# Patient Record
Sex: FEMALE | Race: BLACK OR AFRICAN AMERICAN | NOT HISPANIC OR LATINO | Employment: OTHER | URBAN - METROPOLITAN AREA
[De-identification: names, ages, dates, MRNs, and addresses within clinical notes are randomized per-mention and may not be internally consistent; named-entity substitution may affect disease eponyms.]

---

## 2017-02-10 ENCOUNTER — OFFICE VISIT (OUTPATIENT)
Dept: INTERNAL MEDICINE | Facility: CLINIC | Age: 67
End: 2017-02-10
Payer: MEDICARE

## 2017-02-10 VITALS
HEIGHT: 63 IN | WEIGHT: 220 LBS | OXYGEN SATURATION: 99 % | SYSTOLIC BLOOD PRESSURE: 110 MMHG | TEMPERATURE: 98 F | DIASTOLIC BLOOD PRESSURE: 70 MMHG | BODY MASS INDEX: 38.98 KG/M2

## 2017-02-10 DIAGNOSIS — E04.1 THYROID NODULE: ICD-10-CM

## 2017-02-10 DIAGNOSIS — I77.9 BILATERAL CAROTID ARTERY DISEASE: ICD-10-CM

## 2017-02-10 DIAGNOSIS — E78.2 MIXED HYPERLIPIDEMIA: ICD-10-CM

## 2017-02-10 DIAGNOSIS — E66.01 SEVERE OBESITY (BMI 35.0-39.9) WITH COMORBIDITY: ICD-10-CM

## 2017-02-10 DIAGNOSIS — M43.06 SPONDYLOLYSIS OF LUMBAR REGION: ICD-10-CM

## 2017-02-10 DIAGNOSIS — I10 ESSENTIAL HYPERTENSION: Primary | ICD-10-CM

## 2017-02-10 DIAGNOSIS — E55.9 VITAMIN D DEFICIENCY: ICD-10-CM

## 2017-02-10 DIAGNOSIS — E21.0 HYPERPARATHYROIDISM, PRIMARY: ICD-10-CM

## 2017-02-10 PROCEDURE — 99499 UNLISTED E&M SERVICE: CPT | Mod: S$GLB,,, | Performed by: FAMILY MEDICINE

## 2017-02-10 PROCEDURE — 99214 OFFICE O/P EST MOD 30 MIN: CPT | Mod: S$GLB,,, | Performed by: FAMILY MEDICINE

## 2017-02-10 PROCEDURE — 99999 PR PBB SHADOW E&M-EST. PATIENT-LVL III: CPT | Mod: PBBFAC,,, | Performed by: FAMILY MEDICINE

## 2017-02-10 PROCEDURE — 3074F SYST BP LT 130 MM HG: CPT | Mod: S$GLB,,, | Performed by: FAMILY MEDICINE

## 2017-02-10 PROCEDURE — 3078F DIAST BP <80 MM HG: CPT | Mod: S$GLB,,, | Performed by: FAMILY MEDICINE

## 2017-02-10 RX ORDER — LISINOPRIL AND HYDROCHLOROTHIAZIDE 20; 25 MG/1; MG/1
1 TABLET ORAL DAILY
Qty: 90 TABLET | Refills: 3 | Status: SHIPPED | OUTPATIENT
Start: 2017-02-10 | End: 2017-09-27 | Stop reason: SDUPTHER

## 2017-02-10 NOTE — PROGRESS NOTES
"Subjective:       Patient ID: Beth Souza is a 66 y.o. female.    Chief Complaint: Follow-up    HPI Comments: 66-year-old Afro-American female patient with Patient Active Problem List:     Hypertension     Vitamin D deficiency     Spondylolisthesis of lumbar region     Obesity, Class II, BMI 35.0-39.9, with comorbidity (see actual BMI)     Hyperlipidemia     Hyperparathyroidism, primary     Bilateral carotid artery disease     Thyroid nodule     Peripheral polyneuropathy     Cataract  Here for follow-up on chronic medical conditions, patient reports that she's been taking her medicines regularly and requesting refill on lisinopril with hydrochlorothiazide.  Patient denies of any discomfort with swallowing secondary to thyroid nodule, has been doing well after sebaceous cyst removal on the right side of the neck.   Patient continues to have low back pain off and on lately.   Has been taking gabapentin only as needed.   Patient was informed about hyperparathyroidism but does not follow up with nephrology.   Finished vitamin D by prescription in the past and would like to know whether she can get a refill.  Denies of any dizziness or chest pain or shortness of breath        Review of Systems   Constitutional: Negative for fatigue.   Eyes: Negative for visual disturbance.   Respiratory: Negative for shortness of breath.    Cardiovascular: Negative for chest pain and leg swelling.   Gastrointestinal: Negative for abdominal pain, nausea and vomiting.   Musculoskeletal: Positive for back pain. Negative for myalgias.   Skin: Negative for rash.   Neurological: Positive for numbness. Negative for dizziness, weakness, light-headedness and headaches.   Psychiatric/Behavioral: Negative for sleep disturbance.         Visit Vitals    /70    Temp 98.4 °F (36.9 °C) (Tympanic)    Ht 5' 3" (1.6 m)    Wt 99.8 kg (220 lb 0.3 oz)    SpO2 99%    BMI 38.97 kg/m2     Objective:      Physical Exam   Constitutional: She is " oriented to person, place, and time. She appears well-developed and well-nourished.   HENT:   Head: Normocephalic and atraumatic.   Mouth/Throat: Oropharynx is clear and moist.   Neck: No thyromegaly present.   Cardiovascular: Normal rate, regular rhythm and normal heart sounds.    No murmur heard.  Pulmonary/Chest: Effort normal and breath sounds normal. She has no wheezes.   Abdominal: Soft. Bowel sounds are normal. There is no tenderness.   Musculoskeletal: She exhibits no edema or tenderness.   Neurological: She is alert and oriented to person, place, and time.   Skin: Skin is warm and dry. No rash noted.   Positive for skin tags   Psychiatric: She has a normal mood and affect.         Assessment:       1. Essential hypertension    2. Vitamin D deficiency    3. Mixed hyperlipidemia    4. Hyperparathyroidism, primary    5. Bilateral carotid artery disease    6. Thyroid nodule    7. Severe obesity (BMI 35.0-39.9) with comorbidity    8. Spondylolysis of lumbar region        Plan:   Essential hypertension  -     Basic metabolic panel; Future; Expected date: 2/10/17  -     Lipid panel; Future; Expected date: 2/10/17  -     lisinopril-hydrochlorothiazide (PRINZIDE,ZESTORETIC) 20-25 mg Tab; Take 1 tablet by mouth once daily.  Dispense: 90 tablet; Refill: 3  -     EKG 12-lead; Future  Blood pressure stable today currently on lisinopril hydrodissected 20/25 mg, refill given today.  Encouraged to restrict salt intake and eat low-fat and low-cholesterol diet and exercise 30 minutes daily      Vitamin D deficiency  -     Vitamin D; Future; Expected date: 2/10/17  Will recheck vitamin D levels and replete accordingly    Mixed hyperlipidemia  -     Lipid panel; Future; Expected date: 2/10/17  Currently diet-controlled, will check fasting labs     Hyperparathyroidism, primary  -     PTH, intact; Future; Expected date: 2/10/17   Will recheck parathyroid levels    Bilateral carotid artery disease  -     US Carotid Bilateral;  Future; Expected date: 2/10/17   noted previous carotid ultrasound showing minimal atherosclerosis, will recheck labs on carotid ultrasound    Thyroid nodule  -     TSH; Future; Expected date: 2/10/17   patient clinically asymptomatic    Severe obesity (BMI 35.0-39.9) with comorbidity-encouraged to work on lifestyle modifications with diet and exercise to lose weight with BMI 38.9     Spondylolysis of lumbar region- advised to take gabapentin only as needed  Graded exercise regimen recommended

## 2017-02-10 NOTE — MR AVS SNAPSHOT
[facility]  [address] [facility] [place] [address]  Phone: [phone]  Fax: [phone]                  Beth Souza   2/10/2017 1:40 PM   Office Visit    Description:  Female : 1950   Provider:  Edna Oliva MD   Department:  [facility] - Internal Medicine           Reason for Visit     Follow-up           Diagnoses this Visit        Comments    Essential hypertension    -  Primary     Vitamin D deficiency         Mixed hyperlipidemia         Hyperparathyroidism, primary         Bilateral carotid artery disease         Thyroid nodule         Severe obesity (BMI 35.0-39.9) with comorbidity         Spondylolysis of lumbar region                To Do List           Future Appointments        Provider Department Dept Phone    2017 1:30 PM LABORATORY, [facility] [phone]    2017 2:00 PM [facility] [phone]    2017 2[phone]      Goals (5 Years of Data)     None      Follow-Up and Disposition     Return in about 6 months (around 8/10/2017), or if symptoms worsen or fail to improve.       These Medications        Disp Refills Start End    lisinopril-hydrochlorothiazide (PRINZIDE,ZESTORETIC) 20-25 mg Tab 90 tablet 3 2/10/2017     Take 1 tablet by mouth once daily. - Oral    Pharmacy: [facility] [address] [place] [address] [address] [facility] Ph #: [phone]         [facility] On Call     [facility] Care [facility] -  Assistance  Registered nurses in the [facility] provide clinical advisement, health education, appointment booking, and other advisory services.  Call for this free service at [phone].             Medications           Message regarding Medications     Verify the changes and/or additions to your medication regime listed below are the same as discussed with your clinician today.  If any of these changes or additions are  "incorrect, please notify your healthcare provider.        CHANGE how you are taking these medications     Start Taking Instead of    lisinopril-hydrochlorothiazide (PRINZIDE,ZESTORETIC) 20-25 mg Tab lisinopril-hydrochlorothiazide (PRINZIDE,ZESTORETIC) 20-25 mg Tab    Dosage:  Take 1 tablet by mouth once daily. Dosage:  take 1 tablet by mouth once daily    Reason for Change:  Reorder            Verify that the below list of medications is an accurate representation of the medications you are currently taking.  If none reported, the list may be blank. If incorrect, please contact your healthcare provider. Carry this list with you in case of emergency.           Current Medications     fluticasone (FLONASE) 50 mcg/actuation nasal spray 1 spray by Each Nare route once daily.    lisinopril-hydrochlorothiazide (PRINZIDE,ZESTORETIC) 20-25 mg Tab Take 1 tablet by mouth once daily.    MV,FE,MIN/LUTEIN (CENTRUM SILVER ULTRA WOMEN'S ORAL) Take 1 tablet by mouth once daily.           Clinical Reference Information           Your Vitals Were     BP Temp Height Weight SpO2 BMI    110/70 98.4 °F (36.9 °C) (Tympanic) 5' 3" (1.6 m) 99.8 kg (220 lb 0.3 oz) 99% 38.97 kg/m2      Blood Pressure          Most Recent Value    BP  110/70      Allergies as of 2/10/2017     Aspirin    Sulfa (Sulfonamide Antibiotics)      Immunizations Administered on Date of Encounter - 2/10/2017     None      Orders Placed During Today's Visit     Future Labs/Procedures Expected by Expires    Basic metabolic panel  2/10/2017 4/11/2018    Lipid panel  2/10/2017 4/11/2018    PTH, intact  2/10/2017 4/11/2018    TSH  2/10/2017 4/11/2018    US Carotid Bilateral  2/10/2017 2/10/2018    Vitamin D  2/10/2017 4/11/2018    EKG 12-lead  As directed 2/10/2018      [facility] Sign-Up     Activating your [facility] account is as easy as 1-2-3!     1) Visit [email or web address], select Sign Up Now, enter this activation code and your date of birth, then select Next.  Activation " code not generated  Current Patient Portal Status: Account disabled      2) Create a username and password to use when you visit [facility] in the future and select a security question in case you lose your password and select Next.    3) Enter your e-mail address and click Sign Up!    Additional Information  If you have questions, please e-mail [email or web address] or call [phone] to talk to our [email or web address] staff. Remember, [facility] is NOT to be used for urgent needs. For medical emergencies, dial 911.         Language Assistance Services     ATTENTION: Language assistance services are available, free of charge. Please call [phone].      ATENCIÓN: Si habla heike, tiene a moreira disposición servicios gratuitos de asistencia lingüística. Llame al [phone].     CHÚ Ý: N?u b?n nói Ti?ng Vi?t, có các d?ch v? h? tr? ngôn ng? mi?n phí dành cho b?n. G?i s? [phone].         [facility] complies with applicable Federal civil rights laws and does not discriminate on the basis of race, color, national origin, age, disability, or sex.

## 2017-02-11 ENCOUNTER — LAB VISIT (OUTPATIENT)
Dept: LAB | Facility: HOSPITAL | Age: 67
End: 2017-02-11
Attending: FAMILY MEDICINE
Payer: MEDICARE

## 2017-02-11 DIAGNOSIS — E21.0 HYPERPARATHYROIDISM, PRIMARY: ICD-10-CM

## 2017-02-11 DIAGNOSIS — E04.1 THYROID NODULE: ICD-10-CM

## 2017-02-11 DIAGNOSIS — E55.9 VITAMIN D DEFICIENCY: ICD-10-CM

## 2017-02-11 DIAGNOSIS — I10 ESSENTIAL HYPERTENSION: ICD-10-CM

## 2017-02-11 DIAGNOSIS — E78.2 MIXED HYPERLIPIDEMIA: ICD-10-CM

## 2017-02-11 LAB
25(OH)D3+25(OH)D2 SERPL-MCNC: 18 NG/ML
ANION GAP SERPL CALC-SCNC: 10 MMOL/L
BUN SERPL-MCNC: 17 MG/DL
CALCIUM SERPL-MCNC: 9.2 MG/DL
CHLORIDE SERPL-SCNC: 104 MMOL/L
CHOLEST/HDLC SERPL: 3.9 {RATIO}
CO2 SERPL-SCNC: 28 MMOL/L
CREAT SERPL-MCNC: 0.8 MG/DL
EST. GFR  (AFRICAN AMERICAN): >60 ML/MIN/1.73 M^2
EST. GFR  (NON AFRICAN AMERICAN): >60 ML/MIN/1.73 M^2
GLUCOSE SERPL-MCNC: 86 MG/DL
HDL/CHOLESTEROL RATIO: 25.8 %
HDLC SERPL-MCNC: 225 MG/DL
HDLC SERPL-MCNC: 58 MG/DL
LDLC SERPL CALC-MCNC: 148.8 MG/DL
NONHDLC SERPL-MCNC: 167 MG/DL
POTASSIUM SERPL-SCNC: 3.6 MMOL/L
PTH-INTACT SERPL-MCNC: 83 PG/ML
SODIUM SERPL-SCNC: 142 MMOL/L
TRIGL SERPL-MCNC: 91 MG/DL
TSH SERPL DL<=0.005 MIU/L-ACNC: 1.11 UIU/ML

## 2017-02-11 PROCEDURE — 82306 VITAMIN D 25 HYDROXY: CPT

## 2017-02-11 PROCEDURE — 83970 ASSAY OF PARATHORMONE: CPT

## 2017-02-11 PROCEDURE — 80048 BASIC METABOLIC PNL TOTAL CA: CPT

## 2017-02-11 PROCEDURE — 80061 LIPID PANEL: CPT

## 2017-02-11 PROCEDURE — 84443 ASSAY THYROID STIM HORMONE: CPT

## 2017-02-11 PROCEDURE — 36415 COLL VENOUS BLD VENIPUNCTURE: CPT | Mod: PO

## 2017-02-13 ENCOUNTER — TELEPHONE (OUTPATIENT)
Dept: INTERNAL MEDICINE | Facility: CLINIC | Age: 67
End: 2017-02-13

## 2017-02-13 ENCOUNTER — CLINICAL SUPPORT (OUTPATIENT)
Dept: CARDIOLOGY | Facility: CLINIC | Age: 67
End: 2017-02-13
Payer: MEDICARE

## 2017-02-13 ENCOUNTER — HOSPITAL ENCOUNTER (OUTPATIENT)
Dept: RADIOLOGY | Facility: HOSPITAL | Age: 67
Discharge: HOME | End: 2017-02-13
Attending: FAMILY MEDICINE
Payer: MEDICARE

## 2017-02-13 DIAGNOSIS — E55.9 VITAMIN D DEFICIENCY: ICD-10-CM

## 2017-02-13 DIAGNOSIS — I10 ESSENTIAL HYPERTENSION: ICD-10-CM

## 2017-02-13 DIAGNOSIS — I77.9 BILATERAL CAROTID ARTERY DISEASE: ICD-10-CM

## 2017-02-13 DIAGNOSIS — E21.3 HYPERPARATHYROIDISM: ICD-10-CM

## 2017-02-13 DIAGNOSIS — E55.9 VITAMIN D DEFICIENCY: Primary | ICD-10-CM

## 2017-02-13 PROCEDURE — 93880 EXTRACRANIAL BILAT STUDY: CPT | Mod: 26,,, | Performed by: RADIOLOGY

## 2017-02-13 PROCEDURE — 93000 ELECTROCARDIOGRAM COMPLETE: CPT | Mod: S$GLB,,, | Performed by: NUCLEAR MEDICINE

## 2017-02-13 RX ORDER — ERGOCALCIFEROL 1.25 MG/1
50000 CAPSULE ORAL
Qty: 10 CAPSULE | Refills: 0 | Status: SHIPPED | OUTPATIENT
Start: 2017-02-13 | End: 2017-05-03 | Stop reason: SDUPTHER

## 2017-02-13 RX ORDER — ERGOCALCIFEROL 1.25 MG/1
50000 CAPSULE ORAL
Qty: 10 CAPSULE | Refills: 0 | Status: SHIPPED | OUTPATIENT
Start: 2017-02-13 | End: 2017-02-13 | Stop reason: SDUPTHER

## 2017-02-13 NOTE — TELEPHONE ENCOUNTER
Low vitamin D levels noted, prescription will be sent to pharmacy to be taken once a week.  Elevated parathyroid levels noted, please discuss further with nephrology, referral has been placed.  Otherwise stable labs

## 2017-03-08 ENCOUNTER — OFFICE VISIT (OUTPATIENT)
Dept: NEPHROLOGY | Facility: CLINIC | Age: 67
End: 2017-03-08
Payer: MEDICARE

## 2017-03-08 VITALS
HEART RATE: 76 BPM | WEIGHT: 220.69 LBS | HEIGHT: 63 IN | BODY MASS INDEX: 39.1 KG/M2 | DIASTOLIC BLOOD PRESSURE: 70 MMHG | SYSTOLIC BLOOD PRESSURE: 104 MMHG

## 2017-03-08 DIAGNOSIS — E21.0 HYPERPARATHYROIDISM, PRIMARY: Primary | ICD-10-CM

## 2017-03-08 PROCEDURE — 99205 OFFICE O/P NEW HI 60 MIN: CPT | Mod: S$GLB,,, | Performed by: INTERNAL MEDICINE

## 2017-03-08 PROCEDURE — 1160F RVW MEDS BY RX/DR IN RCRD: CPT | Mod: S$GLB,,, | Performed by: INTERNAL MEDICINE

## 2017-03-08 PROCEDURE — 3074F SYST BP LT 130 MM HG: CPT | Mod: S$GLB,,, | Performed by: INTERNAL MEDICINE

## 2017-03-08 PROCEDURE — 99499 UNLISTED E&M SERVICE: CPT | Mod: S$GLB,,, | Performed by: INTERNAL MEDICINE

## 2017-03-08 PROCEDURE — 3078F DIAST BP <80 MM HG: CPT | Mod: S$GLB,,, | Performed by: INTERNAL MEDICINE

## 2017-03-08 PROCEDURE — 99999 PR PBB SHADOW E&M-EST. PATIENT-LVL III: CPT | Mod: PBBFAC,,, | Performed by: INTERNAL MEDICINE

## 2017-03-08 NOTE — MR AVS SNAPSHOT
[facility]  [address] [facility] [place] [address]  Phone: [phone]  Fax: [phone]                  Beth Souza   3/8/2017 1:40 PM   Office Visit    Description:  Female : 1950   Provider:  Malachi Ramírez MD   Department:  [facility] - Nephrology           Reason for Visit     Consult     Hyperparathyroidism           Diagnoses this Visit        Comments    Hyperparathyroidism, primary    -  Primary            To Do List           Future Appointments        Provider Department Dept Phone    3/13/2017 3:20 PM Priyanka Ny MD [facility] [phone]    2017 9:35 AM LABORATORY, [facility] [phone]    2017 8:00 AM Malachi Ramírez MD [facility] [phone]      Goals (5 Years of Data)     None      Follow-Up and Disposition     Return in about 3 months (around 2017).      [facility]     [facility] Nurse Care [facility] - 24/7 Assistance  Registered nurses in the [facility] provide clinical advisement, health education, appointment booking, and other advisory services.  Call for this free service at [phone].             Medications           Message regarding Medications     Verify the changes and/or additions to your medication regime listed below are the same as discussed with your clinician today.  If any of these changes or additions are incorrect, please notify your healthcare provider.        STOP taking these medications     MV,FE,MIN/LUTEIN (CENTRUM SILVER ULTRA WOMEN'S ORAL) Take 1 tablet by mouth once daily.           Verify that the below list of medications is an accurate representation of the medications you are currently taking.  If none reported, the list may be blank. If incorrect, please contact your healthcare provider. Carry this list with you in case of emergency.           Current Medications     ergocalciferol (ERGOCALCIFEROL) 50,000 unit Cap Take 1 capsule (50,000 Units total) by  "mouth every 7 days.    fluticasone (FLONASE) 50 mcg/actuation nasal spray 1 spray by Each Nare route once daily.    lisinopril-hydrochlorothiazide (PRINZIDE,ZESTORETIC) 20-25 mg Tab Take 1 tablet by mouth once daily.           Clinical Reference Information           Your Vitals Were     BP Pulse Height Weight BMI    104/70 76 5' 3" (1.6 m) 100.1 kg (220 lb 10.9 oz) 39.09 kg/m2      Blood Pressure          Most Recent Value    BP  104/70      Allergies as of 3/8/2017     Aspirin    Sulfa (Sulfonamide Antibiotics)      Immunizations Administered on Date of Encounter - 3/8/2017     None      Orders Placed During Today's Visit     Future Labs/Procedures Expected by Expires    PTH, intact  3/8/2017 5/7/2018    Recurring Lab Work Interval Expires    Renal function panel  Every Weekday until 3/8/2018 3/8/2018      [facility] Sign-Up     Activating your [facility] account is as easy as 1-2-3!     1) Visit [email or web address], select Sign Up Now, enter this activation code and your date of birth, then select Next.  Activation code not generated  Current Patient Portal Status: Account disabled      2) Create a username and password to use when you visit [facility] in the future and select a security question in case you lose your password and select Next.    3) Enter your e-mail address and click Sign Up!    Additional Information  If you have questions, please e-mail [email or web address] or call [phone] to talk to our [facility] staff. Remember, [facility] is NOT to be used for urgent needs. For medical emergencies, dial 911.         Instructions    Call us if you develop severe constipation, severe abdominal cramping , memory loss.        Language Assistance Services     ATTENTION: Language assistance services are available, free of charge. Please call [phone].      ATENCIÓN: Si maddisonla heike, tiene a moreira disposición servicios gratuitos de asistencia lingüística. Llame al [phone].     CHÚ Ý: N?u b?n nói Ti?ng " Vi?t, có các d?ch v? h? tr? ngôn ng? mi?n phí dành cho b?n. G?i s? [phone].         [facility] - Nephrology complies with applicable Federal civil rights laws and does not discriminate on the basis of race, color, national origin, age, disability, or sex.

## 2017-03-08 NOTE — PROGRESS NOTES
NEPHROLOGY CONSULTATION    PHYSICIAN REQUESTING THE CONSULT: Dr. Edna Oliva    REASON FOR CONSULTATION: Hyperparathyroidism    66 y.o. female with history of vitamin D deficiency, HTN, hyperlipidemia, neuropathy, bilateral carotid artery disease  presents to the renal clinic for evaluation of hyperparathyroidism. A consultation has been requested by the patient's PMD, Dr. Edna Oliva. Patient today presents to the clinic complaining of headaches, back pain, left foot paresthesia. She denies any chest pain, SOB, hemoptysis, abdominal or flank pain, diarrhea, nausea/vomiting, hematuria, dysuria, LE swelling, rashes, nasal congestion. Patient reports intermittent NSAID use in the form of Aleve (1-2 tabs per day).   Patient has a longstanding history of HTN that was diagnosed many years ago.  BP is currently well controlled at 104/70. In addition, patient reports history of vitamin D deficiency (currently on vitamin D supplements, last 25-OH vitamin D level was 18), Le neuropathy (left leg) and carotid artery disease. She denies any history of nephrolithiasis.  Patient denies any history of calcium/PTH abnormalities in her family. Laboratory review revealed that the patient's PTH is mildly elevated at 85 (2/11/17). Last calcium from 2/11/17 was 9.2.         Past Medical History:   Diagnosis Date    Cataract     Hyperlipidemia     Hypertension     Lumbar spondylosis     Vitamin D deficiency        Past Surgical History:   Procedure Laterality Date    PARTIAL HYSTERECTOMY         Review of patient's allergies indicates:   Allergen Reactions    Aspirin      Bruising    Sulfa (sulfonamide antibiotics)        Current Outpatient Prescriptions   Medication Sig Dispense Refill    ergocalciferol (ERGOCALCIFEROL) 50,000 unit Cap Take 1 capsule (50,000 Units total) by mouth every 7 days. 10 capsule 0    fluticasone (FLONASE) 50 mcg/actuation nasal spray 1 spray by Each Nare route once daily. 16 g 0     "lisinopril-hydrochlorothiazide (PRINZIDE,ZESTORETIC) 20-25 mg Tab Take 1 tablet by mouth once daily. 90 tablet 3     No current facility-administered medications for this visit.        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 No    Breast-Feeding No     Social History Narrative       Review of Systems:  1. GENERAL: patient denies any fever, weight changes, generalized weakness, dizziness.  2. HEENT: patient reports intermittent headaches, no visual disturbances, swallowing problems, sinus pain, nasal congestion.  3. CARDIOVASCULAR: patient denies chest pain, palpitations.  4. PULMONARY: patient denies SOB, coughing, hemoptysis, wheezing.  5. GASTROINTESTINAL: patient denies abdominal pain/cramping, nausea, vomiting, diarrhea, no constipation  6. GENITOURINARY: patient denies dysuria, hematuria, hesitancy, frequency.  7. EXTREMITIES: patient denies LE edema, LE cramping.  8. DERMATOLOGY: patient denies rashes, ulcers.  9. NEURO: patient denies tremors, extremity weakness, she reports extremity numbness/tingling.  10. MUSCULOSKELETAL: patient reports intermittent back pain  11. HEMATOLOGY: patient denies rectal or gum bleeding.  12: PSYCH: patient denies anxiety, depression.      PHYSICAL EXAM:    /70  Pulse 76  Ht 5' 3" (1.6 m)  Wt 100.1 kg (220 lb 10.9 oz)  BMI 39.09 kg/m2    GENERAL: Pleasant " overweight lady presents to clinic with non-labored breathing.  HEENT: PER, no nasal discharge, no icterus, no oral exudates, moist mucosal membranes.  NECK: no thyroid mass, no lymphadenopathy.  HEART: RRR S1/S2, no rubs, good peripheral pulses.  LUNGS: CTA bilaterally, no wheezing, breathing is nonlabored.  ABDOMEN: soft, nontender, not distended, bowel sounds are present, no abdominal hernia.  EXTREM: no LE edema.  SKIN: no rashes, skin is warm and dry.  NEURO: A & O x 3, no obvious focal signs.    LABORATORY RESULTS:    Lab Results   Component Value Date    CREATININE 0.8 02/11/2017    BUN 17 02/11/2017     02/11/2017    K 3.6 02/11/2017     02/11/2017    CO2 28 02/11/2017      Lab Results   Component Value Date    PTH 83.0 (H) 02/11/2017    CALCIUM 9.2 02/11/2017     No results found for: ALBUMIN  Lab Results   Component Value Date    WBC 6.23 06/03/2015    HGB 12.1 06/03/2015    HCT 37.3 06/03/2015    MCV 89 06/03/2015     (H) 06/03/2015     25-OH vitamin D: 18 (2/11/17)    ASSESSMENT AND PLAN:  66 y.o. female with history of vitamin D deficiency, HTN, hyperlipidemia, neuropathy, bilateral carotid artery disease  presents to the renal clinic for evaluation of hyperparathyroidism    1. Hyperparathyroidism: Patient likely suffers from mild primary hyperparathyroidism. No interventions needed at present.. She will return in 3 months for follow up.    2. Electrolytes: Calcium level at goal at present (9.2). Will check albumin level.     3. Acid base status: No acute issues.    4. Volume: Euvolemic.     5. Hypertension: Good BP control.     6. Medications: Reviewed. Agree with current medical regimen. OK to continue vitamin D for now.       Thank you very much for this consult. Please see my note in Epic for recommendations.    Total time spent was about 45 min. 50 % or more was spend on counseling about treatment options disease etiology. Level 5 consult.

## 2017-03-08 NOTE — LETTER
March 8, 2017      Edna Oliva MD  [address] [facility] [address] [place]  [place] [address]           [facility]  [address] [place] [address]  Phone: [phone]  Fax: [phone]          Patient: Beth Souza   MR Number: [id number]   YOB: 1950   Date of Visit: 3/8/2017       Dear Dr. Edna Oliva:    Thank you for referring Beth Souza to me for evaluation. Attached you will find relevant portions of my assessment and plan of care.    If you have questions, please do not hesitate to call me. I look forward to following Beth Souza along with you.    Sincerely,    Malachi Ramírez MD    Enclosure  CC:  No Recipients    If you would like to receive this communication electronically, please contact [email or web address] or [phone] to request more information on [employer] Link access.    For providers and/or their staff who would like to refer a patient to [facility], please contact us through our one-stop-shop provider referral line, [facility], at [phone].    If you feel you have received this communication in error or would no longer like to receive these types of communications, please e-mail [email or web address]

## 2017-03-10 ENCOUNTER — TELEPHONE (OUTPATIENT)
Dept: PAIN MEDICINE | Facility: CLINIC | Age: 67
End: 2017-03-10

## 2017-03-10 NOTE — TELEPHONE ENCOUNTER
----- Message from Melody Neff sent at 3/9/2017  3:33 PM CST -----  Contact: pt   Pt was told by dr love that there was nothing else she could do for her and told her to schedule with Dr Abbasi,,, system would not allow me to schedule,,, please call pt back to set up appt at [phone]

## 2017-03-16 ENCOUNTER — TELEPHONE (OUTPATIENT)
Dept: PAIN MEDICINE | Facility: CLINIC | Age: 67
End: 2017-03-16

## 2017-03-16 NOTE — TELEPHONE ENCOUNTER
----- Message from Amita Sanches sent at 3/16/2017  3:26 PM CDT -----  Contact: Pt  Pt will be late for her appt. Pt went to [facility] instead of [facility]. Pt is on her way. Pt can be reached at [phone].

## 2017-03-20 ENCOUNTER — OFFICE VISIT (OUTPATIENT)
Dept: PAIN MEDICINE | Facility: CLINIC | Age: 67
End: 2017-03-20
Payer: MEDICARE

## 2017-03-20 VITALS
DIASTOLIC BLOOD PRESSURE: 81 MMHG | WEIGHT: 220 LBS | HEART RATE: 64 BPM | SYSTOLIC BLOOD PRESSURE: 121 MMHG | HEIGHT: 63 IN | BODY MASS INDEX: 38.98 KG/M2

## 2017-03-20 DIAGNOSIS — M70.70 BURSITIS OF HIP, UNSPECIFIED LATERALITY: ICD-10-CM

## 2017-03-20 DIAGNOSIS — M46.1 SACROILIITIS: ICD-10-CM

## 2017-03-20 DIAGNOSIS — M48.061 STENOSIS, SPINAL, LUMBAR: Primary | ICD-10-CM

## 2017-03-20 PROCEDURE — 3074F SYST BP LT 130 MM HG: CPT | Mod: S$GLB,,, | Performed by: ANESTHESIOLOGY

## 2017-03-20 PROCEDURE — 99999 PR PBB SHADOW E&M-EST. PATIENT-LVL III: CPT | Mod: PBBFAC,,, | Performed by: ANESTHESIOLOGY

## 2017-03-20 PROCEDURE — 99213 OFFICE O/P EST LOW 20 MIN: CPT | Mod: S$GLB,,, | Performed by: ANESTHESIOLOGY

## 2017-03-20 PROCEDURE — 3079F DIAST BP 80-89 MM HG: CPT | Mod: S$GLB,,, | Performed by: ANESTHESIOLOGY

## 2017-03-20 PROCEDURE — 99499 UNLISTED E&M SERVICE: CPT | Mod: S$GLB,,, | Performed by: ANESTHESIOLOGY

## 2017-03-20 PROCEDURE — 1160F RVW MEDS BY RX/DR IN RCRD: CPT | Mod: S$GLB,,, | Performed by: ANESTHESIOLOGY

## 2017-03-20 RX ORDER — GABAPENTIN 300 MG/1
300 CAPSULE ORAL NIGHTLY
Qty: 30 CAPSULE | Refills: 0 | Status: SHIPPED | OUTPATIENT
Start: 2017-03-20 | End: 2017-04-24

## 2017-03-20 RX ORDER — MELOXICAM 15 MG/1
15 TABLET ORAL DAILY
Qty: 30 TABLET | Refills: 0 | Status: SHIPPED | OUTPATIENT
Start: 2017-03-20 | End: 2017-04-19

## 2017-03-20 NOTE — PROGRESS NOTES
Chief Pain Complaint:  Bilateral hip and thigh pain    History of Present Illness:  This patient is a 66 y.o. female who presents today complaining of the above noted pain/s. The patient describes the pain as follows.    - duration of pain: > 3 years   - timing: constant   - character: variable, unable to describe  - radiating, dermatomal: extends into bilateral lower extremities  - antecedent trauma, prior spinal surgery: no prior trauma, no prior spinal surgery   - pertinent negatives: No fever, No chills, No weight loss, No bladder dysfunction, No bowel dysfunction, No saddle anesthesia  - pertinent positives: left leg weakness    - medications, other therapies tried (physical therapy, injections):     >> apap, NSAIDs    >> Has previously undergone Physical Therapy    >> Has previously undergone spinal injection/s, LESI's per reporting        Imaging / Labs / Studies (reviewed on 3/20/2017):      Results for orders placed during the hospital encounter of 08/03/16   MRI Lumbar Spine Without Contrast     Findings: No priors.  Multiplanar sequences were obtained of the lumbar spine.  Vertebral alignment demonstrates grade 1 spondylolisthesis of L4 on L5.  The conus ends at the level of L1.  There is desiccation of lumbar intervertebral discs with loss of disc height at L5-S1.  There discogenic changes in the endplates at this level as well as marginal osteophyte formation at L2-3 through L5-S1.  There are no compression fractures or acute osseous abnormalities identified.  Schmorl's node is seen inferior endplate of L5.  Axial images were acquired through the disc spaces T11-12 and T12-L1 and L2-3 through L5-S1.  At T11-12 there is mild circumferential bulging of the disc and facet arthropathy with mild bilateral foraminal narrowing and no significant canal stenosis.  At T12-L1 there is mild bulging of the disc and facet hypertrophy without significant canal or foraminal narrowing.  At L2-3 there is facet and  ligamentum flavum hypertrophy with minimal bulging of the disc.  There is no significant canal or foraminal stenosis.  At L3-4 prominent facet and ligamentum flavum hypertrophy and diffuse posterior bulging of the disc history moderate bilateral foraminal stenosis.  Facet and ligamentum flavum hypertrophy creates a mass effect upon the posterior thecal sac.   At L4-5 significant facet and ligamentum flavum hypertrophy due to unroofing of the disc secondary to spondylolisthesis.  There is diffuse bulging of the disc and severe canal and foraminal stenosis.  AP diameter of the spinal canal is about 8 mm and of the thecal sac is about 5.6 mm at this level.  L5-S1, there is prominent facet arthropathy with diffuse bulging of the disc which is creating moderately severe bilateral foraminal stenosis.  No significant canal stenosis.              Results for orders placed during the hospital encounter of 05/13/16   X-Ray Lumbar Spine Complete 5 View    Narrative Five view lumbar spine.  Findings: There is multilevel degenerative vertebral endplate spurring, disc space narrowing, and facet arthropathy.  Vacuum disc phenomenon at L5-S1.  Grade one anterolisthesis at L4-L5.  Pedicles appear intact.  No compression fracture.              Review of Systems:  CONSTITUTIONAL: patient denies any fever, chills, or weight loss  SKIN: patient denies any rash or itching  RESPIRATORY: patient reports dyspnea with exertion  GASTROINTESTINAL: patient denies having any diarrhea, constipation, or bowel incontinence  GENITOURINARY: patient denies having any abnormal bladder function    MUSCULOSKELETAL:  - patient complains of the above noted pain/s (see chief pain complaint)    NEUROLOGICAL:   - pain as above  - strength in Lower extremities is decreased, on the LEFT  - sensation in Lower extremities is abnormal, on the LEFT  - patient denies any loss of bowel or bladder control      PSYCHIATRIC: patient denies any change in  "mood      Physical Exam:  /81  Pulse 64  Ht 5' 3" (1.6 m)  Wt 99.8 kg (220 lb)  BMI 38.97 kg/m2 (reviewed on 3/20/2017)  General: alert and oriented, in no apparent distress  Gait: normal gait  Skin: No rashes, No discoloration, No obvious lesions  HEENT: EOMI  Respiratory: respirations nonlabored    Musculoskeletal:  - Any pain on flexion, extension, rotation:    >> pain on extension and rotation  - Straight Leg Raise:     >> LEFT :: negative    >> RIGHT :: negative    - Any tenderness to palpation across paraspinal muscles, joints, bursae:     >> across lumbar paraspinals < SI joints and GTB b/l    Neuro:  - Extremity Strength:     >> LEFT :: 5/5    >> RIGHT :: 5/5  - Extremity Reflexes:    >> LEFT  :: 2+    >> RIGHT :: 2+  - Sensory (sensation to light touch):    >> LEFT :: intact    >> RIGHT :: intact     Psych:  Mood and affect is appropriate      Assessment:  Sacroiliitis  Greater Trochanteric Bursitis  Spondylolisthesis  Lumbar Spondylosis  Lumbar DDD  Lumbar radiculopathy  Lumbar spinal stenosis    Plan:  Patient care today for follow-up visit.  She was last seen in August 2016.  She has pain as above, she had LESI x 2 years prior that never helped, she does not want to move forward with any spinal injection at this time.  I previously discussed trying mobic and gabapentin, along with physical therapy.  I discuss all treatment options with her today in clinic including medications, physical therapy, spinal injections, surgical intervention.  It is unclear what if anything patient wants to do to treat her pain.  Ultimately she decides that she wants to try some type of medication.  I will again prescribed Mobic and gabapentin and have her back in 4 weeks to evaluate efficacy.  Imaging / studies reviewed, detailed above.  I discussed in detail the risks, benefits, and alternatives to any and all potential treatment options.  All questions and concerns were fully addressed today in clinic.      >>Pain " Disability Questionnaire:  8-22-16 :: 33    >>Pain Disability Index:  3/20/2017 :: 35    >>ORT:  3/20/2017 :: 0

## 2017-03-20 NOTE — MR AVS SNAPSHOT
[facility]  [address] [place] [address]  Phone: [phone]  Fax: [phone]                  Beth Souza   3/20/2017 11:20 AM   Office Visit    Description:  Female : 1950   Provider:  Nuno Abbasi MD   Department:  [facility]           Reason for Visit     Back Pain           Diagnoses this Visit        Comments    Stenosis, spinal, lumbar    -  Primary     Sacroiliitis         Bursitis of hip, unspecified laterality                To Do List           Future Appointments        Provider Department Dept Phone    2017 10:40 AM Nuno Abbasi MD [facility] [phone]    2017 9:35 AM [facility] [phone]    2017 8:00 AM Malachi Ramírez MD [facility] Nephrology [phone]      Goals (5 Years of Data)     None      Follow-Up and Disposition     Return in about 4 weeks (around 2017).      [facility]     [facility] Nurse Care Line -  Assistance  Registered nurses in the [facility] provide clinical advisement, health education, appointment booking, and other advisory services.  Call for this free service at [phone].             Medications           Message regarding Medications     Verify the changes and/or additions to your medication regime listed below are the same as discussed with your clinician today.  If any of these changes or additions are incorrect, please notify your healthcare provider.             Verify that the below list of medications is an accurate representation of the medications you are currently taking.  If none reported, the list may be blank. If incorrect, please contact your healthcare provider. Carry this list with you in case of emergency.           Current Medications     ergocalciferol (ERGOCALCIFEROL) 50,000 unit Cap Take 1 capsule (50,000 Units total) by mouth every 7 days.    fluticasone  "(FLONASE) 50 mcg/actuation nasal spray 1 spray by Each Nare route once daily.    lisinopril-hydrochlorothiazide (PRINZIDE,ZESTORETIC) 20-25 mg Tab Take 1 tablet by mouth once daily.           Clinical Reference Information           Your Vitals Were     BP Pulse Height Weight BMI    121/81 64 5' 3" (1.6 m) 99.8 kg (220 lb) 38.97 kg/m2      Blood Pressure          Most Recent Value    BP  121/81      Allergies as of 3/20/2017     Aspirin    Sulfa (Sulfonamide Antibiotics)      Immunizations Administered on Date of Encounter - 3/20/2017     None      [facility] Sign-Up     Activating your [facility] account is as easy as 1-2-3!     1) Visit [email or web address], select Sign Up Now, enter this activation code and your date of birth, then select Next.  Activation code not generated  Current Patient Portal Status: Account disabled      2) Create a username and password to use when you visit [facility] in the future and select a security question in case you lose your password and select Next.    3) Enter your e-mail address and click Sign Up!    Additional Information  If you have questions, please e-mail [email or web address] or call [phone] to talk to our [facility] staff. Remember, [facility] is NOT to be used for urgent needs. For medical emergencies, dial 911.         Language Assistance Services     ATTENTION: Language assistance services are available, free of charge. Please call [phone].      ATENCIÓN: Si habla español, tiene a moreira disposición servicios gratuitos de asistencia lingüística. Llame al [phone].     CHÚ Ý: N?u b?n nói Ti?ng Vi?t, có các d?ch v? h? tr? ngôn ng? mi?n phí dành cho b?n. G?i s? [phone].         [facility] complies with applicable Federal civil rights laws and does not discriminate on the basis of race, color, national origin, age, disability, or sex.        "

## 2017-04-24 ENCOUNTER — OFFICE VISIT (OUTPATIENT)
Dept: PAIN MEDICINE | Facility: CLINIC | Age: 67
End: 2017-04-24
Payer: MEDICARE

## 2017-04-24 VITALS
WEIGHT: 222 LBS | RESPIRATION RATE: 18 BRPM | SYSTOLIC BLOOD PRESSURE: 135 MMHG | HEIGHT: 63 IN | DIASTOLIC BLOOD PRESSURE: 78 MMHG | BODY MASS INDEX: 39.34 KG/M2 | HEART RATE: 57 BPM

## 2017-04-24 DIAGNOSIS — M46.1 SACROILIITIS: ICD-10-CM

## 2017-04-24 DIAGNOSIS — M54.16 BILATERAL LUMBAR RADICULOPATHY: ICD-10-CM

## 2017-04-24 DIAGNOSIS — M48.061 STENOSIS, SPINAL, LUMBAR: Primary | ICD-10-CM

## 2017-04-24 DIAGNOSIS — M47.817 SPONDYLOSIS OF LUMBOSACRAL REGION WITHOUT MYELOPATHY OR RADICULOPATHY: ICD-10-CM

## 2017-04-24 DIAGNOSIS — M51.36 DDD (DEGENERATIVE DISC DISEASE), LUMBAR: ICD-10-CM

## 2017-04-24 PROCEDURE — 99999 PR PBB SHADOW E&M-EST. PATIENT-LVL III: CPT | Mod: PBBFAC,,, | Performed by: PHYSICIAN ASSISTANT

## 2017-04-24 PROCEDURE — 3075F SYST BP GE 130 - 139MM HG: CPT | Mod: S$GLB,,, | Performed by: PHYSICIAN ASSISTANT

## 2017-04-24 PROCEDURE — 99499 UNLISTED E&M SERVICE: CPT | Mod: S$GLB,,, | Performed by: PHYSICIAN ASSISTANT

## 2017-04-24 PROCEDURE — 1160F RVW MEDS BY RX/DR IN RCRD: CPT | Mod: S$GLB,,, | Performed by: PHYSICIAN ASSISTANT

## 2017-04-24 PROCEDURE — 99214 OFFICE O/P EST MOD 30 MIN: CPT | Mod: S$GLB,,, | Performed by: PHYSICIAN ASSISTANT

## 2017-04-24 PROCEDURE — 3078F DIAST BP <80 MM HG: CPT | Mod: S$GLB,,, | Performed by: PHYSICIAN ASSISTANT

## 2017-04-24 RX ORDER — TOPIRAMATE 25 MG/1
TABLET ORAL
Qty: 60 TABLET | Refills: 2 | Status: SHIPPED | OUTPATIENT
Start: 2017-04-24 | End: 2017-06-19 | Stop reason: SDUPTHER

## 2017-04-24 NOTE — PROGRESS NOTES
Chief Pain Complaint:  Bilateral hip and thigh pain    History of Present Illness:  This patient is a 66 y.o. female who presents today complaining of the above noted pain/s. The patient describes the pain as follows.    - duration of pain: > 3 years   - timing: constant   - character: variable, unable to describe  - radiating, dermatomal: extends into bilateral lower extremities  - antecedent trauma, prior spinal surgery: no prior trauma, no prior spinal surgery   - pertinent negatives: No fever, No chills, No weight loss, No bladder dysfunction, No bowel dysfunction, No saddle anesthesia  - pertinent positives: left leg weakness    - medications, other therapies tried (physical therapy, injections):     >> apap, NSAIDs    >> Has previously undergone Physical Therapy    >> Has previously undergone spinal injection/s: LESI's per reporting with minimal relief        Imaging / Labs / Studies (reviewed on 4/24/2017):    8/03/16 MRI Lumbar Spine Without Contrast     Findings: No priors.  Multiplanar sequences were obtained of the lumbar spine.  Vertebral alignment demonstrates grade 1 spondylolisthesis of L4 on L5.  The conus ends at the level of L1.  There is desiccation of lumbar intervertebral discs with loss of disc height at L5-S1.  There discogenic changes in the endplates at this level as well as marginal osteophyte formation at L2-3 through L5-S1.  There are no compression fractures or acute osseous abnormalities identified.  Schmorl's node is seen inferior endplate of L5.  Axial images were acquired through the disc spaces T11-12 and T12-L1 and L2-3 through L5-S1.  At T11-12 there is mild circumferential bulging of the disc and facet arthropathy with mild bilateral foraminal narrowing and no significant canal stenosis.  At T12-L1 there is mild bulging of the disc and facet hypertrophy without significant canal or foraminal narrowing.  At L2-3 there is facet and ligamentum flavum hypertrophy with minimal bulging  of the disc.  There is no significant canal or foraminal stenosis.  At L3-4 prominent facet and ligamentum flavum hypertrophy and diffuse posterior bulging of the disc history moderate bilateral foraminal stenosis.  Facet and ligamentum flavum hypertrophy creates a mass effect upon the posterior thecal sac.   At L4-5 significant facet and ligamentum flavum hypertrophy due to unroofing of the disc secondary to spondylolisthesis.  There is diffuse bulging of the disc and severe canal and foraminal stenosis.  AP diameter of the spinal canal is about 8 mm and of the thecal sac is about 5.6 mm at this level.  L5-S1, there is prominent facet arthropathy with diffuse bulging of the disc which is creating moderately severe bilateral foraminal stenosis.  No significant canal stenosis.       5/13/16 X-Ray Lumbar Spine Complete 5 View    Narrative Five view lumbar spine.  Findings: There is multilevel degenerative vertebral endplate spurring, disc space narrowing, and facet arthropathy.  Vacuum disc phenomenon at L5-S1.  Grade one anterolisthesis at L4-L5.  Pedicles appear intact.  No compression fracture.       Review of Systems:  CONSTITUTIONAL: patient denies any fever, chills, or weight loss  SKIN: patient denies any rash or itching  RESPIRATORY: patient reports dyspnea with exertion  GASTROINTESTINAL: patient denies having any diarrhea, constipation, or bowel incontinence  GENITOURINARY: patient denies having any abnormal bladder function    MUSCULOSKELETAL:  - patient complains of the above noted pain/s (see chief pain complaint)    NEUROLOGICAL:   - pain as above  - strength in Lower extremities is decreased, on the LEFT  - sensation in Lower extremities is abnormal, on the LEFT  - patient denies any loss of bowel or bladder control      PSYCHIATRIC: patient denies any change in mood      Physical Exam:    Vitals:  /78 (BP Location: Right arm, Patient Position: Sitting, BP Method: Automatic)  Pulse (!) 57  Resp  "18  Ht 5' 3" (1.6 m)  Wt 100.7 kg (222 lb)  BMI 39.33 kg/m2   (reviewed on 4/24/2017)    General: alert and oriented, in no apparent distress  Gait: normal gait  Skin: No rashes, No discoloration, No obvious lesions  HEENT: EOMI  Respiratory: respirations nonlabored    Musculoskeletal:  - Any pain on flexion, extension, rotation:    >> pain on extension and rotation  - Straight Leg Raise:     >> LEFT :: negative    >> RIGHT :: negative  - Any tenderness to palpation across paraspinal muscles, joints, bursae:     >> across lumbar paraspinals     >> over bilateral SI joints + GT bursae    Neuro:  - Extremity Strength:     >> LEFT :: 5/5    >> RIGHT :: 5/5  - Extremity Reflexes:    >> LEFT  :: 2+    >> RIGHT :: 2+  - Sensory (sensation to light touch):    >> LEFT :: intact    >> RIGHT :: intact     Psych:  Mood and affect is appropriate      Assessment:  Sacroiliitis  Greater Trochanteric Bursitis  Spondylolisthesis  Lumbar Spondylosis  Lumbar DDD  Lumbar radiculopathy  Lumbar spinal stenosis    Plan:  Patient presents today for follow-up visit.  She had LESI x 2 years prior that never helped, so she does not want to move forward with any spinal injection at this time.    - Will start Topamax 25mg BID (with titration instructions, take 1 tablet QHS x 1 week then increase to BID increasing as tolerated). D/c gabapentin - as she tried it for 2 weeks and does not feel it to be very helpful.   - She is scared to take Mobic, so she never started this after reading side effects online.  - Start PT with passive modalities, including ice and heat, and active modalities, including a regimen for stretching and strengthening. Order sent to [facility].   - We also discussed compound cream, but she uses something over the counter that she feels works better.  RTC in 8 weeks after PT if needed. I discussed the risks, benefits, and alternatives to potential treatment options. All questions and concerns were fully addressed today " in clinic. Dr. Abbasi was consulted regarding the patient plan and agrees.              >>Pain Disability Questionnaire:  8-22-16 :: 33    >>Pain Disability Index:  3/20/2017 :: 35  4/24/2017 :: 55    >>ORT:  3/20/2017 :: 0

## 2017-04-24 NOTE — MR AVS SNAPSHOT
[facility]  [address] [place] [address]  Phone: [phone]  Fax: [phone]                  Beth Souza   2017 3:40 PM   Office Visit    Description:  Female : 1950   Provider:  Yolanda Sagastume PA-C   Department:  [facility]           Reason for Visit     Back Pain                To Do List           Future Appointments        Provider Department Dept Phone    2017 3:00 PM Yolanda Sagastume PA-C [facility] [phone]    2017 9:35 AM LAB[facility]AT[facility], [facility] [phone]    2017 8:00 AM Malachi Ramírez MD [facility] Nephrology [phone]      Goals (5 Years of Data)     None       These Medications        Disp Refills Start End    topiramate (TOPAMAX) 25 MG tablet 60 tablet 2 2017     Take 1 tablet (25 mg total) by mouth at night x 1 week then increase to 2 (two) times daily. Increase as tolerated.    Pharmacy: [facility] [address] [place] [address] [address] [place] [address] #: [phone]         [facility]     [facility] Care [facility] -  Assistance  Unless otherwise directed by your provider, please contact [facility], our nurse care line that is available for  assistance.     Registered nurses in the [facility] provide: appointment scheduling, clinical advisement, health education, and other advisory services.  Call: [phone] (toll free)               Medications           Message regarding Medications     Verify the changes and/or additions to your medication regime listed below are the same as discussed with your clinician today.  If any of these changes or additions are incorrect, please notify your healthcare provider.        START taking these NEW medications        Refills    topiramate (TOPAMAX) 25 MG tablet 2    Sig: Take 1 tablet (25 mg total) by mouth at night x 1 week then increase to 2 (two) times  "daily. Increase as tolerated.    Class: Normal      STOP taking these medications     gabapentin (NEURONTIN) 300 MG capsule Take 1 capsule (300 mg total) by mouth every evening. Take 45 min to 1 hour before bed as may cause drowsiness           Verify that the below list of medications is an accurate representation of the medications you are currently taking.  If none reported, the list may be blank. If incorrect, please contact your healthcare provider. Carry this list with you in case of emergency.           Current Medications     ergocalciferol (ERGOCALCIFEROL) 50,000 unit Cap Take 1 capsule (50,000 Units total) by mouth every 7 days.    fluticasone (FLONASE) 50 mcg/actuation nasal spray 1 spray by Each Nare route once daily.    lisinopril-hydrochlorothiazide (PRINZIDE,ZESTORETIC) 20-25 mg Tab Take 1 tablet by mouth once daily.    topiramate (TOPAMAX) 25 MG tablet Take 1 tablet (25 mg total) by mouth at night x 1 week then increase to 2 (two) times daily. Increase as tolerated.           Clinical Reference Information           Your Vitals Were     BP Pulse Resp Height Weight BMI    135/78 (BP Location: Right arm, Patient Position: Sitting, BP Method: Automatic) 57 18 5' 3" (1.6 m) 100.7 kg (222 lb) 39.33 kg/m2      Blood Pressure          Most Recent Value    BP  135/78      Allergies as of 4/24/2017     Aspirin    Sulfa (Sulfonamide Antibiotics)      Immunizations Administered on Date of Encounter - 4/24/2017     None      [facility] Sign-Up     Activating your [facility] account is as easy as 1-2-3!     1) Visit [email or web address], select Sign Up Now, enter this activation code and your date of birth, then select Next.  Activation code not generated  Current Patient Portal Status: Account disabled      2) Create a username and password to use when you visit [facility] in the future and select a security question in case you lose your password and select Next.    3) Enter your e-mail address and click Sign " Up!    Additional Information  If you have questions, please e-mail [email or web address] or call [phone] to talk to our [facility] staff. Remember, [facility] is NOT to be used for urgent needs. For medical emergencies, dial 911.         Language Assistance Services     ATTENTION: Language assistance services are available, free of charge. Please call [phone].      ATENCIÓN: Si habla español, tiene a moreira disposición servicios gratuitos de asistencia lingüística. Llame al [phone].     [facility] Ý: N?u b?n nói Ti?ng Vi?t, có các d?ch v? h? tr? ngôn ng? mi?n phí dành cho b?n. G?i s? [phone].         [facility] complies with applicable Federal civil rights laws and does not discriminate on the basis of race, color, national origin, age, disability, or sex.

## 2017-05-01 ENCOUNTER — LAB VISIT (OUTPATIENT)
Dept: LAB | Facility: HOSPITAL | Age: 67
End: 2017-05-01
Attending: FAMILY MEDICINE
Payer: MEDICARE

## 2017-05-01 ENCOUNTER — OFFICE VISIT (OUTPATIENT)
Dept: INTERNAL MEDICINE | Facility: CLINIC | Age: 67
End: 2017-05-01
Payer: MEDICARE

## 2017-05-01 VITALS
HEART RATE: 55 BPM | WEIGHT: 222.69 LBS | TEMPERATURE: 98 F | HEIGHT: 63 IN | OXYGEN SATURATION: 99 % | BODY MASS INDEX: 39.46 KG/M2 | SYSTOLIC BLOOD PRESSURE: 134 MMHG | DIASTOLIC BLOOD PRESSURE: 80 MMHG

## 2017-05-01 DIAGNOSIS — E04.1 THYROID NODULE: ICD-10-CM

## 2017-05-01 DIAGNOSIS — G62.9 PERIPHERAL POLYNEUROPATHY: ICD-10-CM

## 2017-05-01 DIAGNOSIS — Z23 NEED FOR PROPHYLACTIC VACCINATION WITH STREPTOCOCCUS PNEUMONIAE (PNEUMOCOCCUS) AND INFLUENZA VACCINES: ICD-10-CM

## 2017-05-01 DIAGNOSIS — Z12.39 BREAST SCREENING: ICD-10-CM

## 2017-05-01 DIAGNOSIS — E55.9 VITAMIN D DEFICIENCY: ICD-10-CM

## 2017-05-01 DIAGNOSIS — E21.0 HYPERPARATHYROIDISM, PRIMARY: ICD-10-CM

## 2017-05-01 DIAGNOSIS — I10 ESSENTIAL HYPERTENSION: ICD-10-CM

## 2017-05-01 DIAGNOSIS — R53.82 CHRONIC FATIGUE: ICD-10-CM

## 2017-05-01 DIAGNOSIS — E78.2 MIXED HYPERLIPIDEMIA: ICD-10-CM

## 2017-05-01 DIAGNOSIS — M43.16 SPONDYLOLISTHESIS OF LUMBAR REGION: Primary | ICD-10-CM

## 2017-05-01 PROCEDURE — 36415 COLL VENOUS BLD VENIPUNCTURE: CPT | Mod: PO

## 2017-05-01 PROCEDURE — 99214 OFFICE O/P EST MOD 30 MIN: CPT | Mod: 25,S$GLB,, | Performed by: FAMILY MEDICINE

## 2017-05-01 PROCEDURE — 1160F RVW MEDS BY RX/DR IN RCRD: CPT | Mod: S$GLB,,, | Performed by: FAMILY MEDICINE

## 2017-05-01 PROCEDURE — 84443 ASSAY THYROID STIM HORMONE: CPT

## 2017-05-01 PROCEDURE — 82607 VITAMIN B-12: CPT

## 2017-05-01 PROCEDURE — 83970 ASSAY OF PARATHORMONE: CPT

## 2017-05-01 PROCEDURE — 82306 VITAMIN D 25 HYDROXY: CPT

## 2017-05-01 PROCEDURE — 90670 PCV13 VACCINE IM: CPT | Mod: S$GLB,,, | Performed by: FAMILY MEDICINE

## 2017-05-01 PROCEDURE — 99999 PR PBB SHADOW E&M-EST. PATIENT-LVL III: CPT | Mod: PBBFAC,,, | Performed by: FAMILY MEDICINE

## 2017-05-01 PROCEDURE — 85025 COMPLETE CBC W/AUTO DIFF WBC: CPT

## 2017-05-01 PROCEDURE — 99499 UNLISTED E&M SERVICE: CPT | Mod: S$GLB,,, | Performed by: FAMILY MEDICINE

## 2017-05-01 PROCEDURE — G0009 ADMIN PNEUMOCOCCAL VACCINE: HCPCS | Mod: S$GLB,,, | Performed by: FAMILY MEDICINE

## 2017-05-01 PROCEDURE — 3079F DIAST BP 80-89 MM HG: CPT | Mod: S$GLB,,, | Performed by: FAMILY MEDICINE

## 2017-05-01 PROCEDURE — 80053 COMPREHEN METABOLIC PANEL: CPT

## 2017-05-01 PROCEDURE — 3075F SYST BP GE 130 - 139MM HG: CPT | Mod: S$GLB,,, | Performed by: FAMILY MEDICINE

## 2017-05-01 NOTE — MR AVS SNAPSHOT
[facility]  [address] [facility] [place] [address]  Phone: [phone]  Fax: [phone]                  Beth Souza   2017 3:40 PM   Office Visit    Description:  Female : 1950   Provider:  Edna Oliva MD   Department:  [facility]           Reason for Visit     Fatigue           Diagnoses this Visit        Comments    Spondylolisthesis of lumbar region    -  Primary     Peripheral polyneuropathy         Thyroid nodule         Hyperparathyroidism, primary         Vitamin D deficiency         Essential hypertension         Obesity, Class II, BMI 35.0-39.9, with comorbidity (see actual BMI)         Mixed hyperlipidemia         Chronic fatigue         Breast screening         Need for prophylactic vaccination with Streptococcus pneumoniae (Pneumococcus) and Influenza vaccines                To Do List           Future Appointments        Provider Department Dept Phone    2017 6:45 PM LAB, SAME DAY [facility] [phone]    2017 1:45 PM [facility]1-[facility] [phone]    2017 3:00 PM Yolanda Sagastume PA-C [facility] [phone]    2017 9:35 AM LABORATORY, [facility] [phone]    2017 8:00 AM Malachi Ramríez MD [facility] [phone]      Goals (5 Years of Data)     None      Follow-Up and Disposition     Return in about 6 months (around 2017), or if symptoms worsen or fail to improve.      [facility]     [facility] Nurse Care Line - 24/ Assistance  Unless otherwise directed by your provider, please contact [facility], our nurse care line that is available for 24/ assistance.     Registered nurses in the [facility] provide: appointment scheduling, clinical advisement, health education, and other advisory services.  Call: [phone] (toll free)               Medications           Message  "regarding Medications     Verify the changes and/or additions to your medication regime listed below are the same as discussed with your clinician today.  If any of these changes or additions are incorrect, please notify your healthcare provider.             Verify that the below list of medications is an accurate representation of the medications you are currently taking.  If none reported, the list may be blank. If incorrect, please contact your healthcare provider. Carry this list with you in case of emergency.           Current Medications     ergocalciferol (ERGOCALCIFEROL) 50,000 unit Cap Take 1 capsule (50,000 Units total) by mouth every 7 days.    fluticasone (FLONASE) 50 mcg/actuation nasal spray 1 spray by Each Nare route once daily.    lisinopril-hydrochlorothiazide (PRINZIDE,ZESTORETIC) 20-25 mg Tab Take 1 tablet by mouth once daily.    topiramate (TOPAMAX) 25 MG tablet Take 1 tablet (25 mg total) by mouth at night x 1 week then increase to 2 (two) times daily. Increase as tolerated.           Clinical Reference Information           Your Vitals Were     BP Pulse Temp Height Weight SpO2    134/80 55 98 °F (36.7 °C) (Tympanic) 5' 3" (1.6 m) 101 kg (222 lb 10.6 oz) 99%    BMI                39.44 kg/m2          Blood Pressure          Most Recent Value    BP  134/80      Allergies as of 5/1/2017     Aspirin    Sulfa (Sulfonamide Antibiotics)      Immunizations Administered on Date of Encounter - 5/1/2017     Name Date Dose VIS Date Route    Pneumococcal Conjugate - 13 Valent  Incomplete 0.5 mL 11/5/2015 Intramuscular      Orders Placed During Today's Visit      Normal Orders This Visit    Pneumococcal Conjugate Vaccine (13 Valent) (IM)     Future Labs/Procedures Expected by Expires    CBC auto differential  5/1/2017 6/30/2018    Comprehensive metabolic panel  5/1/2017 6/30/2018    Mammo Digital Screening Bilat with CAD  5/1/2017 7/1/2018    PTH, intact  5/1/2017 6/30/2018    TSH  5/1/2017 6/30/2018    " Vitamin B12  5/1/2017 6/30/2018    Vitamin D  5/1/2017 6/30/2018      [facility] Sign-Up     Activating your [facility] account is as easy as 1-2-3!     1) Visit [email or web address], select Sign Up Now, enter this activation code and your date of birth, then select Next.  Activation code not generated  Current Patient Portal Status: Account disabled      2) Create a username and password to use when you visit [facility] in the future and select a security question in case you lose your password and select Next.    3) Enter your e-mail address and click Sign Up!    Additional Information  If you have questions, please e-mail [email or web address] or call [phone] to talk to our [email or web address] staff. Remember, [facility] is NOT to be used for urgent needs. For medical emergencies, dial 911.         Language Assistance Services     ATTENTION: Language assistance services are available, free of charge. Please call [phone].      ATENCIÓN: Si habla español, tiene a moreira disposición servicios gratuitos de asistencia lingüística. Llame al [phone].     EMETERIO Ý: N?u b?n nói Ti?ng Vi?t, có các d?ch v? h? tr? ngôn ng? mi?n phí dành cho b?n. G?i s? [phone].         [facility] complies with applicable Federal civil rights laws and does not discriminate on the basis of race, color, national origin, age, disability, or sex.

## 2017-05-02 LAB
25(OH)D3+25(OH)D2 SERPL-MCNC: 20 NG/ML
ALBUMIN SERPL BCP-MCNC: 3.6 G/DL
ALP SERPL-CCNC: 80 U/L
ALT SERPL W/O P-5'-P-CCNC: 15 U/L
ANION GAP SERPL CALC-SCNC: 11 MMOL/L
AST SERPL-CCNC: 9 U/L
BASOPHILS # BLD AUTO: 0.01 K/UL
BASOPHILS NFR BLD: 0.2 %
BILIRUB SERPL-MCNC: 0.2 MG/DL
BUN SERPL-MCNC: 25 MG/DL
CALCIUM SERPL-MCNC: 9.3 MG/DL
CHLORIDE SERPL-SCNC: 105 MMOL/L
CO2 SERPL-SCNC: 29 MMOL/L
CREAT SERPL-MCNC: 1 MG/DL
DIFFERENTIAL METHOD: ABNORMAL
EOSINOPHIL # BLD AUTO: 0.2 K/UL
EOSINOPHIL NFR BLD: 2.4 %
ERYTHROCYTE [DISTWIDTH] IN BLOOD BY AUTOMATED COUNT: 15.1 %
EST. GFR  (AFRICAN AMERICAN): >60 ML/MIN/1.73 M^2
EST. GFR  (NON AFRICAN AMERICAN): 59.3 ML/MIN/1.73 M^2
GLUCOSE SERPL-MCNC: 101 MG/DL
HCT VFR BLD AUTO: 42 %
HGB BLD-MCNC: 13.2 G/DL
LYMPHOCYTES # BLD AUTO: 2.1 K/UL
LYMPHOCYTES NFR BLD: 34.7 %
MCH RBC QN AUTO: 28.9 PG
MCHC RBC AUTO-ENTMCNC: 31.4 %
MCV RBC AUTO: 92 FL
MONOCYTES # BLD AUTO: 0.7 K/UL
MONOCYTES NFR BLD: 11.5 %
NEUTROPHILS # BLD AUTO: 3.2 K/UL
NEUTROPHILS NFR BLD: 51 %
PLATELET # BLD AUTO: 300 K/UL
PMV BLD AUTO: 11.3 FL
POTASSIUM SERPL-SCNC: 4 MMOL/L
PROT SERPL-MCNC: 7 G/DL
PTH-INTACT SERPL-MCNC: 114 PG/ML
RBC # BLD AUTO: 4.57 M/UL
SODIUM SERPL-SCNC: 145 MMOL/L
TSH SERPL DL<=0.005 MIU/L-ACNC: 0.92 UIU/ML
VIT B12 SERPL-MCNC: 581 PG/ML
WBC # BLD AUTO: 6.17 K/UL

## 2017-05-03 ENCOUNTER — TELEPHONE (OUTPATIENT)
Dept: INTERNAL MEDICINE | Facility: CLINIC | Age: 67
End: 2017-05-03

## 2017-05-03 DIAGNOSIS — E21.3 HYPERPARATHYROIDISM: Primary | ICD-10-CM

## 2017-05-03 DIAGNOSIS — E55.9 VITAMIN D DEFICIENCY: ICD-10-CM

## 2017-05-03 RX ORDER — ERGOCALCIFEROL 1.25 MG/1
50000 CAPSULE ORAL
Qty: 10 CAPSULE | Refills: 0 | Status: SHIPPED | OUTPATIENT
Start: 2017-05-03 | End: 2017-08-08 | Stop reason: SDUPTHER

## 2017-05-03 NOTE — TELEPHONE ENCOUNTER
Low vitamin D levels, refill given on prescription vitamin D to be taken weekly.  Elevated parathyroid levels but normal calcium levels, will refer to nephrology for further evaluation  Otherwise stable labs

## 2017-06-19 ENCOUNTER — OFFICE VISIT (OUTPATIENT)
Dept: PAIN MEDICINE | Facility: CLINIC | Age: 67
End: 2017-06-19
Payer: MEDICARE

## 2017-06-19 ENCOUNTER — OFFICE VISIT (OUTPATIENT)
Dept: INTERNAL MEDICINE | Facility: CLINIC | Age: 67
End: 2017-06-19
Payer: MEDICARE

## 2017-06-19 VITALS
OXYGEN SATURATION: 97 % | HEART RATE: 64 BPM | BODY MASS INDEX: 39.64 KG/M2 | DIASTOLIC BLOOD PRESSURE: 80 MMHG | WEIGHT: 223 LBS | HEIGHT: 63 IN | TEMPERATURE: 97 F | WEIGHT: 223.75 LBS | BODY MASS INDEX: 39.51 KG/M2 | RESPIRATION RATE: 16 BRPM | SYSTOLIC BLOOD PRESSURE: 108 MMHG | SYSTOLIC BLOOD PRESSURE: 108 MMHG | DIASTOLIC BLOOD PRESSURE: 80 MMHG | HEIGHT: 63 IN | HEART RATE: 64 BPM

## 2017-06-19 DIAGNOSIS — M46.1 SACROILIITIS: ICD-10-CM

## 2017-06-19 DIAGNOSIS — M47.817 SPONDYLOSIS OF LUMBOSACRAL REGION WITHOUT MYELOPATHY OR RADICULOPATHY: ICD-10-CM

## 2017-06-19 DIAGNOSIS — M54.16 BILATERAL LUMBAR RADICULOPATHY: ICD-10-CM

## 2017-06-19 DIAGNOSIS — M51.36 DDD (DEGENERATIVE DISC DISEASE), LUMBAR: ICD-10-CM

## 2017-06-19 DIAGNOSIS — R53.82 CHRONIC FATIGUE: Primary | ICD-10-CM

## 2017-06-19 DIAGNOSIS — M48.061 STENOSIS, SPINAL, LUMBAR: Primary | ICD-10-CM

## 2017-06-19 PROCEDURE — 99999 PR PBB SHADOW E&M-EST. PATIENT-LVL III: CPT | Mod: PBBFAC,,, | Performed by: PHYSICIAN ASSISTANT

## 2017-06-19 PROCEDURE — 1126F AMNT PAIN NOTED NONE PRSNT: CPT | Mod: S$GLB,,, | Performed by: PHYSICIAN ASSISTANT

## 2017-06-19 PROCEDURE — 1159F MED LIST DOCD IN RCRD: CPT | Mod: S$GLB,,, | Performed by: PHYSICIAN ASSISTANT

## 2017-06-19 PROCEDURE — 99213 OFFICE O/P EST LOW 20 MIN: CPT | Mod: S$GLB,,, | Performed by: PHYSICIAN ASSISTANT

## 2017-06-19 PROCEDURE — 99999 PR PBB SHADOW E&M-EST. PATIENT-LVL IV: CPT | Mod: PBBFAC,,, | Performed by: PHYSICIAN ASSISTANT

## 2017-06-19 PROCEDURE — 99499 UNLISTED E&M SERVICE: CPT | Mod: S$GLB,,, | Performed by: PHYSICIAN ASSISTANT

## 2017-06-19 PROCEDURE — 99214 OFFICE O/P EST MOD 30 MIN: CPT | Mod: S$GLB,,, | Performed by: PHYSICIAN ASSISTANT

## 2017-06-19 RX ORDER — TOPIRAMATE 25 MG/1
TABLET ORAL
Qty: 60 TABLET | Refills: 2 | Status: SHIPPED | OUTPATIENT
Start: 2017-06-19 | End: 2017-10-24

## 2017-06-19 NOTE — PATIENT INSTRUCTIONS
4 Steps for Eating Healthier  Changing the way you eat can improve your health. It can lower your cholesterol and blood pressure, and help you stay at a healthy weight. Your diet doesnt have to be bland and boring to be healthy. Just watch your calories and follow these steps:    1. Eat fewer unhealthy fats  · Choose more fish and lean meats instead of fatty cuts of meat.  · Skip butter and lard, and use less margarine.  · Pass on foods that have palm, coconut, or hydrogenated oils.  · Eat fewer high-fat dairy foods like cheese, ice cream, and whole milk.  · Get a heart-healthy cookbook and try some low-fat recipes.  2. Go light on salt  · Keep the saltshaker off the table.  · Limit high-salt ingredients, such as soy sauce, bouillon, and garlic salt.  · Instead of adding salt when cooking, season your food with herbs and flavorings. Try lemon, garlic, and onion.  · Limit convenience foods, such as boxed or canned foods and restaurant food.  · Read food labels and choose lower-sodium options.  3. Limit sugar  · Pause before you add sugars to pancakes, cereal, coffee, or tea. This includes white and brown table sugar, syrup, honey, and molasses. Cut your usual amount by half.  · Use non-sugar sweeteners. Stevia, aspartame, and sucralose can satisfy a sweet tooth without adding calories.  · Swap out sugar-filled soda and other drinks. Buy sugar-free or low-calorie beverages. Remember water is always the best choice.  · Read labels and choose foods with less added sugar. Keep in mind that dairy foods and foods with fruit will have some natural sugar.  · Cut the sugar in recipes by 1/3 to 1/2. Boost the flavor with extracts like almond, vanilla, or orange. Or add spices such as cinnamon or nutmeg.  4. Eat more fiber  · Eat fresh fruits and vegetables every day.  · Boost your diet with whole grains. Go for oats, whole-grain rice, and bran.  · Add beans and lentils to your meals.  · Drink more water to match your fiber  increase. This is to help prevent constipation.  Date Last Reviewed: 5/11/2015  © 1131-0542 [employer]. [address] [place] [address], [place] [address]. All rights reserved. This information is not intended as a substitute for professional medical care. Always follow your healthcare professional's instructions.        Managing Fatigue     Family members can help with meals and chores around the house.   Fatigue is common. It can be caused by worry, lack of sleep, or poor appetite. Fatigue can also be a sign of anemia, a shortage of red blood cells. You might need medical treatment for anemia. The tips below can help you feel better.  Conserving energy  · Keep track of the times of day when you are most tired and plan around them. For instance, if you are more tired in the afternoon, try to get tasks done in the morning.  · Decide which tasks are most important. Do those first.  · Pass tasks along to others when you need to. Ask for help.  · Accept help when its offered. Tell people what they can do to help. For instance, you may need someone to fix a meal, fold clothes, or put gas in your car.  · Plan rest times. You may want to take a nap each day. Just sitting quietly for a few minutes can make you feel more rested.  What you can do to feel better  · Relax before you try to sleep. Take a bath or read for a while.  · Form a sleep pattern. Go to bed at the same time each night and get up at the same time each morning.  · Eat well. Choose foods from all of the food groups each day.  · Exercise. Take a brisk walk to help increase your energy.  · Avoid caffeine and alcohol. Drink plenty of water or fruit juices instead.  Treating anemia  If you begin to feel more tired than normal, tell your doctor. Fatigue could be a sign of anemia. This problem is fairly common in cancer patients, especially during chemotherapy and radiation treatments. If your red blood cell count is too low, you may get a blood  "transfusion. In some cases, you may need medicine to increase the number of red blood cells your body makes.  When to call your healthcare provider  Call your healthcare provider if you have:  · Shortness of breath or chest pain  · A dizzy feeling when you get up from lying or sitting down  · Paler skin than normal  · Extreme tiredness that is not helped by sleep   Date Last Reviewed: 1/3/2016  © 0390-3929 [employer]. [address] [place] [address], [place] [address]. All rights reserved. This information is not intended as a substitute for professional medical care. Always follow your healthcare professional's instructions.        Eating Healthy on the Run     Many grocery stores stock pre-made salads for eating on the run.     Need to eat on the run? This often means grabbing "junk" or fast food full of fat, salt, sugar, and cholesterol. But being in a rush doesn't mean that you can't eat healthy.  "Fast" food made healthy  Try these ways to get good nutrition, fast.  · Go to a grocery or convenience market instead of a fast-food restaurant. Look for choices like sandwiches, yogurt, fresh fruit, and juices.  · Buy precut, prepackaged fresh or frozen fruits and vegetables. You can open the package for a snack, salad, smoothie, or stir-jensen.  · Microwave a frozen dinner that has less than 15 grams (g) of fat and less than 800 milligrams (mg) of sodium. Complete the meal with a wholegrain roll, vegetables, and fresh fruit.  · If you must have fast food, consider your options. Go for veggie burgers, broiled and skinless chicken breast sandwiches, or dinner salads with low-fat dressing. Avoid large (super-sized) portions.  · Blot the extra oil from food with a napkin before you eat it.  · Instead of french fries, choose a baked potato with salsa.  Tip  Fast-food restaurants often have printed nutrition information available. Ask for this information and look up your favorite items before you order.   Date Last " Reviewed: 6/2/2015  © 6125-0943 [employer]. [address] [place] [address]. All rights reserved. This information is not intended as a substitute for professional medical care. Always follow your healthcare professional's instructions.        Healthy Tips for Eating Out  You don't have to give up eating out to cut down on fat, cholesterol, and salt. You just need to think about what you order. Many menus highlight low-fat and low-sodium dishes. But if you can't find what you want, ask. Explain what you need to the  or . Or ask to see printed nutrition information.    Ask for what you want  · Ask that foods be prepared with little or no fat and with no added salt.  · Ask that sauces be left off or served on the side. Choose sauces made with tomato instead of with cream or cheese.  · Ask for steamed rice or a baked or boiled potato, without butter or sour cream.  · Ask that vegetables be steamed and served with no butter or sauce. Ask for lemon juice or vinegar to sprinkle on them for flavor.  Keep these tips in mind  · Choose minestrone or vegetable soups. Ask about sodium content.  · Order salad dressing on the side. Dip your fork in the dressing, then in the salad.  · Look for fish, chicken, turkey, or meat that is broiled, roasted, poached, or steamed.  · Order 1 or 2 low-fat appetizers or soup and a salad instead of a main dish. Or eat only half of the main dish and take the rest home.  · If you want a dessert, try fresh fruit, nonfat yogurt, or sorbet. Or share a dessert.  Foods to avoid  · Donuts, muffins, and pastries  · Coconut, vegetables with butter, cream, or cheese sauce  · Cream, whole milk, and powdered creamers  · Rodriguez, liver, luncheon meats, ground meat, and canned fish in oil  · Sweets and foods made with butter, coconut or palm oil, or hydrogenated fats   Date Last Reviewed: 5/11/2015  © 1714-8599 [employer]. [address] [place] [address]  [address]. All rights reserved. This information is not intended as a substitute for professional medical care. Always follow your healthcare professional's instructions.        More Tips for Healthy Eating Out     Balance out your calories. If you're going out for dinner at a nice restaurant, make healthier choices during the day.   Be creative when eating out. Many places dont make you stick strictly to the menu. Instead of ordering a large entree, choose an appetizer or two and a bowl of soup. A mix of side orders can also make a good meal. Read the descriptions of other entrees and specials. If another entree comes with baby carrots, ask for a side order of them even if they dont come with your entree. Ask how food is prepared or if it can be made differently.  Tips for making the most of your meal out  · Ask to have high-fat, high-calorie extras, such as French fries and potato chips, left off your plate so you wont be tempted.   · Ask what substitutions are available. Instead of French fries, you may be able to have a side of salad with low-calorie dressing.  · Order low-fat milk instead of cream for your coffee.  · Ask for vegetables and main courses to be served without sauces, butter, margarine, or oil.  · Be aware of portion size. You dont have to clean your plate. Take half your meal home in a doggie bag to eat the next day.  · Look for heart-healthy or low-fat entrees that include whole grains, vegetables, or fruits.  · Choose foods that are grilled, broiled, or steamed.  · Avoid dishes that are described on the menu as fried, breaded, smothered, rich, or creamy.  Date Last Reviewed: 6/8/2015  © 3999-6096 [employer]. [address] [place] [address], [place] [address]. All rights reserved. This information is not intended as a substitute for professional medical care. Always follow your healthcare professional's instructions.        What is Insomnia?  Do you have trouble falling asleep? Do you wake  up often during the night? Or, maybe you wake up too early in the morning. You may be suffering from insomnia. Talk to your health care provider if it lasts longer than a few weeks and you feel tired most of the time.    What causes insomnia?  Some common causes of insomnia are:  · Medical problems such as pain, depression, medication side effects, or trouble breathing  · Circadian rhythm disorder, a shift in the bodys normal 24-hour activity cycle  · Lifestyle factors such as a changing sleep schedule, lack of exercise, or too much caffeine  · Sleep settings such as a poor mattress, noise, or a room thats too hot or too cold  · Stress such as problems at work, money worries, or family events  Talk to your health care provider  Describe your sleeping problems to your health care provider. Try to keep a daily sleep diary for a couple weeks. Write down the time you go to bed, the time you wake up, and anything that seems to affect your sleep. Your health care provider can work with you to develop a treatment plan. You may need to learn good sleeping habits and make some lifestyle changes. If you have any medical problems, these may need to be treated first.  Date Last Reviewed: 7/18/2015  © 9006-3956 [employer]. [address] [place] [address], [place] [address]. All rights reserved. This information is not intended as a substitute for professional medical care. Always follow your healthcare professional's instructions.

## 2017-06-19 NOTE — PROGRESS NOTES
Chief Pain Complaint:  Bilateral hip and thigh pain    History of Present Illness:  This patient is a 67 y.o. female who presents today complaining of the above noted pain/s. The patient describes the pain as follows.    - duration of pain: > 3 years   - timing: constant   - character: variable, unable to describe  - radiating, dermatomal: extends into bilateral lower extremities  - antecedent trauma, prior spinal surgery: no prior trauma, no prior spinal surgery   - pertinent negatives: No fever, No chills, No weight loss, No bladder dysfunction, No bowel dysfunction, No saddle anesthesia  - pertinent positives: left leg weakness    - medications, other therapies tried (physical therapy, injections):     >> apap, NSAIDs    >> Has previously undergone Physical Therapy    >> Has previously undergone spinal injection/s: LESI's per reporting with minimal relief        Imaging / Labs / Studies (reviewed on 6/19/2017):    8/03/16 MRI Lumbar Spine Without Contrast     Findings: No priors.  Multiplanar sequences were obtained of the lumbar spine.  Vertebral alignment demonstrates grade 1 spondylolisthesis of L4 on L5.  The conus ends at the level of L1.  There is desiccation of lumbar intervertebral discs with loss of disc height at L5-S1.  There discogenic changes in the endplates at this level as well as marginal osteophyte formation at L2-3 through L5-S1.  There are no compression fractures or acute osseous abnormalities identified.  Schmorl's node is seen inferior endplate of L5.  Axial images were acquired through the disc spaces T11-12 and T12-L1 and L2-3 through L5-S1.  At T11-12 there is mild circumferential bulging of the disc and facet arthropathy with mild bilateral foraminal narrowing and no significant canal stenosis.  At T12-L1 there is mild bulging of the disc and facet hypertrophy without significant canal or foraminal narrowing.  At L2-3 there is facet and ligamentum flavum hypertrophy with minimal bulging  of the disc.  There is no significant canal or foraminal stenosis.  At L3-4 prominent facet and ligamentum flavum hypertrophy and diffuse posterior bulging of the disc history moderate bilateral foraminal stenosis.  Facet and ligamentum flavum hypertrophy creates a mass effect upon the posterior thecal sac.   At L4-5 significant facet and ligamentum flavum hypertrophy due to unroofing of the disc secondary to spondylolisthesis.  There is diffuse bulging of the disc and severe canal and foraminal stenosis.  AP diameter of the spinal canal is about 8 mm and of the thecal sac is about 5.6 mm at this level.  L5-S1, there is prominent facet arthropathy with diffuse bulging of the disc which is creating moderately severe bilateral foraminal stenosis.  No significant canal stenosis.       5/13/16 X-Ray Lumbar Spine Complete 5 View    Narrative Five view lumbar spine.  Findings: There is multilevel degenerative vertebral endplate spurring, disc space narrowing, and facet arthropathy.  Vacuum disc phenomenon at L5-S1.  Grade one anterolisthesis at L4-L5.  Pedicles appear intact.  No compression fracture.       Review of Systems:  CONSTITUTIONAL: patient denies any fever, chills, or weight loss  SKIN: patient denies any rash or itching  RESPIRATORY: patient reports dyspnea with exertion  GASTROINTESTINAL: patient denies having any diarrhea, constipation, or bowel incontinence  GENITOURINARY: patient denies having any abnormal bladder function    MUSCULOSKELETAL:  - patient complains of the above noted pain/s (see chief pain complaint)    NEUROLOGICAL:   - pain as above  - strength in Lower extremities is decreased, on the LEFT  - sensation in Lower extremities is abnormal, on the LEFT  - patient denies any loss of bowel or bladder control      PSYCHIATRIC: patient denies any change in mood      Physical Exam:    Vitals:  /80 (BP Location: Right arm, Patient Position: Sitting, BP Method: Automatic)   Pulse 64   Resp 16  "  Ht 5' 3" (1.6 m)   Wt 101.2 kg (223 lb)   BMI 39.50 kg/m²    (reviewed on 6/19/2017)    General: alert and oriented, in no apparent distress  Gait: normal gait  Skin: No rashes, No discoloration, No obvious lesions  HEENT: EOMI  Respiratory: respirations nonlabored    Musculoskeletal:  - Any pain on flexion, extension, rotation:    >> pain on extension and rotation  - Straight Leg Raise:     >> LEFT :: negative    >> RIGHT :: negative  - Any tenderness to palpation across paraspinal muscles, joints, bursae:     >> across lumbar paraspinals     >> over bilateral SI joints + GT bursae, improved    Neuro:  - Extremity Strength:     >> LEFT :: 5/5    >> RIGHT :: 5/5  - Extremity Reflexes:    >> LEFT  :: 2+    >> RIGHT :: 2+  - Sensory (sensation to light touch):    >> LEFT :: intact    >> RIGHT :: intact     Psych:  Mood and affect is appropriate      Assessment:  Sacroiliitis  Greater Trochanteric Bursitis  Spondylolisthesis  Lumbar Spondylosis  Lumbar DDD  Lumbar radiculopathy  Lumbar spinal stenosis    Plan:  Patient presents today for follow-up visit.  She had LESI x 2 years prior that never helped, so she does not want to move forward with any spinal injection at this time.    - Will re-start Topamax 25mg BID (with titration instructions, take 1 tablet QHS x 1 week then increase to BID increasing as tolerated). She did not start this last time because she was getting it confused with gabapentin.  - Continue PT at [facility]. She has been going once per week, which she finds very helpful. She cannot afford copay more than once per week. PDI score decreased from 55 to 14 after starting PT.   RTC PRN. I discussed the risks, benefits, and alternatives to potential treatment options. All questions and concerns were fully addressed today in clinic. Dr. Abbasi was consulted regarding the patient plan and agrees.              >>Pain Disability Questionnaire:  8-22-16 :: 33    >>Pain Disability Index:  3/20/2017 :: " 35  4/24/2017 :: 55  6/19/2017 :: 14    >>ORT:  3/20/2017 :: 0

## 2017-06-19 NOTE — PROGRESS NOTES
"  Subjective:      Patient ID: Beth Souza is a 67 y.o. female.    Chief Complaint: Fatigue    Fatigue   This is a new problem. The current episode started more than 1 month ago. The problem occurs intermittently. The problem has been waxing and waning. Associated symptoms include fatigue, joint swelling (occasionally in feet off and on), myalgias and urinary symptoms. Pertinent negatives include no abdominal pain, anorexia, arthralgias, change in bowel habit, chest pain, chills, congestion, coughing, diaphoresis, fever, headaches, nausea, neck pain, numbness, rash, sore throat, swollen glands, vertigo, visual change, vomiting or weakness. Associated symptoms comments: Chronic sciatica. Nothing aggravates the symptoms. Treatments tried: vitamin D. The treatment provided no relief.   Physical therapy once a week. NO other exercise.   Diet: BLT with egg and cheese with white bread for breakfast.   Fruit and chips for snack.   Lunch: chinese food, sandwich, fried fish or chicken.     Denies any depression or anxiety.     Sleep: wakes up a couple times throughout the night. Goes back to sleep without any problems. No problem falling asleep.     Review of Systems   Constitutional: Positive for fatigue. Negative for chills, diaphoresis and fever.   HENT: Negative for congestion and sore throat.    Respiratory: Negative for cough.    Cardiovascular: Negative for chest pain.   Gastrointestinal: Negative for abdominal pain, anorexia, change in bowel habit, nausea and vomiting.   Musculoskeletal: Positive for joint swelling (occasionally in feet off and on) and myalgias. Negative for arthralgias and neck pain.   Skin: Negative for rash.   Neurological: Negative for vertigo, weakness, numbness and headaches.     Objective:   /80   Pulse 64   Temp 97 °F (36.1 °C) (Tympanic)   Ht 5' 3" (1.6 m)   Wt 101.5 kg (223 lb 12.3 oz)   SpO2 97%   BMI 39.64 kg/m²     Physical Exam   Constitutional: She is oriented to " person, place, and time. She appears well-developed and well-nourished.   HENT:   Head: Normocephalic and atraumatic.   Right Ear: External ear normal.   Left Ear: External ear normal.   Nose: Nose normal.   Mouth/Throat: Oropharynx is clear and moist.   Eyes: Conjunctivae and EOM are normal. Pupils are equal, round, and reactive to light.   Neck: Normal range of motion. Neck supple.   Cardiovascular: Normal rate, regular rhythm and normal heart sounds.  Exam reveals no gallop and no friction rub.    No murmur heard.  Pulmonary/Chest: Effort normal and breath sounds normal. No respiratory distress. She has no wheezes. She has no rales. She exhibits no tenderness.   Abdominal: Soft. She exhibits no distension. There is no tenderness.   Musculoskeletal: Normal range of motion.   Lymphadenopathy:     She has no cervical adenopathy.   Neurological: She is alert and oriented to person, place, and time.   Skin: Skin is warm and dry.   Psychiatric: She has a normal mood and affect. Her behavior is normal. Judgment and thought content normal.   Vitals reviewed.      Assessment:     1. Chronic fatigue      Plan:   Chronic fatigue    -recent labs reviewed, no obvious causes for her fatigue. Focus on good sleep and eating healthy.  -focus on reducing carbohydrates in diet. Increase protein. Handouts on healthy meal options was printed for her today.   -bed is for sleep only. Some healthy sleep  Habits were discussed with her. Do not turn on TV prior to bed or when wake up in the middle of the night. Try to get between 7-9 hours of sleep a night.     Return if symptoms worsen or fail to improve.

## 2017-06-23 ENCOUNTER — LAB VISIT (OUTPATIENT)
Dept: LAB | Facility: HOSPITAL | Age: 67
End: 2017-06-23
Attending: INTERNAL MEDICINE
Payer: MEDICARE

## 2017-06-23 DIAGNOSIS — E21.0 HYPERPARATHYROIDISM, PRIMARY: ICD-10-CM

## 2017-06-23 LAB
ALBUMIN SERPL BCP-MCNC: 3.5 G/DL
ANION GAP SERPL CALC-SCNC: 10 MMOL/L
BUN SERPL-MCNC: 14 MG/DL
CALCIUM SERPL-MCNC: 9.1 MG/DL
CHLORIDE SERPL-SCNC: 104 MMOL/L
CO2 SERPL-SCNC: 26 MMOL/L
CREAT SERPL-MCNC: 0.9 MG/DL
EST. GFR  (AFRICAN AMERICAN): >60 ML/MIN/1.73 M^2
EST. GFR  (NON AFRICAN AMERICAN): >60 ML/MIN/1.73 M^2
GLUCOSE SERPL-MCNC: 146 MG/DL
PHOSPHATE SERPL-MCNC: 2.8 MG/DL
POTASSIUM SERPL-SCNC: 3.7 MMOL/L
PTH-INTACT SERPL-MCNC: 75 PG/ML
SODIUM SERPL-SCNC: 140 MMOL/L

## 2017-06-23 PROCEDURE — 80069 RENAL FUNCTION PANEL: CPT

## 2017-06-23 PROCEDURE — 83970 ASSAY OF PARATHORMONE: CPT

## 2017-06-23 PROCEDURE — 36415 COLL VENOUS BLD VENIPUNCTURE: CPT | Mod: PO

## 2017-06-26 ENCOUNTER — OFFICE VISIT (OUTPATIENT)
Dept: NEPHROLOGY | Facility: CLINIC | Age: 67
End: 2017-06-26
Payer: MEDICARE

## 2017-06-26 VITALS
BODY MASS INDEX: 38.66 KG/M2 | HEIGHT: 64 IN | HEART RATE: 64 BPM | WEIGHT: 226.44 LBS | DIASTOLIC BLOOD PRESSURE: 78 MMHG | SYSTOLIC BLOOD PRESSURE: 114 MMHG

## 2017-06-26 DIAGNOSIS — E21.0 HYPERPARATHYROIDISM, PRIMARY: Primary | ICD-10-CM

## 2017-06-26 PROCEDURE — 99999 PR PBB SHADOW E&M-EST. PATIENT-LVL III: CPT | Mod: PBBFAC,,, | Performed by: INTERNAL MEDICINE

## 2017-06-26 PROCEDURE — 1159F MED LIST DOCD IN RCRD: CPT | Mod: S$GLB,,, | Performed by: INTERNAL MEDICINE

## 2017-06-26 PROCEDURE — 99213 OFFICE O/P EST LOW 20 MIN: CPT | Mod: S$GLB,,, | Performed by: INTERNAL MEDICINE

## 2017-06-26 PROCEDURE — 1125F AMNT PAIN NOTED PAIN PRSNT: CPT | Mod: S$GLB,,, | Performed by: INTERNAL MEDICINE

## 2017-06-26 PROCEDURE — 99499 UNLISTED E&M SERVICE: CPT | Mod: S$GLB,,, | Performed by: INTERNAL MEDICINE

## 2017-06-26 NOTE — PROGRESS NOTES
PROGRESS NOTE FOR ESTABLISHED PATIENT    PHYSICIAN REQUESTING THE CONSULT: Dr. Edna Oliva    REASON FOR VISIT: Hyperparathyroidism    67 y.o. female with history of vitamin D deficiency, HTN, hyperlipidemia, neuropathy, bilateral carotid artery disease  presents to the renal clinic for evaluation of hyperparathyroidism.     Patient has no complaints at present. She is currently taking po Vitamin D supplements.             Past Medical History:   Diagnosis Date    Cataract     Hyperlipidemia     Hypertension     Lumbar spondylosis     Vitamin D deficiency        Past Surgical History:   Procedure Laterality Date    PARTIAL HYSTERECTOMY         Review of patient's allergies indicates:   Allergen Reactions    Aspirin      Bruising    Sulfa (sulfonamide antibiotics)        Current Outpatient Prescriptions   Medication Sig Dispense Refill    ergocalciferol (ERGOCALCIFEROL) 50,000 unit Cap Take 1 capsule (50,000 Units total) by mouth every 7 days. 10 capsule 0    fluticasone (FLONASE) 50 mcg/actuation nasal spray 1 spray by Each Nare route once daily. 16 g 0    lisinopril-hydrochlorothiazide (PRINZIDE,ZESTORETIC) 20-25 mg Tab Take 1 tablet by mouth once daily. 90 tablet 3    topiramate (TOPAMAX) 25 MG tablet Take 1 tablet (25 mg total) by mouth at night x 1 week then increase to 2 (two) times daily. Increase as tolerated. 60 tablet 2     No current facility-administered medications for this visit.        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 No    Breast-Feeding No     Social History Narrative    No narrative on file       Review of Systems:  1. GENERAL: patient denies any fever, weight changes, generalized weakness, dizziness.  2. HEENT: patient denies headaches, no visual disturbances, swallowing problems, sinus pain, nasal congestion.  3. CARDIOVASCULAR: patient denies chest pain, palpitations.  4. PULMONARY: patient denies SOB, coughing, hemoptysis, wheezing.  5. GASTROINTESTINAL: patient denies abdominal pain/cramping, nausea, vomiting, diarrhea, no constipation  6. GENITOURINARY: patient denies dysuria, hematuria, hesitancy, frequency.  7. EXTREMITIES: patient denies LE edema, LE cramping.  8. DERMATOLOGY: patient denies rashes, ulcers.  9. NEURO: patient denies tremors, extremity weakness, she reports intermittent extremity numbness/tingling.  10. MUSCULOSKELETAL: patient reports intermittent back pain  11. HEMATOLOGY: patient denies rectal or gum bleeding.  12: PSYCH: patient denies anxiety, depression.      PHYSICAL EXAM:  /78   Pulse 64   Ht 5' 4" (1.626 m)   Wt 102.7 kg (226 lb 6.6 oz)   BMI 38.86 kg/m²     GENERAL: Pleasant overweight lady presents to clinic with non-labored breathing.  HEENT: PER, no nasal discharge, no icterus, no oral exudates, moist mucosal membranes.  NECK: no thyroid mass, no lymphadenopathy.  HEART: RRR S1/S2, no rubs, good peripheral pulses.  LUNGS: CTA bilaterally, no wheezing, breathing is nonlabored.  ABDOMEN: soft, nontender, not distended, bowel sounds are present, no abdominal hernia.  EXTREM: no LE edema.  SKIN: no rashes, skin is warm and dry.  NEURO: A & O x 3, no obvious focal signs.    LABORATORY RESULTS:    Lab Results   Component Value Date    CREATININE 0.9 06/23/2017    BUN 14 06/23/2017     06/23/2017    K 3.7 06/23/2017     " 06/23/2017    CO2 26 06/23/2017      Lab Results   Component Value Date    PTH 75.0 06/23/2017    CALCIUM 9.1 06/23/2017    PHOS 2.8 06/23/2017     Lab Results   Component Value Date    ALBUMIN 3.5 06/23/2017     Lab Results   Component Value Date    WBC 6.17 05/01/2017    HGB 13.2 05/01/2017    HCT 42.0 05/01/2017    MCV 92 05/01/2017     05/01/2017     25-OH vitamin D: 20 (5/1/17)    ASSESSMENT AND PLAN:  67 y.o. female with history of vitamin D deficiency, HTN, hyperlipidemia, neuropathy, bilateral carotid artery disease  presents to the renal clinic for evaluation of hyperparathyroidism    1. Hyperparathyroidism: Patient likely suffers from mild primary hyperparathyroidism. No interventions needed at present.. She will return in 4-5 months for follow up.     2. Electrolytes: Calcium level at goal at present (9.3).     3. Acid base status: No acute issues.    4. Volume: Euvolemic.     5. Hypertension: Good BP control.     6. Medications: Reviewed. Agree with current medical regimen. OK to continue vitamin D for now.

## 2017-07-06 ENCOUNTER — TELEPHONE (OUTPATIENT)
Dept: INTERNAL MEDICINE | Facility: CLINIC | Age: 67
End: 2017-07-06

## 2017-07-06 NOTE — TELEPHONE ENCOUNTER
----- Message from Libby Vasquez sent at 7/6/2017  1:49 PM CDT -----  Contact: pt  Please call pt @ [phone], pt have some questions, pt will discuss with nurse.

## 2017-07-06 NOTE — TELEPHONE ENCOUNTER
----- Message from Alexandra Haddad sent at 7/6/2017  2:11 PM CDT -----  Contact: pt   States she is rtn nurses call and can be reached at [phone]//thanks/dbw

## 2017-07-07 ENCOUNTER — TELEPHONE (OUTPATIENT)
Dept: INTERNAL MEDICINE | Facility: CLINIC | Age: 67
End: 2017-07-07

## 2017-07-07 NOTE — TELEPHONE ENCOUNTER
Spoke to pt. Pt is complaining of hair and finger nail issues. Pt is scheduled to see hilda bird on Monday

## 2017-07-07 NOTE — TELEPHONE ENCOUNTER
----- Message from Melody Neff sent at 7/7/2017 12:48 PM CDT -----  Contact: pt   Pt says she needs to ask a few questions,,, Please call pt back at [phone]

## 2017-07-10 ENCOUNTER — OFFICE VISIT (OUTPATIENT)
Dept: INTERNAL MEDICINE | Facility: CLINIC | Age: 67
End: 2017-07-10
Payer: MEDICARE

## 2017-07-10 VITALS
HEART RATE: 58 BPM | WEIGHT: 221.13 LBS | HEIGHT: 64 IN | TEMPERATURE: 97 F | SYSTOLIC BLOOD PRESSURE: 120 MMHG | OXYGEN SATURATION: 96 % | DIASTOLIC BLOOD PRESSURE: 60 MMHG | BODY MASS INDEX: 37.75 KG/M2

## 2017-07-10 DIAGNOSIS — L60.3 ONYCHORRHEXIS: Primary | ICD-10-CM

## 2017-07-10 PROCEDURE — 1126F AMNT PAIN NOTED NONE PRSNT: CPT | Mod: S$GLB,,, | Performed by: PHYSICIAN ASSISTANT

## 2017-07-10 PROCEDURE — 1159F MED LIST DOCD IN RCRD: CPT | Mod: S$GLB,,, | Performed by: PHYSICIAN ASSISTANT

## 2017-07-10 PROCEDURE — 99999 PR PBB SHADOW E&M-EST. PATIENT-LVL III: CPT | Mod: PBBFAC,,, | Performed by: PHYSICIAN ASSISTANT

## 2017-07-10 PROCEDURE — 99213 OFFICE O/P EST LOW 20 MIN: CPT | Mod: S$GLB,,, | Performed by: PHYSICIAN ASSISTANT

## 2017-07-10 NOTE — PROGRESS NOTES
Subjective:      Patient ID: Beth Souza is a 67 y.o. female.    Chief Complaint: Other (hair and nail issues)    HPI  Patient here today because she feels like her nails and hair are extra dry. Denies any excessive hair loss. Pt states that her nails crack at the nail bed. Reports generalized body aches, sciatica, and fatigue. Symptoms have been going on for about 6 months. Denies dry mouth. Admits to dry eyes.   Denies fever, night sweats, or acute weight changes. Denies depression and anxiety.   Also reports occasional shortness of breath with exertion, not too severe, just needs to take breaks. Also occasionally feels like her heart is beating a little faster than normal, usually related to her taking BC powder.     Review of Systems   Constitutional: Negative for activity change, appetite change, chills, diaphoresis, fatigue, fever and unexpected weight change.   HENT: Negative.  Negative for congestion, hearing loss, postnasal drip, rhinorrhea, sore throat, trouble swallowing and voice change.    Eyes: Negative.  Negative for visual disturbance.   Respiratory: Positive for shortness of breath. Negative for cough, choking and chest tightness.    Cardiovascular: Positive for palpitations. Negative for chest pain and leg swelling.   Gastrointestinal: Negative for abdominal distention, abdominal pain, blood in stool, constipation, diarrhea, nausea and vomiting.   Endocrine: Negative for cold intolerance, heat intolerance, polydipsia and polyuria.   Genitourinary: Negative.  Negative for difficulty urinating and frequency.   Musculoskeletal: Positive for arthralgias and myalgias. Negative for back pain, gait problem and joint swelling.   Skin: Negative for color change, pallor, rash and wound.   Neurological: Negative for dizziness, tremors, weakness, light-headedness, numbness and headaches.   Hematological: Negative for adenopathy.   Psychiatric/Behavioral: Negative for behavioral problems, confusion,  "self-injury, sleep disturbance and suicidal ideas. The patient is not nervous/anxious.      Objective:   /60   Pulse (!) 58   Temp 96.8 °F (36 °C) (Tympanic)   Ht 5' 4" (1.626 m)   Wt 100.3 kg (221 lb 1.9 oz)   SpO2 96%   BMI 37.96 kg/m²     Physical Exam   Constitutional: She is oriented to person, place, and time. She appears well-developed and well-nourished.   HENT:   Head: Normocephalic and atraumatic.   Right Ear: External ear normal.   Left Ear: External ear normal.   Nose: Nose normal.   Mouth/Throat: Oropharynx is clear and moist.   Eyes: Conjunctivae and EOM are normal. Pupils are equal, round, and reactive to light.   Neck: Normal range of motion. Neck supple.   Cardiovascular: Normal rate, regular rhythm and normal heart sounds.  Exam reveals no gallop and no friction rub.    No murmur heard.  Pulmonary/Chest: Effort normal and breath sounds normal. No respiratory distress. She has no wheezes. She has no rales. She exhibits no tenderness.   Abdominal: Soft. She exhibits no distension. There is no tenderness.   Musculoskeletal: Normal range of motion.   Lymphadenopathy:     She has no cervical adenopathy.   Neurological: She is alert and oriented to person, place, and time.   Skin: Skin is warm and dry. No cyanosis. Nails show no clubbing (nails painted. No obvious nail changes).   Psychiatric: She has a normal mood and affect. Her behavior is normal. Judgment and thought content normal.   Vitals reviewed.      Assessment:     1. Onychorrhexis      Plan:   Onychorrhexis    -start biotin. Reviewed prior labs, no acute changes   -advised pt to get her mammogram done.   -if shortness of breath and heart racing episodes worsen, will refer to cardiology  -discontinue BC powder. Tylenol for pain.     Return if symptoms worsen or fail to improve.      "

## 2017-07-14 ENCOUNTER — TELEPHONE (OUTPATIENT)
Dept: INTERNAL MEDICINE | Facility: CLINIC | Age: 67
End: 2017-07-14

## 2017-07-14 NOTE — TELEPHONE ENCOUNTER
Pt requesting handicap tag renewal.  Notified pt that Dr. Oliva will not be in clinic until 7/24/17.  Pt stated she cannot wait that long.  Notified pt that I will check with Dyan Carney to see if she can sign for handicap tags.  Pt verbalized understanding.

## 2017-07-14 NOTE — TELEPHONE ENCOUNTER
----- Message from Maria Esther Woodson sent at 7/14/2017  9:45 AM CDT -----  Contact: pt   Call pt regarding getting her handicap plate renewed.   [phone] (home)

## 2017-07-14 NOTE — TELEPHONE ENCOUNTER
Notified pt that handicap tag form has been signed by Dyan Carney and is ready for  at the first floor registration desk.  Pt verbalized understanding.

## 2017-07-24 ENCOUNTER — HOSPITAL ENCOUNTER (OUTPATIENT)
Dept: RADIOLOGY | Facility: HOSPITAL | Age: 67
Discharge: HOME | End: 2017-07-24
Attending: FAMILY MEDICINE
Payer: MEDICARE

## 2017-07-24 VITALS — HEIGHT: 64 IN | BODY MASS INDEX: 37.73 KG/M2 | WEIGHT: 221 LBS

## 2017-07-24 DIAGNOSIS — Z12.39 BREAST SCREENING: ICD-10-CM

## 2017-07-24 PROCEDURE — 77067 SCR MAMMO BI INCL CAD: CPT | Mod: TC

## 2017-07-24 PROCEDURE — 77067 SCR MAMMO BI INCL CAD: CPT | Mod: 26,,, | Performed by: RADIOLOGY

## 2017-08-08 DIAGNOSIS — E55.9 VITAMIN D DEFICIENCY: ICD-10-CM

## 2017-08-08 RX ORDER — ERGOCALCIFEROL 1.25 MG/1
50000 CAPSULE ORAL
Qty: 10 CAPSULE | Refills: 0 | Status: SHIPPED | OUTPATIENT
Start: 2017-08-08 | End: 2017-10-31 | Stop reason: SDUPTHER

## 2017-09-01 ENCOUNTER — OFFICE VISIT (OUTPATIENT)
Dept: INTERNAL MEDICINE | Facility: CLINIC | Age: 67
End: 2017-09-01
Payer: MEDICARE

## 2017-09-01 ENCOUNTER — HOSPITAL ENCOUNTER (OUTPATIENT)
Dept: RADIOLOGY | Facility: HOSPITAL | Age: 67
Discharge: HOME | End: 2017-09-01
Attending: FAMILY MEDICINE
Payer: MEDICARE

## 2017-09-01 VITALS
HEART RATE: 54 BPM | DIASTOLIC BLOOD PRESSURE: 88 MMHG | SYSTOLIC BLOOD PRESSURE: 130 MMHG | WEIGHT: 221.31 LBS | HEIGHT: 64 IN | TEMPERATURE: 97 F | BODY MASS INDEX: 37.78 KG/M2 | OXYGEN SATURATION: 99 %

## 2017-09-01 DIAGNOSIS — R51.9 SINUS HEADACHE: ICD-10-CM

## 2017-09-01 DIAGNOSIS — E55.9 VITAMIN D DEFICIENCY: ICD-10-CM

## 2017-09-01 DIAGNOSIS — R51.9 SINUS HEADACHE: Primary | ICD-10-CM

## 2017-09-01 DIAGNOSIS — I10 ESSENTIAL HYPERTENSION: ICD-10-CM

## 2017-09-01 PROCEDURE — 70220 X-RAY EXAM OF SINUSES: CPT | Mod: 26,,, | Performed by: RADIOLOGY

## 2017-09-01 PROCEDURE — 99999 PR PBB SHADOW E&M-EST. PATIENT-LVL III: CPT | Mod: PBBFAC,,, | Performed by: FAMILY MEDICINE

## 2017-09-01 PROCEDURE — 99214 OFFICE O/P EST MOD 30 MIN: CPT | Mod: S$GLB,,, | Performed by: FAMILY MEDICINE

## 2017-09-01 PROCEDURE — 3008F BODY MASS INDEX DOCD: CPT | Mod: S$GLB,,, | Performed by: FAMILY MEDICINE

## 2017-09-01 PROCEDURE — 3079F DIAST BP 80-89 MM HG: CPT | Mod: S$GLB,,, | Performed by: FAMILY MEDICINE

## 2017-09-01 PROCEDURE — 1126F AMNT PAIN NOTED NONE PRSNT: CPT | Mod: S$GLB,,, | Performed by: FAMILY MEDICINE

## 2017-09-01 PROCEDURE — 3075F SYST BP GE 130 - 139MM HG: CPT | Mod: S$GLB,,, | Performed by: FAMILY MEDICINE

## 2017-09-01 PROCEDURE — 1159F MED LIST DOCD IN RCRD: CPT | Mod: S$GLB,,, | Performed by: FAMILY MEDICINE

## 2017-09-01 PROCEDURE — 70220 X-RAY EXAM OF SINUSES: CPT | Mod: TC,PO

## 2017-09-01 PROCEDURE — 99499 UNLISTED E&M SERVICE: CPT | Mod: S$GLB,,, | Performed by: FAMILY MEDICINE

## 2017-09-01 RX ORDER — FLUTICASONE PROPIONATE 50 MCG
1 SPRAY, SUSPENSION (ML) NASAL DAILY
Qty: 16 G | Refills: 0 | Status: SHIPPED | OUTPATIENT
Start: 2017-09-01 | End: 2018-02-01

## 2017-09-01 RX ORDER — MONTELUKAST SODIUM 10 MG/1
10 TABLET ORAL NIGHTLY
Qty: 30 TABLET | Refills: 0 | Status: SHIPPED | OUTPATIENT
Start: 2017-09-01 | End: 2017-10-01

## 2017-09-01 NOTE — PROGRESS NOTES
"Subjective:       Patient ID: Beth Souza is a 67 y.o. female.    Chief Complaint: Headache    67-year-old Afro-American female patient with Patient Active Problem List:     Hypertension     Vitamin D deficiency     Spondylolisthesis of lumbar region     Obesity, Class II, BMI 35.0-39.9, with comorbidity (see actual BMI)     Hyperlipidemia     Hyperparathyroidism, primary     Thyroid nodule     Peripheral polyneuropathy     Cataract  Here with complaint of headache and sinus pressure, occasionally feeling dizzy but denies of any syncopal episodes, nausea vomiting or fever, blurry vision.   Has been taking her medications for blood pressure and vitamin D by prescription, currently not taking Topamax.  Patient denies of any nasal congestion, but reports sinus pressure.           Review of Systems   Constitutional: Negative for chills, fatigue and fever.   HENT: Positive for congestion, sinus pain and sinus pressure. Negative for postnasal drip, rhinorrhea, sneezing and sore throat.    Eyes: Negative for visual disturbance.   Respiratory: Negative for cough and shortness of breath.    Cardiovascular: Negative for chest pain and leg swelling.   Gastrointestinal: Negative for abdominal pain, nausea and vomiting.   Musculoskeletal: Negative for myalgias.   Skin: Negative for rash.   Neurological: Positive for dizziness and headaches. Negative for syncope and light-headedness.   Psychiatric/Behavioral: Negative for sleep disturbance.         /88   Pulse (!) 54   Temp 97.4 °F (36.3 °C) (Tympanic)   Ht 5' 4" (1.626 m)   Wt 100.4 kg (221 lb 5.5 oz)   SpO2 99%   BMI 37.99 kg/m²   Objective:      Physical Exam   Constitutional: She is oriented to person, place, and time. She appears well-developed and well-nourished.   HENT:   Head: Normocephalic and atraumatic.   Right Ear: External ear normal.   Left Ear: External ear normal.   Mouth/Throat: Oropharynx is clear and moist.   Positive for sinus pressure in " the maxillary and frontal sinuses.  No postnasal drip noted on exam today.   Neck: Neck supple.   Cardiovascular: Normal rate, regular rhythm and normal heart sounds.    No murmur heard.  Pulmonary/Chest: Effort normal and breath sounds normal. She has no wheezes.   Abdominal: Soft. Bowel sounds are normal. There is no tenderness.   Musculoskeletal: She exhibits no edema.   Lymphadenopathy:     She has no cervical adenopathy.   Neurological: She is alert and oriented to person, place, and time.   Skin: Skin is warm and dry. No rash noted.   Psychiatric: She has a normal mood and affect.         Assessment:       1. Sinus headache    2. Essential hypertension    3. Vitamin D deficiency    4. Obesity, Class II, BMI 35.0-39.9, with comorbidity (see actual BMI)        Plan:   Sinus headache  -     X-Ray Sinuses Min 3 Views; Future; Expected date: 09/01/2017  -     montelukast (SINGULAIR) 10 mg tablet; Take 1 tablet (10 mg total) by mouth every evening.  Dispense: 30 tablet; Refill: 0  -     fluticasone (FLONASE) 50 mcg/actuation nasal spray; 1 spray by Each Nare route once daily.  Dispense: 16 g; Refill: 0  Singulair and Flonase prescribed today for symptomatic relief.  Advised to take over-the-counter Claritin for next 7-10 days and drink adequate fluids.  Avoid decongestants  If symptoms continue to persist may need further evaluation like CT scan  Currently not taking Topamax    Essential hypertension-blood pressure stable today currently on lisinopril hydrochlorothiazide 20/25 mg daily    Vitamin D deficiency-taking vitamin D weekly    Obesity, Class II, BMI 35.0-39.9, with comorbidity (see actual BMI)-lifestyle modifications recommended with diet and exercise to lose weight with BMI 37

## 2017-09-27 ENCOUNTER — TELEPHONE (OUTPATIENT)
Dept: INTERNAL MEDICINE | Facility: CLINIC | Age: 67
End: 2017-09-27

## 2017-09-27 ENCOUNTER — OFFICE VISIT (OUTPATIENT)
Dept: INTERNAL MEDICINE | Facility: CLINIC | Age: 67
End: 2017-09-27
Payer: MEDICARE

## 2017-09-27 VITALS
OXYGEN SATURATION: 98 % | DIASTOLIC BLOOD PRESSURE: 80 MMHG | BODY MASS INDEX: 33.98 KG/M2 | HEIGHT: 64 IN | TEMPERATURE: 99 F | SYSTOLIC BLOOD PRESSURE: 118 MMHG | WEIGHT: 199.06 LBS | HEART RATE: 60 BPM

## 2017-09-27 DIAGNOSIS — E04.1 THYROID NODULE: ICD-10-CM

## 2017-09-27 DIAGNOSIS — E21.0 HYPERPARATHYROIDISM, PRIMARY: ICD-10-CM

## 2017-09-27 DIAGNOSIS — E78.2 MIXED HYPERLIPIDEMIA: ICD-10-CM

## 2017-09-27 DIAGNOSIS — I10 ESSENTIAL HYPERTENSION: ICD-10-CM

## 2017-09-27 DIAGNOSIS — R41.3 MEMORY DEFICITS: ICD-10-CM

## 2017-09-27 DIAGNOSIS — Z00.00 ROUTINE GENERAL MEDICAL EXAMINATION AT A HEALTH CARE FACILITY: Primary | ICD-10-CM

## 2017-09-27 DIAGNOSIS — R82.90 ABNORMAL URINE ODOR: Primary | ICD-10-CM

## 2017-09-27 DIAGNOSIS — E55.9 VITAMIN D DEFICIENCY: ICD-10-CM

## 2017-09-27 DIAGNOSIS — E66.9 OBESITY (BMI 30.0-34.9): ICD-10-CM

## 2017-09-27 DIAGNOSIS — M43.16 SPONDYLOLISTHESIS OF LUMBAR REGION: ICD-10-CM

## 2017-09-27 PROCEDURE — 99999 PR PBB SHADOW E&M-EST. PATIENT-LVL III: CPT | Mod: PBBFAC,,, | Performed by: FAMILY MEDICINE

## 2017-09-27 PROCEDURE — 99499 UNLISTED E&M SERVICE: CPT | Mod: S$GLB,,, | Performed by: FAMILY MEDICINE

## 2017-09-27 PROCEDURE — 99397 PER PM REEVAL EST PAT 65+ YR: CPT | Mod: S$GLB,,, | Performed by: FAMILY MEDICINE

## 2017-09-27 RX ORDER — LISINOPRIL AND HYDROCHLOROTHIAZIDE 20; 25 MG/1; MG/1
1 TABLET ORAL DAILY
Qty: 90 TABLET | Refills: 3 | Status: SHIPPED | OUTPATIENT
Start: 2017-09-27 | End: 2018-09-19 | Stop reason: SDUPTHER

## 2017-09-27 NOTE — PROGRESS NOTES
"Subjective:       Patient ID: Beth Souza is a 67 y.o. female.    Chief Complaint: Hyperlipidemia    67-year-old Afro-American female patient with Patient Active Problem List:     Hypertension     Vitamin D deficiency     Spondylolisthesis of lumbar region     Hyperlipidemia     Hyperparathyroidism, primary     Thyroid nodule     Peripheral polyneuropathy     Cataract  Here for follow-up on chronic medical conditions and for routine annual physicals.  Patient reports that she has been doing better with sinus headache and stopped taking Singulair and Topamax.   Continues to take vitamin D by prescription  Patient concerned about memory deficits, requesting refill on blood pressure medication as she does not recall where she has placed it  Denies of any chest pain or shortness of breath, abdominal discomfort.   Patient is scheduled to see nephrology next month.   Has been trying to stay physically active.       Review of Systems   Constitutional: Negative for fatigue.   Eyes: Negative for visual disturbance.   Respiratory: Negative for shortness of breath.    Cardiovascular: Negative for chest pain and leg swelling.   Gastrointestinal: Negative for abdominal pain, nausea and vomiting.   Musculoskeletal: Positive for back pain and myalgias.   Skin: Negative for rash.   Neurological: Negative for weakness, light-headedness, numbness and headaches.   Psychiatric/Behavioral: Positive for decreased concentration. Negative for sleep disturbance.         /80   Pulse 60   Temp 98.7 °F (37.1 °C) (Tympanic)   Ht 5' 4" (1.626 m)   Wt 90.3 kg (199 lb 1.2 oz)   SpO2 98%   BMI 34.17 kg/m²   Objective:      Physical Exam   Constitutional: She is oriented to person, place, and time. She appears well-developed and well-nourished.   HENT:   Head: Normocephalic and atraumatic.   Mouth/Throat: Oropharynx is clear and moist.   Cardiovascular: Normal rate, regular rhythm and normal heart sounds.    No murmur " heard.  Pulmonary/Chest: Effort normal and breath sounds normal. She has no wheezes.   Abdominal: Soft. Bowel sounds are normal. There is no tenderness.   Musculoskeletal: She exhibits no edema or tenderness.   Neurological: She is alert and oriented to person, place, and time.   Skin: Skin is warm and dry. No rash noted.   Psychiatric: She has a normal mood and affect.         Assessment:       1. Routine general medical examination at a health care facility    2. Essential hypertension    3. Mixed hyperlipidemia    4. Spondylolisthesis of lumbar region    5. Vitamin D deficiency    6. Hyperparathyroidism, primary    7. Thyroid nodule    8. Obesity (BMI 30.0-34.9)    9. Memory deficits        Plan:   Routine general medical examination at a health care facility  -     CBC auto differential; Future; Expected date: 09/27/2017  -     Comprehensive metabolic panel; Future; Expected date: 09/27/2017  -     Lipid panel; Future; Expected date: 09/27/2017  -     TSH; Future; Expected date: 09/27/2017  Vital signs stable today.  Clinical exam stable.   Encouraged to maintain lifestyle modifications with low-fat and low-cholesterol diet and exercise 30 minutes daily  Up-to-date with screenings    Essential hypertension  -     Comprehensive metabolic panel; Future; Expected date: 09/27/2017  -     Lipid panel; Future; Expected date: 09/27/2017  -     TSH; Future; Expected date: 09/27/2017  -     lisinopril-hydrochlorothiazide (PRINZIDE,ZESTORETIC) 20-25 mg Tab; Take 1 tablet by mouth once daily.  Dispense: 90 tablet; Refill: 3  Blood pressure stable today, refill given on lisinopril hydrochlorothiazide 20/25 mg daily.   Restrict salt intake    Mixed hyperlipidemia  -     Lipid panel; Future; Expected date: 09/27/2017  Currently diet-controlled    Spondylolisthesis of lumbar region  Vitamin D deficiency  Hyperparathyroidism, primary  Thyroid nodule  -     TSH; Future; Expected date: 09/27/2017    Stable and asymptomatic  secondary to back pain as well as thyroid nodule.  Taking vitamin D by prescription.  Followed by nephrology for vitamin D deficiency and hyperparathyroidism      Obesity (BMI 30.0-34.9)-lifestyle modifications recommended to lose weight with diet and exercise with BMI 34    Memory deficits  -     Vitamin B12; Future; Expected date: 09/27/2017  Encouraged to make notes to avoid being forgetful.

## 2017-09-27 NOTE — TELEPHONE ENCOUNTER
Pt states she forgot to tell you during her appt. That she has a smelly discharge and a vaginal Odor . Pt is requesting test to be ordered to get done tomorrow during her blood work  Please advise

## 2017-09-28 ENCOUNTER — LAB VISIT (OUTPATIENT)
Dept: LAB | Facility: HOSPITAL | Age: 67
End: 2017-09-28
Attending: FAMILY MEDICINE
Payer: MEDICARE

## 2017-09-28 DIAGNOSIS — Z00.00 ROUTINE GENERAL MEDICAL EXAMINATION AT A HEALTH CARE FACILITY: ICD-10-CM

## 2017-09-28 DIAGNOSIS — E04.1 THYROID NODULE: ICD-10-CM

## 2017-09-28 DIAGNOSIS — I10 ESSENTIAL HYPERTENSION: ICD-10-CM

## 2017-09-28 DIAGNOSIS — R41.3 MEMORY DEFICITS: ICD-10-CM

## 2017-09-28 DIAGNOSIS — E78.2 MIXED HYPERLIPIDEMIA: ICD-10-CM

## 2017-09-28 LAB
ALBUMIN SERPL BCP-MCNC: 3.2 G/DL
ALP SERPL-CCNC: 87 U/L
ALT SERPL W/O P-5'-P-CCNC: 11 U/L
ANION GAP SERPL CALC-SCNC: 12 MMOL/L
AST SERPL-CCNC: 9 U/L
BASOPHILS # BLD AUTO: 0.02 K/UL
BASOPHILS NFR BLD: 0.4 %
BILIRUB SERPL-MCNC: 0.5 MG/DL
BUN SERPL-MCNC: 16 MG/DL
CALCIUM SERPL-MCNC: 9.3 MG/DL
CHLORIDE SERPL-SCNC: 102 MMOL/L
CHOLEST SERPL-MCNC: 193 MG/DL
CHOLEST/HDLC SERPL: 3.6 {RATIO}
CO2 SERPL-SCNC: 30 MMOL/L
CREAT SERPL-MCNC: 0.8 MG/DL
DIFFERENTIAL METHOD: ABNORMAL
EOSINOPHIL # BLD AUTO: 0.1 K/UL
EOSINOPHIL NFR BLD: 2.5 %
ERYTHROCYTE [DISTWIDTH] IN BLOOD BY AUTOMATED COUNT: 14.6 %
EST. GFR  (AFRICAN AMERICAN): >60 ML/MIN/1.73 M^2
EST. GFR  (NON AFRICAN AMERICAN): >60 ML/MIN/1.73 M^2
GLUCOSE SERPL-MCNC: 92 MG/DL
HCT VFR BLD AUTO: 41.9 %
HDLC SERPL-MCNC: 54 MG/DL
HDLC SERPL: 28 %
HGB BLD-MCNC: 13.6 G/DL
LDLC SERPL CALC-MCNC: 124 MG/DL
LYMPHOCYTES # BLD AUTO: 1.9 K/UL
LYMPHOCYTES NFR BLD: 38 %
MCH RBC QN AUTO: 29.2 PG
MCHC RBC AUTO-ENTMCNC: 32.5 G/DL
MCV RBC AUTO: 90 FL
MONOCYTES # BLD AUTO: 0.6 K/UL
MONOCYTES NFR BLD: 12.5 %
NEUTROPHILS # BLD AUTO: 2.3 K/UL
NEUTROPHILS NFR BLD: 46.4 %
NONHDLC SERPL-MCNC: 139 MG/DL
PLATELET # BLD AUTO: 285 K/UL
PMV BLD AUTO: 10.3 FL
POTASSIUM SERPL-SCNC: 3.7 MMOL/L
PROT SERPL-MCNC: 6.7 G/DL
RBC # BLD AUTO: 4.66 M/UL
SODIUM SERPL-SCNC: 144 MMOL/L
TRIGL SERPL-MCNC: 75 MG/DL
TSH SERPL DL<=0.005 MIU/L-ACNC: 2.35 UIU/ML
VIT B12 SERPL-MCNC: 714 PG/ML
WBC # BLD AUTO: 4.87 K/UL

## 2017-09-28 PROCEDURE — 84443 ASSAY THYROID STIM HORMONE: CPT

## 2017-09-28 PROCEDURE — 85025 COMPLETE CBC W/AUTO DIFF WBC: CPT

## 2017-09-28 PROCEDURE — 36415 COLL VENOUS BLD VENIPUNCTURE: CPT | Mod: PO

## 2017-09-28 PROCEDURE — 80053 COMPREHEN METABOLIC PANEL: CPT

## 2017-09-28 PROCEDURE — 80061 LIPID PANEL: CPT

## 2017-09-28 PROCEDURE — 82607 VITAMIN B-12: CPT

## 2017-10-05 ENCOUNTER — TELEPHONE (OUTPATIENT)
Dept: INTERNAL MEDICINE | Facility: CLINIC | Age: 67
End: 2017-10-05

## 2017-10-05 NOTE — TELEPHONE ENCOUNTER
----- Message from Rosalind Almendarez sent at 10/5/2017  8:41 AM CDT -----  Would like to speak to nurse about test results. Please call back at [phone]. thanks

## 2017-10-05 NOTE — TELEPHONE ENCOUNTER
----- Message from Abhay Jarrell sent at 10/5/2017  1:51 PM CDT -----  Pt is requesting a call from nurse to discuss a female issue she has.(personal )      Please call pt back at [phone]

## 2017-10-05 NOTE — TELEPHONE ENCOUNTER
Spoke to pt. Informed pt that her UA came back normal. Pt is complaining of a vaginal odor and a discharge. Pt states she is sexually active. I told pt I can ask dr tam to put in a STD work up. Pt states she is going to schedule an appt with her gyneo.

## 2017-10-16 ENCOUNTER — OFFICE VISIT (OUTPATIENT)
Dept: OBSTETRICS AND GYNECOLOGY | Facility: CLINIC | Age: 67
End: 2017-10-16
Payer: MEDICARE

## 2017-10-16 VITALS
BODY MASS INDEX: 37.9 KG/M2 | HEIGHT: 64 IN | DIASTOLIC BLOOD PRESSURE: 84 MMHG | SYSTOLIC BLOOD PRESSURE: 131 MMHG | WEIGHT: 222 LBS

## 2017-10-16 DIAGNOSIS — N89.8 VAGINAL ODOR: Primary | ICD-10-CM

## 2017-10-16 PROCEDURE — 99204 OFFICE O/P NEW MOD 45 MIN: CPT | Mod: S$GLB,,, | Performed by: NURSE PRACTITIONER

## 2017-10-16 PROCEDURE — 99999 PR PBB SHADOW E&M-EST. PATIENT-LVL III: CPT | Mod: PBBFAC,,, | Performed by: NURSE PRACTITIONER

## 2017-10-16 RX ORDER — METRONIDAZOLE 7.5 MG/G
1 GEL VAGINAL DAILY
Qty: 70 G | Refills: 0 | Status: SHIPPED | OUTPATIENT
Start: 2017-10-16 | End: 2017-10-24

## 2017-10-24 ENCOUNTER — OFFICE VISIT (OUTPATIENT)
Dept: CARDIOLOGY | Facility: CLINIC | Age: 67
End: 2017-10-24
Payer: MEDICARE

## 2017-10-24 VITALS
HEIGHT: 64 IN | HEART RATE: 56 BPM | DIASTOLIC BLOOD PRESSURE: 82 MMHG | BODY MASS INDEX: 38.24 KG/M2 | SYSTOLIC BLOOD PRESSURE: 112 MMHG | WEIGHT: 224 LBS

## 2017-10-24 DIAGNOSIS — I10 ESSENTIAL HYPERTENSION: ICD-10-CM

## 2017-10-24 DIAGNOSIS — R00.2 PALPITATION: Primary | ICD-10-CM

## 2017-10-24 DIAGNOSIS — E78.2 MIXED HYPERLIPIDEMIA: ICD-10-CM

## 2017-10-24 PROCEDURE — 99999 PR PBB SHADOW E&M-EST. PATIENT-LVL III: CPT | Mod: PBBFAC,,, | Performed by: INTERNAL MEDICINE

## 2017-10-24 PROCEDURE — 99499 UNLISTED E&M SERVICE: CPT | Mod: S$GLB,,, | Performed by: INTERNAL MEDICINE

## 2017-10-24 PROCEDURE — 99214 OFFICE O/P EST MOD 30 MIN: CPT | Mod: S$GLB,,, | Performed by: INTERNAL MEDICINE

## 2017-10-24 NOTE — PROGRESS NOTES
Subjective:   Patient ID:  Beth Souza is a 67 y.o. female who presents for follow up of Hypertension and Shortness of Breath        Pt, 63 yo, came in for one yr f/u.  PMH HTN, HLD and Vit D.   No chest pain, fainting, PND, orthopnea, syncope and claudication.   Palpitation and fluttering feeling, almost daily. Occasional dizziness.  Does not exercise due to back pain.    No smoking/drinking.  EKG on 10-: NSR. nonspecifc ST T change.               Past Medical History:   Diagnosis Date    Cataract     Hyperlipidemia     Hypertension     Lumbar spondylosis     Vitamin D deficiency        Past Surgical History:   Procedure Laterality Date    PARTIAL HYSTERECTOMY         Social History   Substance Use Topics    Smoking status: Former Smoker     Quit date: 1/1/2004    Smokeless tobacco: Never Used    Alcohol use Yes      Comment: occasionallty       Family History   Problem Relation Age of Onset    Hypertension Mother     Diabetes Daughter     Melanoma Neg Hx     Psoriasis Neg Hx     Lupus Neg Hx     Eczema Neg Hx          Review of Systems   Constitution: Negative for decreased appetite, diaphoresis, fever, weakness, malaise/fatigue and night sweats.   HENT: Negative for nosebleeds.    Eyes: Negative for blurred vision and double vision.   Cardiovascular: Positive for palpitations. Negative for chest pain, claudication, dyspnea on exertion, irregular heartbeat, leg swelling, near-syncope, orthopnea, paroxysmal nocturnal dyspnea and syncope.   Respiratory: Negative for cough, shortness of breath, sleep disturbances due to breathing, snoring, sputum production and wheezing.    Endocrine: Negative for cold intolerance and polyuria.   Hematologic/Lymphatic: Does not bruise/bleed easily.   Skin: Negative for rash.   Musculoskeletal: Negative for back pain, falls, joint pain, joint swelling and neck pain.   Gastrointestinal: Negative for abdominal pain, heartburn, nausea and vomiting.    Genitourinary: Negative for dysuria, frequency and hematuria.   Neurological: Negative for difficulty with concentration, dizziness, focal weakness, headaches, light-headedness, numbness and seizures.   Psychiatric/Behavioral: Negative for depression, memory loss and substance abuse. The patient does not have insomnia.    Allergic/Immunologic: Negative for HIV exposure and hives.       Objective:   Physical Exam   Constitutional: She is oriented to person, place, and time. She appears well-nourished.   HENT:   Head: Normocephalic.   Eyes: Pupils are equal, round, and reactive to light.   Neck: Normal carotid pulses and no JVD present. Carotid bruit is not present. No thyromegaly present.   Cardiovascular: Normal rate, regular rhythm, normal heart sounds and normal pulses.   No extrasystoles are present. PMI is not displaced.  Exam reveals no gallop and no S3.    No murmur heard.  Pulmonary/Chest: Breath sounds normal. No stridor. No respiratory distress.   Abdominal: Soft. Bowel sounds are normal. There is no tenderness. There is no rebound.   Musculoskeletal: Normal range of motion.   Neurological: She is alert and oriented to person, place, and time.   Skin: Skin is intact. No rash noted.   Psychiatric: Her behavior is normal.       Lab Results   Component Value Date    CHOL 193 09/28/2017    CHOL 225 (H) 02/11/2017    CHOL 229 (H) 08/05/2016     Lab Results   Component Value Date    HDL 54 09/28/2017    HDL 58 02/11/2017    HDL 57 08/05/2016     Lab Results   Component Value Date    LDLCALC 124.0 09/28/2017    LDLCALC 148.8 02/11/2017    LDLCALC 150.4 08/05/2016     Lab Results   Component Value Date    TRIG 75 09/28/2017    TRIG 91 02/11/2017    TRIG 108 08/05/2016     Lab Results   Component Value Date    CHOLHDL 28.0 09/28/2017    CHOLHDL 25.8 02/11/2017    CHOLHDL 24.9 08/05/2016       Chemistry        Component Value Date/Time     09/28/2017 0805    K 3.7 09/28/2017 0805     09/28/2017 0805     CO2 30 (H) 09/28/2017 0805    BUN 16 09/28/2017 0805    CREATININE 0.8 09/28/2017 0805    GLU 92 09/28/2017 0805        Component Value Date/Time    CALCIUM 9.3 09/28/2017 0805    ALKPHOS 87 09/28/2017 0805    AST 9 (L) 09/28/2017 0805    ALT 11 09/28/2017 0805    BILITOT 0.5 09/28/2017 0805    ESTGFRAFRICA >60.0 09/28/2017 0805    EGFRNONAA >60.0 09/28/2017 0805          Lab Results   Component Value Date    TSH 2.348 09/28/2017     No results found for: INR, PROTIME  Lab Results   Component Value Date    WBC 4.87 09/28/2017    HGB 13.6 09/28/2017    HCT 41.9 09/28/2017    MCV 90 09/28/2017     09/28/2017     BMP  Sodium   Date Value Ref Range Status   09/28/2017 144 136 - 145 mmol/L Final     Potassium   Date Value Ref Range Status   09/28/2017 3.7 3.5 - 5.1 mmol/L Final     Chloride   Date Value Ref Range Status   09/28/2017 102 95 - 110 mmol/L Final     CO2   Date Value Ref Range Status   09/28/2017 30 (H) 23 - 29 mmol/L Final     BUN, Bld   Date Value Ref Range Status   09/28/2017 16 8 - 23 mg/dL Final     Creatinine   Date Value Ref Range Status   09/28/2017 0.8 0.5 - 1.4 mg/dL Final     Calcium   Date Value Ref Range Status   09/28/2017 9.3 8.7 - 10.5 mg/dL Final     Anion Gap   Date Value Ref Range Status   09/28/2017 12 8 - 16 mmol/L Final     eGFR if    Date Value Ref Range Status   09/28/2017 >60.0 >60 mL/min/1.73 m^2 Final     eGFR if non    Date Value Ref Range Status   09/28/2017 >60.0 >60 mL/min/1.73 m^2 Final     Comment:     Calculation used to obtain the estimated glomerular filtration  rate (eGFR) is the CKD-EPI equation. Since race is unknown   in our information system, the eGFR values for   -American and Non--American patients are given   for each creatinine result.       CrCl cannot be calculated (Patient's most recent lab result is older than the maximum 7 days allowed.).     Assessment:      1. Palpitation    2. Essential hypertension     3. Mixed hyperlipidemia        Plan:   Palpitation  -     Holter monitor - 48 hour  -     2D echo with color flow doppler; Future    Essential hypertension  -     2D echo with color flow doppler; Future    Mixed hyperlipidemia      Continue current meds.  Recommend heart-healthy diet, weight control and regular exercise.  Leonor. Risk modification.   RTC in 1 yr    I have reviewed all pertinent labs and cardiac studies. Plans and recommendations have been formulated under my direct supervision. All questions answered and patient voiced understanding. Patient to continue current medications.

## 2017-10-27 ENCOUNTER — CLINICAL SUPPORT (OUTPATIENT)
Dept: CARDIOLOGY | Facility: CLINIC | Age: 67
End: 2017-10-27
Attending: INTERNAL MEDICINE
Payer: MEDICARE

## 2017-10-27 DIAGNOSIS — R00.2 PALPITATION: ICD-10-CM

## 2017-10-27 DIAGNOSIS — I10 ESSENTIAL HYPERTENSION: ICD-10-CM

## 2017-10-27 PROCEDURE — 93306 TTE W/DOPPLER COMPLETE: CPT | Mod: S$GLB,,, | Performed by: INTERNAL MEDICINE

## 2017-10-30 LAB
ESTIMATED PA SYSTOLIC PRESSURE: 36.48
RETIRED EF AND QEF - SEE NOTES: 60 (ref 55–65)

## 2017-10-31 DIAGNOSIS — E55.9 VITAMIN D DEFICIENCY: ICD-10-CM

## 2017-10-31 RX ORDER — ERGOCALCIFEROL 1.25 MG/1
CAPSULE ORAL
Qty: 10 CAPSULE | Refills: 0 | Status: SHIPPED | OUTPATIENT
Start: 2017-10-31 | End: 2017-11-06 | Stop reason: SDUPTHER

## 2017-11-01 ENCOUNTER — LAB VISIT (OUTPATIENT)
Dept: LAB | Facility: HOSPITAL | Age: 67
End: 2017-11-01
Attending: INTERNAL MEDICINE
Payer: MEDICARE

## 2017-11-01 ENCOUNTER — OFFICE VISIT (OUTPATIENT)
Dept: NEPHROLOGY | Facility: CLINIC | Age: 67
End: 2017-11-01
Payer: MEDICARE

## 2017-11-01 VITALS
HEART RATE: 72 BPM | WEIGHT: 229.94 LBS | DIASTOLIC BLOOD PRESSURE: 88 MMHG | BODY MASS INDEX: 39.26 KG/M2 | HEIGHT: 64 IN | SYSTOLIC BLOOD PRESSURE: 124 MMHG

## 2017-11-01 DIAGNOSIS — E21.0 HYPERPARATHYROIDISM, PRIMARY: Primary | ICD-10-CM

## 2017-11-01 DIAGNOSIS — E21.0 HYPERPARATHYROIDISM, PRIMARY: ICD-10-CM

## 2017-11-01 LAB
25(OH)D3+25(OH)D2 SERPL-MCNC: 21 NG/ML
ALBUMIN SERPL BCP-MCNC: 3.2 G/DL
ANION GAP SERPL CALC-SCNC: 8 MMOL/L
BUN SERPL-MCNC: 19 MG/DL
CALCIUM SERPL-MCNC: 8.9 MG/DL
CHLORIDE SERPL-SCNC: 105 MMOL/L
CO2 SERPL-SCNC: 31 MMOL/L
CREAT SERPL-MCNC: 0.8 MG/DL
EST. GFR  (AFRICAN AMERICAN): >60 ML/MIN/1.73 M^2
EST. GFR  (NON AFRICAN AMERICAN): >60 ML/MIN/1.73 M^2
GLUCOSE SERPL-MCNC: 116 MG/DL
PHOSPHATE SERPL-MCNC: 2.5 MG/DL
POTASSIUM SERPL-SCNC: 3.8 MMOL/L
PTH-INTACT SERPL-MCNC: 119 PG/ML
SODIUM SERPL-SCNC: 144 MMOL/L

## 2017-11-01 PROCEDURE — 82306 VITAMIN D 25 HYDROXY: CPT

## 2017-11-01 PROCEDURE — 99499 UNLISTED E&M SERVICE: CPT | Mod: S$GLB,,, | Performed by: INTERNAL MEDICINE

## 2017-11-01 PROCEDURE — 83970 ASSAY OF PARATHORMONE: CPT

## 2017-11-01 PROCEDURE — 80069 RENAL FUNCTION PANEL: CPT

## 2017-11-01 PROCEDURE — 99213 OFFICE O/P EST LOW 20 MIN: CPT | Mod: S$GLB,,, | Performed by: INTERNAL MEDICINE

## 2017-11-01 PROCEDURE — 99999 PR PBB SHADOW E&M-EST. PATIENT-LVL III: CPT | Mod: PBBFAC,,, | Performed by: INTERNAL MEDICINE

## 2017-11-01 PROCEDURE — 36415 COLL VENOUS BLD VENIPUNCTURE: CPT | Mod: PO

## 2017-11-01 NOTE — PROGRESS NOTES
PROGRESS NOTE FOR ESTABLISHED PATIENT    PHYSICIAN REQUESTING THE CONSULT: Dr. Edna Oliva    REASON FOR VISIT: Hyperparathyroidism    67 y.o. female with history of vitamin D deficiency, HTN, hyperlipidemia, neuropathy, bilateral carotid artery disease  presents to the renal clinic for evaluation of hyperparathyroidism.     Patient has no complaints at present.              Past Medical History:   Diagnosis Date    Cataract     Hyperlipidemia     Hypertension     Lumbar spondylosis     Vitamin D deficiency        Past Surgical History:   Procedure Laterality Date    PARTIAL HYSTERECTOMY         Review of patient's allergies indicates:   Allergen Reactions    Aspirin      Bruising    Sulfa (sulfonamide antibiotics)        Current Outpatient Prescriptions   Medication Sig Dispense Refill    fluticasone (FLONASE) 50 mcg/actuation nasal spray 1 spray by Each Nare route once daily. 16 g 0    lisinopril-hydrochlorothiazide (PRINZIDE,ZESTORETIC) 20-25 mg Tab Take 1 tablet by mouth once daily. 90 tablet 3    VITAMIN D2 50,000 unit capsule TAKE ONE CAPSULE BY MOUTH ONCE A WEEK 10 capsule 0     No current facility-administered medications for this visit.        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 No    Breast-Feeding No     Social History Narrative    No narrative on file       Review  "of Systems:  1. GENERAL: patient denies any fever, weight changes, generalized weakness, dizziness.  2. HEENT: patient denies headaches, no visual disturbances, swallowing problems, sinus pain, nasal congestion.  3. CARDIOVASCULAR: patient denies chest pain, palpitations.  4. PULMONARY: patient denies SOB, coughing, hemoptysis, wheezing.  5. GASTROINTESTINAL: patient denies abdominal pain/cramping, nausea, vomiting, diarrhea, no constipation  6. GENITOURINARY: patient denies dysuria, hematuria, hesitancy, frequency.  7. EXTREMITIES: patient denies LE edema, LE cramping.  8. DERMATOLOGY: patient denies rashes, ulcers.  9. NEURO: patient denies tremors, extremity weakness, she reports intermittent extremity numbness/tingling.  10. MUSCULOSKELETAL: patient reports intermittent back pain  11. HEMATOLOGY: patient denies rectal or gum bleeding.  12: PSYCH: patient denies anxiety, depression.      PHYSICAL EXAM:  /88   Pulse 72   Ht 5' 4" (1.626 m)   Wt 104.3 kg (229 lb 15 oz)   BMI 39.47 kg/m²     GENERAL: Pleasant overweight lady presents to clinic with non-labored breathing.  HEENT: PER, no nasal discharge, no icterus, no oral exudates, moist mucosal membranes.  NECK: no thyroid mass, no lymphadenopathy.  HEART: RRR S1/S2, no rubs, good peripheral pulses.  LUNGS: CTA bilaterally, no wheezing, breathing is nonlabored.  ABDOMEN: soft, nontender, not distended, bowel sounds are present, no abdominal hernia.  EXTREM: no LE edema.  SKIN: no rashes, skin is warm and dry.  NEURO: A & O x 3, no obvious focal signs.    LABORATORY RESULTS:    Lab Results   Component Value Date    CREATININE 0.8 09/28/2017    BUN 16 09/28/2017     09/28/2017    K 3.7 09/28/2017     09/28/2017    CO2 30 (H) 09/28/2017      Lab Results   Component Value Date    PTH 75.0 06/23/2017    CALCIUM 9.3 09/28/2017    PHOS 2.8 06/23/2017     Lab Results   Component Value Date    ALBUMIN 3.2 (L) 09/28/2017     Lab Results   Component " Value Date    WBC 4.87 09/28/2017    HGB 13.6 09/28/2017    HCT 41.9 09/28/2017    MCV 90 09/28/2017     09/28/2017     25-OH vitamin D: 20 (5/1/17)    ASSESSMENT AND PLAN:  67 y.o. female with history of vitamin D deficiency, HTN, hyperlipidemia, neuropathy, bilateral carotid artery disease  presents to the renal clinic for evaluation of hyperparathyroidism    1. Hyperparathyroidism: Patient likely suffers from mild primary hyperparathyroidism. No interventions needed at present.. She will return in 3 months for follow up. Will follow up on today's labs.     2. Electrolytes: Calcium level at goal at present (9.3). Will follow up on today's labs.     3. Acid base status: No acute issues.    4. Volume: Euvolemic.     5. Hypertension: Good BP control.     6. Medications: Reviewed. Agree with current medical regimen. Continue vitamin D for now.

## 2017-11-06 DIAGNOSIS — E55.9 VITAMIN D DEFICIENCY: ICD-10-CM

## 2017-11-06 RX ORDER — ERGOCALCIFEROL 1.25 MG/1
CAPSULE ORAL
Qty: 10 CAPSULE | Refills: 0 | Status: SHIPPED | OUTPATIENT
Start: 2017-11-06 | End: 2018-02-01 | Stop reason: SDUPTHER

## 2017-11-21 ENCOUNTER — OFFICE VISIT (OUTPATIENT)
Dept: INTERNAL MEDICINE | Facility: CLINIC | Age: 67
End: 2017-11-21
Payer: MEDICARE

## 2017-11-21 VITALS
OXYGEN SATURATION: 98 % | TEMPERATURE: 98 F | BODY MASS INDEX: 38.47 KG/M2 | HEIGHT: 64 IN | HEART RATE: 63 BPM | DIASTOLIC BLOOD PRESSURE: 83 MMHG | SYSTOLIC BLOOD PRESSURE: 123 MMHG | WEIGHT: 225.31 LBS

## 2017-11-21 DIAGNOSIS — I10 ESSENTIAL HYPERTENSION: ICD-10-CM

## 2017-11-21 DIAGNOSIS — J06.9 URTI (ACUTE UPPER RESPIRATORY INFECTION): Primary | ICD-10-CM

## 2017-11-21 PROCEDURE — 99213 OFFICE O/P EST LOW 20 MIN: CPT | Mod: S$GLB,,, | Performed by: FAMILY MEDICINE

## 2017-11-21 PROCEDURE — 99499 UNLISTED E&M SERVICE: CPT | Mod: S$GLB,,, | Performed by: FAMILY MEDICINE

## 2017-11-21 PROCEDURE — 99999 PR PBB SHADOW E&M-EST. PATIENT-LVL III: CPT | Mod: PBBFAC,,, | Performed by: FAMILY MEDICINE

## 2017-11-21 RX ORDER — CETIRIZINE HYDROCHLORIDE 10 MG/1
10 TABLET ORAL DAILY
Qty: 30 TABLET | Refills: 0 | Status: SHIPPED | OUTPATIENT
Start: 2017-11-21 | End: 2018-02-01

## 2017-11-21 NOTE — PROGRESS NOTES
"Subjective:       Patient ID: Beth Souza is a 67 y.o. female.    Chief Complaint: Sore Throat (Nasal pressure, possible cold)    67-year-old -American female patient with Patient Active Problem List:     Hypertension     Vitamin D deficiency     Spondylolisthesis of lumbar region     Hyperlipidemia     Hyperparathyroidism, primary     Thyroid nodule     Peripheral polyneuropathy     Cataract     Palpitation  Here with complaint of right-sided sore throat, and headache since yesterday, would like to get checked out.  Denies of any runny nose or fever, earache, nausea vomiting or cough.   Patient has tried taking Veronique-Warren over-the-counter  Patient is going out of town for Thanksgiving and would like to get checked      Review of Systems   Constitutional: Negative for chills, fatigue and fever.   HENT: Positive for postnasal drip and sore throat. Negative for congestion, ear pain, rhinorrhea and sinus pressure.    Eyes: Negative for visual disturbance.   Respiratory: Negative for cough and shortness of breath.    Cardiovascular: Negative for chest pain and leg swelling.   Gastrointestinal: Negative for abdominal pain, nausea and vomiting.   Musculoskeletal: Negative for myalgias.   Skin: Negative for rash.   Neurological: Positive for headaches. Negative for light-headedness.   Psychiatric/Behavioral: Negative for sleep disturbance.         /83 (BP Location: Left arm, Patient Position: Sitting)   Pulse 63   Temp 97.9 °F (36.6 °C) (Tympanic)   Ht 5' 4" (1.626 m)   Wt 102.2 kg (225 lb 5 oz)   SpO2 98%   BMI 38.67 kg/m²   Objective:      Physical Exam   Constitutional: She is oriented to person, place, and time. She appears well-developed and well-nourished.   HENT:   Head: Normocephalic and atraumatic.   Right Ear: External ear normal.   Left Ear: External ear normal.   Mouth/Throat: Oropharynx is clear and moist. No oropharyngeal exudate.   Positive for faint postnasal drip noted in the " posterior pharyngeal mucosa but no significant erythema or exudates noted   Neck: Neck supple.   Cardiovascular: Normal rate, regular rhythm and normal heart sounds.    No murmur heard.  Pulmonary/Chest: Effort normal and breath sounds normal. She has no wheezes.   Abdominal: Soft. Bowel sounds are normal. There is no tenderness.   Musculoskeletal: She exhibits no edema.   Lymphadenopathy:     She has no cervical adenopathy.   Neurological: She is alert and oriented to person, place, and time.   Skin: Skin is warm and dry. No rash noted.   Psychiatric: She has a normal mood and affect.         Assessment:       1. URTI (acute upper respiratory infection)    2. Essential hypertension        Plan:   URTI (acute upper respiratory infection)  -     cetirizine (ZYRTEC) 10 MG tablet; Take 1 tablet (10 mg total) by mouth once daily.  Dispense: 30 tablet; Refill: 0  Patient was advised to start doing salt water gargles and take Zyrtec over-the-counter and avoid decongestants with history of hypertension.   Drink adequate fluids  No antibiotics needed at this time    Essential hypertension-blood pressure stable today currently on lisinopril hydrochlorothiazide 20/25 mg daily

## 2017-12-31 ENCOUNTER — OFFICE VISIT (OUTPATIENT)
Dept: URGENT CARE | Facility: CLINIC | Age: 67
End: 2017-12-31
Payer: MEDICARE

## 2017-12-31 VITALS
BODY MASS INDEX: 36.34 KG/M2 | SYSTOLIC BLOOD PRESSURE: 141 MMHG | DIASTOLIC BLOOD PRESSURE: 93 MMHG | HEART RATE: 65 BPM | HEIGHT: 65 IN | OXYGEN SATURATION: 98 % | TEMPERATURE: 100 F | WEIGHT: 218.13 LBS

## 2017-12-31 DIAGNOSIS — R68.89 FLU-LIKE SYMPTOMS: Primary | ICD-10-CM

## 2017-12-31 LAB
CTP QC/QA: YES
FLUAV AG NPH QL: NEGATIVE
FLUBV AG NPH QL: NEGATIVE

## 2017-12-31 PROCEDURE — 99999 PR PBB SHADOW E&M-EST. PATIENT-LVL III: CPT | Mod: PBBFAC,,, | Performed by: FAMILY MEDICINE

## 2017-12-31 PROCEDURE — 87804 INFLUENZA ASSAY W/OPTIC: CPT | Mod: QW,S$GLB,, | Performed by: FAMILY MEDICINE

## 2017-12-31 PROCEDURE — 99214 OFFICE O/P EST MOD 30 MIN: CPT | Mod: S$GLB,,, | Performed by: FAMILY MEDICINE

## 2017-12-31 RX ORDER — OSELTAMIVIR PHOSPHATE 75 MG/1
75 CAPSULE ORAL 2 TIMES DAILY
Qty: 10 CAPSULE | Refills: 0 | Status: SHIPPED | OUTPATIENT
Start: 2017-12-31 | End: 2018-01-05

## 2017-12-31 NOTE — PROGRESS NOTES
"Subjective:       Patient ID: Beth Souza is a 67 y.o. female.    Chief Complaint: Cough ("cough/congestion and headache")    BP (!) 141/93 (BP Location: Right arm, Patient Position: Sitting, BP Method: Large (Automatic))   Pulse 65   Temp 100 °F (37.8 °C) (Tympanic)   Ht 5' 5" (1.651 m)   Wt 98.9 kg (218 lb 2.3 oz)   SpO2 98%   BMI 36.30 kg/m²     HPI  Congestion fever headaches cough for 3-4 days. No body aches. Taking over the counter cold remedies, helped a little  Had flu vaccine this season    Review of Systems   Constitutional: Positive for fever.   HENT: Positive for congestion.    Respiratory: Positive for cough.    Neurological: Positive for headaches.       Objective:      Physical Exam   Constitutional: She is oriented to person, place, and time. She appears well-developed and well-nourished. No distress.   HENT:   Head: Normocephalic and atraumatic.   Mouth/Throat: Oropharynx is clear and moist. No oropharyngeal exudate.   Eyes: EOM are normal. Pupils are equal, round, and reactive to light. Right eye exhibits no discharge. Left eye exhibits no discharge.   Eyes teary   Neck: Normal range of motion. Neck supple.   Cardiovascular: Regular rhythm and normal heart sounds.    No murmur heard.  Pulmonary/Chest: Effort normal and breath sounds normal. She has no wheezes. She has no rales.   Musculoskeletal: Normal range of motion.   Lymphadenopathy:     She has cervical adenopathy.   Neurological: She is alert and oriented to person, place, and time. No cranial nerve deficit.   Skin: Skin is warm and dry. She is not diaphoretic.   Nursing note and vitals reviewed.      Assessment:       1. Flu-like symptoms        Plan:     Beth was seen today for cough.    Diagnoses and all orders for this visit:    Flu-like symptoms  -     POCT Influenza A/B  -     oseltamivir (TAMIFLU) 75 MG capsule; Take 1 capsule (75 mg total) by mouth 2 (two) times daily.          Discussed with pt all information and " results pertaining to this visit. Discussed dx and plan of tx.  All questions and concerns were addressed at this time. Pt expresses understanding of information and instructions.  Care and follow up instruction given to patient.

## 2017-12-31 NOTE — PATIENT INSTRUCTIONS
Understanding the Cold Virus  Colds are the most common illness that people get. Most adults get 2 or 3 colds per year, and most children get 5 to 7 colds per year. Colds may be caused by over 200 types of viruses. The most common of these are rhinoviruses (rhino refers to the nose).  What causes a cold virus?  All colds start with infection by a virus. You can be infected by more than one cold virus at a time. Infection with cold viruses happens when:  · You breathe in a virus from the air. This can happen when someone with a cold sneezes or coughs near you.  · You touch your eyes, nose, or mouth when your hand has a cold virus on it. This can happen if you touch an object that has the cold virus on it.  What are the symptoms of a cold virus?  Almost all colds involve a stuffy nose. Other common symptoms include:  · Runny nose  · Sneezing  · Sore throat  · Headache  · Cough  How is a cold treated?  Colds usually last 5 to 10 days. Treatment focuses on relieving symptoms. Treatments may include:  · Decongestant medicines. Several types of decongestants are available without prescription. These may help reduce stuffy or runny nose symptoms.  · Prescription or over-the-counter nasal sprays. These may help reduce nasal symptoms, including stuffiness.  · Prescription or over-the-counter pain medicines. These can help with headaches and sore throat.  · Self-care. This includes extra rest, using humidifiers, and drinking more fluids. These help you feel better while you are getting over a cold.  Antibiotics are not helpful for a cold. They do not make a cold shorter or relieve symptoms. Taking antibiotics when you dont need them can make them work less well when you need them for another illness.  Follow all directions for using medicines, especially when giving them to children. Contact your healthcare provider if you have any questions about using cold medicines safely.  Can a cold be prevented?  You can help  reduce the spread of cold viruses. This can help both you and others avoid getting colds. Follow these tips:  · Wash your hands well anytime you may have come into contact with cold viruses. Wash your hands for at least 20 seconds. When you cant wash with soap and water, use an alcohol-based hand .  · Dont touch your nose, eyes, or mouth, especially after touching something that may have a cold virus on it.  · Cover your mouth and nose when you cough or sneeze. Throw away tissues after using them.  · Disinfect things you touch often, such as phones and keyboards.    · Stay home when you have a cold.  What are the possible complications of a cold virus?  Colds usually go away by themselves. But its not unusual to get another type of infection while you have a cold. These can include:  · Sinus infection  · Lung infection, such as bronchitis or pneumonia  · Ear infection  If you have asthma or chronic bronchitis, a cold can make your condition worse.     When should I call my healthcare provider?  Call your healthcare provider right away if you have any of these:  · Fever of 100.4°F (38°C) or higher, or as directed  · Cough, chest pain, or shortness of breath that gets worse  · Symptoms dont get better or get worse after about 10 days  · Headache, sleepiness, or confusion that gets worse   Date Last Reviewed: 3/28/2016  © 3308-0063 [employer]. [address] [place] [address], [place] [address]. All rights reserved. This information is not intended as a substitute for professional medical care. Always follow your healthcare professional's instructions.

## 2018-01-02 ENCOUNTER — OFFICE VISIT (OUTPATIENT)
Dept: INTERNAL MEDICINE | Facility: CLINIC | Age: 68
End: 2018-01-02
Payer: MEDICARE

## 2018-01-02 VITALS
BODY MASS INDEX: 36.58 KG/M2 | HEIGHT: 65 IN | TEMPERATURE: 100 F | HEART RATE: 75 BPM | WEIGHT: 219.56 LBS | OXYGEN SATURATION: 99 % | SYSTOLIC BLOOD PRESSURE: 130 MMHG | DIASTOLIC BLOOD PRESSURE: 84 MMHG

## 2018-01-02 DIAGNOSIS — E66.01 SEVERE OBESITY (BMI 35.0-39.9): ICD-10-CM

## 2018-01-02 DIAGNOSIS — J06.9 URTI (ACUTE UPPER RESPIRATORY INFECTION): ICD-10-CM

## 2018-01-02 DIAGNOSIS — I10 ESSENTIAL HYPERTENSION: ICD-10-CM

## 2018-01-02 DIAGNOSIS — R68.89 FLU-LIKE SYMPTOMS: Primary | ICD-10-CM

## 2018-01-02 PROCEDURE — 96372 THER/PROPH/DIAG INJ SC/IM: CPT | Mod: S$GLB,,, | Performed by: FAMILY MEDICINE

## 2018-01-02 PROCEDURE — 99999 PR PBB SHADOW E&M-EST. PATIENT-LVL III: CPT | Mod: PBBFAC,,, | Performed by: FAMILY MEDICINE

## 2018-01-02 PROCEDURE — 99213 OFFICE O/P EST LOW 20 MIN: CPT | Mod: 25,S$GLB,, | Performed by: FAMILY MEDICINE

## 2018-01-02 RX ORDER — METHYLPREDNISOLONE ACETATE 80 MG/ML
80 INJECTION, SUSPENSION INTRA-ARTICULAR; INTRALESIONAL; INTRAMUSCULAR; SOFT TISSUE
Status: COMPLETED | OUTPATIENT
Start: 2018-01-02 | End: 2018-01-02

## 2018-01-02 RX ADMIN — METHYLPREDNISOLONE ACETATE 80 MG: 80 INJECTION, SUSPENSION INTRA-ARTICULAR; INTRALESIONAL; INTRAMUSCULAR; SOFT TISSUE at 02:01

## 2018-01-02 NOTE — PROGRESS NOTES
"Subjective:       Patient ID: Beth Souza is a 67 y.o. female.    Chief Complaint: Cough (chest congestion); Nasal Congestion; and Headache (Pressure)    67-year-old -American female patient with Patient Active Problem List:     Hypertension     Vitamin D deficiency     Spondylolisthesis of lumbar region     Hyperlipidemia     Hyperparathyroidism, primary     Thyroid nodule     Peripheral polyneuropathy     Cataract     Palpitation     Severe obesity (BMI 35.0-39.9)  Here with complaint of cough, nasal congestion and body aches, and has been having low-grade fever which patient has been taking ibuprofen every 6 hours.  Patient has been having these symptoms ongoing for the past 10 days, has been to urgent care and had been checked for flu, came back negative, patient has been prescribed Tamiflu but did not start the medication yet, as she was negative for flu.   Continues to have ongoing symptoms and has been having body aches getting worse  Has been taking her blood pressure medication regularly  Denies of any wheezing.       Review of Systems   Constitutional: Positive for chills and fever. Negative for fatigue.   HENT: Positive for congestion.    Eyes: Negative for visual disturbance.   Respiratory: Positive for cough. Negative for shortness of breath.    Cardiovascular: Negative for chest pain and leg swelling.   Gastrointestinal: Negative for abdominal pain, nausea and vomiting.   Musculoskeletal: Positive for myalgias.   Skin: Negative for rash.   Neurological: Negative for light-headedness and headaches.   Psychiatric/Behavioral: Negative for sleep disturbance.         /84 (BP Location: Left arm, Patient Position: Sitting)   Pulse 75   Temp (!) 100.4 °F (38 °C) (Tympanic)   Ht 5' 5" (1.651 m)   Wt 99.6 kg (219 lb 9.3 oz)   SpO2 99%   BMI 36.54 kg/m²   Objective:      Physical Exam   Constitutional: She is oriented to person, place, and time. She appears well-developed and " well-nourished.   HENT:   Head: Normocephalic and atraumatic.   Right Ear: External ear normal.   Left Ear: External ear normal.   Mouth/Throat: Oropharynx is clear and moist. No oropharyngeal exudate.   Neck: Neck supple.   Cardiovascular: Normal rate, regular rhythm and normal heart sounds.    No murmur heard.  Pulmonary/Chest: Effort normal and breath sounds normal. She has no wheezes.   Abdominal: Soft. Bowel sounds are normal. There is no tenderness.   Musculoskeletal: She exhibits no edema.   Lymphadenopathy:     She has no cervical adenopathy.   Neurological: She is alert and oriented to person, place, and time.   Skin: Skin is warm and dry. No rash noted.   Psychiatric: She has a normal mood and affect.         Assessment:       1. Flu-like symptoms    2. URTI (acute upper respiratory infection)    3. Severe obesity (BMI 35.0-39.9)    4. Essential hypertension        Plan:   Flu-like symptoms  -     methylPREDNISolone acetate injection 80 mg; Inject 1 mL (80 mg total) into the muscle one time.  URTI (acute upper respiratory infection)  -     methylPREDNISolone acetate injection 80 mg; Inject 1 mL (80 mg total) into the muscle one time.  Reviewed flu swab which was negative  Patient secondary to ongoing symptoms advised to start taking Tamiflu as prescribed  Continue using Zyrtec and Flonase and drink adequate fluids  Tylenol/ibuprofen as needed for fever/bodyaches every 6 hours    Severe obesity (BMI 35.0-39.9)-lifestyle modifications recommended    Essential hypertension-blood pressure stable today currently on lisinopril hydrochlorothiazide 20/25 mg daily

## 2018-01-04 ENCOUNTER — TELEPHONE (OUTPATIENT)
Dept: INTERNAL MEDICINE | Facility: CLINIC | Age: 68
End: 2018-01-04

## 2018-01-04 NOTE — TELEPHONE ENCOUNTER
Returned call to pt with response from Dr. Oliva, advised pt just keep appt as scheduled for further evaluation. DREW

## 2018-01-04 NOTE — TELEPHONE ENCOUNTER
Returned pt call, states that she's still not feeling well even while taking the medication(s) prescribed by Dr. Oliva. Advised that she could go to Urgent Care or ER for further evaluation but pt agreed not to go, scheduled appt with Dr. Oliva for 1/8/2018 at 2:40pm. Pt also states that she's real weak in her legs and wanted to know if there's something she do for it, will send a message to Dr. Oliva and call pt back with a response. DREW

## 2018-01-08 ENCOUNTER — OFFICE VISIT (OUTPATIENT)
Dept: INTERNAL MEDICINE | Facility: CLINIC | Age: 68
End: 2018-01-08
Payer: MEDICARE

## 2018-01-08 ENCOUNTER — HOSPITAL ENCOUNTER (OUTPATIENT)
Dept: RADIOLOGY | Facility: HOSPITAL | Age: 68
Discharge: HOME | End: 2018-01-08
Attending: NURSE PRACTITIONER
Payer: MEDICARE

## 2018-01-08 VITALS
OXYGEN SATURATION: 95 % | SYSTOLIC BLOOD PRESSURE: 102 MMHG | DIASTOLIC BLOOD PRESSURE: 86 MMHG | HEIGHT: 65 IN | BODY MASS INDEX: 36.34 KG/M2 | HEART RATE: 56 BPM | WEIGHT: 218.13 LBS | TEMPERATURE: 98 F

## 2018-01-08 DIAGNOSIS — J20.9 ACUTE BRONCHITIS, UNSPECIFIED ORGANISM: ICD-10-CM

## 2018-01-08 DIAGNOSIS — J20.9 ACUTE BRONCHITIS, UNSPECIFIED ORGANISM: Primary | ICD-10-CM

## 2018-01-08 DIAGNOSIS — R35.0 URINARY FREQUENCY: ICD-10-CM

## 2018-01-08 PROCEDURE — 99214 OFFICE O/P EST MOD 30 MIN: CPT | Mod: S$GLB,,, | Performed by: NURSE PRACTITIONER

## 2018-01-08 PROCEDURE — 71046 X-RAY EXAM CHEST 2 VIEWS: CPT | Mod: 26,,, | Performed by: RADIOLOGY

## 2018-01-08 PROCEDURE — 99999 PR PBB SHADOW E&M-EST. PATIENT-LVL IV: CPT | Mod: PBBFAC,,, | Performed by: NURSE PRACTITIONER

## 2018-01-08 PROCEDURE — 71046 X-RAY EXAM CHEST 2 VIEWS: CPT | Mod: TC,FY,PO

## 2018-01-08 RX ORDER — ALBUTEROL SULFATE 90 UG/1
1-2 AEROSOL, METERED RESPIRATORY (INHALATION) EVERY 6 HOURS PRN
Qty: 1 INHALER | Refills: 0 | Status: SHIPPED | OUTPATIENT
Start: 2018-01-08 | End: 2018-02-01

## 2018-01-08 RX ORDER — GUAIFENESIN 600 MG/1
600 TABLET, EXTENDED RELEASE ORAL 2 TIMES DAILY
Qty: 20 TABLET | Refills: 0 | Status: SHIPPED | OUTPATIENT
Start: 2018-01-08 | End: 2018-01-18

## 2018-01-08 RX ORDER — PROMETHAZINE HYDROCHLORIDE AND DEXTROMETHORPHAN HYDROBROMIDE 6.25; 15 MG/5ML; MG/5ML
5 SYRUP ORAL NIGHTLY PRN
Qty: 118 ML | Refills: 0 | Status: SHIPPED | OUTPATIENT
Start: 2018-01-08 | End: 2018-01-18

## 2018-01-08 NOTE — PATIENT INSTRUCTIONS
Discharge Instructions: Using an Inhaler  Your healthcare provider prescribed medicine that you will breathe in (inhale) by using an inhaler or metered-dose inhaler (MDI). An inhaler is a pressurized sprayer that gives you a measured amount of medicine. A spacer (holding chamber) may also be used. Spacers make it easier to inhale medicine correctly. Using the inhaler the right way is important so that the medicine gets to your lungs to help you breathe better. MDIs are a common type of inhaler, and these instructions refer to this type of inhaler. Other type of inhalers are also available. These include dry powder inhalers (DPIs). Check with your healthcare provider if you aren't sure which type of inhaler you are to use when you get home.  Home care  MDI without spacer  · Remove the cap and shake the inhaler.  · Take a deep breath and breathe out (exhale) all the way.  · Place the inhaler in your mouth. Close your lips around it.  · As you breathe in deeply, press down on the inhaler to release the medicine. Hold your breath for a count of 10, or as long as you can comfortably. Then slowly breathe out.          MDI with spacer  · Remove the caps from the inhaler and spacer and shake the inhaler.  · Take a deep breath and breathe out (exhale) all the way. Put the spacer between your teeth and close your lips tightly around the spacer.  · Spray 1 puff into the spacer by pressing down on the inhaler. Then slowly breathe in as deeply as you can. If you breathe in too quickly, you may hear a whistling sound in the spacer.  · Take the spacer out of your mouth. Hold your breath for a count of 10, or as long as you can comfortably. Then slowly breathe out.    Follow-up with your provider  As soon as you can, make all of your follow-up appointments as directed by our staff.  When to call 911  Call 911 right away if you have:  · Shortness of breath that does not get better after taking your quick-relief medicine  · Trouble  walking and talking because of shortness of breath  · Blue lips or fingernails  · Severe wheezing  · Peak flow readings less than 50% of your personal best   Date Last Reviewed: 10/1/2016  © 9552-3212 [employer]. [address] [place] [address], [place] [address]. All rights reserved. This information is not intended as a substitute for professional medical care. Always follow your healthcare professional's instructions.        Discharge Instructions: Using an Inhaler  Your healthcare provider prescribed medicine that you will breathe in (inhale) by using an inhaler or metered-dose inhaler (MDI). An inhaler is a pressurized sprayer that gives you a measured amount of medicine. A spacer (holding chamber) may also be used. Spacers make it easier to inhale medicine correctly. Using the inhaler the right way is important so that the medicine gets to your lungs to help you breathe better. MDIs are a common type of inhaler, and these instructions refer to this type of inhaler. Other type of inhalers are also available. These include dry powder inhalers (DPIs). Check with your healthcare provider if you aren't sure which type of inhaler you are to use when you get home.  Home care  MDI without spacer  · Remove the cap and shake the inhaler.  · Take a deep breath and breathe out (exhale) all the way.  · Place the inhaler in your mouth. Close your lips around it.  · As you breathe in deeply, press down on the inhaler to release the medicine. Hold your breath for a count of 10, or as long as you can comfortably. Then slowly breathe out.          MDI with spacer  · Remove the caps from the inhaler and spacer and shake the inhaler.  · Take a deep breath and breathe out (exhale) all the way. Put the spacer between your teeth and close your lips tightly around the spacer.  · Spray 1 puff into the spacer by pressing down on the inhaler. Then slowly breathe in as deeply as you can. If you breathe in too quickly, you may hear  a whistling sound in the spacer.  · Take the spacer out of your mouth. Hold your breath for a count of 10, or as long as you can comfortably. Then slowly breathe out.    Follow-up with your provider  As soon as you can, make all of your follow-up appointments as directed by our staff.  When to call 911  Call 911 right away if you have:  · Shortness of breath that does not get better after taking your quick-relief medicine  · Trouble walking and talking because of shortness of breath  · Blue lips or fingernails  · Severe wheezing  · Peak flow readings less than 50% of your personal best   Date Last Reviewed: 10/1/2016  © 1612-8172 [employer]. [address] [place] [address], [place] [address]. All rights reserved. This information is not intended as a substitute for professional medical care. Always follow your healthcare professional's instructions.

## 2018-01-08 NOTE — PROGRESS NOTES
"CC:COUGH  HPI:This is a new problem.   Beth Souza is a 67 y.o. female with a history of cough.  The current episode started in the past 7 days.   The problem has been gradually worsening.   Associated symptoms included cough, chest pain, wheezing.   Pertinent negatives include fever, facial pain, swollen glands   Treatments tried: Tamiflu has been used and this has provided no relief.     Review of patient's allergies indicates:   Allergen Reactions    Aspirin      Bruising       Outpatient Medications Prior to Visit   Medication Sig Dispense Refill    lisinopril-hydrochlorothiazide (PRINZIDE,ZESTORETIC) 20-25 mg Tab Take 1 tablet by mouth once daily. 90 tablet 3    VITAMIN D2 50,000 unit capsule TAKE ONE CAPSULE BY MOUTH ONCE A WEEK 10 capsule 0    cetirizine (ZYRTEC) 10 MG tablet Take 1 tablet (10 mg total) by mouth once daily. 30 tablet 0    fluticasone (FLONASE) 50 mcg/actuation nasal spray 1 spray by Each Nare route once daily. 16 g 0     No facility-administered medications prior to visit.         Physical Exam   /86 (BP Location: Right arm, Patient Position: Sitting, BP Method: Medium (Automatic))   Pulse (!) 56   Temp 98.1 °F (36.7 °C) (Tympanic)   Ht 5' 5" (1.651 m)   Wt 98.9 kg (218 lb 2.3 oz)   SpO2 95%   BMI 36.30 kg/m²   Constitutional: The patient appears well-developed and well-nourished.   Head: Normocephalic and atraumatic.   Right Ear: Tympanic membrane and ear canal normal. No drainage, swelling or tenderness. Tympanic membrane is not injected, not erythematous and not bulging.   Left Ear: Ear canal normal. No drainage, swelling or tenderness. Tympanic membrane is not injected, not erythematous and not bulging.   Nose:  No mucosal edema or rhinitis is noted.      Mouth/Throat: Uvula is midline.  Posterior oropharynx is not erythematous. No oropharyngeal exudate is noted.    Cardiovascular: Normal rate, regular rhythm, S1 normal, S2 normal and normal heart sounds.  Exam " reveals no gallop, no S3, no S4 and no friction rub.    No murmur heard.  Pulmonary/Chest: Effort normal and breath sounds are coarse. No stridor. Not tachypneic. No respiratory distress. The patient has wheezes. The patient has no rhonchi. The patient has no rales.   Skin: The patient is not diaphoretic.     Encounter Diagnoses   Name Primary?    Acute bronchitis, unspecified organism Yes    Urinary frequency        PLAN:    Beth was seen today for fatigue.    Diagnoses and all orders for this visit:    Acute bronchitis, unspecified organism  -     X-Ray Chest PA And Lateral; Future  -     albuterol 90 mcg/actuation inhaler; Inhale 1-2 puffs into the lungs every 6 (six) hours as needed for Wheezing (Cough).  -     guaiFENesin (MUCINEX) 600 mg 12 hr tablet; Take 1 tablet (600 mg total) by mouth 2 (two) times daily.  -     promethazine-dextromethorphan (PROMETHAZINE-DM) 6.25-15 mg/5 mL Syrp; Take 5 mLs by mouth nightly as needed (cough).    Urinary frequency  -     URINALYSIS; Future        Medications Ordered This Encounter      albuterol 90 mcg/actuation inhaler          Sig: Inhale 1-2 puffs into the lungs every 6 (six) hours as needed for Wheezing (Cough).          Dispense:  1 Inhaler          Refill:  0      guaiFENesin (MUCINEX) 600 mg 12 hr tablet          Sig: Take 1 tablet (600 mg total) by mouth 2 (two) times daily.          Dispense:  20 tablet          Refill:  0      promethazine-dextromethorphan (PROMETHAZINE-DM) 6.25-15 mg/5 mL Syrp          Sig: Take 5 mLs by mouth nightly as needed (cough).          Dispense:  118 mL          Refill:  0  Orders Placed This Encounter   Procedures    X-Ray Chest PA And Lateral     Standing Status:   Future     Number of Occurrences:   1     Standing Expiration Date:   1/8/2019     Order Specific Question:   May the Radiologist modify the order per protocol to meet the clinical needs of the patient?     Answer:   Yes    URINALYSIS     Standing Status:   Future      Number of Occurrences:   1     Standing Expiration Date:   3/9/2019     RTC if symptoms are worsening or changing significantly or if not improved by the end of therapy.  Patient reports having some urinary frequency.  Will get urinalysis.

## 2018-01-22 ENCOUNTER — OFFICE VISIT (OUTPATIENT)
Dept: INTERNAL MEDICINE | Facility: CLINIC | Age: 68
End: 2018-01-22
Payer: MEDICARE

## 2018-01-22 VITALS
DIASTOLIC BLOOD PRESSURE: 84 MMHG | BODY MASS INDEX: 36.18 KG/M2 | OXYGEN SATURATION: 99 % | HEIGHT: 65 IN | TEMPERATURE: 98 F | WEIGHT: 217.13 LBS | HEART RATE: 61 BPM | SYSTOLIC BLOOD PRESSURE: 123 MMHG

## 2018-01-22 DIAGNOSIS — M43.16 SPONDYLOLISTHESIS OF LUMBAR REGION: ICD-10-CM

## 2018-01-22 DIAGNOSIS — G47.00 INSOMNIA, UNSPECIFIED TYPE: Primary | ICD-10-CM

## 2018-01-22 DIAGNOSIS — E04.1 THYROID NODULE: ICD-10-CM

## 2018-01-22 DIAGNOSIS — E55.9 VITAMIN D DEFICIENCY: ICD-10-CM

## 2018-01-22 DIAGNOSIS — E21.0 HYPERPARATHYROIDISM, PRIMARY: ICD-10-CM

## 2018-01-22 DIAGNOSIS — E78.2 MIXED HYPERLIPIDEMIA: ICD-10-CM

## 2018-01-22 DIAGNOSIS — E66.01 SEVERE OBESITY (BMI 35.0-39.9): ICD-10-CM

## 2018-01-22 DIAGNOSIS — I10 ESSENTIAL HYPERTENSION: ICD-10-CM

## 2018-01-22 PROCEDURE — 99999 PR PBB SHADOW E&M-EST. PATIENT-LVL III: CPT | Mod: PBBFAC,,, | Performed by: FAMILY MEDICINE

## 2018-01-22 PROCEDURE — 99214 OFFICE O/P EST MOD 30 MIN: CPT | Mod: S$GLB,,, | Performed by: FAMILY MEDICINE

## 2018-01-22 RX ORDER — GABAPENTIN 100 MG/1
100 CAPSULE ORAL NIGHTLY
Qty: 30 CAPSULE | Refills: 0 | Status: SHIPPED | OUTPATIENT
Start: 2018-01-22 | End: 2018-02-01

## 2018-01-22 NOTE — PROGRESS NOTES
"Subjective:       Patient ID: Beth Souza is a 67 y.o. female.    Chief Complaint: Insomnia    67-year-old Afro-American female patient with Patient Active Problem List:     Hypertension     Vitamin D deficiency     Spondylolisthesis of lumbar region     Hyperlipidemia     Hyperparathyroidism, primary     Thyroid nodule     Peripheral polyneuropathy     Cataract     Palpitation     Severe obesity (BMI 35.0-39.9)  Concerned about sleepless nights, has not tried any over-the-counter sleeping aids, reports getting sleep for 45 minutes to an hour, wakes up every hour and watches TV.  Patient denies of any significant palpitations  Reports that she's been having occasional tingling and numbness sensation to her lower legs, back pain has been stable.           Review of Systems   Constitutional: Negative for fatigue.   Eyes: Negative for visual disturbance.   Respiratory: Negative for shortness of breath.    Cardiovascular: Negative for chest pain and leg swelling.   Gastrointestinal: Negative for abdominal pain, nausea and vomiting.   Musculoskeletal: Positive for back pain and myalgias.   Skin: Negative for rash.   Neurological: Positive for numbness. Negative for weakness, light-headedness and headaches.   Psychiatric/Behavioral: Positive for sleep disturbance. The patient is not nervous/anxious.          /84 (BP Location: Left arm, Patient Position: Sitting)   Pulse 61   Temp 97.6 °F (36.4 °C) (Tympanic)   Ht 5' 5" (1.651 m)   Wt 98.5 kg (217 lb 2.5 oz)   SpO2 99%   BMI 36.14 kg/m²   Objective:      Physical Exam   Constitutional: She is oriented to person, place, and time. She appears well-developed and well-nourished.   HENT:   Head: Normocephalic and atraumatic.   Mouth/Throat: Oropharynx is clear and moist.   Cardiovascular: Normal rate, regular rhythm and normal heart sounds.    No murmur heard.  Pulmonary/Chest: Effort normal and breath sounds normal. She has no wheezes.   Abdominal: Soft. " Bowel sounds are normal. There is no tenderness.   Musculoskeletal: She exhibits no edema or tenderness.   No significant paraspinal lumbar muscle tenderness noted on exam today   Neurological: She is alert and oriented to person, place, and time.   Skin: Skin is warm and dry. No rash noted.   Psychiatric: She has a normal mood and affect.         Assessment:       1. Insomnia, unspecified type    2. Essential hypertension    3. Mixed hyperlipidemia    4. Hyperparathyroidism, primary    5. Thyroid nodule    6. Vitamin D deficiency    7. Severe obesity (BMI 35.0-39.9)    8. Spondylolisthesis of lumbar region        Plan:   Insomnia, unspecified type- patient was advised to maintain good sleep regimen and avoid watching TV or electronic items.   Avoid caffeine and carbonated beverages  Try over-the-counter melatonin    Essential hypertension-blood pressure stable today currently on lisinopril hydrochlorothiazide 20/25 mg daily    Mixed hyperlipidemia-currently diet-controlled    Hyperparathyroidism, primary-followed by nephrology    Thyroid nodule-stable and asymptomatic    Vitamin D deficiency-taking vitamin D by prescription    Severe obesity (BMI 35.0-39.9)-lifestyle modifications recommended with diet and excised to lose weight with BMI 36    Spondylolisthesis of lumbar region  -     gabapentin (NEURONTIN) 100 MG capsule; Take 1 capsule (100 mg total) by mouth every evening.  Dispense: 30 capsule; Refill: 0  Will do a trial of gabapentin 100 mg daily.  If helpful will consider further refills

## 2018-01-29 ENCOUNTER — TELEPHONE (OUTPATIENT)
Dept: INTERNAL MEDICINE | Facility: CLINIC | Age: 68
End: 2018-01-29

## 2018-01-29 NOTE — TELEPHONE ENCOUNTER
----- Message from Chacha Frey sent at 1/29/2018 10:54 AM CST -----  Contact: self   Patient would like to consult with nurse regarding a knot on her right side with soreness. Please call back at [phone].      Thanks,  Chacha Frey

## 2018-01-30 ENCOUNTER — OFFICE VISIT (OUTPATIENT)
Dept: INTERNAL MEDICINE | Facility: CLINIC | Age: 68
End: 2018-01-30
Payer: MEDICARE

## 2018-01-30 ENCOUNTER — LAB VISIT (OUTPATIENT)
Dept: LAB | Facility: HOSPITAL | Age: 68
End: 2018-01-30
Attending: INTERNAL MEDICINE
Payer: MEDICARE

## 2018-01-30 VITALS
SYSTOLIC BLOOD PRESSURE: 125 MMHG | WEIGHT: 220.25 LBS | HEART RATE: 71 BPM | TEMPERATURE: 97 F | DIASTOLIC BLOOD PRESSURE: 82 MMHG | HEIGHT: 65 IN | BODY MASS INDEX: 36.69 KG/M2 | OXYGEN SATURATION: 99 %

## 2018-01-30 DIAGNOSIS — M47.26 OSTEOARTHRITIS OF SPINE WITH RADICULOPATHY, LUMBAR REGION: ICD-10-CM

## 2018-01-30 DIAGNOSIS — E21.0 HYPERPARATHYROIDISM, PRIMARY: ICD-10-CM

## 2018-01-30 DIAGNOSIS — I10 ESSENTIAL HYPERTENSION: ICD-10-CM

## 2018-01-30 DIAGNOSIS — L98.9 SKIN LESION OF BACK: Primary | ICD-10-CM

## 2018-01-30 DIAGNOSIS — E66.01 SEVERE OBESITY (BMI 35.0-39.9): ICD-10-CM

## 2018-01-30 LAB
25(OH)D3+25(OH)D2 SERPL-MCNC: 22 NG/ML
ALBUMIN SERPL BCP-MCNC: 3.4 G/DL
ANION GAP SERPL CALC-SCNC: 11 MMOL/L
BUN SERPL-MCNC: 17 MG/DL
CALCIUM SERPL-MCNC: 9.1 MG/DL
CHLORIDE SERPL-SCNC: 102 MMOL/L
CO2 SERPL-SCNC: 30 MMOL/L
CREAT SERPL-MCNC: 0.9 MG/DL
EST. GFR  (AFRICAN AMERICAN): >60 ML/MIN/1.73 M^2
EST. GFR  (NON AFRICAN AMERICAN): >60 ML/MIN/1.73 M^2
GLUCOSE SERPL-MCNC: 129 MG/DL
PHOSPHATE SERPL-MCNC: 2.2 MG/DL
POTASSIUM SERPL-SCNC: 3.4 MMOL/L
PTH-INTACT SERPL-MCNC: 93 PG/ML
SODIUM SERPL-SCNC: 143 MMOL/L

## 2018-01-30 PROCEDURE — 82306 VITAMIN D 25 HYDROXY: CPT

## 2018-01-30 PROCEDURE — 1159F MED LIST DOCD IN RCRD: CPT | Mod: S$GLB,,, | Performed by: FAMILY MEDICINE

## 2018-01-30 PROCEDURE — 83970 ASSAY OF PARATHORMONE: CPT

## 2018-01-30 PROCEDURE — 1125F AMNT PAIN NOTED PAIN PRSNT: CPT | Mod: S$GLB,,, | Performed by: FAMILY MEDICINE

## 2018-01-30 PROCEDURE — 80069 RENAL FUNCTION PANEL: CPT

## 2018-01-30 PROCEDURE — 3008F BODY MASS INDEX DOCD: CPT | Mod: S$GLB,,, | Performed by: FAMILY MEDICINE

## 2018-01-30 PROCEDURE — 99999 PR PBB SHADOW E&M-EST. PATIENT-LVL IV: CPT | Mod: PBBFAC,,, | Performed by: FAMILY MEDICINE

## 2018-01-30 PROCEDURE — 99214 OFFICE O/P EST MOD 30 MIN: CPT | Mod: S$GLB,,, | Performed by: FAMILY MEDICINE

## 2018-01-30 PROCEDURE — 36415 COLL VENOUS BLD VENIPUNCTURE: CPT | Mod: PO

## 2018-01-30 RX ORDER — SULFAMETHOXAZOLE AND TRIMETHOPRIM 800; 160 MG/1; MG/1
1 TABLET ORAL 2 TIMES DAILY
Qty: 14 TABLET | Refills: 0 | Status: SHIPPED | OUTPATIENT
Start: 2018-01-30 | End: 2018-02-01

## 2018-01-30 NOTE — PROGRESS NOTES
"Subjective:       Patient ID: Beth Souza is a 67 y.o. female.    Chief Complaint: Cyst in right side    67-year-old Afro-American female patient with Patient Active Problem List:     Hypertension     Vitamin D deficiency     Spondylolisthesis of lumbar region     Hyperlipidemia     Hyperparathyroidism, primary     Thyroid nodule     Peripheral polyneuropathy     Cataract     Palpitation     Severe obesity (BMI 35.0-39.9)  Here with complaint of noticing lumps to the right side of the back, in the flank area, patient has been noticing it for the past few months but has been gradually getting bigger in size and has soreness.   Patient secondary to chronic low back pain reports ongoing back pain, has been doing well and taking her medications regularly  Denies of changing to a new soaps or lotions.        Review of Systems   Constitutional: Negative for fatigue.   Eyes: Negative for visual disturbance.   Respiratory: Negative for shortness of breath.    Cardiovascular: Negative for chest pain and leg swelling.   Gastrointestinal: Negative for abdominal pain, nausea and vomiting.   Musculoskeletal: Positive for back pain and myalgias.   Skin: Negative for rash.        Lumps noted to the right flank   Neurological: Negative for weakness, light-headedness, numbness and headaches.   Psychiatric/Behavioral: Negative for sleep disturbance.         /82 (BP Location: Left arm, Patient Position: Sitting)   Pulse 71   Temp 97.3 °F (36.3 °C) (Tympanic)   Ht 5' 5" (1.651 m)   Wt 99.9 kg (220 lb 3.8 oz)   SpO2 99%   BMI 36.65 kg/m²   Objective:      Physical Exam   Constitutional: She is oriented to person, place, and time. She appears well-developed and well-nourished.   HENT:   Head: Normocephalic and atraumatic.   Mouth/Throat: Oropharynx is clear and moist.   Cardiovascular: Normal rate, regular rhythm and normal heart sounds.    No murmur heard.  Pulmonary/Chest: Effort normal and breath sounds normal. She " has no wheezes.   Abdominal: Soft. Bowel sounds are normal. There is no tenderness.   Musculoskeletal: She exhibits tenderness. She exhibits no edema.    Positive for tiny less than 1 cm, skin lesions noted to the right side of the paraspinal lumbar muscles in the flank area, consistent with lipoma, but has minimal tenderness on palpation.  No significant erythema noted on exam today or warmth noted  Noted paraspinal lumbar muscle tenderness in the midline   Neurological: She is alert and oriented to person, place, and time.   Skin: Skin is warm and dry. No rash noted.   Psychiatric: She has a normal mood and affect.         Assessment:       1. Skin lesion of back    2. Essential hypertension    3. Severe obesity (BMI 35.0-39.9)    4. Osteoarthritis of spine with radiculopathy, lumbar region        Plan:   Skin lesion of back  -     sulfamethoxazole-trimethoprim 800-160mg (BACTRIM DS) 800-160 mg Tab; Take 1 tablet by mouth 2 (two) times daily.  Dispense: 14 tablet; Refill: 0  Likely lipoma but secondary to increase in size and tenderness, will treat with Bactrim to rule out any infectious cause.   If symptoms continue to persist and increase in size, will refer to general surgery for further evaluation    Essential hypertension-blood pressure stable today currently on lisinopril hydrochlorothiazide 20/25 mg daily    Severe obesity (BMI 35.0-39.9)-lifestyle modifications recommended with diet and exercise to lose weight with BMI 36    Osteoarthritis of spine with radiculopathy, lumbar region  -     Ambulatory referral to Physical Medicine Rehab  [facility], but is no longer covered by her insurance.  Has been taking gabapentin and warm compresses for symptomatic relief, but no significant response noted

## 2018-02-01 ENCOUNTER — OFFICE VISIT (OUTPATIENT)
Dept: NEPHROLOGY | Facility: CLINIC | Age: 68
End: 2018-02-01
Payer: MEDICARE

## 2018-02-01 ENCOUNTER — TELEPHONE (OUTPATIENT)
Dept: INTERNAL MEDICINE | Facility: CLINIC | Age: 68
End: 2018-02-01

## 2018-02-01 VITALS
DIASTOLIC BLOOD PRESSURE: 82 MMHG | SYSTOLIC BLOOD PRESSURE: 118 MMHG | HEART RATE: 72 BPM | BODY MASS INDEX: 36.69 KG/M2 | HEIGHT: 65 IN | WEIGHT: 220.25 LBS

## 2018-02-01 DIAGNOSIS — E83.39 HYPOPHOSPHATEMIA: ICD-10-CM

## 2018-02-01 DIAGNOSIS — E21.0 HYPERPARATHYROIDISM, PRIMARY: Primary | ICD-10-CM

## 2018-02-01 DIAGNOSIS — E55.9 VITAMIN D DEFICIENCY: ICD-10-CM

## 2018-02-01 DIAGNOSIS — M54.10 RADICULITIS OF LEG: Primary | ICD-10-CM

## 2018-02-01 DIAGNOSIS — M43.16 SPONDYLOLISTHESIS OF LUMBAR REGION: ICD-10-CM

## 2018-02-01 PROCEDURE — 1159F MED LIST DOCD IN RCRD: CPT | Mod: S$GLB,,, | Performed by: INTERNAL MEDICINE

## 2018-02-01 PROCEDURE — 1125F AMNT PAIN NOTED PAIN PRSNT: CPT | Mod: S$GLB,,, | Performed by: INTERNAL MEDICINE

## 2018-02-01 PROCEDURE — 3008F BODY MASS INDEX DOCD: CPT | Mod: S$GLB,,, | Performed by: INTERNAL MEDICINE

## 2018-02-01 PROCEDURE — 99999 PR PBB SHADOW E&M-EST. PATIENT-LVL III: CPT | Mod: PBBFAC,,, | Performed by: INTERNAL MEDICINE

## 2018-02-01 PROCEDURE — 99214 OFFICE O/P EST MOD 30 MIN: CPT | Mod: S$GLB,,, | Performed by: INTERNAL MEDICINE

## 2018-02-01 RX ORDER — GABAPENTIN 100 MG/1
100-200 CAPSULE ORAL NIGHTLY
Qty: 60 CAPSULE | Refills: 1 | Status: SHIPPED | OUTPATIENT
Start: 2018-02-01 | End: 2018-02-26

## 2018-02-01 RX ORDER — ERGOCALCIFEROL 1.25 MG/1
50000 CAPSULE ORAL
Qty: 10 CAPSULE | Refills: 0 | Status: SHIPPED | OUTPATIENT
Start: 2018-02-01 | End: 2018-09-19

## 2018-02-01 RX ORDER — METHYLPREDNISOLONE 4 MG/1
TABLET ORAL
Qty: 1 PACKAGE | Refills: 0 | Status: SHIPPED | OUTPATIENT
Start: 2018-02-01 | End: 2018-02-22

## 2018-02-01 NOTE — PROGRESS NOTES
Pt wanting to try conservative measures prior to scheduling with pain management for injections. She continues to have low back discomfort with similar radicular symptoms into her leg and foot.  She prefers [facility], but due to insurance constraints she is agreeable to try PT at [facility]

## 2018-02-01 NOTE — PROGRESS NOTES
PROGRESS NOTE FOR ESTABLISHED PATIENT    PHYSICIAN REQUESTING THE CONSULT: Dr. Edna Oliva    REASON FOR VISIT: Hyperparathyroidism    67 y.o. female with history of vitamin D deficiency, HTN, hyperlipidemia, neuropathy, bilateral carotid artery disease  presents to the renal clinic for evaluation of hyperparathyroidism.     Patient reports intermittent thigh pain.             Past Medical History:   Diagnosis Date    Cataract     Hyperlipidemia     Hypertension     Lumbar spondylosis     Vitamin D deficiency        Past Surgical History:   Procedure Laterality Date    PARTIAL HYSTERECTOMY         Review of patient's allergies indicates:   Allergen Reactions    Aspirin      Bruising    Sulfa (sulfonamide antibiotics)        Current Outpatient Prescriptions   Medication Sig Dispense Refill    lisinopril-hydrochlorothiazide (PRINZIDE,ZESTORETIC) 20-25 mg Tab Take 1 tablet by mouth once daily. 90 tablet 3    VITAMIN D2 50,000 unit capsule TAKE ONE CAPSULE BY MOUTH ONCE A WEEK 10 capsule 0     No current facility-administered medications for this visit.        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 No    Breast-Feeding No     Social History Narrative    No narrative on file       Review of Systems:  1. GENERAL: patient denies any fever, weight changes, generalized weakness,  "dizziness.  2. HEENT: patient denies headaches, no visual disturbances, swallowing problems, sinus pain, nasal congestion.  3. CARDIOVASCULAR: patient denies chest pain, palpitations.  4. PULMONARY: patient denies SOB, coughing, hemoptysis, wheezing.  5. GASTROINTESTINAL: patient denies abdominal pain/cramping, nausea, vomiting, diarrhea, no constipation  6. GENITOURINARY: patient denies dysuria, hematuria, hesitancy, frequency.  7. EXTREMITIES: patient denies LE edema, LE cramping.  8. DERMATOLOGY: patient denies rashes, ulcers.  9. NEURO: patient denies tremors, extremity weakness, she reports intermittent extremity numbness/tingling.  10. MUSCULOSKELETAL: patient reports intermittent thigh pain  11. HEMATOLOGY: patient denies rectal or gum bleeding.  12: PSYCH: patient denies anxiety, depression.      PHYSICAL EXAM:  /82   Pulse 72   Ht 5' 5" (1.651 m)   Wt 99.9 kg (220 lb 3.8 oz)   BMI 36.65 kg/m²     GENERAL: Pleasant overweight lady presents to clinic with non-labored breathing.  HEENT: PER, no nasal discharge, no icterus, no oral exudates, moist mucosal membranes.  NECK: no thyroid mass, no lymphadenopathy.  HEART: RRR S1/S2, no rubs, good peripheral pulses.  LUNGS: CTA bilaterally, no wheezing, breathing is nonlabored.  ABDOMEN: soft, nontender, not distended, bowel sounds are present, no abdominal hernia.  EXTREM: no LE edema.  SKIN: no rashes, skin is warm and dry.  NEURO: A & O x 3, no obvious focal signs.    LABORATORY RESULTS:    Lab Results   Component Value Date    CREATININE 0.9 01/30/2018    BUN 17 01/30/2018     01/30/2018    K 3.4 (L) 01/30/2018     01/30/2018    CO2 30 (H) 01/30/2018      Lab Results   Component Value Date    PTH 93.0 (H) 01/30/2018    CALCIUM 9.1 01/30/2018    PHOS 2.2 (L) 01/30/2018     Lab Results   Component Value Date    ALBUMIN 3.4 (L) 01/30/2018     Lab Results   Component Value Date    WBC 4.87 09/28/2017    HGB 13.6 09/28/2017    HCT 41.9 09/28/2017 "    MCV 90 09/28/2017     09/28/2017     25-OH vitamin D: 22 (1/30/18)    ASSESSMENT AND PLAN:  67 y.o. female with history of vitamin D deficiency, HTN, hyperlipidemia, neuropathy, bilateral carotid artery disease  presents to the renal clinic for evaluation of hyperparathyroidism    1. Hyperparathyroidism: Patient likely suffers from mild primary hyperparathyroidism. Continue Vitamin D. She will return in 4 months for follow up. Will follow up on today's labs.     2. Electrolytes: Calcium level at goal at present (9.1).                            Mild hypophosphatemia: high phos diet was recommended.     3. Acid base status: No acute issues.    4. Volume: Euvolemic.     5. Hypertension: Good BP control.     6. Medications: Reviewed. Agree with current medical regimen. Continue vitamin D for now.

## 2018-02-22 ENCOUNTER — TELEPHONE (OUTPATIENT)
Dept: INTERNAL MEDICINE | Facility: CLINIC | Age: 68
End: 2018-02-22

## 2018-02-22 NOTE — TELEPHONE ENCOUNTER
----- Message from Yamilet Wise sent at 2/22/2018  1:16 PM CST -----  Contact: Pt  Pt called and stated she was returning a call to the nurse. She can be reached at [phone].    Thanks,  TF

## 2018-02-22 NOTE — TELEPHONE ENCOUNTER
----- Message from Edna Oliva MD sent at 2/22/2018 12:10 PM CST -----  Contact: Patient  If its painful, advise her to come in     Dr Oliva  ----- Message -----  From: Ama Oro LPN  Sent: 2/22/2018  11:25 AM  To: Edna Oliva MD        ----- Message -----  From: Tamra Modi  Sent: 2/22/2018  11:04 AM  To: [facility] Staff    Patient called to speak with the nurse; she stated she still has the knot in her side. She can be contacted at [phone].    Thanks,  Tamra

## 2018-02-26 ENCOUNTER — OFFICE VISIT (OUTPATIENT)
Dept: INTERNAL MEDICINE | Facility: CLINIC | Age: 68
End: 2018-02-26
Payer: MEDICARE

## 2018-02-26 VITALS
DIASTOLIC BLOOD PRESSURE: 84 MMHG | HEIGHT: 65 IN | SYSTOLIC BLOOD PRESSURE: 130 MMHG | TEMPERATURE: 97 F | BODY MASS INDEX: 36.22 KG/M2 | WEIGHT: 217.38 LBS | OXYGEN SATURATION: 98 % | HEART RATE: 54 BPM

## 2018-02-26 DIAGNOSIS — E21.0 HYPERPARATHYROIDISM, PRIMARY: ICD-10-CM

## 2018-02-26 DIAGNOSIS — M43.16 SPONDYLOLISTHESIS OF LUMBAR REGION: ICD-10-CM

## 2018-02-26 DIAGNOSIS — E87.6 HYPOKALEMIA: ICD-10-CM

## 2018-02-26 DIAGNOSIS — E66.01 SEVERE OBESITY (BMI 35.0-39.9): ICD-10-CM

## 2018-02-26 DIAGNOSIS — D17.1 LIPOMA OF BACK: Primary | ICD-10-CM

## 2018-02-26 DIAGNOSIS — E55.9 VITAMIN D DEFICIENCY: ICD-10-CM

## 2018-02-26 DIAGNOSIS — M54.10 RADICULITIS OF LEG: ICD-10-CM

## 2018-02-26 DIAGNOSIS — I10 ESSENTIAL HYPERTENSION: ICD-10-CM

## 2018-02-26 PROBLEM — R00.2 PALPITATION: Status: RESOLVED | Noted: 2017-10-24 | Resolved: 2018-02-26

## 2018-02-26 PROCEDURE — 99999 PR PBB SHADOW E&M-EST. PATIENT-LVL IV: CPT | Mod: PBBFAC,,, | Performed by: FAMILY MEDICINE

## 2018-02-26 PROCEDURE — 99214 OFFICE O/P EST MOD 30 MIN: CPT | Mod: S$GLB,,, | Performed by: FAMILY MEDICINE

## 2018-02-26 PROCEDURE — 1126F AMNT PAIN NOTED NONE PRSNT: CPT | Mod: S$GLB,,, | Performed by: FAMILY MEDICINE

## 2018-02-26 PROCEDURE — 1159F MED LIST DOCD IN RCRD: CPT | Mod: S$GLB,,, | Performed by: FAMILY MEDICINE

## 2018-02-26 PROCEDURE — 3008F BODY MASS INDEX DOCD: CPT | Mod: S$GLB,,, | Performed by: FAMILY MEDICINE

## 2018-02-26 NOTE — PROGRESS NOTES
"Subjective:       Patient ID: Beth Souza is a 67 y.o. female.    Chief Complaint: Cyst    67-year-old Afro-American female patient with Patient Active Problem List:     Hypertension     Vitamin D deficiency     Spondylolisthesis of lumbar region     Hyperparathyroidism, primary     Thyroid nodule     Peripheral polyneuropathy     Cataract     Severe obesity (BMI 35.0-39.9)  Here for follow-up on skin lesion to the right side of the flank, reports that she's finish antibiotics but no response noted, continues to have minimal discomfort with certain position changes, reports pain as 4-5/10.  Reports that she's been having this lesion gradually getting bigger for the past 1 year . Secondary to ongoing chronic low back pain radiating to the legs patient was prescribed gabapentin, but has not been taking it regularly.  Reports low back pain as 5/10.   Has been taking her blood pressure medications regularly         Review of Systems   Constitutional: Negative for fatigue.   Eyes: Negative for visual disturbance.   Respiratory: Negative for shortness of breath.    Cardiovascular: Negative for chest pain and leg swelling.   Gastrointestinal: Negative for abdominal pain, nausea and vomiting.   Musculoskeletal: Positive for back pain and myalgias.   Skin: Negative for rash.   Neurological: Positive for numbness. Negative for light-headedness and headaches.   Psychiatric/Behavioral: Negative for sleep disturbance.         /84 (BP Location: Right arm, Patient Position: Sitting)   Pulse (!) 54   Temp 96.8 °F (36 °C) (Tympanic)   Ht 5' 5" (1.651 m)   Wt 98.6 kg (217 lb 6 oz)   SpO2 98%   BMI 36.17 kg/m²   Objective:      Physical Exam   Constitutional: She is oriented to person, place, and time. She appears well-developed and well-nourished.   HENT:   Head: Normocephalic and atraumatic.   Mouth/Throat: Oropharynx is clear and moist.   Cardiovascular: Normal rate, regular rhythm and normal heart sounds.    No " murmur heard.  Pulmonary/Chest: Effort normal and breath sounds normal. She has no wheezes.   Abdominal: Soft. Bowel sounds are normal. There is no tenderness.   Musculoskeletal: She exhibits tenderness. She exhibits no edema.   Positive for paraspinal lumbar muscle tenderness on the right side   Neurological: She is alert and oriented to person, place, and time.   Skin: Skin is warm and dry. No rash noted. No erythema.   Positive for circular, mobile skin lesion noted to the right flank, consistent with lipoma   Psychiatric: She has a normal mood and affect.       No visits with results within 2 Week(s) from this visit.   Latest known visit with results is:   Lab Visit on 01/30/2018   Component Date Value Ref Range Status    Vit D, 25-Hydroxy 01/30/2018 22* 30 - 96 ng/mL Final    Comment: Vitamin D deficiency.........<10 ng/mL                              Vitamin D insufficiency......10-29 ng/mL       Vitamin D sufficiency........> or equal to 30 ng/mL  Vitamin D toxicity............>100 ng/mL      Glucose 01/30/2018 129* 70 - 110 mg/dL Final    Sodium 01/30/2018 143  136 - 145 mmol/L Final    Potassium 01/30/2018 3.4* 3.5 - 5.1 mmol/L Final    Chloride 01/30/2018 102  95 - 110 mmol/L Final    CO2 01/30/2018 30* 23 - 29 mmol/L Final    BUN, Bld 01/30/2018 17  8 - 23 mg/dL Final    Calcium 01/30/2018 9.1  8.7 - 10.5 mg/dL Final    Creatinine 01/30/2018 0.9  0.5 - 1.4 mg/dL Final    Albumin 01/30/2018 3.4* 3.5 - 5.2 g/dL Final    Phosphorus 01/30/2018 2.2* 2.7 - 4.5 mg/dL Final    eGFR if African American 01/30/2018 >60.0  >60 mL/min/1.73 m^2 Final    eGFR if non African American 01/30/2018 >60.0  >60 mL/min/1.73 m^2 Final    Comment: Calculation used to obtain the estimated glomerular filtration  rate (eGFR) is the CKD-EPI equation.       Anion Gap 01/30/2018 11  8 - 16 mmol/L Final    PTH, Intact 01/30/2018 93.0* 9.0 - 77.0 pg/mL Final       Assessment:       1. Lipoma of back    2. Essential  hypertension    3. Vitamin D deficiency    4. Severe obesity (BMI 35.0-39.9)    5. Hyperparathyroidism, primary    6. Spondylolisthesis of lumbar region    7. Radiculitis of leg    8. Hypokalemia        Plan:   Lipoma of back  -     Ambulatory referral to General Surgery  No response noted with antibiotics.   Likely lipoma, patient will be refer to general surgery    Essential hypertension-blood pressure stable today currently on lisinopril hydrochlorothiazide 20/25 mg daily  Reviewed recent labs showing low potassium and low protein, patient was given information about potassium-rich diet advised to eat protein rich diet    Vitamin D deficiency-taking vitamin D by prescription    Severe obesity (BMI 35.0-39.9)-lifestyle modifications recommended to lose weight with BMI 36    Hyperparathyroidism, primary-to be followed by nephrology    Spondylolisthesis of lumbar region- advised to take gabapentin as prescribed by physical medicine and rehabilitation physician for radiculopathy

## 2018-02-26 NOTE — PATIENT INSTRUCTIONS
Potassium-Rich Foods  The normal adult diet usually contains 2,000 mg to 4,000 mg of potassium per day. More potassium is needed when you lose too much potassium from your body. This can happen if you have diarrhea or vomiting. It can also happen if you take a medicine to make you urinate more (diuretic). To increase the amount of potassium in your diet, include these high-potassium foods.     [The (*) indicates foods highest in potassium.]  Vegetables  Artichokes. Cooked 1/2 cup, 200 mg to 300 mg*  Asparagus. Cooked 1/2 cup, 200 mg to 300 mg  Beans. White, red, mclaughlin cooked 1/2 cup, 300 mg to 500 mg*  Beets. Cooked 1/2 cup, 200 mg to 300 mg  Broccoli. Cooked or raw 1 cup, 200 mg to 500 mg*  [place] sprouts. Cooked 1/2 cup, 200 mg to 300 mg  Cabbage. Raw 1 cup, 100 mg to 200 mg  Carrots. Raw or cooked 1/2 cup, 100 mg to 200 mg  Celery. Raw 1 cup, 200 mg to 300 mg  Lima beans. Fresh or frozen 1/2 cup, 300 mg to 500 mg*   Mushrooms. Raw or cooked 1/2 cup, 100 mg to 300 mg  Peas. Cooked 1/2 cup, 150 mg to 250 mg   Potatoes. Baked 1 medium, 500 mg to 900 mg*   Spinach. Cooked 1 cup, 800 mg to 900 mg*   Spinach. Raw 2 cups, 300 mg to 400 mg *  Squash, winter. Fresh, frozen, or cooked 1/2 cup, 200 mg to 400 mg   Tomato. Fresh 1 medium, 200 mg to 300 mg   Tomato juice. Canned 1/2 cup, 200 mg to 300 mg   Fruits  Apple juice. Unsweetened 1 cup, 200 mg to 300 mg   Apricots. Canned 1/2 cup, 200 mg to 300 mg   Apricots. Dried 4 pieces, 100 mg to 200 mg   Avocado. Raw 1/2 cup, 300 mg to 400 mg*  Banana. Fresh 1 small, 300 mg to 400 mg*   Cantaloupe. Fresh 1 cup diced, 300 mg to 400 mg*   Grape juice. Unsweetened 1 cup, 200 mg to 300 mg   Honeydew melon. Fresh 1 cup diced, 300 mg to 400 mg*   Orange. Fresh 1 medium, 200 mg to 300 mg    Orange juice. Unsweetened, fresh or frozen 1/2 cup, 200 mg to 300 mg  Pineapple juice. Unsweetened 1 cup, 300 mg to 400 mg   Prune juice. Unsweetened 1/2 cup, 300 mg to 400 mg*   Prunes. Dried 5  pieces, 300 mg to 400 mg*   Strawberries. Fresh or frozen 1 cup, 200 mg to 300 mg  Meat  Red meat. Cooked 3 ounces, 100 mg to 300 mg   Seafood  Cod, flounder, halibut. Cooked 3 ounces, 100 mg to 300 mg*  [place]. Cooked, 3 ounces 300 mg to 400 mg*   Scallops. Cooked 3 ounces, 200 mg to 300 mg*  Shrimp. Cooked 3/4 cup, 100 mg to 200 mg   Tuna. Fresh or canned 3/4 cup, 200 mg to 500 mg   Date Last Reviewed: 10/1/2016  © 8811-7256 [employer]. [address] [place] [address], [place] [address]. All rights reserved. This information is not intended as a substitute for professional medical care. Always follow your healthcare professional's instructions.        Potassium-Rich Foods  The normal adult diet usually contains 2,000 mg to 4,000 mg of potassium per day. More potassium is needed when you lose too much potassium from your body. This can happen if you have diarrhea or vomiting. It can also happen if you take a medicine to make you urinate more (diuretic). To increase the amount of potassium in your diet, include these high-potassium foods.     [The (*) indicates foods highest in potassium.]  Vegetables  Artichokes. Cooked 1/2 cup, 200 mg to 300 mg*  Asparagus. Cooked 1/2 cup, 200 mg to 300 mg  Beans. White, red, mclaughlin cooked 1/2 cup, 300 mg to 500 mg*  Beets. Cooked 1/2 cup, 200 mg to 300 mg  Broccoli. Cooked or raw 1 cup, 200 mg to 500 mg*  [place] sprouts. Cooked 1/2 cup, 200 mg to 300 mg  Cabbage. Raw 1 cup, 100 mg to 200 mg  Carrots. Raw or cooked 1/2 cup, 100 mg to 200 mg  Celery. Raw 1 cup, 200 mg to 300 mg  Lima beans. Fresh or frozen 1/2 cup, 300 mg to 500 mg*   Mushrooms. Raw or cooked 1/2 cup, 100 mg to 300 mg  Peas. Cooked 1/2 cup, 150 mg to 250 mg   Potatoes. Baked 1 medium, 500 mg to 900 mg*   Spinach. Cooked 1 cup, 800 mg to 900 mg*   Spinach. Raw 2 cups, 300 mg to 400 mg *  Squash, winter. Fresh, frozen, or cooked 1/2 cup, 200 mg to 400 mg   Tomato. Fresh 1 medium, 200 mg to 300 mg   Tomato  juice. Canned 1/2 cup, 200 mg to 300 mg   Fruits  Apple juice. Unsweetened 1 cup, 200 mg to 300 mg   Apricots. Canned 1/2 cup, 200 mg to 300 mg   Apricots. Dried 4 pieces, 100 mg to 200 mg   Avocado. Raw 1/2 cup, 300 mg to 400 mg*  Banana. Fresh 1 small, 300 mg to 400 mg*   Cantaloupe. Fresh 1 cup diced, 300 mg to 400 mg*   Grape juice. Unsweetened 1 cup, 200 mg to 300 mg   Honeydew melon. Fresh 1 cup diced, 300 mg to 400 mg*   Orange. Fresh 1 medium, 200 mg to 300 mg    Orange juice. Unsweetened, fresh or frozen 1/2 cup, 200 mg to 300 mg  Pineapple juice. Unsweetened 1 cup, 300 mg to 400 mg   Prune juice. Unsweetened 1/2 cup, 300 mg to 400 mg*   Prunes. Dried 5 pieces, 300 mg to 400 mg*   Strawberries. Fresh or frozen 1 cup, 200 mg to 300 mg  Meat  Red meat. Cooked 3 ounces, 100 mg to 300 mg   Seafood  Cod, flounder, halibut. Cooked 3 ounces, 100 mg to 300 mg*  [place]. Cooked, 3 ounces 300 mg to 400 mg*   Scallops. Cooked 3 ounces, 200 mg to 300 mg*  Shrimp. Cooked 3/4 cup, 100 mg to 200 mg   Tuna. Fresh or canned 3/4 cup, 200 mg to 500 mg   Date Last Reviewed: 10/1/2016  © 2910-6351 [employer]. [address] [place] [address]. All rights reserved. This information is not intended as a substitute for professional medical care. Always follow your healthcare professional's instructions.

## 2018-03-22 ENCOUNTER — TELEPHONE (OUTPATIENT)
Dept: SURGERY | Facility: CLINIC | Age: 68
End: 2018-03-22

## 2018-03-22 NOTE — TELEPHONE ENCOUNTER
----- Message from Abhay Jarrell sent at 3/22/2018  3:33 PM CDT -----  Pt is requesting a call from nurse to discuss she was sent the wrong apt reminder in a text message.            Please call pt back at [phone]

## 2018-03-26 ENCOUNTER — OFFICE VISIT (OUTPATIENT)
Dept: INTERNAL MEDICINE | Facility: CLINIC | Age: 68
End: 2018-03-26
Payer: MEDICARE

## 2018-03-26 VITALS
TEMPERATURE: 99 F | HEIGHT: 65 IN | BODY MASS INDEX: 36.65 KG/M2 | OXYGEN SATURATION: 99 % | SYSTOLIC BLOOD PRESSURE: 122 MMHG | DIASTOLIC BLOOD PRESSURE: 80 MMHG | HEART RATE: 85 BPM | WEIGHT: 220 LBS

## 2018-03-26 DIAGNOSIS — E66.01 SEVERE OBESITY (BMI 35.0-39.9): ICD-10-CM

## 2018-03-26 DIAGNOSIS — I10 ESSENTIAL HYPERTENSION: ICD-10-CM

## 2018-03-26 DIAGNOSIS — J30.2 CHRONIC SEASONAL ALLERGIC RHINITIS, UNSPECIFIED TRIGGER: ICD-10-CM

## 2018-03-26 DIAGNOSIS — J02.9 PHARYNGITIS, UNSPECIFIED ETIOLOGY: Primary | ICD-10-CM

## 2018-03-26 DIAGNOSIS — E55.9 VITAMIN D DEFICIENCY: ICD-10-CM

## 2018-03-26 PROCEDURE — 99213 OFFICE O/P EST LOW 20 MIN: CPT | Mod: 25,S$GLB,, | Performed by: FAMILY MEDICINE

## 2018-03-26 PROCEDURE — 3079F DIAST BP 80-89 MM HG: CPT | Mod: CPTII,S$GLB,, | Performed by: FAMILY MEDICINE

## 2018-03-26 PROCEDURE — 99999 PR PBB SHADOW E&M-EST. PATIENT-LVL III: CPT | Mod: PBBFAC,,, | Performed by: FAMILY MEDICINE

## 2018-03-26 PROCEDURE — 99499 UNLISTED E&M SERVICE: CPT | Mod: S$GLB,,, | Performed by: FAMILY MEDICINE

## 2018-03-26 PROCEDURE — 3074F SYST BP LT 130 MM HG: CPT | Mod: CPTII,S$GLB,, | Performed by: FAMILY MEDICINE

## 2018-03-26 PROCEDURE — 96372 THER/PROPH/DIAG INJ SC/IM: CPT | Mod: S$GLB,,, | Performed by: FAMILY MEDICINE

## 2018-03-26 RX ORDER — METHYLPREDNISOLONE ACETATE 80 MG/ML
80 INJECTION, SUSPENSION INTRA-ARTICULAR; INTRALESIONAL; INTRAMUSCULAR; SOFT TISSUE
Status: COMPLETED | OUTPATIENT
Start: 2018-03-26 | End: 2018-03-26

## 2018-03-26 RX ADMIN — METHYLPREDNISOLONE ACETATE 80 MG: 80 INJECTION, SUSPENSION INTRA-ARTICULAR; INTRALESIONAL; INTRAMUSCULAR; SOFT TISSUE at 03:03

## 2018-03-26 NOTE — PROGRESS NOTES
"Subjective:       Patient ID: Beth Souza is a 67 y.o. female.    Chief Complaint: Follow-up (possible cold/sinus problem)    67-year-old Afro-American female patient with Patient Active Problem List:     Hypertension     Vitamin D deficiency     Spondylolisthesis of lumbar region     Hyperparathyroidism, primary     Thyroid nodule     Peripheral polyneuropathy     Cataract     Severe obesity (BMI 35.0-39.9)  Here with complaint of sore throat, postnasal drip, sinus pressure and nasal congestion starting yesterday.  Patient took Claritin this morning, has Flonase but did not start using it.  Denies of any fever chills, nausea vomiting cough, has been having minimal headache.   Taking her blood pressure medication vitamin D weekly      Review of Systems   Constitutional: Negative for fatigue and fever.   HENT: Positive for congestion, postnasal drip, rhinorrhea, sinus pressure and sore throat.    Eyes: Negative for visual disturbance.   Respiratory: Negative for cough and shortness of breath.    Cardiovascular: Negative for chest pain and leg swelling.   Gastrointestinal: Negative for abdominal pain, nausea and vomiting.   Musculoskeletal: Negative for myalgias.   Skin: Negative for rash.   Neurological: Negative for light-headedness and headaches.   Psychiatric/Behavioral: Negative for sleep disturbance.         /80 (BP Location: Right arm, Patient Position: Sitting)   Pulse 85   Temp 98.8 °F (37.1 °C) (Tympanic)   Ht 5' 5" (1.651 m)   Wt 99.8 kg (220 lb 0.3 oz)   SpO2 99%   BMI 36.61 kg/m²   Objective:      Physical Exam   Constitutional: She is oriented to person, place, and time. She appears well-developed and well-nourished.   HENT:   Head: Normocephalic and atraumatic.   Right Ear: External ear normal.   Left Ear: External ear normal.   Mouth/Throat: Oropharynx is clear and moist. No oropharyngeal exudate.   Faint postnasal drip noted today  Positive for sinus pressure in  bilateral " maxillary areas and rhinorrhea noted   Neck: Neck supple.   Cardiovascular: Normal rate, regular rhythm and normal heart sounds.    No murmur heard.  Pulmonary/Chest: Effort normal and breath sounds normal. She has no wheezes.   Abdominal: Soft. Bowel sounds are normal. There is no tenderness.   Musculoskeletal: She exhibits no edema.   Lymphadenopathy:     She has no cervical adenopathy.   Neurological: She is alert and oriented to person, place, and time.   Skin: Skin is warm and dry. No rash noted.   Psychiatric: She has a normal mood and affect.         Assessment:       1. Pharyngitis, unspecified etiology    2. Chronic seasonal allergic rhinitis, unspecified trigger    3. Essential hypertension    4. Vitamin D deficiency    5. Severe obesity (BMI 35.0-39.9)        Plan:   Pharyngitis, unspecified etiology  -     methylPREDNISolone acetate injection 80 mg; Inject 1 mL (80 mg total) into the muscle one time.  Chronic seasonal allergic rhinitis, unspecified trigger  -     methylPREDNISolone acetate injection 80 mg; Inject 1 mL (80 mg total) into the muscle one time.  Depo-Medrol 80 mg IM ×1 given today in the clinic  Patient was advised to start using Flonase and continue Claritin or Zyrtec  Salt water gargles recommended  Drink adequate fluids  No further need for antibiotics required at this time    Essential hypertension-blood pressure stable today currently on lisinopril hydrochlorothiazide 20/25 mg daily    Vitamin D deficiency-currently taking prescription vitamin D weekly    Severe obesity (BMI 35.0-39.9)-lifestyle modifications recommended to lose weight with BMI 36

## 2018-04-12 ENCOUNTER — OFFICE VISIT (OUTPATIENT)
Dept: SURGERY | Facility: CLINIC | Age: 68
End: 2018-04-12
Payer: MEDICARE

## 2018-04-12 VITALS
HEART RATE: 81 BPM | BODY MASS INDEX: 36.61 KG/M2 | DIASTOLIC BLOOD PRESSURE: 80 MMHG | SYSTOLIC BLOOD PRESSURE: 128 MMHG | TEMPERATURE: 99 F | WEIGHT: 220 LBS

## 2018-04-12 DIAGNOSIS — L91.0 KELOID SCAR: ICD-10-CM

## 2018-04-12 DIAGNOSIS — R22.9 SUBCUTANEOUS MASS: Primary | ICD-10-CM

## 2018-04-12 PROCEDURE — 99999 PR PBB SHADOW E&M-EST. PATIENT-LVL II: CPT | Mod: PBBFAC,,, | Performed by: SURGERY

## 2018-04-12 PROCEDURE — 99211 OFF/OP EST MAY X REQ PHY/QHP: CPT | Mod: S$GLB,,, | Performed by: SURGERY

## 2018-04-12 NOTE — PROGRESS NOTES
Subjective:       Patient ID: Beth Souza is a 67 y.o. female.    Itching right neck scar and mass right lower back    Chief Complaint: Post-op Evaluation    The patient states that she has itching on the old abscess site on the right neck.  This was where an infected sebaceous cyst was drained in 2016.  She states that the scar is raised and causes pruritus.    She also has a nodule on the medial aspect of her right lower back that was sore 2 weeks ago it is improved      Review of Systems   Skin:        Itching at skin scar, back nodule       Objective:      Physical Exam   Constitutional: No distress.   Overweight   Skin:   On the right neck there is a 1.5 cm hypertrophic scar.    On the medial aspect of the right back there is a 2 x 1 cm firm mobile nodule   Vitals reviewed.      Assessment:     pruritus of the neck scar.    Back mass likely lipoma or sebaceous cyst    Plan:       filed hydrocortisone ointment at the neck scar.  If pruritus does not improve excision and closure with nylon.  Removal of the back mass/lipoma.  Closure with nylon prevent or minimize tissue reaction.

## 2018-04-12 NOTE — PATIENT INSTRUCTIONS
The area on your right neck may be itching because of the weighted heal and created hypertrophic or keloid type scar.  We could also remove the scar and close the area with 1 suture that we have to remove the try to minimize the body's reaction to the procedure.    The lump on your back is either a lipoma or sebaceous cyst.  Both of these are benign.    We can remove both of these lesions at the same time.    You can try using 1% or 2% hydrocortisone ointment or cream on the neck scar 3 times a day to see if this helps with the itching of that scar

## 2018-05-07 ENCOUNTER — TELEPHONE (OUTPATIENT)
Dept: OBSTETRICS AND GYNECOLOGY | Facility: CLINIC | Age: 68
End: 2018-05-07

## 2018-05-07 NOTE — TELEPHONE ENCOUNTER
Spoke with pt and informed pt that due to an emergency Ms Sky had to leave clinic. Pt verbalized understanding. Pt states she will call back to reschedule her appt.  I verbalized understanding.

## 2018-05-10 ENCOUNTER — TELEPHONE (OUTPATIENT)
Dept: INTERNAL MEDICINE | Facility: CLINIC | Age: 68
End: 2018-05-10

## 2018-05-10 NOTE — TELEPHONE ENCOUNTER
----- Message from Libby Vasquez sent at 5/10/2018 11:33 AM CDT -----  Contact: pt  Please call pt @ [phone] regarding appt, pt have a questions.

## 2018-05-10 NOTE — TELEPHONE ENCOUNTER
Returned pt call,stated that she thought her appt on 5/14/2018 was for an annual exam when she already had one in September 2017 but realized that it's only a follow-up appt and just wanted to confirm. DREW

## 2018-05-14 ENCOUNTER — LAB VISIT (OUTPATIENT)
Dept: LAB | Facility: HOSPITAL | Age: 68
End: 2018-05-14
Attending: INTERNAL MEDICINE
Payer: MEDICARE

## 2018-05-14 ENCOUNTER — OFFICE VISIT (OUTPATIENT)
Dept: INTERNAL MEDICINE | Facility: CLINIC | Age: 68
End: 2018-05-14
Payer: MEDICARE

## 2018-05-14 VITALS
HEART RATE: 56 BPM | BODY MASS INDEX: 36.91 KG/M2 | OXYGEN SATURATION: 98 % | TEMPERATURE: 98 F | DIASTOLIC BLOOD PRESSURE: 84 MMHG | WEIGHT: 221.56 LBS | SYSTOLIC BLOOD PRESSURE: 132 MMHG | HEIGHT: 65 IN

## 2018-05-14 DIAGNOSIS — E21.0 HYPERPARATHYROIDISM, PRIMARY: ICD-10-CM

## 2018-05-14 DIAGNOSIS — E04.1 THYROID NODULE: ICD-10-CM

## 2018-05-14 DIAGNOSIS — Z23 NEED FOR PROPHYLACTIC VACCINATION WITH STREPTOCOCCUS PNEUMONIAE (PNEUMOCOCCUS) AND INFLUENZA VACCINES: ICD-10-CM

## 2018-05-14 DIAGNOSIS — E66.01 SEVERE OBESITY (BMI 35.0-39.9): ICD-10-CM

## 2018-05-14 DIAGNOSIS — E55.9 VITAMIN D DEFICIENCY: ICD-10-CM

## 2018-05-14 DIAGNOSIS — E83.39 HYPOPHOSPHATEMIA: ICD-10-CM

## 2018-05-14 DIAGNOSIS — R53.82 CHRONIC FATIGUE: Primary | ICD-10-CM

## 2018-05-14 DIAGNOSIS — I10 ESSENTIAL HYPERTENSION: ICD-10-CM

## 2018-05-14 DIAGNOSIS — M43.16 SPONDYLOLISTHESIS OF LUMBAR REGION: ICD-10-CM

## 2018-05-14 PROCEDURE — 99999 PR PBB SHADOW E&M-EST. PATIENT-LVL III: CPT | Mod: PBBFAC,,, | Performed by: FAMILY MEDICINE

## 2018-05-14 PROCEDURE — 90732 PPSV23 VACC 2 YRS+ SUBQ/IM: CPT | Mod: S$GLB,,, | Performed by: FAMILY MEDICINE

## 2018-05-14 PROCEDURE — 83970 ASSAY OF PARATHORMONE: CPT

## 2018-05-14 PROCEDURE — 99214 OFFICE O/P EST MOD 30 MIN: CPT | Mod: 25,S$GLB,, | Performed by: FAMILY MEDICINE

## 2018-05-14 PROCEDURE — G0009 ADMIN PNEUMOCOCCAL VACCINE: HCPCS | Mod: S$GLB,,, | Performed by: FAMILY MEDICINE

## 2018-05-14 PROCEDURE — 99499 UNLISTED E&M SERVICE: CPT | Mod: S$GLB,,, | Performed by: FAMILY MEDICINE

## 2018-05-14 PROCEDURE — 3079F DIAST BP 80-89 MM HG: CPT | Mod: CPTII,S$GLB,, | Performed by: FAMILY MEDICINE

## 2018-05-14 PROCEDURE — 80069 RENAL FUNCTION PANEL: CPT

## 2018-05-14 PROCEDURE — 36415 COLL VENOUS BLD VENIPUNCTURE: CPT | Mod: PO

## 2018-05-14 PROCEDURE — 3075F SYST BP GE 130 - 139MM HG: CPT | Mod: CPTII,S$GLB,, | Performed by: FAMILY MEDICINE

## 2018-05-14 PROCEDURE — 82306 VITAMIN D 25 HYDROXY: CPT

## 2018-05-15 ENCOUNTER — LAB VISIT (OUTPATIENT)
Dept: LAB | Facility: HOSPITAL | Age: 68
End: 2018-05-15
Attending: FAMILY MEDICINE
Payer: MEDICARE

## 2018-05-15 DIAGNOSIS — E78.2 MIXED HYPERLIPIDEMIA: Primary | ICD-10-CM

## 2018-05-15 DIAGNOSIS — I10 ESSENTIAL HYPERTENSION: ICD-10-CM

## 2018-05-15 DIAGNOSIS — E55.9 VITAMIN D DEFICIENCY: ICD-10-CM

## 2018-05-15 DIAGNOSIS — E21.0 HYPERPARATHYROIDISM, PRIMARY: ICD-10-CM

## 2018-05-15 DIAGNOSIS — R53.82 CHRONIC FATIGUE: ICD-10-CM

## 2018-05-15 DIAGNOSIS — E04.1 THYROID NODULE: ICD-10-CM

## 2018-05-15 LAB
25(OH)D3+25(OH)D2 SERPL-MCNC: 24 NG/ML
25(OH)D3+25(OH)D2 SERPL-MCNC: 26 NG/ML
ALBUMIN SERPL BCP-MCNC: 3.6 G/DL
ALBUMIN SERPL BCP-MCNC: 3.6 G/DL
ALP SERPL-CCNC: 82 U/L
ALT SERPL W/O P-5'-P-CCNC: 17 U/L
ANION GAP SERPL CALC-SCNC: 14 MMOL/L
ANION GAP SERPL CALC-SCNC: 9 MMOL/L
AST SERPL-CCNC: 12 U/L
BASOPHILS # BLD AUTO: 0.02 K/UL
BASOPHILS NFR BLD: 0.4 %
BILIRUB SERPL-MCNC: 0.7 MG/DL
BUN SERPL-MCNC: 15 MG/DL
BUN SERPL-MCNC: 23 MG/DL
CALCIUM SERPL-MCNC: 9.2 MG/DL
CALCIUM SERPL-MCNC: 9.4 MG/DL
CHLORIDE SERPL-SCNC: 102 MMOL/L
CHLORIDE SERPL-SCNC: 102 MMOL/L
CHOLEST SERPL-MCNC: 216 MG/DL
CHOLEST/HDLC SERPL: 3.3 {RATIO}
CO2 SERPL-SCNC: 25 MMOL/L
CO2 SERPL-SCNC: 29 MMOL/L
CREAT SERPL-MCNC: 0.8 MG/DL
CREAT SERPL-MCNC: 0.8 MG/DL
DIFFERENTIAL METHOD: ABNORMAL
EOSINOPHIL # BLD AUTO: 0.1 K/UL
EOSINOPHIL NFR BLD: 1.8 %
ERYTHROCYTE [DISTWIDTH] IN BLOOD BY AUTOMATED COUNT: 14.7 %
EST. GFR  (AFRICAN AMERICAN): >60 ML/MIN/1.73 M^2
EST. GFR  (AFRICAN AMERICAN): >60 ML/MIN/1.73 M^2
EST. GFR  (NON AFRICAN AMERICAN): >60 ML/MIN/1.73 M^2
EST. GFR  (NON AFRICAN AMERICAN): >60 ML/MIN/1.73 M^2
GLUCOSE SERPL-MCNC: 91 MG/DL
GLUCOSE SERPL-MCNC: 98 MG/DL
HCT VFR BLD AUTO: 41.2 %
HDLC SERPL-MCNC: 65 MG/DL
HDLC SERPL: 30.1 %
HGB BLD-MCNC: 13.2 G/DL
IMM GRANULOCYTES # BLD AUTO: 0.01 K/UL
IMM GRANULOCYTES NFR BLD AUTO: 0.2 %
LDLC SERPL CALC-MCNC: 132.4 MG/DL
LYMPHOCYTES # BLD AUTO: 1.5 K/UL
LYMPHOCYTES NFR BLD: 29.9 %
MCH RBC QN AUTO: 29.8 PG
MCHC RBC AUTO-ENTMCNC: 32 G/DL
MCV RBC AUTO: 93 FL
MONOCYTES # BLD AUTO: 0.6 K/UL
MONOCYTES NFR BLD: 11.2 %
NEUTROPHILS # BLD AUTO: 2.8 K/UL
NEUTROPHILS NFR BLD: 56.5 %
NONHDLC SERPL-MCNC: 151 MG/DL
NRBC BLD-RTO: 0 /100 WBC
PHOSPHATE SERPL-MCNC: 3 MG/DL
PLATELET # BLD AUTO: 314 K/UL
PMV BLD AUTO: 10.5 FL
POTASSIUM SERPL-SCNC: 4.3 MMOL/L
POTASSIUM SERPL-SCNC: 4.5 MMOL/L
PROT SERPL-MCNC: 7 G/DL
PTH-INTACT SERPL-MCNC: 101 PG/ML
PTH-INTACT SERPL-MCNC: 66 PG/ML
RBC # BLD AUTO: 4.43 M/UL
SODIUM SERPL-SCNC: 140 MMOL/L
SODIUM SERPL-SCNC: 141 MMOL/L
TRIGL SERPL-MCNC: 93 MG/DL
TSH SERPL DL<=0.005 MIU/L-ACNC: 1.33 UIU/ML
WBC # BLD AUTO: 5.01 K/UL

## 2018-05-15 PROCEDURE — 36415 COLL VENOUS BLD VENIPUNCTURE: CPT | Mod: PO

## 2018-05-15 PROCEDURE — 80061 LIPID PANEL: CPT

## 2018-05-15 PROCEDURE — 83970 ASSAY OF PARATHORMONE: CPT

## 2018-05-15 PROCEDURE — 84443 ASSAY THYROID STIM HORMONE: CPT

## 2018-05-15 PROCEDURE — 85025 COMPLETE CBC W/AUTO DIFF WBC: CPT

## 2018-05-15 PROCEDURE — 80053 COMPREHEN METABOLIC PANEL: CPT

## 2018-05-15 PROCEDURE — 82306 VITAMIN D 25 HYDROXY: CPT

## 2018-05-15 RX ORDER — SIMVASTATIN 20 MG/1
20 TABLET, FILM COATED ORAL NIGHTLY
Qty: 90 TABLET | Refills: 3 | Status: SHIPPED | OUTPATIENT
Start: 2018-05-15 | End: 2018-05-24

## 2018-05-22 ENCOUNTER — OFFICE VISIT (OUTPATIENT)
Dept: NEPHROLOGY | Facility: CLINIC | Age: 68
End: 2018-05-22
Payer: MEDICARE

## 2018-05-22 VITALS
HEART RATE: 56 BPM | BODY MASS INDEX: 40.57 KG/M2 | SYSTOLIC BLOOD PRESSURE: 122 MMHG | DIASTOLIC BLOOD PRESSURE: 90 MMHG | WEIGHT: 220.44 LBS | HEIGHT: 62 IN

## 2018-05-22 DIAGNOSIS — E21.0 HYPERPARATHYROIDISM, PRIMARY: Primary | ICD-10-CM

## 2018-05-22 PROCEDURE — 99214 OFFICE O/P EST MOD 30 MIN: CPT | Mod: S$GLB,,, | Performed by: INTERNAL MEDICINE

## 2018-05-22 PROCEDURE — 3080F DIAST BP >= 90 MM HG: CPT | Mod: CPTII,S$GLB,, | Performed by: INTERNAL MEDICINE

## 2018-05-22 PROCEDURE — 3074F SYST BP LT 130 MM HG: CPT | Mod: CPTII,S$GLB,, | Performed by: INTERNAL MEDICINE

## 2018-05-22 PROCEDURE — 99999 PR PBB SHADOW E&M-EST. PATIENT-LVL III: CPT | Mod: PBBFAC,,, | Performed by: INTERNAL MEDICINE

## 2018-05-22 NOTE — PROGRESS NOTES
PROGRESS NOTE FOR ESTABLISHED PATIENT    PHYSICIAN REQUESTING THE CONSULT: Dr. Edna Oliva    REASON FOR VISIT: Hyperparathyroidism    67 y.o. female with history of vitamin D deficiency, HTN, hyperlipidemia, neuropathy, bilateral carotid artery disease  presents to the renal clinic for evaluation of hyperparathyroidism.     Patient presents without complaints today.             Past Medical History:   Diagnosis Date    Cataract     Hyperlipidemia     Hypertension     Lumbar spondylosis     Vitamin D deficiency        Past Surgical History:   Procedure Laterality Date    PARTIAL HYSTERECTOMY         Review of patient's allergies indicates:   Allergen Reactions    Aspirin      Bruising    Sulfa (sulfonamide antibiotics)        Current Outpatient Prescriptions   Medication Sig Dispense Refill    ergocalciferol (VITAMIN D2) 50,000 unit Cap Take 1 capsule (50,000 Units total) by mouth every 7 days. 10 capsule 0    lisinopril-hydrochlorothiazide (PRINZIDE,ZESTORETIC) 20-25 mg Tab Take 1 tablet by mouth once daily. 90 tablet 3    simvastatin (ZOCOR) 20 MG tablet Take 1 tablet (20 mg total) by mouth every evening. 90 tablet 3     No current facility-administered medications for this visit.        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 No    Breast-Feeding No     Social History Narrative  "   No narrative on file       Review of Systems:  1. GENERAL: patient denies any fever, weight changes, generalized weakness, dizziness.  2. HEENT: patient denies headaches, no visual disturbances, swallowing problems, sinus pain, nasal congestion.  3. CARDIOVASCULAR: patient denies chest pain, palpitations.  4. PULMONARY: patient denies SOB, coughing, hemoptysis, wheezing.  5. GASTROINTESTINAL: patient denies abdominal pain/cramping, nausea, vomiting, diarrhea, no constipation  6. GENITOURINARY: patient denies dysuria, hematuria, hesitancy, frequency.  7. EXTREMITIES: patient denies LE edema, LE cramping.  8. DERMATOLOGY: patient denies rashes, ulcers.  9. NEURO: patient denies tremors, extremity weakness, she reports intermittent extremity numbness/tingling.  10. MUSCULOSKELETAL: patient denies any joint pain  11. HEMATOLOGY: patient denies rectal or gum bleeding.  12: PSYCH: patient denies anxiety, depression.      PHYSICAL EXAM:  BP (!) 122/90   Pulse (!) 56   Ht 5' 2" (1.575 m)   Wt 100 kg (220 lb 7.4 oz)   BMI 40.32 kg/m²     GENERAL: Pleasant overweight lady presents to clinic with non-labored breathing.  HEENT: PER, no nasal discharge, no icterus, no oral exudates, moist mucosal membranes.  NECK: no thyroid mass, no lymphadenopathy.  HEART: RRR S1/S2, no rubs, good peripheral pulses.  LUNGS: CTA bilaterally, no wheezing, breathing is nonlabored.  ABDOMEN: soft, nontender, not distended, bowel sounds are present, no abdominal hernia.  EXTREM: no LE edema.  SKIN: no rashes, skin is warm and dry.  NEURO: A & O x 3, no obvious focal signs.    LABORATORY RESULTS:    Lab Results   Component Value Date    CREATININE 0.8 05/15/2018    BUN 15 05/15/2018     05/15/2018    K 4.3 05/15/2018     05/15/2018    CO2 29 05/15/2018      Lab Results   Component Value Date    PTH 66.0 05/15/2018    CALCIUM 9.4 05/15/2018    PHOS 3.0 05/14/2018     Lab Results   Component Value Date    ALBUMIN 3.6 05/15/2018 "     Lab Results   Component Value Date    WBC 5.01 05/15/2018    HGB 13.2 05/15/2018    HCT 41.2 05/15/2018    MCV 93 05/15/2018     05/15/2018     25-OH vitamin D: 26 (5/15/18)    ASSESSMENT AND PLAN:  67 y.o. female with history of vitamin D deficiency, HTN, hyperlipidemia, neuropathy, bilateral carotid artery disease  presents to the renal clinic for evaluation of hyperparathyroidism    1. Hyperparathyroidism: Patient likely suffers from mild primary hyperparathyroidism. Last PTH was 101. Continue Vitamin D. She will return in 3 months for follow up.     2. Electrolytes: Calcium level at goal at present (9.4).                            Mild hypophosphatemia: has resolved.     3. Acid base status: No acute issues.    4. Volume: Euvolemic.     5. Hypertension: Good BP control.     6. Medications: Reviewed. Agree with current medical regimen. Continue vitamin D for now.

## 2018-05-24 ENCOUNTER — OFFICE VISIT (OUTPATIENT)
Dept: OBSTETRICS AND GYNECOLOGY | Facility: CLINIC | Age: 68
End: 2018-05-24
Payer: MEDICARE

## 2018-05-24 VITALS
HEIGHT: 62 IN | WEIGHT: 224.88 LBS | SYSTOLIC BLOOD PRESSURE: 128 MMHG | BODY MASS INDEX: 41.38 KG/M2 | DIASTOLIC BLOOD PRESSURE: 86 MMHG

## 2018-05-24 DIAGNOSIS — N76.0 BV (BACTERIAL VAGINOSIS): ICD-10-CM

## 2018-05-24 DIAGNOSIS — B96.89 BV (BACTERIAL VAGINOSIS): ICD-10-CM

## 2018-05-24 DIAGNOSIS — B37.31 CANDIDAL VAGINITIS: Primary | ICD-10-CM

## 2018-05-24 PROCEDURE — 3079F DIAST BP 80-89 MM HG: CPT | Mod: CPTII,S$GLB,, | Performed by: NURSE PRACTITIONER

## 2018-05-24 PROCEDURE — 87480 CANDIDA DNA DIR PROBE: CPT

## 2018-05-24 PROCEDURE — 99213 OFFICE O/P EST LOW 20 MIN: CPT | Mod: S$GLB,,, | Performed by: NURSE PRACTITIONER

## 2018-05-24 PROCEDURE — 99999 PR PBB SHADOW E&M-EST. PATIENT-LVL II: CPT | Mod: PBBFAC,,, | Performed by: NURSE PRACTITIONER

## 2018-05-24 PROCEDURE — 87510 GARDNER VAG DNA DIR PROBE: CPT

## 2018-05-24 PROCEDURE — 3074F SYST BP LT 130 MM HG: CPT | Mod: CPTII,S$GLB,, | Performed by: NURSE PRACTITIONER

## 2018-05-24 RX ORDER — METRONIDAZOLE 500 MG/1
500 TABLET ORAL EVERY 12 HOURS
Qty: 14 TABLET | Refills: 0 | Status: SHIPPED | OUTPATIENT
Start: 2018-05-24 | End: 2018-05-31

## 2018-05-24 NOTE — PROGRESS NOTES
"CC: Vaginal discharge    Beth Souza is a 67 y.o. female  presents for vaginal discharge and odor for 3 weeks. No OTC medication used.     Past Medical History:   Diagnosis Date    Cataract     Hyperlipidemia     Hypertension     Lumbar spondylosis     Vitamin D deficiency      Past Surgical History:   Procedure Laterality Date    PARTIAL HYSTERECTOMY       Family History   Problem Relation Age of Onset    Hypertension Mother     Diabetes Daughter     Melanoma Neg Hx     Psoriasis Neg Hx     Lupus Neg Hx     Eczema Neg Hx      Social History   Substance Use Topics    Smoking status: Former Smoker     Quit date: 2004    Smokeless tobacco: Never Used    Alcohol use Yes      Comment: occasionallty     OB History      Para Term  AB Living    3 3 3     3    SAB TAB Ectopic Multiple Live Births            3          /86   Ht 5' 2" (1.575 m)   Wt 102 kg (224 lb 13.9 oz)   BMI 41.13 kg/m²     ROS:  GENERAL: Denies weight gain or weight loss. Feeling well overall.   SKIN: Denies rash or lesions.   HEAD: Denies head injury or headache.   NODES: Denies enlarged lymph nodes.   CHEST: Denies chest pain or shortness of breath.   CARDIOVASCULAR: Denies palpitations or left sided chest pain.   ABDOMEN: No abdominal pain, constipation, diarrhea, nausea, vomiting or rectal bleeding.   URINARY: No frequency, dysuria, hematuria, or burning on urination.  REPRODUCTIVE: See HPI.   BREASTS: The patient performs breast self-examination and denies pain, lumps, or nipple discharge.   HEMATOLOGIC: No easy bruisability or excessive bleeding.   MUSCULOSKELETAL: Denies joint pain or swelling.   NEUROLOGIC: Denies syncope or weakness.   PSYCHIATRIC: Denies depression, anxiety or mood swings.    PE:   APPEARANCE: Well nourished, well developed, in no acute distress.  AFFECT: WNL, alert and oriented x 3.  PELVIC: Normal external female genitalia without lesions. Vagina atrophic without " lesions, scant , white discharge.    1. Candidal vaginitis  Vaginosis Screen by DNA Probe    PLAN:  Wet prep; Clue cells  Flagyl rx       Patient was counseled today on A.C.S. Pap guidelines and recommendations for yearly pelvic exams, mammograms and monthly self breast exams; to see her PCP for other health maintenance.

## 2018-05-25 ENCOUNTER — TELEPHONE (OUTPATIENT)
Dept: OBSTETRICS AND GYNECOLOGY | Facility: CLINIC | Age: 68
End: 2018-05-25

## 2018-05-25 LAB
CANDIDA RRNA VAG QL PROBE: NEGATIVE
G VAGINALIS RRNA GENITAL QL PROBE: POSITIVE
T VAGINALIS RRNA GENITAL QL PROBE: NEGATIVE

## 2018-05-25 NOTE — TELEPHONE ENCOUNTER
----- Message from Mallorie Thakur NP sent at 5/25/2018  8:20 AM CDT -----  Please call patient and inform her that cx indicated BV, please complete the Flagyl.

## 2018-06-14 ENCOUNTER — TELEPHONE (OUTPATIENT)
Dept: INTERNAL MEDICINE | Facility: CLINIC | Age: 68
End: 2018-06-14

## 2018-06-14 DIAGNOSIS — Z11.1 SCREENING-PULMONARY TB: Primary | ICD-10-CM

## 2018-06-14 NOTE — TELEPHONE ENCOUNTER
PPD skin test has been ordered, please advise patient to get it tomorrow so that he can be read on Monday  Patient is up-to-date with pneumonia vaccinations, during her last visit she had received pneumonia injection and does not need any.

## 2018-06-14 NOTE — TELEPHONE ENCOUNTER
Pt called stated that she would like to get a TB test done, advised that I will send a message to Dr. Oliva and call back with a response. DREW

## 2018-06-14 NOTE — TELEPHONE ENCOUNTER
Returned call to pt, advised that orders have been place to have TB skin test done. Advised that  prefers her to come in tomorrow for it but pt states she is unable to and scheduled for Monday. DREW

## 2018-06-18 ENCOUNTER — TELEPHONE (OUTPATIENT)
Dept: INTERNAL MEDICINE | Facility: CLINIC | Age: 68
End: 2018-06-18

## 2018-06-18 ENCOUNTER — CLINICAL SUPPORT (OUTPATIENT)
Dept: INTERNAL MEDICINE | Facility: CLINIC | Age: 68
End: 2018-06-18
Payer: MEDICARE

## 2018-06-18 DIAGNOSIS — Z11.1 SCREENING-PULMONARY TB: Primary | ICD-10-CM

## 2018-06-18 PROCEDURE — 86580 TB INTRADERMAL TEST: CPT | Mod: S$GLB,,, | Performed by: FAMILY MEDICINE

## 2018-06-19 ENCOUNTER — TELEPHONE (OUTPATIENT)
Dept: INTERNAL MEDICINE | Facility: CLINIC | Age: 68
End: 2018-06-19

## 2018-06-19 NOTE — TELEPHONE ENCOUNTER
----- Message from Lee Torres sent at 6/19/2018  1:19 PM CDT -----  Contact: pt  Please give pt a call at ..[phone] (home) [phone] (work)regarding questions about her appt tomorrow.

## 2018-06-19 NOTE — TELEPHONE ENCOUNTER
Returned call to pt, just stated that she would be a little tomorrow to have her TB test read as she has an appointment at 2pm. Advised that she could afterwards. DREW

## 2018-06-21 ENCOUNTER — CLINICAL SUPPORT (OUTPATIENT)
Dept: INTERNAL MEDICINE | Facility: CLINIC | Age: 68
End: 2018-06-21
Payer: MEDICARE

## 2018-06-21 PROCEDURE — 99999 PR PBB SHADOW E&M-EST. PATIENT-LVL I: CPT | Mod: PBBFAC,,,

## 2018-06-25 NOTE — PROGRESS NOTES
Patient here for PPD placement as per current standing MD order. After reviewing allergies and meds, PPD placed in left arm. Patient was advised to remain in clinic for 15 minutes post administration and to report any signs and symptoms of allergic reaction. Patient scheduled for return visit for PPD read and informed of time parameters for reading of test. Patient verbalized understanding.

## 2018-07-16 ENCOUNTER — OFFICE VISIT (OUTPATIENT)
Dept: INTERNAL MEDICINE | Facility: CLINIC | Age: 68
End: 2018-07-16
Payer: MEDICARE

## 2018-07-16 ENCOUNTER — HOSPITAL ENCOUNTER (OUTPATIENT)
Dept: RADIOLOGY | Facility: HOSPITAL | Age: 68
Discharge: HOME | End: 2018-07-16
Attending: FAMILY MEDICINE
Payer: MEDICARE

## 2018-07-16 VITALS
WEIGHT: 218.69 LBS | HEART RATE: 66 BPM | DIASTOLIC BLOOD PRESSURE: 82 MMHG | OXYGEN SATURATION: 98 % | BODY MASS INDEX: 40.25 KG/M2 | SYSTOLIC BLOOD PRESSURE: 130 MMHG | TEMPERATURE: 98 F | HEIGHT: 62 IN

## 2018-07-16 DIAGNOSIS — I10 ESSENTIAL HYPERTENSION: ICD-10-CM

## 2018-07-16 DIAGNOSIS — E55.9 VITAMIN D DEFICIENCY: ICD-10-CM

## 2018-07-16 DIAGNOSIS — Z01.89 NEED FOR ASSESSMENT FOR SLEEP APNEA: ICD-10-CM

## 2018-07-16 DIAGNOSIS — E21.0 HYPERPARATHYROIDISM, PRIMARY: ICD-10-CM

## 2018-07-16 DIAGNOSIS — M47.26 OSTEOARTHRITIS OF SPINE WITH RADICULOPATHY, LUMBAR REGION: Primary | ICD-10-CM

## 2018-07-16 DIAGNOSIS — M79.642 LEFT HAND PAIN: ICD-10-CM

## 2018-07-16 DIAGNOSIS — E66.01 MORBID OBESITY WITH BMI OF 40.0-44.9, ADULT: ICD-10-CM

## 2018-07-16 PROCEDURE — 73130 X-RAY EXAM OF HAND: CPT | Mod: TC,FY,PO,LT

## 2018-07-16 PROCEDURE — 99999 PR PBB SHADOW E&M-EST. PATIENT-LVL IV: CPT | Mod: PBBFAC,,, | Performed by: FAMILY MEDICINE

## 2018-07-16 PROCEDURE — 3079F DIAST BP 80-89 MM HG: CPT | Mod: CPTII,S$GLB,, | Performed by: FAMILY MEDICINE

## 2018-07-16 PROCEDURE — 99214 OFFICE O/P EST MOD 30 MIN: CPT | Mod: S$GLB,,, | Performed by: FAMILY MEDICINE

## 2018-07-16 PROCEDURE — 3075F SYST BP GE 130 - 139MM HG: CPT | Mod: CPTII,S$GLB,, | Performed by: FAMILY MEDICINE

## 2018-07-16 PROCEDURE — 73130 X-RAY EXAM OF HAND: CPT | Mod: 26,LT,, | Performed by: RADIOLOGY

## 2018-07-16 RX ORDER — GABAPENTIN 100 MG/1
100 CAPSULE ORAL NIGHTLY
Qty: 30 CAPSULE | Refills: 1 | Status: SHIPPED | OUTPATIENT
Start: 2018-07-16 | End: 2018-09-15

## 2018-07-16 NOTE — PATIENT INSTRUCTIONS
After finishing prescription vitamin d by treatment , start taking over the counter vitamin D3 2000 units daily for maintenance

## 2018-07-16 NOTE — PROGRESS NOTES
Subjective:       Patient ID: Beth Souza is a 68 y.o. female.    Chief Complaint: Leg Pain (both legs)    68-year-old  female patient with Patient Active Problem List:     Hypertension     Vitamin D deficiency     Spondylolisthesis of lumbar region     Hyperparathyroidism, primary     Thyroid nodule     Peripheral polyneuropathy     Cataract     Osteoarthritis of spine with radiculopathy, lumbar region     Morbid obesity with BMI of 40.0-44.9, adult  Here with complaint of bilateral leg pains below the knees, off and on lately for the past 2 weeks which has got worse, has been having off and on leg pains radiating from the lower back, patient requesting physical therapy which has been helpful in the past and does not want to consider going to physiatry as patient not interested in steroid injections  Patient has been taking blood pressure medication and finishing up vitamin-D by prescription  Denies any significant neck pain but started to have left hand pain and was unable to make a fist few days ago, reports that this pain started a week ago and denies any injury or trauma  Patient reports pain as 7/10 intermittently and has tried taking over-the-counter Tylenol and ibuprofen with no significant relief  Denies any trouble with bowel /bladder accidents   Patient does report feeling sleepy after, getting few hours of sleep but has difficulty staying asleep and occasionally snores.           Review of Systems   Constitutional: Negative for fatigue.   Eyes: Negative for visual disturbance.   Respiratory: Negative for shortness of breath.    Cardiovascular: Negative for chest pain and leg swelling.   Gastrointestinal: Negative for abdominal pain, nausea and vomiting.   Musculoskeletal: Positive for arthralgias, back pain and myalgias.   Skin: Negative for color change and rash.   Neurological: Positive for numbness. Negative for weakness, light-headedness and headaches.  "  Psychiatric/Behavioral: Positive for sleep disturbance.         /82 (BP Location: Left arm, Patient Position: Sitting)   Pulse 66   Temp 98.2 °F (36.8 °C) (Tympanic)   Ht 5' 2" (1.575 m)   Wt 99.2 kg (218 lb 11.1 oz)   SpO2 98%   BMI 40.00 kg/m²   Objective:      Physical Exam   Constitutional: She is oriented to person, place, and time. She appears well-developed and well-nourished.   HENT:   Head: Normocephalic and atraumatic.   Mouth/Throat: Oropharynx is clear and moist.   Cardiovascular: Normal rate, regular rhythm and normal heart sounds.    No murmur heard.  Pulmonary/Chest: Effort normal and breath sounds normal. She has no wheezes.   Abdominal: Soft. Bowel sounds are normal. There is no tenderness.   Musculoskeletal: She exhibits tenderness. She exhibits no edema.   Positive for paraspinal lumbar muscle tenderness bilaterally, but no significant calf tenderness noted on exam today  Normal bilateral knee exam  Positive for tenderness to the left hand with hand  1+ today on the left side  No paraspinal cervical muscle tenderness noted on exam today   Neurological: She is alert and oriented to person, place, and time.   Skin: Skin is warm and dry. No rash noted. No erythema.   Psychiatric: She has a normal mood and affect.         Assessment:       1. Osteoarthritis of spine with radiculopathy, lumbar region    2. Left hand pain    3. Morbid obesity with BMI of 40.0-44.9, adult    4. Hyperparathyroidism, primary    5. Vitamin D deficiency    6. Essential hypertension    7. Need for assessment for sleep apnea        Plan:   Osteoarthritis of spine with radiculopathy, lumbar region  -     gabapentin (NEURONTIN) 100 MG capsule; Take 1 capsule (100 mg total) by mouth every evening.  Dispense: 30 capsule; Refill: 1  -     Ambulatory Referral to Physical/Occupational Therapy  Gabapentin will be prescribed today for symptomatic relief and will refer to physical therapy for further " evaluation      Left hand pain  -     X-Ray Hand Complete Left; Future; Expected date: 07/16/2018  Will get x-ray of the left hand to look into further etiology  Patient was advised to take over-the-counter extra-strength Tylenol as needed for pain, likely secondary to arthritis    Morbid obesity with BMI of 40.0-44.9, adult-Lifestyle modifications recommended to lose weight with BMI 40 with diet and exercise    Hyperparathyroidism, primary-noted normal parathyroid levels  On  recent labs    Vitamin D deficiency- patient was advised to finish prescription vitamin-D weekly and start taking over-the-counter vitamin D3 2000 units daily for maintenance    Essential hypertension-blood pressure is stable today currently on lisinopril hydrochlorothiazide 20/25 mg daily    Need for assessment for sleep apnea  -     Ambulatory referral to Pulmonology    Patient reports that she has been having trouble staying asleep and does not feel refreshed and does have history of snoring will refer to pulmonology to rule out sleep apnea

## 2018-07-19 ENCOUNTER — TELEPHONE (OUTPATIENT)
Dept: INTERNAL MEDICINE | Facility: CLINIC | Age: 68
End: 2018-07-19

## 2018-07-19 NOTE — TELEPHONE ENCOUNTER
Returned call to pt regarding X-ray results, advised that Dr. Oliva mailed a letter out to her regarding those results on 7/17 and should be receiving that soon. DREW

## 2018-07-19 NOTE — TELEPHONE ENCOUNTER
----- Message from Kizzy Souza sent at 7/19/2018  1:38 PM CDT -----  Contact: pt  She's calling to get xray results, also wanted to schedule mammogram, please add orders and advise [phone] (home) [phone] (work)

## 2018-07-23 ENCOUNTER — TELEPHONE (OUTPATIENT)
Dept: INTERNAL MEDICINE | Facility: CLINIC | Age: 68
End: 2018-07-23

## 2018-07-23 NOTE — TELEPHONE ENCOUNTER
Returned call to pt regarding a medication that was prescribed and stated that medication is working great but wanted to know when can she stop taking it. Informed that Dr. Oliva wants to continue as prescribed until she discontinues it. DREW

## 2018-07-23 NOTE — TELEPHONE ENCOUNTER
----- Message from Nedra Polanco sent at 7/23/2018  4:32 PM CDT -----  Contact: pt  Pt stated she will like a call back, she can be reached at [phone] Thanks

## 2018-07-27 ENCOUNTER — TELEPHONE (OUTPATIENT)
Dept: INTERNAL MEDICINE | Facility: CLINIC | Age: 68
End: 2018-07-27

## 2018-07-27 NOTE — TELEPHONE ENCOUNTER
Spoke with pt, she was wanting to know if she can drink alcohol with Gabapentin. Advised her that Dr. Osei stated that alcohol is not recommended with any medications. Also advised pt that it can cause dizziness, lack of concentration, and alcohol intolerance. Pt verbalized understanding.

## 2018-07-27 NOTE — TELEPHONE ENCOUNTER
----- Message from Franchesca Horn sent at 7/27/2018  3:22 PM CDT -----  Contact: pt   Pt is requesting a call back from the nurse in regards to pt having questions about her med.  [phone]

## 2018-07-27 NOTE — PROGRESS NOTES
"Beth Souza is a 67 y.o. female  presents with complaint of vaginal odor with slight white discharge for last 3 weeks.     Past Medical History:   Diagnosis Date    Cataract     Hyperlipidemia     Hypertension     Lumbar spondylosis     Vitamin D deficiency      Past Surgical History:   Procedure Laterality Date    PARTIAL HYSTERECTOMY       Social History   Substance Use Topics    Smoking status: Former Smoker     Quit date: 2004    Smokeless tobacco: Never Used    Alcohol use Yes      Comment: occasionallty     Family History   Problem Relation Age of Onset    Hypertension Mother     Diabetes Daughter     Melanoma Neg Hx     Psoriasis Neg Hx     Lupus Neg Hx     Eczema Neg Hx      OB History    Para Term  AB Living   3 3 3     3   SAB TAB Ectopic Multiple Live Births           3      # Outcome Date GA Lbr Zachary/2nd Weight Sex Delivery Anes PTL Lv   3 Term      Vag-Spont   HUGO   2 Term      Vag-Spont   HUGO   1 Term      Vag-Spont   HUGO          /84   Ht 5' 4" (1.626 m)   Wt 100.7 kg (222 lb 0.1 oz)   BMI 38.11 kg/m²     ROS:  GENERAL: No fever, chills, fatigability or weight loss.  VULVAR: No pain, no lesions and no itching.  VAGINAL: No relaxation, no itching, no discharge, no abnormal bleeding and no lesions.  ABDOMEN: No abdominal pain. Denies nausea. Denies vomiting. No diarrhea. No constipation  BREAST: Denies pain. No lumps. No discharge.  URINARY: No incontinence, no nocturia, no frequency and no dysuria.  CARDIOVASCULAR: No chest pain. No shortness of breath. No leg cramps.  NEUROLOGICAL: No headaches. No vision changes.    PHYSICAL EXAM:  VULVA: normal appearing vulva with no masses, tenderness or lesions, VAGINA: vaginal discharge - mucoid, WET MOUNT done - results: negative for pathogens, normal epithelial cells    ASSESSMENT and PLAN:  1. Vaginal odor  POCT Wet Prep    metronidazole (METROGEL) 0.75 % vaginal gel     Discussed issues that can cause " odor - will try metrogel if not covered by her insurance will change to oral flagyl     Patient was counseled today on vaginitis prevention including :  a. avoiding feminine products such as deoderant soaps, body wash, bubble bath, douches, scented toilet paper, deoderant tampons or pads, feminine wipes, chronic pad use, etc.  b. avoiding other vulvovaginal irritants such as long hot baths, humidity, tight, synthetic clothing, chlorine and sitting around in wet bathing suits  c. wearing cotton underwear, avoiding thong underwear and no underwear to bed  d. taking showers instead of baths and use a hair dryer on cool setting afterwards to dry  e. wearing cotton to exercise and shower immediately after exercise and change clothes  f. using polyurethane condoms without spermicide if sexually active and symptoms are triggered by intercourse       [id number]

## 2018-07-30 ENCOUNTER — OFFICE VISIT (OUTPATIENT)
Dept: PULMONOLOGY | Facility: CLINIC | Age: 68
End: 2018-07-30
Payer: MEDICARE

## 2018-07-30 ENCOUNTER — TELEPHONE (OUTPATIENT)
Dept: PULMONOLOGY | Facility: CLINIC | Age: 68
End: 2018-07-30

## 2018-07-30 VITALS
DIASTOLIC BLOOD PRESSURE: 78 MMHG | RESPIRATION RATE: 18 BRPM | BODY MASS INDEX: 40.33 KG/M2 | SYSTOLIC BLOOD PRESSURE: 116 MMHG | WEIGHT: 219.13 LBS | HEIGHT: 62 IN | OXYGEN SATURATION: 95 % | HEART RATE: 64 BPM

## 2018-07-30 DIAGNOSIS — G47.33 OSA (OBSTRUCTIVE SLEEP APNEA): Primary | ICD-10-CM

## 2018-07-30 DIAGNOSIS — G47.00 INSOMNIA, UNSPECIFIED TYPE: ICD-10-CM

## 2018-07-30 PROCEDURE — 3078F DIAST BP <80 MM HG: CPT | Mod: CPTII,S$GLB,, | Performed by: NURSE PRACTITIONER

## 2018-07-30 PROCEDURE — 3074F SYST BP LT 130 MM HG: CPT | Mod: CPTII,S$GLB,, | Performed by: NURSE PRACTITIONER

## 2018-07-30 PROCEDURE — 99999 PR PBB SHADOW E&M-EST. PATIENT-LVL III: CPT | Mod: PBBFAC,,, | Performed by: NURSE PRACTITIONER

## 2018-07-30 PROCEDURE — 99204 OFFICE O/P NEW MOD 45 MIN: CPT | Mod: S$GLB,,, | Performed by: NURSE PRACTITIONER

## 2018-07-30 NOTE — LETTER
July 30, 2018      Edna Oliva MD  [address] [facility] [address]  [place] [address]           [facility]  [address] [place] [address]  Phone: [phone]  Fax: [phone]          Patient: Beth Souza   MR Number: [id number]   YOB: 1950   Date of Visit: 7/30/2018       Dear Dr. Edna Oliva:    Thank you for referring Beth Souza to me for evaluation. Attached you will find relevant portions of my assessment and plan of care.    If you have questions, please do not hesitate to call me. I look forward to following Beth Souza along with you.    Sincerely,    Elizabeth Lejeune, DANIELLE    Enclosure  CC:  No Recipients    If you would like to receive this communication electronically, please contact [email or web address] or [phone] to request more information on [facility] Link access.    For providers and/or their staff who would like to refer a patient to [facility], please contact us through our one-stop-shop provider referral line, [facility] , at [phone].    If you feel you have received this communication in error or would no longer like to receive these types of communications, please e-mail [email or web address]

## 2018-07-30 NOTE — PROGRESS NOTES
Subjective:      Patient ID: Beth Souza is a 68 y.o. female.    Chief Complaint: sleep evaluation    HPI    Patient presents to the office today for evaluation of sleep apnea.  Patient with snoring. Patient not having problems falling asleep, but wakes up frequently throughout the night.  Patient does not wake up feeling refreshed in the morning.  Patient with daytime hypersomnolence. She feels very sleepy but states she does not fall asleep when she lies down during the day.  [place] Sleepiness Scale score 0.  Patient has had symptoms for a few years. Comorbidities include HTN.   Also has insomnia secondary watching TV throughout the night.  She states she was ordering from [employer] â[employer] in the middle of the night.  She has recently started turning the TV  Off when she is ready to sleep.   Bedtime: 8PM -asleep from 10pm to midnight  Wake time: 8AM    STOP - BANG Questionnaire:     1. Snoring : Do you snore loudly ?    Yes    2. Tired : Do you often feel tired, fatigued, or sleepy during daytime?   Yes    3. Observed: Has anyone observed you stop breathing during your sleep?   No    4. Blood pressure : Do you have or are you being treated for high blood pressure?   Yes    5. BMI :BMI more than 35 kg/m2?   Yes    6. Age : Age over 50 yr old?   Yes    7. Neck circumference: Neck circumference greater than 40 cm?   No    8. Gender: Gender male?   No    High risk of LEN: Yes 5 - 8  Intermediate risk of LEN: Yes 3 - 4  Low risk of LEN: Yes 0 - 2      References:   STOP Questionnaire   A Tool to Screen Patients for Obstructive Sleep Apnea: [facility]., ALICJA Jones.B.B.S., Grover Carlos M.D.,Emma Conner, Ph.D., ALICJA Watkins.B.B.S.,_ John Hernandez.,_ Rajesh Eng M.D., [facility].R.C.P.C.; Anesthesiology 2008; 108:812-21 Copyright © 2008, the American Society of Anesthesiologists, Inc. [employer]    Patient Active Problem List   Diagnosis     "Hypertension    Vitamin D deficiency    Spondylolisthesis of lumbar region    Hyperparathyroidism, primary    Thyroid nodule    Peripheral polyneuropathy    Cataract    Osteoarthritis of spine with radiculopathy, lumbar region    Morbid obesity with BMI of 40.0-44.9, adult         /78   Pulse 64   Resp 18   Ht 5' 2" (1.575 m)   Wt 99.4 kg (219 lb 2.2 oz)   SpO2 95%   BMI 40.08 kg/m²   Body mass index is 40.08 kg/m².    Review of Systems      Objective:      Physical Exam  Personal Diagnostic Review    No results found for this or any previous visit.    Results for orders placed during the hospital encounter of 01/08/18   X-Ray Chest PA And Lateral    Narrative Chest two views.  Comparison 03/14/2016.    Findings: There is borderline cardiomegaly and aortic uncoiling.  Lungs are clear bilaterally.  Multilevel thoracic spondylosis.    Impression  No acute disease.      Electronically signed by: WARD ZULETA MD  Date:     01/08/18  Time:    14:58          Assessment:     1. LEN (obstructive sleep apnea)    2. Insomnia, unspecified type       Outpatient Encounter Prescriptions as of 7/30/2018   Medication Sig Dispense Refill    ergocalciferol (VITAMIN D2) 50,000 unit Cap Take 1 capsule (50,000 Units total) by mouth every 7 days. 10 capsule 0    gabapentin (NEURONTIN) 100 MG capsule Take 1 capsule (100 mg total) by mouth every evening. 30 capsule 1    lisinopril-hydrochlorothiazide (PRINZIDE,ZESTORETIC) 20-25 mg Tab Take 1 tablet by mouth once daily. 90 tablet 3     No facility-administered encounter medications on file as of 7/30/2018.      Orders Placed This Encounter   Procedures    Home Sleep Studies     Standing Status:   Future     Standing Expiration Date:   7/30/2019     Scheduling Instructions:      3 night protocol     Plan:   Sleep study to evaluate for sleep apnea.   Discussed sleep habits.   Spend no more than 8 hrs in the bed.   No TV in bedroom.       Thank you Oliva for this " consultation.

## 2018-07-30 NOTE — PATIENT INSTRUCTIONS
You are scheduled for a home sleep test. You should be notified soon about your test. If you have any questions please contact our sleep lab at [phone].   You can also go to [email or web address] for more information about your home sleep test.   The sleep lab will also make a follow up appointment with your provider to review the results of the sleep test.

## 2018-08-07 ENCOUNTER — LAB VISIT (OUTPATIENT)
Dept: LAB | Facility: HOSPITAL | Age: 68
End: 2018-08-07
Attending: INTERNAL MEDICINE
Payer: MEDICARE

## 2018-08-07 DIAGNOSIS — E21.0 HYPERPARATHYROIDISM, PRIMARY: ICD-10-CM

## 2018-08-07 LAB
ALBUMIN SERPL BCP-MCNC: 3.4 G/DL
ANION GAP SERPL CALC-SCNC: 11 MMOL/L
BUN SERPL-MCNC: 15 MG/DL
CALCIUM SERPL-MCNC: 9.5 MG/DL
CHLORIDE SERPL-SCNC: 102 MMOL/L
CO2 SERPL-SCNC: 30 MMOL/L
CREAT SERPL-MCNC: 0.8 MG/DL
EST. GFR  (AFRICAN AMERICAN): >60 ML/MIN/1.73 M^2
EST. GFR  (NON AFRICAN AMERICAN): >60 ML/MIN/1.73 M^2
GLUCOSE SERPL-MCNC: 112 MG/DL
PHOSPHATE SERPL-MCNC: 2.6 MG/DL
POTASSIUM SERPL-SCNC: 3.8 MMOL/L
PTH-INTACT SERPL-MCNC: 83 PG/ML
SODIUM SERPL-SCNC: 143 MMOL/L

## 2018-08-07 PROCEDURE — 80069 RENAL FUNCTION PANEL: CPT

## 2018-08-07 PROCEDURE — 83970 ASSAY OF PARATHORMONE: CPT

## 2018-08-07 PROCEDURE — 36415 COLL VENOUS BLD VENIPUNCTURE: CPT | Mod: PO

## 2018-08-09 ENCOUNTER — TELEPHONE (OUTPATIENT)
Dept: INTERNAL MEDICINE | Facility: CLINIC | Age: 68
End: 2018-08-09

## 2018-08-09 NOTE — TELEPHONE ENCOUNTER
Called pt with response from Dr. Oliva regarding gabapentin, no answer left message to call back office. DREW

## 2018-08-09 NOTE — TELEPHONE ENCOUNTER
Returned call to pt, advise that Dr. Oliva states that symptoms have completely resolved then she may discontinue medication but if she still has some symptoms then can take medication on an as needed bases. DREW

## 2018-08-09 NOTE — TELEPHONE ENCOUNTER
Returned call to pt, stated that she completed medication. Advised that I will send a message to Dr. Oliva informing her it the matter and call her back to let her know if she should continue or discontinue the medicine. DREW

## 2018-08-13 ENCOUNTER — TELEPHONE (OUTPATIENT)
Dept: INTERNAL MEDICINE | Facility: CLINIC | Age: 68
End: 2018-08-13

## 2018-08-13 NOTE — TELEPHONE ENCOUNTER
----- Message from Kizzy Souza sent at 8/13/2018  1:58 PM CDT -----  Contact: pt  She's calling stating that she was told to take Vitamin D3 but cannot afford to pay over the counter, wants to know if it can prescribed, please advise [phone] (home) [phone] (work)

## 2018-08-13 NOTE — TELEPHONE ENCOUNTER
Returned call to pt, advised that she can get Vitamin D3 2000 units otc from [employer] for about $6 and cheaper than the one she found at [facility]. DREW

## 2018-08-14 ENCOUNTER — CLINICAL SUPPORT (OUTPATIENT)
Dept: REHABILITATION | Facility: HOSPITAL | Age: 68
End: 2018-08-14
Payer: MEDICARE

## 2018-08-14 DIAGNOSIS — M47.26 OSTEOARTHRITIS OF SPINE WITH RADICULOPATHY, LUMBAR REGION: Primary | ICD-10-CM

## 2018-08-14 PROCEDURE — 97161 PT EVAL LOW COMPLEX 20 MIN: CPT

## 2018-08-14 PROCEDURE — 97014 ELECTRIC STIMULATION THERAPY: CPT

## 2018-08-14 PROCEDURE — G8978 MOBILITY CURRENT STATUS: HCPCS | Mod: CJ

## 2018-08-14 PROCEDURE — G8979 MOBILITY GOAL STATUS: HCPCS | Mod: CI

## 2018-08-14 PROCEDURE — 97110 THERAPEUTIC EXERCISES: CPT

## 2018-08-14 NOTE — PROGRESS NOTES
PHYSICAL THERAPY INITIAL OUTPATIENT EVALUATION    Referring Provider:  Dr. Edna Oliva    Diagnosis:       ICD-10-CM ICD-9-CM    1. Osteoarthritis of spine with radiculopathy, lumbar region M47.26 721.3        Orders:  Evaluate and Treat    Date of Initial Evaluation:  18    Orders :  18    Coding Cycle Visit # 1 of 12    SUBJECTIVE:  Patient reports pain in low back x 3 years. Insiduous onset per pt.  Pt. Reports pain going down back of legs and her legs are weak and she feels tired.  Pt. Reports that pain radiates to L LE greater than R LE with more numbness on L LE and radiates down into B feet.  Pt. Reports limited with walking long distances and standing for long periods. Pt. Reports having injections in low back but doesn't feel that they helped.  Pt. Reports little pain in low back except with stooping pain increases significantly.      Past Medical History:    Past Medical History:   Diagnosis Date    Cataract     Hyperlipidemia     Hypertension     Lumbar spondylosis     Vitamin D deficiency        Problem List:    Patient Active Problem List   Diagnosis    Hypertension    Vitamin D deficiency    Spondylolisthesis of lumbar region    Hyperparathyroidism, primary    Thyroid nodule    Peripheral polyneuropathy    Cataract    Osteoarthritis of spine with radiculopathy, lumbar region    Morbid obesity with BMI of 40.0-44.9, adult       Current Medications:    Current Outpatient Medications:     ergocalciferol (VITAMIN D2) 50,000 unit Cap, Take 1 capsule (50,000 Units total) by mouth every 7 days., Disp: 10 capsule, Rfl: 0    gabapentin (NEURONTIN) 100 MG capsule, Take 1 capsule (100 mg total) by mouth every evening., Disp: 30 capsule, Rfl: 1    lisinopril-hydrochlorothiazide (PRINZIDE,ZESTORETIC) 20-25 mg Tab, Take 1 tablet by mouth once daily., Disp: 90 tablet, Rfl: 3    OBJECTIVE:  Pain: 9.5-20/10,  described as constant    Sensation:  B LE intact      Lumbar ROM: Forward  Bending   WNL       Backwardbending  WNL      Sidebend Right  WNL      Sidebend Left   WNL     Rotation Right   WNL     Rotation Left   WNL    Hip Extension B 5 degrees from neutral         R L  Strength:  Gluteus Medius   3/5 3/5     Glut Max   1+/5 1+/5      Psoas    4+/5 4+/5     TFL    4/5 4+/5     Quadriceps   5/5 5/5     HS     5/5 5/5     Rectus Abdominis  NT     Anterior Tibialis  5/5 5/5     Gastrocnemius  4+/5 4+/5     Transverse Abdominis NT     Everters   5/5 5/5     Inverters   1+/5 1+/5    Function: MODIFIED OSWESTRY LOW BACK PAIN DISABILITY QUESTIONNAIRE    The following scores are patient-reported and range from 0-5, with 0 being least impaired and 5 being most impaired.        Eval  Section 1- Pain intensity    5/5   Section 2- Person care  N/A/5   Section 3 Lifting- Optional  4/5     Section 4  Walking  3/5  Section 5 Sitting   0/5   Section 6 Standing  3/5  Section 7 Sleeping  N/A/5   Section 8 Social Life   N/A/5  Section 9 Traveling  N/A/5   Section 10 Employ/home  4/5    Patient reports 38% disability on the Modified Oswestry Low Back Pain Disability Questionnaire.    Functional Limitations and Goal:   This patient's primary Physical Therapy goal is to improve his current level of function(Walking and moving around ) with minimal limitations. The patient's current level of impairment is 20-40% Impaired(CJ) based on their score of 38% impaired on the Oswestry Low Back Questionnaire. The Patient is expected to achieve a score of 1-20%(CI) within 10  Treatments.    Other: Repeated Motions flexion caused increase in pain. Repeated motion extension had no change.  Repeated Motions no change with Side glides.   Slump Test Bilat.- Neg.; HS 90/90 R:L 12:18 degrees; Neg. Sciatic nerve bias except L sural nerve + bias.      ASSESSMENT:  The patient is a 68 y.o. year old female who presents to physical therapy with complaints of OA of lumbar spine with radiculopathy.  Impairments include decreased B  hip extension ROM, mild to significant weakness in B lower Extremities, increase L HS tightness, and L sural nerve bias.  Impairments that limit patient include walking and standing long periods and lifting, and bending.  Pt. Shows good rehab potential.  Pt. Will benefit from skilled PT to increase lumbar and LE strength and flexibility to decrease pain.  Pt. Will begin with Mariya Extension Principle secondary Flexion syndrome.    Co-morbidities which may impact the plan of care and potentially impede the patient's progress in therapy include:  Lumbar spondylosis.      The patient's clinical presentation is stabl.  Based on patient's stable clinical presentation,  multiple co-morbidities, and examination of 2 body systems, patient presents with low complexity.    Short Term Goals:  (3-4 weeks)  1.  Patient will decrease lumbar pain to less than 6/10.  2.  Patient will increase B hip ext to WNL.  3.  Patient will increase superficial core strength to 3+/5.      Long Term Goals:  (6-8 weeks)  1.  Patient will increase deep abdominal strength to 4/5 to perform lifting activities without increase in pain.  2.  Patient will B LE strength to 5/5 to tolerate standing and walking activities.  3.  Patient will be able to bend and squat with less than 3/10 low back pain.    4.  Patient will increase to 5% impairment per Low Back Disability Questionnaire.    TREATMENT PROVIDED:    Initial evaluation completed.    Manual Therapy:  N/A    Therapeutic Exercise:  B quad stretch x 4 min., B hip ext x 4 min., bridging x 2min., prone press up x 2 min., B HS stretch x 4 min., PPT x 2 min.     Modalities: e-stim with moist heat to lumbar region x 15 min.    Self-Care: HEP, condition, activity      PLAN:  Patient will benefit from physical therapy (2) x/week for (6-8) weeks including manual therapy, therapeutic exercise, functional activities, modalities, and patient education.    Thank you for this referral.    These services are  reasonable and necessary for the conditions set forth above while under my care.

## 2018-08-16 ENCOUNTER — CLINICAL SUPPORT (OUTPATIENT)
Dept: REHABILITATION | Facility: HOSPITAL | Age: 68
End: 2018-08-16
Payer: MEDICARE

## 2018-08-16 DIAGNOSIS — M47.26 OSTEOARTHRITIS OF SPINE WITH RADICULOPATHY, LUMBAR REGION: Primary | ICD-10-CM

## 2018-08-16 PROCEDURE — 97110 THERAPEUTIC EXERCISES: CPT

## 2018-08-16 NOTE — PROGRESS NOTES
PHYSICAL THERAPY Daily Note    Referring Provider:  Dr. Edna Oliva    Diagnosis:       ICD-10-CM ICD-9-CM    1. Osteoarthritis of spine with radiculopathy, lumbar region M47.26 721.3        Orders:  Evaluate and Treat    Date of Initial Evaluation:  18    Orders :  18    Coding Cycle Visit # 2 of 12    SUBJECTIVE:    Pt. Reports feeling better since evaluation.  Pt. Reports being able to sleep better.     Past Medical History:    Past Medical History:   Diagnosis Date    Cataract     Hyperlipidemia     Hypertension     Lumbar spondylosis     Vitamin D deficiency        Problem List:    Patient Active Problem List   Diagnosis    Hypertension    Vitamin D deficiency    Spondylolisthesis of lumbar region    Hyperparathyroidism, primary    Thyroid nodule    Peripheral polyneuropathy    Cataract    Osteoarthritis of spine with radiculopathy, lumbar region    Morbid obesity with BMI of 40.0-44.9, adult       Current Medications:    Current Outpatient Medications:     ergocalciferol (VITAMIN D2) 50,000 unit Cap, Take 1 capsule (50,000 Units total) by mouth every 7 days., Disp: 10 capsule, Rfl: 0    gabapentin (NEURONTIN) 100 MG capsule, Take 1 capsule (100 mg total) by mouth every evening., Disp: 30 capsule, Rfl: 1    lisinopril-hydrochlorothiazide (PRINZIDE,ZESTORETIC) 20-25 mg Tab, Take 1 tablet by mouth once daily., Disp: 90 tablet, Rfl: 3    OBJECTIVE:  Pain: 9.5-20/10,  described as constant    ASSESSMENT:  Pt. Reports getting some cramping in her legs last night. Pt. Only has mild tenderness L5 today.  Mild  Muscle tension noted  B glut medius with moderate tenderness with STM.  Pt. Had more discomfort with extension exercises today.  Pt. Educated on decreasing amount of extension with prone press ups  And decreasing some tension with quad stretch.  Pt. Expressed more weakness with hip extension exercise.    Added further core and LE strengthening and stretching to POC.         TREATMENT PROVIDED:      Manual Therapy:  P-A  Mobilization to Lumbar 1-5 x 3 min. STM x 6 min. To B glut medius    Therapeutic Exercise:  (one on one x 24 min., supervised x 4 min.) B quad stretch x 4 min., B hip ext x 4 min., bridging x 2min., prone press up x 2 min., B HS stretch x 4 min., PPT x 2 min., hip abd x 2min., shuttle x 2min. B gastroc stretch x 4 min., iso abs with ball x 2min.    Modalities: e-stim with moist heat to lumbar region x 15 min.    Self-Care: HEP, condition, activity      PLAN:  Patient will benefit from physical therapy (2) x/week for (6-8) weeks including manual therapy, therapeutic exercise, functional activities, modalities, and patient education.    Thank you for this referral.    These services are reasonable and necessary for the conditions set forth above while under my care.

## 2018-08-21 ENCOUNTER — CLINICAL SUPPORT (OUTPATIENT)
Dept: REHABILITATION | Facility: HOSPITAL | Age: 68
End: 2018-08-21
Payer: MEDICARE

## 2018-08-21 DIAGNOSIS — M47.26 OSTEOARTHRITIS OF SPINE WITH RADICULOPATHY, LUMBAR REGION: Primary | ICD-10-CM

## 2018-08-21 PROCEDURE — 97140 MANUAL THERAPY 1/> REGIONS: CPT

## 2018-08-21 PROCEDURE — 97110 THERAPEUTIC EXERCISES: CPT

## 2018-08-21 PROCEDURE — 97014 ELECTRIC STIMULATION THERAPY: CPT

## 2018-08-21 NOTE — PROGRESS NOTES
PHYSICAL THERAPY Daily Note    Referring Provider:  Dr. Edna Oliva    Diagnosis:       ICD-10-CM ICD-9-CM    1. Osteoarthritis of spine with radiculopathy, lumbar region M47.26 721.3        Orders:  Evaluate and Treat    Date of Initial Evaluation:  18    Orders :  18    Coding Cycle Visit # 3 of 12    SUBJECTIVE:    Pt. Reports pain with walking long distance. Pt. Reports some discomfort post. Leg and lumbar region after last tx.    Past Medical History:    Past Medical History:   Diagnosis Date    Cataract     Hyperlipidemia     Hypertension     Lumbar spondylosis     Vitamin D deficiency        Problem List:    Patient Active Problem List   Diagnosis    Hypertension    Vitamin D deficiency    Spondylolisthesis of lumbar region    Hyperparathyroidism, primary    Thyroid nodule    Peripheral polyneuropathy    Cataract    Osteoarthritis of spine with radiculopathy, lumbar region    Morbid obesity with BMI of 40.0-44.9, adult       Current Medications:    Current Outpatient Medications:     ergocalciferol (VITAMIN D2) 50,000 unit Cap, Take 1 capsule (50,000 Units total) by mouth every 7 days., Disp: 10 capsule, Rfl: 0    gabapentin (NEURONTIN) 100 MG capsule, Take 1 capsule (100 mg total) by mouth every evening., Disp: 30 capsule, Rfl: 1    lisinopril-hydrochlorothiazide (PRINZIDE,ZESTORETIC) 20-25 mg Tab, Take 1 tablet by mouth once daily., Disp: 90 tablet, Rfl: 3    OBJECTIVE:  Current Pain:  0/10, some discomfort lower back and upper glut region     ASSESSMENT:    Pt. Educated on DOMS.  Pt. Had numbness in L foot with lying prone.  Pt. Neg. For L femoral nerve bias.  Pt. Had tenderness in B glut medius but greater on L glut medius. Pt. Had cramp in L lower thoracic region with bridging.  Pt. Seems to be progressing with mild decrease in pain level.  Cont. PoC.        TREATMENT PROVIDED:      Manual Therapy:  P-A  Mobilization to Lumbar 4-5 spinous processes x 4 min. STM x 8 min.  To B glut medius    Therapeutic Exercise:  (one on one x 12 min., supervised x 18 min.) B quad stretch x 4 min., B hip ext x 4 min., bridging x 2min., prone press up x 2 min., B HS stretch x 4 min., PPT x 2 min., hip abd x 2min., shuttle x 2min. B gastroc stretch x 4 min., iso abs with ball x 2min.    Modalities: e-stim with moist heat to lumbar region x 15 min.    Self-Care: HEP, condition, activity      PLAN:  Patient will benefit from physical therapy (2) x/week for (6-8) weeks including manual therapy, therapeutic exercise, functional activities, modalities, and patient education.    Thank you for this referral.    These services are reasonable and necessary for the conditions set forth above while under my care.

## 2018-08-28 ENCOUNTER — TELEPHONE (OUTPATIENT)
Dept: PULMONOLOGY | Facility: CLINIC | Age: 68
End: 2018-08-28

## 2018-08-28 ENCOUNTER — PROCEDURE VISIT (OUTPATIENT)
Dept: SLEEP MEDICINE | Facility: CLINIC | Age: 68
End: 2018-08-28
Payer: MEDICARE

## 2018-08-28 DIAGNOSIS — G47.33 OSA (OBSTRUCTIVE SLEEP APNEA): ICD-10-CM

## 2018-08-28 PROCEDURE — 95806 SLEEP STUDY UNATT&RESP EFFT: CPT | Mod: S$GLB,,, | Performed by: INTERNAL MEDICINE

## 2018-08-28 PROCEDURE — 99499 UNLISTED E&M SERVICE: CPT | Mod: S$GLB,,, | Performed by: INTERNAL MEDICINE

## 2018-08-28 NOTE — Clinical Note
Assessment and RecommendationsThree night protocol was used. Data on the 1st night was used for the reportDuration 6 hr 49 min. Excluded signals 64 min Respiratory. Lowest oxygen saturation 87%. Significant heartrate variability present. Snoring recorded above 50 decibels 91% of the time.Apnea-hypopnea index: AHI: 11.7 events per hour. Total events 80.Mild obstructive sleep apnea-hypopnea syndrome.Treatment recommendations:CPAP would be the guideline recommendation for first-line treatment for obstructive sleep apnea.Either order in lab CPAP titration or AutoPAP device.Follow-up evaluation and treatment in the sleep disorder clinic.Other modalities for treatment of obstructive sleep apnea may be explored in patients with intolerant to CPAPincluding evaluation by ear nose and throat, mandibular advancement device, Hypoglossal nerve stimulator (INSPIRE) ,nonsupine sleep positioning device. Significant weight loss is recommended to normal ranges.Close follow-up to ensure resolution of s

## 2018-08-28 NOTE — PROGRESS NOTES
Assessment and Recommendations  Three night protocol was used. Data on the 1st night was used for the report  Duration 6 hr 49 min. Excluded signals 64 min Respiratory. Lowest oxygen saturation 87%. Significant heart  rate variability present. Snoring recorded above 50 decibels 91% of the time.  Apnea-hypopnea index: AHI: 11.7 events per hour. Total events 80.  Mild obstructive sleep apnea-hypopnea syndrome.  Treatment recommendations:  CPAP would be the guideline recommendation for first-line treatment for obstructive sleep apnea.  Either order in lab CPAP titration or AutoPAP device.  Follow-up evaluation and treatment in the sleep disorder clinic.  Other modalities for treatment of obstructive sleep apnea may be explored in patients with intolerant to CPAP  including evaluation by ear nose and throat, mandibular advancement device, Hypoglossal nerve stimulator (  INSPIRE) ,nonsupine sleep positioning device. Significant weight loss is recommended to normal ranges.  Close follow-up to ensure resolution of symptoms

## 2018-08-28 NOTE — PROCEDURES
Home Sleep Studies  Date/Time: 8/28/2018 1:40 PM  Performed by: Jean Bosch MD  Authorized by: Elizabeth Lejeune, NP       Assessment and Recommendations  Three night protocol was used. Data on the 1st night was used for the report  Duration 6 hr 49 min. Excluded signals 64 min Respiratory. Lowest oxygen saturation 87%. Significant heart  rate variability present. Snoring recorded above 50 decibels 91% of the time.  Apnea-hypopnea index: AHI: 11.7 events per hour. Total events 80.  Mild obstructive sleep apnea-hypopnea syndrome.  Treatment recommendations:  CPAP would be the guideline recommendation for first-line treatment for obstructive sleep apnea.  Either order in lab CPAP titration or AutoPAP device.  Follow-up evaluation and treatment in the sleep disorder clinic.  Other modalities for treatment of obstructive sleep apnea may be explored in patients with intolerant to CPAP  including evaluation by ear nose and throat, mandibular advancement device, Hypoglossal nerve stimulator (  INSPIRE) ,nonsupine sleep positioning device. Significant weight loss is recommended to normal ranges.  Close follow-up to ensure resolution of symptoms

## 2018-08-29 ENCOUNTER — OFFICE VISIT (OUTPATIENT)
Dept: SLEEP MEDICINE | Facility: CLINIC | Age: 68
End: 2018-08-29
Payer: MEDICARE

## 2018-08-29 VITALS
WEIGHT: 217.81 LBS | RESPIRATION RATE: 19 BRPM | BODY MASS INDEX: 40.08 KG/M2 | SYSTOLIC BLOOD PRESSURE: 130 MMHG | HEIGHT: 62 IN | HEART RATE: 60 BPM | DIASTOLIC BLOOD PRESSURE: 82 MMHG | OXYGEN SATURATION: 98 %

## 2018-08-29 DIAGNOSIS — G47.33 OSA (OBSTRUCTIVE SLEEP APNEA): Primary | ICD-10-CM

## 2018-08-29 DIAGNOSIS — G47.00 INSOMNIA, UNSPECIFIED TYPE: ICD-10-CM

## 2018-08-29 PROCEDURE — 3075F SYST BP GE 130 - 139MM HG: CPT | Mod: CPTII,S$GLB,, | Performed by: NURSE PRACTITIONER

## 2018-08-29 PROCEDURE — 3079F DIAST BP 80-89 MM HG: CPT | Mod: CPTII,S$GLB,, | Performed by: NURSE PRACTITIONER

## 2018-08-29 PROCEDURE — 99214 OFFICE O/P EST MOD 30 MIN: CPT | Mod: S$GLB,,, | Performed by: NURSE PRACTITIONER

## 2018-08-29 PROCEDURE — 99999 PR PBB SHADOW E&M-EST. PATIENT-LVL III: CPT | Mod: PBBFAC,,, | Performed by: NURSE PRACTITIONER

## 2018-08-29 NOTE — PROGRESS NOTES
"Subjective:      Patient ID: Beth Souza is a 68 y.o. female.    Chief Complaint: Sleep Apnea    Patient presents to the office today for evaluation of sleep apnea.  Patient with snoring. Patient not having problems falling asleep, but wakes up frequently throughout the night.  Patient does not wake up feeling refreshed in the morning.  Patient with daytime hypersomnolence. She feels very sleepy but states she does not fall asleep when she lies down during the day.  Patient has had symptoms for a few years. Comorbidities include HTN.   Also has insomnia secondary watching TV throughout the night.  She states she was ordering from [employer] in the middle of the night.  She has recently started turning the TV  Off when she is ready to sleep.   Bedtime: 8PM -asleep from 10pm to midnight  Wake time: 8AM    She was advised on sleep stimuli and sleep scheduling. No changes since last visit.     She was approved for HST.     Patient Active Problem List:     Hypertension     Vitamin D deficiency     Spondylolisthesis of lumbar region     Hyperparathyroidism, primary     Thyroid nodule     Peripheral polyneuropathy     Cataract     Osteoarthritis of spine with radiculopathy, lumbar region     Morbid obesity with BMI of 40.0-44.9, adult     LEN (obstructive sleep apnea)            /82   Pulse 60   Resp 19   Ht 5' 2" (1.575 m)   Wt 98.8 kg (217 lb 13 oz)   SpO2 98%   BMI 39.84 kg/m²   Body mass index is 39.84 kg/m².    Review of Systems   Constitutional: Negative.    HENT: Negative.    Respiratory: Negative.    Cardiovascular: Negative.    Musculoskeletal: Negative.    Gastrointestinal: Negative.    Neurological: Negative.    Psychiatric/Behavioral: Positive for sleep disturbance.     Objective:      Physical Exam   Constitutional: She is oriented to person, place, and time. She appears well-developed and well-nourished.   HENT:   Head: Normocephalic and atraumatic.   Mouth/Throat: Oropharynx is clear and moist. "   Neck: Normal range of motion. Neck supple.   Cardiovascular: Normal rate and regular rhythm. Exam reveals no gallop.   No murmur heard.  Pulmonary/Chest: Effort normal and breath sounds normal.   Abdominal: Soft. She exhibits no mass. There is no tenderness.   Musculoskeletal: Normal range of motion. She exhibits no edema.   Neurological: She is alert and oriented to person, place, and time.   Skin: Skin is warm and dry.   Psychiatric: She has a normal mood and affect.     Personal Diagnostic Review  Home sleep test with AHI 11        Assessment:       1. LEN (obstructive sleep apnea)    2. Insomnia, unspecified type        Outpatient Encounter Medications as of 8/29/2018   Medication Sig Dispense Refill    ergocalciferol (VITAMIN D2) 50,000 unit Cap Take 1 capsule (50,000 Units total) by mouth every 7 days. 10 capsule 0    gabapentin (NEURONTIN) 100 MG capsule Take 1 capsule (100 mg total) by mouth every evening. 30 capsule 1    lisinopril-hydrochlorothiazide (PRINZIDE,ZESTORETIC) 20-25 mg Tab Take 1 tablet by mouth once daily. 90 tablet 3     No facility-administered encounter medications on file as of 8/29/2018.      Orders Placed This Encounter   Procedures    CPAP FOR HOME USE     Order Specific Question:   Type:     Answer:   Auto CPAP     Order Specific Question:   Auto CPAP pressure setting range (cmH20):     Answer:   4-16     Order Specific Question:   Length of need (1-99 months):     Answer:   99     Order Specific Question:   Humidification:     Answer:   Heated     Order Specific Question:   Type of mask:     Answer:   Nasal     Order Specific Question:   Headgear?     Answer:   Yes     Order Specific Question:   Tubing?     Answer:   Yes     Order Specific Question:   Humidifier chamber?     Answer:   Yes     Order Specific Question:   Chin strap?     Answer:   Yes     Order Specific Question:   Filters?     Answer:   Yes     Plan:      Discussed only 8hrs in bedroom and stimulus control.  AutoPAP 4-16 cm H2O and follow up in 8 weeks with download of data card and review of symptoms.

## 2018-08-29 NOTE — PROGRESS NOTES
Subjective:      Patient ID: Beth Souza is a 68 y.o. female.    Chief Complaint: No chief complaint on file.    HPI    Patient presents to the office today for evaluation of sleep apnea.  Patient with snoring and witnessed apneas. Patient not having problems falling asleep, but wakes up frequently throughout the night.  Patient does not wake up feeling refreshed in the morning.  Patient with daytime hypersomnolence.  H[place] Sleepiness Scale score ***.  Patient has had symptoms for ***. Comorbidities include ***  Bedtime: ***PM  Wake time: ***AM    STOP - BANG Questionnaire:     1. Snoring : Do you snore loudly ?    {YES:37317}    2. Tired : Do you often feel tired, fatigued, or sleepy during daytime?   {YES:41266}    3. Observed: Has anyone observed you stop breathing during your sleep?   {YES:04742}    4. Blood pressure : Do you have or are you being treated for high blood pressure?   {YES:94512}    5. BMI :BMI more than 35 kg/m2?   {YES:47145}    6. Age : Age over 50 yr old?   {YES:07137}    7. Neck circumference: Neck circumference greater than 40 cm?   {YES:81783}    8. Gender: Gender male?   {YES:54000}    High risk of LEN: Yes 5 - 8  Intermediate risk of LEN: Yes 3 - 4  Low risk of LEN: Yes 0 - 2      References:   STOP Questionnaire   A Tool to Screen Patients for Obstructive Sleep Apnea: JUJU Deal.C.P.C., ALICJA Jones.B.B.S., Grover Carlos M.D.,Emma Conner, Ph.D., ALICJA Watkins.B.B.S.,_ John Hernandez.,_ Rajesh Eng M.D., Anjum Young F.R.C.P.C.; Anesthesiology 2008; 108:812-21 Copyright © 2008, the American Society of Anesthesiologists, Inc. [employer]    Patient Active Problem List   Diagnosis    Hypertension    Vitamin D deficiency    Spondylolisthesis of lumbar region    Hyperparathyroidism, primary    Thyroid nodule    Peripheral polyneuropathy    Cataract    Osteoarthritis of spine with radiculopathy, lumbar region     Morbid obesity with BMI of 40.0-44.9, adult    LEN (obstructive sleep apnea)         There were no vitals taken for this visit.  There is no height or weight on file to calculate BMI.    Review of Systems      Objective:      Physical Exam  Personal Diagnostic Review    No results found for this or any previous visit.    Results for orders placed during the hospital encounter of 01/08/18   X-Ray Chest PA And Lateral    Narrative Chest two views.  Comparison 03/14/2016.    Findings: There is borderline cardiomegaly and aortic uncoiling.  Lungs are clear bilaterally.  Multilevel thoracic spondylosis.    Impression  No acute disease.      Electronically signed by: WARD ZULETA MD  Date:     01/08/18  Time:    14:58          Assessment:     No diagnosis found.   Outpatient Encounter Medications as of 8/29/2018   Medication Sig Dispense Refill    ergocalciferol (VITAMIN D2) 50,000 unit Cap Take 1 capsule (50,000 Units total) by mouth every 7 days. 10 capsule 0    gabapentin (NEURONTIN) 100 MG capsule Take 1 capsule (100 mg total) by mouth every evening. 30 capsule 1    lisinopril-hydrochlorothiazide (PRINZIDE,ZESTORETIC) 20-25 mg Tab Take 1 tablet by mouth once daily. 90 tablet 3     No facility-administered encounter medications on file as of 8/29/2018.      No orders of the defined types were placed in this encounter.    Plan:     ***

## 2018-08-29 NOTE — PATIENT INSTRUCTIONS
"Stimulus control -- Stimulus control therapy is based on the idea that some people with insomnia have learned to associate the bedroom with staying awake rather than sleeping.  ?You should spend no more than 20 minutes lying in bed trying to fall asleep.  ?If you cannot fall asleep within 20 minutes, get up, go to another room and read or find another relaxing activity until you feel sleepy again. Activities such as eating, balancing your checkbook, doing housework, watching TV, or studying for a test, which "reward" you for staying awake, should be avoided.  ?When you start to feel sleepy, you can return to bed. If you cannot fall asleep in another 20 minutes, repeat the process.  ?Set an alarm clock and get up at the same time every day, including weekends.  ?Do not take a nap during the day.  You may not sleep much on the first night. However, sleep is more likely on succeeding nights because sleepiness is increased and naps are not allowed.      "

## 2018-09-04 ENCOUNTER — CLINICAL SUPPORT (OUTPATIENT)
Dept: REHABILITATION | Facility: HOSPITAL | Age: 68
End: 2018-09-04
Payer: MEDICARE

## 2018-09-04 DIAGNOSIS — M47.26 OSTEOARTHRITIS OF SPINE WITH RADICULOPATHY, LUMBAR REGION: Primary | ICD-10-CM

## 2018-09-04 PROCEDURE — 97140 MANUAL THERAPY 1/> REGIONS: CPT

## 2018-09-04 PROCEDURE — 97110 THERAPEUTIC EXERCISES: CPT

## 2018-09-04 PROCEDURE — 97014 ELECTRIC STIMULATION THERAPY: CPT

## 2018-09-04 NOTE — PROGRESS NOTES
PHYSICAL THERAPY Daily Note    Referring Provider:  Dr. Edna Oliva    Diagnosis:       ICD-10-CM ICD-9-CM    1. Osteoarthritis of spine with radiculopathy, lumbar region M47.26 721.3        Orders:  Evaluate and Treat    Date of Initial Evaluation:  18    Orders :  18    Coding Cycle Visit # 4 of 12    SUBJECTIVE:   Pt. Reports no increase in pain and no positions or activities are bothering her.  Pt. Reports numbness/ tingling pins in L lateral leg.      Past Medical History:    Past Medical History:   Diagnosis Date    Cataract     Hyperlipidemia     Hypertension     Lumbar spondylosis     Vitamin D deficiency        Problem List:    Patient Active Problem List   Diagnosis    Hypertension    Vitamin D deficiency    Spondylolisthesis of lumbar region    Hyperparathyroidism, primary    Thyroid nodule    Peripheral polyneuropathy    Cataract    Osteoarthritis of spine with radiculopathy, lumbar region    Morbid obesity with BMI of 40.0-44.9, adult    LEN (obstructive sleep apnea)       Current Medications:    Current Outpatient Medications:     ergocalciferol (VITAMIN D2) 50,000 unit Cap, Take 1 capsule (50,000 Units total) by mouth every 7 days., Disp: 10 capsule, Rfl: 0    gabapentin (NEURONTIN) 100 MG capsule, Take 1 capsule (100 mg total) by mouth every evening., Disp: 30 capsule, Rfl: 1    lisinopril-hydrochlorothiazide (PRINZIDE,ZESTORETIC) 20-25 mg Tab, Take 1 tablet by mouth once daily., Disp: 90 tablet, Rfl: 3    OBJECTIVE:  Current Pain:  0/10, some discomfort lower back and upper glut region     ASSESSMENT:    Pt. Tender to L 3-5 spinous processes and sacrum.  Pt. Noted to have cont. B hip ext to neutral.  Pt. Also noted to have B IT band tightness.  Pt. Educated on IT band stretches and continuing piriformis stretch.  Pt. Had cramping in hip flexors with quad stretch.  Pt. Admits to not being faithful with HEP.  Pt. Has tenderness B glut medius and discomfort with B QL  stretch.  No leg length discrepancy noted.  Cont. POC.        TREATMENT PROVIDED:      Manual Therapy:  PA mob L 3-5 x 3 min., STM B IT band x 6 min., B QL stretch x 4 min.    Therapeutic Exercise:  (one on one x 12 min., supervised x 23 min.) B quad stretch x 4 min., B hip ext x 4 min., bridging x 2min., B HS stretch x 4 min., PPT x 2 min., hip abd x 2min., shuttle x 3min. B gastroc stretch x 4 min., iso abs with ball x 2min., B IT band stretch x 4 min. , B piriformis stretch x 4 min.    Modalities: e-stim with moist heat to lumbar region x 15 min.    Self-Care: HEP, condition, activity      PLAN:  Patient will benefit from physical therapy (2) x/week for (6-8) weeks including manual therapy, therapeutic exercise, functional activities, modalities, and patient education.    Thank you for this referral.    These services are reasonable and necessary for the conditions set forth above while under my care.

## 2018-09-07 DIAGNOSIS — E21.3 HYPERPARATHYROIDISM: Primary | ICD-10-CM

## 2018-09-08 ENCOUNTER — LAB VISIT (OUTPATIENT)
Dept: LAB | Facility: HOSPITAL | Age: 68
End: 2018-09-08
Attending: INTERNAL MEDICINE
Payer: MEDICARE

## 2018-09-08 DIAGNOSIS — E21.3 HYPERPARATHYROIDISM: ICD-10-CM

## 2018-09-08 LAB
ALBUMIN SERPL BCP-MCNC: 3.5 G/DL
ANION GAP SERPL CALC-SCNC: 9 MMOL/L
BASOPHILS # BLD AUTO: 0.04 K/UL
BASOPHILS NFR BLD: 0.8 %
BUN SERPL-MCNC: 12 MG/DL
CALCIUM SERPL-MCNC: 9.8 MG/DL
CHLORIDE SERPL-SCNC: 102 MMOL/L
CO2 SERPL-SCNC: 31 MMOL/L
CREAT SERPL-MCNC: 0.8 MG/DL
DIFFERENTIAL METHOD: ABNORMAL
EOSINOPHIL # BLD AUTO: 0.1 K/UL
EOSINOPHIL NFR BLD: 2.4 %
ERYTHROCYTE [DISTWIDTH] IN BLOOD BY AUTOMATED COUNT: 14.8 %
EST. GFR  (AFRICAN AMERICAN): >60 ML/MIN/1.73 M^2
EST. GFR  (NON AFRICAN AMERICAN): >60 ML/MIN/1.73 M^2
GLUCOSE SERPL-MCNC: 84 MG/DL
HCT VFR BLD AUTO: 43.2 %
HGB BLD-MCNC: 13.6 G/DL
IMM GRANULOCYTES # BLD AUTO: 0.02 K/UL
IMM GRANULOCYTES NFR BLD AUTO: 0.4 %
LYMPHOCYTES # BLD AUTO: 1.7 K/UL
LYMPHOCYTES NFR BLD: 33.6 %
MCH RBC QN AUTO: 29.4 PG
MCHC RBC AUTO-ENTMCNC: 31.5 G/DL
MCV RBC AUTO: 94 FL
MONOCYTES # BLD AUTO: 0.5 K/UL
MONOCYTES NFR BLD: 10.7 %
NEUTROPHILS # BLD AUTO: 2.6 K/UL
NEUTROPHILS NFR BLD: 52.1 %
NRBC BLD-RTO: 0 /100 WBC
PHOSPHATE SERPL-MCNC: 3.8 MG/DL
PLATELET # BLD AUTO: 316 K/UL
PMV BLD AUTO: 10 FL
POTASSIUM SERPL-SCNC: 4.3 MMOL/L
RBC # BLD AUTO: 4.62 M/UL
SODIUM SERPL-SCNC: 142 MMOL/L
WBC # BLD AUTO: 4.94 K/UL

## 2018-09-08 PROCEDURE — 80069 RENAL FUNCTION PANEL: CPT

## 2018-09-08 PROCEDURE — 85025 COMPLETE CBC W/AUTO DIFF WBC: CPT

## 2018-09-08 PROCEDURE — 36415 COLL VENOUS BLD VENIPUNCTURE: CPT | Mod: PO

## 2018-09-10 ENCOUNTER — TELEPHONE (OUTPATIENT)
Dept: INTERNAL MEDICINE | Facility: CLINIC | Age: 68
End: 2018-09-10

## 2018-09-10 NOTE — TELEPHONE ENCOUNTER
----- Message from Jr Bhardwaj sent at 9/10/2018  1:28 PM CDT -----  Contact: pt  She's calling in regards to being worked into the schedule one day this week, pls advise, [phone] (home)

## 2018-09-13 ENCOUNTER — TELEPHONE (OUTPATIENT)
Dept: PULMONOLOGY | Facility: CLINIC | Age: 68
End: 2018-09-13

## 2018-09-13 NOTE — TELEPHONE ENCOUNTER
----- Message from Marixa Howell sent at 9/13/2018 10:42 AM CDT -----  Contact: self/[phone]  Would like to consult with nurse regarding a sleep mask, please call back at [phone]. Thanks/ar

## 2018-09-15 ENCOUNTER — OFFICE VISIT (OUTPATIENT)
Dept: NEPHROLOGY | Facility: CLINIC | Age: 68
End: 2018-09-15
Payer: MEDICARE

## 2018-09-15 VITALS
HEIGHT: 62 IN | SYSTOLIC BLOOD PRESSURE: 126 MMHG | HEART RATE: 64 BPM | BODY MASS INDEX: 40.57 KG/M2 | DIASTOLIC BLOOD PRESSURE: 90 MMHG | WEIGHT: 220.44 LBS

## 2018-09-15 DIAGNOSIS — E83.52 HYPERCALCEMIA: ICD-10-CM

## 2018-09-15 DIAGNOSIS — E21.0 HYPERPARATHYROIDISM, PRIMARY: Primary | ICD-10-CM

## 2018-09-15 PROCEDURE — 3074F SYST BP LT 130 MM HG: CPT | Mod: CPTII,,, | Performed by: INTERNAL MEDICINE

## 2018-09-15 PROCEDURE — 99999 PR PBB SHADOW E&M-EST. PATIENT-LVL III: CPT | Mod: PBBFAC,,, | Performed by: INTERNAL MEDICINE

## 2018-09-15 PROCEDURE — 99213 OFFICE O/P EST LOW 20 MIN: CPT | Mod: S$PBB,,, | Performed by: INTERNAL MEDICINE

## 2018-09-15 PROCEDURE — 1101F PT FALLS ASSESS-DOCD LE1/YR: CPT | Mod: CPTII,,, | Performed by: INTERNAL MEDICINE

## 2018-09-15 PROCEDURE — 99213 OFFICE O/P EST LOW 20 MIN: CPT | Mod: PBBFAC,PO | Performed by: INTERNAL MEDICINE

## 2018-09-15 PROCEDURE — 3080F DIAST BP >= 90 MM HG: CPT | Mod: CPTII,,, | Performed by: INTERNAL MEDICINE

## 2018-09-15 NOTE — PROGRESS NOTES
PROGRESS NOTE FOR ESTABLISHED PATIENT    PHYSICIAN REQUESTING THE CONSULT: Dr. Edna Oliva    REASON FOR VISIT: Hyperparathyroidism    68 y.o. female with history of vitamin D deficiency, HTN, hyperlipidemia, neuropathy, bilateral carotid artery disease  presents to the renal clinic for evaluation of hyperparathyroidism.     Patient presents without complaints today except for minor LE edema.             Past Medical History:   Diagnosis Date    Cataract     Hyperlipidemia     Hypertension     Lumbar spondylosis     Vitamin D deficiency        Past Surgical History:   Procedure Laterality Date    PARTIAL HYSTERECTOMY         Review of patient's allergies indicates:   Allergen Reactions    Aspirin      Bruising    Sulfa (sulfonamide antibiotics)        Current Outpatient Medications   Medication Sig Dispense Refill    ergocalciferol (VITAMIN D2) 50,000 unit Cap Take 1 capsule (50,000 Units total) by mouth every 7 days. 10 capsule 0    gabapentin (NEURONTIN) 100 MG capsule Take 1 capsule (100 mg total) by mouth every evening. 30 capsule 1    lisinopril-hydrochlorothiazide (PRINZIDE,ZESTORETIC) 20-25 mg Tab Take 1 tablet by mouth once daily. 90 tablet 3     No current facility-administered medications for this visit.        Family History   Problem Relation Age of Onset    Hypertension Mother     Diabetes Daughter     Melanoma Neg Hx     Psoriasis Neg Hx     Lupus Neg Hx     Eczema Neg Hx        Social History     Socioeconomic History    Marital status:      Spouse name: Not on file    Number of children: 3    Years of education: Not on file    Highest education level: Not on file   Social Needs    Financial resource strain: Not on file    Food insecurity - worry: Not on file    Food insecurity - inability: Not on file    Transportation needs - medical: Not on file    Transportation needs - non-medical: Not on file   Occupational History    Occupation: Cleaning houses     Employer:  "not employed   Tobacco Use    Smoking status: Former Smoker     Last attempt to quit: 2004     Years since quittin.7    Smokeless tobacco: Never Used   Substance and Sexual Activity    Alcohol use: Yes     Comment: occasionallty    Drug use: No    Sexual activity: Yes     Partners: Male   Other Topics Concern    Are you pregnant or think you may be? No    Breast-feeding No   Social History Narrative    Not on file       Review of Systems:  1. GENERAL: patient denies any fever, weight changes, generalized weakness, dizziness.  2. HEENT: patient denies headaches, no visual disturbances, swallowing problems, sinus pain, nasal congestion.  3. CARDIOVASCULAR: patient denies chest pain, palpitations.  4. PULMONARY: patient denies SOB, coughing, hemoptysis, wheezing.  5. GASTROINTESTINAL: patient denies abdominal pain/cramping, nausea, vomiting, diarrhea, no constipation  6. GENITOURINARY: patient denies dysuria, hematuria, hesitancy, frequency.  7. EXTREMITIES: patient reports mild LE edema, no LE cramping.  8. DERMATOLOGY: patient denies rashes, ulcers.  9. NEURO: patient denies tremors, extremity weakness  10. MUSCULOSKELETAL: patient denies any joint pain  11. HEMATOLOGY: patient denies rectal or gum bleeding.  12: PSYCH: patient denies anxiety, depression.      PHYSICAL EXAM:  BP (!) 126/90   Pulse 64   Ht 5' 2" (1.575 m)   Wt 100 kg (220 lb 7.4 oz)   BMI 40.32 kg/m²     GENERAL: Pleasant overweight lady presents to clinic with non-labored breathing.  HEENT: PER, no nasal discharge, no icterus, no oral exudates, moist mucosal membranes.  NECK: no thyroid mass, no lymphadenopathy.  HEART: RRR S1/S2, no rubs, good peripheral pulses.  LUNGS: CTA bilaterally, no wheezing, breathing is nonlabored.  ABDOMEN: soft, nontender, not distended, bowel sounds are present, no abdominal hernia.  EXTREM: Trace LE edema.  SKIN: no rashes, skin is warm and dry.  NEURO: A & O x 3, no obvious focal " signs.    LABORATORY RESULTS:    Lab Results   Component Value Date    CREATININE 0.8 09/08/2018    BUN 12 09/08/2018     09/08/2018    K 4.3 09/08/2018     09/08/2018    CO2 31 (H) 09/08/2018      Lab Results   Component Value Date    PTH 83.0 (H) 08/07/2018    CALCIUM 9.8 09/08/2018    PHOS 3.8 09/08/2018     Lab Results   Component Value Date    ALBUMIN 3.5 09/08/2018     Lab Results   Component Value Date    WBC 4.94 09/08/2018    HGB 13.6 09/08/2018    HCT 43.2 09/08/2018    MCV 94 09/08/2018     09/08/2018     25-OH vitamin D: 26 (5/15/18)    ASSESSMENT AND PLAN:  68 y.o. female with history of vitamin D deficiency, HTN, hyperlipidemia, neuropathy, bilateral carotid artery disease  presents to the renal clinic for evaluation of hyperparathyroidism    1. Hyperparathyroidism: Patient likely suffers from mild primary hyperparathyroidism. Last PTH was 83 (down from 101). Continue Vitamin D. She will return in 6 months for follow up.     2. Electrolytes: Calcium level at goal at present (9.8).     3. Acid base status: No acute issues.    4. Volume: Euvolemic.     5. Hypertension: Good BP control.     6. Medications: Reviewed. Agree with current medical regimen. Continue vitamin D for now.

## 2018-09-19 ENCOUNTER — TELEPHONE (OUTPATIENT)
Dept: INTERNAL MEDICINE | Facility: CLINIC | Age: 68
End: 2018-09-19

## 2018-09-19 ENCOUNTER — OFFICE VISIT (OUTPATIENT)
Dept: INTERNAL MEDICINE | Facility: CLINIC | Age: 68
End: 2018-09-19
Payer: MEDICARE

## 2018-09-19 VITALS
BODY MASS INDEX: 40.37 KG/M2 | TEMPERATURE: 99 F | SYSTOLIC BLOOD PRESSURE: 118 MMHG | OXYGEN SATURATION: 99 % | WEIGHT: 219.38 LBS | DIASTOLIC BLOOD PRESSURE: 76 MMHG | HEART RATE: 50 BPM | HEIGHT: 62 IN

## 2018-09-19 DIAGNOSIS — E04.1 THYROID NODULE: ICD-10-CM

## 2018-09-19 DIAGNOSIS — I10 ESSENTIAL HYPERTENSION: ICD-10-CM

## 2018-09-19 DIAGNOSIS — Z12.39 SCREENING FOR BREAST CANCER: Primary | ICD-10-CM

## 2018-09-19 DIAGNOSIS — B00.1 FEVER BLISTER: ICD-10-CM

## 2018-09-19 DIAGNOSIS — Z12.31 ENCOUNTER FOR SCREENING MAMMOGRAM FOR MALIGNANT NEOPLASM OF BREAST: ICD-10-CM

## 2018-09-19 DIAGNOSIS — E66.01 MORBID OBESITY WITH BMI OF 40.0-44.9, ADULT: ICD-10-CM

## 2018-09-19 DIAGNOSIS — E21.0 HYPERPARATHYROIDISM, PRIMARY: ICD-10-CM

## 2018-09-19 DIAGNOSIS — G47.33 OSA (OBSTRUCTIVE SLEEP APNEA): ICD-10-CM

## 2018-09-19 DIAGNOSIS — M43.16 SPONDYLOLISTHESIS OF LUMBAR REGION: ICD-10-CM

## 2018-09-19 DIAGNOSIS — Z00.00 ROUTINE GENERAL MEDICAL EXAMINATION AT A HEALTH CARE FACILITY: Primary | ICD-10-CM

## 2018-09-19 DIAGNOSIS — E55.9 VITAMIN D DEFICIENCY: ICD-10-CM

## 2018-09-19 PROCEDURE — 99214 OFFICE O/P EST MOD 30 MIN: CPT | Mod: S$PBB,,, | Performed by: FAMILY MEDICINE

## 2018-09-19 PROCEDURE — 99214 OFFICE O/P EST MOD 30 MIN: CPT | Mod: PBBFAC,PO | Performed by: FAMILY MEDICINE

## 2018-09-19 PROCEDURE — 99999 PR PBB SHADOW E&M-EST. PATIENT-LVL IV: CPT | Mod: PBBFAC,,, | Performed by: FAMILY MEDICINE

## 2018-09-19 PROCEDURE — 3078F DIAST BP <80 MM HG: CPT | Mod: CPTII,,, | Performed by: FAMILY MEDICINE

## 2018-09-19 PROCEDURE — 3074F SYST BP LT 130 MM HG: CPT | Mod: CPTII,,, | Performed by: FAMILY MEDICINE

## 2018-09-19 RX ORDER — LISINOPRIL AND HYDROCHLOROTHIAZIDE 20; 25 MG/1; MG/1
1 TABLET ORAL DAILY
Qty: 90 TABLET | Refills: 3 | Status: SHIPPED | OUTPATIENT
Start: 2018-09-19 | End: 2019-08-19 | Stop reason: SDUPTHER

## 2018-09-19 RX ORDER — ACYCLOVIR 50 MG/G
CREAM TOPICAL 3 TIMES DAILY
Qty: 1 TUBE | Refills: 0 | Status: SHIPPED | OUTPATIENT
Start: 2018-09-19 | End: 2019-01-25

## 2018-09-19 NOTE — PROGRESS NOTES
"Subjective:       Patient ID: Beth Souza is a 68 y.o. female.    Chief Complaint: Follow-up     68-year-old  female patient with Patient Active Problem List:     Hypertension     Vitamin D deficiency     Spondylolisthesis of lumbar region     Hyperparathyroidism, primary     Thyroid nodule     Peripheral polyneuropathy     Cataract     Osteoarthritis of spine with radiculopathy, lumbar region     Morbid obesity with BMI of 40.0-44.9, adult     LEN (obstructive sleep apnea)  Here for routine annual physicals.  Patient reports that she has been taking home blood pressure medication regularly and requesting refill today.  Continues to have significant low back pain radiating to bilateral lower legs and has tried physical therapy in the past but would like to consider aquatic therapy which has been helpful in the past, reports back pain as 8 to 10/10.   Patient denies any recent falls  Currently taking vitamin-D over-the-counter 2000 units daily  Patient has been given CPAP machine recently and will start using from tonight for sleep apnea  Reports that she has been having fever blisters to left side of the mouth and would like to get checked to see if there is any infection  Would like to get scheduled for mammogram      Review of Systems   Constitutional: Negative for fatigue and fever.   Eyes: Negative for visual disturbance.   Respiratory: Negative for shortness of breath.    Cardiovascular: Negative for chest pain and leg swelling.   Gastrointestinal: Negative for abdominal pain, nausea and vomiting.   Musculoskeletal: Positive for back pain, gait problem and myalgias.   Skin: Negative for rash.   Neurological: Positive for numbness. Negative for weakness, light-headedness and headaches.   Psychiatric/Behavioral: Negative for sleep disturbance.         /76 (BP Location: Left arm, Patient Position: Sitting)   Pulse (!) 50   Temp 98.5 °F (36.9 °C) (Oral)   Ht 5' 2" (1.575 m)   Wt " 99.5 kg (219 lb 5.7 oz)   SpO2 99%   BMI 40.12 kg/m²   Objective:      Physical Exam   Constitutional: She is oriented to person, place, and time. She appears well-developed and well-nourished.   HENT:   Head: Normocephalic and atraumatic.   Right Ear: External ear normal.   Left Ear: External ear normal.   Mouth/Throat: Oropharynx is clear and moist.   Neck: Neck supple.   Cardiovascular: Normal rate, regular rhythm and normal heart sounds.   No murmur heard.  Pulmonary/Chest: Effort normal and breath sounds normal. She has no wheezes.   Abdominal: Soft. Bowel sounds are normal. There is no tenderness.   Musculoskeletal: She exhibits tenderness. She exhibits no edema.   Positive for paraspinal lumbar muscle tenderness bilaterally with straight leg raise test negative bilaterally   Lymphadenopathy:     She has no cervical adenopathy.   Neurological: She is alert and oriented to person, place, and time.   Skin: Skin is warm and dry. No rash noted.   Noted fever blisters at the tip of the left side of the mouth   Psychiatric: She has a normal mood and affect.       Lab Visit on 09/08/2018   Component Date Value Ref Range Status    WBC 09/08/2018 4.94  3.90 - 12.70 K/uL Final    RBC 09/08/2018 4.62  4.00 - 5.40 M/uL Final    Hemoglobin 09/08/2018 13.6  12.0 - 16.0 g/dL Final    Hematocrit 09/08/2018 43.2  37.0 - 48.5 % Final    MCV 09/08/2018 94  82 - 98 fL Final    MCH 09/08/2018 29.4  27.0 - 31.0 pg Final    MCHC 09/08/2018 31.5* 32.0 - 36.0 g/dL Final    RDW 09/08/2018 14.8* 11.5 - 14.5 % Final    Platelets 09/08/2018 316  150 - 350 K/uL Final    MPV 09/08/2018 10.0  9.2 - 12.9 fL Final    Immature Granulocytes 09/08/2018 0.4  0.0 - 0.5 % Final    Gran # (ANC) 09/08/2018 2.6  1.8 - 7.7 K/uL Final    Immature Grans (Abs) 09/08/2018 0.02  0.00 - 0.04 K/uL Final    Comment: Mild elevation in immature granulocytes is non specific and   can be seen in a variety of conditions including stress response,    acute inflammation, trauma and pregnancy. Correlation with other   laboratory and clinical findings is essential.      Lymph # 09/08/2018 1.7  1.0 - 4.8 K/uL Final    Mono # 09/08/2018 0.5  0.3 - 1.0 K/uL Final    Eos # 09/08/2018 0.1  0.0 - 0.5 K/uL Final    Baso # 09/08/2018 0.04  0.00 - 0.20 K/uL Final    nRBC 09/08/2018 0  0 /100 WBC Final    Gran% 09/08/2018 52.1  38.0 - 73.0 % Final    Lymph% 09/08/2018 33.6  18.0 - 48.0 % Final    Mono% 09/08/2018 10.7  4.0 - 15.0 % Final    Eosinophil% 09/08/2018 2.4  0.0 - 8.0 % Final    Basophil% 09/08/2018 0.8  0.0 - 1.9 % Final    Differential Method 09/08/2018 Automated   Final    Glucose 09/08/2018 84  70 - 110 mg/dL Final    Sodium 09/08/2018 142  136 - 145 mmol/L Final    Potassium 09/08/2018 4.3  3.5 - 5.1 mmol/L Final    Chloride 09/08/2018 102  95 - 110 mmol/L Final    CO2 09/08/2018 31* 23 - 29 mmol/L Final    BUN, Bld 09/08/2018 12  8 - 23 mg/dL Final    Calcium 09/08/2018 9.8  8.7 - 10.5 mg/dL Final    Creatinine 09/08/2018 0.8  0.5 - 1.4 mg/dL Final    Albumin 09/08/2018 3.5  3.5 - 5.2 g/dL Final    Phosphorus 09/08/2018 3.8  2.7 - 4.5 mg/dL Final    eGFR if African American 09/08/2018 >60.0  >60 mL/min/1.73 m^2 Final    eGFR if non African American 09/08/2018 >60.0  >60 mL/min/1.73 m^2 Final    Comment: Calculation used to obtain the estimated glomerular filtration  rate (eGFR) is the CKD-EPI equation.       Anion Gap 09/08/2018 9  8 - 16 mmol/L Final   Lab Visit on 09/08/2018   Component Date Value Ref Range Status    Protein, Urine Random 09/08/2018 8  0 - 15 mg/dL Final    Comment: The random urine reference ranges provided were established   for 24 hour urine collections.  No reference ranges exist for  random urine specimens.  Correlate clinically.      Creatinine, Random Ur 09/08/2018 121.0  15.0 - 325.0 mg/dL Final    Comment: The random urine reference ranges provided were established   for 24 hour urine collections.  No  reference ranges exist for  random urine specimens.  Correlate clinically.      Prot/Creat Ratio, Ur 09/08/2018 0.07  0.00 - 0.20 Final    Specimen UA 09/08/2018 Urine, Clean Catch   Final    Color, UA 09/08/2018 Yellow  Yellow, Straw, Ave Final    Appearance, UA 09/08/2018 Clear  Clear Final    pH, UA 09/08/2018 8.0  5.0 - 8.0 Final    Specific Gravity, UA 09/08/2018 1.015  1.005 - 1.030 Final    Protein, UA 09/08/2018 Negative  Negative Final    Comment: Recommend a 24 hour urine protein or a urine   protein/creatinine ratio if globulin induced proteinuria is  clinically suspected.      Glucose, UA 09/08/2018 Negative  Negative Final    Ketones, UA 09/08/2018 Negative  Negative Final    Bilirubin (UA) 09/08/2018 Negative  Negative Final    Occult Blood UA 09/08/2018 Negative  Negative Final    Nitrite, UA 09/08/2018 Negative  Negative Final    Leukocytes, UA 09/08/2018 Negative  Negative Final       Assessment:       1. Routine general medical examination at a health care facility    2. Essential hypertension    3. Vitamin D deficiency    4. Spondylolisthesis of lumbar region    5. Hyperparathyroidism, primary    6. Thyroid nodule    7. Morbid obesity with BMI of 40.0-44.9, adult    8. LEN (obstructive sleep apnea)    9. Encounter for screening mammogram for malignant neoplasm of breast    10. Fever blister        Plan:   Routine general medical examination at a health care facility  -     Lipid panel; Future; Expected date: 09/19/2018  -     TSH; Future; Expected date: 09/19/2018  Vital signs stable today.  Clinical exam stable  Reviewed recent labs which looks stable but will check fasting lipid panel  Encouraged to work on lifestyle modifications with diet and exercise  Patient due for mammogram and will be scheduled  Up-to-date on screenings otherwise    Essential hypertension  -     Lipid panel; Future; Expected date: 09/19/2018  -     TSH; Future; Expected date: 09/19/2018  -      lisinopril-hydrochlorothiazide (PRINZIDE,ZESTORETIC) 20-25 mg Tab; Take 1 tablet by mouth once daily.  Dispense: 90 tablet; Refill: 3  Blood pressure is stable today currently on lisinopril hydrochlorothiazide 20/25 mg, refill given today    Vitamin D deficiency-currently taking over-the-counter vitamin D3 2000 units daily    Spondylolisthesis of lumbar region  -     Ambulatory referral to Pain Clinic  -     Ambulatory Referral to Physical/Occupational Therapy  Will start with aquatic therapy at [facility]  and will refer to Pain Management secondary to degenerative lumbar spine with stenosis for further evaluation    Hyperparathyroidism, primary  Thyroid nodule    Morbid obesity with BMI of 40.0-44.9, adult- Lifestyle modifications recommended to lose weight with BMI 40 with diet and exercise    LEN (obstructive sleep apnea)-will start using CPAP today    Encounter for screening mammogram for malignant neoplasm of breast-will schedule mammogram    Fever blister  -     acyclovir 5% (ZOVIRAX) 5 % Crea; Apply topically 3 (three) times daily. Apply to fever blisters  Dispense: 1 Tube; Refill: 0  Zovirax to be prescribed today  Encouraged to avoid stress

## 2018-09-20 ENCOUNTER — OFFICE VISIT (OUTPATIENT)
Dept: OBSTETRICS AND GYNECOLOGY | Facility: CLINIC | Age: 68
End: 2018-09-20
Payer: MEDICARE

## 2018-09-20 VITALS
HEIGHT: 62 IN | WEIGHT: 219.56 LBS | BODY MASS INDEX: 40.4 KG/M2 | SYSTOLIC BLOOD PRESSURE: 126 MMHG | DIASTOLIC BLOOD PRESSURE: 82 MMHG

## 2018-09-20 DIAGNOSIS — Z00.00 PREVENTATIVE HEALTH CARE: ICD-10-CM

## 2018-09-20 DIAGNOSIS — Z01.419 GYNECOLOGIC EXAM NORMAL: Primary | ICD-10-CM

## 2018-09-20 PROCEDURE — 87660 TRICHOMONAS VAGIN DIR PROBE: CPT

## 2018-09-20 PROCEDURE — G0101 CA SCREEN;PELVIC/BREAST EXAM: HCPCS | Mod: S$PBB,,, | Performed by: NURSE PRACTITIONER

## 2018-09-20 PROCEDURE — 99212 OFFICE O/P EST SF 10 MIN: CPT | Mod: PBBFAC | Performed by: NURSE PRACTITIONER

## 2018-09-20 PROCEDURE — 99999 PR PBB SHADOW E&M-EST. PATIENT-LVL II: CPT | Mod: PBBFAC,,, | Performed by: NURSE PRACTITIONER

## 2018-09-20 RX ORDER — MULTIVITAMIN
1 TABLET ORAL DAILY
COMMUNITY
End: 2019-01-25

## 2018-09-20 RX ORDER — LANOLIN ALCOHOL/MO/W.PET/CERES
100 CREAM (GRAM) TOPICAL DAILY
COMMUNITY
End: 2019-01-25

## 2018-09-20 RX ORDER — CHOLECALCIFEROL (VITAMIN D3) 25 MCG
1000 TABLET ORAL DAILY
COMMUNITY
End: 2019-01-25

## 2018-09-20 NOTE — PROGRESS NOTES
"CC: Well woman exam    Beth Souza is a 68 y.o. female  presents for a well woman exam.  No issues, problems, or complaints. Patient is s/p benign hysterectomy. Has mammogram per PCP next week. Patient is sexually active.    Past Medical History:   Diagnosis Date    Cataract     Hyperlipidemia     Hypertension     Lumbar spondylosis     Vitamin D deficiency      Past Surgical History:   Procedure Laterality Date    PARTIAL HYSTERECTOMY       Family History   Problem Relation Age of Onset    Hypertension Mother     Diabetes Daughter     Melanoma Neg Hx     Psoriasis Neg Hx     Lupus Neg Hx     Eczema Neg Hx      Social History     Tobacco Use    Smoking status: Former Smoker     Last attempt to quit: 2004     Years since quittin.7    Smokeless tobacco: Former User   Substance Use Topics    Alcohol use: Yes     Comment: occasionallty    Drug use: No     OB History      Para Term  AB Living    3 3 3     3    SAB TAB Ectopic Multiple Live Births            3          /82 (BP Location: Right arm, Patient Position: Sitting, BP Method: Large (Manual))   Ht 5' 2" (1.575 m)   Wt 99.6 kg (219 lb 9.3 oz)   BMI 40.16 kg/m²     ROS:  GENERAL: Denies weight gain or weight loss. Feeling well overall.   SKIN: Denies rash or lesions.   HEAD: Denies head injury or headache.   NODES: Denies enlarged lymph nodes.   CHEST: Denies chest pain or shortness of breath.   CARDIOVASCULAR: Denies palpitations or left sided chest pain.   ABDOMEN: No abdominal pain, constipation, diarrhea, nausea, vomiting or rectal bleeding.   URINARY: No frequency, dysuria, hematuria, or burning on urination.  REPRODUCTIVE: See HPI.   BREASTS: The patient performs breast self-examination and denies pain, lumps, or nipple discharge.   HEMATOLOGIC: No easy bruisability or excessive bleeding.   MUSCULOSKELETAL: Denies joint pain or swelling.   NEUROLOGIC: Denies syncope or weakness.   PSYCHIATRIC: " Denies depression, anxiety or mood swings.    PE:   APPEARANCE: Well nourished, well developed, in no acute distress.  AFFECT: WNL, alert and oriented x 3.  SKIN: No acne or hirsutism.  NECK: Neck symmetric without masses or thyromegaly.  NODES: No inguinal, cervical, axillary or femoral lymph node enlargement.  CHEST: Good respiratory effort.   ABDOMEN: Soft. No tenderness or masses. No hepatosplenomegaly. No hernias.  BREASTS: Symmetrical, no skin changes or visible lesions. No palpable masses, nipple discharge bilaterally.  PELVIC: Normal external female genitalia without lesions. Normal hair distribution. Adequate perineal body, normal urethral meatus. Vagina atrophic without lesions, scant discharge.  No significant cystocele or rectocele. Bimanual exam shows uterus and cervix to be surgically absent. Adnexa without masses or tenderness.  RECTAL: Rectovaginal exam confirms above with normal sphincter tone, no masses.  EXTREMITIES: No edema.     PLAN:  Affirm cx  Patient was counseled today on A.C.S. Pap guidelines and recommendations for yearly pelvic exams, mammograms and monthly self breast exams; to see her PCP for other health maintenance.

## 2018-09-21 LAB
CANDIDA RRNA VAG QL PROBE: NEGATIVE
G VAGINALIS RRNA GENITAL QL PROBE: NEGATIVE
T VAGINALIS RRNA GENITAL QL PROBE: NEGATIVE

## 2018-09-22 ENCOUNTER — LAB VISIT (OUTPATIENT)
Dept: LAB | Facility: HOSPITAL | Age: 68
End: 2018-09-22
Attending: FAMILY MEDICINE
Payer: MEDICARE

## 2018-09-22 DIAGNOSIS — I10 ESSENTIAL HYPERTENSION: ICD-10-CM

## 2018-09-22 DIAGNOSIS — Z00.00 ROUTINE GENERAL MEDICAL EXAMINATION AT A HEALTH CARE FACILITY: ICD-10-CM

## 2018-09-22 LAB
CHOLEST SERPL-MCNC: 212 MG/DL
CHOLEST/HDLC SERPL: 3.5 {RATIO}
HDLC SERPL-MCNC: 61 MG/DL
HDLC SERPL: 28.8 %
LDLC SERPL CALC-MCNC: 132.6 MG/DL
NONHDLC SERPL-MCNC: 151 MG/DL
TRIGL SERPL-MCNC: 92 MG/DL
TSH SERPL DL<=0.005 MIU/L-ACNC: 1.47 UIU/ML

## 2018-09-22 PROCEDURE — 84443 ASSAY THYROID STIM HORMONE: CPT

## 2018-09-22 PROCEDURE — 36415 COLL VENOUS BLD VENIPUNCTURE: CPT | Mod: PO

## 2018-09-22 PROCEDURE — 80061 LIPID PANEL: CPT

## 2018-09-24 ENCOUNTER — TELEPHONE (OUTPATIENT)
Dept: INTERNAL MEDICINE | Facility: CLINIC | Age: 68
End: 2018-09-24

## 2018-09-24 DIAGNOSIS — E78.2 MIXED HYPERLIPIDEMIA: Primary | ICD-10-CM

## 2018-09-24 RX ORDER — SIMVASTATIN 20 MG/1
20 TABLET, FILM COATED ORAL NIGHTLY
Qty: 90 TABLET | Refills: 3 | Status: SHIPPED | OUTPATIENT
Start: 2018-09-24 | End: 2019-01-25

## 2018-09-24 NOTE — TELEPHONE ENCOUNTER
Spoke with Malissa at [employer] regarding pt's Prior Authorization for acyclovir 5% cream (Zovirax). Stated that PA didn't have enough supporting documentation and that she faxed over a form stating what we would need to send back, advised that we haven't received anything just yet but will keep watch for it. TNJ

## 2018-09-26 ENCOUNTER — TELEPHONE (OUTPATIENT)
Dept: INTERNAL MEDICINE | Facility: CLINIC | Age: 68
End: 2018-09-26

## 2018-09-26 NOTE — TELEPHONE ENCOUNTER
[facility] sent denial on PA for Zovirax 5%, stated if there are any alternatives? Please advise

## 2018-09-29 ENCOUNTER — HOSPITAL ENCOUNTER (OUTPATIENT)
Dept: RADIOLOGY | Facility: HOSPITAL | Age: 68
Discharge: HOME | End: 2018-09-29
Attending: FAMILY MEDICINE
Payer: MEDICARE

## 2018-09-29 VITALS — HEIGHT: 62 IN | BODY MASS INDEX: 40.3 KG/M2 | WEIGHT: 219 LBS

## 2018-09-29 DIAGNOSIS — Z12.39 SCREENING FOR BREAST CANCER: ICD-10-CM

## 2018-09-29 PROCEDURE — 77067 SCR MAMMO BI INCL CAD: CPT | Mod: TC,PO

## 2018-09-29 PROCEDURE — 77067 SCR MAMMO BI INCL CAD: CPT | Mod: 26,,, | Performed by: RADIOLOGY

## 2018-10-04 ENCOUNTER — HOSPITAL ENCOUNTER (OUTPATIENT)
Dept: RADIOLOGY | Facility: HOSPITAL | Age: 68
Discharge: HOME | End: 2018-10-04
Attending: PAIN MEDICINE
Payer: MEDICARE

## 2018-10-04 ENCOUNTER — OFFICE VISIT (OUTPATIENT)
Dept: PAIN MEDICINE | Facility: CLINIC | Age: 68
End: 2018-10-04
Payer: MEDICARE

## 2018-10-04 VITALS
BODY MASS INDEX: 40.29 KG/M2 | HEIGHT: 62 IN | RESPIRATION RATE: 16 BRPM | DIASTOLIC BLOOD PRESSURE: 99 MMHG | HEART RATE: 56 BPM | SYSTOLIC BLOOD PRESSURE: 149 MMHG | WEIGHT: 218.94 LBS

## 2018-10-04 DIAGNOSIS — M43.16 SPONDYLOLISTHESIS OF LUMBAR REGION: ICD-10-CM

## 2018-10-04 DIAGNOSIS — M54.16 LUMBAR RADICULOPATHY: ICD-10-CM

## 2018-10-04 DIAGNOSIS — M53.3 SACROILIAC JOINT PAIN: ICD-10-CM

## 2018-10-04 DIAGNOSIS — M51.36 LUMBAR DEGENERATIVE DISC DISEASE: Primary | ICD-10-CM

## 2018-10-04 DIAGNOSIS — M48.061 SPINAL STENOSIS OF LUMBAR REGION WITHOUT NEUROGENIC CLAUDICATION: ICD-10-CM

## 2018-10-04 PROCEDURE — 99204 OFFICE O/P NEW MOD 45 MIN: CPT | Mod: S$PBB,,, | Performed by: PAIN MEDICINE

## 2018-10-04 PROCEDURE — 1101F PT FALLS ASSESS-DOCD LE1/YR: CPT | Mod: CPTII,,, | Performed by: PAIN MEDICINE

## 2018-10-04 PROCEDURE — 99213 OFFICE O/P EST LOW 20 MIN: CPT | Mod: PBBFAC,25 | Performed by: PAIN MEDICINE

## 2018-10-04 PROCEDURE — 72110 X-RAY EXAM L-2 SPINE 4/>VWS: CPT | Mod: TC

## 2018-10-04 PROCEDURE — 99999 PR PBB SHADOW E&M-EST. PATIENT-LVL III: CPT | Mod: PBBFAC,,, | Performed by: PAIN MEDICINE

## 2018-10-04 PROCEDURE — 72110 X-RAY EXAM L-2 SPINE 4/>VWS: CPT | Mod: 26,,, | Performed by: RADIOLOGY

## 2018-10-04 NOTE — PROGRESS NOTES
Chief Pain Complaint:  Chief Complaint   Patient presents with    Low-back Pain     Pt c/o low back, radiates to both feet      History of Present Illness:   This patient is a 68 y.o. female who presents today complaining of the above noted pain/s. The patient describes the pain as follows. Ms. Souza has history of hypertension, lumbar spondylosis, peripheral neuropathy, obesity, LEN with complaints of low back and bilateral leg pain radiating into the feet.  She has been having these symptoms for approximately 3 years and currently rates her pain as a 9/10.  She describes numbness in the left lateral thigh which is constant she has found no relieving factors while walking and bending caused her symptoms to increase.  Left side is worse than the right side.  She has undergone injections 2 times before with little to no benefit.  There was no inciting factor.  She does not participate in physical therapy recently is due to start next week.  She notices numbness left leg denies weakness and denies bowel or bladder changes aside from urinary urgency.    Previous Therapy:  Medications:  Tramadol, gabapentin, ibuprofen  Injections:  X2  Surgeries:  No lumbar surgeries  Physical Therapy:  Has not participated in physical therapy    Past Surgical History:   Procedure Laterality Date    HYSTERECTOMY      PARTIAL HYSTERECTOMY       Imaging / Labs / Studies (reviewed on 10/4/2018):    Results for orders placed during the hospital encounter of 08/03/16   MRI Lumbar Spine Without Contrast    Narrative Findings: No priors.  Multiplanar sequences were obtained of the lumbar spine.    Vertebral alignment demonstrates grade 1 spondylolisthesis of L4 on L5.  The conus ends at the level of L1.  There is desiccation of lumbar intervertebral discs with loss of disc height at L5-S1.  There discogenic changes in the endplates at this level as well as marginal osteophyte formation at L2-3 through L5-S1.  There are no compression  fractures or acute osseous abnormalities identified.  Schmorl's node is seen inferior endplate of L5.  Axial images were acquired through the disc spaces T11-12 and T12-L1 and L2-3 through L5-S1.    At T11-12 there is mild circumferential bulging of the disc and facet arthropathy with mild bilateral foraminal narrowing and no significant canal stenosis.  At T12-L1 there is mild bulging of the disc and facet hypertrophy without significant canal or foraminal narrowing.  At L2-3 there is facet and ligamentum flavum hypertrophy with minimal bulging of the disc.  There is no significant canal or foraminal stenosis.  At L3-4 prominent facet and ligamentum flavum hypertrophy and diffuse posterior bulging of the disc history moderate bilateral foraminal stenosis.  Facet and ligamentum flavum hypertrophy creates a mass effect upon the posterior thecal sac.   At L4-5 significant facet and ligamentum flavum hypertrophy due to unroofing of the disc secondary to spondylolisthesis.  There is diffuse bulging of the disc and severe canal and foraminal stenosis.  AP diameter of the spinal canal is about 8 mm and of the thecal sac is about 5.6 mm at this level.  L5-S1, there is prominent facet arthropathy with diffuse bulging of the disc which is creating moderately severe bilateral foraminal stenosis.  No significant canal stenosis.    Impression  1.  Grade 1 spondylolisthesis of L4 on L5.  2.  Multilevel degenerative change with most significant findings at the L4-5 level where there is significant canal stenosis.  Areas of acquired canal and foraminal narrowing as detailed above.     Results for orders placed during the hospital encounter of 05/13/16   X-Ray Lumbar Spine Complete 5 View    Narrative Five view lumbar spine.    Findings: There is multilevel degenerative vertebral endplate spurring, disc space narrowing, and facet arthropathy.  Vacuum disc phenomenon at L5-S1.  Grade one anterolisthesis at L4-L5.  Pedicles appear  intact.  No compression fracture.    Impression  As above.     Results for orders placed during the hospital encounter of 02/25/15   X-Ray Lumbar Spine AP And Lateral    Narrative Findings: Compared to previous 01/23/14.  No new osseous abnormalities are identified. 2 views    Impression  Stable appearance to osseous structures.  Grade 1 spondylolisthesis L4 on L5 and degenerative change.     Review of Systems:  CONSTITUTIONAL: patient denies any fever, chills, or weight loss  SKIN: patient denies any rash or itching  RESPIRATORY: patient denies having any shortness of breath  GASTROINTESTINAL: patient denies having any diarrhea, constipation, or bowel incontinence  GENITOURINARY: patient denies having any abnormal bladder function    MUSCULOSKELETAL:  - patient complains of the above noted pain/s (see chief pain complaint)    NEUROLOGICAL:   - pain as above  - strength in Lower extremities is intact, BILATERALLY  - sensation in Lower extremities is abnormal, on the LEFT  - patient denies any loss of bowel or bladder control      PSYCHIATRIC: patient denies any change in mood    Other:  All other systems reviewed and are negative      Physical Exam:  There were no vitals taken for this visit. (reviewed on 10/4/2018)\  General:  Alert and oriented, No acute distress.    HEENT:       EOMI.  Normocephalic, atraumatic.   Cardiovascular:  Regular rate.    Gastrointestinal:  Soft.    Respiratory:  Respirations are non-labored.    Cervical Spine:  No masses or atrophy,  Thoracic Spine:  Palpation normal.    Lumbar Spine:  No masses or atrophy,         Range of motion -normal Flexion, Extension,  Right Lat Bending,  Left Lat Bending        Increased pain with -moving from a flexed to an extended position        Palpation - tender palpation bilateral lower lumbar facet joint line        PSIS tenderness -  possible left negative on right        Tung's/YURI - positive left, negative on the right             SLR - negative  for radicular symptoms  Motor Exam:      Strength:  Rate on 1-5 scale Right Left   L2- hip flexion  5 5   L3- knee extension  5 5   L4- ankle dorsiflexion 5 5   L5- great toe extension 5 5   S1- ankle plantarflexion 5 5     Sensory Exam:  Full and equal sensation to light touch throughout except left lateral thigh sensation is decreased to light touch   Reflexes   Left DTR's   Biceps.   2  Triceps.   2   Brachioradialis 2.     Knee.   2  Ankle.   1  Right DTR's   Biceps.   2  Triceps.   2  Brachioradialis. 2     Knee.   2  Ankle.   1  Neurologic:  Cranial Nerves II-XII are grossly intact.    Pathologic reflexes:            Clonus - Neg  Psychiatric:  Cooperative.    Gait: Normal    Assessment  Lumbar Spondylosis  Lumbar Radiculopathy    1. 68 y.o. year old patient with PMH of   Past Medical History:   Diagnosis Date    Cataract     Hyperlipidemia     Hypertension     Lumbar spondylosis     Vitamin D deficiency       presenting with pain located lumbar spine, bilateral lower extremities  2. Pain Generators / Etiology:  Lumbar spondylolisthesis, lumbar spondylosis, lumbar radiculopathy, degenerative disc disease of the lumbar spine, lumbar spinal stenosis  3. Failed Meds (E- Effective, NE- Not Effective):  Tylenol-E,   4. Physical Therapy - Has referral; start next week  5. Psychological comorbidities -  none  6. Anticoagulants / Antiplatelets:  None     PLAN:  1. Medications:  Recommend over-the-counter ibuprofen to 400 mg twice a day, in combination with Tylenol twice a day not to exceed 3000 mg daily  2. PT - patient has referral, will start next week  3. Psychological - none  4. Labs - obtain  none; reviewed labs from 09/08/2018, creatinine 0.8  5. Imaging - obtain lumbar flexion-extension x-ray today to evaluate L4-5 listhesis  6. Interventions - schedule none; consider bilateral L4 and L5 transforaminal epidural steroid injections, lumbar medial branch blocks in the future  7. Referrals - none; consider  referral to Neurosurgery in the future  8. Records - none  9. Follow up visit - return to clinic in 4 6 weeks for follow-up  10. Patient Questions - answer all patient's questions regarding diagnosis, therapy, treatment    TENA Blunt MD  Interventional Pain  [facility] [place]

## 2018-10-04 NOTE — PATIENT INSTRUCTIONS
- recommend over the counter ibuprofen 200-400mg twice daily with meals  - will get lumbar flexion/extension xray today  - start with physical therapy and perform exercises at home as tolerated  - continue taking tylenol over the counter, do not exceed 3000mg daily  - alternate ibuprofen and tylenol during the day  - return to clinic in 4-6 weeks

## 2018-10-30 ENCOUNTER — OFFICE VISIT (OUTPATIENT)
Dept: SLEEP MEDICINE | Facility: CLINIC | Age: 68
End: 2018-10-30
Payer: MEDICARE

## 2018-10-30 ENCOUNTER — TELEPHONE (OUTPATIENT)
Dept: PAIN MEDICINE | Facility: CLINIC | Age: 68
End: 2018-10-30

## 2018-10-30 VITALS
OXYGEN SATURATION: 99 % | DIASTOLIC BLOOD PRESSURE: 80 MMHG | WEIGHT: 222 LBS | HEART RATE: 60 BPM | RESPIRATION RATE: 17 BRPM | BODY MASS INDEX: 40.85 KG/M2 | SYSTOLIC BLOOD PRESSURE: 130 MMHG | HEIGHT: 62 IN

## 2018-10-30 DIAGNOSIS — G47.00 INSOMNIA, UNSPECIFIED TYPE: ICD-10-CM

## 2018-10-30 DIAGNOSIS — E66.01 MORBID OBESITY WITH BMI OF 40.0-44.9, ADULT: ICD-10-CM

## 2018-10-30 DIAGNOSIS — G47.33 OSA (OBSTRUCTIVE SLEEP APNEA): Primary | ICD-10-CM

## 2018-10-30 PROCEDURE — 1101F PT FALLS ASSESS-DOCD LE1/YR: CPT | Mod: CPTII,,, | Performed by: NURSE PRACTITIONER

## 2018-10-30 PROCEDURE — 90662 IIV NO PRSV INCREASED AG IM: CPT | Mod: PBBFAC,PO

## 2018-10-30 PROCEDURE — 3079F DIAST BP 80-89 MM HG: CPT | Mod: CPTII,,, | Performed by: NURSE PRACTITIONER

## 2018-10-30 PROCEDURE — 99999 PR PBB SHADOW E&M-EST. PATIENT-LVL III: CPT | Mod: PBBFAC,,, | Performed by: NURSE PRACTITIONER

## 2018-10-30 PROCEDURE — 3075F SYST BP GE 130 - 139MM HG: CPT | Mod: CPTII,,, | Performed by: NURSE PRACTITIONER

## 2018-10-30 PROCEDURE — 99214 OFFICE O/P EST MOD 30 MIN: CPT | Mod: S$PBB,,, | Performed by: NURSE PRACTITIONER

## 2018-10-30 PROCEDURE — 99213 OFFICE O/P EST LOW 20 MIN: CPT | Mod: PBBFAC,PO | Performed by: NURSE PRACTITIONER

## 2018-10-30 NOTE — TELEPHONE ENCOUNTER
No answer. Left message informing patient that our providers do not write prescriptions for narcotic pain medicines. Instructed patient to call back with any other questions.     ----- Message from Jazmin Moore MA sent at 10/29/2018  9:40 AM CDT -----  Contact: [phone]       ----- Message -----  From: Mannie Nuno  Sent: 10/29/2018   8:27 AM  To: [facility] Staff    Pt would like to consult with the nurse, pt states that her back is in pain and nothing over the counter is working for her.  Pt would like something called in for pain.  Please call back at [phone].  Thx-

## 2018-10-30 NOTE — PROGRESS NOTES
"Subjective:      Patient ID: Beth Souza is a 68 y.o. female.    Chief Complaint: Sleep Apnea    HPI  Presents to office for review of AutoPAP therapy. Patient states improved symptoms with use of AutoPAP. Sleeping more soundly. Waking up feeling more refreshed. Improved daytime sleepiness. Patient states she is benefiting from use of the AutoPAP. Still still has poor sleep habits. In bed in 7:00 and watching TV late at night watching TV, but she feels much better during the day. Not sleepy.     Patient Active Problem List   Diagnosis    Hypertension    Vitamin D deficiency    Spondylolisthesis of lumbar region    Hyperparathyroidism, primary    Thyroid nodule    Peripheral polyneuropathy    Cataract    Osteoarthritis of spine with radiculopathy, lumbar region    Morbid obesity with BMI of 40.0-44.9, adult    LEN (obstructive sleep apnea)         /80   Pulse 60   Resp 17   Ht 5' 2" (1.575 m)   Wt 100.7 kg (222 lb 0.1 oz)   SpO2 99%   BMI 40.60 kg/m²   Body mass index is 40.6 kg/m².    Review of Systems   Psychiatric/Behavioral: Positive for sleep disturbance.   All other systems reviewed and are negative.    Objective:      Physical Exam   Constitutional: She is oriented to person, place, and time. She appears well-developed and well-nourished.   HENT:   Head: Normocephalic and atraumatic.   Neck: Normal range of motion. Neck supple.   Cardiovascular: Normal rate and regular rhythm.   Pulmonary/Chest: Effort normal and breath sounds normal.   Musculoskeletal: Normal range of motion.   Neurological: She is alert and oriented to person, place, and time.   Skin: Skin is warm and dry.   Psychiatric: She has a normal mood and affect.     Personal Diagnostic Review  Autopap download: Patient wears on average 7 hrs and 35 minutes. Greater than 4 hrs 90 % of the time. 90% percentile pressure 7 with AHI 2.5 events/hr          Assessment:       1. LEN (obstructive sleep apnea)    2. Insomnia, " unspecified type    3. Morbid obesity with BMI of 40.0-44.9, adult        Outpatient Encounter Medications as of 10/30/2018   Medication Sig Dispense Refill    lisinopril-hydrochlorothiazide (PRINZIDE,ZESTORETIC) 20-25 mg Tab Take 1 tablet by mouth once daily. 90 tablet 3    multivitamin (ONE DAILY MULTIVITAMIN) per tablet Take 1 tablet by mouth once daily.      acyclovir 5% (ZOVIRAX) 5 % Crea Apply topically 3 (three) times daily. Apply to fever blisters 1 Tube 0    cyanocobalamin (VITAMIN B-12) 1000 MCG tablet Take 100 mcg by mouth once daily.      simvastatin (ZOCOR) 20 MG tablet Take 1 tablet (20 mg total) by mouth every evening. 90 tablet 3    vitamin D (VITAMIN D3) 1000 units Tab Take 1,000 Units by mouth once daily.       No facility-administered encounter medications on file as of 10/30/2018.      Orders Placed This Encounter   Procedures    Influenza - High Dose (65+) (PF) (IM)     Plan:   Doing well on PAP settings. Patient is compliant. Follow up in 6 months with PAP data download or call earlier if any problems.  Weight loss and exercise to improve overall health.

## 2018-11-13 ENCOUNTER — CLINICAL SUPPORT (OUTPATIENT)
Dept: CARDIOLOGY | Facility: CLINIC | Age: 68
End: 2018-11-13
Payer: MEDICARE

## 2018-11-13 ENCOUNTER — OFFICE VISIT (OUTPATIENT)
Dept: CARDIOLOGY | Facility: CLINIC | Age: 68
End: 2018-11-13
Payer: MEDICARE

## 2018-11-13 VITALS
WEIGHT: 224 LBS | DIASTOLIC BLOOD PRESSURE: 76 MMHG | HEIGHT: 62 IN | SYSTOLIC BLOOD PRESSURE: 130 MMHG | BODY MASS INDEX: 41.22 KG/M2 | HEART RATE: 57 BPM

## 2018-11-13 DIAGNOSIS — I10 HTN (HYPERTENSION): ICD-10-CM

## 2018-11-13 DIAGNOSIS — I10 ESSENTIAL HYPERTENSION: Primary | ICD-10-CM

## 2018-11-13 DIAGNOSIS — I10 ESSENTIAL HYPERTENSION: ICD-10-CM

## 2018-11-13 PROCEDURE — 3075F SYST BP GE 130 - 139MM HG: CPT | Mod: CPTII,S$GLB,, | Performed by: INTERNAL MEDICINE

## 2018-11-13 PROCEDURE — 99999 PR PBB SHADOW E&M-EST. PATIENT-LVL III: CPT | Mod: PBBFAC,,, | Performed by: INTERNAL MEDICINE

## 2018-11-13 PROCEDURE — 1101F PT FALLS ASSESS-DOCD LE1/YR: CPT | Mod: CPTII,S$GLB,, | Performed by: INTERNAL MEDICINE

## 2018-11-13 PROCEDURE — 93000 ELECTROCARDIOGRAM COMPLETE: CPT | Mod: S$GLB,,, | Performed by: INTERNAL MEDICINE

## 2018-11-13 PROCEDURE — 99213 OFFICE O/P EST LOW 20 MIN: CPT | Mod: S$GLB,,, | Performed by: INTERNAL MEDICINE

## 2018-11-13 PROCEDURE — 3078F DIAST BP <80 MM HG: CPT | Mod: CPTII,S$GLB,, | Performed by: INTERNAL MEDICINE

## 2018-11-13 NOTE — PROGRESS NOTES
Subjective:   Patient ID:  Beth Souza is a 68 y.o. female who presents for follow up of Annual Exam; Palpitations; and Hypertension        Pt, 65 yo, came in for one yr f/u.  PMH HTN, HLD and Vit D.   No chest pain, fainting except sinus, PND, orthopnea, syncope and claudication.   Palpitation and fluttering feeling at night. No dizziness, and faint  Does not exercise due to back pain.    No smoking/drinking.  EKG on 10-: NSR. nonspecifc ST T change.               Past Medical History:   Diagnosis Date    Cataract     Hyperlipidemia     Hypertension     Lumbar spondylosis     Vitamin D deficiency        Past Surgical History:   Procedure Laterality Date    HYSTERECTOMY      PARTIAL HYSTERECTOMY         Social History     Tobacco Use    Smoking status: Former Smoker     Last attempt to quit: 2004     Years since quittin.8    Smokeless tobacco: Former User   Substance Use Topics    Alcohol use: Yes     Comment: occasionallty    Drug use: No       Family History   Problem Relation Age of Onset    Hypertension Mother     Diabetes Daughter     Melanoma Neg Hx     Psoriasis Neg Hx     Lupus Neg Hx     Eczema Neg Hx          Review of Systems   Constitution: Negative for decreased appetite, diaphoresis, fever, weakness, malaise/fatigue and night sweats.   HENT: Negative for nosebleeds.    Eyes: Negative for blurred vision and double vision.   Cardiovascular: Positive for palpitations. Negative for chest pain, claudication, dyspnea on exertion, irregular heartbeat, leg swelling, near-syncope, orthopnea, paroxysmal nocturnal dyspnea and syncope.   Respiratory: Negative for cough, shortness of breath, sleep disturbances due to breathing, snoring, sputum production and wheezing.    Endocrine: Negative for cold intolerance and polyuria.   Hematologic/Lymphatic: Does not bruise/bleed easily.   Skin: Negative for rash.   Musculoskeletal: Negative for back pain, falls, joint pain, joint  swelling and neck pain.   Gastrointestinal: Negative for abdominal pain, heartburn, nausea and vomiting.   Genitourinary: Negative for dysuria, frequency and hematuria.   Neurological: Negative for difficulty with concentration, dizziness, focal weakness, headaches, light-headedness, numbness and seizures.   Psychiatric/Behavioral: Negative for depression, memory loss and substance abuse. The patient does not have insomnia.    Allergic/Immunologic: Negative for HIV exposure and hives.       Objective:   Physical Exam   Constitutional: She is oriented to person, place, and time. She appears well-nourished.   HENT:   Head: Normocephalic.   Eyes: Pupils are equal, round, and reactive to light.   Neck: Normal carotid pulses and no JVD present. Carotid bruit is not present. No thyromegaly present.   Cardiovascular: Normal rate, regular rhythm, normal heart sounds and normal pulses.  No extrasystoles are present. PMI is not displaced. Exam reveals no gallop and no S3.   No murmur heard.  Pulmonary/Chest: Breath sounds normal. No stridor. No respiratory distress.   Abdominal: Soft. Bowel sounds are normal. There is no tenderness. There is no rebound.   Musculoskeletal: Normal range of motion.   Neurological: She is alert and oriented to person, place, and time.   Skin: Skin is intact. No rash noted.   Psychiatric: Her behavior is normal.       Lab Results   Component Value Date    CHOL 212 (H) 09/22/2018    CHOL 216 (H) 05/15/2018    CHOL 193 09/28/2017     Lab Results   Component Value Date    HDL 61 09/22/2018    HDL 65 05/15/2018    HDL 54 09/28/2017     Lab Results   Component Value Date    LDLCALC 132.6 09/22/2018    LDLCALC 132.4 05/15/2018    LDLCALC 124.0 09/28/2017     Lab Results   Component Value Date    TRIG 92 09/22/2018    TRIG 93 05/15/2018    TRIG 75 09/28/2017     Lab Results   Component Value Date    CHOLHDL 28.8 09/22/2018    CHOLHDL 30.1 05/15/2018    CHOLHDL 28.0 09/28/2017       Chemistry         Component Value Date/Time     09/08/2018 0826    K 4.3 09/08/2018 0826     09/08/2018 0826    CO2 31 (H) 09/08/2018 0826    BUN 12 09/08/2018 0826    CREATININE 0.8 09/08/2018 0826    GLU 84 09/08/2018 0826        Component Value Date/Time    CALCIUM 9.8 09/08/2018 0826    ALKPHOS 82 05/15/2018 0946    AST 12 05/15/2018 0946    ALT 17 05/15/2018 0946    BILITOT 0.7 05/15/2018 0946    ESTGFRAFRICA >60.0 09/08/2018 0826    EGFRNONAA >60.0 09/08/2018 0826          No results found for: LABA1C, HGBA1C  Lab Results   Component Value Date    TSH 1.465 09/22/2018     No results found for: INR, PROTIME  Lab Results   Component Value Date    WBC 4.94 09/08/2018    HGB 13.6 09/08/2018    HCT 43.2 09/08/2018    MCV 94 09/08/2018     09/08/2018     BMP  Sodium   Date Value Ref Range Status   09/08/2018 142 136 - 145 mmol/L Final     Potassium   Date Value Ref Range Status   09/08/2018 4.3 3.5 - 5.1 mmol/L Final     Chloride   Date Value Ref Range Status   09/08/2018 102 95 - 110 mmol/L Final     CO2   Date Value Ref Range Status   09/08/2018 31 (H) 23 - 29 mmol/L Final     BUN, Bld   Date Value Ref Range Status   09/08/2018 12 8 - 23 mg/dL Final     Creatinine   Date Value Ref Range Status   09/08/2018 0.8 0.5 - 1.4 mg/dL Final     Calcium   Date Value Ref Range Status   09/08/2018 9.8 8.7 - 10.5 mg/dL Final     Anion Gap   Date Value Ref Range Status   09/08/2018 9 8 - 16 mmol/L Final     eGFR if    Date Value Ref Range Status   09/08/2018 >60.0 >60 mL/min/1.73 m^2 Final     eGFR if non    Date Value Ref Range Status   09/08/2018 >60.0 >60 mL/min/1.73 m^2 Final     Comment:     Calculation used to obtain the estimated glomerular filtration  rate (eGFR) is the CKD-EPI equation.        BNP  @LABRCNTIP(BNP,BNPTRIAGEBLO)@  @LABRCNTIP(troponini)@  CrCl cannot be calculated (Patient's most recent lab result is older than the maximum 7 days allowed.).  No results found in the last  24 hours.  No results found in the last 24 hours.  No results found in the last 24 hours.    Assessment:      1. Essential hypertension      BP controlled   occasional palpitation  Echo showed normal EF and mild LVH  Plan:     Continue current meds.  Recommend heart-healthy diet, weight control and regular exercise.  Leonor. Risk modification.   RTC in 1 yr    I have reviewed all pertinent labs and cardiac studies. Plans and recommendations have been formulated under my direct supervision. All questions answered and patient voiced understanding. Patient to continue current medications.

## 2018-11-29 ENCOUNTER — DOCUMENTATION ONLY (OUTPATIENT)
Dept: REHABILITATION | Facility: HOSPITAL | Age: 68
End: 2018-11-29

## 2018-11-29 NOTE — PROGRESS NOTES
Pt. Came to PT x 4 visits.  Pt. Had a decrease in pain.  Pt. Did not return after 9-4-18.  Pt. Now D/C from PT.

## 2019-01-25 ENCOUNTER — OFFICE VISIT (OUTPATIENT)
Dept: INTERNAL MEDICINE | Facility: CLINIC | Age: 69
End: 2019-01-25
Payer: MEDICARE

## 2019-01-25 VITALS
OXYGEN SATURATION: 95 % | TEMPERATURE: 98 F | SYSTOLIC BLOOD PRESSURE: 130 MMHG | WEIGHT: 223.13 LBS | HEIGHT: 62 IN | BODY MASS INDEX: 41.06 KG/M2 | DIASTOLIC BLOOD PRESSURE: 80 MMHG | HEART RATE: 57 BPM

## 2019-01-25 DIAGNOSIS — E04.1 THYROID NODULE: ICD-10-CM

## 2019-01-25 DIAGNOSIS — E66.01 MORBID OBESITY WITH BMI OF 40.0-44.9, ADULT: ICD-10-CM

## 2019-01-25 DIAGNOSIS — E55.9 VITAMIN D DEFICIENCY: ICD-10-CM

## 2019-01-25 DIAGNOSIS — M47.26 OSTEOARTHRITIS OF SPINE WITH RADICULOPATHY, LUMBAR REGION: Primary | ICD-10-CM

## 2019-01-25 DIAGNOSIS — M54.16 LUMBAR BACK PAIN WITH RADICULOPATHY AFFECTING LEFT LOWER EXTREMITY: ICD-10-CM

## 2019-01-25 DIAGNOSIS — I10 ESSENTIAL HYPERTENSION: ICD-10-CM

## 2019-01-25 DIAGNOSIS — E21.0 HYPERPARATHYROIDISM, PRIMARY: ICD-10-CM

## 2019-01-25 DIAGNOSIS — L91.8 CUTANEOUS SKIN TAGS: ICD-10-CM

## 2019-01-25 PROCEDURE — 3079F DIAST BP 80-89 MM HG: CPT | Mod: CPTII,S$GLB,, | Performed by: FAMILY MEDICINE

## 2019-01-25 PROCEDURE — 99214 PR OFFICE/OUTPT VISIT, EST, LEVL IV, 30-39 MIN: ICD-10-PCS | Mod: S$GLB,,, | Performed by: FAMILY MEDICINE

## 2019-01-25 PROCEDURE — 1101F PR PT FALLS ASSESS DOC 0-1 FALLS W/OUT INJ PAST YR: ICD-10-PCS | Mod: CPTII,S$GLB,, | Performed by: FAMILY MEDICINE

## 2019-01-25 PROCEDURE — 99999 PR PBB SHADOW E&M-EST. PATIENT-LVL IV: ICD-10-PCS | Mod: PBBFAC,,, | Performed by: FAMILY MEDICINE

## 2019-01-25 PROCEDURE — 99999 PR PBB SHADOW E&M-EST. PATIENT-LVL IV: CPT | Mod: PBBFAC,,, | Performed by: FAMILY MEDICINE

## 2019-01-25 PROCEDURE — 3079F PR MOST RECENT DIASTOLIC BLOOD PRESSURE 80-89 MM HG: ICD-10-PCS | Mod: CPTII,S$GLB,, | Performed by: FAMILY MEDICINE

## 2019-01-25 PROCEDURE — 1101F PT FALLS ASSESS-DOCD LE1/YR: CPT | Mod: CPTII,S$GLB,, | Performed by: FAMILY MEDICINE

## 2019-01-25 PROCEDURE — 99499 RISK ADDL DX/OHS AUDIT: ICD-10-PCS | Mod: S$GLB,,, | Performed by: FAMILY MEDICINE

## 2019-01-25 PROCEDURE — 3075F PR MOST RECENT SYSTOLIC BLOOD PRESS GE 130-139MM HG: ICD-10-PCS | Mod: CPTII,S$GLB,, | Performed by: FAMILY MEDICINE

## 2019-01-25 PROCEDURE — 99214 OFFICE O/P EST MOD 30 MIN: CPT | Mod: S$GLB,,, | Performed by: FAMILY MEDICINE

## 2019-01-25 PROCEDURE — 3075F SYST BP GE 130 - 139MM HG: CPT | Mod: CPTII,S$GLB,, | Performed by: FAMILY MEDICINE

## 2019-01-25 PROCEDURE — 99499 UNLISTED E&M SERVICE: CPT | Mod: S$GLB,,, | Performed by: FAMILY MEDICINE

## 2019-01-25 RX ORDER — GABAPENTIN 300 MG/1
300 CAPSULE ORAL NIGHTLY PRN
Qty: 30 CAPSULE | Refills: 0 | Status: SHIPPED | OUTPATIENT
Start: 2019-01-25 | End: 2019-04-09 | Stop reason: SDUPTHER

## 2019-01-25 NOTE — PROGRESS NOTES
"Subjective:       Patient ID: Beth Souza is a 68 y.o. female.    Chief Complaint: Left leg pain (w/ numbness starting at left hip down to left foot)    68-year-old  female patient with Patient Active Problem List:     Hypertension     Vitamin D deficiency     Spondylolisthesis of lumbar region     Hyperparathyroidism, primary     Thyroid nodule     Peripheral polyneuropathy     Cataract     Osteoarthritis of spine with radiculopathy, lumbar region     Morbid obesity with BMI of 40.0-44.9, adult     LEN (obstructive sleep apnea)  Here with complaint of low back pain radiating to the left leg, up to 8/10 in spite of doing physical therapy twice a week.  Reports worsening symptoms lately but denies any trouble with bowel or bladder.  Denies any injury or trauma and has been taking over-the-counter Tylenol with no significant response.  Patient reports tingling and numbness sensation radiating to the left leg.   Has been taking her blood pressure medication regularly  Patient also requesting to get skin lesion checked out on the scalp on the left side which has been painful      Review of Systems   Constitutional: Negative for fatigue.   Eyes: Negative for visual disturbance.   Respiratory: Negative for shortness of breath.    Cardiovascular: Negative for chest pain and leg swelling.   Gastrointestinal: Negative for abdominal pain, constipation, nausea and vomiting.   Genitourinary: Negative for frequency and urgency.   Musculoskeletal: Positive for arthralgias, back pain and myalgias.   Skin: Negative for rash.   Neurological: Positive for numbness. Negative for weakness, light-headedness and headaches.   Psychiatric/Behavioral: Negative for sleep disturbance.         /80 (BP Location: Left arm, Patient Position: Sitting)   Pulse (!) 57   Temp 97.5 °F (36.4 °C) (Oral)   Ht 5' 2" (1.575 m)   Wt 101.2 kg (223 lb 1.7 oz)   SpO2 95%   BMI 40.81 kg/m²   Objective:      Physical Exam "   Constitutional: She is oriented to person, place, and time. She appears well-developed and well-nourished.   HENT:   Head: Normocephalic and atraumatic.   Mouth/Throat: Oropharynx is clear and moist.   Cardiovascular: Normal rate, regular rhythm and normal heart sounds.   No murmur heard.  Pulmonary/Chest: Effort normal and breath sounds normal. She has no wheezes.   Abdominal: Soft. Bowel sounds are normal. There is no tenderness.   Musculoskeletal: She exhibits tenderness. She exhibits no edema.   Positive for paraspinal lumbar muscle tenderness on the left side, with straight leg raise test negative on the left side   Neurological: She is alert and oriented to person, place, and time.   Skin: Skin is warm and dry. No rash noted. No erythema.   Noted cutaneous skin tag raised on the left side of the scalp and patient also has multiple skin tags to the neck   Psychiatric: She has a normal mood and affect.         Assessment:       1. Osteoarthritis of spine with radiculopathy, lumbar region    2. Lumbar back pain with radiculopathy affecting left lower extremity    3. Essential hypertension    4. Vitamin D deficiency    5. Morbid obesity with BMI of 40.0-44.9, adult    6. Thyroid nodule    7. Hyperparathyroidism, primary    8. Cutaneous skin tags        Plan:   Osteoarthritis of spine with radiculopathy, lumbar region  -     MRI Lumbar Spine Without Contrast; Future; Expected date: 01/25/2019  -     gabapentin (NEURONTIN) 300 MG capsule; Take 1 capsule (300 mg total) by mouth nightly as needed.  Dispense: 30 capsule; Refill: 0  Lumbar back pain with radiculopathy affecting left lower extremity  -     MRI Lumbar Spine Without Contrast; Future; Expected date: 01/25/2019  -     gabapentin (NEURONTIN) 300 MG capsule; Take 1 capsule (300 mg total) by mouth nightly as needed.  Dispense: 30 capsule; Refill: 0  Secondary to worsening symptoms, will get MRI of the lumbar spine and will refer to Pain Management after  reviewing MRI test results  Patient will be prescribed gabapentin 300 mg daily in the evening as needed for symptomatic relief  Advised to continue physical therapy and take over-the-counter Tylenol as needed for pain    Essential hypertension  -     Basic metabolic panel; Future; Expected date: 01/25/2019  -     Lipid panel; Future; Expected date: 01/25/2019  Blood pressure is stable today currently on lisinopril hydrochlorothiazide 20/25 mg daily    Vitamin D deficiency  -     Vitamin D; Future; Expected date: 01/25/2019  Finished vitamin-D by prescription weekly, will recheck vitamin-D level    Morbid obesity with BMI of 40.0-44.9, adult-strict lifestyle changes recommended with diet and exercise to lose weight with BMI 40    Thyroid nodule  -     TSH; Future; Expected date: 01/25/2019  Hyperparathyroidism, primary  -     PTH, intact; Future; Expected date: 01/25/2019  Will recheck parathyroid and thyroid levels  Patient clinically asymptomatic    Cutaneous skin tags  -     Ambulatory Referral to Dermatology  Will refer to Dermatology for further evaluation

## 2019-01-28 ENCOUNTER — LAB VISIT (OUTPATIENT)
Dept: LAB | Facility: HOSPITAL | Age: 69
End: 2019-01-28
Attending: FAMILY MEDICINE
Payer: MEDICARE

## 2019-01-28 DIAGNOSIS — E04.1 THYROID NODULE: ICD-10-CM

## 2019-01-28 DIAGNOSIS — E55.9 VITAMIN D DEFICIENCY: ICD-10-CM

## 2019-01-28 DIAGNOSIS — E21.0 HYPERPARATHYROIDISM, PRIMARY: ICD-10-CM

## 2019-01-28 DIAGNOSIS — I10 ESSENTIAL HYPERTENSION: ICD-10-CM

## 2019-01-28 LAB
25(OH)D3+25(OH)D2 SERPL-MCNC: 21 NG/ML
ANION GAP SERPL CALC-SCNC: 8 MMOL/L
BUN SERPL-MCNC: 15 MG/DL
CALCIUM SERPL-MCNC: 9 MG/DL
CHLORIDE SERPL-SCNC: 104 MMOL/L
CHOLEST SERPL-MCNC: 209 MG/DL
CHOLEST/HDLC SERPL: 3.1 {RATIO}
CO2 SERPL-SCNC: 31 MMOL/L
CREAT SERPL-MCNC: 0.8 MG/DL
EST. GFR  (AFRICAN AMERICAN): >60 ML/MIN/1.73 M^2
EST. GFR  (NON AFRICAN AMERICAN): >60 ML/MIN/1.73 M^2
GLUCOSE SERPL-MCNC: 101 MG/DL
HDLC SERPL-MCNC: 67 MG/DL
HDLC SERPL: 32.1 %
LDLC SERPL CALC-MCNC: 126.4 MG/DL
NONHDLC SERPL-MCNC: 142 MG/DL
POTASSIUM SERPL-SCNC: 4.1 MMOL/L
PTH-INTACT SERPL-MCNC: 97 PG/ML
SODIUM SERPL-SCNC: 143 MMOL/L
TRIGL SERPL-MCNC: 78 MG/DL
TSH SERPL DL<=0.005 MIU/L-ACNC: 1.44 UIU/ML

## 2019-01-28 PROCEDURE — 83970 ASSAY OF PARATHORMONE: CPT

## 2019-01-28 PROCEDURE — 80061 LIPID PANEL: CPT

## 2019-01-28 PROCEDURE — 36415 COLL VENOUS BLD VENIPUNCTURE: CPT

## 2019-01-28 PROCEDURE — 80048 BASIC METABOLIC PNL TOTAL CA: CPT

## 2019-01-28 PROCEDURE — 84443 ASSAY THYROID STIM HORMONE: CPT

## 2019-01-28 PROCEDURE — 82306 VITAMIN D 25 HYDROXY: CPT

## 2019-01-29 ENCOUNTER — TELEPHONE (OUTPATIENT)
Dept: INTERNAL MEDICINE | Facility: CLINIC | Age: 69
End: 2019-01-29

## 2019-01-29 DIAGNOSIS — E55.9 VITAMIN D DEFICIENCY: Primary | ICD-10-CM

## 2019-01-29 RX ORDER — ERGOCALCIFEROL 1.25 MG/1
50000 CAPSULE ORAL
Qty: 10 CAPSULE | Refills: 0 | Status: SHIPPED | OUTPATIENT
Start: 2019-01-29 | End: 2019-04-17

## 2019-01-29 NOTE — TELEPHONE ENCOUNTER
----- Message from Amber Cheng sent at 1/29/2019 12:33 PM CST -----  Pt at [phone]//states she has a question regarding med/Gabapentin//please call to discuss//thanks/del

## 2019-01-29 NOTE — TELEPHONE ENCOUNTER
----- Message from Shabbir Bond sent at 1/29/2019  2:00 PM CST -----  Contact: self [phone]  Returning call, please call back at [phone]. Md Conner

## 2019-01-29 NOTE — TELEPHONE ENCOUNTER
Returned call to pt regarding labs, conveyed results as noted my Dr. Oliva. Pt verbalized understanding and will contact us if she would like to start low dose of cholesterol medication. Pt also states that she just started gabapentin and it makes her feel woozy, wanted to know from Dr. Oliva if the dosage is too strong or it's because it is a new medication and her body isn't use to it yet? Lastly she wanted to know if she can stop medication on once issue is resolved or if Dr. Oliva has to discontinue for her, advised that I will send message to Dr. Oliva to verify and call back with a response. DREW

## 2019-01-31 ENCOUNTER — TELEPHONE (OUTPATIENT)
Dept: INTERNAL MEDICINE | Facility: CLINIC | Age: 69
End: 2019-01-31

## 2019-01-31 NOTE — TELEPHONE ENCOUNTER
----- Message from Nitish Pickering sent at 1/31/2019  3:15 PM CST -----  Contact: same  Patient called in and stated she would like a call back from office about her C-Pap machine.  Patient call back number is [phone]

## 2019-02-12 ENCOUNTER — TELEPHONE (OUTPATIENT)
Dept: RADIOLOGY | Facility: HOSPITAL | Age: 69
End: 2019-02-12

## 2019-02-13 ENCOUNTER — INITIAL CONSULT (OUTPATIENT)
Dept: DERMATOLOGY | Facility: CLINIC | Age: 69
End: 2019-02-13
Payer: MEDICARE

## 2019-02-13 ENCOUNTER — TELEPHONE (OUTPATIENT)
Dept: INTERNAL MEDICINE | Facility: CLINIC | Age: 69
End: 2019-02-13

## 2019-02-13 ENCOUNTER — HOSPITAL ENCOUNTER (OUTPATIENT)
Dept: RADIOLOGY | Facility: HOSPITAL | Age: 69
Discharge: HOME | End: 2019-02-13
Attending: FAMILY MEDICINE
Payer: MEDICARE

## 2019-02-13 DIAGNOSIS — M47.26 OSTEOARTHRITIS OF SPINE WITH RADICULOPATHY, LUMBAR REGION: Primary | ICD-10-CM

## 2019-02-13 DIAGNOSIS — M54.16 LUMBAR BACK PAIN WITH RADICULOPATHY AFFECTING LEFT LOWER EXTREMITY: ICD-10-CM

## 2019-02-13 DIAGNOSIS — D09.9 SQUAMOUS CELL CARCINOMA IN SITU (SCCIS): ICD-10-CM

## 2019-02-13 DIAGNOSIS — M47.26 OSTEOARTHRITIS OF SPINE WITH RADICULOPATHY, LUMBAR REGION: ICD-10-CM

## 2019-02-13 DIAGNOSIS — D48.5 NEOPLASM OF UNCERTAIN BEHAVIOR OF SKIN: Primary | ICD-10-CM

## 2019-02-13 DIAGNOSIS — L91.0 KELOID: ICD-10-CM

## 2019-02-13 PROCEDURE — 88342 IMHCHEM/IMCYTCHM 1ST ANTB: CPT | Mod: 26,,, | Performed by: PATHOLOGY

## 2019-02-13 PROCEDURE — 72148 MRI LUMBAR SPINE W/O DYE: CPT | Mod: 26,,, | Performed by: RADIOLOGY

## 2019-02-13 PROCEDURE — 88305 TISSUE EXAM BY PATHOLOGIST: CPT | Mod: 59 | Performed by: PATHOLOGY

## 2019-02-13 PROCEDURE — 88305 TISSUE SPECIMEN TO PATHOLOGY, DERMATOLOGY: ICD-10-PCS | Mod: 26,,, | Performed by: PATHOLOGY

## 2019-02-13 PROCEDURE — 11103 PR TANGENTIAL BIOPSY, SKIN, EA ADDTL LESION: ICD-10-PCS | Mod: S$GLB,,, | Performed by: STUDENT IN AN ORGANIZED HEALTH CARE EDUCATION/TRAINING PROGRAM

## 2019-02-13 PROCEDURE — 1101F PR PT FALLS ASSESS DOC 0-1 FALLS W/OUT INJ PAST YR: ICD-10-PCS | Mod: CPTII,S$GLB,, | Performed by: STUDENT IN AN ORGANIZED HEALTH CARE EDUCATION/TRAINING PROGRAM

## 2019-02-13 PROCEDURE — 99213 PR OFFICE/OUTPT VISIT, EST, LEVL III, 20-29 MIN: ICD-10-PCS | Mod: 25,S$GLB,, | Performed by: STUDENT IN AN ORGANIZED HEALTH CARE EDUCATION/TRAINING PROGRAM

## 2019-02-13 PROCEDURE — 99213 OFFICE O/P EST LOW 20 MIN: CPT | Mod: 25,S$GLB,, | Performed by: STUDENT IN AN ORGANIZED HEALTH CARE EDUCATION/TRAINING PROGRAM

## 2019-02-13 PROCEDURE — 99999 PR PBB SHADOW E&M-EST. PATIENT-LVL III: CPT | Mod: PBBFAC,,, | Performed by: STUDENT IN AN ORGANIZED HEALTH CARE EDUCATION/TRAINING PROGRAM

## 2019-02-13 PROCEDURE — 99999 PR PBB SHADOW E&M-EST. PATIENT-LVL III: ICD-10-PCS | Mod: PBBFAC,,, | Performed by: STUDENT IN AN ORGANIZED HEALTH CARE EDUCATION/TRAINING PROGRAM

## 2019-02-13 PROCEDURE — 72148 MRI LUMBAR SPINE W/O DYE: CPT | Mod: TC

## 2019-02-13 PROCEDURE — 72148 MRI LUMBAR SPINE WITHOUT CONTRAST: ICD-10-PCS | Mod: 26,,, | Performed by: RADIOLOGY

## 2019-02-13 PROCEDURE — 88342 TISSUE SPECIMEN TO PATHOLOGY, DERMATOLOGY: ICD-10-PCS | Mod: 26,,, | Performed by: PATHOLOGY

## 2019-02-13 PROCEDURE — 11102 PR TANGENTIAL BIOPSY, SKIN, SINGLE LESION: ICD-10-PCS | Mod: S$GLB,,, | Performed by: STUDENT IN AN ORGANIZED HEALTH CARE EDUCATION/TRAINING PROGRAM

## 2019-02-13 PROCEDURE — 1101F PT FALLS ASSESS-DOCD LE1/YR: CPT | Mod: CPTII,S$GLB,, | Performed by: STUDENT IN AN ORGANIZED HEALTH CARE EDUCATION/TRAINING PROGRAM

## 2019-02-13 PROCEDURE — 11102 TANGNTL BX SKIN SINGLE LES: CPT | Mod: S$GLB,,, | Performed by: STUDENT IN AN ORGANIZED HEALTH CARE EDUCATION/TRAINING PROGRAM

## 2019-02-13 PROCEDURE — 11103 TANGNTL BX SKIN EA SEP/ADDL: CPT | Mod: S$GLB,,, | Performed by: STUDENT IN AN ORGANIZED HEALTH CARE EDUCATION/TRAINING PROGRAM

## 2019-02-13 NOTE — PROGRESS NOTES
Subjective:       Patient ID:  Beth Souza is a 68 y.o. female who presents for   Chief Complaint   Patient presents with    Spot     c/o spot to neck and scalp x 1-2 years  no tx      History of Present Illness: The patient presents with chief complaint of skin lesions.  Location: neck and scalp  Duration: 1-2 years   Signs/Symptoms: painful and irritated.   Prior treatments: none     Patient complains of lesion(s) - keloid  Location: right neck  Duration: years, developed after a surgical procedure in the area.   Symptoms: itching, redness  Relieving factors/Previous treatments: none             Review of Systems   Skin: Negative for itching, rash and dry skin.        Objective:    Physical Exam   Constitutional: She appears well-developed and well-nourished. No distress.   Neurological: She is alert and oriented to person, place, and time. She is not disoriented.   Psychiatric: She has a normal mood and affect.   Skin:   Areas Examined (abnormalities noted in diagram):   Scalp / Hair Palpated and Inspected  Head / Face Inspection Performed  Neck Inspection Performed              Diagram Legend     Erythematous scaling macule/papule c/w actinic keratosis       Vascular papule c/w angioma      Pigmented verrucoid papule/plaque c/w seborrheic keratosis      Yellow umbilicated papule c/w sebaceous hyperplasia      Irregularly shaped tan macule c/w lentigo     1-2 mm smooth white papules consistent with Milia      Movable subcutaneous cyst with punctum c/w epidermal inclusion cyst      Subcutaneous movable cyst c/w pilar cyst      Firm pink to brown papule c/w dermatofibroma      Pedunculated fleshy papule(s) c/w skin tag(s)      Evenly pigmented macule c/w junctional nevus     Mildly variegated pigmented, slightly irregular-bordered macule c/w mildly atypical nevus      Flesh colored to evenly pigmented papule c/w intradermal nevus       Pink pearly papule/plaque c/w basal cell carcinoma      Erythematous  hyperkeratotic cursted plaque c/w SCC      Surgical scar with no sign of skin cancer recurrence      Open and closed comedones      Inflammatory papules and pustules      Verrucoid papule consistent consistent with wart     Erythematous eczematous patches and plaques     Dystrophic onycholytic nail with subungual debris c/w onychomycosis     Umbilicated papule    Erythematous-base heme-crusted tan verrucoid plaque consistent with inflamed seborrheic keratosis     Erythematous Silvery Scaling Plaque c/w Psoriasis     See annotation              Assessment / Plan:      Pathology Orders:     Normal Orders This Visit    Tissue Specimen To Pathology, Dermatology     Questions:    Directional Terms:  Other(comment)    Clinical Information:  r/o irriated SK    Specific Site:  left neck    Tissue Specimen To Pathology, Dermatology     Questions:    Directional Terms:  Other(comment)    Clinical Information:  r/o irritated SK vs other    Specific Site:  scalp        Neoplasm of uncertain behavior of skin  -     Tissue Specimen To Pathology, Dermatology  -     Tissue Specimen To Pathology, Dermatology  Shave biopsy procedure note:    Shave biopsy performed after verbal consent including risk of infection, scar, recurrence, need for additional treatment of site. Area prepped with alcohol, anesthetized with approximately 1.0cc of 1% lidocaine with epinephrine. Lesional tissue shaved with razor blade. Hemostasis achieved with application of aluminum chloride followed by hyfrecation. No complications. Dressing applied. Wound care explained.    Discussed that she may form a keloid in the areas that we are removing today.     Keloid  Discussed etiology of disease process and treatment options, including ILK injections, however, patient declines intervention at this time.            Follow-up if symptoms worsen or fail to improve.

## 2019-02-13 NOTE — TELEPHONE ENCOUNTER
Returned call to pt to schedule referral appt for pain medicine, scheduled appt for pt and will mail out reminder letter. DREW

## 2019-02-13 NOTE — TELEPHONE ENCOUNTER
Spoke with pt regarding MRI results, conveyed results as noted by Dr. Oliva. Pt verbalized understanding and will call back to schedule appt with Pain Management. DREW

## 2019-02-13 NOTE — LETTER
February 13, 2019      Edna Oliva MD  [id number] [facility] [address]           [facility]  [address]  [place] [address]  Phone: [phone]  Fax: [phone]          Patient: Beth Souza   MR Number: [id number]   YOB: 1950   Date of Visit: 2/13/2019       Dear Dr. Edna Oliva:    Thank you for referring Beth Souza to me for evaluation. Attached you will find relevant portions of my assessment and plan of care.    If you have questions, please do not hesitate to call me. I look forward to following Beth Souza along with you.    Sincerely,    Nelson Riley MD    Enclosure  CC:  No Recipients    If you would like to receive this communication electronically, please contact [email or web address] or [phone] to request more information on [email or web address] Link access.    For providers and/or their staff who would like to refer a patient to [facility], please contact us through our one-stop-shop provider referral line, [facility], at [phone].    If you feel you have received this communication in error or would no longer like to receive these types of communications, please e-mail [email or web address]

## 2019-03-01 ENCOUNTER — TELEPHONE (OUTPATIENT)
Dept: PHYSICAL MEDICINE AND REHAB | Facility: CLINIC | Age: 69
End: 2019-03-01

## 2019-03-01 NOTE — TELEPHONE ENCOUNTER
Spoke with patient to confirm appointment with Dr. Swanson Monday 03/04/2019. Patient verbalized understanding.

## 2019-03-04 ENCOUNTER — OFFICE VISIT (OUTPATIENT)
Dept: PAIN MEDICINE | Facility: CLINIC | Age: 69
End: 2019-03-04
Payer: MEDICARE

## 2019-03-04 VITALS
DIASTOLIC BLOOD PRESSURE: 83 MMHG | HEIGHT: 62 IN | HEART RATE: 64 BPM | BODY MASS INDEX: 41.06 KG/M2 | SYSTOLIC BLOOD PRESSURE: 124 MMHG | WEIGHT: 223.13 LBS

## 2019-03-04 DIAGNOSIS — M47.816 LUMBAR FACET ARTHROPATHY: ICD-10-CM

## 2019-03-04 DIAGNOSIS — M48.061 SPINAL STENOSIS OF LUMBAR REGION WITHOUT NEUROGENIC CLAUDICATION: ICD-10-CM

## 2019-03-04 DIAGNOSIS — M47.816 LUMBAR SPONDYLOSIS: ICD-10-CM

## 2019-03-04 DIAGNOSIS — M54.16 LUMBAR RADICULOPATHY: ICD-10-CM

## 2019-03-04 DIAGNOSIS — E66.01 MORBID OBESITY WITH BMI OF 40.0-44.9, ADULT: Primary | ICD-10-CM

## 2019-03-04 PROCEDURE — 99999 PR PBB SHADOW E&M-EST. PATIENT-LVL III: CPT | Mod: PBBFAC,,, | Performed by: ANESTHESIOLOGY

## 2019-03-04 PROCEDURE — 1101F PT FALLS ASSESS-DOCD LE1/YR: CPT | Mod: CPTII,S$GLB,, | Performed by: ANESTHESIOLOGY

## 2019-03-04 PROCEDURE — 3074F SYST BP LT 130 MM HG: CPT | Mod: CPTII,S$GLB,, | Performed by: ANESTHESIOLOGY

## 2019-03-04 PROCEDURE — 99499 RISK ADDL DX/OHS AUDIT: ICD-10-PCS | Mod: S$GLB,,, | Performed by: ANESTHESIOLOGY

## 2019-03-04 PROCEDURE — 3074F PR MOST RECENT SYSTOLIC BLOOD PRESSURE < 130 MM HG: ICD-10-PCS | Mod: CPTII,S$GLB,, | Performed by: ANESTHESIOLOGY

## 2019-03-04 PROCEDURE — 3079F PR MOST RECENT DIASTOLIC BLOOD PRESSURE 80-89 MM HG: ICD-10-PCS | Mod: CPTII,S$GLB,, | Performed by: ANESTHESIOLOGY

## 2019-03-04 PROCEDURE — 1101F PR PT FALLS ASSESS DOC 0-1 FALLS W/OUT INJ PAST YR: ICD-10-PCS | Mod: CPTII,S$GLB,, | Performed by: ANESTHESIOLOGY

## 2019-03-04 PROCEDURE — 99999 PR PBB SHADOW E&M-EST. PATIENT-LVL III: ICD-10-PCS | Mod: PBBFAC,,, | Performed by: ANESTHESIOLOGY

## 2019-03-04 PROCEDURE — 3079F DIAST BP 80-89 MM HG: CPT | Mod: CPTII,S$GLB,, | Performed by: ANESTHESIOLOGY

## 2019-03-04 PROCEDURE — 99214 OFFICE O/P EST MOD 30 MIN: CPT | Mod: S$GLB,,, | Performed by: ANESTHESIOLOGY

## 2019-03-04 PROCEDURE — 99499 UNLISTED E&M SERVICE: CPT | Mod: S$GLB,,, | Performed by: ANESTHESIOLOGY

## 2019-03-04 PROCEDURE — 99214 PR OFFICE/OUTPT VISIT, EST, LEVL IV, 30-39 MIN: ICD-10-PCS | Mod: S$GLB,,, | Performed by: ANESTHESIOLOGY

## 2019-03-04 RX ORDER — GABAPENTIN 300 MG/1
CAPSULE ORAL
Qty: 90 CAPSULE | Refills: 0 | Status: SHIPPED | OUTPATIENT
Start: 2019-03-04 | End: 2019-05-13

## 2019-03-04 NOTE — PROGRESS NOTES
Chief Pain Complaint:  Back Pain (radiating down bilateral legs)        History of Present Illness:   Beth Souza is a 68 y.o. female  who is presenting with a chief complaint of Back Pain (radiating down bilateral legs)  . The patient began experiencing this problem insidiously, and the pain has been gradually worsening over the past 3 year(s). The pain is described as throbbing, shooting, cramping, burning, aching and electrical and is located in the bilateral lumbar spine. Pain is intermittent and lasts hours. The pain radiates to bilateral lower extremities. Pain is 40% axial and 60% radicular. The patient rates her pain a 8 out of ten and interferes with activities of daily living a 8 out of ten. Pain is exacerbated by ambulation, and is improved by rest. Patient reports no prior trauma, no prior spinal surgery     - pertinent negatives: No fever, No chills, No weight loss, No bladder dysfunction, No bowel dysfunction, No saddle anesthesia  - pertinent positives: none    - medications, other therapies tried (physical therapy, injections):     >> NSAIDs, Tylenol, gabapentin, zanaflex and flexeril    >> Has previously undergone Physical Therapy    >> Has previously undergone spinal injection/s         Lumbar LAWSON with relief for less than 1 week    Imaging / Labs / Studies (reviewed on 3/4/2019):    Results for orders placed during the hospital encounter of 02/13/19   MRI Lumbar Spine Without Contrast    Narrative EXAMINATION:  MRI LUMBAR SPINE WITHOUT CONTRAST    CLINICAL HISTORY:  Spondylolisthesis; Other spondylosis with radiculopathy, lumbar region    TECHNIQUE:  Multiplanar, multisequence MR images were acquired from the thoracolumbar junction to the sacrum without the administration of contrast.    COMPARISON:  08/03/2016    FINDINGS:  There is grade 1 spondylolisthesis of L4 on L5. The vertebral body heights are well maintained, with no fracture.  No marrow signal abnormality suspicious for an  infiltrative process.    The conus medullaris terminates at approximately the L2-3 disc space.  The adjacent soft tissue structures show no significant abnormalities.  There is disc desiccation noted throughout the lumbar spine with mild-to-moderate disc space narrowing noted at the L4-5 and L5-S1 levels.  Mild diffuse disc bulge is present at the T11-T12 and T12-L1 levels.  No abutment of the anterior cord.    L1-L2: No significant central canal or neural foraminal narrowing.Mild bilateral facet arthropathy noted at this level.    L2-L3: No significant central canal or neural foraminal narrowing.Mild-to-moderate bilateral facet arthropathy noted at this level.    L3-L4: Severe bilateral facet arthropathy and ligamentum flavum hypertrophy resulting moderate narrowing of the central canal and mild to moderate narrowing of either neural foraminal canal.    L4-L5:  Grade 1 spondylolisthesis severe bilateral facet arthropathy and ligamentum flavum hypertrophy resulting in severe narrowing of the central canal.  The right neural foraminal canal is mildly to moderately narrowed.  The left neural foraminal canal is moderately narrowed    L5-S1:  Diffuse disc bulge and mild bilateral facet arthropathy resulting in no significant central canal narrowing.  The bilateral neural foraminal canals are moderately to severely narrowed.      Impression 1. Prominent degenerative changes within the mid to lower cervical spine as detailed above.  The overall appearance is similar to the study from 2016.      Electronically signed by: Alex Oliva DO  Date:    02/13/2019  Time:    09:29       Results for orders placed during the hospital encounter of 05/13/16   X-Ray Lumbar Spine Complete 5 View    Narrative Five view lumbar spine.    Findings: There is multilevel degenerative vertebral endplate spurring, disc space narrowing, and facet arthropathy.  Vacuum disc phenomenon at L5-S1.  Grade one anterolisthesis at L4-L5.  Pedicles  appear intact.  No compression fracture.    Impression  As above.  ______________________________________     Electronically signed by: WARD ZULETA MD  Date:     05/13/16  Time:    14:56      Results for orders placed during the hospital encounter of 10/04/18   X-Ray Lumbar Complete With Flex And Ext    Narrative EXAMINATION:  XR LUMBAR SPINE 5 VIEW WITH FLEX AND EXT    CLINICAL HISTORY:  Low back pain, >6wks conservative tx, persistent-progressive sx, surgical candidate;  Spondylolisthesis, lumbar region    TECHNIQUE:  Five views of the lumbar spine plus flexion extension views were performed.    COMPARISON:  None.    FINDINGS:  Vertebral body heights are within normal limits.  There is grade 1 anterolisthesis of L4 on L5 which appears unchanged from prior.  No definite change in spinal alignment with flexion or extension to suggest instability.  There is moderate disc height loss at L4-5 and L5-S1.  Multilevel facet arthropathy noted in the lower lumbar spine.  No pars defects visualized.  Posterior elements appear grossly intact. No acute fractures or subluxations are demonstrated.  The remaining visualized osseous and soft tissue structures demonstrate no appreciable abnormality.  No appreciable change from prior.      Impression Unchanged appearance of the lumbar spine with multilevel degenerative changes and grade 1 anterolisthesis of L4 on L5.      Electronically signed by: Gerber Aranda MD  Date:    10/04/2018  Time:    11:38     Results for orders placed during the hospital encounter of 02/25/15   X-Ray Lumbar Spine AP And Lateral    Narrative Findings: Compared to previous 01/23/14.  No new osseous abnormalities are identified. 2 views    Impression  Stable appearance to osseous structures.  Grade 1 spondylolisthesis L4 on L5 and degenerative change.      Electronically signed by: JEROME VOGT MD  Date:     02/25/15  Time:    10:51        Review of Systems:  CONSTITUTIONAL: patient denies any  "fever, chills, or weight loss  SKIN: patient denies any rash or itching  RESPIRATORY: patient denies having any shortness of breath  GASTROINTESTINAL: patient denies having any diarrhea, constipation, or bowel incontinence  GENITOURINARY: patient denies having any abnormal bladder function    MUSCULOSKELETAL:  - patient complains of the above noted pain/s (see chief pain complaint)    NEUROLOGICAL:   - pain as above  - strength in Lower extremities is intact, BILATERALLY  - sensation in Lower extremities is intact, BILATERALLY  - patient denies any loss of bowel or bladder control      PSYCHIATRIC: patient denies any change in mood    Other:  All other systems reviewed and are negative      Physical Exam:  /83   Pulse 64   Ht 5' 2" (1.575 m)   Wt 101.2 kg (223 lb 1.7 oz)   BMI 40.81 kg/m²  (reviewed on 3/4/2019)  General: Alert and oriented, in no apparent distress.  Gait: normal gait.  Skin: No rashes, No discoloration, No obvious lesions  HEENT: Normocephalic, atraumatic. Pupils equal and round.  Cardiovascular: Regular rate and rhythm , no significant peripheral edema present  Respiratory: Without audible wheezing, without use of accessory muscles of respiration.    Musculoskeletal:    Cervical Spine    - Pain on flexion of cervical spine Absent  - Spurling's Test:  Absent    - Pain on extension of cervical spine Absent  - TTP over the cervical facet joints Absent  - Cervical facet loading Absent      Lumbar Spine    - Pain on flexion of lumbar spine Present  - Straight Leg Raise:  Present    - Pain on extension of lumbar spine Present  - TTP over the lumbar facet joints Present Bilateral L5-S1   - Lumbar facet loading Present    -Pain on palpation over the SI joint  Absent  - YURI: Absent      Neuro:    Strength:  UE R/L: D: 5/5; B: 5/5; T: 5/5; WF: 5/5; WE: 5/5; IO: 5/5;  LE R/L: HF: 5/5, HE: 5/5, KF: 5/5; KE: 5/5; FE: 5/5; FF: 5/5    Extremity Reflexes: Brisk and symmetric throughout.      Extremity " Sensory: Sensation to pinprick and temperature symmetric. Proprioception intact.      Psych:  Mood and affect is appropriate      Assessment:    Beth Souza is a 68 y.o. year old female who is presenting with     Encounter Diagnoses   Name Primary?    Morbid obesity with BMI of 40.0-44.9, adult Yes    Lumbar spondylosis     Lumbar radiculopathy     Spinal stenosis of lumbar region without neurogenic claudication     Lumbar facet arthropathy        Plan:    1. Interventional: Consider bilateral L4-5 TFESI with local.    2. Pharmacologic: Increase Gabapentin from 300 mg PO Qhs with 600 then 900 mg PO Qhs.     3. Rehabilitative: Encouraged PT.     4. Diagnostic: Lumbar MRI reviewed with patient.     5. Consult: Encouraged Neurosurgical consultation for severe L4-5 canal stenosis but patient wants to hold off for now.    6.  4 weeks.       20 minutes were spent in this encounter with more than 50% of the time used for counseling and review of the plan.  Imaging / studies reviewed, detailed above.  I discussed in detail the risks, benefits, and alternatives to any and all potential treatment options.  All questions and concerns were fully addressed today in clinic. Medical decision making moderate.    Thank you for the opportunity to assist in the care of this patient.    Best wishes,    Signed:    Mukul Swanson MD          Disclaimer:  This note may have been prepared using voice recognition software, it may have not been extensively proofed, as such there could be errors within the text such as sound alike errors.

## 2019-03-04 NOTE — LETTER
March 4, 2019      Edna Oliva MD  [address] [address] [place] [address]           [facility]  [address] [facility] [address] [address]  Phone: [phone]  Fax: [phone]          Patient: Beth Souza   MR Number: [id number]   YOB: 1950   Date of Visit: 3/4/2019       Dear Dr. Edna Oliva:    Thank you for referring Beth Souza to me for evaluation. Attached you will find relevant portions of my assessment and plan of care.    If you have questions, please do not hesitate to call me. I look forward to following Beth Souza along with you.    Sincerely,    Mukul Swanson MD    Enclosure  CC:  No Recipients    If you would like to receive this communication electronically, please contact [email or web address] or [phone] to request more information on [employer] Link access.    For providers and/or their staff who would like to refer a patient to [facility], please contact us through our one-stop-shop provider referral line, [facility], at [phone].    If you feel you have received this communication in error or would no longer like to receive these types of communications, please e-mail [email or web address]

## 2019-03-06 ENCOUNTER — OFFICE VISIT (OUTPATIENT)
Dept: OPHTHALMOLOGY | Facility: CLINIC | Age: 69
End: 2019-03-06
Payer: MEDICARE

## 2019-03-06 DIAGNOSIS — H11.31 SUBCONJUNCTIVAL HEMORRHAGE OF RIGHT EYE: Primary | ICD-10-CM

## 2019-03-06 PROCEDURE — 92012 PR EYE EXAM, EST PATIENT,INTERMED: ICD-10-PCS | Mod: S$GLB,,, | Performed by: OPTOMETRIST

## 2019-03-06 PROCEDURE — 99999 PR PBB SHADOW E&M-EST. PATIENT-LVL II: CPT | Mod: PBBFAC,,, | Performed by: OPTOMETRIST

## 2019-03-06 PROCEDURE — 99999 PR PBB SHADOW E&M-EST. PATIENT-LVL II: ICD-10-PCS | Mod: PBBFAC,,, | Performed by: OPTOMETRIST

## 2019-03-06 PROCEDURE — 92012 INTRM OPH EXAM EST PATIENT: CPT | Mod: S$GLB,,, | Performed by: OPTOMETRIST

## 2019-03-06 NOTE — PROGRESS NOTES
HPI     Last [facility] exam 11/09/2016  Chief complaint: red spot in the lateral corner of right eye   Onset: 5 days ago   Patient states that lateral corner of right eye was bloody red   Patient was not experiencing any pain or blurred vision   Patient states that eye is a little watery but no matting   Condition is about cleared and patient just wanted to make sure that there   is not anything wrong with her eye     Cataracts OU  Uses OTC Readers    Last edited by Lev Woodson MA on 3/6/2019  9:41 AM. (History)            Assessment /Plan     For exam results, see Encounter Report.    Subconjunctival hemorrhage of right eye      Reassurance given.  RTC for full exam NA.

## 2019-03-11 ENCOUNTER — TELEPHONE (OUTPATIENT)
Dept: OPHTHALMOLOGY | Facility: CLINIC | Age: 69
End: 2019-03-11

## 2019-03-11 NOTE — TELEPHONE ENCOUNTER
----- Message from Sylviepricila Barnard sent at 3/11/2019  1:58 PM CDT -----  Contact: pt  States she's on her way, she got stopped by a train. Please call pt at [phone]. Thank you

## 2019-03-11 NOTE — TELEPHONE ENCOUNTER
Saw message that patient was running late. Paged patient 15 to 25 min after scheduled appointment. Patient did not show.

## 2019-03-12 ENCOUNTER — TELEPHONE (OUTPATIENT)
Dept: OPHTHALMOLOGY | Facility: CLINIC | Age: 69
End: 2019-03-12

## 2019-03-12 NOTE — TELEPHONE ENCOUNTER
----- Message from Jackelyn Maldonado sent at 3/12/2019 10:14 AM CDT -----  Contact: pt  Type:  Patient Returning Call     Who Called: Patient   Who Left Message for Patient: pt not sure   Does the patient know what this is regarding?: pt not sure   Would the patient rather a call back or a response via [facility]? Call back   Best Call Back Number: [phone]   Additional Information: pt states she received called from Dr. Guajardo's office and she's returning call.

## 2019-03-13 RX ORDER — FLUOROURACIL 50 MG/G
CREAM TOPICAL 2 TIMES DAILY
Qty: 40 G | Refills: 1 | Status: SHIPPED | OUTPATIENT
Start: 2019-03-13 | End: 2019-04-17

## 2019-03-14 ENCOUNTER — TELEPHONE (OUTPATIENT)
Dept: DERMATOLOGY | Facility: CLINIC | Age: 69
End: 2019-03-14

## 2019-03-14 ENCOUNTER — OFFICE VISIT (OUTPATIENT)
Dept: OPHTHALMOLOGY | Facility: CLINIC | Age: 69
End: 2019-03-14
Payer: MEDICARE

## 2019-03-14 DIAGNOSIS — H01.00B BLEPHARITIS OF UPPER AND LOWER EYELIDS OF BOTH EYES, UNSPECIFIED TYPE: ICD-10-CM

## 2019-03-14 DIAGNOSIS — H25.13 CATARACT, NUCLEAR SCLEROTIC SENILE, BILATERAL: Primary | ICD-10-CM

## 2019-03-14 DIAGNOSIS — Z13.5 GLAUCOMA SCREENING: ICD-10-CM

## 2019-03-14 DIAGNOSIS — H25.013 CATARACT CORTICAL, SENILE, BILATERAL: ICD-10-CM

## 2019-03-14 DIAGNOSIS — H52.7 REFRACTIVE ERROR: ICD-10-CM

## 2019-03-14 DIAGNOSIS — H01.00A BLEPHARITIS OF UPPER AND LOWER EYELIDS OF BOTH EYES, UNSPECIFIED TYPE: ICD-10-CM

## 2019-03-14 PROCEDURE — 99999 PR PBB SHADOW E&M-EST. PATIENT-LVL I: ICD-10-PCS | Mod: PBBFAC,,, | Performed by: OPTOMETRIST

## 2019-03-14 PROCEDURE — 92014 COMPRE OPH EXAM EST PT 1/>: CPT | Mod: S$GLB,,, | Performed by: OPTOMETRIST

## 2019-03-14 PROCEDURE — 92014 PR EYE EXAM, EST PATIENT,COMPREHESV: ICD-10-PCS | Mod: S$GLB,,, | Performed by: OPTOMETRIST

## 2019-03-14 PROCEDURE — 92015 DETERMINE REFRACTIVE STATE: CPT | Mod: S$GLB,,, | Performed by: OPTOMETRIST

## 2019-03-14 PROCEDURE — 99999 PR PBB SHADOW E&M-EST. PATIENT-LVL I: CPT | Mod: PBBFAC,,, | Performed by: OPTOMETRIST

## 2019-03-14 PROCEDURE — 92015 PR REFRACTION: ICD-10-PCS | Mod: S$GLB,,, | Performed by: OPTOMETRIST

## 2019-03-14 RX ORDER — NEOMYCIN SULFATE, POLYMYXIN B SULFATE AND DEXAMETHASONE 3.5; 10000; 1 MG/ML; [USP'U]/ML; MG/ML
1 SUSPENSION/ DROPS OPHTHALMIC 3 TIMES DAILY
Qty: 5 ML | Refills: 0 | Status: SHIPPED | OUTPATIENT
Start: 2019-03-14 | End: 2019-03-21

## 2019-03-14 NOTE — TELEPHONE ENCOUNTER
Spoke to pt and informed her per Dr Riley, it is okay to start her gabapentin.  It will not affect her treatment with efudex.  Pt verbalized understanding to all information given and had no further questions.

## 2019-03-14 NOTE — PROGRESS NOTES
HPI     Last [facility] visit 03/06/2019  Subconjunctival hemorrhage OD  Both eyes foreign body sensation   Condition has been off and on for about 6 months   Itching   Watery  No light sensitivity  No blurred   No matting   Using OTC eye drop   Patient did not bring glasses to exam    Last edited by Lev Woodson MA on 3/14/2019  8:15 AM. (History)            Assessment /Plan     For exam results, see Encounter Report.    Cataract, nuclear sclerotic senile, bilateral    Cataract cortical, senile, bilateral    Blepharitis of upper and lower eyelids of both eyes, unspecified type    Glaucoma screening    Refractive error      Moderate NS/cortical cataracts OU = will follow.  Blepharitis OU = maxitrol drops TID for 7 days with HC.  OH OK OU otherwise.  Spec Rx given.  RTC one year or prn.

## 2019-03-14 NOTE — TELEPHONE ENCOUNTER
Pt came today to get educated on where and how to apply the efudex cream.  Pt was informed to apply cream to left scalp (area was sectioned off with paper tape as a guide for pt) BID x 5 weeks.  Pt was instructed not to relax or color hair.  Informed pt of what to expect from the cream which will be blistering and swelling similar to a very bad sun burn.  Pt also wanted Dr Riley to know she has a new medication Gabapentin which she has not started yet because she wants Dr Riley's opinion on if she should start it or not.  Pt had no further questions and verbalized understanding to all information given.  Please advise.

## 2019-03-15 ENCOUNTER — OFFICE VISIT (OUTPATIENT)
Dept: DERMATOLOGY | Facility: CLINIC | Age: 69
End: 2019-03-15
Payer: MEDICARE

## 2019-03-15 DIAGNOSIS — D09.9 SQUAMOUS CELL CARCINOMA IN SITU (SCCIS): Primary | ICD-10-CM

## 2019-03-15 PROCEDURE — 99999 PR PBB SHADOW E&M-EST. PATIENT-LVL II: CPT | Mod: PBBFAC,,, | Performed by: STUDENT IN AN ORGANIZED HEALTH CARE EDUCATION/TRAINING PROGRAM

## 2019-03-15 PROCEDURE — 1101F PT FALLS ASSESS-DOCD LE1/YR: CPT | Mod: CPTII,S$GLB,, | Performed by: STUDENT IN AN ORGANIZED HEALTH CARE EDUCATION/TRAINING PROGRAM

## 2019-03-15 PROCEDURE — 99212 PR OFFICE/OUTPT VISIT, EST, LEVL II, 10-19 MIN: ICD-10-PCS | Mod: S$GLB,,, | Performed by: STUDENT IN AN ORGANIZED HEALTH CARE EDUCATION/TRAINING PROGRAM

## 2019-03-15 PROCEDURE — 99212 OFFICE O/P EST SF 10 MIN: CPT | Mod: S$GLB,,, | Performed by: STUDENT IN AN ORGANIZED HEALTH CARE EDUCATION/TRAINING PROGRAM

## 2019-03-15 PROCEDURE — 99999 PR PBB SHADOW E&M-EST. PATIENT-LVL II: ICD-10-PCS | Mod: PBBFAC,,, | Performed by: STUDENT IN AN ORGANIZED HEALTH CARE EDUCATION/TRAINING PROGRAM

## 2019-03-15 PROCEDURE — 1101F PR PT FALLS ASSESS DOC 0-1 FALLS W/OUT INJ PAST YR: ICD-10-PCS | Mod: CPTII,S$GLB,, | Performed by: STUDENT IN AN ORGANIZED HEALTH CARE EDUCATION/TRAINING PROGRAM

## 2019-03-15 NOTE — PROGRESS NOTES
History of Present Illness: The patient presents with chief complaint of spot.  Location: scalp  Duration: months   Signs/Symptoms: itch at times     Prior treatments: starting efudex

## 2019-03-15 NOTE — PROGRESS NOTES
Subjective:       Patient ID:  Beth Souza is a 68 y.o. female who presents for   Chief Complaint   Patient presents with    Spot     efudex education and how and where to use the cream to scalp       History of Present Illness: The patient presents for follow-up of SCCIS on the scalp. She was last seen on 2/13/19 where she had a skin lesion on the scalp biopsied that was consistent with SCCIS with negative margins. She was to start Efudex to the area to ensure no regrowth or recurrence of the lesion. She is here to discuss treatment and to identify lesion.             Review of Systems   Skin: Negative for itching, rash and dry skin.        Objective:    Physical Exam   Constitutional: She appears well-developed and well-nourished. No distress.   Neurological: She is alert and oriented to person, place, and time. She is not disoriented.   Psychiatric: She has a normal mood and affect.   Skin:   Areas Examined (abnormalities noted in diagram):   Scalp / Hair Palpated and Inspected  Head / Face Inspection Performed  Neck Inspection Performed              Diagram Legend     Erythematous scaling macule/papule c/w actinic keratosis       Vascular papule c/w angioma      Pigmented verrucoid papule/plaque c/w seborrheic keratosis      Yellow umbilicated papule c/w sebaceous hyperplasia      Irregularly shaped tan macule c/w lentigo     1-2 mm smooth white papules consistent with Milia      Movable subcutaneous cyst with punctum c/w epidermal inclusion cyst      Subcutaneous movable cyst c/w pilar cyst      Firm pink to brown papule c/w dermatofibroma      Pedunculated fleshy papule(s) c/w skin tag(s)      Evenly pigmented macule c/w junctional nevus     Mildly variegated pigmented, slightly irregular-bordered macule c/w mildly atypical nevus      Flesh colored to evenly pigmented papule c/w intradermal nevus       Pink pearly papule/plaque c/w basal cell carcinoma      Erythematous hyperkeratotic cursted plaque  c/w SCC      Surgical scar with no sign of skin cancer recurrence      Open and closed comedones      Inflammatory papules and pustules      Verrucoid papule consistent consistent with wart     Erythematous eczematous patches and plaques     Dystrophic onycholytic nail with subungual debris c/w onychomycosis     Umbilicated papule    Erythematous-base heme-crusted tan verrucoid plaque consistent with inflamed seborrheic keratosis     Erythematous Silvery Scaling Plaque c/w Psoriasis     See annotation      Assessment / Plan:        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 Discussed with the patient how to treat the area. Reassured patient that this is an early lesion that has not become invasive. Counseled on how to care and treat area. Will treat for 5 weeks and have patient follow-up afterward to re-evaluate area.        Follow-up in about 8 weeks (around 5/10/2019).

## 2019-03-15 NOTE — PATIENT INSTRUCTIONS
Fluorouracil, 5-FU skin cream or solution  What is this medicine?  FLUOROURACIL, 5-FU (flure oh YOOR a [place]) is a chemotherapy agent. It is used on the skin to treat skin cancer and skin conditions that could become cancer.  How should I use this medicine?  This medicine is only for use on the skin. Follow the directions on the prescription label. Wash hands before and after use. Wash affected area and gently pat dry. To apply this medicine use a cotton-tipped applicator, or use gloves if applying with fingertips. If applied with unprotected fingertips, it is very important to wash your hands well after you apply this medicine. Avoid applying to the eyes, nose, or mouth. Apply enough medicine to cover the affected area. You can cover the area with a light gauze dressing, but do not use tight or air-tight dressings. Finish the full course prescribed by your doctor or health care professional, even if you think your condition is better. Do not stop taking except on the advice of your doctor or health care professional.  Talk to your pediatrician regarding the use of this medicine in children. Special care may be needed.  What side effects may I notice from receiving this medicine?  Side effects that you should report to your doctor or health care professional as soon as possible:  · severe redness and swelling of normal skin  Side effects that usually do not require medical attention (report to your doctor or health care professional if they continue or are bothersome):  · dark colored skin  · eye irritation including burning, itching, sensitivity, stinging, or watering  · sensitivity to light  · skin irritation including burning, itching, pain, redness, scarring, or swelling of the affected area  · eye irritation including burning, itching, sensitivity, stinging, or watering  · increased sensitivity of the skin to sun and ultraviolet light  · pain and burning, redness, irritation of the affected area  · skin rash,  itching of the affected area  · sensitivity to light  · tenderness  What may interact with this medicine?  Interactions are not expected. Do not use any other skin products without telling your doctor or health care professional.  What if I miss a dose?  If you miss a dose, apply it as soon as you can. If it is almost time for your next dose, only use that dose. Do not apply extra doses. Contact your doctor or health care professional if you miss more than one dose.  Where should I keep my medicine?  Keep out of the reach of children.  Store at room temperature between 15 and 30 degrees C (59 and 86 degrees F). Do not freeze. Keep container tightly closed. Throw away any unused medicine after the expiration date.  What should I tell my health care provider before I take this medicine?  They need to know if you have any of these conditions:  · DPD enzyme deficiency  · recent or current radiation therapy  · swelling or open sores at the treatment site  · an unusual or allergic reaction to fluorouracil, other chemotherapy, other medicines, foods, dyes, or preservatives  · pregnant or trying to get pregnant  · breast-feeding  What should I watch for while using this medicine?  Visit your doctor or health care professional for checks on your progress. You will need to use this medicine for 2 to 6 weeks. This may be longer depending on the condition being treated. You may not see full healing for another 1 to 2 months after you stop using the medicine.  Treated areas of skin can look unsightly during and for several weeks after treatment with this medicine.  Do not get this medicine in your eyes. If you do, rinse out with plenty of cool tap water.  This medicine can make you more sensitive to the sun. Keep out of the sun. If you cannot avoid being in the sun, wear protective clothing and use sunscreen. Do not use sun lamps or tanning beds/booths.  NOTE:This sheet is a summary. It may not cover all possible information. If  you have questions about this medicine, talk to your doctor, pharmacist, or health care provider. Copyright© 2017 [employer]

## 2019-03-25 ENCOUNTER — TELEPHONE (OUTPATIENT)
Dept: INTERNAL MEDICINE | Facility: CLINIC | Age: 69
End: 2019-03-25

## 2019-03-25 NOTE — TELEPHONE ENCOUNTER
----- Message from Mallory Manning sent at 3/25/2019 10:46 AM CDT -----  Patient returning call..[phone]

## 2019-04-02 ENCOUNTER — TELEPHONE (OUTPATIENT)
Dept: PAIN MEDICINE | Facility: CLINIC | Age: 69
End: 2019-04-02

## 2019-04-02 NOTE — TELEPHONE ENCOUNTER
----- Message from Angela Barros sent at 4/2/2019  1:31 PM CDT -----  Contact: self  Patient is requesting a call back to reschedule 04/08/19 2:20 appointment to an earlier time if possible. Please call back at [phone].    Thanks,  Angela Barros

## 2019-04-03 ENCOUNTER — TELEPHONE (OUTPATIENT)
Dept: PAIN MEDICINE | Facility: CLINIC | Age: 69
End: 2019-04-03

## 2019-04-09 ENCOUNTER — OFFICE VISIT (OUTPATIENT)
Dept: INTERNAL MEDICINE | Facility: CLINIC | Age: 69
End: 2019-04-09
Payer: MEDICARE

## 2019-04-09 ENCOUNTER — TELEPHONE (OUTPATIENT)
Dept: ORTHOPEDICS | Facility: CLINIC | Age: 69
End: 2019-04-09

## 2019-04-09 VITALS
BODY MASS INDEX: 40.29 KG/M2 | TEMPERATURE: 98 F | OXYGEN SATURATION: 96 % | DIASTOLIC BLOOD PRESSURE: 86 MMHG | HEIGHT: 62 IN | HEART RATE: 61 BPM | WEIGHT: 218.94 LBS | SYSTOLIC BLOOD PRESSURE: 126 MMHG

## 2019-04-09 DIAGNOSIS — C44.42 SQUAMOUS CELL CANCER OF SCALP AND SKIN OF NECK: ICD-10-CM

## 2019-04-09 DIAGNOSIS — E55.9 VITAMIN D DEFICIENCY: ICD-10-CM

## 2019-04-09 DIAGNOSIS — Z78.0 ASYMPTOMATIC MENOPAUSAL STATE: ICD-10-CM

## 2019-04-09 DIAGNOSIS — E21.0 HYPERPARATHYROIDISM, PRIMARY: ICD-10-CM

## 2019-04-09 DIAGNOSIS — E66.01 MORBID OBESITY WITH BMI OF 40.0-44.9, ADULT: ICD-10-CM

## 2019-04-09 DIAGNOSIS — M47.26 OSTEOARTHRITIS OF SPINE WITH RADICULOPATHY, LUMBAR REGION: ICD-10-CM

## 2019-04-09 DIAGNOSIS — L98.9 SKIN LESION OF BACK: ICD-10-CM

## 2019-04-09 DIAGNOSIS — M47.816 SPONDYLOSIS OF LUMBAR SPINE: Primary | ICD-10-CM

## 2019-04-09 DIAGNOSIS — I10 ESSENTIAL HYPERTENSION: ICD-10-CM

## 2019-04-09 PROCEDURE — 99999 PR PBB SHADOW E&M-EST. PATIENT-LVL IV: CPT | Mod: PBBFAC,,, | Performed by: FAMILY MEDICINE

## 2019-04-09 PROCEDURE — 99499 UNLISTED E&M SERVICE: CPT | Mod: S$GLB,,, | Performed by: FAMILY MEDICINE

## 2019-04-09 PROCEDURE — 3074F PR MOST RECENT SYSTOLIC BLOOD PRESSURE < 130 MM HG: ICD-10-PCS | Mod: CPTII,S$GLB,, | Performed by: FAMILY MEDICINE

## 2019-04-09 PROCEDURE — 3079F PR MOST RECENT DIASTOLIC BLOOD PRESSURE 80-89 MM HG: ICD-10-PCS | Mod: CPTII,S$GLB,, | Performed by: FAMILY MEDICINE

## 2019-04-09 PROCEDURE — 3074F SYST BP LT 130 MM HG: CPT | Mod: CPTII,S$GLB,, | Performed by: FAMILY MEDICINE

## 2019-04-09 PROCEDURE — 99214 OFFICE O/P EST MOD 30 MIN: CPT | Mod: S$GLB,,, | Performed by: FAMILY MEDICINE

## 2019-04-09 PROCEDURE — 1101F PT FALLS ASSESS-DOCD LE1/YR: CPT | Mod: CPTII,S$GLB,, | Performed by: FAMILY MEDICINE

## 2019-04-09 PROCEDURE — 99999 PR PBB SHADOW E&M-EST. PATIENT-LVL IV: ICD-10-PCS | Mod: PBBFAC,,, | Performed by: FAMILY MEDICINE

## 2019-04-09 PROCEDURE — 1101F PR PT FALLS ASSESS DOC 0-1 FALLS W/OUT INJ PAST YR: ICD-10-PCS | Mod: CPTII,S$GLB,, | Performed by: FAMILY MEDICINE

## 2019-04-09 PROCEDURE — 99499 RISK ADDL DX/OHS AUDIT: ICD-10-PCS | Mod: S$GLB,,, | Performed by: FAMILY MEDICINE

## 2019-04-09 PROCEDURE — 99214 PR OFFICE/OUTPT VISIT, EST, LEVL IV, 30-39 MIN: ICD-10-PCS | Mod: S$GLB,,, | Performed by: FAMILY MEDICINE

## 2019-04-09 PROCEDURE — 3079F DIAST BP 80-89 MM HG: CPT | Mod: CPTII,S$GLB,, | Performed by: FAMILY MEDICINE

## 2019-04-09 RX ORDER — MELOXICAM 7.5 MG/1
TABLET ORAL
Qty: 30 TABLET | Refills: 0 | Status: SHIPPED | OUTPATIENT
Start: 2019-04-09 | End: 2019-04-17

## 2019-04-09 NOTE — PROGRESS NOTES
Subjective:       Patient ID: Beth Souza is a 68 y.o. female.    Chief Complaint: Follow-up    68-year-old  female patient with Patient Active Problem List:     Hypertension     Vitamin D deficiency     Spondylolisthesis of lumbar region     Hyperparathyroidism, primary     Thyroid nodule     Peripheral polyneuropathy     Cataract     Osteoarthritis of spine with radiculopathy, lumbar region     Morbid obesity with BMI of 40.0-44.9, adult     LEN (obstructive sleep apnea)  Here with complaint of low back pain only with stooping and has been having burning sensation with tingling and numbness to the left leg for which patient has tried taking 600 mg of gabapentin which has been helpful, 900mg has been too strong for her.  Patient reports back pain as 5/10 with stooping and has been doing better with physical therapy.   Denies any bowel or bladder accidents  Patient has been followed by Dermatology Dr. 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   Also has skin lesion to right upper back which has been for several years and no increase in size reported  Denies any chest pain or shortness of breath or palpitations and has been doing well with no anxiety or depression, reports that she has not shared her diagnosis with any of her family members    Review of Systems   Constitutional: Negative for fatigue.   Eyes: Negative for visual disturbance.   Respiratory: Negative for shortness of breath.    Cardiovascular: Negative for chest pain and leg swelling.   Gastrointestinal: Negative for abdominal pain, nausea and vomiting.   Musculoskeletal: Positive for back pain and myalgias.   Skin: Negative for rash.   Neurological: Positive for numbness. Negative for weakness,  "light-headedness and headaches.   Psychiatric/Behavioral: Negative for dysphoric mood and sleep disturbance. The patient is not nervous/anxious.          /86   Pulse 61   Temp 98.2 °F (36.8 °C) (Tympanic)   Ht 5' 2" (1.575 m)   Wt 99.3 kg (218 lb 14.7 oz)   SpO2 96%   BMI 40.04 kg/m²   Objective:      Physical Exam   Constitutional: She is oriented to person, place, and time. She appears well-developed and well-nourished.   HENT:   Head: Normocephalic and atraumatic.   Mouth/Throat: Oropharynx is clear and moist.   Cardiovascular: Normal rate, regular rhythm and normal heart sounds.   No murmur heard.  Pulmonary/Chest: Effort normal and breath sounds normal. She has no wheezes.   Abdominal: Soft. Bowel sounds are normal. There is no tenderness.   Musculoskeletal: She exhibits tenderness. She exhibits no edema.   Positive for paraspinal lumbar muscle tenderness in the midline   Neurological: She is alert and oriented to person, place, and time.   Skin: Skin is warm and dry. No rash noted. No erythema.   Positive for tiny localized circular skin lesion to the right upper back laterally, likely consistent with lipoma, nontender to touch   Psychiatric: She has a normal mood and affect.       MRI lumbar spine :    There is grade 1 spondylolisthesis of L4 on L5. The vertebral body heights are well maintained, with no fracture.  No marrow signal abnormality suspicious for an infiltrative process.    The conus medullaris terminates at approximately the L2-3 disc space.  The adjacent soft tissue structures show no significant abnormalities.  There is disc desiccation noted throughout the lumbar spine with mild-to-moderate disc space narrowing noted at the L4-5 and L5-S1 levels.  Mild diffuse disc bulge is present at the T11-T12 and T12-L1 levels.  No abutment of the anterior cord.    L1-L2: No significant central canal or neural foraminal narrowing.Mild bilateral facet arthropathy noted at this level.    L2-L3: " No significant central canal or neural foraminal narrowing.Mild-to-moderate bilateral facet arthropathy noted at this level.    L3-L4: Severe bilateral facet arthropathy and ligamentum flavum hypertrophy resulting moderate narrowing of the central canal and mild to moderate narrowing of either neural foraminal canal.    L4-L5:  Grade 1 spondylolisthesis severe bilateral facet arthropathy and ligamentum flavum hypertrophy resulting in severe narrowing of the central canal.  The right neural foraminal canal is mildly to moderately narrowed.  The left neural foraminal canal is moderately narrowed    L5-S1:  Diffuse disc bulge and mild bilateral facet arthropathy resulting in no significant central canal narrowing.  The bilateral neural foraminal canals are moderately to severely narrowed.  Assessment:       1. Spondylosis of lumbar spine    2. Osteoarthritis of spine with radiculopathy, lumbar region    3. Squamous cell cancer of scalp and skin of neck    4. Skin lesion of back    5. Essential hypertension    6. Morbid obesity with BMI of 40.0-44.9, adult    7. Vitamin D deficiency    8. Hyperparathyroidism, primary    9. Asymptomatic menopausal state        Plan:   Spondylosis of lumbar spine  -     Ambulatory consult to Neurosurgery  -     meloxicam (MOBIC) 7.5 MG tablet; Take 1-2 tabs per day as needed  Dispense: 30 tablet; Refill: 0  Osteoarthritis of spine with radiculopathy, lumbar region  -     meloxicam (MOBIC) 7.5 MG tablet; Take 1-2 tabs per day as needed  Dispense: 30 tablet; Refill: 0  Reviewed MRI of the lumbar spine test results with patient today  Will do a trial of meloxicam but patient was advised to watch out for any significant bruising, if any worsening will discontinue meloxicam  Patient was advised to continue physical therapy and gabapentin 600 mg at bedtime  Patient to discuss further with Neurosurgery, but patient reports that she is not ready for the surgery    Squamous cell cancer of scalp  and skin of neck  To be followed by Dermatology, patient reports that she cannot use fluorouracil cream as prescribed as it causes significant soreness and not able to pinpoint the lesion to the scalp by the patient.     Skin lesion of back  Likely lipoma, reassurance and observation recommended at this time  If any changes in size or tenderness discuss further with Dermatology    Essential hypertension  Blood pressure is stable today currently on lisinopril hydrochlorothiazide 20/25 mg daily    Morbid obesity with BMI of 40.0-44.9, adult  Lifestyle modifications recommended to lose weight with BMI 40    Vitamin D deficiency  Currently taking vitamin-D by prescription    Hyperparathyroidism, primary    Asymptomatic menopausal state  -     DXA Bone Density Spine And Hip; Future; Expected date: 04/09/2019  Due for DEXA scan

## 2019-04-11 ENCOUNTER — TELEPHONE (OUTPATIENT)
Dept: DERMATOLOGY | Facility: CLINIC | Age: 69
End: 2019-04-11

## 2019-04-11 NOTE — TELEPHONE ENCOUNTER
----- Message from Nedra Polanco sent at 4/11/2019 10:33 AM CDT -----  Contact: pt  Pt stated she will like a call back about freezing a bump in her head, she can be reached at [phone] Thanks

## 2019-04-15 ENCOUNTER — OFFICE VISIT (OUTPATIENT)
Dept: PAIN MEDICINE | Facility: CLINIC | Age: 69
End: 2019-04-15
Payer: MEDICARE

## 2019-04-15 ENCOUNTER — TELEPHONE (OUTPATIENT)
Dept: INTERNAL MEDICINE | Facility: CLINIC | Age: 69
End: 2019-04-15

## 2019-04-15 VITALS
HEIGHT: 62 IN | WEIGHT: 223.75 LBS | BODY MASS INDEX: 41.17 KG/M2 | DIASTOLIC BLOOD PRESSURE: 94 MMHG | SYSTOLIC BLOOD PRESSURE: 143 MMHG | HEART RATE: 53 BPM

## 2019-04-15 DIAGNOSIS — M54.16 LUMBAR RADICULOPATHY: ICD-10-CM

## 2019-04-15 DIAGNOSIS — E66.01 MORBID OBESITY WITH BMI OF 40.0-44.9, ADULT: Primary | ICD-10-CM

## 2019-04-15 DIAGNOSIS — M47.816 LUMBAR SPONDYLOSIS: ICD-10-CM

## 2019-04-15 PROCEDURE — 99999 PR PBB SHADOW E&M-EST. PATIENT-LVL II: ICD-10-PCS | Mod: PBBFAC,,, | Performed by: ANESTHESIOLOGY

## 2019-04-15 PROCEDURE — 1101F PT FALLS ASSESS-DOCD LE1/YR: CPT | Mod: CPTII,S$GLB,, | Performed by: ANESTHESIOLOGY

## 2019-04-15 PROCEDURE — 1101F PR PT FALLS ASSESS DOC 0-1 FALLS W/OUT INJ PAST YR: ICD-10-PCS | Mod: CPTII,S$GLB,, | Performed by: ANESTHESIOLOGY

## 2019-04-15 PROCEDURE — 3080F PR MOST RECENT DIASTOLIC BLOOD PRESSURE >= 90 MM HG: ICD-10-PCS | Mod: CPTII,S$GLB,, | Performed by: ANESTHESIOLOGY

## 2019-04-15 PROCEDURE — 3077F PR MOST RECENT SYSTOLIC BLOOD PRESSURE >= 140 MM HG: ICD-10-PCS | Mod: CPTII,S$GLB,, | Performed by: ANESTHESIOLOGY

## 2019-04-15 PROCEDURE — 99214 OFFICE O/P EST MOD 30 MIN: CPT | Mod: S$GLB,,, | Performed by: ANESTHESIOLOGY

## 2019-04-15 PROCEDURE — 99499 UNLISTED E&M SERVICE: CPT | Mod: S$GLB,,, | Performed by: ANESTHESIOLOGY

## 2019-04-15 PROCEDURE — 3080F DIAST BP >= 90 MM HG: CPT | Mod: CPTII,S$GLB,, | Performed by: ANESTHESIOLOGY

## 2019-04-15 PROCEDURE — 99214 PR OFFICE/OUTPT VISIT, EST, LEVL IV, 30-39 MIN: ICD-10-PCS | Mod: S$GLB,,, | Performed by: ANESTHESIOLOGY

## 2019-04-15 PROCEDURE — 3077F SYST BP >= 140 MM HG: CPT | Mod: CPTII,S$GLB,, | Performed by: ANESTHESIOLOGY

## 2019-04-15 PROCEDURE — 99499 RISK ADDL DX/OHS AUDIT: ICD-10-PCS | Mod: S$GLB,,, | Performed by: ANESTHESIOLOGY

## 2019-04-15 PROCEDURE — 99999 PR PBB SHADOW E&M-EST. PATIENT-LVL II: CPT | Mod: PBBFAC,,, | Performed by: ANESTHESIOLOGY

## 2019-04-15 NOTE — TELEPHONE ENCOUNTER
----- Message from Nadya Chintan sent at 4/15/2019 10:35 AM CDT -----  Contact: Patient   Patient would like a call back at  [phone], Regards to her test results that she would like Dr. Oliva to look at sourav rodriguez but she does not know when it was done  nor the name of the doctor who'd order it for her but she is looking to get a second opinion.    Thanks  Td

## 2019-04-15 NOTE — TELEPHONE ENCOUNTER
Spoke with patient who had a procedure done by Dr. Riley at [facility] (resukts under pathology on 02/13/2019). Patient is scheduled for another procedure to freeze a bump on her head where the iopsy was taken. Patient would like your opinion on whether you agree with treatment plan to go further with the procedure. Also patient advised shes been having headaches where biopsy was taken, headache is relieved by Tylenol extra strength 2-3. I advised patient to not go over 4,000 mg in a 24 hour period, she verbalized understanding.     Please advise, patient would like an answer today if possible

## 2019-04-16 NOTE — PROGRESS NOTES
Chief Pain Complaint:  Follow-up (4 week follow up )    Interval History: The patient was last seen 3/4/2019. At that visit the plan was to start Gabapentin. The patient reports that  she is/was better following the last visit. The changes lasted 4 weeks. The changes have continued through this visit.      History of Present Illness:   Beth Souza is a 68 y.o. female  who is presenting with a chief complaint of Follow-up (4 week follow up )  . The patient began experiencing this problem insidiously, and the pain has been gradually worsening over the past 3 year(s). The pain is described as throbbing, shooting, cramping, burning, aching and electrical and is located in the bilateral lumbar spine. Pain is intermittent and lasts hours. The pain radiates to bilateral lower extremities. Pain is 40% axial and 60% radicular. The patient rates her pain a 8 out of ten and interferes with activities of daily living a 8 out of ten. Pain is exacerbated by ambulation, and is improved by rest. Patient reports no prior trauma, no prior spinal surgery     - pertinent negatives: No fever, No chills, No weight loss, No bladder dysfunction, No bowel dysfunction, No saddle anesthesia  - pertinent positives: none    - medications, other therapies tried (physical therapy, injections):     >> NSAIDs, Tylenol, gabapentin, zanaflex and flexeril    >> Has previously undergone Physical Therapy    >> Has previously undergone spinal injection/s         Lumbar LAWSON with relief for less than 1 week    Imaging / Labs / Studies (reviewed on 4/16/2019):    Results for orders placed during the hospital encounter of 02/13/19   MRI Lumbar Spine Without Contrast    Narrative EXAMINATION:  MRI LUMBAR SPINE WITHOUT CONTRAST    CLINICAL HISTORY:  Spondylolisthesis; Other spondylosis with radiculopathy, lumbar region    TECHNIQUE:  Multiplanar, multisequence MR images were acquired from the thoracolumbar junction to the sacrum without the  administration of contrast.    COMPARISON:  08/03/2016    FINDINGS:  There is grade 1 spondylolisthesis of L4 on L5. The vertebral body heights are well maintained, with no fracture.  No marrow signal abnormality suspicious for an infiltrative process.    The conus medullaris terminates at approximately the L2-3 disc space.  The adjacent soft tissue structures show no significant abnormalities.  There is disc desiccation noted throughout the lumbar spine with mild-to-moderate disc space narrowing noted at the L4-5 and L5-S1 levels.  Mild diffuse disc bulge is present at the T11-T12 and T12-L1 levels.  No abutment of the anterior cord.    L1-L2: No significant central canal or neural foraminal narrowing.Mild bilateral facet arthropathy noted at this level.    L2-L3: No significant central canal or neural foraminal narrowing.Mild-to-moderate bilateral facet arthropathy noted at this level.    L3-L4: Severe bilateral facet arthropathy and ligamentum flavum hypertrophy resulting moderate narrowing of the central canal and mild to moderate narrowing of either neural foraminal canal.    L4-L5:  Grade 1 spondylolisthesis severe bilateral facet arthropathy and ligamentum flavum hypertrophy resulting in severe narrowing of the central canal.  The right neural foraminal canal is mildly to moderately narrowed.  The left neural foraminal canal is moderately narrowed    L5-S1:  Diffuse disc bulge and mild bilateral facet arthropathy resulting in no significant central canal narrowing.  The bilateral neural foraminal canals are moderately to severely narrowed.      Impression 1. Prominent degenerative changes within the mid to lower cervical spine as detailed above.  The overall appearance is similar to the study from 2016.      Electronically signed by: Alex Oliva DO  Date:    02/13/2019  Time:    09:29       Results for orders placed during the hospital encounter of 05/13/16   X-Ray Lumbar Spine Complete 5 View    Narrative  Five view lumbar spine.    Findings: There is multilevel degenerative vertebral endplate spurring, disc space narrowing, and facet arthropathy.  Vacuum disc phenomenon at L5-S1.  Grade one anterolisthesis at L4-L5.  Pedicles appear intact.  No compression fracture.    Impression  As above.  ______________________________________     Electronically signed by: WARD ZULETA MD  Date:     05/13/16  Time:    14:56      Results for orders placed during the hospital encounter of 10/04/18   X-Ray Lumbar Complete With Flex And Ext    Narrative EXAMINATION:  XR LUMBAR SPINE 5 VIEW WITH FLEX AND EXT    CLINICAL HISTORY:  Low back pain, >6wks conservative tx, persistent-progressive sx, surgical candidate;  Spondylolisthesis, lumbar region    TECHNIQUE:  Five views of the lumbar spine plus flexion extension views were performed.    COMPARISON:  None.    FINDINGS:  Vertebral body heights are within normal limits.  There is grade 1 anterolisthesis of L4 on L5 which appears unchanged from prior.  No definite change in spinal alignment with flexion or extension to suggest instability.  There is moderate disc height loss at L4-5 and L5-S1.  Multilevel facet arthropathy noted in the lower lumbar spine.  No pars defects visualized.  Posterior elements appear grossly intact. No acute fractures or subluxations are demonstrated.  The remaining visualized osseous and soft tissue structures demonstrate no appreciable abnormality.  No appreciable change from prior.      Impression Unchanged appearance of the lumbar spine with multilevel degenerative changes and grade 1 anterolisthesis of L4 on L5.      Electronically signed by: Gerber Aranda MD  Date:    10/04/2018  Time:    11:38     Results for orders placed during the hospital encounter of 02/25/15   X-Ray Lumbar Spine AP And Lateral    Narrative Findings: Compared to previous 01/23/14.  No new osseous abnormalities are identified. 2 views    Impression  Stable appearance to osseous  "structures.  Grade 1 spondylolisthesis L4 on L5 and degenerative change.      Electronically signed by: JEROME VOGT MD  Date:     02/25/15  Time:    10:51        Review of Systems:  CONSTITUTIONAL: patient denies any fever, chills, or weight loss  SKIN: patient denies any rash or itching  RESPIRATORY: patient denies having any shortness of breath  GASTROINTESTINAL: patient denies having any diarrhea, constipation, or bowel incontinence  GENITOURINARY: patient denies having any abnormal bladder function    MUSCULOSKELETAL:  - patient complains of the above noted pain/s (see chief pain complaint)    NEUROLOGICAL:   - pain as above  - strength in Lower extremities is intact, BILATERALLY  - sensation in Lower extremities is intact, BILATERALLY  - patient denies any loss of bowel or bladder control      PSYCHIATRIC: patient denies any change in mood    Other:  All other systems reviewed and are negative      Physical Exam:  BP (!) 143/94 (BP Location: Left arm, Patient Position: Sitting, BP Method: Large (Automatic))   Pulse (!) 53   Ht 5' 2" (1.575 m)   Wt 101.5 kg (223 lb 12.3 oz)   BMI 40.93 kg/m²  (reviewed on 4/16/2019)  General: Alert and oriented, in no apparent distress.  Gait: normal gait.  Skin: No rashes, No discoloration, No obvious lesions  HEENT: Normocephalic, atraumatic. Pupils equal and round.  Cardiovascular: Regular rate and rhythm , no significant peripheral edema present  Respiratory: Without audible wheezing, without use of accessory muscles of respiration.    Musculoskeletal:    Cervical Spine    - Pain on flexion of cervical spine Absent  - Spurling's Test:  Absent    - Pain on extension of cervical spine Absent  - TTP over the cervical facet joints Absent  - Cervical facet loading Absent      Lumbar Spine    - Pain on flexion of lumbar spine Present  - Straight Leg Raise:  Present    - Pain on extension of lumbar spine Present  - TTP over the lumbar facet joints Present Bilateral L5-S1   - " Lumbar facet loading Present    -Pain on palpation over the SI joint  Absent  - YURI: Absent      Neuro:    Strength:  UE R/L: D: 5/5; B: 5/5; T: 5/5; WF: 5/5; WE: 5/5; IO: 5/5;  LE R/L: HF: 5/5, HE: 5/5, KF: 5/5; KE: 5/5; FE: 5/5; FF: 5/5    Extremity Reflexes: Brisk and symmetric throughout.      Extremity Sensory: Sensation to pinprick and temperature symmetric. Proprioception intact.      Psych:  Mood and affect is appropriate      Assessment:    Beth Suoza is a 68 y.o. year old female who is presenting with     Encounter Diagnoses   Name Primary?    Morbid obesity with BMI of 40.0-44.9, adult Yes    Lumbar spondylosis     Lumbar radiculopathy        Plan:    1. Interventional: Consider bilateral L4-5 TFESI with local but patient wants of hold off.     2. Pharmacologic: Continue  600 mg PO Qhs can increase to 900 mg PO Qhs.     3. Rehabilitative: Encouraged PT.     4. Diagnostic: Lumbar MRI reviewed with patient.     5. Consult: Encouraged Neurosurgical consultation for severe L4-5 canal stenosis but patient wants to hold off for now.    6. F/U: PRN.       20 minutes were spent in this encounter with more than 50% of the time used for counseling and review of the plan.  Imaging / studies reviewed, detailed above.  I discussed in detail the risks, benefits, and alternatives to any and all potential treatment options.  All questions and concerns were fully addressed today in clinic. Medical decision making moderate.    Thank you for the opportunity to assist in the care of this patient.    Best wishes,    Signed:    Mukul Swanson MD          Disclaimer:  This note may have been prepared using voice recognition software, it may have not been extensively proofed, as such there could be errors within the text such as sound alike errors.

## 2019-04-16 NOTE — TELEPHONE ENCOUNTER
----- Message from Marixa Howell sent at 4/16/2019  2:34 PM CDT -----  Contact: self/[phone]  Type:  Patient Returning Call    Who Called:Beth Souza    Who Left Message for Patient:nurse  Does the patient know what this is regarding?:procedure   Would the patient rather a call back or a response via [employer]? Call back  Best Call Back Number:[phone]  Additional Information:

## 2019-04-16 NOTE — TELEPHONE ENCOUNTER
Spoke with pt. Advised pt to follow up with dermatology treatments as recommended per .Pt was thankful for the call back.Call ended well.

## 2019-04-17 ENCOUNTER — OFFICE VISIT (OUTPATIENT)
Dept: NEUROSURGERY | Facility: CLINIC | Age: 69
End: 2019-04-17
Payer: MEDICARE

## 2019-04-17 VITALS
BODY MASS INDEX: 40.65 KG/M2 | HEART RATE: 50 BPM | WEIGHT: 220.88 LBS | HEIGHT: 62 IN | DIASTOLIC BLOOD PRESSURE: 87 MMHG | SYSTOLIC BLOOD PRESSURE: 146 MMHG

## 2019-04-17 DIAGNOSIS — M43.16 SPONDYLOLISTHESIS, LUMBAR REGION: ICD-10-CM

## 2019-04-17 DIAGNOSIS — M51.36 DEGENERATIVE DISC DISEASE, LUMBAR: ICD-10-CM

## 2019-04-17 DIAGNOSIS — G99.2 MYELOPATHY CONCURRENT WITH AND DUE TO STENOSIS OF LUMBAR SPINE: ICD-10-CM

## 2019-04-17 DIAGNOSIS — M48.061 MYELOPATHY CONCURRENT WITH AND DUE TO STENOSIS OF LUMBAR SPINE: ICD-10-CM

## 2019-04-17 DIAGNOSIS — G62.9 PERIPHERAL POLYNEUROPATHY: Primary | ICD-10-CM

## 2019-04-17 PROCEDURE — 3079F DIAST BP 80-89 MM HG: CPT | Mod: CPTII,S$GLB,, | Performed by: NEUROLOGICAL SURGERY

## 2019-04-17 PROCEDURE — 3079F PR MOST RECENT DIASTOLIC BLOOD PRESSURE 80-89 MM HG: ICD-10-PCS | Mod: CPTII,S$GLB,, | Performed by: NEUROLOGICAL SURGERY

## 2019-04-17 PROCEDURE — 1101F PR PT FALLS ASSESS DOC 0-1 FALLS W/OUT INJ PAST YR: ICD-10-PCS | Mod: CPTII,S$GLB,, | Performed by: NEUROLOGICAL SURGERY

## 2019-04-17 PROCEDURE — 99499 UNLISTED E&M SERVICE: CPT | Mod: S$GLB,,, | Performed by: NEUROLOGICAL SURGERY

## 2019-04-17 PROCEDURE — 3077F SYST BP >= 140 MM HG: CPT | Mod: CPTII,S$GLB,, | Performed by: NEUROLOGICAL SURGERY

## 2019-04-17 PROCEDURE — 99204 OFFICE O/P NEW MOD 45 MIN: CPT | Mod: S$GLB,,, | Performed by: NEUROLOGICAL SURGERY

## 2019-04-17 PROCEDURE — 99499 RISK ADDL DX/OHS AUDIT: ICD-10-PCS | Mod: S$GLB,,, | Performed by: NEUROLOGICAL SURGERY

## 2019-04-17 PROCEDURE — 3077F PR MOST RECENT SYSTOLIC BLOOD PRESSURE >= 140 MM HG: ICD-10-PCS | Mod: CPTII,S$GLB,, | Performed by: NEUROLOGICAL SURGERY

## 2019-04-17 PROCEDURE — 99999 PR PBB SHADOW E&M-EST. PATIENT-LVL III: CPT | Mod: PBBFAC,,, | Performed by: NEUROLOGICAL SURGERY

## 2019-04-17 PROCEDURE — 99999 PR PBB SHADOW E&M-EST. PATIENT-LVL III: ICD-10-PCS | Mod: PBBFAC,,, | Performed by: NEUROLOGICAL SURGERY

## 2019-04-17 PROCEDURE — 1101F PT FALLS ASSESS-DOCD LE1/YR: CPT | Mod: CPTII,S$GLB,, | Performed by: NEUROLOGICAL SURGERY

## 2019-04-17 PROCEDURE — 99204 PR OFFICE/OUTPT VISIT, NEW, LEVL IV, 45-59 MIN: ICD-10-PCS | Mod: S$GLB,,, | Performed by: NEUROLOGICAL SURGERY

## 2019-04-17 NOTE — LETTER
April 17, 2019      Edna Oliva MD  [address]  [place] [address]           [facility]  [address] [facility] [address]  Phone: [phone]  Fax: [phone]          Patient: Beth Souza   MR Number: [id number]   YOB: 1950   Date of Visit: 4/17/2019       Dear Dr. Edna Oliva:    Thank you for referring Beth Souza to me for evaluation. Attached you will find relevant portions of my assessment and plan of care.    If you have questions, please do not hesitate to call me. I look forward to following Beth Souza along with you.    Sincerely,    Becky Lundy, RENEA    Enclosure  CC:  No Recipients    If you would like to receive this communication electronically, please contact [email or web address] or [phone] to request more information on [employer] access.    For providers and/or their staff who would like to refer a patient to [facility], please contact us through our one-stop-shop provider referral line, [facility], at [phone].    If you feel you have received this communication in error or would no longer like to receive these types of communications, please e-mail [email or web address]

## 2019-04-17 NOTE — PROGRESS NOTES
Subjective:      Patient ID: Beth Souza is a 68 y.o. female.    Chief Complaint: Lumbar Spine Pain (L-Spine)    Patient is here today for evaluation of lower back pain.  She has a long history of lower back pain going down into the lower extremities get worse over the past several years.  She has seen pain management for this and has injections in the past.  She has also had physical therapy for this without any relief in the past.  Currently she takes nonsteroidal anti-inflammatories well as well as muscle relaxers when needed.  She has an MRI of the lumbar spine from 2019 as well as 1 from 2016 compare.  She ambulates unassisted and denies any bowel bladder symptoms  No previous spine surgery     Review of Systems   Constitutional: Negative for activity change, appetite change and chills.   HENT: Negative for hearing loss, sore throat and tinnitus.    Eyes: Negative for pain, discharge and itching.   Cardiovascular: Negative for chest pain.   Gastrointestinal: Negative for abdominal pain.   Endocrine: Negative for cold intolerance and heat intolerance.   Genitourinary: Negative for difficulty urinating and dysuria.   Musculoskeletal: Positive for back pain and gait problem.   Allergic/Immunologic: Negative for environmental allergies.   Neurological: Positive for weakness. Negative for dizziness, tremors, light-headedness and headaches.   Hematological: Negative for adenopathy.   Psychiatric/Behavioral: Negative for agitation, behavioral problems and confusion.         Objective:       Physical Exam:  Nursing note and vitals reviewed.    Constitutional: She appears well-developed and well-nourished. No distress.     Eyes: Pupils are equal, round, and reactive to light. EOM are normal.     Cardiovascular: Intact distal pulses.     Abdominal: Soft.     Psych/Behavior: She is alert. She is oriented to person, place, and time. She has a normal mood and affect.     Neurological:        DTRs: Tricep reflexes  are 2+ on the right side and 2+ on the left side. Bicep reflexes are 2+ on the right side and 2+ on the left side. Brachioradialis reflexes are 2+ on the right side and 2+ on the left side. Achilles reflexes are 2+ on the right side and 2+ on the left side.     General    Nursing note and vitals reviewed.  Constitutional: She is oriented to person, place, and time. She appears well-developed and well-nourished. No distress.   HENT:   Head: Normocephalic and atraumatic.   Nose: Nose normal.   Eyes: EOM are normal. Pupils are equal, round, and reactive to light.   Neck: Normal range of motion. Neck supple.   Cardiovascular: Intact distal pulses.    Pulmonary/Chest: Effort normal. No respiratory distress.   Abdominal: Soft. She exhibits no distension.   Neurological: She is alert and oriented to person, place, and time. No cranial nerve deficit.   Psychiatric: She has a normal mood and affect. Her behavior is normal.         Right Ankle/Foot Exam     Tests   Heel Walk: unable to perform  Tiptoe Walk: unable to perform  Single Heel Rise: able to perform  Double Heel Rise: unable to perform double heel rise    Left Ankle/Foot Exam     Tests   Heel Walk: unable to perform  Tiptoe Walk: unable to perform  Single Heel Rise: able to perform  Double Heel Rise: able to perform  Back (L-Spine & T-Spine) / Neck (C-Spine) Exam     Tenderness Posterior midline palpation reveals tenderness of the Upper L-Spine and Lower L-Spine. Right paramedian tenderness of the Lower L-Spine, Sacrum and Upper L-Spine. Left paramedian tenderness of the Lower L-Spine, Sacrum and Upper L-Spine.     Back (L-Spine & T-Spine) Range of Motion   Lateral bend right: abnormal   Lateral bend left: abnormal     Neck (C-Spine) Range of Motion   Flexion:     Normal  Extension: Normal  Right Lateral Bend: normal  Left Lateral Bend: normal  Right Rotation: normal  Left Rotation: normal    Spinal Sensation   Right Side Sensation  C-Spine Level: normal   L-Spine  Level: decreased  Left Side Sensation  C-Spine Level: normal  L-Spine Level: decreased    Back (L-Spine & T-Spine) Tests   Right Side Tests  Squat Test: unable to perform  Left Side Tests  Squat: unable to perform    Neck (C-Spine) Tests   Spurling's Test   Left:  Negative  Right: negative      Muscle Strength   Right Upper Extremity   Biceps: 5/5/5   Deltoid:  5/5  Triceps:  5/5  Wrist extension: 5/5/5   Wrist flexion: 5/5/5   Finger Flexors:  5/5  Finger Extensors:  5/5  Left Upper Extremity  Biceps: 5/5/5   Deltoid:  5/5  Triceps:  5/5  Wrist extension: 5/5/5   Wrist flexion: 5/5/5   Finger Flexors:  5/5  Finger Extensors:  5/5  Right Lower Extremity   Hip Abduction: 5/5   Hip Flexion: 5/5   Hip Extensors: 5/5  Quadriceps:  5/5   Hamstrin/5   Anterior tibial:  4/5/5  Gastrocsoleus:  5/5/5  EHL:  4/5  Left Lower Extremity   Hip Abduction: 5/5   Hip Flexion: 5/5   Hip Extensors: 5/5  Quadriceps:  5/5   Hamstrin/5   Anterior tibial:  /5/5   Gastrocsoleus:  5/5/5  EHL:  4/5    Reflexes     Left Side  Biceps:  2+  Triceps:  2+  Brachioradialis:  2+  Quadriceps:  2+  Post Tibial:  2+  Achilles:  2+  Left Garcia's Sign:  Absent    Right Side   Biceps:  2+  Triceps:  2+  Brachioradialis:  2+  Quadriceps:  2+  Post Tibial:  2+  Achilles:  2+  Right Garcia's Sign:  absent    I  reviewed all pertinent imaging regarding this case.  Assessment:     1. Peripheral polyneuropathy    2. Degenerative disc disease, lumbar    3. Spondylolisthesis, lumbar region    4. Myelopathy concurrent with and due to stenosis of lumbar spine      Plan:     Peripheral polyneuropathy  -     CT Lumbar Spine Without Contrast; Future; Expected date: 2019    Degenerative disc disease, lumbar    Spondylolisthesis, lumbar region    Myelopathy concurrent with and due to stenosis of lumbar spine     Will order CT scan of lumbar spine.  Patient has significant stenosis at L3-4 and 4 5 which corresponds with her symptoms going down the  back and into the legs bilaterally.  She does have a spondylolisthesis at L4-5 grade 1.  Will follow her back up with a CT scan when she has good willing to consider an operation thanks    Thank you for the referral   Please call with any questions    Juan Pablo Gautam MD  Neurosurgery

## 2019-04-25 ENCOUNTER — HOSPITAL ENCOUNTER (OUTPATIENT)
Dept: RADIOLOGY | Facility: HOSPITAL | Age: 69
Discharge: HOME | End: 2019-04-25
Attending: NEUROLOGICAL SURGERY
Payer: MEDICARE

## 2019-04-25 DIAGNOSIS — G62.9 PERIPHERAL POLYNEUROPATHY: ICD-10-CM

## 2019-04-25 PROCEDURE — 72131 CT LUMBAR SPINE W/O DYE: CPT | Mod: TC

## 2019-04-30 ENCOUNTER — OFFICE VISIT (OUTPATIENT)
Dept: DERMATOLOGY | Facility: CLINIC | Age: 69
End: 2019-04-30
Payer: MEDICARE

## 2019-04-30 ENCOUNTER — OFFICE VISIT (OUTPATIENT)
Dept: SLEEP MEDICINE | Facility: CLINIC | Age: 69
End: 2019-04-30
Payer: MEDICARE

## 2019-04-30 ENCOUNTER — APPOINTMENT (OUTPATIENT)
Dept: RADIOLOGY | Facility: HOSPITAL | Age: 69
End: 2019-04-30
Attending: FAMILY MEDICINE
Payer: MEDICARE

## 2019-04-30 ENCOUNTER — OFFICE VISIT (OUTPATIENT)
Dept: NEUROSURGERY | Facility: CLINIC | Age: 69
End: 2019-04-30
Payer: MEDICARE

## 2019-04-30 VITALS
RESPIRATION RATE: 18 BRPM | SYSTOLIC BLOOD PRESSURE: 134 MMHG | WEIGHT: 220.25 LBS | DIASTOLIC BLOOD PRESSURE: 82 MMHG | HEIGHT: 62 IN | OXYGEN SATURATION: 98 % | BODY MASS INDEX: 40.53 KG/M2 | HEART RATE: 68 BPM

## 2019-04-30 VITALS
HEIGHT: 62 IN | DIASTOLIC BLOOD PRESSURE: 82 MMHG | BODY MASS INDEX: 40.53 KG/M2 | RESPIRATION RATE: 12 BRPM | SYSTOLIC BLOOD PRESSURE: 134 MMHG | WEIGHT: 220.25 LBS | HEART RATE: 68 BPM

## 2019-04-30 DIAGNOSIS — M51.36 DEGENERATIVE DISC DISEASE, LUMBAR: Primary | ICD-10-CM

## 2019-04-30 DIAGNOSIS — Z78.0 ASYMPTOMATIC MENOPAUSAL STATE: ICD-10-CM

## 2019-04-30 DIAGNOSIS — E66.01 MORBID OBESITY WITH BMI OF 40.0-44.9, ADULT: ICD-10-CM

## 2019-04-30 DIAGNOSIS — D09.9 SQUAMOUS CELL CARCINOMA IN SITU (SCCIS): Primary | ICD-10-CM

## 2019-04-30 DIAGNOSIS — G47.33 OSA (OBSTRUCTIVE SLEEP APNEA): Primary | ICD-10-CM

## 2019-04-30 DIAGNOSIS — M43.16 SPONDYLOLISTHESIS, LUMBAR REGION: ICD-10-CM

## 2019-04-30 PROCEDURE — 3079F PR MOST RECENT DIASTOLIC BLOOD PRESSURE 80-89 MM HG: ICD-10-PCS | Mod: CPTII,S$GLB,, | Performed by: NEUROLOGICAL SURGERY

## 2019-04-30 PROCEDURE — 99999 PR PBB SHADOW E&M-EST. PATIENT-LVL III: CPT | Mod: PBBFAC,,, | Performed by: NURSE PRACTITIONER

## 2019-04-30 PROCEDURE — 99214 OFFICE O/P EST MOD 30 MIN: CPT | Mod: S$GLB,,, | Performed by: NEUROLOGICAL SURGERY

## 2019-04-30 PROCEDURE — 99999 PR PBB SHADOW E&M-EST. PATIENT-LVL III: ICD-10-PCS | Mod: PBBFAC,,, | Performed by: NURSE PRACTITIONER

## 2019-04-30 PROCEDURE — 99213 PR OFFICE/OUTPT VISIT, EST, LEVL III, 20-29 MIN: ICD-10-PCS | Mod: S$GLB,,, | Performed by: NURSE PRACTITIONER

## 2019-04-30 PROCEDURE — 99999 PR PBB SHADOW E&M-EST. PATIENT-LVL II: ICD-10-PCS | Mod: PBBFAC,,, | Performed by: STUDENT IN AN ORGANIZED HEALTH CARE EDUCATION/TRAINING PROGRAM

## 2019-04-30 PROCEDURE — 3075F SYST BP GE 130 - 139MM HG: CPT | Mod: CPTII,S$GLB,, | Performed by: NURSE PRACTITIONER

## 2019-04-30 PROCEDURE — 3079F DIAST BP 80-89 MM HG: CPT | Mod: CPTII,S$GLB,, | Performed by: NEUROLOGICAL SURGERY

## 2019-04-30 PROCEDURE — 99214 PR OFFICE/OUTPT VISIT, EST, LEVL IV, 30-39 MIN: ICD-10-PCS | Mod: S$GLB,,, | Performed by: NEUROLOGICAL SURGERY

## 2019-04-30 PROCEDURE — 99213 OFFICE O/P EST LOW 20 MIN: CPT | Mod: S$GLB,,, | Performed by: STUDENT IN AN ORGANIZED HEALTH CARE EDUCATION/TRAINING PROGRAM

## 2019-04-30 PROCEDURE — 99999 PR PBB SHADOW E&M-EST. PATIENT-LVL II: CPT | Mod: PBBFAC,,, | Performed by: STUDENT IN AN ORGANIZED HEALTH CARE EDUCATION/TRAINING PROGRAM

## 2019-04-30 PROCEDURE — 1101F PT FALLS ASSESS-DOCD LE1/YR: CPT | Mod: CPTII,S$GLB,, | Performed by: NURSE PRACTITIONER

## 2019-04-30 PROCEDURE — 99999 PR PBB SHADOW E&M-EST. PATIENT-LVL III: ICD-10-PCS | Mod: PBBFAC,,, | Performed by: NEUROLOGICAL SURGERY

## 2019-04-30 PROCEDURE — 3075F SYST BP GE 130 - 139MM HG: CPT | Mod: CPTII,S$GLB,, | Performed by: NEUROLOGICAL SURGERY

## 2019-04-30 PROCEDURE — 3074F PR MOST RECENT SYSTOLIC BLOOD PRESSURE < 130 MM HG: ICD-10-PCS | Mod: CPTII,S$GLB,, | Performed by: STUDENT IN AN ORGANIZED HEALTH CARE EDUCATION/TRAINING PROGRAM

## 2019-04-30 PROCEDURE — 77080 DXA BONE DENSITY AXIAL: CPT | Mod: TC

## 2019-04-30 PROCEDURE — 1101F PT FALLS ASSESS-DOCD LE1/YR: CPT | Mod: CPTII,S$GLB,, | Performed by: STUDENT IN AN ORGANIZED HEALTH CARE EDUCATION/TRAINING PROGRAM

## 2019-04-30 PROCEDURE — 3075F PR MOST RECENT SYSTOLIC BLOOD PRESS GE 130-139MM HG: ICD-10-PCS | Mod: CPTII,S$GLB,, | Performed by: NURSE PRACTITIONER

## 2019-04-30 PROCEDURE — 3078F PR MOST RECENT DIASTOLIC BLOOD PRESSURE < 80 MM HG: ICD-10-PCS | Mod: CPTII,S$GLB,, | Performed by: STUDENT IN AN ORGANIZED HEALTH CARE EDUCATION/TRAINING PROGRAM

## 2019-04-30 PROCEDURE — 1101F PR PT FALLS ASSESS DOC 0-1 FALLS W/OUT INJ PAST YR: ICD-10-PCS | Mod: CPTII,S$GLB,, | Performed by: STUDENT IN AN ORGANIZED HEALTH CARE EDUCATION/TRAINING PROGRAM

## 2019-04-30 PROCEDURE — 3078F DIAST BP <80 MM HG: CPT | Mod: CPTII,S$GLB,, | Performed by: STUDENT IN AN ORGANIZED HEALTH CARE EDUCATION/TRAINING PROGRAM

## 2019-04-30 PROCEDURE — 99999 PR PBB SHADOW E&M-EST. PATIENT-LVL III: CPT | Mod: PBBFAC,,, | Performed by: NEUROLOGICAL SURGERY

## 2019-04-30 PROCEDURE — 77080 DEXA BONE DENSITY SPINE HIP: ICD-10-PCS | Mod: 26,,, | Performed by: RADIOLOGY

## 2019-04-30 PROCEDURE — 1101F PR PT FALLS ASSESS DOC 0-1 FALLS W/OUT INJ PAST YR: ICD-10-PCS | Mod: CPTII,S$GLB,, | Performed by: NEUROLOGICAL SURGERY

## 2019-04-30 PROCEDURE — 1101F PT FALLS ASSESS-DOCD LE1/YR: CPT | Mod: CPTII,S$GLB,, | Performed by: NEUROLOGICAL SURGERY

## 2019-04-30 PROCEDURE — 3079F PR MOST RECENT DIASTOLIC BLOOD PRESSURE 80-89 MM HG: ICD-10-PCS | Mod: CPTII,S$GLB,, | Performed by: NURSE PRACTITIONER

## 2019-04-30 PROCEDURE — 77080 DXA BONE DENSITY AXIAL: CPT | Mod: 26,,, | Performed by: RADIOLOGY

## 2019-04-30 PROCEDURE — 3079F DIAST BP 80-89 MM HG: CPT | Mod: CPTII,S$GLB,, | Performed by: NURSE PRACTITIONER

## 2019-04-30 PROCEDURE — 99213 PR OFFICE/OUTPT VISIT, EST, LEVL III, 20-29 MIN: ICD-10-PCS | Mod: S$GLB,,, | Performed by: STUDENT IN AN ORGANIZED HEALTH CARE EDUCATION/TRAINING PROGRAM

## 2019-04-30 PROCEDURE — 99213 OFFICE O/P EST LOW 20 MIN: CPT | Mod: S$GLB,,, | Performed by: NURSE PRACTITIONER

## 2019-04-30 PROCEDURE — 3074F SYST BP LT 130 MM HG: CPT | Mod: CPTII,S$GLB,, | Performed by: STUDENT IN AN ORGANIZED HEALTH CARE EDUCATION/TRAINING PROGRAM

## 2019-04-30 PROCEDURE — 1101F PR PT FALLS ASSESS DOC 0-1 FALLS W/OUT INJ PAST YR: ICD-10-PCS | Mod: CPTII,S$GLB,, | Performed by: NURSE PRACTITIONER

## 2019-04-30 PROCEDURE — 3075F PR MOST RECENT SYSTOLIC BLOOD PRESS GE 130-139MM HG: ICD-10-PCS | Mod: CPTII,S$GLB,, | Performed by: NEUROLOGICAL SURGERY

## 2019-04-30 NOTE — PROGRESS NOTES
Subjective:       Patient ID:  Beth Souza is a 68 y.o. female who presents for   Chief Complaint   Patient presents with    Spot     f/u spot to scalp x 2 months tx efudex      History of Present Illness: The patient presents with chief complaint for follow-up of SCCIS on the scalp. She was last seen on 3/15/19 where she was prescribed Efudex for treatment, however, she reports only having used it for 1 week and discontinued due to the discomfort from the medication. She denies any regrowth of the lesion, but states that she is having headaches and numbness on the side of the face where the lesion was located. No other growths or concerning skin lesions.         Review of Systems   Skin: Negative for itching, rash and dry skin.        Objective:    Physical Exam   Constitutional: She appears well-developed and well-nourished. No distress.   Neurological: She is alert and oriented to person, place, and time. She is not disoriented.   Psychiatric: She has a normal mood and affect.   Skin:   Areas Examined (abnormalities noted in diagram):   Scalp / Hair Palpated and Inspected  Head / Face Inspection Performed  Neck Inspection Performed              Diagram Legend     Erythematous scaling macule/papule c/w actinic keratosis       Vascular papule c/w angioma      Pigmented verrucoid papule/plaque c/w seborrheic keratosis      Yellow umbilicated papule c/w sebaceous hyperplasia      Irregularly shaped tan macule c/w lentigo     1-2 mm smooth white papules consistent with Milia      Movable subcutaneous cyst with punctum c/w epidermal inclusion cyst      Subcutaneous movable cyst c/w pilar cyst      Firm pink to brown papule c/w dermatofibroma      Pedunculated fleshy papule(s) c/w skin tag(s)      Evenly pigmented macule c/w junctional nevus     Mildly variegated pigmented, slightly irregular-bordered macule c/w mildly atypical nevus      Flesh colored to evenly pigmented papule c/w intradermal nevus        Pink pearly papule/plaque c/w basal cell carcinoma      Erythematous hyperkeratotic cursted plaque c/w SCC      Surgical scar with no sign of skin cancer recurrence      Open and closed comedones      Inflammatory papules and pustules      Verrucoid papule consistent consistent with wart     Erythematous eczematous patches and plaques     Dystrophic onycholytic nail with subungual debris c/w onychomycosis     Umbilicated papule    Erythematous-base heme-crusted tan verrucoid plaque consistent with inflamed seborrheic keratosis     Erythematous Silvery Scaling Plaque c/w Psoriasis     See annotation      Assessment / Plan:        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 Patient with 1 week of Efudex treatment, which was discontinued due to discomfort.     Discussed in depth with patient diagnosis, prognosis, and potential treatment options. Reassured patient that this is an early atypical lesion that has NOT become invasive. In addition, her symptoms of numbness and headaches are likely unrelated to her diagnosis and is likely due to some other etiology; should check with PCP if symptoms continue.    After in depth discussion, will treat area with cryotherapy and monitor.     Cryosurgery procedure note:    Verbal consent from the patient is obtained including, but not limited to, risk of hypopigmentation/hyperpigmentation, scar, recurrence of lesion. Liquid nitrogen cryosurgery is applied to 1 lesion to produce a freeze injury. The patient is aware that blisters may form and is instructed on wound care with gentle cleansing and use of vaseline ointment to keep moist until healed. The patient is supplied a handout on cryosurgery and is instructed to call if lesions do not completely resolve.         Follow up in about 1 year (around 4/30/2020).

## 2019-04-30 NOTE — PROGRESS NOTES
Subjective:      Patient ID: Beth Souza is a 68 y.o. female.    Chief Complaint: No chief complaint on file.    Patient is here today for follow-up with a CT scan of the lumbar spine for my review    Previously she was seen with an MRI which did show spondylolisthesis at L4-5 and stenosis at L3-4.  Since the last visit her symptoms are essentially unchanged she has back pain going down into the left lower extremity as well as the right.  Worse with activity and better with rest.  It is associated with numbness and tingling.  She ambulates unassisted and denies any bowel bladder symptoms    Patient presents today for follow up of CT Lumbar Mazor Protocol. Patient states her symptoms are overall she has a grade 1 spondylolisthesis at L4-5.  As well as facet disease and central stenosis at L3-4     Review of Systems   Constitutional: Negative for activity change, appetite change and chills.   HENT: Negative for hearing loss, sore throat and tinnitus.    Eyes: Negative for pain, discharge and itching.   Cardiovascular: Negative for chest pain.   Gastrointestinal: Negative for abdominal pain.   Endocrine: Negative for cold intolerance and heat intolerance.   Genitourinary: Negative for difficulty urinating and dysuria.   Musculoskeletal: Positive for back pain and gait problem.   Allergic/Immunologic: Negative for environmental allergies.   Neurological: Positive for weakness. Negative for dizziness, tremors, light-headedness and headaches.   Hematological: Negative for adenopathy.   Psychiatric/Behavioral: Negative for agitation, behavioral problems and confusion.         Objective:       Physical Exam:  Nursing note and vitals reviewed.    Constitutional: She appears well-developed and well-nourished. No distress.     Eyes: Pupils are equal, round, and reactive to light. EOM are normal.     Cardiovascular: Intact distal pulses.     Abdominal: Soft.     Psych/Behavior: She is alert. She is oriented to person,  place, and time. She has a normal mood and affect.     Neurological:        DTRs: Tricep reflexes are 2+ on the right side and 2+ on the left side. Bicep reflexes are 2+ on the right side and 2+ on the left side. Brachioradialis reflexes are 2+ on the right side and 2+ on the left side. Achilles reflexes are 2+ on the right side and 2+ on the left side.     General    Nursing note and vitals reviewed.  Constitutional: She is oriented to person, place, and time. She appears well-developed and well-nourished. No distress.   HENT:   Head: Normocephalic and atraumatic.   Nose: Nose normal.   Eyes: EOM are normal. Pupils are equal, round, and reactive to light.   Neck: Normal range of motion. Neck supple.   Cardiovascular: Intact distal pulses.    Pulmonary/Chest: Effort normal. No respiratory distress.   Abdominal: Soft. She exhibits no distension.   Neurological: She is alert and oriented to person, place, and time. No cranial nerve deficit.   Psychiatric: She has a normal mood and affect. Her behavior is normal.         Right Ankle/Foot Exam     Tests   Heel Walk: unable to perform  Tiptoe Walk: unable to perform  Single Heel Rise: able to perform  Double Heel Rise: unable to perform double heel rise    Left Ankle/Foot Exam     Tests   Heel Walk: unable to perform  Tiptoe Walk: unable to perform  Single Heel Rise: able to perform  Double Heel Rise: able to perform  Back (L-Spine & T-Spine) / Neck (C-Spine) Exam     Tenderness Posterior midline palpation reveals tenderness of the Upper L-Spine and Lower L-Spine. Right paramedian tenderness of the Lower L-Spine, Sacrum and Upper L-Spine. Left paramedian tenderness of the Lower L-Spine, Sacrum and Upper L-Spine.     Back (L-Spine & T-Spine) Range of Motion   Lateral bend right: abnormal   Lateral bend left: abnormal     Neck (C-Spine) Range of Motion   Flexion:     Normal  Extension: Normal  Right Lateral Bend: normal  Left Lateral Bend: normal  Right Rotation:  normal  Left Rotation: normal    Spinal Sensation   Right Side Sensation  C-Spine Level: normal   L-Spine Level: decreased  Left Side Sensation  C-Spine Level: normal  L-Spine Level: decreased    Back (L-Spine & T-Spine) Tests   Right Side Tests  Squat Test: unable to perform  Left Side Tests  Squat: unable to perform    Neck (C-Spine) Tests   Spurling's Test   Left:  Negative  Right: negative    Comments:  Pain limited weakness in dorsiflexion and plantar flexion on the left side  She has sensory deficits to light touch down the left lower extremity in the 4 5 distribution  Tenderness to palpation of the posterior paraspinous muscles      Muscle Strength   Right Upper Extremity   Biceps: 5/5/5   Deltoid:  5/5  Triceps:  5/5  Wrist extension: 5/5/5   Wrist flexion: 5/5/5   Finger Flexors:  5/5  Finger Extensors:  5/5  Left Upper Extremity  Biceps: 5/5/5   Deltoid:  5/5  Triceps:  5/5  Wrist extension: 5/5/5   Wrist flexion: 5/5/5   Finger Flexors:  5/5  Finger Extensors:  5/5  Right Lower Extremity   Hip Abduction: 5/5   Hip Flexion: 5/5   Hip Extensors: 5/5  Quadriceps:  5/5   Hamstrin/5   Anterior tibial:  4/5/5  Gastrocsoleus:  5/5/5  EHL:  4/5  Left Lower Extremity   Hip Abduction: 5/5   Hip Flexion: 5/5   Hip Extensors: 5/5  Quadriceps:  5/5   Hamstrin/5   Anterior tibial:  4/5/5   Gastrocsoleus:  5/5/5  EHL:  4/5    Reflexes     Left Side  Biceps:  2+  Triceps:  2+  Brachioradialis:  2+  Quadriceps:  2+  Post Tibial:  2+  Achilles:  2+  Left Garcia's Sign:  Absent    Right Side   Biceps:  2+  Triceps:  2+  Brachioradialis:  2+  Quadriceps:  2+  Post Tibial:  2+  Achilles:  2+  Right Garcia's Sign:  absent    I have personally reviewed the following imaging and agree with the radiologist's findings of:   CT azucena: 1. There is a transitional vertebral body between the thoracic and lumbar portions of the spine. There is grade 1 anterolisthesis of L3 on L4.  2. There are moderate degenerative changes  between L4 and S1.  3. There are mild osteoarthritic changes in the facet joints bilaterally between L3 and L4.      Assessment:     1. Degenerative disc disease, lumbar    2. Spondylolisthesis, lumbar region      Plan:     Degenerative disc disease, lumbar    Spondylolisthesis, lumbar region     at this point the patient has a symptomatic spondylolisthesis at L4-5 and a symptomatic stenosis at L3-4.  I recommended a 1 level minimally invasive transforaminal lumbar interbody fusion at L4-5 with a a 1 level minimally invasive decompression at L3-4.  I have explained the procedure and length and detail today with the associated risks and benefits.  At the current time patient does not wish to pursue a surgical intervention she will call me in August to see about potentially scheduling a surgical procedure.  If her symptoms change or any way or if she has any new symptoms I would be happy to see her back otherwise please call with any issues    Thank you for the referral   Please call with any questions    Juan Pablo Gautam MD  Neurosurgery

## 2019-04-30 NOTE — ASSESSMENT & PLAN NOTE
Doing well on PAP settings. Patient is compliant. Follow up in 12 months with PAP data download or call earlier if any problems.

## 2019-04-30 NOTE — PROGRESS NOTES
"Subjective:      Patient ID: Beth Souza is a 68 y.o. female.    Chief Complaint: Sleep Apnea    HPI  Presents to office for review of AutoPAP therapy. Patient states improved symptoms with use of AutoPAP. Sleeping more soundly. Waking up feeling more refreshed. Improved daytime sleepiness. Patient states she is benefiting from use of the AutoPAP. Her daytime sleepiness has resolved. [place] Sleepiness scale score: 0    Patient Active Problem List   Diagnosis    Hypertension    Vitamin D deficiency    Spondylolisthesis of lumbar region    Hyperparathyroidism, primary    Thyroid nodule    Peripheral polyneuropathy    Cataract    Osteoarthritis of spine with radiculopathy, lumbar region    Morbid obesity with BMI of 40.0-44.9, adult    LEN (obstructive sleep apnea)          /82   Pulse 68   Resp 18   Ht 5' 2" (1.575 m)   Wt 99.9 kg (220 lb 3.8 oz)   SpO2 98%   BMI 40.28 kg/m²   Body mass index is 40.28 kg/m².    Review of Systems   Constitutional: Negative.    HENT: Negative.    Respiratory: Negative.    Cardiovascular: Negative.    Musculoskeletal: Negative.    Gastrointestinal: Negative.    Neurological: Negative.    Psychiatric/Behavioral: Negative.      Objective:      Physical Exam   Constitutional: She is oriented to person, place, and time. She appears well-developed and well-nourished.   obese   HENT:   Head: Normocephalic and atraumatic.   Neck: Normal range of motion. Neck supple.   Cardiovascular: Normal rate and regular rhythm.   Pulmonary/Chest: Effort normal and breath sounds normal.   Abdominal: Soft.   Musculoskeletal: Normal range of motion. She exhibits no edema.   Neurological: She is alert and oriented to person, place, and time.   Skin: Skin is warm and dry.   Psychiatric: She has a normal mood and affect.     Personal Diagnostic Review  Autopap download: Patient wears on average 7 hrs and 26 minutes. Greater than 4 hrs 76 % of the time. 90% percentile pressure 6.9 " with AHI 2.8 events/hr    Assessment:       1. LEN (obstructive sleep apnea)    2. Morbid obesity with BMI of 40.0-44.9, adult        Outpatient Encounter Medications as of 4/30/2019   Medication Sig Dispense Refill    lisinopril-hydrochlorothiazide (PRINZIDE,ZESTORETIC) 20-25 mg Tab Take 1 tablet by mouth once daily. 90 tablet 3    multivitamin capsule Take 1 capsule by mouth once daily.      gabapentin (NEURONTIN) 300 MG capsule Take 1 cap qhs for 1 wk, then 2 caps qhs x 1 wk, then 3 caps qhs or tid thereafter 90 capsule 0     No facility-administered encounter medications on file as of 4/30/2019.      Orders Placed This Encounter   Procedures    CPAP/BIPAP SUPPLIES     90 day supply with 3 refills.     Order Specific Question:   Type of mask:     Answer:   Nasal     Order Specific Question:   Headgear?     Answer:   Yes     Order Specific Question:   Tubing?     Answer:   Yes     Order Specific Question:   Humidifier chamber?     Answer:   Yes     Order Specific Question:   Chin strap?     Answer:   Yes     Order Specific Question:   Filters?     Answer:   Yes     Order Specific Question:   Cushions?     Answer:   Yes     Order Specific Question:   Length of need (1-99 months):     Answer:   99     Plan:        Problem List Items Addressed This Visit        Endocrine    Morbid obesity with BMI of 40.0-44.9, adult    Current Assessment & Plan     Weight loss and exercise to improve overall health.              Other    LEN (obstructive sleep apnea) - Primary    Overview     Autopap 4-16cm H2O with ODME.          Current Assessment & Plan     Doing well on PAP settings. Patient is compliant. Follow up in 12 months with PAP data download or call earlier if any problems.           Relevant Orders    CPAP/BIPAP SUPPLIES

## 2019-04-30 NOTE — LETTER
April 30, 2019      Edna Oliva MD  [id number] [place] [facility] [address]           [facility]  [id number] [place] [facility] [address]  Phone: [phone]  Fax: [phone]          Patient: Beth Souza   MR Number: [id number]   YOB: 1950   Date of Visit: 4/30/2019       Dear Dr. Edna Oliva:    Thank you for referring Beth Souza to me for evaluation. Attached you will find relevant portions of my assessment and plan of care.    If you have questions, please do not hesitate to call me. I look forward to following Beth Souza along with you.    Sincerely,    Juan Pablo Gautam MD    Enclosure  CC:  No Recipients    If you would like to receive this communication electronically, please contact [email or web address] or [phone] to request more information on [facility] Link access.    For providers and/or their staff who would like to refer a patient to [facility], please contact us through our one-stop-shop provider referral line, [facility], at [phone].    If you feel you have received this communication in error or would no longer like to receive these types of communications, please e-mail [email or web address]

## 2019-05-08 ENCOUNTER — TELEPHONE (OUTPATIENT)
Dept: OBSTETRICS AND GYNECOLOGY | Facility: CLINIC | Age: 69
End: 2019-05-08

## 2019-05-08 NOTE — TELEPHONE ENCOUNTER
----- Message from Janie Major sent at 5/8/2019 12:33 PM CDT -----  Contact: patient  Calling to schedule an appointment with Mallorie Thakur in the am, please call patient @ [phone]/. Thanks, lillie

## 2019-05-08 NOTE — TELEPHONE ENCOUNTER
Spoke to patient and scheduled her appointment for 05/13/19 at 9:30am to see DANIELLE Nobles at the [place] location. Patient verbalized understanding.

## 2019-05-13 ENCOUNTER — TELEPHONE (OUTPATIENT)
Dept: OBSTETRICS AND GYNECOLOGY | Facility: CLINIC | Age: 69
End: 2019-05-13

## 2019-05-13 ENCOUNTER — OFFICE VISIT (OUTPATIENT)
Dept: OBSTETRICS AND GYNECOLOGY | Facility: CLINIC | Age: 69
End: 2019-05-13
Payer: MEDICARE

## 2019-05-13 VITALS
WEIGHT: 224.88 LBS | DIASTOLIC BLOOD PRESSURE: 78 MMHG | SYSTOLIC BLOOD PRESSURE: 118 MMHG | BODY MASS INDEX: 41.38 KG/M2 | HEIGHT: 62 IN

## 2019-05-13 DIAGNOSIS — N64.4 BREAST PAIN: Primary | ICD-10-CM

## 2019-05-13 DIAGNOSIS — N95.2 VAGINAL ATROPHY: ICD-10-CM

## 2019-05-13 PROCEDURE — 99999 PR PBB SHADOW E&M-EST. PATIENT-LVL III: CPT | Mod: PBBFAC,,, | Performed by: NURSE PRACTITIONER

## 2019-05-13 PROCEDURE — 3074F SYST BP LT 130 MM HG: CPT | Mod: CPTII,S$GLB,, | Performed by: NURSE PRACTITIONER

## 2019-05-13 PROCEDURE — 3074F PR MOST RECENT SYSTOLIC BLOOD PRESSURE < 130 MM HG: ICD-10-PCS | Mod: CPTII,S$GLB,, | Performed by: NURSE PRACTITIONER

## 2019-05-13 PROCEDURE — 1101F PR PT FALLS ASSESS DOC 0-1 FALLS W/OUT INJ PAST YR: ICD-10-PCS | Mod: CPTII,S$GLB,, | Performed by: NURSE PRACTITIONER

## 2019-05-13 PROCEDURE — 1101F PT FALLS ASSESS-DOCD LE1/YR: CPT | Mod: CPTII,S$GLB,, | Performed by: NURSE PRACTITIONER

## 2019-05-13 PROCEDURE — 3078F DIAST BP <80 MM HG: CPT | Mod: CPTII,S$GLB,, | Performed by: NURSE PRACTITIONER

## 2019-05-13 PROCEDURE — 99213 OFFICE O/P EST LOW 20 MIN: CPT | Mod: S$GLB,,, | Performed by: NURSE PRACTITIONER

## 2019-05-13 PROCEDURE — 99999 PR PBB SHADOW E&M-EST. PATIENT-LVL III: ICD-10-PCS | Mod: PBBFAC,,, | Performed by: NURSE PRACTITIONER

## 2019-05-13 PROCEDURE — 99213 PR OFFICE/OUTPT VISIT, EST, LEVL III, 20-29 MIN: ICD-10-PCS | Mod: S$GLB,,, | Performed by: NURSE PRACTITIONER

## 2019-05-13 PROCEDURE — 3078F PR MOST RECENT DIASTOLIC BLOOD PRESSURE < 80 MM HG: ICD-10-PCS | Mod: CPTII,S$GLB,, | Performed by: NURSE PRACTITIONER

## 2019-05-13 RX ORDER — ESTRADIOL 0.1 MG/G
1 CREAM VAGINAL
Qty: 42.5 G | Refills: 1 | Status: SHIPPED | OUTPATIENT
Start: 2019-05-13 | End: 2019-06-06

## 2019-05-13 NOTE — TELEPHONE ENCOUNTER
----- Message from Preethi Kenney sent at 5/13/2019  2:21 PM CDT -----  Contact: Patient  Type:  Needs Medical Advice    Who Called: Patient  Symptoms (please be specific):    How long has patient had these symptoms:    Pharmacy name and phone #:    Would the patient rather a call back or a response via [facility]? call  Best Call Back Number: [phone]  Additional Information: Patient is requesting some sort of cream that she forgot to tel Ms Thakur about

## 2019-05-13 NOTE — TELEPHONE ENCOUNTER
Patient states she forgot to mention that she has an odor. Patient is requesting a medication or cream that would help with her odor . Please advise.

## 2019-05-13 NOTE — PROGRESS NOTES
"CC: Bilateral breast pain    Beth Souza is a 68 y.o. female  presents for c/o bilateral breast pain. No nipple discharge or f/h of breast cancer. Normal mammogram 2018. " Wants to be sexually active,is concerned about dryness". S/P benign hysterectomy.     Past Medical History:   Diagnosis Date    Cataract     Hyperlipidemia     Hypertension     Lumbar spondylosis     Vitamin D deficiency      Past Surgical History:   Procedure Laterality Date    HYSTERECTOMY      PARTIAL HYSTERECTOMY       Family History   Problem Relation Age of Onset    Hypertension Mother     Diabetes Daughter     Melanoma Neg Hx     Psoriasis Neg Hx     Lupus Neg Hx     Eczema Neg Hx      Social History     Tobacco Use    Smoking status: Former Smoker     Last attempt to quit: 2004     Years since quitting: 15.3    Smokeless tobacco: Former User   Substance Use Topics    Alcohol use: Yes     Frequency: 2-4 times a month     Drinks per session: 1 or 2     Binge frequency: Never     Comment: occasionallty    Drug use: No     OB History        3    Para   3    Term   3            AB        Living   3       SAB        TAB        Ectopic        Multiple        Live Births   3                 /78 (BP Location: Right arm, Patient Position: Sitting, BP Method: Large (Manual))   Ht 5' 2" (1.575 m)   Wt 102 kg (224 lb 13.9 oz)   BMI 41.13 kg/m²     ROS:  GENERAL: Denies weight gain or weight loss. Feeling well overall.   ABDOMEN: No abdominal pain, constipation, diarrhea, nausea, vomiting or rectal bleeding.   URINARY: No frequency, dysuria, hematuria, or burning on urination.  REPRODUCTIVE: See HPI.   Breast: HPI      PE:   APPEARANCE: Obese female, in no acute distress.  AFFECT: WNL, alert and oriented x 3.  BREASTS: Symmetrical, no skin changes or visible lesions. No palpable masses, nipple discharge bilaterally.  PELVIC: Normal external female genitalia without lesions. Normal hair " distribution. Adequate perineal body, normal urethral meatus. Vagina atrophic without lesions or discharge.     1. Breast pain  Mammo Digital Screening Bilat    US Breast Bilateral Complete   2. Encounter for screening mammogram for malignant neoplasm of breast   Mammo Digital Screening Bilat     PLAN:  DX mammogram and ultrasounds: Exam was unremarkable  Estrace cream for atrophy. Explained to patient to use olive oil.

## 2019-05-14 RX ORDER — METRONIDAZOLE 500 MG/1
500 TABLET ORAL 2 TIMES DAILY
Qty: 14 TABLET | Refills: 0 | Status: SHIPPED | OUTPATIENT
Start: 2019-05-14 | End: 2019-05-21

## 2019-05-15 ENCOUNTER — HOSPITAL ENCOUNTER (OUTPATIENT)
Dept: RADIOLOGY | Facility: HOSPITAL | Age: 69
Discharge: HOME | End: 2019-05-15
Attending: NURSE PRACTITIONER
Payer: MEDICARE

## 2019-05-15 VITALS — WEIGHT: 224 LBS | HEIGHT: 62 IN | BODY MASS INDEX: 41.22 KG/M2

## 2019-05-15 DIAGNOSIS — N64.4 BREAST PAIN: ICD-10-CM

## 2019-05-15 PROCEDURE — 77066 DX MAMMO INCL CAD BI: CPT | Mod: 26,,, | Performed by: RADIOLOGY

## 2019-05-15 PROCEDURE — 77066 MAMMO DIGITAL DIAGNOSTIC BILAT WITH TOMOSYNTHESIS_CAD: ICD-10-PCS | Mod: 26,,, | Performed by: RADIOLOGY

## 2019-05-15 PROCEDURE — 77066 DX MAMMO INCL CAD BI: CPT | Mod: TC

## 2019-05-15 PROCEDURE — 77062 BREAST TOMOSYNTHESIS BI: CPT | Mod: 26,,, | Performed by: RADIOLOGY

## 2019-05-15 PROCEDURE — 77062 MAMMO DIGITAL DIAGNOSTIC BILAT WITH TOMOSYNTHESIS_CAD: ICD-10-PCS | Mod: 26,,, | Performed by: RADIOLOGY

## 2019-06-06 ENCOUNTER — HOSPITAL ENCOUNTER (OUTPATIENT)
Dept: RADIOLOGY | Facility: HOSPITAL | Age: 69
Discharge: HOME | End: 2019-06-06
Attending: FAMILY MEDICINE
Payer: MEDICARE

## 2019-06-06 ENCOUNTER — OFFICE VISIT (OUTPATIENT)
Dept: INTERNAL MEDICINE | Facility: CLINIC | Age: 69
End: 2019-06-06
Payer: MEDICARE

## 2019-06-06 VITALS
HEART RATE: 56 BPM | DIASTOLIC BLOOD PRESSURE: 80 MMHG | HEIGHT: 62 IN | SYSTOLIC BLOOD PRESSURE: 130 MMHG | TEMPERATURE: 98 F | BODY MASS INDEX: 40.93 KG/M2 | OXYGEN SATURATION: 97 % | WEIGHT: 222.44 LBS

## 2019-06-06 DIAGNOSIS — M25.471 RIGHT ANKLE SWELLING: ICD-10-CM

## 2019-06-06 DIAGNOSIS — G47.33 OSA ON CPAP: ICD-10-CM

## 2019-06-06 DIAGNOSIS — D04.4 SQUAMOUS CELL CARCINOMA IN SITU (SCCIS) OF SCALP: ICD-10-CM

## 2019-06-06 DIAGNOSIS — I10 ESSENTIAL HYPERTENSION: ICD-10-CM

## 2019-06-06 DIAGNOSIS — E66.01 MORBID OBESITY WITH BMI OF 40.0-44.9, ADULT: ICD-10-CM

## 2019-06-06 DIAGNOSIS — R51.9 LEFT TEMPORAL HEADACHE: Primary | ICD-10-CM

## 2019-06-06 DIAGNOSIS — E55.9 VITAMIN D DEFICIENCY: ICD-10-CM

## 2019-06-06 PROCEDURE — 1101F PR PT FALLS ASSESS DOC 0-1 FALLS W/OUT INJ PAST YR: ICD-10-PCS | Mod: CPTII,S$GLB,, | Performed by: FAMILY MEDICINE

## 2019-06-06 PROCEDURE — 73610 X-RAY EXAM OF ANKLE: CPT | Mod: TC,RT

## 2019-06-06 PROCEDURE — 99999 PR PBB SHADOW E&M-EST. PATIENT-LVL III: CPT | Mod: PBBFAC,,, | Performed by: FAMILY MEDICINE

## 2019-06-06 PROCEDURE — 99214 PR OFFICE/OUTPT VISIT, EST, LEVL IV, 30-39 MIN: ICD-10-PCS | Mod: S$GLB,,, | Performed by: FAMILY MEDICINE

## 2019-06-06 PROCEDURE — 3075F SYST BP GE 130 - 139MM HG: CPT | Mod: CPTII,S$GLB,, | Performed by: FAMILY MEDICINE

## 2019-06-06 PROCEDURE — 99999 PR PBB SHADOW E&M-EST. PATIENT-LVL III: ICD-10-PCS | Mod: PBBFAC,,, | Performed by: FAMILY MEDICINE

## 2019-06-06 PROCEDURE — 3079F PR MOST RECENT DIASTOLIC BLOOD PRESSURE 80-89 MM HG: ICD-10-PCS | Mod: CPTII,S$GLB,, | Performed by: FAMILY MEDICINE

## 2019-06-06 PROCEDURE — 99214 OFFICE O/P EST MOD 30 MIN: CPT | Mod: S$GLB,,, | Performed by: FAMILY MEDICINE

## 2019-06-06 PROCEDURE — 1101F PT FALLS ASSESS-DOCD LE1/YR: CPT | Mod: CPTII,S$GLB,, | Performed by: FAMILY MEDICINE

## 2019-06-06 PROCEDURE — 73610 XR ANKLE COMPLETE 3 VIEW RIGHT: ICD-10-PCS | Mod: 26,RT,, | Performed by: RADIOLOGY

## 2019-06-06 PROCEDURE — 73610 X-RAY EXAM OF ANKLE: CPT | Mod: 26,RT,, | Performed by: RADIOLOGY

## 2019-06-06 PROCEDURE — 3075F PR MOST RECENT SYSTOLIC BLOOD PRESS GE 130-139MM HG: ICD-10-PCS | Mod: CPTII,S$GLB,, | Performed by: FAMILY MEDICINE

## 2019-06-06 PROCEDURE — 3079F DIAST BP 80-89 MM HG: CPT | Mod: CPTII,S$GLB,, | Performed by: FAMILY MEDICINE

## 2019-06-06 RX ORDER — TRAMADOL HYDROCHLORIDE 50 MG/1
50 TABLET ORAL EVERY 12 HOURS PRN
Qty: 12 TABLET | Refills: 0 | Status: SHIPPED | OUTPATIENT
Start: 2019-06-06 | End: 2019-08-19

## 2019-06-06 NOTE — PROGRESS NOTES
"Subjective:       Patient ID: Beth Souza is a 69 y.o. female.    Chief Complaint: Fatigue (w/ right ankle swelling)    69-year-old  female patient with Patient Active Problem List:     Hypertension     Vitamin D deficiency     Spondylolisthesis of lumbar region     Hyperparathyroidism, primary     Thyroid nodule     Peripheral polyneuropathy     Cataract     Osteoarthritis of spine with radiculopathy, lumbar region     Morbid obesity with BMI of 40.0-44.9, adult     LEN on CPAP  Here with complaint of left-sided temporal headache off and on which has been gradually getting progressive, denies any earache tinnitus.  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   Patient denies any blurry vision, nausea vomiting.   Reports that she has tried taking Tylenol/ibuprofen but no relief noted  Has been taking her blood pressure medication regularly  Also reports that she started noticing right ankle swelling for the past few weeks but denies any injury.       Review of Systems   Constitutional: Negative for fatigue and fever.   Eyes: Negative for visual disturbance.   Respiratory: Negative for shortness of breath.    Cardiovascular: Negative for chest pain and leg swelling.   Gastrointestinal: Negative for abdominal pain, nausea and vomiting.   Musculoskeletal: Positive for arthralgias, joint swelling and myalgias.   Skin: Negative for rash.   Neurological: Positive for headaches. Negative for weakness, light-headedness and numbness.   Psychiatric/Behavioral: Negative for sleep disturbance.         /80 (BP Location: Left arm, Patient Position: Sitting)   Pulse (!) 56   Temp 97.7 °F (36.5 °C) (Tympanic)   Ht 5' 2" (1.575 m)   Wt 100.9 kg (222 lb 7.1 oz)   SpO2 97%   BMI 40.69 kg/m²   Objective:      Physical Exam "   Constitutional: She is oriented to person, place, and time. She appears well-developed and well-nourished.   HENT:   Head: Normocephalic and atraumatic.   Right Ear: External ear normal.   Left Ear: External ear normal.   Mouth/Throat: Oropharynx is clear and moist. No oropharyngeal exudate.   No sinus pressure tenderness noted   Cardiovascular: Normal rate, regular rhythm and normal heart sounds.   No murmur heard.  Pulmonary/Chest: Effort normal and breath sounds normal. She has no wheezes.   Abdominal: Soft. Bowel sounds are normal. There is no tenderness.   Musculoskeletal: She exhibits edema and tenderness.   Positive for right temporal scalp tenderness noted on exam, No erythema  Minimal tenderness and swelling noted to the right ankle   Neurological: She is alert and oriented to person, place, and time. No cranial nerve deficit.   Skin: Skin is warm and dry. No rash noted. No erythema.   Psychiatric: She has a normal mood and affect.         Assessment/Plan:   1. Left temporal headache  - CBC auto differential; Future  - Sedimentation rate; Future  - Basic metabolic panel; Future  - Vitamin D; Future  - MRI Brain W WO Contrast; Future  - traMADol (ULTRAM) 50 mg tablet; Take 1 tablet (50 mg total) by mouth every 12 (twelve) hours as needed for Pain.  Dispense: 12 tablet; Refill: 0  Tramadol prescribed today for us symptomatic relief  MRI of the brain with and without contrast has been ordered for further evaluation Will check further labs  If any worsening advised to go to ER    2. Essential hypertension  Blood pressure stable today currently on lisinopril hydrochlorothiazide 20/25 mg daily    3. Squamous cell carcinoma in situ (SCCIS) of scalp  As per Dermatology    4. LEN on CPAP  Stable    5. Morbid obesity with BMI of 40.0-44.9, adult  Lifestyle modifications recommended to lose weight with BMI 40    6. Vitamin D deficiency  - Vitamin D; Future  Treated for vitamin-D deficiency in the past    7. Right  ankle swelling  Will get right ankle x-ray to look into further etiology  Patient was advised to try using compression stockings and elevate lower extremity

## 2019-06-07 DIAGNOSIS — M19.071 DJD (DEGENERATIVE JOINT DISEASE), ANKLE AND FOOT, RIGHT: ICD-10-CM

## 2019-06-07 DIAGNOSIS — E55.9 VITAMIN D DEFICIENCY: Primary | ICD-10-CM

## 2019-06-07 RX ORDER — ERGOCALCIFEROL 1.25 MG/1
50000 CAPSULE ORAL
Qty: 10 CAPSULE | Refills: 0 | Status: SHIPPED | OUTPATIENT
Start: 2019-06-07 | End: 2019-09-16

## 2019-06-26 ENCOUNTER — TELEPHONE (OUTPATIENT)
Dept: INTERNAL MEDICINE | Facility: CLINIC | Age: 69
End: 2019-06-26

## 2019-06-26 NOTE — TELEPHONE ENCOUNTER
Tried calling pt to inform her that  will be [place] of the [place] weeks in [place]. Pt had an appt scheduled 07/26/19 . Pt was rescheduled to 07/10/19 with  ( pt has another appt in podiatry that day). Pt did not answer. Unable to leave message mailbox full. Will try again.

## 2019-07-03 ENCOUNTER — OFFICE VISIT (OUTPATIENT)
Dept: OPHTHALMOLOGY | Facility: CLINIC | Age: 69
End: 2019-07-03
Payer: MEDICARE

## 2019-07-03 DIAGNOSIS — H04.123 BILATERAL DRY EYES: Primary | ICD-10-CM

## 2019-07-03 PROCEDURE — 92012 INTRM OPH EXAM EST PATIENT: CPT | Mod: S$GLB,,, | Performed by: OPTOMETRIST

## 2019-07-03 PROCEDURE — 92012 PR EYE EXAM, EST PATIENT,INTERMED: ICD-10-PCS | Mod: S$GLB,,, | Performed by: OPTOMETRIST

## 2019-07-03 PROCEDURE — 99999 PR PBB SHADOW E&M-EST. PATIENT-LVL II: CPT | Mod: PBBFAC,,, | Performed by: OPTOMETRIST

## 2019-07-03 PROCEDURE — 99999 PR PBB SHADOW E&M-EST. PATIENT-LVL II: ICD-10-PCS | Mod: PBBFAC,,, | Performed by: OPTOMETRIST

## 2019-07-03 RX ORDER — LOTEPREDNOL ETABONATE 5 MG/ML
1 SUSPENSION/ DROPS OPHTHALMIC 2 TIMES DAILY
Qty: 5 ML | Refills: 1 | Status: SHIPPED | OUTPATIENT
Start: 2019-07-03 | End: 2019-07-17

## 2019-07-03 NOTE — PROGRESS NOTES
HPI     Last [facility] exam 03/06/2019  Chief complaint: irritation, OU  Onset: about 1 to 2 months ago  Upper eyelids are tender to touch  Eyes become irritated in the wind   Eyes start to water when placing on sunglasses   No light sensitivity   Slightly blurred vision   Has been using OTC drops for dry eyes     Last edited by Lev Woodson MA on 7/3/2019  1:57 PM. (History)            Assessment /Plan     For exam results, see Encounter Report.    Bilateral dry eyes  -     loteprednol (LOTEMAX) 0.5 % ophthalmic suspension; Place 1 drop into both eyes 2 (two) times daily. for 14 days  Dispense: 5 mL; Refill: 1      Dry eyes:  Use above mild steroid for 14 days.  Begin aggressive Systane Ultra 4 X a day qd OU indefinitely.  RTC prn.  May need Restasis in the future.

## 2019-08-19 ENCOUNTER — OFFICE VISIT (OUTPATIENT)
Dept: INTERNAL MEDICINE | Facility: CLINIC | Age: 69
End: 2019-08-19
Payer: MEDICARE

## 2019-08-19 VITALS
HEART RATE: 68 BPM | DIASTOLIC BLOOD PRESSURE: 86 MMHG | WEIGHT: 218.5 LBS | HEIGHT: 62 IN | BODY MASS INDEX: 40.21 KG/M2 | OXYGEN SATURATION: 97 % | SYSTOLIC BLOOD PRESSURE: 110 MMHG | TEMPERATURE: 99 F

## 2019-08-19 DIAGNOSIS — I10 ESSENTIAL HYPERTENSION: ICD-10-CM

## 2019-08-19 DIAGNOSIS — Z00.00 ROUTINE GENERAL MEDICAL EXAMINATION AT A HEALTH CARE FACILITY: Primary | ICD-10-CM

## 2019-08-19 DIAGNOSIS — E04.1 THYROID NODULE: ICD-10-CM

## 2019-08-19 DIAGNOSIS — M43.16 SPONDYLOLISTHESIS OF LUMBAR REGION: ICD-10-CM

## 2019-08-19 DIAGNOSIS — E21.0 HYPERPARATHYROIDISM, PRIMARY: ICD-10-CM

## 2019-08-19 DIAGNOSIS — G47.33 OSA ON CPAP: ICD-10-CM

## 2019-08-19 DIAGNOSIS — G62.9 PERIPHERAL POLYNEUROPATHY: ICD-10-CM

## 2019-08-19 DIAGNOSIS — E66.01 SEVERE OBESITY (BMI 35.0-35.9 WITH COMORBIDITY): ICD-10-CM

## 2019-08-19 DIAGNOSIS — E55.9 VITAMIN D DEFICIENCY: ICD-10-CM

## 2019-08-19 PROCEDURE — 99999 PR PBB SHADOW E&M-EST. PATIENT-LVL III: CPT | Mod: PBBFAC,,, | Performed by: FAMILY MEDICINE

## 2019-08-19 PROCEDURE — 3074F PR MOST RECENT SYSTOLIC BLOOD PRESSURE < 130 MM HG: ICD-10-PCS | Mod: CPTII,S$GLB,, | Performed by: FAMILY MEDICINE

## 2019-08-19 PROCEDURE — 99214 PR OFFICE/OUTPT VISIT, EST, LEVL IV, 30-39 MIN: ICD-10-PCS | Mod: S$GLB,,, | Performed by: FAMILY MEDICINE

## 2019-08-19 PROCEDURE — 99999 PR PBB SHADOW E&M-EST. PATIENT-LVL III: ICD-10-PCS | Mod: PBBFAC,,, | Performed by: FAMILY MEDICINE

## 2019-08-19 PROCEDURE — 3074F SYST BP LT 130 MM HG: CPT | Mod: CPTII,S$GLB,, | Performed by: FAMILY MEDICINE

## 2019-08-19 PROCEDURE — 99499 UNLISTED E&M SERVICE: CPT | Mod: S$GLB,,, | Performed by: FAMILY MEDICINE

## 2019-08-19 PROCEDURE — 3079F DIAST BP 80-89 MM HG: CPT | Mod: CPTII,S$GLB,, | Performed by: FAMILY MEDICINE

## 2019-08-19 PROCEDURE — 99214 OFFICE O/P EST MOD 30 MIN: CPT | Mod: S$GLB,,, | Performed by: FAMILY MEDICINE

## 2019-08-19 PROCEDURE — 99499 RISK ADDL DX/OHS AUDIT: ICD-10-PCS | Mod: S$GLB,,, | Performed by: FAMILY MEDICINE

## 2019-08-19 PROCEDURE — 3079F PR MOST RECENT DIASTOLIC BLOOD PRESSURE 80-89 MM HG: ICD-10-PCS | Mod: CPTII,S$GLB,, | Performed by: FAMILY MEDICINE

## 2019-08-19 RX ORDER — LISINOPRIL AND HYDROCHLOROTHIAZIDE 20; 25 MG/1; MG/1
1 TABLET ORAL DAILY
Qty: 90 TABLET | Refills: 3 | Status: SHIPPED | OUTPATIENT
Start: 2019-08-19 | End: 2019-10-11 | Stop reason: SDUPTHER

## 2019-08-19 NOTE — PROGRESS NOTES
"Subjective:       Patient ID: Beth Souza is a 69 y.o. female.    Chief Complaint: Follow-up    69-year-old  female patient with Patient Active Problem List:     Essential hypertension     Vitamin D deficiency     Spondylolisthesis of lumbar region     Hyperparathyroidism, primary     Thyroid nodule     Peripheral polyneuropathy     Cataract     Osteoarthritis of spine with radiculopathy, lumbar region     Severe obesity (BMI 35.0-35.9 with comorbidity)     LEN on CPAP  Here for routine annual physicals.   Patient reports that she has been taking her medications regularly, has been having back pain  Reports that she has been having minimal discomfort with swallowing on the right side and would like to get checked on her thyroid nodule for follow-up.   Denies any chest pain or difficulty breathing, reports feeling excessively dried up using CPAP machine with sleep apnea.     Review of Systems   Constitutional: Negative for fatigue.   HENT: Positive for congestion and trouble swallowing. Negative for voice change.    Eyes: Negative for visual disturbance.   Respiratory: Negative for shortness of breath.    Cardiovascular: Negative for chest pain and leg swelling.   Gastrointestinal: Negative for abdominal pain, nausea and vomiting.   Musculoskeletal: Positive for back pain and myalgias.   Skin: Negative for rash.   Neurological: Positive for numbness. Negative for weakness, light-headedness and headaches.   Psychiatric/Behavioral: Negative for sleep disturbance.         /86 (BP Location: Right arm, Patient Position: Sitting)   Pulse 68   Temp 98.6 °F (37 °C) (Tympanic)   Ht 5' 2" (1.575 m)   Wt 99.1 kg (218 lb 7.6 oz)   SpO2 97%   BMI 39.96 kg/m²   Objective:      Physical Exam   Constitutional: She is oriented to person, place, and time. She appears well-developed and well-nourished.   HENT:   Head: Normocephalic and atraumatic.   Mouth/Throat: Oropharynx is clear and moist. "   Neck: Neck supple. No thyromegaly present.   Cardiovascular: Normal rate, regular rhythm and normal heart sounds.   No murmur heard.  Pulmonary/Chest: Effort normal and breath sounds normal. She has no wheezes.   Abdominal: Soft. Bowel sounds are normal. There is no tenderness.   Musculoskeletal: She exhibits tenderness. She exhibits no edema.   Positive for paraspinal lumbar muscle tenderness bilaterally   Lymphadenopathy:     She has no cervical adenopathy.   Neurological: She is alert and oriented to person, place, and time.   Skin: Skin is warm and dry. No rash noted.   Psychiatric: She has a normal mood and affect.         Assessment/Plan:   1. Routine general medical examination at a [facility] facility  - CBC auto differential; Future  - Comprehensive metabolic panel; Future  - Lipid panel; Future  - TSH; Future  - Vitamin D; Future  - Urinalysis; Future  Vital signs stable today.  Clinical exam stable  Continue lifestyle modifications with low-fat and low-cholesterol diet and exercise 30 min daily    2. Essential hypertension  - Comprehensive metabolic panel; Future  - Lipid panel; Future  - TSH; Future  - Urinalysis; Future  - lisinopril-hydrochlorothiazide (PRINZIDE,ZESTORETIC) 20-25 mg Tab; Take 1 tablet by mouth once daily.  Dispense: 90 tablet; Refill: 3  Blood pressure is stable today currently on lisinopril hydrochlorothiazide 20/25 mg  Restrict salt intake and eat low-fat and low-cholesterol diet    3. Hyperparathyroidism, primary  - PTH, intact; Future    4. Spondylolisthesis of lumbar region  Patient currently getting physical therapy twice a week    5. Vitamin D deficiency  - CBC auto differential; Future  - Vitamin D; Future  Will check vitamin-D levels currently taking vitamin-D by prescription weekly    6. LEN on CPAP  Advised to use humidification to avoid excessive dryness    7. Thyroid nodule  - Lipid panel; Future  - US Soft Tissue Head Neck Thyroid; Future  Will check thyroid ultrasound  for follow-up on thyroid nodule    8. Peripheral polyneuropathy    9. Severe obesity (BMI 35.0-35.9 with comorbidity)  Lifestyle modifications recommended to lose weight with BMI 39

## 2019-08-20 ENCOUNTER — HOSPITAL ENCOUNTER (OUTPATIENT)
Dept: RADIOLOGY | Facility: HOSPITAL | Age: 69
Discharge: HOME | End: 2019-08-20
Attending: FAMILY MEDICINE
Payer: MEDICARE

## 2019-08-20 DIAGNOSIS — E04.1 THYROID NODULE: ICD-10-CM

## 2019-08-20 PROCEDURE — 76536 US EXAM OF HEAD AND NECK: CPT | Mod: 26,,, | Performed by: RADIOLOGY

## 2019-08-20 PROCEDURE — 76536 US SOFT TISSUE HEAD NECK THYROID: ICD-10-PCS | Mod: 26,,, | Performed by: RADIOLOGY

## 2019-08-20 PROCEDURE — 76536 US EXAM OF HEAD AND NECK: CPT | Mod: TC

## 2019-08-21 DIAGNOSIS — E21.0 HYPERPARATHYROIDISM, PRIMARY: ICD-10-CM

## 2019-08-21 DIAGNOSIS — E55.9 VITAMIN D DEFICIENCY: ICD-10-CM

## 2019-08-21 DIAGNOSIS — E78.2 MIXED HYPERLIPIDEMIA: Primary | ICD-10-CM

## 2019-08-21 RX ORDER — ATORVASTATIN CALCIUM 20 MG/1
20 TABLET, FILM COATED ORAL DAILY
Qty: 90 TABLET | Refills: 3 | Status: SHIPPED | OUTPATIENT
Start: 2019-08-21 | End: 2019-11-12

## 2019-08-29 ENCOUNTER — TELEPHONE (OUTPATIENT)
Dept: INTERNAL MEDICINE | Facility: CLINIC | Age: 69
End: 2019-08-29

## 2019-08-29 DIAGNOSIS — R10.13 EPIGASTRIC ABDOMINAL PAIN: Primary | ICD-10-CM

## 2019-08-29 NOTE — TELEPHONE ENCOUNTER
Patient would like to add amylase and lipase levels secondary to epigastric abdominal pain as recommended by [facility]  Ms Valeriy Perez , labs has been ordered

## 2019-08-29 NOTE — TELEPHONE ENCOUNTER
Spoke with pt. Pt was given lab results and recommendations and scheduled for 09/03/19 for her amylase and lipase labs. Pt was thankful for the call.Call ended well.

## 2019-09-03 ENCOUNTER — LAB VISIT (OUTPATIENT)
Dept: LAB | Facility: HOSPITAL | Age: 69
End: 2019-09-03
Attending: FAMILY MEDICINE
Payer: MEDICARE

## 2019-09-03 DIAGNOSIS — R10.13 EPIGASTRIC ABDOMINAL PAIN: ICD-10-CM

## 2019-09-03 LAB
AMYLASE SERPL-CCNC: 78 U/L (ref 20–110)
LIPASE SERPL-CCNC: 16 U/L (ref 4–60)

## 2019-09-03 PROCEDURE — 36415 COLL VENOUS BLD VENIPUNCTURE: CPT

## 2019-09-03 PROCEDURE — 82150 ASSAY OF AMYLASE: CPT

## 2019-09-03 PROCEDURE — 83690 ASSAY OF LIPASE: CPT

## 2019-09-16 ENCOUNTER — HOSPITAL ENCOUNTER (OUTPATIENT)
Dept: RADIOLOGY | Facility: HOSPITAL | Age: 69
Discharge: HOME | End: 2019-09-16
Attending: FAMILY MEDICINE
Payer: MEDICARE

## 2019-09-16 ENCOUNTER — OFFICE VISIT (OUTPATIENT)
Dept: INTERNAL MEDICINE | Facility: CLINIC | Age: 69
End: 2019-09-16
Payer: MEDICARE

## 2019-09-16 VITALS
HEART RATE: 60 BPM | WEIGHT: 220.25 LBS | OXYGEN SATURATION: 98 % | DIASTOLIC BLOOD PRESSURE: 80 MMHG | TEMPERATURE: 98 F | HEIGHT: 62 IN | RESPIRATION RATE: 16 BRPM | SYSTOLIC BLOOD PRESSURE: 118 MMHG | BODY MASS INDEX: 40.53 KG/M2

## 2019-09-16 DIAGNOSIS — E04.1 THYROID NODULE: ICD-10-CM

## 2019-09-16 DIAGNOSIS — E66.01 MORBID OBESITY WITH BMI OF 40.0-44.9, ADULT: ICD-10-CM

## 2019-09-16 DIAGNOSIS — G47.33 OSA ON CPAP: ICD-10-CM

## 2019-09-16 DIAGNOSIS — M54.2 NECK PAIN ON RIGHT SIDE: Primary | ICD-10-CM

## 2019-09-16 DIAGNOSIS — M54.2 NECK PAIN ON RIGHT SIDE: ICD-10-CM

## 2019-09-16 DIAGNOSIS — I10 ESSENTIAL HYPERTENSION: ICD-10-CM

## 2019-09-16 DIAGNOSIS — M47.26 OSTEOARTHRITIS OF SPINE WITH RADICULOPATHY, LUMBAR REGION: ICD-10-CM

## 2019-09-16 PROCEDURE — 3074F PR MOST RECENT SYSTOLIC BLOOD PRESSURE < 130 MM HG: ICD-10-PCS | Mod: CPTII,S$GLB,, | Performed by: FAMILY MEDICINE

## 2019-09-16 PROCEDURE — G0008 FLU VACCINE - HIGH DOSE (65+) PRESERVATIVE FREE IM: ICD-10-PCS | Mod: S$GLB,,, | Performed by: FAMILY MEDICINE

## 2019-09-16 PROCEDURE — 1101F PR PT FALLS ASSESS DOC 0-1 FALLS W/OUT INJ PAST YR: ICD-10-PCS | Mod: CPTII,S$GLB,, | Performed by: FAMILY MEDICINE

## 2019-09-16 PROCEDURE — 1101F PT FALLS ASSESS-DOCD LE1/YR: CPT | Mod: CPTII,S$GLB,, | Performed by: FAMILY MEDICINE

## 2019-09-16 PROCEDURE — 72050 XR CERVICAL SPINE COMPLETE 5 VIEW: ICD-10-PCS | Mod: 26,,, | Performed by: RADIOLOGY

## 2019-09-16 PROCEDURE — 3079F DIAST BP 80-89 MM HG: CPT | Mod: CPTII,S$GLB,, | Performed by: FAMILY MEDICINE

## 2019-09-16 PROCEDURE — 90662 IIV NO PRSV INCREASED AG IM: CPT | Mod: S$GLB,,, | Performed by: FAMILY MEDICINE

## 2019-09-16 PROCEDURE — 99999 PR PBB SHADOW E&M-EST. PATIENT-LVL III: ICD-10-PCS | Mod: PBBFAC,,, | Performed by: FAMILY MEDICINE

## 2019-09-16 PROCEDURE — 3079F PR MOST RECENT DIASTOLIC BLOOD PRESSURE 80-89 MM HG: ICD-10-PCS | Mod: CPTII,S$GLB,, | Performed by: FAMILY MEDICINE

## 2019-09-16 PROCEDURE — 3074F SYST BP LT 130 MM HG: CPT | Mod: CPTII,S$GLB,, | Performed by: FAMILY MEDICINE

## 2019-09-16 PROCEDURE — 99214 PR OFFICE/OUTPT VISIT, EST, LEVL IV, 30-39 MIN: ICD-10-PCS | Mod: 25,S$GLB,, | Performed by: FAMILY MEDICINE

## 2019-09-16 PROCEDURE — 72050 X-RAY EXAM NECK SPINE 4/5VWS: CPT | Mod: 26,,, | Performed by: RADIOLOGY

## 2019-09-16 PROCEDURE — 99214 OFFICE O/P EST MOD 30 MIN: CPT | Mod: 25,S$GLB,, | Performed by: FAMILY MEDICINE

## 2019-09-16 PROCEDURE — 72050 X-RAY EXAM NECK SPINE 4/5VWS: CPT | Mod: TC

## 2019-09-16 PROCEDURE — 99999 PR PBB SHADOW E&M-EST. PATIENT-LVL III: CPT | Mod: PBBFAC,,, | Performed by: FAMILY MEDICINE

## 2019-09-16 PROCEDURE — 90662 FLU VACCINE - HIGH DOSE (65+) PRESERVATIVE FREE IM: ICD-10-PCS | Mod: S$GLB,,, | Performed by: FAMILY MEDICINE

## 2019-09-16 PROCEDURE — G0008 ADMIN INFLUENZA VIRUS VAC: HCPCS | Mod: S$GLB,,, | Performed by: FAMILY MEDICINE

## 2019-09-16 RX ORDER — DEXLANSOPRAZOLE 60 MG/1
CAPSULE, DELAYED RELEASE ORAL
COMMUNITY
Start: 2019-08-27 | End: 2019-09-16

## 2019-09-16 RX ORDER — METHOCARBAMOL 500 MG/1
500 TABLET, FILM COATED ORAL NIGHTLY PRN
Qty: 30 TABLET | Refills: 0 | Status: SHIPPED | OUTPATIENT
Start: 2019-09-16 | End: 2019-09-26

## 2019-09-16 RX ORDER — PANTOPRAZOLE SODIUM 40 MG/1
TABLET, DELAYED RELEASE ORAL
COMMUNITY
Start: 2019-09-14 | End: 2019-09-16

## 2019-09-16 NOTE — PROGRESS NOTES
"Subjective:       Patient ID: Beth Souza is a 69 y.o. female.    Chief Complaint: Neck Pain    69-year-old  female patient with Patient Active Problem List:     Essential hypertension     Vitamin D deficiency     Spondylolisthesis of lumbar region     Hyperparathyroidism, primary     Thyroid nodule     Peripheral polyneuropathy     Cataract     Osteoarthritis of spine with radiculopathy, lumbar region     Morbid obesity with BMI of 40.0-44.9, adult     LEN on CPAP  Here reports that she has been having neck pain on the right side, since she had procedure done to the right side of the neck causing keloid more than a year ago.   Patient reports pain radiating to the neck posteriorly, denies any tingling or numbness sensation to extremities.  Reports pain during the day up to 2 to 3/10 but at bedtime can get worse up to 8/10.   Has been taking extra-strength Tylenol as needed for pain as patient cannot take any medication which has aspirin in it  Reports worsening pain with neck movement but denies any dizziness      Review of Systems   Constitutional: Negative for fatigue and fever.   HENT: Negative for ear pain.    Eyes: Negative for visual disturbance.   Respiratory: Negative for shortness of breath.    Cardiovascular: Negative for chest pain and leg swelling.   Gastrointestinal: Negative for abdominal pain, nausea and vomiting.   Musculoskeletal: Positive for myalgias and neck pain. Negative for neck stiffness.   Skin: Negative for rash.   Neurological: Negative for weakness, light-headedness, numbness and headaches.   Psychiatric/Behavioral: Negative for sleep disturbance.         /80 (BP Location: Right arm, Patient Position: Sitting, BP Method: Large (Manual))   Pulse 60   Temp 97.9 °F (36.6 °C) (Tympanic)   Resp 16   Ht 5' 2" (1.575 m)   Wt 99.9 kg (220 lb 3.8 oz)   SpO2 98%   BMI 40.28 kg/m²   Objective:      Physical Exam   Constitutional: She is oriented to person, " place, and time. She appears well-developed and well-nourished.   HENT:   Head: Normocephalic and atraumatic.   Mouth/Throat: Oropharynx is clear and moist.   Cardiovascular: Normal rate, regular rhythm and normal heart sounds.   No murmur heard.  Pulmonary/Chest: Effort normal and breath sounds normal. She has no wheezes.   Abdominal: Soft. Bowel sounds are normal. There is no tenderness.   Musculoskeletal: She exhibits tenderness. She exhibits no edema.   Positive for tenderness to the paraspinal cervical muscles on the right side anteriorly and posteriorly   Neurological: She is alert and oriented to person, place, and time.   Skin: Skin is warm and dry. No rash noted. No erythema.   Noted tiny linear keloid scar to the right side of the neck trina- laterally   Psychiatric: She has a normal mood and affect.         Assessment/Plan:   1. Neck pain on right side  - X-Ray Cervical Spine Complete 5 view; Future  - methocarbamol (ROBAXIN) 500 MG Tab; Take 1 tablet (500 mg total) by mouth nightly as needed.  Dispense: 30 tablet; Refill: 0  Will get x-ray of the cervical spine to look into further etiology  Robaxin prescribed today for symptomatic relief and advised to try over-the-counter anti inflammatory creams and continue taking extra-strength Tylenol as needed for pain  If symptoms continue to persist or worsen advised to see Dermatology for the keloid scar  Reviewed previous imaging in 2016 showing sebaceous cyst which has been taken care of based upon CT soft tissues  If symptoms continue to persist will consider ultrasound of the soft tissues    2. Thyroid nodule  Reviewed thyroid ultrasound showing tiny thyroid nodules which requires no further intervention    3. Essential hypertension  Blood pressure is stable today currently on lisinopril hydrochlorothiazide 20/25 mg daily    4. Osteoarthritis of spine with radiculopathy, lumbar region    5. LEN on CPAP    6. Morbid obesity with BMI of 40.0-44.9, adult      Flu shot given today

## 2019-10-11 DIAGNOSIS — I10 ESSENTIAL HYPERTENSION: ICD-10-CM

## 2019-10-11 RX ORDER — LISINOPRIL AND HYDROCHLOROTHIAZIDE 20; 25 MG/1; MG/1
1 TABLET ORAL DAILY
Qty: 90 TABLET | Refills: 3 | Status: SHIPPED | OUTPATIENT
Start: 2019-10-11 | End: 2020-05-11 | Stop reason: SDUPTHER

## 2019-10-11 NOTE — TELEPHONE ENCOUNTER
S/w pt regarding medication refill. Pt states that she can not find refill she picked up and is requesting another refill./Kmw

## 2019-10-11 NOTE — TELEPHONE ENCOUNTER
----- Message from Claire Neff sent at 10/11/2019  9:36 AM CDT -----  Contact: [phone]  Would like to consult with the nurse, Patient would like to get a refill on her Rx Medication, Please call back at [phone], Thanks sj  .Type:  RX Refill Request    Who Called:  Ms Souza  Refill or New Rx:refill  RX Name and Strength:Blood Tablet Blood  How is the patient currently taking it? (ex. 1XDay):  Is this a 30 day or 90 day RX:90  Preferred Pharmacy with phone number:.  [facility] [address] [place], [place] - [address] [place]  [address] [address]  Phone: [phone] Fax: [phone]      Local or Mail Order: Local  Ordering Provider:Dr Oliva  Would the patient rather a call back or a response via [facility]? CallBack  Best Call Back Number:[phone]  Additional Information:

## 2019-10-30 ENCOUNTER — OFFICE VISIT (OUTPATIENT)
Dept: INTERNAL MEDICINE | Facility: CLINIC | Age: 69
End: 2019-10-30
Payer: MEDICARE

## 2019-10-30 ENCOUNTER — LAB VISIT (OUTPATIENT)
Dept: LAB | Facility: HOSPITAL | Age: 69
End: 2019-10-30
Attending: FAMILY MEDICINE
Payer: MEDICARE

## 2019-10-30 ENCOUNTER — CLINICAL SUPPORT (OUTPATIENT)
Dept: CARDIOLOGY | Facility: CLINIC | Age: 69
End: 2019-10-30
Payer: MEDICARE

## 2019-10-30 VITALS
TEMPERATURE: 98 F | BODY MASS INDEX: 40.13 KG/M2 | HEIGHT: 62 IN | WEIGHT: 218.06 LBS | OXYGEN SATURATION: 97 % | SYSTOLIC BLOOD PRESSURE: 134 MMHG | HEART RATE: 55 BPM | DIASTOLIC BLOOD PRESSURE: 86 MMHG

## 2019-10-30 DIAGNOSIS — I10 ESSENTIAL HYPERTENSION: ICD-10-CM

## 2019-10-30 DIAGNOSIS — E04.1 THYROID NODULE: ICD-10-CM

## 2019-10-30 DIAGNOSIS — M79.10 MUSCLE SORENESS: Primary | ICD-10-CM

## 2019-10-30 DIAGNOSIS — M79.10 MUSCLE SORENESS: ICD-10-CM

## 2019-10-30 DIAGNOSIS — M43.16 SPONDYLOLISTHESIS OF LUMBAR REGION: ICD-10-CM

## 2019-10-30 DIAGNOSIS — E66.01 SEVERE OBESITY (BMI 35.0-39.9) WITH COMORBIDITY: ICD-10-CM

## 2019-10-30 DIAGNOSIS — M47.22 OSTEOARTHRITIS OF SPINE WITH RADICULOPATHY, CERVICAL REGION: ICD-10-CM

## 2019-10-30 DIAGNOSIS — E55.9 VITAMIN D DEFICIENCY: ICD-10-CM

## 2019-10-30 DIAGNOSIS — E21.0 HYPERPARATHYROIDISM, PRIMARY: ICD-10-CM

## 2019-10-30 LAB
CK SERPL-CCNC: 162 U/L (ref 20–180)
CRP SERPL-MCNC: 9 MG/L (ref 0–8.2)
ERYTHROCYTE [SEDIMENTATION RATE] IN BLOOD BY WESTERGREN METHOD: 30 MM/HR (ref 0–36)

## 2019-10-30 PROCEDURE — 36415 COLL VENOUS BLD VENIPUNCTURE: CPT

## 2019-10-30 PROCEDURE — 93005 EKG 12-LEAD: ICD-10-PCS | Mod: S$GLB,,, | Performed by: FAMILY MEDICINE

## 2019-10-30 PROCEDURE — 99214 PR OFFICE/OUTPT VISIT, EST, LEVL IV, 30-39 MIN: ICD-10-PCS | Mod: S$GLB,,, | Performed by: FAMILY MEDICINE

## 2019-10-30 PROCEDURE — 86038 ANTINUCLEAR ANTIBODIES: CPT

## 2019-10-30 PROCEDURE — 99999 PR PBB SHADOW E&M-EST. PATIENT-LVL III: ICD-10-PCS | Mod: PBBFAC,,, | Performed by: FAMILY MEDICINE

## 2019-10-30 PROCEDURE — 82550 ASSAY OF CK (CPK): CPT

## 2019-10-30 PROCEDURE — 1101F PT FALLS ASSESS-DOCD LE1/YR: CPT | Mod: CPTII,S$GLB,, | Performed by: FAMILY MEDICINE

## 2019-10-30 PROCEDURE — 93005 ELECTROCARDIOGRAM TRACING: CPT | Mod: S$GLB,,, | Performed by: FAMILY MEDICINE

## 2019-10-30 PROCEDURE — 3079F DIAST BP 80-89 MM HG: CPT | Mod: CPTII,S$GLB,, | Performed by: FAMILY MEDICINE

## 2019-10-30 PROCEDURE — 99214 OFFICE O/P EST MOD 30 MIN: CPT | Mod: S$GLB,,, | Performed by: FAMILY MEDICINE

## 2019-10-30 PROCEDURE — 1101F PR PT FALLS ASSESS DOC 0-1 FALLS W/OUT INJ PAST YR: ICD-10-PCS | Mod: CPTII,S$GLB,, | Performed by: FAMILY MEDICINE

## 2019-10-30 PROCEDURE — 3075F SYST BP GE 130 - 139MM HG: CPT | Mod: CPTII,S$GLB,, | Performed by: FAMILY MEDICINE

## 2019-10-30 PROCEDURE — 86140 C-REACTIVE PROTEIN: CPT

## 2019-10-30 PROCEDURE — 99999 PR PBB SHADOW E&M-EST. PATIENT-LVL III: CPT | Mod: PBBFAC,,, | Performed by: FAMILY MEDICINE

## 2019-10-30 PROCEDURE — 93010 ELECTROCARDIOGRAM REPORT: CPT | Mod: S$GLB,,, | Performed by: INTERNAL MEDICINE

## 2019-10-30 PROCEDURE — 93010 EKG 12-LEAD: ICD-10-PCS | Mod: S$GLB,,, | Performed by: INTERNAL MEDICINE

## 2019-10-30 PROCEDURE — 85652 RBC SED RATE AUTOMATED: CPT

## 2019-10-30 PROCEDURE — 3079F PR MOST RECENT DIASTOLIC BLOOD PRESSURE 80-89 MM HG: ICD-10-PCS | Mod: CPTII,S$GLB,, | Performed by: FAMILY MEDICINE

## 2019-10-30 PROCEDURE — 3075F PR MOST RECENT SYSTOLIC BLOOD PRESS GE 130-139MM HG: ICD-10-PCS | Mod: CPTII,S$GLB,, | Performed by: FAMILY MEDICINE

## 2019-10-30 NOTE — PROGRESS NOTES
"Subjective:       Patient ID: Beth Souza is a 69 y.o. female.    Chief Complaint: Other (Body soreness)    69-year-old  female patient with Patient Active Problem List:     Essential hypertension     Vitamin D deficiency     Spondylolisthesis of lumbar region     Hyperparathyroidism, primary     Thyroid nodule     Peripheral polyneuropathy     Cataract     Osteoarthritis of spine with radiculopathy, lumbar region     Severe obesity (BMI 35.0-39.9) with comorbidity     LEN on CPAP  Reports that she has been having soreness all over the body, patient has finished physical therapy for her back few weeks ago.   Reports that she has not been taking cholesterol medication as prescribed and has been taking blood pressure medication but did not take this morning  Patient is unable to take NSAIDs secondary to bruising and has been taking Tylenol with no relief.   Denies any injury or trauma  Has been having chronic neck and back pain but has been having soreness all over including her chest but denies any cough or difficulty breathing.   Patient does have a muscle relaxants at home but has not been taking it    Review of Systems   Constitutional: Negative for fatigue and fever.   Eyes: Negative for visual disturbance.   Respiratory: Negative for cough, chest tightness, shortness of breath and wheezing.    Cardiovascular: Negative for chest pain and leg swelling.   Gastrointestinal: Negative for abdominal pain, nausea and vomiting.   Musculoskeletal: Positive for arthralgias, back pain, myalgias and neck pain.   Skin: Negative for rash.   Neurological: Negative for weakness, light-headedness, numbness and headaches.   Psychiatric/Behavioral: Negative for sleep disturbance.         BP (!) 134/96 (BP Location: Right arm, Patient Position: Sitting, BP Method: Large (Manual))   Pulse (!) 55   Temp 97.6 °F (36.4 °C) (Tympanic)   Ht 5' 2" (1.575 m)   Wt 98.9 kg (218 lb 0.6 oz)   SpO2 97%   BMI 39.88 " kg/m²   Objective:      Physical Exam   Constitutional: She is oriented to person, place, and time. She appears well-developed and well-nourished.   HENT:   Head: Normocephalic and atraumatic.   Mouth/Throat: Oropharynx is clear and moist.   Cardiovascular: Normal rate, regular rhythm and normal heart sounds.   No murmur heard.  Pulmonary/Chest: Effort normal and breath sounds normal. She has no wheezes.   Abdominal: Soft. Bowel sounds are normal. There is no tenderness.   Musculoskeletal: She exhibits tenderness. She exhibits no edema.   Positive for generalized tenderness in bilateral upper and lower extremities , cervical and lumbar spine on exam today   Neurological: She is alert and oriented to person, place, and time.   Skin: Skin is warm and dry. No rash noted.   Psychiatric: She has a normal mood and affect.         Assessment/Plan:   1. Muscle soreness  - Sedimentation rate; Future  - C-reactive protein; Future  - SUNG Screen w/Reflex; Future  - CK; Future  2. Spondylolisthesis of lumbar region  3. Osteoarthritis of spine with radiculopathy, cervical region    Could be secondary to generalized osteoarthrosis  Reviewed recent labs which looks stable  Patient was advised to continue taking over-the-counter vitamin D3 1000 units daily  Advised to start taking muscle relaxants as prescribed in the past and continue Tylenol as needed for pain  Will check further labs and if abnormal will consider referral to Rheumatology  Drink adequate fluids    4. Thyroid nodule  Stable and asymptomatic    5. Hyperparathyroidism, primary  Noted mild elevation parathyroid levels but normal calcium levels    6. Vitamin D deficiency  Currently taking over-the-counter vitamin-D supplements    7. Essential hypertension  - EKG 12-lead; Future  Blood pressure elevated today for not taking blood pressure medication but otherwise has been stable  Patient on lisinopril hydrochlorothiazide  20/25 mg daily    Advised to take cholesterol  medication Lipitor 20 mg as prescribed for cholesterol    8. Severe obesity (BMI 35.0-39.9) with comorbidity

## 2019-10-31 LAB — ANA SER QL IF: NORMAL

## 2019-11-12 ENCOUNTER — OFFICE VISIT (OUTPATIENT)
Dept: CARDIOLOGY | Facility: CLINIC | Age: 69
End: 2019-11-12
Payer: MEDICARE

## 2019-11-12 ENCOUNTER — TELEPHONE (OUTPATIENT)
Dept: RHEUMATOLOGY | Facility: CLINIC | Age: 69
End: 2019-11-12

## 2019-11-12 VITALS
BODY MASS INDEX: 40.04 KG/M2 | HEART RATE: 63 BPM | WEIGHT: 218.94 LBS | DIASTOLIC BLOOD PRESSURE: 70 MMHG | SYSTOLIC BLOOD PRESSURE: 114 MMHG | OXYGEN SATURATION: 98 %

## 2019-11-12 DIAGNOSIS — E78.2 MIXED HYPERLIPIDEMIA: ICD-10-CM

## 2019-11-12 DIAGNOSIS — E66.01 SEVERE OBESITY (BMI 35.0-39.9) WITH COMORBIDITY: ICD-10-CM

## 2019-11-12 DIAGNOSIS — G47.33 OSA ON CPAP: ICD-10-CM

## 2019-11-12 DIAGNOSIS — I10 ESSENTIAL HYPERTENSION: Primary | ICD-10-CM

## 2019-11-12 DIAGNOSIS — M79.10 MYALGIA: ICD-10-CM

## 2019-11-12 PROCEDURE — 3074F SYST BP LT 130 MM HG: CPT | Mod: CPTII,S$GLB,, | Performed by: INTERNAL MEDICINE

## 2019-11-12 PROCEDURE — 99999 PR PBB SHADOW E&M-EST. PATIENT-LVL III: CPT | Mod: PBBFAC,,, | Performed by: INTERNAL MEDICINE

## 2019-11-12 PROCEDURE — 1101F PR PT FALLS ASSESS DOC 0-1 FALLS W/OUT INJ PAST YR: ICD-10-PCS | Mod: CPTII,S$GLB,, | Performed by: INTERNAL MEDICINE

## 2019-11-12 PROCEDURE — 99499 RISK ADDL DX/OHS AUDIT: ICD-10-PCS | Mod: S$GLB,,, | Performed by: INTERNAL MEDICINE

## 2019-11-12 PROCEDURE — 3078F PR MOST RECENT DIASTOLIC BLOOD PRESSURE < 80 MM HG: ICD-10-PCS | Mod: CPTII,S$GLB,, | Performed by: INTERNAL MEDICINE

## 2019-11-12 PROCEDURE — 1101F PT FALLS ASSESS-DOCD LE1/YR: CPT | Mod: CPTII,S$GLB,, | Performed by: INTERNAL MEDICINE

## 2019-11-12 PROCEDURE — 3078F DIAST BP <80 MM HG: CPT | Mod: CPTII,S$GLB,, | Performed by: INTERNAL MEDICINE

## 2019-11-12 PROCEDURE — 3074F PR MOST RECENT SYSTOLIC BLOOD PRESSURE < 130 MM HG: ICD-10-PCS | Mod: CPTII,S$GLB,, | Performed by: INTERNAL MEDICINE

## 2019-11-12 PROCEDURE — 99999 PR PBB SHADOW E&M-EST. PATIENT-LVL III: ICD-10-PCS | Mod: PBBFAC,,, | Performed by: INTERNAL MEDICINE

## 2019-11-12 PROCEDURE — 99214 OFFICE O/P EST MOD 30 MIN: CPT | Mod: S$GLB,,, | Performed by: INTERNAL MEDICINE

## 2019-11-12 PROCEDURE — 99499 UNLISTED E&M SERVICE: CPT | Mod: S$GLB,,, | Performed by: INTERNAL MEDICINE

## 2019-11-12 PROCEDURE — 99214 PR OFFICE/OUTPT VISIT, EST, LEVL IV, 30-39 MIN: ICD-10-PCS | Mod: S$GLB,,, | Performed by: INTERNAL MEDICINE

## 2019-11-12 NOTE — PROGRESS NOTES
Subjective:   Patient ID:  Beth Souza is a 69 y.o. female who presents for follow up of Follow-up      Pt, 68 yo, came in for one yr f/u.  PMH HTN, HLD and Vit D.   No chest pain, fainting except sinus, PND, orthopnea, syncope and claudication.   Palpitation and fluttering feeling at night. No dizziness, and faint  Does not exercise due to back pain.  C/o diffuse body sore and pain  Pt has bee not taking Lipitor for a long time.  No smoking/drinking.  EKG on 10-: NSR. nonspecifc ST T change.    echo normal EF, mild LAE and Holter normal             Past Medical History:   Diagnosis Date    Cataract     Hyperlipidemia     Hypertension     Lumbar spondylosis     Vitamin D deficiency        Past Surgical History:   Procedure Laterality Date    PARTIAL HYSTERECTOMY         Social History     Tobacco Use    Smoking status: Former Smoker     Last attempt to quit: 1/1/2004     Years since quitting: 15.8    Smokeless tobacco: Former User   Substance Use Topics    Alcohol use: Yes     Frequency: 2-4 times a month     Drinks per session: 1 or 2     Binge frequency: Never     Comment: occasionallty    Drug use: No       Family History   Problem Relation Age of Onset    Hypertension Mother     Diabetes Daughter     Melanoma Neg Hx     Psoriasis Neg Hx     Lupus Neg Hx     Eczema Neg Hx          Review of Systems   Constitution: Negative for decreased appetite, diaphoresis, fever, malaise/fatigue and night sweats.   HENT: Negative for nosebleeds.    Eyes: Negative for blurred vision and double vision.   Cardiovascular: Positive for palpitations. Negative for chest pain, claudication, dyspnea on exertion, irregular heartbeat, leg swelling, near-syncope, orthopnea, paroxysmal nocturnal dyspnea and syncope.   Respiratory: Negative for cough, shortness of breath, sleep disturbances due to breathing, snoring, sputum production and wheezing.    Endocrine: Negative for cold intolerance and  polyuria.   Hematologic/Lymphatic: Does not bruise/bleed easily.   Skin: Negative for rash.   Musculoskeletal: Positive for myalgias. Negative for back pain, falls, joint pain, joint swelling and neck pain.   Gastrointestinal: Negative for abdominal pain, heartburn, nausea and vomiting.   Genitourinary: Negative for dysuria, frequency and hematuria.   Neurological: Negative for difficulty with concentration, dizziness, focal weakness, headaches, light-headedness, numbness, seizures and weakness.   Psychiatric/Behavioral: Negative for depression, memory loss and substance abuse. The patient does not have insomnia.    Allergic/Immunologic: Negative for HIV exposure and hives.       Objective:   Physical Exam   Constitutional: She is oriented to person, place, and time. She appears well-nourished.   HENT:   Head: Normocephalic.   Eyes: Pupils are equal, round, and reactive to light.   Neck: Normal carotid pulses and no JVD present. Carotid bruit is not present. No thyromegaly present.   Cardiovascular: Normal rate, regular rhythm, normal heart sounds and normal pulses.  No extrasystoles are present. PMI is not displaced. Exam reveals no gallop and no S3.   No murmur heard.  Pulmonary/Chest: Breath sounds normal. No stridor. No respiratory distress.   Abdominal: Soft. Bowel sounds are normal. There is no tenderness. There is no rebound.   Musculoskeletal: Normal range of motion.   Proximal muscle tenderness   Neurological: She is alert and oriented to person, place, and time.   Skin: Skin is intact. No rash noted.   Psychiatric: Her behavior is normal.       Lab Results   Component Value Date    CHOL 201 (H) 08/20/2019    CHOL 209 (H) 01/28/2019    CHOL 212 (H) 09/22/2018     Lab Results   Component Value Date    HDL 57 08/20/2019    HDL 67 01/28/2019    HDL 61 09/22/2018     Lab Results   Component Value Date    LDLCALC 130.2 08/20/2019    LDLCALC 126.4 01/28/2019    LDLCALC 132.6 09/22/2018     Lab Results   Component  Value Date    TRIG 69 08/20/2019    TRIG 78 01/28/2019    TRIG 92 09/22/2018     Lab Results   Component Value Date    CHOLHDL 28.4 08/20/2019    CHOLHDL 32.1 01/28/2019    CHOLHDL 28.8 09/22/2018       Chemistry        Component Value Date/Time     08/20/2019 0803    K 3.8 08/20/2019 0803     08/20/2019 0803    CO2 33 (H) 08/20/2019 0803    BUN 14 08/20/2019 0803    CREATININE 0.8 08/20/2019 0803    GLU 97 08/20/2019 0803        Component Value Date/Time    CALCIUM 9.2 08/20/2019 0803    ALKPHOS 82 08/20/2019 0803    AST 10 08/20/2019 0803    ALT 17 08/20/2019 0803    BILITOT 0.4 08/20/2019 0803    ESTGFRAFRICA >60.0 08/20/2019 0803    EGFRNONAA >60.0 08/20/2019 0803          No results found for: LABA1C, HGBA1C  Lab Results   Component Value Date    TSH 1.426 08/20/2019     No results found for: INR, PROTIME  Lab Results   Component Value Date    WBC 4.94 08/20/2019    HGB 13.8 08/20/2019    HCT 44.2 08/20/2019    MCV 93 08/20/2019     08/20/2019     BMP  Sodium   Date Value Ref Range Status   08/20/2019 144 136 - 145 mmol/L Final     Potassium   Date Value Ref Range Status   08/20/2019 3.8 3.5 - 5.1 mmol/L Final     Chloride   Date Value Ref Range Status   08/20/2019 104 95 - 110 mmol/L Final     CO2   Date Value Ref Range Status   08/20/2019 33 (H) 23 - 29 mmol/L Final     BUN, Bld   Date Value Ref Range Status   08/20/2019 14 8 - 23 mg/dL Final     Creatinine   Date Value Ref Range Status   08/20/2019 0.8 0.5 - 1.4 mg/dL Final     Calcium   Date Value Ref Range Status   08/20/2019 9.2 8.7 - 10.5 mg/dL Final     Anion Gap   Date Value Ref Range Status   08/20/2019 7 (L) 8 - 16 mmol/L Final     eGFR if    Date Value Ref Range Status   08/20/2019 >60.0 >60 mL/min/1.73 m^2 Final     eGFR if non    Date Value Ref Range Status   08/20/2019 >60.0 >60 mL/min/1.73 m^2 Final     Comment:     Calculation used to obtain the estimated glomerular filtration  rate (eGFR) is  the CKD-EPI equation.        BNP  @LABRCNTIP(BNP,BNPTRIAGEBLO)@  @LABRCNTIP(troponini)@  CrCl cannot be calculated (Patient's most recent lab result is older than the maximum 7 days allowed.).  No results found in the last 24 hours.  No results found in the last 24 hours.  No results found in the last 24 hours.    Assessment:      1. Essential hypertension    2. LEN on CPAP    3. Severe obesity (BMI 35.0-39.9) with comorbidity    4. Mixed hyperlipidemia      BP wnl  HLD borderline    Plan:   Continue Lisinopril  Ok not taking Lipitor  advise low fat diet  DASH  Recommend heart-healthy diet, weight control and regular exercise.  Leonor. Risk modification.   RTC in 6 months    I have reviewed all pertinent labs and cardiac studies. Plans and recommendations have been formulated under my direct supervision. All questions answered and patient voiced understanding. Patient to continue current medications.

## 2019-11-12 NOTE — TELEPHONE ENCOUNTER
Called patient regarding referral from Dr. James, pt states that she will call back to schedule an appointment.

## 2020-01-21 ENCOUNTER — OFFICE VISIT (OUTPATIENT)
Dept: DERMATOLOGY | Facility: CLINIC | Age: 70
End: 2020-01-21
Payer: MEDICARE

## 2020-01-21 DIAGNOSIS — R51.9 GENERALIZED HEADACHES: Primary | ICD-10-CM

## 2020-01-21 PROCEDURE — 1101F PT FALLS ASSESS-DOCD LE1/YR: CPT | Mod: CPTII,S$GLB,, | Performed by: STUDENT IN AN ORGANIZED HEALTH CARE EDUCATION/TRAINING PROGRAM

## 2020-01-21 PROCEDURE — 99212 PR OFFICE/OUTPT VISIT, EST, LEVL II, 10-19 MIN: ICD-10-PCS | Mod: S$GLB,,, | Performed by: STUDENT IN AN ORGANIZED HEALTH CARE EDUCATION/TRAINING PROGRAM

## 2020-01-21 PROCEDURE — 1159F PR MEDICATION LIST DOCUMENTED IN MEDICAL RECORD: ICD-10-PCS | Mod: S$GLB,,, | Performed by: STUDENT IN AN ORGANIZED HEALTH CARE EDUCATION/TRAINING PROGRAM

## 2020-01-21 PROCEDURE — 1159F MED LIST DOCD IN RCRD: CPT | Mod: S$GLB,,, | Performed by: STUDENT IN AN ORGANIZED HEALTH CARE EDUCATION/TRAINING PROGRAM

## 2020-01-21 PROCEDURE — 99999 PR PBB SHADOW E&M-EST. PATIENT-LVL I: CPT | Mod: PBBFAC,,, | Performed by: STUDENT IN AN ORGANIZED HEALTH CARE EDUCATION/TRAINING PROGRAM

## 2020-01-21 PROCEDURE — 99999 PR PBB SHADOW E&M-EST. PATIENT-LVL I: ICD-10-PCS | Mod: PBBFAC,,, | Performed by: STUDENT IN AN ORGANIZED HEALTH CARE EDUCATION/TRAINING PROGRAM

## 2020-01-21 PROCEDURE — 99212 OFFICE O/P EST SF 10 MIN: CPT | Mod: S$GLB,,, | Performed by: STUDENT IN AN ORGANIZED HEALTH CARE EDUCATION/TRAINING PROGRAM

## 2020-01-21 PROCEDURE — 1101F PR PT FALLS ASSESS DOC 0-1 FALLS W/OUT INJ PAST YR: ICD-10-PCS | Mod: CPTII,S$GLB,, | Performed by: STUDENT IN AN ORGANIZED HEALTH CARE EDUCATION/TRAINING PROGRAM

## 2020-01-21 NOTE — PROGRESS NOTES
Subjective:       Patient ID:  Beth Souza is a 69 y.o. female who presents for No chief complaint on file.    History of Present Illness: The patient presents for follow up, here for headaches. 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 Reports having left sided headache over the past 4 days. Reports a tingling pain radiating up her neck, face, and onto the scalp. Denies any ocular changes. Denies any rash, nodules or other symptoms on the skin.         Review of Systems   Eyes: Negative for visual change.   Skin: Negative for itching, rash, dry skin and dry lips.        Objective:    Physical Exam   Constitutional: She appears well-developed and well-nourished. No distress.   Neurological: She is alert and oriented to person, place, and time. She is not disoriented.   Psychiatric: She has a normal mood and affect.   Skin:   Areas Examined (abnormalities noted in diagram):   Head / Face Inspection Performed  Neck Inspection Performed              Diagram Legend     Erythematous scaling macule/papule c/w actinic keratosis       Vascular papule c/w angioma      Pigmented verrucoid papule/plaque c/w seborrheic keratosis      Yellow umbilicated papule c/w sebaceous hyperplasia      Irregularly shaped tan macule c/w lentigo     1-2 mm smooth white papules consistent with Milia      Movable subcutaneous cyst with punctum c/w epidermal inclusion cyst      Subcutaneous movable cyst c/w pilar cyst      Firm pink to brown papule c/w dermatofibroma      Pedunculated fleshy papule(s) c/w skin tag(s)      Evenly pigmented macule c/w junctional nevus     Mildly variegated pigmented, slightly irregular-bordered macule c/w mildly atypical nevus      Flesh colored to evenly pigmented papule c/w intradermal nevus       Pink pearly papule/plaque c/w basal cell carcinoma      Erythematous hyperkeratotic cursted plaque c/w SCC      Surgical scar with no sign of skin cancer recurrence       Open and closed comedones      Inflammatory papules and pustules      Verrucoid papule consistent consistent with wart     Erythematous eczematous patches and plaques     Dystrophic onycholytic nail with subungual debris c/w onychomycosis     Umbilicated papule    Erythematous-base heme-crusted tan verrucoid plaque consistent with inflamed seborrheic keratosis     Erythematous Silvery Scaling Plaque c/w Psoriasis     See annotation      Assessment / Plan:        Generalized headaches - likely underlying neurologic cause as there are no cutaneous or subcutaneous findings for her symptoms. Discussed with patient to notify PCP in order to do full workup for underlying cause of headaches.     SCCIS  - scalp, resolved.              Follow up if symptoms worsen or fail to improve.

## 2020-01-28 ENCOUNTER — OFFICE VISIT (OUTPATIENT)
Dept: INTERNAL MEDICINE | Facility: CLINIC | Age: 70
End: 2020-01-28
Payer: MEDICARE

## 2020-01-28 ENCOUNTER — LAB VISIT (OUTPATIENT)
Dept: LAB | Facility: HOSPITAL | Age: 70
End: 2020-01-28
Attending: FAMILY MEDICINE
Payer: MEDICARE

## 2020-01-28 VITALS
HEART RATE: 59 BPM | OXYGEN SATURATION: 97 % | DIASTOLIC BLOOD PRESSURE: 88 MMHG | BODY MASS INDEX: 40.61 KG/M2 | RESPIRATION RATE: 18 BRPM | SYSTOLIC BLOOD PRESSURE: 132 MMHG | HEIGHT: 62 IN | WEIGHT: 220.69 LBS | TEMPERATURE: 98 F

## 2020-01-28 DIAGNOSIS — I10 ESSENTIAL HYPERTENSION: ICD-10-CM

## 2020-01-28 DIAGNOSIS — M47.22 OSTEOARTHRITIS OF SPINE WITH RADICULOPATHY, CERVICAL REGION: ICD-10-CM

## 2020-01-28 DIAGNOSIS — M47.26 OSTEOARTHRITIS OF SPINE WITH RADICULOPATHY, LUMBAR REGION: ICD-10-CM

## 2020-01-28 DIAGNOSIS — R51.9 LEFT-SIDED HEADACHE: ICD-10-CM

## 2020-01-28 DIAGNOSIS — R41.3 MEMORY DEFICITS: ICD-10-CM

## 2020-01-28 DIAGNOSIS — M79.10 MYALGIA: ICD-10-CM

## 2020-01-28 DIAGNOSIS — E78.2 MIXED HYPERLIPIDEMIA: ICD-10-CM

## 2020-01-28 DIAGNOSIS — E66.01 MORBID OBESITY WITH BMI OF 40.0-44.9, ADULT: ICD-10-CM

## 2020-01-28 DIAGNOSIS — E55.9 VITAMIN D DEFICIENCY: ICD-10-CM

## 2020-01-28 DIAGNOSIS — R51.9 LEFT-SIDED HEADACHE: Primary | ICD-10-CM

## 2020-01-28 DIAGNOSIS — E21.0 HYPERPARATHYROIDISM, PRIMARY: ICD-10-CM

## 2020-01-28 LAB
BASOPHILS # BLD AUTO: 0.03 K/UL (ref 0–0.2)
BASOPHILS NFR BLD: 0.5 % (ref 0–1.9)
DIFFERENTIAL METHOD: ABNORMAL
EOSINOPHIL # BLD AUTO: 0.2 K/UL (ref 0–0.5)
EOSINOPHIL NFR BLD: 2.3 % (ref 0–8)
ERYTHROCYTE [DISTWIDTH] IN BLOOD BY AUTOMATED COUNT: 14.4 % (ref 11.5–14.5)
ERYTHROCYTE [SEDIMENTATION RATE] IN BLOOD BY WESTERGREN METHOD: 10 MM/HR (ref 0–36)
HCT VFR BLD AUTO: 45.8 % (ref 37–48.5)
HGB BLD-MCNC: 14.2 G/DL (ref 12–16)
IMM GRANULOCYTES # BLD AUTO: 0.02 K/UL (ref 0–0.04)
IMM GRANULOCYTES NFR BLD AUTO: 0.3 % (ref 0–0.5)
LYMPHOCYTES # BLD AUTO: 2.4 K/UL (ref 1–4.8)
LYMPHOCYTES NFR BLD: 35.9 % (ref 18–48)
MCH RBC QN AUTO: 29.5 PG (ref 27–31)
MCHC RBC AUTO-ENTMCNC: 31 G/DL (ref 32–36)
MCV RBC AUTO: 95 FL (ref 82–98)
MONOCYTES # BLD AUTO: 0.6 K/UL (ref 0.3–1)
MONOCYTES NFR BLD: 8.7 % (ref 4–15)
NEUTROPHILS # BLD AUTO: 3.4 K/UL (ref 1.8–7.7)
NEUTROPHILS NFR BLD: 52.3 % (ref 38–73)
NRBC BLD-RTO: 0 /100 WBC
PLATELET # BLD AUTO: 334 K/UL (ref 150–350)
PMV BLD AUTO: 10.2 FL (ref 9.2–12.9)
RBC # BLD AUTO: 4.82 M/UL (ref 4–5.4)
WBC # BLD AUTO: 6.57 K/UL (ref 3.9–12.7)

## 2020-01-28 PROCEDURE — 80053 COMPREHEN METABOLIC PANEL: CPT

## 2020-01-28 PROCEDURE — 82607 VITAMIN B-12: CPT

## 2020-01-28 PROCEDURE — 99499 RISK ADDL DX/OHS AUDIT: ICD-10-PCS | Mod: S$GLB,,, | Performed by: FAMILY MEDICINE

## 2020-01-28 PROCEDURE — 85025 COMPLETE CBC W/AUTO DIFF WBC: CPT

## 2020-01-28 PROCEDURE — 1125F PR PAIN SEVERITY QUANTIFIED, PAIN PRESENT: ICD-10-PCS | Mod: S$GLB,,, | Performed by: FAMILY MEDICINE

## 2020-01-28 PROCEDURE — 1125F AMNT PAIN NOTED PAIN PRSNT: CPT | Mod: S$GLB,,, | Performed by: FAMILY MEDICINE

## 2020-01-28 PROCEDURE — 99999 PR PBB SHADOW E&M-EST. PATIENT-LVL III: ICD-10-PCS | Mod: PBBFAC,,, | Performed by: FAMILY MEDICINE

## 2020-01-28 PROCEDURE — 36415 COLL VENOUS BLD VENIPUNCTURE: CPT

## 2020-01-28 PROCEDURE — 99214 PR OFFICE/OUTPT VISIT, EST, LEVL IV, 30-39 MIN: ICD-10-PCS | Mod: S$GLB,,, | Performed by: FAMILY MEDICINE

## 2020-01-28 PROCEDURE — 84443 ASSAY THYROID STIM HORMONE: CPT

## 2020-01-28 PROCEDURE — 3079F PR MOST RECENT DIASTOLIC BLOOD PRESSURE 80-89 MM HG: ICD-10-PCS | Mod: CPTII,S$GLB,, | Performed by: FAMILY MEDICINE

## 2020-01-28 PROCEDURE — 1101F PT FALLS ASSESS-DOCD LE1/YR: CPT | Mod: CPTII,S$GLB,, | Performed by: FAMILY MEDICINE

## 2020-01-28 PROCEDURE — 1101F PR PT FALLS ASSESS DOC 0-1 FALLS W/OUT INJ PAST YR: ICD-10-PCS | Mod: CPTII,S$GLB,, | Performed by: FAMILY MEDICINE

## 2020-01-28 PROCEDURE — 3075F PR MOST RECENT SYSTOLIC BLOOD PRESS GE 130-139MM HG: ICD-10-PCS | Mod: CPTII,S$GLB,, | Performed by: FAMILY MEDICINE

## 2020-01-28 PROCEDURE — 99214 OFFICE O/P EST MOD 30 MIN: CPT | Mod: S$GLB,,, | Performed by: FAMILY MEDICINE

## 2020-01-28 PROCEDURE — 3075F SYST BP GE 130 - 139MM HG: CPT | Mod: CPTII,S$GLB,, | Performed by: FAMILY MEDICINE

## 2020-01-28 PROCEDURE — 99499 UNLISTED E&M SERVICE: CPT | Mod: S$GLB,,, | Performed by: FAMILY MEDICINE

## 2020-01-28 PROCEDURE — 82306 VITAMIN D 25 HYDROXY: CPT

## 2020-01-28 PROCEDURE — 3079F DIAST BP 80-89 MM HG: CPT | Mod: CPTII,S$GLB,, | Performed by: FAMILY MEDICINE

## 2020-01-28 PROCEDURE — 99999 PR PBB SHADOW E&M-EST. PATIENT-LVL III: CPT | Mod: PBBFAC,,, | Performed by: FAMILY MEDICINE

## 2020-01-28 PROCEDURE — 1159F MED LIST DOCD IN RCRD: CPT | Mod: S$GLB,,, | Performed by: FAMILY MEDICINE

## 2020-01-28 PROCEDURE — 1159F PR MEDICATION LIST DOCUMENTED IN MEDICAL RECORD: ICD-10-PCS | Mod: S$GLB,,, | Performed by: FAMILY MEDICINE

## 2020-01-28 PROCEDURE — 82550 ASSAY OF CK (CPK): CPT

## 2020-01-28 PROCEDURE — 86140 C-REACTIVE PROTEIN: CPT

## 2020-01-28 PROCEDURE — 85652 RBC SED RATE AUTOMATED: CPT

## 2020-01-28 PROCEDURE — 83735 ASSAY OF MAGNESIUM: CPT

## 2020-01-28 RX ORDER — BUTALBITAL, ACETAMINOPHEN AND CAFFEINE 50; 325; 40 MG/1; MG/1; MG/1
1 TABLET ORAL 3 TIMES DAILY PRN
Qty: 30 TABLET | Refills: 1 | Status: SHIPPED | OUTPATIENT
Start: 2020-01-28 | End: 2020-02-27

## 2020-01-29 DIAGNOSIS — E55.9 VITAMIN D DEFICIENCY: Primary | ICD-10-CM

## 2020-01-29 LAB
25(OH)D3+25(OH)D2 SERPL-MCNC: 22 NG/ML (ref 30–96)
ALBUMIN SERPL BCP-MCNC: 3.8 G/DL (ref 3.5–5.2)
ALP SERPL-CCNC: 88 U/L (ref 55–135)
ALT SERPL W/O P-5'-P-CCNC: 15 U/L (ref 10–44)
ANION GAP SERPL CALC-SCNC: 7 MMOL/L (ref 8–16)
AST SERPL-CCNC: 11 U/L (ref 10–40)
BILIRUB SERPL-MCNC: 0.2 MG/DL (ref 0.1–1)
BUN SERPL-MCNC: 19 MG/DL (ref 8–23)
CALCIUM SERPL-MCNC: 9.3 MG/DL (ref 8.7–10.5)
CHLORIDE SERPL-SCNC: 101 MMOL/L (ref 95–110)
CK SERPL-CCNC: 191 U/L (ref 20–180)
CO2 SERPL-SCNC: 33 MMOL/L (ref 23–29)
CREAT SERPL-MCNC: 0.8 MG/DL (ref 0.5–1.4)
CRP SERPL-MCNC: 6.8 MG/L (ref 0–8.2)
EST. GFR  (AFRICAN AMERICAN): >60 ML/MIN/1.73 M^2
EST. GFR  (NON AFRICAN AMERICAN): >60 ML/MIN/1.73 M^2
GLUCOSE SERPL-MCNC: 78 MG/DL (ref 70–110)
MAGNESIUM SERPL-MCNC: 2.2 MG/DL (ref 1.6–2.6)
POTASSIUM SERPL-SCNC: 3.8 MMOL/L (ref 3.5–5.1)
PROT SERPL-MCNC: 7.5 G/DL (ref 6–8.4)
SODIUM SERPL-SCNC: 141 MMOL/L (ref 136–145)
TSH SERPL DL<=0.005 MIU/L-ACNC: 0.69 UIU/ML (ref 0.4–4)
VIT B12 SERPL-MCNC: 895 PG/ML (ref 210–950)

## 2020-01-29 RX ORDER — ERGOCALCIFEROL 1.25 MG/1
50000 CAPSULE ORAL
Qty: 10 CAPSULE | Refills: 0 | Status: SHIPPED | OUTPATIENT
Start: 2020-01-29 | End: 2020-04-20 | Stop reason: SDUPTHER

## 2020-01-29 NOTE — PROGRESS NOTES
Subjective:       Patient ID: Beth Souza is a 69 y.o. female.    Chief Complaint: Sinus Problem and Headache    69-year-old  female patient with Patient Active Problem List:     Essential hypertension     Vitamin D deficiency     Mixed hyperlipidemia     Hyperparathyroidism, primary     Thyroid nodule     Peripheral polyneuropathy     Cataract     Osteoarthritis of spine with radiculopathy, lumbar region     Severe obesity (BMI 35.0-39.9) with comorbidity     LEN on CPAP     Myalgia  Reports that she has been having left-sided facial headache and on lately for the past few months, has been having sinus pressure.  Denies any fever with chills, vision disturbances.  Reports sensitivity to light but denies any sensitivity to noise.  Has tried taking over-the-counter NSAIDs with no relief  Reports pain up to 6/10.  Does not drink excessive caffeine carbonated beverages.  Taking blood pressure medication regularly  Patient reports muscle aches especially to bilateral thighs and occasionally to bilateral arms off and on lately, currently not taking any over-the-counter medication  Reports that she has not been able to exercise  Continues to have neck pain radiating to the scalp causing headaches off and on lately.   Patient also reports having memory deficits on and off lately, sleep has been stable.      Review of Systems   Constitutional: Negative for chills, fatigue and fever.   HENT: Positive for ear pain and sinus pressure. Negative for ear discharge and hearing loss.    Eyes: Negative for visual disturbance.   Respiratory: Negative for shortness of breath.    Cardiovascular: Negative for chest pain and leg swelling.   Gastrointestinal: Negative for abdominal pain, nausea and vomiting.   Musculoskeletal: Positive for myalgias and neck pain. Negative for arthralgias and joint swelling.   Skin: Negative for rash.   Neurological: Positive for headaches. Negative for dizziness, weakness,  "light-headedness and numbness.   Psychiatric/Behavioral: Positive for decreased concentration. Negative for sleep disturbance.         /88 (BP Location: Right arm, Patient Position: Sitting, BP Method: Large (Manual))   Pulse (!) 59   Temp 97.9 °F (36.6 °C) (Oral)   Resp 18   Ht 5' 2" (1.575 m)   Wt 100.1 kg (220 lb 10.9 oz)   SpO2 97%   BMI 40.36 kg/m²   Objective:      Physical Exam   Constitutional: She is oriented to person, place, and time. She appears well-developed and well-nourished.   HENT:   Head: Normocephalic and atraumatic.   Right Ear: External ear normal.   Left Ear: External ear normal.   Mouth/Throat: Oropharynx is clear and moist. No oropharyngeal exudate.   Neck: Neck supple.   Cardiovascular: Normal rate, regular rhythm and normal heart sounds.   No murmur heard.  Pulmonary/Chest: Effort normal and breath sounds normal. She has no wheezes.   Abdominal: Soft. Bowel sounds are normal. There is no tenderness.   Musculoskeletal: She exhibits tenderness. She exhibits no edema.   Positive for paraspinal cervical muscle tenderness in the midline  Positive for left-sided facial tenderness on exam today with sinus pressure in the maxillary area and frontal area.    Positive for muscle tenderness on palpation to bilateral thighs.   Lymphadenopathy:     She has no cervical adenopathy.   Neurological: She is alert and oriented to person, place, and time.   Skin: Skin is warm and dry. No rash noted.   Psychiatric: She has a normal mood and affect.         Assessment/Plan:   1. Left-sided headache  - CBC auto differential; Future  - Comprehensive metabolic panel; Future  - butalbital-acetaminophen-caffeine -40 mg (FIORICET, ESGIC) -40 mg per tablet; Take 1 tablet by mouth 3 (three) times daily as needed for Pain.  Dispense: 30 tablet; Refill: 1    Fioricet prescribed today for symptomatic relief.  Encouraged to drink adequate fluids, avoid caffeine and carbonated beverages  Encouraged " to avoid stress    2. Essential hypertension  - Comprehensive metabolic panel; Future  - TSH; Future  Blood pressure is stable today currently on lisinopril hydrochlorothiazide 20/25 mg daily    3. Osteoarthritis of spine with radiculopathy, cervical region  Graded exercise regimen recommended    4. Myalgia  - TSH; Future  - CK; Future  - Sedimentation rate; Future  - C-reactive protein; Future  - Magnesium; Future  Encouraged to drink adequate fluids.  Will recheck further muscle enzyme levels    5. Osteoarthritis of spine with radiculopathy, lumbar region  Graded exercise regimen recommended    6. Morbid obesity (BMI 40.0-44.9) with comorbidity  Lifestyle modifications recommended to lose weight with BMI 40    7. Hyperparathyroidism, primary    8. Mixed hyperlipidemia  Diet controlled    9. Vitamin D deficiency  - Vitamin D; Future  Will check vitamin-D levels    10. Memory deficits  - Vitamin B12; Future  Encouraged to maintain flash cards  Will check vitamin B12 levels

## 2020-02-17 ENCOUNTER — TELEPHONE (OUTPATIENT)
Dept: INTERNAL MEDICINE | Facility: CLINIC | Age: 70
End: 2020-02-17

## 2020-02-17 DIAGNOSIS — G89.29 CHRONIC INTRACTABLE HEADACHE, UNSPECIFIED HEADACHE TYPE: Primary | ICD-10-CM

## 2020-02-17 DIAGNOSIS — R51.9 CHRONIC INTRACTABLE HEADACHE, UNSPECIFIED HEADACHE TYPE: Primary | ICD-10-CM

## 2020-02-17 NOTE — TELEPHONE ENCOUNTER
S/w pt and was able to schedule pt on 7/06/2020. Pt voiced that she would also like to be placed on th waiting list for an earlier appt./Mono

## 2020-02-17 NOTE — TELEPHONE ENCOUNTER
----- Message from Mandy Palomo MA sent at 2/17/2020  2:37 PM CST -----   Pt requesting referral be placed to Neurology for extreme headaches. Please Advise.  ----- Message -----  From: Abhay Jarrell  Sent: 2/17/2020   7:53 AM CST  To: [facility] Staff    ..Type:  Patient Returning Call    Who Called: pt   Who Left Message for Patient:  Does the patient know what this is regarding?: referral // headaches   Would the patient rather a call back or a response via [facility]? Call back   Best Call Back Number:[phone]  Additional Information: Pt is requesting a call from nurse to discuss referral for Neuro

## 2020-03-11 ENCOUNTER — TELEPHONE (OUTPATIENT)
Dept: INTERNAL MEDICINE | Facility: CLINIC | Age: 70
End: 2020-03-11

## 2020-03-11 NOTE — TELEPHONE ENCOUNTER
----- Message from Alexandra Haddad sent at 3/11/2020  9:32 AM CDT -----  Contact: pt  Type:  Needs Medical Advice    Who Called:  Pt   Symptoms (please be specific): coughing / sore throat    How long has patient had these symptoms:  Started on yesterday   Ph[facility] phone [email or web address]  Would the patient rather a call back or a response via [facility]? Either   Best Call Back Number:  [phone]  Additional Information:       [facility] [address] [place] [address] [address] [facility]  [address] [place] [address]  Phone: [phone] Fax: [phone]

## 2020-03-12 ENCOUNTER — OFFICE VISIT (OUTPATIENT)
Dept: INTERNAL MEDICINE | Facility: CLINIC | Age: 70
End: 2020-03-12
Payer: MEDICARE

## 2020-03-12 VITALS
DIASTOLIC BLOOD PRESSURE: 90 MMHG | HEIGHT: 62 IN | HEART RATE: 76 BPM | TEMPERATURE: 100 F | BODY MASS INDEX: 39.92 KG/M2 | OXYGEN SATURATION: 94 % | SYSTOLIC BLOOD PRESSURE: 122 MMHG | WEIGHT: 216.94 LBS

## 2020-03-12 DIAGNOSIS — J06.9 UPPER RESPIRATORY TRACT INFECTION, UNSPECIFIED TYPE: Primary | ICD-10-CM

## 2020-03-12 LAB
INFLUENZA A, MOLECULAR: NEGATIVE
INFLUENZA B, MOLECULAR: NEGATIVE
SPECIMEN SOURCE: NORMAL

## 2020-03-12 PROCEDURE — 3074F PR MOST RECENT SYSTOLIC BLOOD PRESSURE < 130 MM HG: ICD-10-PCS | Mod: CPTII,S$GLB,, | Performed by: NURSE PRACTITIONER

## 2020-03-12 PROCEDURE — 99999 PR PBB SHADOW E&M-EST. PATIENT-LVL III: ICD-10-PCS | Mod: PBBFAC,,, | Performed by: NURSE PRACTITIONER

## 2020-03-12 PROCEDURE — 3080F PR MOST RECENT DIASTOLIC BLOOD PRESSURE >= 90 MM HG: ICD-10-PCS | Mod: CPTII,S$GLB,, | Performed by: NURSE PRACTITIONER

## 2020-03-12 PROCEDURE — 3080F DIAST BP >= 90 MM HG: CPT | Mod: CPTII,S$GLB,, | Performed by: NURSE PRACTITIONER

## 2020-03-12 PROCEDURE — 1101F PT FALLS ASSESS-DOCD LE1/YR: CPT | Mod: CPTII,S$GLB,, | Performed by: NURSE PRACTITIONER

## 2020-03-12 PROCEDURE — 99999 PR PBB SHADOW E&M-EST. PATIENT-LVL III: CPT | Mod: PBBFAC,,, | Performed by: NURSE PRACTITIONER

## 2020-03-12 PROCEDURE — 1159F PR MEDICATION LIST DOCUMENTED IN MEDICAL RECORD: ICD-10-PCS | Mod: S$GLB,,, | Performed by: NURSE PRACTITIONER

## 2020-03-12 PROCEDURE — 1126F AMNT PAIN NOTED NONE PRSNT: CPT | Mod: S$GLB,,, | Performed by: NURSE PRACTITIONER

## 2020-03-12 PROCEDURE — 99214 PR OFFICE/OUTPT VISIT, EST, LEVL IV, 30-39 MIN: ICD-10-PCS | Mod: S$GLB,,, | Performed by: NURSE PRACTITIONER

## 2020-03-12 PROCEDURE — 87502 INFLUENZA DNA AMP PROBE: CPT

## 2020-03-12 PROCEDURE — 99214 OFFICE O/P EST MOD 30 MIN: CPT | Mod: S$GLB,,, | Performed by: NURSE PRACTITIONER

## 2020-03-12 PROCEDURE — 1159F MED LIST DOCD IN RCRD: CPT | Mod: S$GLB,,, | Performed by: NURSE PRACTITIONER

## 2020-03-12 PROCEDURE — 1101F PR PT FALLS ASSESS DOC 0-1 FALLS W/OUT INJ PAST YR: ICD-10-PCS | Mod: CPTII,S$GLB,, | Performed by: NURSE PRACTITIONER

## 2020-03-12 PROCEDURE — 1126F PR PAIN SEVERITY QUANTIFIED, NO PAIN PRESENT: ICD-10-PCS | Mod: S$GLB,,, | Performed by: NURSE PRACTITIONER

## 2020-03-12 PROCEDURE — 3074F SYST BP LT 130 MM HG: CPT | Mod: CPTII,S$GLB,, | Performed by: NURSE PRACTITIONER

## 2020-03-12 RX ORDER — FLUTICASONE PROPIONATE 50 MCG
2 SPRAY, SUSPENSION (ML) NASAL DAILY
Qty: 9.9 ML | Refills: 0 | Status: SHIPPED | OUTPATIENT
Start: 2020-03-12 | End: 2020-03-26

## 2020-03-12 RX ORDER — BENZONATATE 200 MG/1
200 CAPSULE ORAL 3 TIMES DAILY PRN
Qty: 20 CAPSULE | Refills: 0 | Status: SHIPPED | OUTPATIENT
Start: 2020-03-12 | End: 2020-03-19

## 2020-03-12 NOTE — PROGRESS NOTES
"CC:   Chief Complaint   Patient presents with    Cough    Sore Throat    Sinus Problem     HPI: This is a new problem.   Beth Souza is a 69 y.o. female with a complaint of URI.  The current episode started in the past 3 days.   The problem has been gradually worsening.   Associated symptoms included chills, rhinorrhea, sore throat, cough, body aches, fatigue.    Pertinent negatives include dyspnea, wheezing   Treatments tried: none     Review of patient's allergies indicates:   Allergen Reactions    Aspirin Other (See Comments)     Bruising       Outpatient Medications Prior to Visit   Medication Sig Dispense Refill    ergocalciferol (ERGOCALCIFEROL) 50,000 unit Cap Take 1 capsule (50,000 Units total) by mouth every 7 days. 10 capsule 0    lisinopril-hydrochlorothiazide (PRINZIDE,ZESTORETIC) 20-25 mg Tab Take 1 tablet by mouth once daily. 90 tablet 3    multivitamin capsule Take 1 capsule by mouth once daily.       No facility-administered medications prior to visit.         Physical Exam   BP (!) 122/90 (BP Location: Right arm, Patient Position: Sitting, BP Method: Large (Manual))   Pulse 76   Temp 99.9 °F (37.7 °C) (Oral)   Ht 5' 2" (1.575 m)   Wt 98.4 kg (216 lb 14.9 oz)   SpO2 (!) 94%   BMI 39.68 kg/m²   Constitutional: The patient appears well-developed and well-nourished.   Head: Normocephalic and atraumatic.   Right Ear: Tympanic membrane and ear canal normal. No drainage, swelling or tenderness. Tympanic membrane is not injected, not erythematous and not bulging.   Left Ear: Ear canal normal. No drainage, swelling or tenderness. Tympanic membrane is not injected, not erythematous and not bulging.   Nose: Mucosal edema and rhinorrhea present.   Mouth/Throat: Uvula is midline. Posterior oropharyngeal erythema present. No oropharyngeal exudate.        THE MUCOSA IS BOGGY AND ERYTHEMATOUS.     Cardiovascular: Normal rate, regular rhythm, S1 normal, S2 normal and normal heart sounds.  Exam " reveals no gallop, no S3, no S4 and no friction rub.    No murmur heard.  Pulmonary/Chest: Effort normal and breath sounds normal. No stridor. Not tachypneic. No respiratory distress. The patient has no wheezes. The patient has no rhonchi. The patient has no rales.   Skin: The patient is not diaphoretic.     Encounter Diagnosis   Name Primary?    Upper respiratory tract infection, unspecified type Yes       PLAN:    Beth was seen today for cough, sore throat and sinus problem.    Diagnoses and all orders for this visit:    Upper respiratory tract infection, unspecified type  -     Influenza A & B by Molecular  -     fluticasone propionate (FLONASE) 50 mcg/actuation nasal spray; 2 sprays (100 mcg total) by Each Nostril route once daily. for 14 days  -     benzonatate (TESSALON) 200 MG capsule; Take 1 capsule (200 mg total) by mouth 3 (three) times daily as needed.      Medications Ordered This Encounter   Medications    benzonatate (TESSALON) 200 MG capsule     Sig: Take 1 capsule (200 mg total) by mouth 3 (three) times daily as needed.     Dispense:  20 capsule     Refill:  0    fluticasone propionate (FLONASE) 50 mcg/actuation nasal spray     Si sprays (100 mcg total) by Each Nostril route once daily. for 14 days     Dispense:  9.9 mL     Refill:  0     Orders Placed This Encounter   Procedures    Influenza A & B by Molecular     RTC if symptoms are worsening or changing significantly or if not improved by the end of therapy.

## 2020-03-13 ENCOUNTER — TELEPHONE (OUTPATIENT)
Dept: URGENT CARE | Facility: CLINIC | Age: 70
End: 2020-03-13

## 2020-03-13 ENCOUNTER — TELEPHONE (OUTPATIENT)
Dept: INTERNAL MEDICINE | Facility: CLINIC | Age: 70
End: 2020-03-13

## 2020-03-13 DIAGNOSIS — R11.0 NAUSEA: Primary | ICD-10-CM

## 2020-03-13 RX ORDER — ONDANSETRON 4 MG/1
4 TABLET, ORALLY DISINTEGRATING ORAL EVERY 8 HOURS PRN
Qty: 10 TABLET | Refills: 0 | Status: SHIPPED | OUTPATIENT
Start: 2020-03-13 | End: 2020-06-29

## 2020-03-13 NOTE — TELEPHONE ENCOUNTER
----- Message from Angela Papo sent at 3/13/2020  1:38 PM CDT -----  Contact: self  Patient requesting a call back regarding her recent visit. She states that she is needing something to make her feel better. Please call pt back at [phone]      Pt uses    [facility] [address] [place], [place] [address] - [address] [facility]  [address] [facility] [address]  Phone: [phone] Fax: [phone]

## 2020-03-13 NOTE — TELEPHONE ENCOUNTER
I returned a call to the pt who states she's feeling worse than she was and she is nauseated and have a loss of appetite also weak and fatigue. Could you send her something into the pharmacy. Please advise. Thanks //kah

## 2020-03-13 NOTE — TELEPHONE ENCOUNTER
----- Message from Mallory Manning sent at 3/13/2020  9:40 AM CDT -----  ..Type:  Same Day Appointment Request    Caller is requesting a same day appointment.  Caller declined first available appointment listed below.    Name of Caller:pt   When is the first available appointment?03/17  Symptoms:not feeling well  Best Call Back Number:.[phone] (home) [phone] (work)    Additional Information:

## 2020-03-17 ENCOUNTER — OFFICE VISIT (OUTPATIENT)
Dept: INTERNAL MEDICINE | Facility: CLINIC | Age: 70
End: 2020-03-17
Payer: MEDICARE

## 2020-03-17 VITALS
HEIGHT: 62 IN | RESPIRATION RATE: 18 BRPM | HEART RATE: 81 BPM | TEMPERATURE: 101 F | SYSTOLIC BLOOD PRESSURE: 132 MMHG | OXYGEN SATURATION: 98 % | DIASTOLIC BLOOD PRESSURE: 76 MMHG | WEIGHT: 208.13 LBS | BODY MASS INDEX: 38.3 KG/M2

## 2020-03-17 DIAGNOSIS — R68.89 FLU-LIKE SYMPTOMS: ICD-10-CM

## 2020-03-17 DIAGNOSIS — R50.9 FEVER, UNSPECIFIED FEVER CAUSE: Primary | ICD-10-CM

## 2020-03-17 DIAGNOSIS — I10 ESSENTIAL HYPERTENSION: ICD-10-CM

## 2020-03-17 DIAGNOSIS — E55.9 VITAMIN D DEFICIENCY: ICD-10-CM

## 2020-03-17 DIAGNOSIS — R53.83 FATIGUE, UNSPECIFIED TYPE: ICD-10-CM

## 2020-03-17 DIAGNOSIS — E66.01 SEVERE OBESITY (BMI 35.0-39.9) WITH COMORBIDITY: ICD-10-CM

## 2020-03-17 LAB
INFLUENZA A, MOLECULAR: NEGATIVE
INFLUENZA B, MOLECULAR: NEGATIVE
SPECIMEN SOURCE: NORMAL

## 2020-03-17 PROCEDURE — 87502 INFLUENZA DNA AMP PROBE: CPT

## 2020-03-17 PROCEDURE — 99213 OFFICE O/P EST LOW 20 MIN: CPT | Mod: S$GLB,,, | Performed by: FAMILY MEDICINE

## 2020-03-17 PROCEDURE — 3075F PR MOST RECENT SYSTOLIC BLOOD PRESS GE 130-139MM HG: ICD-10-PCS | Mod: CPTII,S$GLB,, | Performed by: FAMILY MEDICINE

## 2020-03-17 PROCEDURE — 3075F SYST BP GE 130 - 139MM HG: CPT | Mod: CPTII,S$GLB,, | Performed by: FAMILY MEDICINE

## 2020-03-17 PROCEDURE — 1101F PT FALLS ASSESS-DOCD LE1/YR: CPT | Mod: CPTII,S$GLB,, | Performed by: FAMILY MEDICINE

## 2020-03-17 PROCEDURE — 99213 PR OFFICE/OUTPT VISIT, EST, LEVL III, 20-29 MIN: ICD-10-PCS | Mod: S$GLB,,, | Performed by: FAMILY MEDICINE

## 2020-03-17 PROCEDURE — 1126F AMNT PAIN NOTED NONE PRSNT: CPT | Mod: S$GLB,,, | Performed by: FAMILY MEDICINE

## 2020-03-17 PROCEDURE — 3078F PR MOST RECENT DIASTOLIC BLOOD PRESSURE < 80 MM HG: ICD-10-PCS | Mod: CPTII,S$GLB,, | Performed by: FAMILY MEDICINE

## 2020-03-17 PROCEDURE — 99999 PR PBB SHADOW E&M-EST. PATIENT-LVL IV: CPT | Mod: PBBFAC,,, | Performed by: FAMILY MEDICINE

## 2020-03-17 PROCEDURE — 1101F PR PT FALLS ASSESS DOC 0-1 FALLS W/OUT INJ PAST YR: ICD-10-PCS | Mod: CPTII,S$GLB,, | Performed by: FAMILY MEDICINE

## 2020-03-17 PROCEDURE — 99999 PR PBB SHADOW E&M-EST. PATIENT-LVL IV: ICD-10-PCS | Mod: PBBFAC,,, | Performed by: FAMILY MEDICINE

## 2020-03-17 PROCEDURE — 1126F PR PAIN SEVERITY QUANTIFIED, NO PAIN PRESENT: ICD-10-PCS | Mod: S$GLB,,, | Performed by: FAMILY MEDICINE

## 2020-03-17 PROCEDURE — 1159F MED LIST DOCD IN RCRD: CPT | Mod: S$GLB,,, | Performed by: FAMILY MEDICINE

## 2020-03-17 PROCEDURE — 3078F DIAST BP <80 MM HG: CPT | Mod: CPTII,S$GLB,, | Performed by: FAMILY MEDICINE

## 2020-03-17 PROCEDURE — 1159F PR MEDICATION LIST DOCUMENTED IN MEDICAL RECORD: ICD-10-PCS | Mod: S$GLB,,, | Performed by: FAMILY MEDICINE

## 2020-03-17 NOTE — PATIENT INSTRUCTIONS
FAX    DATE: 2020  TO: [facility]  FAX: [phone]  FROM: Edna Oliva MD  RE: Beth Souza,  1950; our MRN [id number]      PHYSICIAN/KELLY ORDERS    PATIENT IDENTIFYING INFORMATION:  Beth Souza  [address]  [facility] [address] YOB: 1950  OUR MRN: [id number]   Home Phone [phone]   Work Phone [phone]   Mobile [phone]        ORDER DATE: 2020    ORDER: COVID-19 Testing    SUPPORTING DIAGNOSES    ICD-10-CM ICD-9-CM   1. Fever, unspecified fever cause R50.9 780.60   2. Flu-like symptoms R68.89 780.99   3. Fatigue, unspecified type R53.83 780.79   4. Essential hypertension I10 401.9   5. Severe obesity (BMI 35.0-39.9) with comorbidity E66.01 278.01   6. Vitamin D deficiency E55.9 268.9           Edna Oliva MD    FAX RESULTS TO:    Edna Oliva MD  [facility] [address]; [place] [address]  PH: [phone]    FX: [phone]    CONFIDENTIALITY NOTICE: The accompanying facsimile is intended solely for the use of the recipient designated above. Document(s) transmitted herewith may contain information that is confidential and privileged. Delivery, distribution or dissemination of this communication other than to the intended recipient is strictly prohibited. If you have received this facsimile in error, please notify [facility]'s Compliance and Privacy Department immediately by telephone at [phone]. Thank you.

## 2020-03-18 ENCOUNTER — TELEPHONE (OUTPATIENT)
Dept: INTERNAL MEDICINE | Facility: CLINIC | Age: 70
End: 2020-03-18

## 2020-03-18 NOTE — TELEPHONE ENCOUNTER
S/w pt and informed that she is able to go to testing site today due to her order being sent over yesterday and that the center called about her this morning. Pt voiced understanding./Mono

## 2020-03-18 NOTE — TELEPHONE ENCOUNTER
----- Message from Edna Oliva MD sent at 3/18/2020 10:19 AM CDT -----  Contact: pt  Please let the patient know that she has a order for testing and can go in today, and based upon available kits, she can be tested today at [facility] site.     If patient is starts to have difficulty breathing associated with cough and spiking fevers above 100.4, patient needs to go to ER.     Dr Oliva  ----- Message -----  From: Mandy Palomo MA  Sent: 3/18/2020  10:15 AM CDT  To: Edna Oliva MD    PLEASE ADVISE.  ----- Message -----  From: Libby Vasquez  Sent: 3/18/2020  10:11 AM CDT  To: [facility] Staff    Please call pt @ [phone] regarding Covid-19 Testing, pt states she went yesterday, but they was out of testing Kit out and site shut down, pt need to know what to do.

## 2020-03-19 ENCOUNTER — TELEPHONE (OUTPATIENT)
Dept: INTERNAL MEDICINE | Facility: CLINIC | Age: 70
End: 2020-03-19

## 2020-03-19 NOTE — TELEPHONE ENCOUNTER
S/w pt daughter and informed her that the provider is out of the office but that a message was sent as high priority to help the pt out in some way regarding her symptoms. Pt daughter voiced understanding and asked what could be given to the pt to help with her weakness. I informed that pt could use a liquid diet at the moment if she could not take solid foods. Pt daughter voiced understanding./Mono

## 2020-03-19 NOTE — TELEPHONE ENCOUNTER
----- Message from Eric Bustamante sent at 3/19/2020  3:59 PM CDT -----  Contact: Pt daughter Nicole  Pt daughter Nicole called and she is very concerned about the pt    The pt is not eating, not drinking, is sometimes unresponsive, very weak, has a fever, coughing. Pt has never felt like this before.    Nicole would like to know if you can prescribe something for her.    Pt was tested for COVID-19 yesterday.    Nicole can be reached at [phone]

## 2020-03-20 ENCOUNTER — NURSE TRIAGE (OUTPATIENT)
Dept: ADMINISTRATIVE | Facility: CLINIC | Age: 70
End: 2020-03-20

## 2020-03-20 ENCOUNTER — PATIENT MESSAGE (OUTPATIENT)
Dept: INTERNAL MEDICINE | Facility: CLINIC | Age: 70
End: 2020-03-20

## 2020-03-20 NOTE — TELEPHONE ENCOUNTER
Spoke with daughter:not with daughter. Has been tested for COVID 19- so is isolating- at [facility]- drive up. Was tested 2 days ago.    MD called her and told her to take motrin. Has not had solid food wince last Wednesday. Bed to restroom and back. Very weak to where had incontinence  today. Called EMS yesterday- did not bring her in. Had fever a few days ago- coricidin and motrin.  + cough, +SOB-mild. Daughter states 1 week ago pt was normal strength. instructed to call 911. Verbalizes understanding.       Reason for Disposition   SEVERE weakness (i.e., unable to walk or barely able to walk, requires support) and new onset or worsening    Additional Information   Negative: Severe difficulty breathing (e.g., struggling for each breath, speaks in single words)   Negative: Shock suspected (e.g., cold/pale/clammy skin, too weak to stand, low BP, rapid pulse)   Negative: Difficult to awaken or acting confused (e.g., disoriented, slurred speech)   Negative: Fainted > 15 minutes ago and still feels too weak or dizzy to stand    Protocols used: WEAKNESS (GENERALIZED) AND FATIGUE-A-OH

## 2020-03-23 ENCOUNTER — TELEPHONE (OUTPATIENT)
Dept: INTERNAL MEDICINE | Facility: CLINIC | Age: 70
End: 2020-03-23

## 2020-03-23 NOTE — TELEPHONE ENCOUNTER
----- Message from Claire Tawanda sent at 3/23/2020  9:24 AM CDT -----  Contact: [phone]  Would like to consult with the nurse, patient would like to know if her test are ready, please call back at  [phone], thanks   .Type:  Test Results    Who Called:  Ms Souza  Name of Test (Lab/Mammo/Etc):   Date of Test: march 12  Ordering Provider: dr tam  Where the test was performed: [facility]  Would the patient rather a call back or a response via [facility]? callback  Best Call Back Number: [phone]  Additional Information:

## 2020-03-23 NOTE — TELEPHONE ENCOUNTER
S/w pt and informed that as of this moment that we have no received any results from the testing centers for our pt. Informed pt that as soon as her results are given to us we will call her with them. Pt voiced understanding./Mono

## 2020-03-24 ENCOUNTER — TELEPHONE (OUTPATIENT)
Dept: INTERNAL MEDICINE | Facility: CLINIC | Age: 70
End: 2020-03-24

## 2020-03-24 NOTE — TELEPHONE ENCOUNTER
S/w pt family and informed that pt needs to go to the ER for further evaluation due to everything being done medically necessary in clinic for pt and nothing more can be done for the pt here at the clinic. Pt family expressed understanding./Kmw

## 2020-03-24 NOTE — TELEPHONE ENCOUNTER
----- Message from Edna Oliva MD sent at 3/24/2020 10:01 AM CDT -----  Contact: pt  With patient's vague symptoms, please have her go to ER for complete evaluation or she can see any other provider available in the clinic .     Dr Oliva  ----- Message -----  From: Mandy Palomo MA  Sent: 3/24/2020   9:39 AM CDT  To: Edna Oliva MD    PLEASE INFORM. PT NOW WANTS TO SCHEDULE AN APPT.  ----- Message -----  From: Salome Mathews  Sent: 3/24/2020   9:29 AM CDT  To: [facility] Staff    Pt stated that she is not any better and would like a call back for appointment at [phone]      Thanks,  Salome Mathews

## 2020-03-25 ENCOUNTER — TELEPHONE (OUTPATIENT)
Dept: INTERNAL MEDICINE | Facility: CLINIC | Age: 70
End: 2020-03-25

## 2020-03-25 ENCOUNTER — PATIENT MESSAGE (OUTPATIENT)
Dept: INTERNAL MEDICINE | Facility: CLINIC | Age: 70
End: 2020-03-25

## 2020-03-25 DIAGNOSIS — U07.1 COVID-19 VIRUS DETECTED: Primary | ICD-10-CM

## 2020-03-25 NOTE — TELEPHONE ENCOUNTER
S/w [facility] in [place] whom informed me that pt is POSITIVE for COVID-19. Informed technician that the provider will be informed of information and pt will be contacted as soon as possible./Mono

## 2020-03-25 NOTE — TELEPHONE ENCOUNTER
Your test was POSITIVE for COVID-19 (coronavirus).     [facility]  Preventing the Spread of Coronavirus Disease 2019 in Homes and Residential Communities      Prevention steps for people with confirmed or suspected COVID-19 (including persons under investigation) who do not need to be hospitalized and people with confirmed COVID-19 who were hospitalized and determined to be medically stable to go home.    Your healthcare provider and public health staff will evaluate whether you can be cared for at home.   Stay home except to get medical care.   Separate yourself from other people and animals in your home   Call ahead before visiting your doctor.   Wear a facemask.   Cover your coughs and sneezes.   Clean your hands often.   Avoid sharing personal household items.   Clean all high-touch surfaces every day.   Monitor your symptoms. Seek prompt medical attention if your illness is worsening (e.g., difficulty breathing). Before seeking care, call your healthcare provider.   If you have a medical emergency and need to call 911, notify the dispatch personnel that you have, or are being evaluated for COVID-19. If possible, put on a facemask before emergency medical services arrive.   Discontinuing home isolation. Call your provider about guidance to discontinue home isolation.    Recommended precautions for household members, intimate partners, and caregivers in a nonhealthcare setting of a patient with symptomatic laboratory-confirmed COVID-19 or a patient under investigation.  Household members, intimate partners, and caregivers in a nonhealthcare setting may have close contact with a person with symptomatic, laboratory-confirmed COVID-19 or a person under investigation. Close contacts should monitor their health; they should call their healthcare provider right away if they develop symptoms suggestive of COVID-19 (e.g., fever, cough, shortness of breath).    Close contacts  should also follow these recommendations:   Make sure that you understand and can help the patient follow their healthcare provider's instructions for medication(s) and care. You should help the patient with basic needs in the home and provide support for getting groceries, prescriptions, and other personal needs.   Monitor the patient's symptoms. If the patient is getting sicker, call his or her healthcare provider and tell them that the patient has laboratory-confirmed COVID-19. This will help the healthcare provider's office take steps to keep other people in the office or waiting room from getting infected. Ask the healthcare provider to call the local or [facility] health department for additional guidance. If the patient has a medical emergency and you need to call 911, notify the dispatch personnel that the patient has, or is being evaluated for COVID-19.   Household members should stay in another room or be  from the patient as much as possible. Household members should use a separate bedroom and bathroom, if available.   Prohibit visitors who do not have an essential need to be in the home.   Household members should care for any pets in the home. Do not handle pets or other animals while sick.   Make sure that shared spaces in the home have good air flow, such as by an air conditioner or an opened window, weather permitting.   Perform hand hygiene frequently. Wash your hands often with soap and water for at least 20 seconds or use an alcohol-based hand  that contains 60 to 95% alcohol, covering all surfaces of your hands and rubbing them together until they feel dry. Soap and water should be used preferentially if hands are visibly dirty.   Avoid touching your eyes, nose, and mouth with unwashed hands.   The patient should wear a facemask when you are around other people. If the patient is not able to wear a facemask (for example, because it causes trouble breathing), you, as the  caregiver should wear a mask when you are in the same room as the patient.   Wear a disposable facemask and gloves when you touch or have contact with the patient's blood, stool, or body fluids, such as saliva, sputum, nasal mucus, vomit, urine.  o Throw out disposable facemasks and gloves after using them. Do not reuse.  o When removing personal protective equipment, first remove and dispose of gloves. Then, immediately clean your hands with soap and water or alcohol-based hand . Next, remove and dispose of facemask, and immediately clean your hands again with soap and water or alcohol-based hand .   Avoid sharing household items with the patient. You should not share dishes, drinking glasses, cups, eating utensils, towels, bedding, or other items. After the patient uses these items, you should wash them thoroughly (see below Wash laundry thoroughly).   Clean all high-touch surfaces, such as counters, tabletops, doorknobs, bathroom fixtures, toilets, phones, keyboards, tablets, and bedside tables, every day. Also, clean any surfaces that may have blood, stool, or body fluids on them.   Use a household cleaning spray or wipe, according to the label instructions. Labels contain instructions for safe and effective use of the cleaning product including precautions you should take when applying the product, such as wearing gloves and making sure you have good ventilation during use of the product.   Wash laundry thoroughly.  o Immediately remove and wash clothes or bedding that have blood, stool, or body fluids on them.  o Wear disposable gloves while handling soiled items and keep soiled items away from your body. Clean your hands (with soap and water or an alcohol-based hand ) immediately after removing your gloves.  o Read and follow directions on labels of laundry or clothing items and detergent. In general, using a normal laundry detergent according to washing machine  instructions and dry thoroughly using the warmest temperatures recommended on the clothing label.   Place all used disposable gloves, facemasks, and other contaminated items in a lined container before disposing of them with other household waste. Clean your hands (with soap and water or an alcohol-based hand ) immediately after handling these items. Soap and water should be used preferentially if hands are visibly dirty.   Discuss any additional questions with your state or local health department or healthcare provider. Check available hours when contacting your local health department.    For more information see CDC link below.      https://www.cdc.gov/coronavirus/2019-ncov/hcp/guidance-prevent-spread.html#precautions              If your symptoms worsen or if you have any other concerns, please contact [facility] at [phone].     Sincerely,       Edna Oliva MD

## 2020-03-26 ENCOUNTER — TELEPHONE (OUTPATIENT)
Dept: INTERNAL MEDICINE | Facility: CLINIC | Age: 70
End: 2020-03-26

## 2020-03-26 NOTE — TELEPHONE ENCOUNTER
S/w rep from [employer] yesterday and was informed of pt test results for COVID-19. Received a call from [employer] again today regarding pt results and was informed that a message will be sent to rep to inform that results were already given. Call ended well./GEENA

## 2020-03-26 NOTE — TELEPHONE ENCOUNTER
----- Message from Eric Bustamante sent at 3/26/2020  9:41 AM CDT -----  Contact: Keyana with [employer] [facility] with [employer] called and would like to have Yamileth landers asia her back    Pt is positive for the coronavirus    Keyana can be reached at [phone]

## 2020-03-27 ENCOUNTER — TELEPHONE (OUTPATIENT)
Dept: INTERNAL MEDICINE | Facility: CLINIC | Age: 70
End: 2020-03-27

## 2020-03-27 NOTE — TELEPHONE ENCOUNTER
----- Message from Preethi Kenney sent at 3/27/2020  2:04 PM CDT -----  Contact: Patients daughter, Tara Pacheco is returning a call, please call her back at [phone]. Thank you

## 2020-03-30 ENCOUNTER — TELEPHONE (OUTPATIENT)
Dept: INTERNAL MEDICINE | Facility: CLINIC | Age: 70
End: 2020-03-30

## 2020-03-30 NOTE — TELEPHONE ENCOUNTER
----- Message from Mandy Palomo MA sent at 3/30/2020  3:03 PM CDT -----  Contact: pt       ----- Message -----  From: Edna Oliva MD  Sent: 3/30/2020  12:35 PM CDT  To: Mandy Palomo MA    Please advise patient to take Tylenol as needed for pain, but if any worsening and if deteriorating in health please advise to go to ER    Dr Oliva  ----- Message -----  From: Mandy Palomo MA  Sent: 3/30/2020  11:29 AM CDT  To: Edna Oliva MD    PLEASE ADVISE.  ----- Message -----  From: Alexandra Haddad  Sent: 3/30/2020  11:16 AM CDT  To: [facility]    Type:  Needs Medical Advice    Who Called:  Pt   Symptoms (please be specific): pain in her feet/ can barely walk/ has a virus   How long has patient had these symptoms:   Approx 4 days   Pharmacy name and phone #:    Would the patient rather a call back or a response via [facility]?  Phone   Best Call Back Number:  [phone]  Additional Information:

## 2020-04-02 ENCOUNTER — TELEPHONE (OUTPATIENT)
Dept: INTERNAL MEDICINE | Facility: CLINIC | Age: 70
End: 2020-04-02

## 2020-04-02 NOTE — TELEPHONE ENCOUNTER
----- Message from Preethi Kenney sent at 4/2/2020  8:28 AM CDT -----  Contact: Patients daughter, Tara  Ms Tara states that patient has COVID-19 and would like proof of patients DX sent thru email, so patient can get financial help with her bills, please email to [email or web address]. Thank you

## 2020-04-03 ENCOUNTER — TELEPHONE (OUTPATIENT)
Dept: INTERNAL MEDICINE | Facility: CLINIC | Age: 70
End: 2020-04-03

## 2020-04-03 NOTE — TELEPHONE ENCOUNTER
----- Message from Radha Dooley sent at 4/3/2020  3:31 PM CDT -----  Contact: Pt  Pt is requesting call back in regards to questions about getting medication for Covid-19. Pt stated that she did test positive for virus and it has been over 14 days since results but is still feeling bad and would like to know if provider can send any additional medication.          Pls call back at [phone]

## 2020-04-20 ENCOUNTER — TELEPHONE (OUTPATIENT)
Dept: INTERNAL MEDICINE | Facility: CLINIC | Age: 70
End: 2020-04-20

## 2020-04-20 DIAGNOSIS — E55.9 VITAMIN D DEFICIENCY: ICD-10-CM

## 2020-04-20 RX ORDER — ERGOCALCIFEROL 1.25 MG/1
50000 CAPSULE ORAL
Qty: 10 CAPSULE | Refills: 0 | Status: SHIPPED | OUTPATIENT
Start: 2020-04-20 | End: 2020-06-30 | Stop reason: SDUPTHER

## 2020-04-20 NOTE — TELEPHONE ENCOUNTER
----- Message from Nedra Polanco sent at 4/20/2020 10:56 AM CDT -----  Contact: pt   Stated she had COV-19 and is still having shortness of breath, she can be reached at [phone]    Type:  RX Refill Request    Who Called: pt  Refill or New Rx:refill  RX Name and Strength:vitamin D capsule  How is the patient currently taking it? (ex. 1XDay):  Is this a 30 day or 90 day RX:  Preferred Pharmacy with phone number: listed below   Local or Mail Order: local   Ordering Provider: Dr Oliva  Would the patient rather a call back or a response via [facility]? Call back   Best Call Back Number: [phone]  Additional Information:       [facility]  [place] [facility] [address]  [address]  [place] [address]  Phone: [phone] Fax: [phone]

## 2020-04-20 NOTE — TELEPHONE ENCOUNTER
----- Message from Rina Sharp sent at 4/20/2020  4:00 PM CDT -----  Type:  Patient Returning Call    Who Called:Dimple  Who Left Message for Patient:  Does the patient know what this is regarding?:  Would the patient rather a call back or a response via [facility]? call  Best Call Back Number:[phone]    Additional Information:

## 2020-04-20 NOTE — TELEPHONE ENCOUNTER
Spoke to pt's granddaughter, advised her that Mrs. Souza needs to go to the ED for the sob after having tested positive for Coronavirus/jj

## 2020-04-24 ENCOUNTER — TELEPHONE (OUTPATIENT)
Dept: PULMONOLOGY | Facility: CLINIC | Age: 70
End: 2020-04-24

## 2020-04-24 NOTE — TELEPHONE ENCOUNTER
WT: 208  HT: 5'2  BP: n/a  O2: n/a  HR: n/a  RR: n/a    NO O2 AT HOME. HAS NOT USED CPAP IN AROUND A MONTH AND A HALF DUE TO BEING COVID POS.

## 2020-04-27 ENCOUNTER — OFFICE VISIT (OUTPATIENT)
Dept: PULMONOLOGY | Facility: CLINIC | Age: 70
End: 2020-04-27
Payer: MEDICARE

## 2020-04-27 VITALS — WEIGHT: 208.13 LBS | BODY MASS INDEX: 38.3 KG/M2 | HEIGHT: 62 IN

## 2020-04-27 DIAGNOSIS — G47.33 OSA ON CPAP: Primary | ICD-10-CM

## 2020-04-27 PROCEDURE — 99442 PR PHYSICIAN TELEPHONE EVALUATION 11-20 MIN: CPT | Mod: 95,,, | Performed by: NURSE PRACTITIONER

## 2020-04-27 PROCEDURE — 99442 PR PHYSICIAN TELEPHONE EVALUATION 11-20 MIN: ICD-10-PCS | Mod: 95,,, | Performed by: NURSE PRACTITIONER

## 2020-04-27 NOTE — PROGRESS NOTES
"Established Patient - Audio Only Telehealth Visit     The patient location is: Home  The chief complaint leading to consultation is: sleep apnea  Visit type: Virtual visit with audio only (telephone)  Time 13min  The reason for the audio only service rather than synchronous audio and video virtual visit was related to technical difficulties or patient preference/necessity.     Each patient to whom I provide medical services by telemedicine is:  (1) informed of the relationship between the physician and patient and the respective role of any other health care provider with respect to management of the patient; and (2) notified that they may decline to receive medical services by telemedicine and may withdraw from such care at any time. Patient verbally consented to receive this service via voice-only telephone call.  Subjective:      Patient ID: Beth Souza is a 69 y.o. female.    Chief Complaint: Sleep Apnea (dl in media)    HPI  Presents to office for review of AutoPAP therapy. Patient states improved symptoms with use of AutoPAP. Patient states benefiting is benefiting from use of the AutoPAP.   She was recently diagnosed with COVID mid April. She was afraid to wear CPAP due to infection so has not worn recently with increased daytime sleepiness.   She is back to baseline and fully recovered from COVID -19.  No fever, chills, or hemoptysis. No pleuritic type chest pain. Breathing is stable as compared to 6 months ago.    Patient Active Problem List   Diagnosis    Essential hypertension    Vitamin D deficiency    Mixed hyperlipidemia    Hyperparathyroidism, primary    Thyroid nodule    Peripheral polyneuropathy    Cataract    Osteoarthritis of spine with radiculopathy, lumbar region    LEN on CPAP    Myalgia           Ht 5' 2" (1.575 m)   Wt 94.4 kg (208 lb 1.8 oz)   BMI 38.06 kg/m²   Body mass index is 38.06 kg/m².    Review of Systems   Constitutional: Negative.    HENT: Negative.  "   Respiratory: Negative.    Cardiovascular: Negative.    Musculoskeletal: Negative.    Gastrointestinal: Negative.    Neurological: Negative.    Psychiatric/Behavioral: Positive for sleep disturbance.     Objective:      Physical Exam  Personal Diagnostic Review  Compliance Summary  11/8/2019 - 2/5/2020 (90 days)  Days with Device Usage 34 days  Days without Device Usage 56 days  Percent Days with Device Usage 37.8%  Cumulative Usage 8 days 10 hrs. 30 mins. 28 secs.  Maximum Usage (1 Day) 8 hrs. 51 mins. 49 secs.  Average Usage (All Days) 2 hrs. 15 mins.  Average Usage (Days Used) 5 hrs. 57 mins. 22 secs.  Minimum Usage (1 Day) 45 mins. 50 secs.  Percent of Days with Usage >= 4 Hours 28.9%  Percent of Days with Usage < 4 Hours 71.1%  Date Range  Total Blower Time 9 days 7 mins. 31 secs.  Average AHI 3.0  Auto-CPAP Summary  Auto-CPAP Mean Pressure 5.4 cmH2O  Auto-CPAP Peak Average Pressure 7.0 cmH2O  Average Device Pressure <= 90% of Time 7.0 cmH2O  Average Time in Large Leak Per Day 5 mins. 2 secs.        Assessment:       1. LEN on CPAP        Outpatient Encounter Medications as of 4/27/2020   Medication Sig Dispense Refill    ergocalciferol (ERGOCALCIFEROL) 50,000 unit Cap Take 1 capsule (50,000 Units total) by mouth every 7 days. 10 capsule 0    lisinopril-hydrochlorothiazide (PRINZIDE,ZESTORETIC) 20-25 mg Tab Take 1 tablet by mouth once daily. 90 tablet 3    multivitamin capsule Take 1 capsule by mouth once daily.      ondansetron (ZOFRAN-ODT) 4 MG TbDL Take 1 tablet (4 mg total) by mouth every 8 (eight) hours as needed (nausea). 10 tablet 0    [DISCONTINUED] ergocalciferol (ERGOCALCIFEROL) 50,000 unit Cap Take 1 capsule (50,000 Units total) by mouth every 7 days. 10 capsule 0     No facility-administered encounter medications on file as of 4/27/2020.      Orders Placed This Encounter   Procedures    CPAP/BIPAP SUPPLIES     90 day supply with 3 refills.     Order Specific Question:   Type of mask:      Answer:   Nasal     Order Specific Question:   Headgear?     Answer:   Yes     Order Specific Question:   Tubing?     Answer:   Yes     Order Specific Question:   Humidifier chamber?     Answer:   Yes     Order Specific Question:   Chin strap?     Answer:   Yes     Order Specific Question:   Filters?     Answer:   Yes     Order Specific Question:   Cushions?     Answer:   Yes     Order Specific Question:   Length of need (1-99 months):     Answer:   99     Plan:        Problem List Items Addressed This Visit        Other    LEN on CPAP - Primary    Overview     Autopap 4-16cm H2O with [employer].          Relevant Orders    CPAP/BIPAP SUPPLIES               This service was not originating from a related E/M service provided within the previous 7 days nor will  to an E/M service or procedure within the next 24 hours or my soonest available appointment.  Prevailing standard of care was able to be met in this audio-only visit.

## 2020-04-30 ENCOUNTER — TELEPHONE (OUTPATIENT)
Dept: INTERNAL MEDICINE | Facility: CLINIC | Age: 70
End: 2020-04-30

## 2020-04-30 NOTE — TELEPHONE ENCOUNTER
----- Message from Marixa Howell sent at 4/30/2020 10:12 AM CDT -----  Contact: self/[phone]  Would like to consult with nurse regarding medication, pt states she is having some coughing and would like some antibiotics called in. Please call back at [phone].  Thanks/ar

## 2020-04-30 NOTE — TELEPHONE ENCOUNTER
Called and spoke with pt who states she has had COVID and is still having a productive cough. She is requesting antibiotics. I spoke with her and she can not do a video visit but she can do an audio visit. Audio visit scheduled. She mentioned that she is bringing her family member up here to the clinic for an appointment. I informed her since she has been COVID positive and still having a productive cough that she would not be let into the building. Pt verbalized understanding.

## 2020-05-01 ENCOUNTER — OFFICE VISIT (OUTPATIENT)
Dept: INTERNAL MEDICINE | Facility: CLINIC | Age: 70
End: 2020-05-01
Payer: MEDICARE

## 2020-05-01 DIAGNOSIS — I10 ESSENTIAL HYPERTENSION: ICD-10-CM

## 2020-05-01 DIAGNOSIS — U07.1 COVID-19 VIRUS DETECTED: ICD-10-CM

## 2020-05-01 DIAGNOSIS — R05.9 COUGH: Primary | ICD-10-CM

## 2020-05-01 PROCEDURE — 99441 PR PHYSICIAN TELEPHONE EVALUATION 5-10 MIN: ICD-10-PCS | Mod: 95,,, | Performed by: FAMILY MEDICINE

## 2020-05-01 PROCEDURE — 99441 PR PHYSICIAN TELEPHONE EVALUATION 5-10 MIN: CPT | Mod: 95,,, | Performed by: FAMILY MEDICINE

## 2020-05-01 RX ORDER — BENZONATATE 200 MG/1
200 CAPSULE ORAL 3 TIMES DAILY PRN
Qty: 30 CAPSULE | Refills: 0 | Status: SHIPPED | OUTPATIENT
Start: 2020-05-01 | End: 2020-05-11

## 2020-05-01 RX ORDER — CETIRIZINE HYDROCHLORIDE 10 MG/1
10 TABLET ORAL NIGHTLY
Qty: 30 TABLET | Refills: 0 | Status: SHIPPED | OUTPATIENT
Start: 2020-05-01 | End: 2020-10-20

## 2020-05-01 NOTE — PROGRESS NOTES
Established Patient - Audio Only Telehealth Visit     The patient location is:  Home  The chief complaint leading to consultation is:  Cough  Visit type: Virtual visit with audio only (telephone)     The reason for the audio only service rather than synchronous audio and video virtual visit was related to technical difficulties or patient preference/necessity.     Each patient to whom I provide medical services by telemedicine is:  (1) informed of the relationship between the physician and patient and the respective role of any other health care provider with respect to management of the patient; and (2) notified that they may decline to receive medical services by telemedicine and may withdraw from such care at any time. Patient verbally consented to receive this service via voice-only telephone call.       Subjective:       Patient ID: Beth Souza is a 69 y.o. female.    Chief Complaint: No chief complaint on file.    69-year-old  female patient with Patient Active Problem List:     Essential hypertension     Vitamin D deficiency     Mixed hyperlipidemia     Hyperparathyroidism, primary     Thyroid nodule     Peripheral polyneuropathy     Cataract     Osteoarthritis of spine with radiculopathy, lumbar region     LEN on CPAP     Myalgia  Reports that she has been having persistent cough off and on lately for the past 1 week, patient was diagnosed with COVID-19 03/25/2020.  Patient has been doing well and denies any persistent cough or wheezing since then.  Has not been having any discomfort with breathing, fever with chills nausea/vomiting  Reports that she has been taking Mucinex DM as needed but not regularly  Has been using CPAP machine regularly with sleep apnea    Review of Systems   Constitutional: Negative for chills, fatigue and fever.   HENT: Negative for congestion and postnasal drip.    Eyes: Negative for visual disturbance.   Respiratory: Positive for cough. Negative for  shortness of breath and wheezing.    Cardiovascular: Negative for chest pain and leg swelling.   Gastrointestinal: Negative for abdominal pain, nausea and vomiting.   Musculoskeletal: Negative for myalgias.   Skin: Negative for rash.   Neurological: Negative for light-headedness and headaches.   Psychiatric/Behavioral: Negative for sleep disturbance.         There were no vitals taken for this visit.  Objective:      Physical Exam   Constitutional: She is oriented to person, place, and time.   Neurological: She is alert and oriented to person, place, and time.   Psychiatric: She has a normal mood and affect.         Assessment/Plan:   1. Cough  - benzonatate (TESSALON) 200 MG capsule; Take 1 capsule (200 mg total) by mouth 3 (three) times daily as needed for Cough.  Dispense: 30 capsule; Refill: 0  - cetirizine (ZYRTEC) 10 MG tablet; Take 1 tablet (10 mg total) by mouth every evening.  Dispense: 30 tablet; Refill: 0    Could be secondary to allergies.  Zyrtec prescribed today and patient was advised to continue Mucinex DM twice a day.  Tessalon Perles prescribed today for symptomatic relief.  Advised to drink adequate fluids.   If having any associated shortness of breath or worsening symptoms patient to let us know  If symptoms do not improve with cough suppressant, will consider having patient come into the clinic for further imaging.     2. COVID-19 virus detected  Patient was positive for COVID on 03/25/2020 at [facility] testing site.    3. Essential hypertension  Stable on lisinopril hydrochlorothiazide 20/25 mg          This service was not originating from a related E/M service provided within the previous 7 days nor will  to an E/M service or procedure within the next 24 hours or my soonest available appointment.  Prevailing standard of care was able to be met in this audio-only visit.

## 2020-05-11 DIAGNOSIS — I10 ESSENTIAL HYPERTENSION: ICD-10-CM

## 2020-05-11 RX ORDER — LISINOPRIL AND HYDROCHLOROTHIAZIDE 20; 25 MG/1; MG/1
1 TABLET ORAL DAILY
Qty: 90 TABLET | Refills: 3 | Status: SHIPPED | OUTPATIENT
Start: 2020-05-11 | End: 2021-03-31 | Stop reason: SDUPTHER

## 2020-05-11 NOTE — TELEPHONE ENCOUNTER
----- Message from Ena Cruz sent at 5/11/2020 10:55 AM CDT -----  Contact: Pt   Type:  RX Refill Request    Who Called: Beth Souza  Refill or New Rx:refill  RX Name and Strength:lisinopril-hydrochlorothiazide (PRINZIDE,ZESTORETIC) 20-25 mg Tab   How is the patient currently taking it? (ex. 1XDay):1x  Is this a 30 day or 90 day RX:90 day  Preferred Pharmacy with phone number:  [facility] [address] [place], [place] - [address]  [address] [address]  Phone: [phone] Fax: [phone]  Local or Mail Order:Local  Ordering Provider:  Would the patient rather a call back or a response via [facility]? Call Back  Best Call Back Number:[phone] (home)   Additional Information: pt is has one left.

## 2020-05-25 ENCOUNTER — TELEPHONE (OUTPATIENT)
Dept: OPHTHALMOLOGY | Facility: CLINIC | Age: 70
End: 2020-05-25

## 2020-05-25 NOTE — TELEPHONE ENCOUNTER
Spoke with pt.  She has bright red area covering most of the white part of the eye.  No pain, vision changes or discharge. Discussed sub-conj hemorrhage symptoms.  Offered today appt, but pt decided best not to come in due to COVID.  Advised to call for appt if pain, vision changes or discharge occurs.

## 2020-05-25 NOTE — TELEPHONE ENCOUNTER
----- Message from Greer Saucedo sent at 5/25/2020 11:01 AM CDT -----  Contact: pt  Type:  Patient Returning Call    Who Called:pt  Who Left Message for Patient:maximo  Does the patient know what this is regarding?:no  Would the patient rather a call back or a response via [facility]? Call back   Best Call Back Number:[phone] (home)   Additional Information: pt stated that her phone doesn't ring all the time, please call right back if she doesn't answer

## 2020-05-25 NOTE — TELEPHONE ENCOUNTER
----- Message from Negin Agarwal sent at 5/25/2020 10:19 AM CDT -----  Contact: pt  .Name of Caller  Pt    Reason for Visit/Symptoms  Left Eye red/ swollen   Best Contact Number or Confirm if Mychart Preferred : call back; [phone]  Preferred Date/Time of Appointment :  Today   Interested in Virtual Visit (yes/no)  Additional Information

## 2020-05-29 ENCOUNTER — LAB VISIT (OUTPATIENT)
Dept: LAB | Facility: HOSPITAL | Age: 70
End: 2020-05-29
Attending: PHYSICIAN ASSISTANT
Payer: MEDICARE

## 2020-05-29 ENCOUNTER — OFFICE VISIT (OUTPATIENT)
Dept: INTERNAL MEDICINE | Facility: CLINIC | Age: 70
End: 2020-05-29
Payer: MEDICARE

## 2020-05-29 VITALS
TEMPERATURE: 98 F | HEART RATE: 53 BPM | DIASTOLIC BLOOD PRESSURE: 80 MMHG | SYSTOLIC BLOOD PRESSURE: 120 MMHG | HEIGHT: 62 IN | BODY MASS INDEX: 38.14 KG/M2 | WEIGHT: 207.25 LBS | OXYGEN SATURATION: 98 %

## 2020-05-29 DIAGNOSIS — L65.9 HAIR LOSS: ICD-10-CM

## 2020-05-29 DIAGNOSIS — Z12.31 ENCOUNTER FOR SCREENING MAMMOGRAM FOR MALIGNANT NEOPLASM OF BREAST: ICD-10-CM

## 2020-05-29 DIAGNOSIS — R60.0 LOWER EXTREMITY EDEMA: Primary | ICD-10-CM

## 2020-05-29 DIAGNOSIS — E78.00 HYPERCHOLESTEROLEMIA: ICD-10-CM

## 2020-05-29 DIAGNOSIS — R60.0 LOWER EXTREMITY EDEMA: ICD-10-CM

## 2020-05-29 DIAGNOSIS — Z12.39 SCREENING FOR BREAST CANCER: ICD-10-CM

## 2020-05-29 DIAGNOSIS — R51.9 NONINTRACTABLE EPISODIC HEADACHE, UNSPECIFIED HEADACHE TYPE: ICD-10-CM

## 2020-05-29 DIAGNOSIS — J30.9 ALLERGIC RHINITIS, UNSPECIFIED SEASONALITY, UNSPECIFIED TRIGGER: ICD-10-CM

## 2020-05-29 LAB
ALBUMIN SERPL BCP-MCNC: 3.6 G/DL (ref 3.5–5.2)
ALP SERPL-CCNC: 89 U/L (ref 55–135)
ALT SERPL W/O P-5'-P-CCNC: 15 U/L (ref 10–44)
ANION GAP SERPL CALC-SCNC: 8 MMOL/L (ref 8–16)
AST SERPL-CCNC: 9 U/L (ref 10–40)
BASOPHILS # BLD AUTO: 0.03 K/UL (ref 0–0.2)
BASOPHILS NFR BLD: 0.5 % (ref 0–1.9)
BILIRUB SERPL-MCNC: 0.4 MG/DL (ref 0.1–1)
BUN SERPL-MCNC: 14 MG/DL (ref 8–23)
CALCIUM SERPL-MCNC: 9.4 MG/DL (ref 8.7–10.5)
CHLORIDE SERPL-SCNC: 101 MMOL/L (ref 95–110)
CO2 SERPL-SCNC: 32 MMOL/L (ref 23–29)
CREAT SERPL-MCNC: 0.8 MG/DL (ref 0.5–1.4)
DIFFERENTIAL METHOD: ABNORMAL
EOSINOPHIL # BLD AUTO: 0.2 K/UL (ref 0–0.5)
EOSINOPHIL NFR BLD: 3.4 % (ref 0–8)
ERYTHROCYTE [DISTWIDTH] IN BLOOD BY AUTOMATED COUNT: 15.8 % (ref 11.5–14.5)
EST. GFR  (AFRICAN AMERICAN): >60 ML/MIN/1.73 M^2
EST. GFR  (NON AFRICAN AMERICAN): >60 ML/MIN/1.73 M^2
GLUCOSE SERPL-MCNC: 102 MG/DL (ref 70–110)
HCT VFR BLD AUTO: 42.9 % (ref 37–48.5)
HGB BLD-MCNC: 13.6 G/DL (ref 12–16)
IMM GRANULOCYTES # BLD AUTO: 0.01 K/UL (ref 0–0.04)
IMM GRANULOCYTES NFR BLD AUTO: 0.2 % (ref 0–0.5)
LYMPHOCYTES # BLD AUTO: 2.1 K/UL (ref 1–4.8)
LYMPHOCYTES NFR BLD: 35.7 % (ref 18–48)
MCH RBC QN AUTO: 29 PG (ref 27–31)
MCHC RBC AUTO-ENTMCNC: 31.7 G/DL (ref 32–36)
MCV RBC AUTO: 92 FL (ref 82–98)
MONOCYTES # BLD AUTO: 0.6 K/UL (ref 0.3–1)
MONOCYTES NFR BLD: 10.4 % (ref 4–15)
NEUTROPHILS # BLD AUTO: 2.9 K/UL (ref 1.8–7.7)
NEUTROPHILS NFR BLD: 50 % (ref 38–73)
NRBC BLD-RTO: 0 /100 WBC
PLATELET # BLD AUTO: 316 K/UL (ref 150–350)
PMV BLD AUTO: 9.1 FL (ref 9.2–12.9)
POTASSIUM SERPL-SCNC: 4.1 MMOL/L (ref 3.5–5.1)
PROT SERPL-MCNC: 7.5 G/DL (ref 6–8.4)
RBC # BLD AUTO: 4.69 M/UL (ref 4–5.4)
SODIUM SERPL-SCNC: 141 MMOL/L (ref 136–145)
T3 SERPL-MCNC: 88 NG/DL (ref 60–180)
T4 FREE SERPL-MCNC: 1.06 NG/DL (ref 0.71–1.51)
TSH SERPL DL<=0.005 MIU/L-ACNC: 1.23 UIU/ML (ref 0.4–4)
WBC # BLD AUTO: 5.85 K/UL (ref 3.9–12.7)

## 2020-05-29 PROCEDURE — 80053 COMPREHEN METABOLIC PANEL: CPT

## 2020-05-29 PROCEDURE — 99999 PR PBB SHADOW E&M-EST. PATIENT-LVL IV: ICD-10-PCS | Mod: PBBFAC,,, | Performed by: PHYSICIAN ASSISTANT

## 2020-05-29 PROCEDURE — 3074F SYST BP LT 130 MM HG: CPT | Mod: CPTII,S$GLB,, | Performed by: PHYSICIAN ASSISTANT

## 2020-05-29 PROCEDURE — 84480 ASSAY TRIIODOTHYRONINE (T3): CPT

## 2020-05-29 PROCEDURE — 1101F PR PT FALLS ASSESS DOC 0-1 FALLS W/OUT INJ PAST YR: ICD-10-PCS | Mod: CPTII,S$GLB,, | Performed by: PHYSICIAN ASSISTANT

## 2020-05-29 PROCEDURE — 99214 OFFICE O/P EST MOD 30 MIN: CPT | Mod: S$GLB,,, | Performed by: PHYSICIAN ASSISTANT

## 2020-05-29 PROCEDURE — 1126F AMNT PAIN NOTED NONE PRSNT: CPT | Mod: S$GLB,,, | Performed by: PHYSICIAN ASSISTANT

## 2020-05-29 PROCEDURE — 99499 UNLISTED E&M SERVICE: CPT | Mod: S$GLB,,, | Performed by: PHYSICIAN ASSISTANT

## 2020-05-29 PROCEDURE — 99214 PR OFFICE/OUTPT VISIT, EST, LEVL IV, 30-39 MIN: ICD-10-PCS | Mod: S$GLB,,, | Performed by: PHYSICIAN ASSISTANT

## 2020-05-29 PROCEDURE — 84443 ASSAY THYROID STIM HORMONE: CPT

## 2020-05-29 PROCEDURE — 99499 RISK ADDL DX/OHS AUDIT: ICD-10-PCS | Mod: S$GLB,,, | Performed by: PHYSICIAN ASSISTANT

## 2020-05-29 PROCEDURE — 3074F PR MOST RECENT SYSTOLIC BLOOD PRESSURE < 130 MM HG: ICD-10-PCS | Mod: CPTII,S$GLB,, | Performed by: PHYSICIAN ASSISTANT

## 2020-05-29 PROCEDURE — 1159F PR MEDICATION LIST DOCUMENTED IN MEDICAL RECORD: ICD-10-PCS | Mod: S$GLB,,, | Performed by: PHYSICIAN ASSISTANT

## 2020-05-29 PROCEDURE — 99999 PR PBB SHADOW E&M-EST. PATIENT-LVL IV: CPT | Mod: PBBFAC,,, | Performed by: PHYSICIAN ASSISTANT

## 2020-05-29 PROCEDURE — 1126F PR PAIN SEVERITY QUANTIFIED, NO PAIN PRESENT: ICD-10-PCS | Mod: S$GLB,,, | Performed by: PHYSICIAN ASSISTANT

## 2020-05-29 PROCEDURE — 85025 COMPLETE CBC W/AUTO DIFF WBC: CPT

## 2020-05-29 PROCEDURE — 1159F MED LIST DOCD IN RCRD: CPT | Mod: S$GLB,,, | Performed by: PHYSICIAN ASSISTANT

## 2020-05-29 PROCEDURE — 84439 ASSAY OF FREE THYROXINE: CPT

## 2020-05-29 PROCEDURE — 1101F PT FALLS ASSESS-DOCD LE1/YR: CPT | Mod: CPTII,S$GLB,, | Performed by: PHYSICIAN ASSISTANT

## 2020-05-29 PROCEDURE — 3079F DIAST BP 80-89 MM HG: CPT | Mod: CPTII,S$GLB,, | Performed by: PHYSICIAN ASSISTANT

## 2020-05-29 PROCEDURE — 3079F PR MOST RECENT DIASTOLIC BLOOD PRESSURE 80-89 MM HG: ICD-10-PCS | Mod: CPTII,S$GLB,, | Performed by: PHYSICIAN ASSISTANT

## 2020-05-29 PROCEDURE — 36415 COLL VENOUS BLD VENIPUNCTURE: CPT

## 2020-05-29 RX ORDER — ATORVASTATIN CALCIUM 20 MG/1
20 TABLET, FILM COATED ORAL DAILY
Qty: 90 TABLET | Refills: 3 | Status: SHIPPED | OUTPATIENT
Start: 2020-05-29 | End: 2020-10-13 | Stop reason: DRUGHIGH

## 2020-05-29 RX ORDER — FLUTICASONE PROPIONATE 50 MCG
1 SPRAY, SUSPENSION (ML) NASAL DAILY
Qty: 9.9 ML | Refills: 0 | Status: SHIPPED | OUTPATIENT
Start: 2020-05-29 | End: 2020-06-28

## 2020-05-29 NOTE — PATIENT INSTRUCTIONS
-start over the counter biotin daily to help with hair growth  -start nioxin hair growth foam (any drug store)          Leg Swelling in Both Legs    Swelling of the feet, ankles, and legs is called edema. It is caused by excess fluid that has collected in the tissues. Extra fluid in the body settles in the lowest part because of gravity. This is why the legs and feet are most affected.  Some of the causes for edema include:  · Disease of the heart like congestive heart failure  · Standing or sitting for long periods of time  · Infection of the feet or legs  · Blood pooling in the veins of your legs (venous insufficiency)  · Dilated veins in your lower leg (varicose veins)  · Garters or other clothing that is tight on your legs. This will cause blood to pool in your legs because the clothing limits blood flow.  · Some medicines such as hormones like birth control pills, some blood pressure medicines like calcium channel blockers (amlodipine) and steroids, some antidepressants like MAO inhibitors and tricyclics  · Menstrual periods that cause you to retain fluids  · Many types of renal disease  · Liver failure or cirrhosis  · Pregnancy, some swelling is normal, but a sudden increase in leg swelling or weight gain can be a sign of a dangerous complication of pregnancy  · Poor nutrition  · Thyroid disease  Medical treatment will depend on what is causing the swelling in your legs. Your healthcare provider may prescribe water pills (diuretics) to get rid of the extra fluid.  Home care  Follow these guidelines when caring for yourself at home:  · Don't wear clothing like garters that is tight on your legs.  · Keep your legs up while lying or sitting.  · If infection, injury, or recent surgery is causing the swelling, stay off your legs as much as possible until symptoms get better.  · If your healthcare provider says that your leg swelling is caused by venous insufficiency or varicose veins, don't sit or  one  place for long periods of time. Take breaks and walk about every few hours. Brisk walking is a good exercise. It helps circulate the blood that has collected in your leg. Talk with your provider about using support stockings to stop daytime leg swelling.  · If your provider says that heart disease is causing your leg swelling, follow a low-salt diet to stop extra fluid from staying in your body. You may also need medicine.  Follow-up care  Follow up with your healthcare provider, or as advised.  When to seek medical advice  Call your healthcare provider right away if any of these occur:  · New shortness of breath or chest pain  · Shortness of breath or chest pain that gets worse  · Swelling in both legs or ankles that gets worse  · Swelling of the abdomen  · Redness, warmth, or swelling in one leg  · Fever of 100.4ºF (38ºC) or higher, or as directed by your healthcare provider  · Yellow color to your skin or eyes  · Rapid, unexplained weight gain  · Having to sleep upright or use an increased number of pillows  Date Last Reviewed: 3/31/2016  © 4615-7736 [employer]. [address] [place] [address], [place] [address]. All rights reserved. This information is not intended as a substitute for professional medical care. Always follow your healthcare professional's instructions.

## 2020-05-29 NOTE — PROGRESS NOTES
Subjective:      Patient ID: Beth Souza is a 69 y.o. female.    Chief Complaint: Headache and Foot Swelling    Headache    This is a new problem. The problem occurs intermittently. The problem has been waxing and waning. The pain is located in the bilateral region. The pain quality is similar to prior headaches. The pain is mild. Associated symptoms include dizziness (one episode), insomnia and sinus pressure. Pertinent negatives include no abdominal pain, abnormal behavior, anorexia, back pain, blurred vision, coughing, drainage, ear pain, eye pain, eye redness, eye watering, facial sweating, fever, hearing loss, loss of balance, muscle aches, nausea, neck pain, numbness, phonophobia, photophobia, rhinorrhea, scalp tenderness, seizures, sore throat, swollen glands, tingling, tinnitus, visual change, vomiting, weakness or weight loss. Associated symptoms comments: Hair falling out. Nothing aggravates the symptoms. She has tried acetaminophen (claritin) for the symptoms.   Pt reports that her right foot has been swelling intermittently. NO pain. No pain in her calf.   Also reports hair thinning. She did use a different perming chemical at the beauty store.   Denies any changes to her BM. Denies any fever, cough, or URI symptoms.     Patient Active Problem List   Diagnosis    Essential hypertension    Vitamin D deficiency    Mixed hyperlipidemia -was prescribed lipitor by PCP last year but stopped filling the medication. Patient was not aware that she needed to be on this medication long term.     Hyperparathyroidism, primary    Thyroid nodule -will recheck thyroid hormone levels today    Peripheral polyneuropathy    Cataract    Osteoarthritis of spine with radiculopathy, lumbar region    LEN on CPAP    Myalgia     Health Maintenance Due   Topic Date Due    Shingles Vaccine (1 of 2) 06/05/2001    Mammogram  05/15/2020       Review of Systems   Constitutional: Negative for activity change,  "appetite change, chills, diaphoresis, fatigue, fever, unexpected weight change and weight loss.   HENT: Positive for sinus pressure. Negative for congestion, ear pain, hearing loss, postnasal drip, rhinorrhea, sore throat, tinnitus, trouble swallowing and voice change.    Eyes: Negative.  Negative for blurred vision, photophobia, pain, redness and visual disturbance.   Respiratory: Negative.  Negative for cough, choking, chest tightness and shortness of breath.    Cardiovascular: Positive for leg swelling (foot and ankle) no pain. Negative for chest pain and palpitations.   Gastrointestinal: Negative for abdominal distention, abdominal pain, anorexia, blood in stool, constipation, diarrhea, nausea and vomiting.   Endocrine: Negative for cold intolerance, heat intolerance, polydipsia and polyuria.   Genitourinary: Negative.  Negative for difficulty urinating and frequency.   Musculoskeletal: Negative for arthralgias, back pain, gait problem, joint swelling, myalgias and neck pain.   Skin: Negative for color change, pallor, rash and wound.   Neurological: Positive for dizziness (one episode) and headaches. Negative for tingling, tremors, seizures, syncope, facial asymmetry, speech difficulty, weakness, light-headedness, numbness and loss of balance.   Hematological: Negative for adenopathy.   Psychiatric/Behavioral: Negative for behavioral problems, confusion, self-injury, sleep disturbance and suicidal ideas. The patient has insomnia. The patient is not nervous/anxious.      Objective:   /80 (BP Location: Left arm, Patient Position: Sitting, BP Method: Large (Manual))   Pulse (!) 53   Temp 97.8 °F (36.6 °C) (Tympanic)   Ht 5' 2" (1.575 m)   Wt 94 kg (207 lb 3.7 oz)   SpO2 98%   BMI 37.90 kg/m²     Physical Exam   Constitutional: She is oriented to person, place, and time. She appears well-developed and well-nourished. No distress.   HENT:   Head: Normocephalic and atraumatic. Hair is abnormal.       Right " Ear: External ear normal.   Left Ear: External ear normal.   Nose: Nose normal.   Mouth/Throat: Oropharynx is clear and moist.   Eyes: Pupils are equal, round, and reactive to light. Conjunctivae and EOM are normal.   Neck: Normal range of motion. Neck supple.   Cardiovascular: Normal rate, regular rhythm and normal heart sounds. Exam reveals no gallop and no friction rub.   No murmur heard.  Pulmonary/Chest: Effort normal and breath sounds normal. No respiratory distress. She has no wheezes. She has no rales. She exhibits no tenderness.   Abdominal: Soft. She exhibits no distension. There is no tenderness.   Musculoskeletal: Normal range of motion.        Right lower leg: She exhibits no tenderness, no bony tenderness, no swelling and no edema.        Left lower leg: She exhibits no tenderness, no bony tenderness, no swelling and no edema.        Feet:    Lymphadenopathy:     She has no cervical adenopathy.   Neurological: She is alert and oriented to person, place, and time.   Skin: Skin is warm and dry. She is not diaphoretic.   Psychiatric: She has a normal mood and affect. Her behavior is normal. Judgment and thought content normal.   Vitals reviewed.      Assessment:     1. Lower extremity edema    2. Hair loss    3. Nonintractable episodic headache, unspecified headache type    4. Allergic rhinitis, unspecified seasonality, unspecified trigger    5. Hypercholesterolemia    6. Screening for breast cancer    7. Encounter for screening mammogram for malignant neoplasm of breast       Plan:   Lower extremity edema  -     CBC auto differential; Future; Expected date: 05/29/2020  -     Comprehensive metabolic panel; Future; Expected date: 05/29/2020  -     Urinalysis; Future  -     T3; Future; Expected date: 05/29/2020  -     T4, free; Future; Expected date: 05/29/2020  -     TSH; Future; Expected date: 05/29/2020  -compression socks  -elevation  -avoid salt  Educational handout on over-the-counter medications and  at-home conservative care, pertinent to the patients diagnosis today, was handed to the patient and discussed in detail.    Hair loss  -     CBC auto differential; Future; Expected date: 05/29/2020  -     Comprehensive metabolic panel; Future; Expected date: 05/29/2020  -     Urinalysis; Future  -     T3; Future; Expected date: 05/29/2020  -     T4, free; Future; Expected date: 05/29/2020  -     TSH; Future; Expected date: 05/29/2020    Nonintractable episodic headache, unspecified headache type  -     CBC auto differential; Future; Expected date: 05/29/2020  -     Comprehensive metabolic panel; Future; Expected date: 05/29/2020  -     Urinalysis; Future  -     T3; Future; Expected date: 05/29/2020  -     T4, free; Future; Expected date: 05/29/2020  -     TSH; Future; Expected date: 05/29/2020  -related to allergies  -antihistamine + flonase daily    Allergic rhinitis, unspecified seasonality, unspecified trigger  -     fluticasone propionate (FLONASE) 50 mcg/actuation nasal spray; 1 spray (50 mcg total) by Each Nostril route once daily.  Dispense: 9.9 mL; Refill: 0    Hypercholesterolemia  -     atorvastatin (LIPITOR) 20 MG tablet; Take 1 tablet (20 mg total) by mouth once daily.  Dispense: 90 tablet; Refill: 3  -diagnosis and treatment discussed with her in detail. Advised her that this lower her risk of a stroke and heart attack. Advised her to take this daily.   Advise to maintain lifestyle changes with low carbohydrate, low sugar diet and exercise 30 minutes daily    Screening for breast cancer  -     Mammo Digital Screening Bilat; Future; Expected date: 05/29/2020    Encounter for screening mammogram for malignant neoplasm of breast   -     Mammo Digital Screening Bilat; Future; Expected date: 05/29/2020    -scheduled for a follow up with her PCP in 1 month.     Follow up if symptoms worsen or fail to improve.

## 2020-06-01 ENCOUNTER — TELEPHONE (OUTPATIENT)
Dept: INTERNAL MEDICINE | Facility: CLINIC | Age: 70
End: 2020-06-01

## 2020-06-29 ENCOUNTER — OFFICE VISIT (OUTPATIENT)
Dept: INTERNAL MEDICINE | Facility: CLINIC | Age: 70
End: 2020-06-29
Payer: MEDICARE

## 2020-06-29 VITALS
SYSTOLIC BLOOD PRESSURE: 128 MMHG | DIASTOLIC BLOOD PRESSURE: 80 MMHG | WEIGHT: 208.75 LBS | HEART RATE: 57 BPM | OXYGEN SATURATION: 96 % | BODY MASS INDEX: 38.42 KG/M2 | RESPIRATION RATE: 16 BRPM | HEIGHT: 62 IN

## 2020-06-29 DIAGNOSIS — R60.0 PEDAL EDEMA: Primary | ICD-10-CM

## 2020-06-29 DIAGNOSIS — E55.9 VITAMIN D DEFICIENCY: ICD-10-CM

## 2020-06-29 DIAGNOSIS — G47.33 OSA ON CPAP: ICD-10-CM

## 2020-06-29 DIAGNOSIS — M47.26 OSTEOARTHRITIS OF SPINE WITH RADICULOPATHY, LUMBAR REGION: ICD-10-CM

## 2020-06-29 DIAGNOSIS — R41.89 COGNITIVE DEFICITS: ICD-10-CM

## 2020-06-29 DIAGNOSIS — E78.2 MIXED HYPERLIPIDEMIA: ICD-10-CM

## 2020-06-29 DIAGNOSIS — I10 ESSENTIAL HYPERTENSION: ICD-10-CM

## 2020-06-29 DIAGNOSIS — E21.3 HYPERPARATHYROIDISM: ICD-10-CM

## 2020-06-29 DIAGNOSIS — L65.9 HAIR LOSS: ICD-10-CM

## 2020-06-29 PROBLEM — M79.10 MYALGIA: Status: RESOLVED | Noted: 2019-11-12 | Resolved: 2020-06-29

## 2020-06-29 PROCEDURE — 1126F PR PAIN SEVERITY QUANTIFIED, NO PAIN PRESENT: ICD-10-PCS | Mod: S$GLB,,, | Performed by: FAMILY MEDICINE

## 2020-06-29 PROCEDURE — 1159F PR MEDICATION LIST DOCUMENTED IN MEDICAL RECORD: ICD-10-PCS | Mod: S$GLB,,, | Performed by: FAMILY MEDICINE

## 2020-06-29 PROCEDURE — 3008F BODY MASS INDEX DOCD: CPT | Mod: CPTII,S$GLB,, | Performed by: FAMILY MEDICINE

## 2020-06-29 PROCEDURE — 99499 UNLISTED E&M SERVICE: CPT | Mod: S$GLB,,, | Performed by: FAMILY MEDICINE

## 2020-06-29 PROCEDURE — 3079F PR MOST RECENT DIASTOLIC BLOOD PRESSURE 80-89 MM HG: ICD-10-PCS | Mod: CPTII,S$GLB,, | Performed by: FAMILY MEDICINE

## 2020-06-29 PROCEDURE — 1126F AMNT PAIN NOTED NONE PRSNT: CPT | Mod: S$GLB,,, | Performed by: FAMILY MEDICINE

## 2020-06-29 PROCEDURE — 3074F PR MOST RECENT SYSTOLIC BLOOD PRESSURE < 130 MM HG: ICD-10-PCS | Mod: CPTII,S$GLB,, | Performed by: FAMILY MEDICINE

## 2020-06-29 PROCEDURE — 1159F MED LIST DOCD IN RCRD: CPT | Mod: S$GLB,,, | Performed by: FAMILY MEDICINE

## 2020-06-29 PROCEDURE — 3079F DIAST BP 80-89 MM HG: CPT | Mod: CPTII,S$GLB,, | Performed by: FAMILY MEDICINE

## 2020-06-29 PROCEDURE — 3074F SYST BP LT 130 MM HG: CPT | Mod: CPTII,S$GLB,, | Performed by: FAMILY MEDICINE

## 2020-06-29 PROCEDURE — 3008F PR BODY MASS INDEX (BMI) DOCUMENTED: ICD-10-PCS | Mod: CPTII,S$GLB,, | Performed by: FAMILY MEDICINE

## 2020-06-29 PROCEDURE — 99214 OFFICE O/P EST MOD 30 MIN: CPT | Mod: S$GLB,,, | Performed by: FAMILY MEDICINE

## 2020-06-29 PROCEDURE — 1101F PR PT FALLS ASSESS DOC 0-1 FALLS W/OUT INJ PAST YR: ICD-10-PCS | Mod: CPTII,S$GLB,, | Performed by: FAMILY MEDICINE

## 2020-06-29 PROCEDURE — 99214 PR OFFICE/OUTPT VISIT, EST, LEVL IV, 30-39 MIN: ICD-10-PCS | Mod: S$GLB,,, | Performed by: FAMILY MEDICINE

## 2020-06-29 PROCEDURE — 99499 RISK ADDL DX/OHS AUDIT: ICD-10-PCS | Mod: S$GLB,,, | Performed by: FAMILY MEDICINE

## 2020-06-29 PROCEDURE — 1101F PT FALLS ASSESS-DOCD LE1/YR: CPT | Mod: CPTII,S$GLB,, | Performed by: FAMILY MEDICINE

## 2020-06-29 PROCEDURE — 99999 PR PBB SHADOW E&M-EST. PATIENT-LVL V: ICD-10-PCS | Mod: PBBFAC,,, | Performed by: FAMILY MEDICINE

## 2020-06-29 PROCEDURE — 99999 PR PBB SHADOW E&M-EST. PATIENT-LVL V: CPT | Mod: PBBFAC,,, | Performed by: FAMILY MEDICINE

## 2020-06-29 NOTE — PROGRESS NOTES
"Subjective:       Patient ID: Beth Souza is a 70 y.o. female.    Chief Complaint: Follow-up, Neck Pain (Scar on Neck), and Foot Swelling (Bilateral)    70-year-old  female patient with Patient Active Problem List:     Essential hypertension     Vitamin D deficiency     Mixed hyperlipidemia     Hyperparathyroidism, primary     Thyroid nodule     Peripheral polyneuropathy     Cataract     Osteoarthritis of spine with radiculopathy, lumbar region     LEN on CPAP  Reports that she has been retaining more fluid in her legs in spite of decreasing salt intake, denies any chest pain or difficulty breathing or palpitations.  Patient has been having memory deficits and has appointment with neurology to discuss further.  Patient reports that she continues to have soreness to the neck around the keloid area on the right side and has been noticing lump to the right lower back, which has been painful on palpating.  Has not been taking cholesterol medication regularly    Review of Systems   Constitutional: Negative for fatigue.   Eyes: Negative for visual disturbance.   Respiratory: Negative for cough, shortness of breath and wheezing.    Cardiovascular: Positive for leg swelling. Negative for chest pain and palpitations.   Gastrointestinal: Negative for abdominal pain, nausea and vomiting.   Musculoskeletal: Negative for myalgias.   Skin: Negative for rash.   Neurological: Negative for light-headedness and headaches.   Psychiatric/Behavioral: Positive for decreased concentration. Negative for sleep disturbance.         /80 (BP Location: Right arm, Patient Position: Sitting, BP Method: Large (Manual))   Pulse (!) 57   Resp 16   Ht 5' 2" (1.575 m)   Wt 94.7 kg (208 lb 12.4 oz)   SpO2 96%   BMI 38.19 kg/m²   Objective:      Physical Exam  Constitutional:       Appearance: She is well-developed.   HENT:      Head: Normocephalic and atraumatic.   Cardiovascular:      Rate and Rhythm: Normal rate " and regular rhythm.      Heart sounds: Normal heart sounds. No murmur.   Pulmonary:      Effort: Pulmonary effort is normal. No respiratory distress.      Breath sounds: Normal breath sounds. No wheezing.   Abdominal:      General: Bowel sounds are normal.      Palpations: Abdomen is soft.      Tenderness: There is no abdominal tenderness.   Musculoskeletal:      Right lower leg: Edema present.      Left lower leg: Edema present.      Comments: Positive for 1+ edema noted to bilateral feet   Skin:     General: Skin is warm and dry.      Findings: No rash.      Comments: Noted keloid scar to the right side of the neck laterally and tiny round lump noted to the right flank consistent with lipoma   Neurological:      Mental Status: She is alert and oriented to person, place, and time.           Assessment/Plan:   1. Pedal edema  - Brain Natriuretic Peptide; Future  - PTH, intact; Future  - X-Ray Chest PA And Lateral; Future  - COMPRESSION STOCKINGS  Compression stockings prescribed today.  Reviewed recent labs which looks stable  Will check further evaluation  Restrict salt intake and elevate lower extremities    2. Vitamin D deficiency  - Vitamin D; Future  3. Hyperparathyroidism  - PTH, intact; Future  Will check further labs and discuss further with Nephrology, taking vitamin-D by prescription week    4. Hair loss  - Ambulatory referral/consult to Dermatology; Future  Reports frequent hair loss, advised to avoid scented shampoos  Will refer to dermatology to discuss further and also discuss about keloid to the neck    5. Cognitive deficits  Patient has appointment with neurology next month    6. Mixed hyperlipidemia  - Lipid Panel; Future  Currently not taking Lipitor 20 mg daily, encouraged to start taking it as prescribed    7. Essential hypertension  Blood pressure stable today currently on lisinopril hydrochlorothiazide 20/25 mg    8. Osteoarthritis of spine with radiculopathy, lumbar region  Graded exercise  regimen recommended    9. LEN on CPAP   Stable

## 2020-06-30 ENCOUNTER — LAB VISIT (OUTPATIENT)
Dept: LAB | Facility: HOSPITAL | Age: 70
End: 2020-06-30
Attending: FAMILY MEDICINE
Payer: MEDICARE

## 2020-06-30 ENCOUNTER — HOSPITAL ENCOUNTER (OUTPATIENT)
Dept: RADIOLOGY | Facility: HOSPITAL | Age: 70
Discharge: HOME | End: 2020-06-30
Attending: FAMILY MEDICINE
Payer: MEDICARE

## 2020-06-30 DIAGNOSIS — R60.0 PEDAL EDEMA: ICD-10-CM

## 2020-06-30 DIAGNOSIS — E21.3 HYPERPARATHYROIDISM: ICD-10-CM

## 2020-06-30 DIAGNOSIS — E78.2 MIXED HYPERLIPIDEMIA: ICD-10-CM

## 2020-06-30 DIAGNOSIS — E55.9 VITAMIN D DEFICIENCY: ICD-10-CM

## 2020-06-30 LAB
25(OH)D3+25(OH)D2 SERPL-MCNC: 23 NG/ML (ref 30–96)
BNP SERPL-MCNC: 29 PG/ML (ref 0–99)
CHOLEST SERPL-MCNC: 235 MG/DL (ref 120–199)
CHOLEST/HDLC SERPL: 3.5 {RATIO} (ref 2–5)
HDLC SERPL-MCNC: 67 MG/DL (ref 40–75)
HDLC SERPL: 28.5 % (ref 20–50)
LDLC SERPL CALC-MCNC: 149.4 MG/DL (ref 63–159)
NONHDLC SERPL-MCNC: 168 MG/DL
PTH-INTACT SERPL-MCNC: 73 PG/ML (ref 9–77)
TRIGL SERPL-MCNC: 93 MG/DL (ref 30–150)

## 2020-06-30 PROCEDURE — 83970 ASSAY OF PARATHORMONE: CPT

## 2020-06-30 PROCEDURE — 36415 COLL VENOUS BLD VENIPUNCTURE: CPT

## 2020-06-30 PROCEDURE — 71046 X-RAY EXAM CHEST 2 VIEWS: CPT | Mod: 26,,, | Performed by: RADIOLOGY

## 2020-06-30 PROCEDURE — 71046 X-RAY EXAM CHEST 2 VIEWS: CPT | Mod: TC

## 2020-06-30 PROCEDURE — 80061 LIPID PANEL: CPT

## 2020-06-30 PROCEDURE — 82306 VITAMIN D 25 HYDROXY: CPT

## 2020-06-30 PROCEDURE — 71046 XR CHEST PA AND LATERAL: ICD-10-PCS | Mod: 26,,, | Performed by: RADIOLOGY

## 2020-06-30 PROCEDURE — 83880 ASSAY OF NATRIURETIC PEPTIDE: CPT

## 2020-06-30 RX ORDER — ERGOCALCIFEROL 1.25 MG/1
50000 CAPSULE ORAL
Qty: 10 CAPSULE | Refills: 0 | Status: SHIPPED | OUTPATIENT
Start: 2020-06-30 | End: 2021-02-01

## 2020-07-01 DIAGNOSIS — M25.571 RIGHT ANKLE PAIN, UNSPECIFIED CHRONICITY: Primary | ICD-10-CM

## 2020-07-02 ENCOUNTER — HOSPITAL ENCOUNTER (OUTPATIENT)
Dept: RADIOLOGY | Facility: HOSPITAL | Age: 70
Discharge: HOME | End: 2020-07-02
Attending: PODIATRIST
Payer: MEDICARE

## 2020-07-02 ENCOUNTER — PATIENT OUTREACH (OUTPATIENT)
Dept: ADMINISTRATIVE | Facility: OTHER | Age: 70
End: 2020-07-02

## 2020-07-02 ENCOUNTER — OFFICE VISIT (OUTPATIENT)
Dept: PODIATRY | Facility: CLINIC | Age: 70
End: 2020-07-02
Payer: MEDICARE

## 2020-07-02 VITALS
DIASTOLIC BLOOD PRESSURE: 79 MMHG | WEIGHT: 208.75 LBS | HEIGHT: 62 IN | HEART RATE: 69 BPM | BODY MASS INDEX: 38.42 KG/M2 | SYSTOLIC BLOOD PRESSURE: 111 MMHG

## 2020-07-02 DIAGNOSIS — M21.42 PES PLANUS OF BOTH FEET: ICD-10-CM

## 2020-07-02 DIAGNOSIS — M25.571 CHRONIC PAIN OF RIGHT ANKLE: Primary | ICD-10-CM

## 2020-07-02 DIAGNOSIS — G89.29 CHRONIC PAIN OF RIGHT ANKLE: Primary | ICD-10-CM

## 2020-07-02 DIAGNOSIS — M76.821 POSTERIOR TIBIAL TENDINITIS OF RIGHT LEG: ICD-10-CM

## 2020-07-02 DIAGNOSIS — M21.41 PES PLANUS OF BOTH FEET: ICD-10-CM

## 2020-07-02 DIAGNOSIS — M25.571 RIGHT ANKLE PAIN, UNSPECIFIED CHRONICITY: ICD-10-CM

## 2020-07-02 DIAGNOSIS — M19.071 PRIMARY OSTEOARTHRITIS OF RIGHT FOOT: ICD-10-CM

## 2020-07-02 PROCEDURE — 73610 XR ANKLE COMPLETE 3 VIEW RIGHT: ICD-10-PCS | Mod: 26,RT,, | Performed by: RADIOLOGY

## 2020-07-02 PROCEDURE — 73610 X-RAY EXAM OF ANKLE: CPT | Mod: 26,RT,, | Performed by: RADIOLOGY

## 2020-07-02 PROCEDURE — 3074F SYST BP LT 130 MM HG: CPT | Mod: CPTII,S$GLB,, | Performed by: PODIATRIST

## 2020-07-02 PROCEDURE — 1159F PR MEDICATION LIST DOCUMENTED IN MEDICAL RECORD: ICD-10-PCS | Mod: S$GLB,,, | Performed by: PODIATRIST

## 2020-07-02 PROCEDURE — 1159F MED LIST DOCD IN RCRD: CPT | Mod: S$GLB,,, | Performed by: PODIATRIST

## 2020-07-02 PROCEDURE — 99203 OFFICE O/P NEW LOW 30 MIN: CPT | Mod: S$GLB,,, | Performed by: PODIATRIST

## 2020-07-02 PROCEDURE — 3078F PR MOST RECENT DIASTOLIC BLOOD PRESSURE < 80 MM HG: ICD-10-PCS | Mod: CPTII,S$GLB,, | Performed by: PODIATRIST

## 2020-07-02 PROCEDURE — 99999 PR PBB SHADOW E&M-EST. PATIENT-LVL III: CPT | Mod: PBBFAC,,, | Performed by: PODIATRIST

## 2020-07-02 PROCEDURE — 1101F PR PT FALLS ASSESS DOC 0-1 FALLS W/OUT INJ PAST YR: ICD-10-PCS | Mod: CPTII,S$GLB,, | Performed by: PODIATRIST

## 2020-07-02 PROCEDURE — 99203 PR OFFICE/OUTPT VISIT, NEW, LEVL III, 30-44 MIN: ICD-10-PCS | Mod: S$GLB,,, | Performed by: PODIATRIST

## 2020-07-02 PROCEDURE — 1125F PR PAIN SEVERITY QUANTIFIED, PAIN PRESENT: ICD-10-PCS | Mod: S$GLB,,, | Performed by: PODIATRIST

## 2020-07-02 PROCEDURE — 3074F PR MOST RECENT SYSTOLIC BLOOD PRESSURE < 130 MM HG: ICD-10-PCS | Mod: CPTII,S$GLB,, | Performed by: PODIATRIST

## 2020-07-02 PROCEDURE — 73610 X-RAY EXAM OF ANKLE: CPT | Mod: TC,RT

## 2020-07-02 PROCEDURE — 1125F AMNT PAIN NOTED PAIN PRSNT: CPT | Mod: S$GLB,,, | Performed by: PODIATRIST

## 2020-07-02 PROCEDURE — 99999 PR PBB SHADOW E&M-EST. PATIENT-LVL III: ICD-10-PCS | Mod: PBBFAC,,, | Performed by: PODIATRIST

## 2020-07-02 PROCEDURE — 3008F PR BODY MASS INDEX (BMI) DOCUMENTED: ICD-10-PCS | Mod: CPTII,S$GLB,, | Performed by: PODIATRIST

## 2020-07-02 PROCEDURE — 3008F BODY MASS INDEX DOCD: CPT | Mod: CPTII,S$GLB,, | Performed by: PODIATRIST

## 2020-07-02 PROCEDURE — 3078F DIAST BP <80 MM HG: CPT | Mod: CPTII,S$GLB,, | Performed by: PODIATRIST

## 2020-07-02 PROCEDURE — 1101F PT FALLS ASSESS-DOCD LE1/YR: CPT | Mod: CPTII,S$GLB,, | Performed by: PODIATRIST

## 2020-07-02 NOTE — PROGRESS NOTES
Requested updates within Care Everywhere.  Patient's chart was reviewed for overdue SHARLA topics.  Immunizations reconciled.    Orders previously placed:mammo

## 2020-07-02 NOTE — PROGRESS NOTES
Subjective:       Patient ID: Beth Souza is a 70 y.o. female.    Chief Complaint: Ankle Pain (Pt c/o right ankle pain. Pt states pain rating 8/10 at present. Non diabetic pt. Wears sandals w/o socks) and Foot Pain (Pt c/o right foot pain and swelling.)      HPI: Beth Souza presents to the office with the chief complaint of pains to the right foot and ankle at the medial aspect. The pains are rated as 8/10 and are described as sore and achy in nature. The pains have been on going now for the past several month or so, and are worsening. The patient denies trauma. The patient states no NSAIDs for management due to allergy to ASA. No PCP evaluation of this condition as of yet. The patient states that prolonged walking and standing exacerbates and causes the symptoms. The patient does state mild associated swelling as well. Patient's Primary Care Provider is Edna Oliva MD.     Review of patient's allergies indicates:   Allergen Reactions    Aspirin Other (See Comments)     Bruising       Past Medical History:   Diagnosis Date    Cataract     Hyperlipidemia     Hypertension     Lumbar spondylosis     Vitamin D deficiency        Family History   Problem Relation Age of Onset    Hypertension Mother     Diabetes Daughter     Melanoma Neg Hx     Psoriasis Neg Hx     Lupus Neg Hx     Eczema Neg Hx        Social History     Socioeconomic History    Marital status:      Spouse name: Not on file    Number of children: 3    Years of education: Not on file    Highest education level: Not on file   Occupational History    Occupation: Cleaning [employer]     Employer: not employed   Social Needs    Financial resource strain: Not on file    Food insecurity     Worry: Not on file     Inability: Not on file    Transportation needs     Medical: Not on file     Non-medical: Not on file   Tobacco Use    Smoking status: Former Smoker     Quit date: 2004     Years since quittin.5     "Smokeless tobacco: Former User   Substance and Sexual Activity    Alcohol use: Yes     Frequency: 2-4 times a month     Drinks per session: 1 or 2     Binge frequency: Never     Comment: occasionallty    Drug use: No    Sexual activity: Not Currently     Partners: Male   Lifestyle    Physical activity     Days per week: Not on file     Minutes per session: Not on file    Stress: Not on file   Relationships    Social connections     Talks on phone: Not on file     Gets together: Not on file     Attends Mormonism service: Not on file     Active member of club or organization: Not on file     Attends meetings of clubs or organizations: Not on file     Relationship status: Not on file   Other Topics Concern    Are you pregnant or think you may be? No    Breast-feeding No   Social History Narrative    Not on file       Past Surgical History:   Procedure Laterality Date    PARTIAL HYSTERECTOMY         Review of Systems   Constitutional: Negative for activity change, appetite change, chills and fever.   HENT: Negative for sinus pain, sore throat and voice change.    Eyes: Negative for pain, redness and visual disturbance.   Respiratory: Negative for cough and shortness of breath.    Cardiovascular: Negative for chest pain and palpitations.   Gastrointestinal: Negative for diarrhea, nausea and vomiting.   Musculoskeletal: Positive for arthralgias, back pain, gait problem and joint swelling.   Skin: Negative for color change and wound.   Neurological: Negative for dizziness, weakness and numbness.   Psychiatric/Behavioral: The patient is not nervous/anxious.          Objective:   /79   Pulse 69   Ht 5' 2" (1.575 m)   Wt 94.7 kg (208 lb 12.4 oz)   BMI 38.19 kg/m²     X-Ray Ankle Complete Right  Narrative: EXAMINATION:  XR ANKLE COMPLETE 3 VIEW RIGHT    CLINICAL HISTORY:  Pain in right ankle and joints of right foot    TECHNIQUE:  AP, lateral, and oblique images of the right ankle were " performed.    COMPARISON:  06/06/2019    FINDINGS:  There is mild nonspecific soft tissue edema surrounding the ankle.  No acute fractures or dislocations visualized.  There is mild degenerative spurring present at the distal tip of the medial malleolus.  Ankle mortise is otherwise preserved.  Degenerative findings noted throughout the midfoot.  Prominent dorsal and plantar surface calcaneal enthesophytes are present.  Pes planus deformity also noted.  Impression: As above    Electronically signed by: Gerber Aranda MD  Date:    07/02/2020  Time:    13:49      Physical Exam    LOWER EXTREMITY PHYSICAL EXAMINATION    VASCULAR: Pulses are palpable to the B/L lower extremity. The right dorsalis pedis pulse is 2/4 and the posterior tibial pulse is 2/4. The left dorsalis pedis pulse is 2/4 and the posterior tibial pulse is 2/4. Hair growth is noted on the dorsal foot and digits. Proximal to distal, warm to warm. Capillary refill time is WNL at less than 3s.    DERMATOLOGY: Skin is supple, dry and intact. No ecchymosis is noted. No hypertrophic skin formation. No erythema or cellulitis is noted.     ORTHOPEDIC:ORTHOPEDIC: There is severe collapsing pes planovalgus on the right foot. There is moderate tenderness to palpation of the navicular tuberosity on the right foot. There is moderate edema noted along the course of the PT tendon on the right foot. There is moderate retro-malleolar edema (medial malleolus). There is mild pain to palpation at the spring ligament and the deltoid ligaments. There is no apparent pains to palpation of the medial malleolus.  Equinus contracture is noted.  No pain with palpation and/or range of motion of the ankle joint.  There is no crepitus noted with range of motion of the ankle joint. The ankle is not in valgus.  There is moderate limitation to ROM of the STJ and the MTJ. The hindfoot is in valgus. Upon standing, there is moderate geneva-talar subluxation noted on the left foot. There is  moderate forefoot/midfoot abduction on the hindfoot.  RCSP is valgus. The deformity is rigid. The patient is able to double heel rise, but has difficulties with single heel rise on the right foot. Gait pattern is antalgic at this time.      NEUROLOGY: Protective sensation is intact via 5.07 [place] Yuniel monofilament. Proprioception is intact. Sensation to light touch is intact. Upon palpation of the interspaces, there are no neurological sensations stated that radiate proximal or distal. Upon compression of the metatarsal heads from medial to lateral, no neurological sensations or symptoms are stated.    Assessment:     1. Chronic pain of right ankle    2. Pes planus of both feet    3. Posterior tibial tendinitis of right leg    4. Primary osteoarthritis of right foot        Plan:     Chronic pain of right ankle    Pes planus of both feet    Posterior tibial tendinitis of right leg    Primary osteoarthritis of right foot      X-rays were taken today and reviewed by myself with the patient the room.  There is significant multi surface articular degeneration noted to the subtalar joint, talonavicular joint, navicular cuneiform joint, 1st metatarsal cuneiform joint.  There is also significant spurring noted to the posterior and plantar heel.  Mild edema is noted to the medial ankle.    Did discuss treatment options in regarding to chronic foot and ankle pain as relates to pes planus foot deformity and posterior tibial tendinitis.  Discussed with patient biomechanical etiology which can contribute to these affects.  Discussed treatment options in regarding to steroid injection, orthotics, surgical intervention, motion control shoes.  Patient states that she was not interested in steroid injection at this time.  She would like to consider surgical intervention at a later day, did request orthotics inserts today to help provide some support to the arch and heel.  A prescription was provided to the patient for  orthotics.  We discussed the importance of wearing the orthotics to help elevate the arch which will allow for tension to be lessened to the plantar fascia and posterior heel cord.  Discussed the importance of the break-in period with the inserts by wearing them 2-3 hours for the 1st day and increasing their use an hour each day until able to tolerate a full work period.    Patient follow-up in 2 months for new worsening symptoms.        Future Appointments   Date Time Provider Department Center   7/6/2020  9:00 AM Nena Chaves MD [facility] DERM [facility] [place]   7/6/2020  2:00 PM Jeff Renee MD [facility] NEURO [facility]   9/2/2020 10:00 AM Nadia Smith DPM [facility] POD [facility]   4/29/2021 10:00 AM Elizabeth Lejeune, NP [facility] PULWW [facility] [place]

## 2020-07-06 ENCOUNTER — OFFICE VISIT (OUTPATIENT)
Dept: NEUROLOGY | Facility: CLINIC | Age: 70
End: 2020-07-06
Payer: MEDICARE

## 2020-07-06 VITALS
HEIGHT: 62 IN | HEART RATE: 80 BPM | DIASTOLIC BLOOD PRESSURE: 80 MMHG | WEIGHT: 212.06 LBS | BODY MASS INDEX: 39.02 KG/M2 | SYSTOLIC BLOOD PRESSURE: 130 MMHG

## 2020-07-06 DIAGNOSIS — R51.9 CHRONIC INTRACTABLE HEADACHE, UNSPECIFIED HEADACHE TYPE: ICD-10-CM

## 2020-07-06 DIAGNOSIS — G89.29 CHRONIC INTRACTABLE HEADACHE, UNSPECIFIED HEADACHE TYPE: ICD-10-CM

## 2020-07-06 DIAGNOSIS — G31.84 MILD COGNITIVE IMPAIRMENT WITH MEMORY LOSS: Primary | ICD-10-CM

## 2020-07-06 DIAGNOSIS — E55.9 VITAMIN D DEFICIENCY: ICD-10-CM

## 2020-07-06 DIAGNOSIS — I10 ESSENTIAL HYPERTENSION: ICD-10-CM

## 2020-07-06 PROCEDURE — 3079F PR MOST RECENT DIASTOLIC BLOOD PRESSURE 80-89 MM HG: ICD-10-PCS | Mod: CPTII,S$GLB,, | Performed by: PSYCHIATRY & NEUROLOGY

## 2020-07-06 PROCEDURE — 99204 OFFICE O/P NEW MOD 45 MIN: CPT | Mod: S$GLB,,, | Performed by: PSYCHIATRY & NEUROLOGY

## 2020-07-06 PROCEDURE — 3075F PR MOST RECENT SYSTOLIC BLOOD PRESS GE 130-139MM HG: ICD-10-PCS | Mod: CPTII,S$GLB,, | Performed by: PSYCHIATRY & NEUROLOGY

## 2020-07-06 PROCEDURE — 99999 PR PBB SHADOW E&M-EST. PATIENT-LVL IV: CPT | Mod: PBBFAC,,, | Performed by: PSYCHIATRY & NEUROLOGY

## 2020-07-06 PROCEDURE — 99499 RISK ADDL DX/OHS AUDIT: ICD-10-PCS | Mod: S$GLB,,, | Performed by: PSYCHIATRY & NEUROLOGY

## 2020-07-06 PROCEDURE — 1159F PR MEDICATION LIST DOCUMENTED IN MEDICAL RECORD: ICD-10-PCS | Mod: S$GLB,,, | Performed by: PSYCHIATRY & NEUROLOGY

## 2020-07-06 PROCEDURE — 1126F PR PAIN SEVERITY QUANTIFIED, NO PAIN PRESENT: ICD-10-PCS | Mod: S$GLB,,, | Performed by: PSYCHIATRY & NEUROLOGY

## 2020-07-06 PROCEDURE — 3008F PR BODY MASS INDEX (BMI) DOCUMENTED: ICD-10-PCS | Mod: CPTII,S$GLB,, | Performed by: PSYCHIATRY & NEUROLOGY

## 2020-07-06 PROCEDURE — 3079F DIAST BP 80-89 MM HG: CPT | Mod: CPTII,S$GLB,, | Performed by: PSYCHIATRY & NEUROLOGY

## 2020-07-06 PROCEDURE — 99999 PR PBB SHADOW E&M-EST. PATIENT-LVL IV: ICD-10-PCS | Mod: PBBFAC,,, | Performed by: PSYCHIATRY & NEUROLOGY

## 2020-07-06 PROCEDURE — 99204 PR OFFICE/OUTPT VISIT, NEW, LEVL IV, 45-59 MIN: ICD-10-PCS | Mod: S$GLB,,, | Performed by: PSYCHIATRY & NEUROLOGY

## 2020-07-06 PROCEDURE — 1101F PR PT FALLS ASSESS DOC 0-1 FALLS W/OUT INJ PAST YR: ICD-10-PCS | Mod: CPTII,S$GLB,, | Performed by: PSYCHIATRY & NEUROLOGY

## 2020-07-06 PROCEDURE — 1101F PT FALLS ASSESS-DOCD LE1/YR: CPT | Mod: CPTII,S$GLB,, | Performed by: PSYCHIATRY & NEUROLOGY

## 2020-07-06 PROCEDURE — 3008F BODY MASS INDEX DOCD: CPT | Mod: CPTII,S$GLB,, | Performed by: PSYCHIATRY & NEUROLOGY

## 2020-07-06 PROCEDURE — 1159F MED LIST DOCD IN RCRD: CPT | Mod: S$GLB,,, | Performed by: PSYCHIATRY & NEUROLOGY

## 2020-07-06 PROCEDURE — 1126F AMNT PAIN NOTED NONE PRSNT: CPT | Mod: S$GLB,,, | Performed by: PSYCHIATRY & NEUROLOGY

## 2020-07-06 PROCEDURE — 99499 UNLISTED E&M SERVICE: CPT | Mod: S$GLB,,, | Performed by: PSYCHIATRY & NEUROLOGY

## 2020-07-06 PROCEDURE — 3075F SYST BP GE 130 - 139MM HG: CPT | Mod: CPTII,S$GLB,, | Performed by: PSYCHIATRY & NEUROLOGY

## 2020-07-06 NOTE — PROGRESS NOTES
"Subjective:      Patient ID: Beth Souza is a 70 y.o. female.    Chief Complaint: "I do not remember things. "    This right-handed patient indicates that she has become increasingly aware of the fact that she has some difficulty with recent memory.  As an example, the patient may forget the location of her car keys and then will have trouble finding them.  The patient states that on another occasion she may be looking into her pantry but then forget what she is searching for.  The patient states that she can go from 1 room to the next and not recall what she is doing in the 2nd room although after standing momentarily she is able to recall and then perform the activities that she came to perform.  She has no difficulty with names of family or friends.  She has no difficulty with orientation while driving although she occasionally will forget why she has turned right or turned left until she is able to think about it momentarily.  The patient does not get lost in her home nor does she get lost while driving.    The patient states that she is totally independent for instrumental activities of daily living.  She is able to be independent and lives alone.  She dresses herself and is able to manage her household activities including household finances.  Patient is able to do her own cooking and cleaning without any assistance.  The patient remains socially active visiting with friends frequently.  The patient states that since the pandemic has started she did have the corona infection in March and was quarantine for 14 days.  The patient states that she was less active at that time and has been less active since then.    She does have an occasional mild headache.  She denies any nausea or vomiting.  She is not aware of any weakness of her arms or legs.  She denies any numbness or tingling in her extremities.  She is not aware of any change in her speech or swallowing.  She denies any recent head trauma or remote " head trauma.          ROS:  GENERAL: NO FEVER, CHILLS, FATIGABILITY OR WEIGHT LOSS.  SKIN: NO RASHES, ITCHING OR CHANGES IN COLOR OR TEXTURE OF SKIN.  HEAD: NO  RECENT HEAD TRAUMA.  EYES: VISUAL ACUITY FINE. NO PHOTOPHOBIA, OCULAR PAIN OR DIPLOPIA.  EARS: DENIES EAR PAIN, DISCHARGE OR VERTIGO.  NOSE: NO LOSS OF SMELL, NO EPISTAXIS OR POSTNASAL DRIP.  MOUTH & THROAT: NO HOARSENESS OR CHANGE IN VOICE. NO EXCESSIVE GUM BLEEDING.  NODES: DENIES SWOLLEN GLANDS.  CHEST: DENIES GUIDO, CYANOSIS, WHEEZING, COUGH AND SPUTUM PRODUCTION.  CARDIOVASCULAR: DENIES CHEST PAIN, PND, ORTHOPNEA OR REDUCED EXERCISE TOLERANCE.  ABDOMEN: APPETITE FINE. NO WEIGHT LOSS. DENIES DIARRHEA, ABDOMINAL PAIN, HEMATEMESIS OR BLOOD IN STOOL.  URINARY: NO FLANK PAIN, DYSURIA OR HEMATURIA.  PERIPHERAL VASCULAR: NO CLAUDICATION OR CYANOSIS.  MUSCULOSKELETAL: NO JOINT STIFFNESS OR SWELLING. DENIES BACK PAIN.  NEUROLOGIC: NO HISTORY OF SEIZURES, PARALYSIS, ALTERATION OF GAIT OR COORDINATION.    Past Medical History:   Diagnosis Date    Cataract     Hyperlipidemia     Hypertension     Lumbar spondylosis     Vitamin D deficiency      Past Surgical History:   Procedure Laterality Date    PARTIAL HYSTERECTOMY       Family History   Problem Relation Age of Onset    Hypertension Mother     Diabetes Daughter     Melanoma Neg Hx     Psoriasis Neg Hx     Lupus Neg Hx     Eczema Neg Hx      Social History     Socioeconomic History    Marital status:      Spouse name: Not on file    Number of children: 3    Years of education: Not on file    Highest education level: Not on file   Occupational History    Occupation: Cleaning houses     Employer: not employed   Social Needs    Financial resource strain: Not on file    Food insecurity     Worry: Not on file     Inability: Not on file    Transportation needs     Medical: Not on file     Non-medical: Not on file   Tobacco Use    Smoking status: Former Smoker     Quit date: 1/1/2004     Years  since quittin.5    Smokeless tobacco: Former User   Substance and Sexual Activity    Alcohol use: Yes     Frequency: 2-4 times a month     Drinks per session: 1 or 2     Binge frequency: Never     Comment: occasionallty    Drug use: No    Sexual activity: Not Currently     Partners: Male   Lifestyle    Physical activity     Days per week: Not on file     Minutes per session: Not on file    Stress: Not on file   Relationships    Social connections     Talks on phone: Not on file     Gets together: Not on file     Attends Pentecostalism service: Not on file     Active member of club or organization: Not on file     Attends meetings of clubs or organizations: Not on file     Relationship status: Not on file   Other Topics Concern    Are you pregnant or think you may be? No    Breast-feeding No   Social History Narrative    Not on file         Objective:   PE:   VITAL SIGNS:   Height 5 ft 2 in, weight 96.2 kg, BMI 38.79  Vitals:    20 1354   BP: 130/80   Pulse: 80     APPEARANCE: WELL NOURISHED, WELL DEVELOPED, IN NO ACUTE DISTRESS.    HEAD: NORMOCEPHALIC, ATRAUMATIC.  EYES: PERRL. EOMI.  NON-ICTERIC SCLERAE.    NOSE: MUCOSA PINK. AIRWAY CLEAR.  MOUTH & THROAT: NO TONSILLAR ENLARGEMENT. NO PHARYNGEAL ERYTHEMA OR EXUDATE. NO STRIDOR.  NECK: SUPPLE. NO BRUITS.  CHEST: LUNGS CLEAR TO AUSCULTATION.  CARDIOVASCULAR: REGULAR RHYTHM WITHOUT SIGNIFICANT MURMURS.  ABDOMEN: BOWEL SOUNDS NORMAL. NOT DISTENDED.  MUSCULOSKELETAL:  NO BONY DEFORMITY SEEN.   MUSCLE MASS AND MUSCLE TONE ARE NORMAL IN BOTH UPPER AND BOTH LOWER EXTREMITIES.    NEUROLOGIC:   MENTAL STATUS:  THE PATIENT IS WELL ORIENTED TO PERSON, TIME, PLACE, AND SITUATION.   THE PATIENT SCORED 22/30 ON THE IMELDA COGNITIVE ASSESSMENT.  SHE LOST 2 POINTS WITH NAMING 1 POINT IN ATTENTION, 1 POINT IN LANGUAGE, AND 3 POINTS IN DELAYED RECALL.  SHE WAS ABLE TO RECALL 2/5 WORDS ON DELAYED RECALL. THE PATIENT IS ATTENTIVE TO THE ENVIRONMENT AND COOPERATIVE FOR  THE EXAM.  CRANIAL NERVES: II-XII GROSSLY INTACT. FUNDOSCOPIC EXAM IS NORMAL.  NO HEMORRHAGE, EXUDATE OR PAPILLEDEMA IS PRESENT. THE EXTRAOCULAR MUSCLES ARE INTACT IN THE CARDINAL DIRECTIONS OF GAZE.  NO PTOSIS IS PRESENT. FACIAL FEATURES ARE SYMMETRICAL.  SPEECH IS NORMAL IN FLUENCY, DICTION, AND PHRASING.  TONGUE PROTRUDES IN THE MIDLINE.    GAIT AND STATION:  ROMBERG IS NEGATIVE.  GOOD ALTERNATE ARMSWING WITH NORMAL GAIT.  MOTOR:  NO DOWNDRIFT OF EITHER ARM WHEN HELD AT SHOULDER LEVEL.  MANUAL MUSCLE TESTING OF PROXIMAL AND DISTAL MUSCLES OF BOTH UPPER AND LOWER EXTREMITIES IS NORMAL. MUSCLE MASS IS NORMAL.  MUSCLE TONE IS NORMAL.  SENSORY:  INTACT BOTH UPPER AND LOWER EXTREMITIES TO PIN PRICK, TOUCH, AND VIBRATION.  CEREBELLAR:  FINGER TO NOSE DONE WELL.  ALTERNATING MOVEMENTS INTACT.  NO INVOLUNTARY MOVEMENTS OR TREMOR SEEN.  REFLEXES:  STRETCH REFLEXES ARE 2+ BOTH UPPER AND LOWER EXTREMITIES.  PLANTAR STIMULATION IS FLEXOR BILATERALLY AND NO PATHOLOGICAL REFLEXES ARE SEEN              Assessment:     Encounter Diagnoses   Name Primary?    Chronic intractable headache, unspecified headache type     Mild cognitive impairment with memory loss Yes    Essential hypertension     Vitamin D deficiency        Discussion:  The patient does have some indication of mild cognitive impairment with memory loss.  However she was able to perform fairly well on the Deon cognitive Assessment.  She has had recent thyroid functions done which were normal and a prior B12 level was also normal.  However because of this change in her memory and cognitive functioning, I would recommend imaging of the brain.  Medications are not available that would improve recent memory.  It should be emphasized that this patient does not show any signs of dementia.    Plan:     1.  Schedule MRI brain without contrast  2.  The patient is encouraged to begin a very active exercise program as active exercise is the 1 recommendation that can be  accomplished and can improve cognitive functioning.  3.  Return to Neurology as needed but continue follow-up with primary care for her other medical issues.      This was a 50 min visit with the patient with over 50% of the time spent counseling the patient regarding the findings on today's examination and discussion of cognitive impairment and its relationship to dementia.  This note is generated with speech recognition software and is subject to transcription error and sound alike phrases that may be missed by proofreading.

## 2020-07-06 NOTE — LETTER
July 6, 2020      Edna Oliva MD  [address]  [place] [address]           [facility]  [address] [place] [id number]  Phone: [phone]  Fax: [phone]          Patient: Beth Souza   MR Number: [id number]   YOB: 1950   Date of Visit: 7/6/2020       Dear Dr. Edna Oliva:    Thank you for referring Beth Souza to me for evaluation. Attached you will find relevant portions of my assessment and plan of care.    If you have questions, please do not hesitate to call me. I look forward to following Beth Souza along with you.    Sincerely,    Jeff Renee MD    Enclosure  CC:  No Recipients    If you would like to receive this communication electronically, please contact [email or web address] or [phone] to request more information on Quick[employer] Link access.    For providers and/or their staff who would like to refer a patient to [facility], please contact us through our one-stop-shop provider referral line, [facility], at [phone].    If you feel you have received this communication in error or would no longer like to receive these types of communications, please e-mail [email or web address]

## 2020-07-17 ENCOUNTER — HOSPITAL ENCOUNTER (OUTPATIENT)
Dept: RADIOLOGY | Facility: HOSPITAL | Age: 70
Discharge: HOME | End: 2020-07-17
Attending: PSYCHIATRY & NEUROLOGY
Payer: MEDICARE

## 2020-07-17 DIAGNOSIS — G31.84 MILD COGNITIVE IMPAIRMENT WITH MEMORY LOSS: ICD-10-CM

## 2020-07-17 PROCEDURE — 70551 MRI BRAIN STEM W/O DYE: CPT | Mod: TC

## 2020-07-20 ENCOUNTER — LAB VISIT (OUTPATIENT)
Dept: LAB | Facility: HOSPITAL | Age: 70
End: 2020-07-20
Attending: PSYCHIATRY & NEUROLOGY
Payer: MEDICARE

## 2020-07-20 ENCOUNTER — OFFICE VISIT (OUTPATIENT)
Dept: NEUROLOGY | Facility: CLINIC | Age: 70
End: 2020-07-20
Payer: MEDICARE

## 2020-07-20 ENCOUNTER — PATIENT OUTREACH (OUTPATIENT)
Dept: ADMINISTRATIVE | Facility: OTHER | Age: 70
End: 2020-07-20

## 2020-07-20 VITALS
BODY MASS INDEX: 39.02 KG/M2 | SYSTOLIC BLOOD PRESSURE: 110 MMHG | HEIGHT: 62 IN | HEART RATE: 60 BPM | WEIGHT: 212.06 LBS | DIASTOLIC BLOOD PRESSURE: 80 MMHG

## 2020-07-20 DIAGNOSIS — D32.0 MENINGIOMA OF CEREBELLUM: ICD-10-CM

## 2020-07-20 DIAGNOSIS — R42 DIZZINESS AND GIDDINESS: ICD-10-CM

## 2020-07-20 PROCEDURE — 1101F PT FALLS ASSESS-DOCD LE1/YR: CPT | Mod: CPTII,S$GLB,, | Performed by: PSYCHIATRY & NEUROLOGY

## 2020-07-20 PROCEDURE — 3008F PR BODY MASS INDEX (BMI) DOCUMENTED: ICD-10-PCS | Mod: CPTII,S$GLB,, | Performed by: PSYCHIATRY & NEUROLOGY

## 2020-07-20 PROCEDURE — 1101F PR PT FALLS ASSESS DOC 0-1 FALLS W/OUT INJ PAST YR: ICD-10-PCS | Mod: CPTII,S$GLB,, | Performed by: PSYCHIATRY & NEUROLOGY

## 2020-07-20 PROCEDURE — 36415 COLL VENOUS BLD VENIPUNCTURE: CPT

## 2020-07-20 PROCEDURE — 3074F SYST BP LT 130 MM HG: CPT | Mod: CPTII,S$GLB,, | Performed by: PSYCHIATRY & NEUROLOGY

## 2020-07-20 PROCEDURE — 1159F PR MEDICATION LIST DOCUMENTED IN MEDICAL RECORD: ICD-10-PCS | Mod: S$GLB,,, | Performed by: PSYCHIATRY & NEUROLOGY

## 2020-07-20 PROCEDURE — 99213 PR OFFICE/OUTPT VISIT, EST, LEVL III, 20-29 MIN: ICD-10-PCS | Mod: S$GLB,,, | Performed by: PSYCHIATRY & NEUROLOGY

## 2020-07-20 PROCEDURE — 99999 PR PBB SHADOW E&M-EST. PATIENT-LVL III: ICD-10-PCS | Mod: PBBFAC,,, | Performed by: PSYCHIATRY & NEUROLOGY

## 2020-07-20 PROCEDURE — 82565 ASSAY OF CREATININE: CPT

## 2020-07-20 PROCEDURE — 99999 PR PBB SHADOW E&M-EST. PATIENT-LVL III: CPT | Mod: PBBFAC,,, | Performed by: PSYCHIATRY & NEUROLOGY

## 2020-07-20 PROCEDURE — 99213 OFFICE O/P EST LOW 20 MIN: CPT | Mod: S$GLB,,, | Performed by: PSYCHIATRY & NEUROLOGY

## 2020-07-20 PROCEDURE — 3079F PR MOST RECENT DIASTOLIC BLOOD PRESSURE 80-89 MM HG: ICD-10-PCS | Mod: CPTII,S$GLB,, | Performed by: PSYCHIATRY & NEUROLOGY

## 2020-07-20 PROCEDURE — 1126F PR PAIN SEVERITY QUANTIFIED, NO PAIN PRESENT: ICD-10-PCS | Mod: S$GLB,,, | Performed by: PSYCHIATRY & NEUROLOGY

## 2020-07-20 PROCEDURE — 3008F BODY MASS INDEX DOCD: CPT | Mod: CPTII,S$GLB,, | Performed by: PSYCHIATRY & NEUROLOGY

## 2020-07-20 PROCEDURE — 3074F PR MOST RECENT SYSTOLIC BLOOD PRESSURE < 130 MM HG: ICD-10-PCS | Mod: CPTII,S$GLB,, | Performed by: PSYCHIATRY & NEUROLOGY

## 2020-07-20 PROCEDURE — 3079F DIAST BP 80-89 MM HG: CPT | Mod: CPTII,S$GLB,, | Performed by: PSYCHIATRY & NEUROLOGY

## 2020-07-20 PROCEDURE — 1159F MED LIST DOCD IN RCRD: CPT | Mod: S$GLB,,, | Performed by: PSYCHIATRY & NEUROLOGY

## 2020-07-20 PROCEDURE — 1126F AMNT PAIN NOTED NONE PRSNT: CPT | Mod: S$GLB,,, | Performed by: PSYCHIATRY & NEUROLOGY

## 2020-07-20 NOTE — PROGRESS NOTES
Requested updates within Care Everywhere.  Patient's chart was reviewed for overdue SHARLA topics.  Immunizations reconciled.

## 2020-07-20 NOTE — PROGRESS NOTES
The patient was asked to return to the office today for discussion regarding her MRI.  The MRI had been done after her 1st visit because of a complaint of mild memory impairment and mild cognitive impairment.  The MRI of the brain without contrast demonstrated the presence of a extra-axial mass in the posterior fossa on the left felt to be most consistent with a meningioma.  However because of the lack of contrast, that diagnosis cannot be made with any degree of certainty and radiology has requested a follow-up MRI with contrast.    The purpose of the visit today was to discuss the findings on the MRI and to demonstrate the MRI to the patient while then scheduling MRI with contrast.  The patient was shown the images and all questions were answered to her satisfaction.    The patient appears to be asymptomatic regarding this mass lesion, but does complain of tinnitus.  She is not aware of any hearing loss.  She is not aware of any awkwardness of movement of the left arm or left leg.  She denies any ataxia.  She denies any headache, nausea, or vomiting.      This was a 25 min visit with the patient with 100% of the time spent counseling the patient regarding the findings on MRI and demonstration of the MRI to the patient.  MRI with contrast will be scheduled for the patient with further recommendations including possible referral to Neurosurgery.    This note is generated with speech recognition software and is subject to transcription error and sound alike phrases that may be missed by proofreading.

## 2020-07-21 LAB
CREAT SERPL-MCNC: 0.9 MG/DL (ref 0.5–1.4)
EST. GFR  (AFRICAN AMERICAN): >60 ML/MIN/1.73 M^2
EST. GFR  (NON AFRICAN AMERICAN): >60 ML/MIN/1.73 M^2

## 2020-07-31 ENCOUNTER — PATIENT MESSAGE (OUTPATIENT)
Dept: NEUROLOGY | Facility: CLINIC | Age: 70
End: 2020-07-31

## 2020-07-31 ENCOUNTER — TELEPHONE (OUTPATIENT)
Dept: ORTHOPEDICS | Facility: CLINIC | Age: 70
End: 2020-07-31

## 2020-07-31 ENCOUNTER — HOSPITAL ENCOUNTER (OUTPATIENT)
Dept: RADIOLOGY | Facility: HOSPITAL | Age: 70
Discharge: HOME | End: 2020-07-31
Attending: PSYCHIATRY & NEUROLOGY
Payer: MEDICARE

## 2020-07-31 DIAGNOSIS — D32.9 MENINGIOMA: Primary | ICD-10-CM

## 2020-07-31 DIAGNOSIS — R42 DIZZINESS AND GIDDINESS: ICD-10-CM

## 2020-07-31 PROCEDURE — 25500020 PHARM REV CODE 255: Performed by: PSYCHIATRY & NEUROLOGY

## 2020-07-31 PROCEDURE — 70553 MRI BRAIN STEM W/O & W/DYE: CPT | Mod: TC

## 2020-07-31 PROCEDURE — A9585 GADOBUTROL INJECTION: HCPCS | Performed by: PSYCHIATRY & NEUROLOGY

## 2020-07-31 RX ORDER — GADOBUTROL 604.72 MG/ML
10 INJECTION INTRAVENOUS
Status: COMPLETED | OUTPATIENT
Start: 2020-07-31 | End: 2020-07-31

## 2020-07-31 RX ADMIN — GADOBUTROL 9 ML: 604.72 INJECTION INTRAVENOUS at 07:07

## 2020-08-03 ENCOUNTER — OFFICE VISIT (OUTPATIENT)
Dept: NEUROSURGERY | Facility: CLINIC | Age: 70
End: 2020-08-03
Payer: MEDICARE

## 2020-08-03 VITALS
HEIGHT: 62 IN | HEART RATE: 58 BPM | BODY MASS INDEX: 38.94 KG/M2 | SYSTOLIC BLOOD PRESSURE: 135 MMHG | RESPIRATION RATE: 18 BRPM | DIASTOLIC BLOOD PRESSURE: 84 MMHG | WEIGHT: 211.63 LBS

## 2020-08-03 DIAGNOSIS — D32.9 MENINGIOMA: Primary | ICD-10-CM

## 2020-08-03 DIAGNOSIS — D49.7 NEOPLASM OF CENTRAL NERVOUS SYSTEM: ICD-10-CM

## 2020-08-03 PROCEDURE — 99213 PR OFFICE/OUTPT VISIT, EST, LEVL III, 20-29 MIN: ICD-10-PCS | Mod: S$GLB,,, | Performed by: NEUROLOGICAL SURGERY

## 2020-08-03 PROCEDURE — 3075F SYST BP GE 130 - 139MM HG: CPT | Mod: CPTII,S$GLB,, | Performed by: NEUROLOGICAL SURGERY

## 2020-08-03 PROCEDURE — 1126F PR PAIN SEVERITY QUANTIFIED, NO PAIN PRESENT: ICD-10-PCS | Mod: S$GLB,,, | Performed by: NEUROLOGICAL SURGERY

## 2020-08-03 PROCEDURE — 99999 PR PBB SHADOW E&M-EST. PATIENT-LVL IV: CPT | Mod: PBBFAC,,, | Performed by: NEUROLOGICAL SURGERY

## 2020-08-03 PROCEDURE — 3075F PR MOST RECENT SYSTOLIC BLOOD PRESS GE 130-139MM HG: ICD-10-PCS | Mod: CPTII,S$GLB,, | Performed by: NEUROLOGICAL SURGERY

## 2020-08-03 PROCEDURE — 1159F PR MEDICATION LIST DOCUMENTED IN MEDICAL RECORD: ICD-10-PCS | Mod: S$GLB,,, | Performed by: NEUROLOGICAL SURGERY

## 2020-08-03 PROCEDURE — 3079F DIAST BP 80-89 MM HG: CPT | Mod: CPTII,S$GLB,, | Performed by: NEUROLOGICAL SURGERY

## 2020-08-03 PROCEDURE — 3079F PR MOST RECENT DIASTOLIC BLOOD PRESSURE 80-89 MM HG: ICD-10-PCS | Mod: CPTII,S$GLB,, | Performed by: NEUROLOGICAL SURGERY

## 2020-08-03 PROCEDURE — 99213 OFFICE O/P EST LOW 20 MIN: CPT | Mod: S$GLB,,, | Performed by: NEUROLOGICAL SURGERY

## 2020-08-03 PROCEDURE — 99499 UNLISTED E&M SERVICE: CPT | Mod: S$GLB,,, | Performed by: NEUROLOGICAL SURGERY

## 2020-08-03 PROCEDURE — 3008F BODY MASS INDEX DOCD: CPT | Mod: CPTII,S$GLB,, | Performed by: NEUROLOGICAL SURGERY

## 2020-08-03 PROCEDURE — 1101F PT FALLS ASSESS-DOCD LE1/YR: CPT | Mod: CPTII,S$GLB,, | Performed by: NEUROLOGICAL SURGERY

## 2020-08-03 PROCEDURE — 3008F PR BODY MASS INDEX (BMI) DOCUMENTED: ICD-10-PCS | Mod: CPTII,S$GLB,, | Performed by: NEUROLOGICAL SURGERY

## 2020-08-03 PROCEDURE — 99999 PR PBB SHADOW E&M-EST. PATIENT-LVL IV: ICD-10-PCS | Mod: PBBFAC,,, | Performed by: NEUROLOGICAL SURGERY

## 2020-08-03 PROCEDURE — 1126F AMNT PAIN NOTED NONE PRSNT: CPT | Mod: S$GLB,,, | Performed by: NEUROLOGICAL SURGERY

## 2020-08-03 PROCEDURE — 1159F MED LIST DOCD IN RCRD: CPT | Mod: S$GLB,,, | Performed by: NEUROLOGICAL SURGERY

## 2020-08-03 PROCEDURE — 1101F PR PT FALLS ASSESS DOC 0-1 FALLS W/OUT INJ PAST YR: ICD-10-PCS | Mod: CPTII,S$GLB,, | Performed by: NEUROLOGICAL SURGERY

## 2020-08-03 PROCEDURE — 99499 RISK ADDL DX/OHS AUDIT: ICD-10-PCS | Mod: S$GLB,,, | Performed by: NEUROLOGICAL SURGERY

## 2020-08-03 NOTE — PROGRESS NOTES
Subjective:      Patient ID: Beth Souza is a 70 y.o. female.    Chief Complaint: Follow-up (Meningoma & discuss MRI )    This pleasant 70-year-old lady apparently underwent MRI imaging recently during workup for subjective memory issues.  A left cerebellar convexity meningioma was discovered.  She is seen today for definitive evaluation and management.  She described her memory problems is being fairly mild, forgetting where her keys were or having trouble remembering a certain friends name etc.  She otherwise claims to be quite functional on a day-to-day basis for the most part.  She denies regular headaches, nausea and vomiting, hearing loss on the left, numbness on the left, balance or gait disturbance.  She does admit to some episodic back pain, however feels that this is mild.  She has some episodic lower extremity discomfort that she describes as mild and not interfering with her quality of life.    Past Medical History:   Diagnosis Date    Cataract     Hyperlipidemia     Hypertension     Lumbar spondylosis     Vitamin D deficiency      Past Surgical History:   Procedure Laterality Date    PARTIAL HYSTERECTOMY       Family History   Problem Relation Age of Onset    Hypertension Mother     Diabetes Daughter     Melanoma Neg Hx     Psoriasis Neg Hx     Lupus Neg Hx     Eczema Neg Hx      Social History     Tobacco Use    Smoking status: Former Smoker     Quit date: 2004     Years since quittin.6    Smokeless tobacco: Former User   Substance Use Topics    Alcohol use: Yes     Frequency: 2-4 times a month     Drinks per session: 1 or 2     Binge frequency: Never     Comment: occasionallty    Drug use: No     Current Outpatient Medications on File Prior to Visit   Medication Sig Dispense Refill    atorvastatin (LIPITOR) 20 MG tablet Take 1 tablet (20 mg total) by mouth once daily. 90 tablet 3    cetirizine (ZYRTEC) 10 MG tablet Take 1 tablet (10 mg total) by mouth every evening.  30 tablet 0    ergocalciferol (ERGOCALCIFEROL) 50,000 unit Cap Take 1 capsule (50,000 Units total) by mouth every 7 days. 10 capsule 0    lisinopriL-hydrochlorothiazide (PRINZIDE,ZESTORETIC) 20-25 mg Tab Take 1 tablet by mouth once daily. 90 tablet 3    multivitamin capsule Take 1 capsule by mouth once daily.       No current facility-administered medications on file prior to visit.      Review of Systems   Constitutional: Negative for diaphoresis, fatigue and unexpected weight change.   HENT: Negative for hearing loss, rhinorrhea, trouble swallowing and voice change.    Eyes: Negative for photophobia and visual disturbance.   Respiratory: Negative for chest tightness and shortness of breath.    Cardiovascular: Negative for chest pain.   Gastrointestinal: Negative for constipation, nausea and vomiting.   Endocrine: Negative for cold intolerance, heat intolerance, polydipsia, polyphagia and polyuria.   Genitourinary: Negative for difficulty urinating.   Musculoskeletal: Positive for back pain. Negative for neck pain and neck stiffness.        The patient describes mild episodic back pain.   Skin: Negative.    Allergic/Immunologic: Negative.    Neurological: Negative for dizziness, tremors, seizures, syncope, facial asymmetry, speech difficulty, weakness, numbness and headaches.        The patient describes fairly mild memory issues that are occasional   Hematological: Negative for adenopathy. Does not bruise/bleed easily.   Psychiatric/Behavioral: Negative for behavioral problems, confusion and decreased concentration.         Objective:     Vitals:    08/03/20 0912   BP: 135/84   Pulse: (!) 58   Resp: 18      Review of patient's allergies indicates:   Allergen Reactions    Aspirin Other (See Comments)     Bruising      Body mass index is 38.71 kg/m².     Physical Exam:  Nursing note and vitals reviewed.    Constitutional: She appears well-developed and well-nourished.     Eyes: Pupils are equal, round, and  reactive to light. Conjunctivae and EOM are normal.     Cardiovascular: Normal rate, regular rhythm and normal pulses.     Abdominal: Soft. Bowel sounds are normal.     Skin: Skin displays no rash on trunk and no rash on extremities.     Psych/Behavior: She is alert. She is oriented to person, place, and time. She has a normal mood and affect.     Musculoskeletal: Gait is normal.        Neck: Range of motion is full. Muscle strength is 5/5. Tone is normal.        Back: Range of motion is full. Muscle strength is 5/5. Tone is normal.        Right Upper Extremities: Range of motion is full. Muscle strength is 5/5. Tone is normal.        Left Upper Extremities: Range of motion is full. Muscle strength is 5/5. Tone is normal.       Right Lower Extremities: Range of motion is full. Muscle strength is 5/5. Tone is normal.        Left Lower Extremities: Range of motion is full. Muscle strength is 5/5. Tone is normal.     Neurological:        Coordination: She has a normal Romberg Test, normal finger to nose coordination and normal tandem walking coordination.        Sensory: There is no sensory deficit in the trunk. There is no sensory deficit in the extremities.        DTRs: DTRs are DTRS NORMAL AND SYMMETRICnormal and symmetric. Tricep reflexes are 1+ on the right side and 1+ on the left side. Bicep reflexes are 1+ on the right side and 1+ on the left side. Brachioradialis reflexes are 1+ on the right side and 1+ on the left side. Patellar reflexes are 1+ on the right side and 1+ on the left side. Achilles reflexes are 1+ on the right side and 1+ on the left side. She displays no Babinski's sign on the right side. She displays no Babinski's sign on the left side.        Cranial nerves: Cranial nerve(s) II, III, IV, V, VI, VII, VIII, IX, X, XI and XII are intact.     Neurologic Exam     Mental Status   Oriented to person, place, and time.   Speech: speech is normal   Level of consciousness: alert  Knowledge: good and  consistent with education.     Cranial Nerves     CN III, IV, VI   Pupils are equal, round, and reactive to light.  Extraocular motions are normal.     CN VIII   CN VIII normal.     CN IX, X   CN IX normal.   CN X normal.     CN XI   CN XI normal.     CN XII   CN XII normal.     Motor Exam   Muscle bulk: normal  Overall muscle tone: normal  Right arm tone: normal  Left arm tone: normal  Right arm pronator drift: absent  Left arm pronator drift: absent  Right leg tone: normal  Left leg tone: normal    Strength   Strength 5/5 throughout.     Sensory Exam   Light touch normal.     Gait, Coordination, and Reflexes     Gait  Gait: normal    Coordination   Romberg: negative  Finger to nose coordination: normal    Tremor   Resting tremor: absent  Intention tremor: absent  Action tremor: absent    Reflexes   Right brachioradialis: 1+  Left brachioradialis: 1+  Right biceps: 1+  Left biceps: 1+  Right triceps: 1+  Left triceps: 1+  Right patellar: 1+  Left patellar: 1+  Right achilles: 1+  Left achilles: 1+  Right : 1+  Left : 1+  Right plantar: normal  Left plantar: normal  Right Garcia: absent  Left Garcia: absent    I  reviewed all pertinent imaging regarding this case.  If an MRI of the brain with without contrast is available for review obtained within the last 3 weeks.  This study demonstrates a 22 mm left cerebellar convexity meningioma.  There is no significant mass effect.  There is no vasogenic edema.  The midline structures are not effaced.  There is some bony hyperostosis associated with the attachment of the tumor along the cerebellar convexity.  The sigmoid and transverse sinus are not affected.    I also reviewed an MRI of the lumbar spine obtained a couple of years ago.  The patient has a known spondylolisthesis at L4-5 with associated canal and neural foraminal stenosis.  Assessment:     1. Meningioma    2. Neoplasm of central nervous system     3.  Spondylolisthesis L4-5, minimally  symptomatic  Plan:   As the patient has no referable symptoms to the meningioma, I think conservative observation is reasonable.  I plan to see her back in 6 months time with a follow-up MRI of the brain with without contrast.  Should she did demonstrate new symptoms reparable to the tumor or tumor progression by MRI, she may be a reasonable candidate for Gamma Knife radiosurgery versus microsurgical resection.  With regard to the spondylolisthesis, she has little or no symptomatology at this point.  We will review this particular problem when she returns to see how she is doing.  Meningioma  -     Ambulatory referral/consult to Neurosurgery  -     MRI Brain Synaptive Stealth W/WO CONTRAST; Future; Expected date: 02/03/2021    Neoplasm of central nervous system  -     MRI Brain Synaptive Stealth W/WO CONTRAST; Future; Expected date: 02/03/2021        Thank you for the referral   Please call with any questions    Ron Barr MD  Neurosurgery

## 2020-08-03 NOTE — LETTER
August 3, 2020      Jeff Renee MD  [address]  [place] [address]           [facility]  [address]  [place] [address]  Phone: [phone]  Fax: [phone]          Patient: Beth Souza   MR Number: [id number]   YOB: 1950   Date of Visit: 8/3/2020       Dear Dr. Jeff Renee:    Thank you for referring Beth Souza to me for evaluation. Attached you will find relevant portions of my assessment and plan of care.    If you have questions, please do not hesitate to call me. I look forward to following Beth Souza along with you.    Sincerely,    Ron Barr MD    Enclosure  CC:  No Recipients    If you would like to receive this communication electronically, please contact [email or web address] or [phone] to request more information on [facility] access.    For providers and/or their staff who would like to refer a patient to [facility], please contact us through our one-stop-shop provider referral line, [facility], at [phone].    If you feel you have received this communication in error or would no longer like to receive these types of communications, please e-mail [email or web address]

## 2020-08-04 ENCOUNTER — TELEPHONE (OUTPATIENT)
Dept: NEUROSURGERY | Facility: CLINIC | Age: 70
End: 2020-08-04

## 2020-08-04 NOTE — TELEPHONE ENCOUNTER
2/5/2021 10:00 AM [facility] [place] [facility] [place]         2/9/2021 10:30 AM Ron Barr MD [facility]       Spoke w/ patient she verbalized understanding F/U and MRI Brain Synaptive Stealth W/WO CONTRAST (6 months).            ----- Message from Lawson Gonzalez MA sent at 8/3/2020 10:06 AM CDT -----  Regarding: Appointment  Patient needs an F/U and MRI Brain Synaptive Stealth W/WO CONTRAST (6 months)

## 2020-09-01 ENCOUNTER — PATIENT OUTREACH (OUTPATIENT)
Dept: ADMINISTRATIVE | Facility: OTHER | Age: 70
End: 2020-09-01

## 2020-09-01 NOTE — PROGRESS NOTES
Health Maintenance Due   Topic Date Due    Shingles Vaccine (1 of 2) 06/05/2001    Mammogram  05/15/2020    Influenza Vaccine (1) 09/01/2020     Updates were requested from care everywhere.  Chart was reviewed for overdue Proactive [facility] Encounters (SHARLA) topics (CRS, Breast Cancer Screening, Eye exam)  Health Maintenance has been updated.  [place] immunization registry triggered.  Immunizations were not reconciled due to [employer] not responding.

## 2020-09-02 ENCOUNTER — PATIENT OUTREACH (OUTPATIENT)
Dept: ADMINISTRATIVE | Facility: OTHER | Age: 70
End: 2020-09-02

## 2020-09-02 ENCOUNTER — OFFICE VISIT (OUTPATIENT)
Dept: PODIATRY | Facility: CLINIC | Age: 70
End: 2020-09-02
Payer: MEDICARE

## 2020-09-02 VITALS — SYSTOLIC BLOOD PRESSURE: 116 MMHG | HEART RATE: 65 BPM | DIASTOLIC BLOOD PRESSURE: 81 MMHG

## 2020-09-02 DIAGNOSIS — M21.42 PES PLANUS OF BOTH FEET: Primary | ICD-10-CM

## 2020-09-02 DIAGNOSIS — M21.41 PES PLANUS OF BOTH FEET: Primary | ICD-10-CM

## 2020-09-02 DIAGNOSIS — M76.821 POSTERIOR TIBIAL TENDON DYSFUNCTION, RIGHT: ICD-10-CM

## 2020-09-02 DIAGNOSIS — E66.01 MORBID OBESITY: ICD-10-CM

## 2020-09-02 PROCEDURE — 1159F MED LIST DOCD IN RCRD: CPT | Mod: S$GLB,,, | Performed by: PODIATRIST

## 2020-09-02 PROCEDURE — 1126F PR PAIN SEVERITY QUANTIFIED, NO PAIN PRESENT: ICD-10-PCS | Mod: S$GLB,,, | Performed by: PODIATRIST

## 2020-09-02 PROCEDURE — 1101F PR PT FALLS ASSESS DOC 0-1 FALLS W/OUT INJ PAST YR: ICD-10-PCS | Mod: CPTII,S$GLB,, | Performed by: PODIATRIST

## 2020-09-02 PROCEDURE — 1159F PR MEDICATION LIST DOCUMENTED IN MEDICAL RECORD: ICD-10-PCS | Mod: S$GLB,,, | Performed by: PODIATRIST

## 2020-09-02 PROCEDURE — 3079F DIAST BP 80-89 MM HG: CPT | Mod: CPTII,S$GLB,, | Performed by: PODIATRIST

## 2020-09-02 PROCEDURE — 3074F SYST BP LT 130 MM HG: CPT | Mod: CPTII,S$GLB,, | Performed by: PODIATRIST

## 2020-09-02 PROCEDURE — 99999 PR PBB SHADOW E&M-EST. PATIENT-LVL III: CPT | Mod: PBBFAC,,, | Performed by: PODIATRIST

## 2020-09-02 PROCEDURE — 99213 OFFICE O/P EST LOW 20 MIN: CPT | Mod: S$GLB,,, | Performed by: PODIATRIST

## 2020-09-02 PROCEDURE — 99999 PR PBB SHADOW E&M-EST. PATIENT-LVL III: ICD-10-PCS | Mod: PBBFAC,,, | Performed by: PODIATRIST

## 2020-09-02 PROCEDURE — 99213 PR OFFICE/OUTPT VISIT, EST, LEVL III, 20-29 MIN: ICD-10-PCS | Mod: S$GLB,,, | Performed by: PODIATRIST

## 2020-09-02 PROCEDURE — 3079F PR MOST RECENT DIASTOLIC BLOOD PRESSURE 80-89 MM HG: ICD-10-PCS | Mod: CPTII,S$GLB,, | Performed by: PODIATRIST

## 2020-09-02 PROCEDURE — 1101F PT FALLS ASSESS-DOCD LE1/YR: CPT | Mod: CPTII,S$GLB,, | Performed by: PODIATRIST

## 2020-09-02 PROCEDURE — 3074F PR MOST RECENT SYSTOLIC BLOOD PRESSURE < 130 MM HG: ICD-10-PCS | Mod: CPTII,S$GLB,, | Performed by: PODIATRIST

## 2020-09-02 PROCEDURE — 1126F AMNT PAIN NOTED NONE PRSNT: CPT | Mod: S$GLB,,, | Performed by: PODIATRIST

## 2020-09-02 NOTE — PROGRESS NOTES
Subjective:       Patient ID: Beth Souza is a 70 y.o. female.    Chief Complaint: Follow-up (Patient states she is here for a follow up of foot swelling. Denies swelling or pain at present. Patient is not a diabetic, wearing socks and tennis shoes.)      HPI: Beth Souza presents to the office to follow-up of bilateral lower leg swelling.  Patient reports that she is no longer having pain to the medial aspect of the right foot and ankle.  States that she occasionally gets some discomfort, but currently in 0/10 pain at present.  Reports swelling has resolved.  Has been ambulating in a motion control shoes with appropriate semi rigid orthotics.  Also complains of callus the plantar aspect of the right foot and the lateral aspect of the left 4th digit.  No signs of infection.  Reports that overall she is doing quite well.  Patient's Primary Care Provider is Edna Oliva MD.     Review of patient's allergies indicates:   Allergen Reactions    Aspirin Other (See Comments)     Bruising       Past Medical History:   Diagnosis Date    Cataract     Hyperlipidemia     Hypertension     Lumbar spondylosis     Vitamin D deficiency        Family History   Problem Relation Age of Onset    Hypertension Mother     Diabetes Daughter     Melanoma Neg Hx     Psoriasis Neg Hx     Lupus Neg Hx     Eczema Neg Hx        Social History     Socioeconomic History    Marital status:      Spouse name: Not on file    Number of children: 3    Years of education: Not on file    Highest education level: Not on file   Occupational History    Occupation: Cleaning houses     Employer: not employed   Social Needs    Financial resource strain: Not on file    Food insecurity     Worry: Not on file     Inability: Not on file    Transportation needs     Medical: Not on file     Non-medical: Not on file   Tobacco Use    Smoking status: Former Smoker     Quit date: 2004     Years since quittin.6     Smokeless tobacco: Former User   Substance and Sexual Activity    Alcohol use: Yes     Frequency: 2-4 times a month     Drinks per session: 1 or 2     Binge frequency: Never     Comment: occasionallty    Drug use: No    Sexual activity: Not Currently     Partners: Male   Lifestyle    Physical activity     Days per week: Not on file     Minutes per session: Not on file    Stress: Not on file   Relationships    Social connections     Talks on phone: Not on file     Gets together: Not on file     Attends Sikhism service: Not on file     Active member of club or organization: Not on file     Attends meetings of clubs or organizations: Not on file     Relationship status: Not on file   Other Topics Concern    Are you pregnant or think you may be? No    Breast-feeding No   Social History Narrative    Live w/ alone        Past Surgical History:   Procedure Laterality Date    PARTIAL HYSTERECTOMY         Review of Systems   Constitutional: Negative for activity change, appetite change, chills and fever.   HENT: Negative for sinus pain, sore throat and voice change.    Eyes: Negative for pain, redness and visual disturbance.   Respiratory: Negative for cough and shortness of breath.    Cardiovascular: Negative for chest pain and palpitations.   Gastrointestinal: Negative for diarrhea, nausea and vomiting.   Musculoskeletal: Positive for back pain. Negative for joint swelling.   Skin: Negative for color change and wound.   Neurological: Positive for numbness. Negative for dizziness and weakness.   Psychiatric/Behavioral: The patient is not nervous/anxious.          Objective:   /81   Pulse 65     MRI Brain W WO Contrast  Narrative: EXAMINATION:  MRI BRAIN W WO CONTRAST    CLINICAL HISTORY:  Dizziness and giddinessDizziness, non-specific;Meningioma, cerebellum;    TECHNIQUE:  Sagittal T1. Axial T1, T2, T2 FLAIR, GRE,DWI.  Sagittal, axial, and coronal T1 post contrast.    COMPARISON:  Prior MRI from July 17  2020.    FINDINGS:  There is no restricted diffusion.    Corresponding to the finding on the previous MRI is an extra-axial enhancing mass within the left aspect of the posterior cranial fossa most characteristic of meningioma.  It demonstrates relative hypointense signal on T2 weighted imaging with hyperostosis of the adjacent calvarium.  There is remodeling of the surrounding cerebellum without shift of the midline or vasogenic edema within the brain tissue.  Retrospectively this lesion was present on a remote CT scan from October 2016.    Again demonstrated is a moderate degree of patchy, nonspecific T2 FLAIR hyperintense signal within periventricular white matter.  No abnormal intra-axial enhancement.  Tiny focus of hypointense signal on GRE series within the right corona radiata likely relates to a focus of hemosiderin deposition such as in the setting of chronic hypertension.    The ventricles and sulci are normal in size and configuration. Midline structures are normal. There are preserved arterial flow-voids on T2 weighted imaging. The paranasal sinuses and mastoid air cells are clear.    No abnormal marrow signal is identified.  Impression: 1. Meningioma within the left aspect of the posterior cranial fossa with remodeling of the adjacent cerebellum.  No attenuation of the 4th ventricle or shift of midline.  2. Nonspecific white matter T2 changes consistent with moderate chronic microvascular ischemia.    Electronically signed by: Perico Oleary Jr., MD  Date:    07/31/2020  Time:    09:11      Physical Exam  LOWER EXTREMITY PHYSICAL EXAMINATION  DERMATOLOGY: Skin is supple, dry and intact.  No edema present to the medial aspect of the right ankle.  No ecchymosis is noted. No hypertrophic skin formation. No erythema or cellulitis is noted.     VASCULAR: The right dorsalis pedis pulse is 2/4 and the posterior tibial pulse is 2/4. The left dorsalis pedis pulse is 2/4 and the posterior tibial pulse is 2/4. Hair  growth is noted on the dorsal foot and digits. Proximal to distal, warm to warm. Capillary refill time is WNL at less than 3s.    NEUROLOGY: Protective sensation is absent. Proprioception is intact. Sensation to light touch is reduced.  Vibratory sensation decreased    ORTHOPEDIC: There is moderate to severe collapsing pes planovalgus on the right foot. There is no enderness to palpation of the navicular tuberosity on the right foot. There is no present edema noted along the course of the PT tendon on the right foot. No pain with palpation and/or range of motion of the ankle joint.  There is no crepitus noted with range of motion of the ankle joint. The ankle is not in valgus and the RCSP is neutral.  There is limited ROM of the STJ and the MTJ. The hindfoot is in valgus. Upon standing, there is geneva-talar subluxation noted on the right foot.  Gait pattern is nonantalgic at this time.    Assessment:     1. Pes planus of both feet    2. Posterior tibial tendon dysfunction, right    3. Morbid obesity        Plan:     Pes planus of both feet    Posterior tibial tendon dysfunction, right    Morbid obesity      Thorough discussion is had with the patient this afternoon, concerning the diagnosis, its etiology, and the treatment algorithm at present.  Recommend patient to continue using motion controlled shoes and over-the-counter orthotics to help alleviate posterior tibial tendon dysfunction with edema to the posterior aspect of the right ankle along the posterior tibial tendon.  Currently she is in 0/10 pain at present.  I do recommend she continues to increase her activity as well.     Patient is counseled and reminded concerning the importance of good nutrition and healthy eating habits, which may include blood sugar control, to prevent and/or help podiatric foot and ankle complications.    Patient follow-up p.r.n. for new or worsening pathology        Future Appointments   Date Time Provider Department Center    2/5/2021 10:00 AM [facility] [place]   2/9/2021 10:30 AM Ron Barr MD [facility]   4/29/2021 10:00 AM Elizabeth Lejeune, NP [facility] [place]

## 2020-09-14 ENCOUNTER — HOSPITAL ENCOUNTER (OUTPATIENT)
Dept: RADIOLOGY | Facility: HOSPITAL | Age: 70
Discharge: HOME | End: 2020-09-14
Attending: PHYSICIAN ASSISTANT
Payer: MEDICARE

## 2020-09-14 VITALS — HEIGHT: 62 IN | BODY MASS INDEX: 38.83 KG/M2 | WEIGHT: 211 LBS

## 2020-09-14 DIAGNOSIS — Z12.31 ENCOUNTER FOR SCREENING MAMMOGRAM FOR MALIGNANT NEOPLASM OF BREAST: ICD-10-CM

## 2020-09-14 DIAGNOSIS — Z12.39 SCREENING FOR BREAST CANCER: ICD-10-CM

## 2020-09-14 PROCEDURE — 77067 MAMMO DIGITAL SCREENING BILAT WITH TOMO: ICD-10-PCS | Mod: 26,,, | Performed by: RADIOLOGY

## 2020-09-14 PROCEDURE — 77067 SCR MAMMO BI INCL CAD: CPT | Mod: TC

## 2020-09-14 PROCEDURE — 77063 MAMMO DIGITAL SCREENING BILAT WITH TOMO: ICD-10-PCS | Mod: 26,,, | Performed by: RADIOLOGY

## 2020-09-14 PROCEDURE — 77067 SCR MAMMO BI INCL CAD: CPT | Mod: 26,,, | Performed by: RADIOLOGY

## 2020-09-14 PROCEDURE — 77063 BREAST TOMOSYNTHESIS BI: CPT | Mod: 26,,, | Performed by: RADIOLOGY

## 2020-10-12 ENCOUNTER — LAB VISIT (OUTPATIENT)
Dept: LAB | Facility: HOSPITAL | Age: 70
End: 2020-10-12
Attending: FAMILY MEDICINE
Payer: MEDICARE

## 2020-10-12 ENCOUNTER — OFFICE VISIT (OUTPATIENT)
Dept: INTERNAL MEDICINE | Facility: CLINIC | Age: 70
End: 2020-10-12
Payer: MEDICARE

## 2020-10-12 VITALS
SYSTOLIC BLOOD PRESSURE: 132 MMHG | DIASTOLIC BLOOD PRESSURE: 86 MMHG | BODY MASS INDEX: 40.13 KG/M2 | HEART RATE: 63 BPM | TEMPERATURE: 98 F | OXYGEN SATURATION: 98 % | WEIGHT: 218.06 LBS | RESPIRATION RATE: 16 BRPM | HEIGHT: 62 IN

## 2020-10-12 DIAGNOSIS — G31.84 MILD COGNITIVE IMPAIRMENT WITH MEMORY LOSS: ICD-10-CM

## 2020-10-12 DIAGNOSIS — G47.33 OSA ON CPAP: ICD-10-CM

## 2020-10-12 DIAGNOSIS — E55.9 VITAMIN D DEFICIENCY: ICD-10-CM

## 2020-10-12 DIAGNOSIS — D32.0 MENINGIOMA OF CEREBELLUM: ICD-10-CM

## 2020-10-12 DIAGNOSIS — E78.2 MIXED HYPERLIPIDEMIA: ICD-10-CM

## 2020-10-12 DIAGNOSIS — I10 ESSENTIAL HYPERTENSION: ICD-10-CM

## 2020-10-12 DIAGNOSIS — Z00.00 ROUTINE GENERAL MEDICAL EXAMINATION AT A HEALTH CARE FACILITY: Primary | ICD-10-CM

## 2020-10-12 DIAGNOSIS — M47.26 OSTEOARTHRITIS OF SPINE WITH RADICULOPATHY, LUMBAR REGION: ICD-10-CM

## 2020-10-12 DIAGNOSIS — Z00.00 ROUTINE GENERAL MEDICAL EXAMINATION AT A HEALTH CARE FACILITY: ICD-10-CM

## 2020-10-12 DIAGNOSIS — M19.071 OSTEOARTHRITIS OF RIGHT ANKLE AND FOOT: ICD-10-CM

## 2020-10-12 LAB
ALBUMIN SERPL BCP-MCNC: 3.8 G/DL (ref 3.5–5.2)
ALP SERPL-CCNC: 88 U/L (ref 55–135)
ALT SERPL W/O P-5'-P-CCNC: 17 U/L (ref 10–44)
ANION GAP SERPL CALC-SCNC: 8 MMOL/L (ref 8–16)
AST SERPL-CCNC: 9 U/L (ref 10–40)
BASOPHILS # BLD AUTO: 0.02 K/UL (ref 0–0.2)
BASOPHILS NFR BLD: 0.3 % (ref 0–1.9)
BILIRUB SERPL-MCNC: 0.5 MG/DL (ref 0.1–1)
BUN SERPL-MCNC: 17 MG/DL (ref 8–23)
CALCIUM SERPL-MCNC: 9.1 MG/DL (ref 8.7–10.5)
CHLORIDE SERPL-SCNC: 104 MMOL/L (ref 95–110)
CHOLEST SERPL-MCNC: 222 MG/DL (ref 120–199)
CHOLEST/HDLC SERPL: 2.9 {RATIO} (ref 2–5)
CO2 SERPL-SCNC: 31 MMOL/L (ref 23–29)
CREAT SERPL-MCNC: 0.8 MG/DL (ref 0.5–1.4)
DIFFERENTIAL METHOD: ABNORMAL
EOSINOPHIL # BLD AUTO: 0.1 K/UL (ref 0–0.5)
EOSINOPHIL NFR BLD: 2.1 % (ref 0–8)
ERYTHROCYTE [DISTWIDTH] IN BLOOD BY AUTOMATED COUNT: 15.4 % (ref 11.5–14.5)
EST. GFR  (AFRICAN AMERICAN): >60 ML/MIN/1.73 M^2
EST. GFR  (NON AFRICAN AMERICAN): >60 ML/MIN/1.73 M^2
GLUCOSE SERPL-MCNC: 90 MG/DL (ref 70–110)
HCT VFR BLD AUTO: 45.3 % (ref 37–48.5)
HDLC SERPL-MCNC: 77 MG/DL (ref 40–75)
HDLC SERPL: 34.7 % (ref 20–50)
HGB BLD-MCNC: 13.8 G/DL (ref 12–16)
IMM GRANULOCYTES # BLD AUTO: 0.01 K/UL (ref 0–0.04)
IMM GRANULOCYTES NFR BLD AUTO: 0.2 % (ref 0–0.5)
LDLC SERPL CALC-MCNC: 132.4 MG/DL (ref 63–159)
LYMPHOCYTES # BLD AUTO: 2.4 K/UL (ref 1–4.8)
LYMPHOCYTES NFR BLD: 38.8 % (ref 18–48)
MCH RBC QN AUTO: 29.2 PG (ref 27–31)
MCHC RBC AUTO-ENTMCNC: 30.5 G/DL (ref 32–36)
MCV RBC AUTO: 96 FL (ref 82–98)
MONOCYTES # BLD AUTO: 0.7 K/UL (ref 0.3–1)
MONOCYTES NFR BLD: 11.4 % (ref 4–15)
NEUTROPHILS # BLD AUTO: 2.9 K/UL (ref 1.8–7.7)
NEUTROPHILS NFR BLD: 47.2 % (ref 38–73)
NONHDLC SERPL-MCNC: 145 MG/DL
NRBC BLD-RTO: 0 /100 WBC
PLATELET # BLD AUTO: 314 K/UL (ref 150–350)
PMV BLD AUTO: 10.4 FL (ref 9.2–12.9)
POTASSIUM SERPL-SCNC: 4.4 MMOL/L (ref 3.5–5.1)
PROT SERPL-MCNC: 7.4 G/DL (ref 6–8.4)
RBC # BLD AUTO: 4.73 M/UL (ref 4–5.4)
SODIUM SERPL-SCNC: 143 MMOL/L (ref 136–145)
TRIGL SERPL-MCNC: 63 MG/DL (ref 30–150)
TSH SERPL DL<=0.005 MIU/L-ACNC: 0.83 UIU/ML (ref 0.4–4)
WBC # BLD AUTO: 6.05 K/UL (ref 3.9–12.7)

## 2020-10-12 PROCEDURE — 82306 VITAMIN D 25 HYDROXY: CPT

## 2020-10-12 PROCEDURE — 90694 VACC AIIV4 NO PRSRV 0.5ML IM: CPT | Mod: S$GLB,,, | Performed by: FAMILY MEDICINE

## 2020-10-12 PROCEDURE — 90694 FLU VACCINE - QUADRIVALENT - ADJUVANTED: ICD-10-PCS | Mod: S$GLB,,, | Performed by: FAMILY MEDICINE

## 2020-10-12 PROCEDURE — G0008 ADMIN INFLUENZA VIRUS VAC: HCPCS | Mod: S$GLB,,, | Performed by: FAMILY MEDICINE

## 2020-10-12 PROCEDURE — 3075F SYST BP GE 130 - 139MM HG: CPT | Mod: CPTII,S$GLB,, | Performed by: FAMILY MEDICINE

## 2020-10-12 PROCEDURE — 99999 PR PBB SHADOW E&M-EST. PATIENT-LVL IV: ICD-10-PCS | Mod: PBBFAC,,, | Performed by: FAMILY MEDICINE

## 2020-10-12 PROCEDURE — 3075F PR MOST RECENT SYSTOLIC BLOOD PRESS GE 130-139MM HG: ICD-10-PCS | Mod: CPTII,S$GLB,, | Performed by: FAMILY MEDICINE

## 2020-10-12 PROCEDURE — 99214 PR OFFICE/OUTPT VISIT, EST, LEVL IV, 30-39 MIN: ICD-10-PCS | Mod: 25,S$GLB,, | Performed by: FAMILY MEDICINE

## 2020-10-12 PROCEDURE — 80061 LIPID PANEL: CPT

## 2020-10-12 PROCEDURE — 99214 OFFICE O/P EST MOD 30 MIN: CPT | Mod: 25,S$GLB,, | Performed by: FAMILY MEDICINE

## 2020-10-12 PROCEDURE — 99999 PR PBB SHADOW E&M-EST. PATIENT-LVL IV: CPT | Mod: PBBFAC,,, | Performed by: FAMILY MEDICINE

## 2020-10-12 PROCEDURE — 3079F PR MOST RECENT DIASTOLIC BLOOD PRESSURE 80-89 MM HG: ICD-10-PCS | Mod: CPTII,S$GLB,, | Performed by: FAMILY MEDICINE

## 2020-10-12 PROCEDURE — 3079F DIAST BP 80-89 MM HG: CPT | Mod: CPTII,S$GLB,, | Performed by: FAMILY MEDICINE

## 2020-10-12 PROCEDURE — 36415 COLL VENOUS BLD VENIPUNCTURE: CPT

## 2020-10-12 PROCEDURE — 85025 COMPLETE CBC W/AUTO DIFF WBC: CPT

## 2020-10-12 PROCEDURE — 80053 COMPREHEN METABOLIC PANEL: CPT

## 2020-10-12 PROCEDURE — 84443 ASSAY THYROID STIM HORMONE: CPT

## 2020-10-12 PROCEDURE — G0008 FLU VACCINE - QUADRIVALENT - ADJUVANTED: ICD-10-PCS | Mod: S$GLB,,, | Performed by: FAMILY MEDICINE

## 2020-10-12 NOTE — PROGRESS NOTES
Subjective:       Patient ID: Beth Souza is a 70 y.o. female.    Chief Complaint: Leg Swelling (Right), Extremity Weakness (Bilateral Leg), and Ankle Pain (Right )    70-year-old  female patient with  Patient Active Problem List:     Essential hypertension     Vitamin D deficiency     Mixed hyperlipidemia     Hyperparathyroidism, primary     Thyroid nodule     Cataract     Osteoarthritis of spine with radiculopathy, lumbar region     LEN on CPAP     Mild cognitive impairment with memory loss     Meningioma of cerebellum  Here for routine annual physicals.  Patient has been taking her medications regularly reported that she had seen neurosurgeon recently and who decided to go with conservative management at this time secondary to her meningioma occasionally causing mild cognitive deficits.   Patient has been taking over-the-counter multivitamins   Continues to have right ankle pain for which she had seen podiatrist and was advised to wear comfortable shoes, continues to have back pain up to 5/10 causing right thigh pain, has been taking extra-strength Tylenol as needed for pain but no significant relief noted.  Has not been exercising lately.  Denies any chest pain or difficulty breathing or palpitations, trouble with swallowing    Review of Systems   Constitutional: Negative for fatigue.   Eyes: Negative for visual disturbance.   Respiratory: Negative for shortness of breath.    Cardiovascular: Negative for chest pain and leg swelling.   Gastrointestinal: Negative for abdominal pain, nausea and vomiting.   Musculoskeletal: Positive for arthralgias, back pain, gait problem and myalgias.   Skin: Negative for rash.   Neurological: Positive for numbness. Negative for weakness, light-headedness and headaches.   Psychiatric/Behavioral: Positive for decreased concentration. Negative for sleep disturbance.         /86 (BP Location: Right arm, Patient Position: Sitting, BP Method: Large  "(Manual))   Pulse 63   Temp 97.5 °F (36.4 °C) (Temporal)   Resp 16   Ht 5' 2" (1.575 m)   Wt 98.9 kg (218 lb 0.6 oz)   SpO2 98%   BMI 39.88 kg/m²   Objective:      Physical Exam  Constitutional:       Appearance: She is well-developed.   HENT:      Head: Normocephalic and atraumatic.   Cardiovascular:      Rate and Rhythm: Normal rate and regular rhythm.      Heart sounds: Normal heart sounds. No murmur.   Pulmonary:      Effort: Pulmonary effort is normal.      Breath sounds: Normal breath sounds. No wheezing.   Abdominal:      General: Bowel sounds are normal.      Palpations: Abdomen is soft.      Tenderness: There is no abdominal tenderness.   Musculoskeletal:         General: Tenderness present.      Comments: Positive for tenderness to the right ankle medially and paraspinal lumbar muscle tenderness on the right side   Skin:     General: Skin is warm and dry.      Findings: No rash.   Neurological:      General: No focal deficit present.      Mental Status: She is alert and oriented to person, place, and time.   Psychiatric:         Mood and Affect: Mood normal.           Assessment/Plan:   1. Routine general medical examination at a health care facility  - CBC auto differential; Future  - Comprehensive Metabolic Panel; Future  - Lipid Panel; Future  - TSH; Future  - Vitamin D; Future  - Urinalysis; Future  Vital signs stable today.  Clinical exam stable  Continue lifestyle modifications with low-fat and low-cholesterol diet and exercise 30 minutes daily  Flu shot given today  Advised to consider getting shingles vaccination if interested outside pharmacy    2. Osteoarthritis of right ankle and foot  - Ambulatory referral/consult to Physical/Occupational Therapy; Future  4. Osteoarthritis of spine with radiculopathy, lumbar region  - Ambulatory referral/consult to Physical/Occupational Therapy; Future    Reviewed previous x-rays, advised to continue taking extra-strength Tylenol as needed for pain and " graded exercise regimen recommended  Will refer to physical therapy at [facility] as requested for strengthening     3. Essential hypertension  - Comprehensive Metabolic Panel; Future  - Lipid Panel; Future  - TSH; Future  - Urinalysis; Future  Blood pressure is stable currently on lisinopril hydrochlorothiazide 20/25 mg  Restrict salt intake and eat low-fat and low-cholesterol diet    5. Mixed hyperlipidemia  Currently taking Lipitor 20 mg daily    6. Vitamin D deficiency  - CBC auto differential; Future  - Vitamin D; Future  Will check vitamin-D levels as it was low in the past    7. LEN on CPAP  Stable    8. Meningioma of cerebellum  9. Mild cognitive impairment with memory loss   Followed by neurosurgeon

## 2020-10-13 DIAGNOSIS — E78.2 MIXED HYPERLIPIDEMIA: Primary | ICD-10-CM

## 2020-10-13 LAB — 25(OH)D3+25(OH)D2 SERPL-MCNC: 26 NG/ML (ref 30–96)

## 2020-10-13 RX ORDER — ATORVASTATIN CALCIUM 40 MG/1
40 TABLET, FILM COATED ORAL DAILY
Qty: 90 TABLET | Refills: 3 | Status: SHIPPED | OUTPATIENT
Start: 2020-10-13 | End: 2020-10-20

## 2020-10-14 ENCOUNTER — TELEPHONE (OUTPATIENT)
Dept: INTERNAL MEDICINE | Facility: CLINIC | Age: 70
End: 2020-10-14

## 2020-10-14 NOTE — TELEPHONE ENCOUNTER
----- Message from Edna Oliva MD sent at 10/13/2020  9:07 AM CDT -----  Low vitamin-D levels noted, after finishing prescription vitamin-D start taking over-the-counter vitamin D3 1000 units daily for maintenance  Noted elevated cholesterol levels still not under goal, will increase Lipitor from 20-40 mg daily.   Otherwise stable labs

## 2020-10-19 ENCOUNTER — TELEPHONE (OUTPATIENT)
Dept: ORTHOPEDICS | Facility: CLINIC | Age: 70
End: 2020-10-19

## 2020-10-19 ENCOUNTER — HOSPITAL ENCOUNTER (OUTPATIENT)
Dept: RADIOLOGY | Facility: HOSPITAL | Age: 70
Discharge: HOME | End: 2020-10-19
Attending: PHYSICIAN ASSISTANT
Payer: MEDICARE

## 2020-10-19 ENCOUNTER — OFFICE VISIT (OUTPATIENT)
Dept: INTERNAL MEDICINE | Facility: CLINIC | Age: 70
End: 2020-10-19
Payer: MEDICARE

## 2020-10-19 VITALS
TEMPERATURE: 97 F | WEIGHT: 218.69 LBS | HEIGHT: 62 IN | OXYGEN SATURATION: 97 % | HEART RATE: 64 BPM | BODY MASS INDEX: 40.25 KG/M2 | SYSTOLIC BLOOD PRESSURE: 130 MMHG | DIASTOLIC BLOOD PRESSURE: 62 MMHG

## 2020-10-19 DIAGNOSIS — E55.9 VITAMIN D DEFICIENCY: ICD-10-CM

## 2020-10-19 DIAGNOSIS — M25.559 HIP PAIN: Primary | ICD-10-CM

## 2020-10-19 DIAGNOSIS — I10 ESSENTIAL HYPERTENSION: ICD-10-CM

## 2020-10-19 DIAGNOSIS — M25.559 HIP PAIN: ICD-10-CM

## 2020-10-19 PROCEDURE — 99214 OFFICE O/P EST MOD 30 MIN: CPT | Mod: S$GLB,,, | Performed by: PHYSICIAN ASSISTANT

## 2020-10-19 PROCEDURE — 1125F AMNT PAIN NOTED PAIN PRSNT: CPT | Mod: S$GLB,,, | Performed by: PHYSICIAN ASSISTANT

## 2020-10-19 PROCEDURE — 73521 X-RAY EXAM HIPS BI 2 VIEWS: CPT | Mod: 26,,, | Performed by: RADIOLOGY

## 2020-10-19 PROCEDURE — 73521 XR HIPS BILATERAL 2 VIEW INCL AP PELVIS: ICD-10-PCS | Mod: 26,,, | Performed by: RADIOLOGY

## 2020-10-19 PROCEDURE — 1101F PT FALLS ASSESS-DOCD LE1/YR: CPT | Mod: CPTII,S$GLB,, | Performed by: PHYSICIAN ASSISTANT

## 2020-10-19 PROCEDURE — 3008F PR BODY MASS INDEX (BMI) DOCUMENTED: ICD-10-PCS | Mod: CPTII,S$GLB,, | Performed by: PHYSICIAN ASSISTANT

## 2020-10-19 PROCEDURE — 73521 X-RAY EXAM HIPS BI 2 VIEWS: CPT | Mod: TC

## 2020-10-19 PROCEDURE — 99999 PR PBB SHADOW E&M-EST. PATIENT-LVL IV: ICD-10-PCS | Mod: PBBFAC,,, | Performed by: PHYSICIAN ASSISTANT

## 2020-10-19 PROCEDURE — 3078F PR MOST RECENT DIASTOLIC BLOOD PRESSURE < 80 MM HG: ICD-10-PCS | Mod: CPTII,S$GLB,, | Performed by: PHYSICIAN ASSISTANT

## 2020-10-19 PROCEDURE — 99999 PR PBB SHADOW E&M-EST. PATIENT-LVL IV: CPT | Mod: PBBFAC,,, | Performed by: PHYSICIAN ASSISTANT

## 2020-10-19 PROCEDURE — 3008F BODY MASS INDEX DOCD: CPT | Mod: CPTII,S$GLB,, | Performed by: PHYSICIAN ASSISTANT

## 2020-10-19 PROCEDURE — 99499 UNLISTED E&M SERVICE: CPT | Mod: S$GLB,,, | Performed by: PHYSICIAN ASSISTANT

## 2020-10-19 PROCEDURE — 3078F DIAST BP <80 MM HG: CPT | Mod: CPTII,S$GLB,, | Performed by: PHYSICIAN ASSISTANT

## 2020-10-19 PROCEDURE — 3075F PR MOST RECENT SYSTOLIC BLOOD PRESS GE 130-139MM HG: ICD-10-PCS | Mod: CPTII,S$GLB,, | Performed by: PHYSICIAN ASSISTANT

## 2020-10-19 PROCEDURE — 99214 PR OFFICE/OUTPT VISIT, EST, LEVL IV, 30-39 MIN: ICD-10-PCS | Mod: S$GLB,,, | Performed by: PHYSICIAN ASSISTANT

## 2020-10-19 PROCEDURE — 99499 RISK ADDL DX/OHS AUDIT: ICD-10-PCS | Mod: S$GLB,,, | Performed by: PHYSICIAN ASSISTANT

## 2020-10-19 PROCEDURE — 1101F PR PT FALLS ASSESS DOC 0-1 FALLS W/OUT INJ PAST YR: ICD-10-PCS | Mod: CPTII,S$GLB,, | Performed by: PHYSICIAN ASSISTANT

## 2020-10-19 PROCEDURE — 1125F PR PAIN SEVERITY QUANTIFIED, PAIN PRESENT: ICD-10-PCS | Mod: S$GLB,,, | Performed by: PHYSICIAN ASSISTANT

## 2020-10-19 PROCEDURE — 1159F MED LIST DOCD IN RCRD: CPT | Mod: S$GLB,,, | Performed by: PHYSICIAN ASSISTANT

## 2020-10-19 PROCEDURE — 3075F SYST BP GE 130 - 139MM HG: CPT | Mod: CPTII,S$GLB,, | Performed by: PHYSICIAN ASSISTANT

## 2020-10-19 PROCEDURE — 1159F PR MEDICATION LIST DOCUMENTED IN MEDICAL RECORD: ICD-10-PCS | Mod: S$GLB,,, | Performed by: PHYSICIAN ASSISTANT

## 2020-10-19 RX ORDER — MELOXICAM 7.5 MG/1
TABLET ORAL
Qty: 20 TABLET | Refills: 0 | Status: SHIPPED | OUTPATIENT
Start: 2020-10-19 | End: 2021-02-01

## 2020-10-19 NOTE — PROGRESS NOTES
Subjective:      Patient ID: Beth Souza is a 70 y.o. female.    Chief Complaint: Hip Pain (LEFT)    Hip Pain   The incident occurred 5 to 7 days ago. There was no injury mechanism. The pain is present in the left hip (bilateral thighs). Pain scale: fluctuates. The pain is severe. The pain has been fluctuating since onset. Associated symptoms include an inability to bear weight, numbness and tingling. Pertinent negatives include no loss of motion, loss of sensation or muscle weakness. Nothing aggravates the symptoms. She has tried acetaminophen and heat for the symptoms. The treatment provided no relief.     Patient Active Problem List   Diagnosis    Essential hypertension    Vitamin D deficiency    Mixed hyperlipidemia    Hyperparathyroidism, primary    Thyroid nodule    Cataract    Osteoarthritis of spine with radiculopathy, lumbar region    LEN on CPAP    Mild cognitive impairment with memory loss    Meningioma of cerebellum         Review of Systems   Constitutional: Negative for activity change, appetite change, chills, diaphoresis, fatigue, fever and unexpected weight change.   HENT: Negative.  Negative for congestion, hearing loss, postnasal drip, rhinorrhea, sore throat, trouble swallowing and voice change.    Eyes: Negative.  Negative for visual disturbance.   Respiratory: Negative.  Negative for cough, choking, chest tightness and shortness of breath.    Cardiovascular: Negative for chest pain, palpitations and leg swelling.   Gastrointestinal: Negative for abdominal distention, abdominal pain, blood in stool, constipation, diarrhea, nausea and vomiting.   Endocrine: Negative for cold intolerance, heat intolerance, polydipsia and polyuria.   Genitourinary: Negative.  Negative for difficulty urinating and frequency.   Musculoskeletal: Positive for arthralgias, back pain and myalgias. Negative for gait problem, joint swelling and neck pain.   Skin: Negative for color change, pallor, rash  "and wound.   Neurological: Positive for tingling and numbness. Negative for dizziness, tremors, weakness, light-headedness and headaches.   Hematological: Negative for adenopathy.   Psychiatric/Behavioral: Negative for behavioral problems, confusion, self-injury, sleep disturbance and suicidal ideas. The patient is not nervous/anxious.      Objective:   /62 (BP Location: Right arm, Patient Position: Sitting, BP Method: Large (Manual))   Pulse 64   Temp 97.2 °F (36.2 °C) (Tympanic)   Ht 5' 2" (1.575 m)   Wt 99.2 kg (218 lb 11.1 oz)   SpO2 97%   BMI 40.00 kg/m²     Physical Exam  Vitals signs reviewed.   Constitutional:       Appearance: She is well-developed.   HENT:      Head: Normocephalic and atraumatic.      Right Ear: External ear normal.      Left Ear: External ear normal.      Nose: Nose normal.   Eyes:      Conjunctiva/sclera: Conjunctivae normal.      Pupils: Pupils are equal, round, and reactive to light.   Neck:      Musculoskeletal: Normal range of motion and neck supple.   Cardiovascular:      Rate and Rhythm: Normal rate and regular rhythm.      Heart sounds: Normal heart sounds. No murmur. No friction rub. No gallop.    Pulmonary:      Effort: Pulmonary effort is normal. No respiratory distress.      Breath sounds: Normal breath sounds. No wheezing or rales.   Chest:      Chest wall: No tenderness.   Abdominal:      General: There is no distension.      Palpations: Abdomen is soft.      Tenderness: There is no abdominal tenderness.   Musculoskeletal: Normal range of motion.      Right hip: She exhibits normal range of motion, normal strength, no tenderness, no bony tenderness, no swelling, no crepitus, no deformity and no laceration.      Left hip: She exhibits decreased strength, tenderness and bony tenderness. She exhibits normal range of motion, no swelling, no crepitus and no deformity.      Lumbar back: She exhibits pain and spasm. She exhibits normal range of motion, no tenderness, no " bony tenderness, no swelling, no edema, no deformity, no laceration and normal pulse.   Lymphadenopathy:      Cervical: No cervical adenopathy.   Skin:     General: Skin is warm and dry.      Findings: No erythema or rash.   Neurological:      Mental Status: She is alert and oriented to person, place, and time.   Psychiatric:         Behavior: Behavior normal.         Thought Content: Thought content normal.         Judgment: Judgment normal.       X-Ray Hips Bilateral 2 View Incl AP Pelvis  Narrative: EXAMINATION:  XR HIPS BILATERAL 2 VIEW INCL AP PELVIS    CLINICAL HISTORY:  Pain in unspecified hip    TECHNIQUE:  AP view of the pelvis and frogleg lateral views of both hips were performed.    COMPARISON:  January 23, 2014    FINDINGS:  Prominent degenerative changes noted in the included lumbosacral spine, bilateral hips and pubic symphysis.  There is asymmetric increased density or sclerosis involving the pubic symphysis right greater than left.  Symmetric degenerative changes noted in the SI and hip joints.  No acute fracture or dislocation.  Pelvic ring is intact.  No grossly evident AVN on this plain film exam.  Several calcifications noted within the lower abdomen and pelvis bilaterally.  Impression: As above.  Follow-up and or further evaluation as warranted.    Electronically signed by: Jose Antonio Romero MD  Date:    10/19/2020  Time:    11:19    Assessment:     1. Hip pain    2. Essential hypertension    3. Vitamin D deficiency      Plan:   Hip pain  -     Cancel: X-Ray Hip 3 or 4 views Bilateral; Future; Expected date: 10/19/2020  -     meloxicam (MOBIC) 7.5 MG tablet; Take 1-2 tablets as needed for hip pain  Dispense: 20 tablet; Refill: 0  -RICE  -referral to ortho for further evaluation and treatment.     Essential hypertension  -Stable and controlled. Continue current treatment plan as previously prescribed with your PCP.     Vitamin D deficiency  -stable on vitamin D supplement      Follow up if symptoms  worsen or fail to improve.

## 2020-10-19 NOTE — PATIENT INSTRUCTIONS
Leg Swelling in Both Legs    Swelling of the feet, ankles, and legs is called edema. It is caused by excess fluid that has collected in the tissues. Extra fluid in the body settles in the lowest part because of gravity. This is why the legs and feet are most affected.  Some of the causes for edema include:  · Disease of the heart like congestive heart failure  · Standing or sitting for long periods of time  · Infection of the feet or legs  · Blood pooling in the veins of your legs (venous insufficiency)  · Dilated veins in your lower leg (varicose veins)  · Garters or other clothing that is tight on your legs. This will cause blood to pool in your legs because the clothing limits blood flow.  · Some medicines such as hormones like birth control pills, some blood pressure medicines like calcium channel blockers (amlodipine) and steroids, some antidepressants like MAO inhibitors and tricyclics  · Menstrual periods that cause you to retain fluids  · Many types of renal disease  · Liver failure or cirrhosis  · Pregnancy, some swelling is normal, but a sudden increase in leg swelling or weight gain can be a sign of a dangerous complication of pregnancy  · Poor nutrition  · Thyroid disease  Medical treatment will depend on what is causing the swelling in your legs. Your healthcare provider may prescribe water pills (diuretics) to get rid of the extra fluid.  Home care  Follow these guidelines when caring for yourself at home:  · Don't wear clothing like garters that is tight on your legs.  · Keep your legs up while lying or sitting.  · If infection, injury, or recent surgery is causing the swelling, stay off your legs as much as possible until symptoms get better.  · If your healthcare provider says that your leg swelling is caused by venous insufficiency or varicose veins, don't sit or  one place for long periods of time. Take breaks and walk about every few hours. Brisk walking is a good exercise. It helps  circulate the blood that has collected in your leg. Talk with your provider about using support stockings to stop daytime leg swelling.  · If your provider says that heart disease is causing your leg swelling, follow a low-salt diet to stop extra fluid from staying in your body. You may also need medicine.  Follow-up care  Follow up with your healthcare provider, or as advised.  When to seek medical advice  Call your healthcare provider right away if any of these occur:  · New shortness of breath or chest pain  · Shortness of breath or chest pain that gets worse  · Swelling in both legs or ankles that gets worse  · Swelling of the abdomen  · Redness, warmth, or swelling in one leg  · Fever of 100.4ºF (38ºC) or higher, or as directed by your healthcare provider  · Yellow color to your skin or eyes  · Rapid, unexplained weight gain  · Having to sleep upright or use an increased number of pillows  Date Last Reviewed: 3/31/2016  © 9776-6417 [employer]. [address] [place] [address], [place] [address]. All rights reserved. This information is not intended as a substitute for professional medical care. Always follow your healthcare professional's instructions.        Putting on Compression Stockings     Turn the stocking inside-out, then fit it over your toes and heel.          Roll the stocking up your leg.            Once stockings are on, make sure the top of the stocking is about two fingers width below the crease of the knee (or the groin if you wear thigh-high stockings).          Use equipment, such as a stocking luly, or wear rubber gloves to make it easier to put on compression stockings.         Elastic compression stockings are prescribed to treat many vein problems. Wearing them may be the most important thing you do to manage your symptoms. The stockings fit tightly around your ankle, gradually reducing in pressure as they go up your legs. This helps keep blood flowing to your heart. As a result,  swelling is reduced. Your healthcare provider will prescribe stockings at a safe pressure for you. He or she will also tell you how often to wear and remove the stockings. Follow these instructions closely. Also, do not buy or wear compression stockings without first seeing your healthcare provider.  Tips for wear and care  To wear stockings safely and to get the most benefit:  · Wear the length prescribed by your healthcare provider.  · Pull them to the designated height and no farther. Dont let them bunch at the top. This can restrict blood flow and increase swelling.  · Wear the stockings for the amount of time your healthcare provider recommends. Replace them when they start to feel loose. This will likely be every 3 to 6 months.  · Remove them as your healthcare provider directs. When removed, wash your legs. Then check your legs and feet for sores. Call your healthcare provider if you find a sore. Dont put the stockings back on unless your healthcare provider directs.   · Wash the stockings as instructed. They may need to be hand-washed.  Date Last Reviewed: 5/1/2016  © 8055-7662 [employer]. [address] [place] [address], [place] [address]. All rights reserved. This information is not intended as a substitute for professional medical care. Always follow your healthcare professional's instructions.

## 2020-10-20 ENCOUNTER — OFFICE VISIT (OUTPATIENT)
Dept: OBSTETRICS AND GYNECOLOGY | Facility: CLINIC | Age: 70
End: 2020-10-20
Payer: MEDICARE

## 2020-10-20 ENCOUNTER — OFFICE VISIT (OUTPATIENT)
Dept: ORTHOPEDICS | Facility: CLINIC | Age: 70
End: 2020-10-20
Payer: MEDICARE

## 2020-10-20 VITALS
HEART RATE: 52 BPM | HEIGHT: 62 IN | BODY MASS INDEX: 40.25 KG/M2 | SYSTOLIC BLOOD PRESSURE: 145 MMHG | DIASTOLIC BLOOD PRESSURE: 95 MMHG | WEIGHT: 218.69 LBS

## 2020-10-20 VITALS
SYSTOLIC BLOOD PRESSURE: 132 MMHG | DIASTOLIC BLOOD PRESSURE: 78 MMHG | WEIGHT: 221.31 LBS | HEIGHT: 62 IN | BODY MASS INDEX: 40.72 KG/M2

## 2020-10-20 DIAGNOSIS — G31.84 MILD COGNITIVE IMPAIRMENT WITH MEMORY LOSS: ICD-10-CM

## 2020-10-20 DIAGNOSIS — M79.605 BILATERAL LEG PAIN: Primary | ICD-10-CM

## 2020-10-20 DIAGNOSIS — M25.559 HIP PAIN: ICD-10-CM

## 2020-10-20 DIAGNOSIS — N89.8 VAGINAL DISCHARGE: Primary | ICD-10-CM

## 2020-10-20 DIAGNOSIS — G89.29 CHRONIC BILATERAL LOW BACK PAIN, UNSPECIFIED WHETHER SCIATICA PRESENT: ICD-10-CM

## 2020-10-20 DIAGNOSIS — M79.604 BILATERAL LEG PAIN: Primary | ICD-10-CM

## 2020-10-20 DIAGNOSIS — M54.50 CHRONIC BILATERAL LOW BACK PAIN, UNSPECIFIED WHETHER SCIATICA PRESENT: ICD-10-CM

## 2020-10-20 PROCEDURE — 3080F PR MOST RECENT DIASTOLIC BLOOD PRESSURE >= 90 MM HG: ICD-10-PCS | Mod: CPTII,S$GLB,, | Performed by: FAMILY MEDICINE

## 2020-10-20 PROCEDURE — 1101F PT FALLS ASSESS-DOCD LE1/YR: CPT | Mod: CPTII,S$GLB,, | Performed by: FAMILY MEDICINE

## 2020-10-20 PROCEDURE — 99499 RISK ADDL DX/OHS AUDIT: ICD-10-PCS | Mod: S$GLB,,, | Performed by: FAMILY MEDICINE

## 2020-10-20 PROCEDURE — 99999 PR PBB SHADOW E&M-EST. PATIENT-LVL III: ICD-10-PCS | Mod: PBBFAC,,, | Performed by: NURSE PRACTITIONER

## 2020-10-20 PROCEDURE — 87210 SMEAR WET MOUNT SALINE/INK: CPT | Mod: QW,S$GLB,, | Performed by: NURSE PRACTITIONER

## 2020-10-20 PROCEDURE — 3077F SYST BP >= 140 MM HG: CPT | Mod: CPTII,S$GLB,, | Performed by: FAMILY MEDICINE

## 2020-10-20 PROCEDURE — 99999 PR PBB SHADOW E&M-EST. PATIENT-LVL III: CPT | Mod: PBBFAC,,, | Performed by: NURSE PRACTITIONER

## 2020-10-20 PROCEDURE — 1159F PR MEDICATION LIST DOCUMENTED IN MEDICAL RECORD: ICD-10-PCS | Mod: S$GLB,,, | Performed by: NURSE PRACTITIONER

## 2020-10-20 PROCEDURE — 1159F MED LIST DOCD IN RCRD: CPT | Mod: S$GLB,,, | Performed by: NURSE PRACTITIONER

## 2020-10-20 PROCEDURE — 99215 OFFICE O/P EST HI 40 MIN: CPT | Mod: S$GLB,,, | Performed by: FAMILY MEDICINE

## 2020-10-20 PROCEDURE — 3075F PR MOST RECENT SYSTOLIC BLOOD PRESS GE 130-139MM HG: ICD-10-PCS | Mod: CPTII,S$GLB,, | Performed by: NURSE PRACTITIONER

## 2020-10-20 PROCEDURE — 3008F BODY MASS INDEX DOCD: CPT | Mod: CPTII,S$GLB,, | Performed by: NURSE PRACTITIONER

## 2020-10-20 PROCEDURE — 99215 PR OFFICE/OUTPT VISIT, EST, LEVL V, 40-54 MIN: ICD-10-PCS | Mod: S$GLB,,, | Performed by: FAMILY MEDICINE

## 2020-10-20 PROCEDURE — 3078F DIAST BP <80 MM HG: CPT | Mod: CPTII,S$GLB,, | Performed by: NURSE PRACTITIONER

## 2020-10-20 PROCEDURE — 1125F PR PAIN SEVERITY QUANTIFIED, PAIN PRESENT: ICD-10-PCS | Mod: S$GLB,,, | Performed by: NURSE PRACTITIONER

## 2020-10-20 PROCEDURE — 1125F AMNT PAIN NOTED PAIN PRSNT: CPT | Mod: S$GLB,,, | Performed by: FAMILY MEDICINE

## 2020-10-20 PROCEDURE — 3080F DIAST BP >= 90 MM HG: CPT | Mod: CPTII,S$GLB,, | Performed by: FAMILY MEDICINE

## 2020-10-20 PROCEDURE — 1125F PR PAIN SEVERITY QUANTIFIED, PAIN PRESENT: ICD-10-PCS | Mod: S$GLB,,, | Performed by: FAMILY MEDICINE

## 2020-10-20 PROCEDURE — 3077F PR MOST RECENT SYSTOLIC BLOOD PRESSURE >= 140 MM HG: ICD-10-PCS | Mod: CPTII,S$GLB,, | Performed by: FAMILY MEDICINE

## 2020-10-20 PROCEDURE — 1101F PT FALLS ASSESS-DOCD LE1/YR: CPT | Mod: CPTII,S$GLB,, | Performed by: NURSE PRACTITIONER

## 2020-10-20 PROCEDURE — 99214 OFFICE O/P EST MOD 30 MIN: CPT | Mod: 25,S$GLB,, | Performed by: NURSE PRACTITIONER

## 2020-10-20 PROCEDURE — 3008F PR BODY MASS INDEX (BMI) DOCUMENTED: ICD-10-PCS | Mod: CPTII,S$GLB,, | Performed by: FAMILY MEDICINE

## 2020-10-20 PROCEDURE — 3078F PR MOST RECENT DIASTOLIC BLOOD PRESSURE < 80 MM HG: ICD-10-PCS | Mod: CPTII,S$GLB,, | Performed by: NURSE PRACTITIONER

## 2020-10-20 PROCEDURE — 1159F MED LIST DOCD IN RCRD: CPT | Mod: S$GLB,,, | Performed by: FAMILY MEDICINE

## 2020-10-20 PROCEDURE — 1159F PR MEDICATION LIST DOCUMENTED IN MEDICAL RECORD: ICD-10-PCS | Mod: S$GLB,,, | Performed by: FAMILY MEDICINE

## 2020-10-20 PROCEDURE — 3008F BODY MASS INDEX DOCD: CPT | Mod: CPTII,S$GLB,, | Performed by: FAMILY MEDICINE

## 2020-10-20 PROCEDURE — 1101F PR PT FALLS ASSESS DOC 0-1 FALLS W/OUT INJ PAST YR: ICD-10-PCS | Mod: CPTII,S$GLB,, | Performed by: FAMILY MEDICINE

## 2020-10-20 PROCEDURE — 3075F SYST BP GE 130 - 139MM HG: CPT | Mod: CPTII,S$GLB,, | Performed by: NURSE PRACTITIONER

## 2020-10-20 PROCEDURE — 99999 PR PBB SHADOW E&M-EST. PATIENT-LVL IV: ICD-10-PCS | Mod: PBBFAC,,, | Performed by: FAMILY MEDICINE

## 2020-10-20 PROCEDURE — 3008F PR BODY MASS INDEX (BMI) DOCUMENTED: ICD-10-PCS | Mod: CPTII,S$GLB,, | Performed by: NURSE PRACTITIONER

## 2020-10-20 PROCEDURE — 99999 PR PBB SHADOW E&M-EST. PATIENT-LVL IV: CPT | Mod: PBBFAC,,, | Performed by: FAMILY MEDICINE

## 2020-10-20 PROCEDURE — 1101F PR PT FALLS ASSESS DOC 0-1 FALLS W/OUT INJ PAST YR: ICD-10-PCS | Mod: CPTII,S$GLB,, | Performed by: NURSE PRACTITIONER

## 2020-10-20 PROCEDURE — 99499 UNLISTED E&M SERVICE: CPT | Mod: S$GLB,,, | Performed by: FAMILY MEDICINE

## 2020-10-20 PROCEDURE — 87210 PR  SMEAR,STAIN,WET MNT,INTERP: ICD-10-PCS | Mod: QW,S$GLB,, | Performed by: NURSE PRACTITIONER

## 2020-10-20 PROCEDURE — 1125F AMNT PAIN NOTED PAIN PRSNT: CPT | Mod: S$GLB,,, | Performed by: NURSE PRACTITIONER

## 2020-10-20 PROCEDURE — 99214 PR OFFICE/OUTPT VISIT, EST, LEVL IV, 30-39 MIN: ICD-10-PCS | Mod: 25,S$GLB,, | Performed by: NURSE PRACTITIONER

## 2020-10-20 NOTE — PATIENT INSTRUCTIONS
Continue regular follow-up with primary care    I have recommended physical therapy and to try the meloxicam given her primary care    The patient request referral to pain management and we will make that referral

## 2020-10-20 NOTE — PROGRESS NOTES
Subjective:     Patient ID: Beth Souza is a 70 y.o. female.    Chief Complaint: Pain of the Left Hip      HPI:  Patient is scheduled for left hip pain which started a few weeks ago however she states to me that she wants to be evaluated and treated for bilateral hip pain as well as low back pain and pains that radiate down the back inside of her legs to her feet and also for swelling in her ft and lower legs which is started over the past few weeks.  No specific injury known and patient states no change in activity or recent medication.      Problem - the patient states that it was recommended to her to change her footwear and this seemed to help for few days but is not helping any longer.  She says she was also referred to physical therapy but has not gone.  She says her primary care has her on a medication which has a diuretic and at but she still has swelling in her ft and lower legs which is uncomfortable.  She has not taken any long trips recently or had known risk for DVT.    Duration - 2-4 weeks    Severity -    Pain - 9/10.  Patient says the pain interferes with her being able to do any walking or standing much at home or to do any ADLs and affects sleep at night.  She shows me the meloxicam bottle she was given yesterday by her physician but she has she wanted to wait till she came in today to discuss starting at.  She says she has an allergic reaction in her skin to aspirin but is able to take ibuprofen and Aleve type medications that any difficulty so I advised her that she most likely will be able to take the meloxicam without problem.  She however says that the weaning.  Vascular decompression of the did discuss with her primary care or cardiologist and that if she thinks she is having any cardiac related problem she should go to urgent care or the emergency department.   Limitations - affects standing and walking    Previous Treatment - has not been to physical therapy although it was  ordered.  Other Information - the patient is adamant that she just wants the pain fixed that she has not only in her hip but in both legs and related to the swelling in the legs, as well as back problems.      Past Medical History:   Diagnosis Date    Cataract     Hyperlipidemia     Hypertension     Lumbar spondylosis     Vitamin D deficiency      Past Surgical History:   Procedure Laterality Date    HYSTERECTOMY      PARTIAL HYSTERECTOMY       Family History   Problem Relation Age of Onset    Hypertension Mother     Diabetes Daughter     Melanoma Neg Hx     Psoriasis Neg Hx     Lupus Neg Hx     Eczema Neg Hx      Social History     Socioeconomic History    Marital status:      Spouse name: Not on file    Number of children: 3    Years of education: Not on file    Highest education level: Not on file   Occupational History    Occupation: Cleaning houses     Employer: not employed   Social Needs    Financial resource strain: Not on file    Food insecurity     Worry: Not on file     Inability: Not on file    Transportation needs     Medical: Not on file     Non-medical: Not on file   Tobacco Use    Smoking status: Former Smoker     Quit date: 2004     Years since quittin.8    Smokeless tobacco: Former User   Substance and Sexual Activity    Alcohol use: Yes     Frequency: 2-4 times a month     Drinks per session: 1 or 2     Binge frequency: Never     Comment: occasionallty    Drug use: No    Sexual activity: Not Currently     Partners: Male   Lifestyle    Physical activity     Days per week: Not on file     Minutes per session: Not on file    Stress: Not on file   Relationships    Social connections     Talks on phone: Not on file     Gets together: Not on file     Attends [employer] service: Not on file     Active member of club or organization: Not on file     Attends meetings of clubs or organizations: Not on file     Relationship status: Not on file   Other Topics  Concern    Are you pregnant or think you may be? No    Breast-feeding No   Social History Narrative    Live w/ alone        Current Outpatient Medications:     atorvastatin (LIPITOR) 40 MG tablet, Take 1 tablet (40 mg total) by mouth once daily., Disp: 90 tablet, Rfl: 3    cetirizine (ZYRTEC) 10 MG tablet, Take 1 tablet (10 mg total) by mouth every evening., Disp: 30 tablet, Rfl: 0    ergocalciferol (ERGOCALCIFEROL) 50,000 unit Cap, Take 1 capsule (50,000 Units total) by mouth every 7 days., Disp: 10 capsule, Rfl: 0    lisinopriL-hydrochlorothiazide (PRINZIDE,ZESTORETIC) 20-25 mg Tab, Take 1 tablet by mouth once daily., Disp: 90 tablet, Rfl: 3    meloxicam (MOBIC) 7.5 MG tablet, Take 1-2 tablets as needed for hip pain, Disp: 20 tablet, Rfl: 0    multivitamin capsule, Take 1 capsule by mouth once daily., Disp: , Rfl:   Review of patient's allergies indicates:   Allergen Reactions    Aspirin Other (See Comments)     Bruising     Review of Systems   Constitutional: Negative for chills, fever and weight loss.   Respiratory: Negative for shortness of breath.    Cardiovascular: Positive for leg swelling. Negative for chest pain and palpitations.   Musculoskeletal: Positive for back pain and joint pain.       Objective:   Body mass index is 40 kg/m².  Vitals:    10/20/20 0910   BP: (!) 145/95   Pulse: (!) 52           Ortho/SPM Exam  General - A&O, NAD.  Obese but ambulatory and does not appear to be in acute distress or severe pain at this time    Respiratory Effort - Normal    Extremity (Body Part) - .  Left hip     -No acute deformity/swelling    ROM - internal rotation 10° in external 15° with mild discomfort    TTP - very tender to palpation over the left greater trochanter but the patient complains of some tenderness to palpation in all areas examined of the left thigh left lower extremity and ankle region bilaterally.    Stability -within normal limits    Strength - not tested today due to patient's  discomfort    Neurovascular Intact -     Other - 1+ ankle edema noted and when lightly touch the patient complained of pain and I was unable to do a more full exam.  The patient was leaning back in the chair and had difficulty sitting up straight but I was able to test straight leg raise bilateral to 80° without any pain in the back with these movements.    Psychiatric - Affect & Cognition WNL      Plan for Improvement - recommended the patient elevate her feet and talk to her doctor about possibly getting compression stockings as well as using her diuretic and staying on low-salt diet.    Recommended the patient take a cortisone injection to the left greater trochanter hip.  Today to reduce her pain in the left leg region however she refused to consider this and said she wanted all of her pain treated including both legs and back.  I offered to send her to pain management for evaluation and she said she would prefer to do that so we will be glad to make the referral for her.      IMAGING: X-Ray Hips Bilateral 2 View Incl AP Pelvis  Narrative: EXAMINATION:  XR HIPS BILATERAL 2 VIEW INCL AP PELVIS    CLINICAL HISTORY:  Pain in unspecified hip    TECHNIQUE:  AP view of the pelvis and frogleg lateral views of both hips were performed.    COMPARISON:  January 23, 2014    FINDINGS:  Prominent degenerative changes noted in the included lumbosacral spine, bilateral hips and pubic symphysis.  There is asymmetric increased density or sclerosis involving the pubic symphysis right greater than left.  Symmetric degenerative changes noted in the SI and hip joints.  No acute fracture or dislocation.  Pelvic ring is intact.  No grossly evident AVN on this plain film exam.  Several calcifications noted within the lower abdomen and pelvis bilaterally.  Impression: As above.  Follow-up and or further evaluation as warranted.    Electronically signed by: Jose Antonio Romero MD  Date:    10/19/2020  Time:    11:19       Radiographs /  Imaging : Relevant imaging results reviewed by me and interpreted by me, discussed with the patient and / or family -radiographs reviewed by me and shown to the patient and discussed with her and Voltaren significant degenerative changes in the hips but no acute dislocation or fracture.      Assessment:     Encounter Diagnoses   Name Primary?    Hip pain     Bilateral leg pain Yes    Chronic bilateral low back pain, unspecified whether sciatica present     Mild cognitive impairment with memory loss         Plan:   Bilateral leg pain  -     Ambulatory referral/consult to Pain Clinic; Future; Expected date: 10/27/2020    Hip pain  -     Ambulatory referral/consult to Orthopedics    Chronic bilateral low back pain, unspecified whether sciatica present  -     Ambulatory referral/consult to Pain Clinic; Future; Expected date: 10/27/2020    Mild cognitive impairment with memory loss        The patient verbalized good understanding of the medical issues discussed today and expressed appreciation for the care provided.  Patient was given the opportunity to ask questions and be an active participant in their medical care. Patient had no further questions or concerns at this time.     The patient was encouraged to participate in appropriate physical activity.      Vincent Dwyer M.D.  [facility]        This note was dictated using voice recognition software and may have sound a like errors.

## 2020-10-20 NOTE — PROGRESS NOTES
"CC:   Beth Souza is a 70 y.o. female  presents with complaint of   Clear light vaginal discharge for one week no odor no itching   Past Medical History:   Diagnosis Date    Cataract     Hyperlipidemia     Hypertension     Lumbar spondylosis     Vitamin D deficiency      Past Surgical History:   Procedure Laterality Date    HYSTERECTOMY      PARTIAL HYSTERECTOMY       Social History     Tobacco Use    Smoking status: Former Smoker     Quit date: 2004     Years since quittin.8    Smokeless tobacco: Never Used   Substance Use Topics    Alcohol use: Yes     Frequency: 2-4 times a month     Drinks per session: 1 or 2     Binge frequency: Never     Comment: occasionally    Drug use: No     Family History   Problem Relation Age of Onset    Hypertension Mother     Diabetes Daughter     Melanoma Neg Hx     Psoriasis Neg Hx     Lupus Neg Hx     Eczema Neg Hx     Breast cancer Neg Hx     Colon cancer Neg Hx     Ovarian cancer Neg Hx     Thrombosis Neg Hx      OB History    Para Term  AB Living   3 3 3     3   SAB TAB Ectopic Multiple Live Births           3      # Outcome Date GA Lbr Zachary/2nd Weight Sex Delivery Anes PTL Lv   3 Term      Vag-Spont   HUGO   2 Term      Vag-Spont   HUGO   1 Term      Vag-Spont   HUGO       /78   Ht 5' 2" (1.575 m)   Wt 100.4 kg (221 lb 5.5 oz)   BMI 40.48 kg/m²     ROS:  Review of Systems   Genitourinary: Positive for vaginal discharge.         PHYSICAL EXAM:  Physical Exam  Genitourinary:      Pelvic exam was performed with patient supine.      Vulva, inguinal canal, urethra and bladder normal.      Genitourinary Comments: Wet prep negative   White discharge   Uterus and cervix absent              ASSESSMENT and PLAN:    ICD-10-CM ICD-9-CM    1. Vaginal discharge  N89.8 623.5 POCT WET PREP     Normal vaginal discharge reassurance provided   "

## 2020-10-20 NOTE — LETTER
October 20, 2020      Dyan Carney PA-C  [address] [place] [address]           [facility]  [address] [facility] [place] [address]  Phone: [phone]  Fax: [phone]          Patient: Beth Souaz   MR Number: [id number]   YOB: 1950   Date of Visit: 10/20/2020       Dear Dyan Carney:    Thank you for referring Beth Souza to me for evaluation. Attached you will find relevant portions of my assessment and plan of care.    If you have questions, please do not hesitate to call me. I look forward to following Beth Souza along with you.    Sincerely,    Vincent Dwyer MD    Enclosure  CC:  No Recipients    If you would like to receive this communication electronically, please contact [email or web address] or [phone] to request more information on [employer] access.    For providers and/or their staff who would like to refer a patient to [facility], please contact us through our one-stop-shop provider referral line, [facility], at [phone].    If you feel you have received this communication in error or would no longer like to receive these types of communications, please e-mail [email or web address]

## 2021-01-08 ENCOUNTER — TELEPHONE (OUTPATIENT)
Dept: INTERNAL MEDICINE | Facility: CLINIC | Age: 71
End: 2021-01-08

## 2021-01-12 ENCOUNTER — OFFICE VISIT (OUTPATIENT)
Dept: INTERNAL MEDICINE | Facility: CLINIC | Age: 71
End: 2021-01-12
Payer: MEDICARE

## 2021-01-12 VITALS
WEIGHT: 225.06 LBS | DIASTOLIC BLOOD PRESSURE: 86 MMHG | RESPIRATION RATE: 20 BRPM | HEART RATE: 76 BPM | OXYGEN SATURATION: 95 % | TEMPERATURE: 99 F | SYSTOLIC BLOOD PRESSURE: 131 MMHG | BODY MASS INDEX: 41.17 KG/M2

## 2021-01-12 DIAGNOSIS — M54.31 BILATERAL SCIATICA: Primary | ICD-10-CM

## 2021-01-12 DIAGNOSIS — I10 ESSENTIAL HYPERTENSION: ICD-10-CM

## 2021-01-12 DIAGNOSIS — M54.32 BILATERAL SCIATICA: Primary | ICD-10-CM

## 2021-01-12 DIAGNOSIS — L72.3 INFECTED SEBACEOUS CYST: ICD-10-CM

## 2021-01-12 DIAGNOSIS — L08.9 INFECTED SEBACEOUS CYST: ICD-10-CM

## 2021-01-12 PROCEDURE — 99214 PR OFFICE/OUTPT VISIT, EST, LEVL IV, 30-39 MIN: ICD-10-PCS | Mod: S$GLB,,, | Performed by: PHYSICIAN ASSISTANT

## 2021-01-12 PROCEDURE — 3079F PR MOST RECENT DIASTOLIC BLOOD PRESSURE 80-89 MM HG: ICD-10-PCS | Mod: CPTII,S$GLB,, | Performed by: PHYSICIAN ASSISTANT

## 2021-01-12 PROCEDURE — 99999 PR PBB SHADOW E&M-EST. PATIENT-LVL IV: ICD-10-PCS | Mod: PBBFAC,,, | Performed by: PHYSICIAN ASSISTANT

## 2021-01-12 PROCEDURE — 3008F BODY MASS INDEX DOCD: CPT | Mod: CPTII,S$GLB,, | Performed by: PHYSICIAN ASSISTANT

## 2021-01-12 PROCEDURE — 3008F PR BODY MASS INDEX (BMI) DOCUMENTED: ICD-10-PCS | Mod: CPTII,S$GLB,, | Performed by: PHYSICIAN ASSISTANT

## 2021-01-12 PROCEDURE — 99214 OFFICE O/P EST MOD 30 MIN: CPT | Mod: S$GLB,,, | Performed by: PHYSICIAN ASSISTANT

## 2021-01-12 PROCEDURE — 1159F PR MEDICATION LIST DOCUMENTED IN MEDICAL RECORD: ICD-10-PCS | Mod: S$GLB,,, | Performed by: PHYSICIAN ASSISTANT

## 2021-01-12 PROCEDURE — 99999 PR PBB SHADOW E&M-EST. PATIENT-LVL IV: CPT | Mod: PBBFAC,,, | Performed by: PHYSICIAN ASSISTANT

## 2021-01-12 PROCEDURE — 3075F SYST BP GE 130 - 139MM HG: CPT | Mod: CPTII,S$GLB,, | Performed by: PHYSICIAN ASSISTANT

## 2021-01-12 PROCEDURE — 1125F AMNT PAIN NOTED PAIN PRSNT: CPT | Mod: S$GLB,,, | Performed by: PHYSICIAN ASSISTANT

## 2021-01-12 PROCEDURE — 1125F PR PAIN SEVERITY QUANTIFIED, PAIN PRESENT: ICD-10-PCS | Mod: S$GLB,,, | Performed by: PHYSICIAN ASSISTANT

## 2021-01-12 PROCEDURE — 1159F MED LIST DOCD IN RCRD: CPT | Mod: S$GLB,,, | Performed by: PHYSICIAN ASSISTANT

## 2021-01-12 PROCEDURE — 3079F DIAST BP 80-89 MM HG: CPT | Mod: CPTII,S$GLB,, | Performed by: PHYSICIAN ASSISTANT

## 2021-01-12 PROCEDURE — 3075F PR MOST RECENT SYSTOLIC BLOOD PRESS GE 130-139MM HG: ICD-10-PCS | Mod: CPTII,S$GLB,, | Performed by: PHYSICIAN ASSISTANT

## 2021-01-12 RX ORDER — DOXYCYCLINE 100 MG/1
100 CAPSULE ORAL 2 TIMES DAILY
Qty: 20 CAPSULE | Refills: 0 | Status: SHIPPED | OUTPATIENT
Start: 2021-01-12 | End: 2021-01-22

## 2021-01-12 RX ORDER — PREDNISONE 20 MG/1
TABLET ORAL
Qty: 9 TABLET | Refills: 0 | Status: SHIPPED | OUTPATIENT
Start: 2021-01-12 | End: 2021-02-01

## 2021-01-14 ENCOUNTER — OFFICE VISIT (OUTPATIENT)
Dept: PAIN MEDICINE | Facility: CLINIC | Age: 71
End: 2021-01-14
Payer: MEDICARE

## 2021-01-14 VITALS
DIASTOLIC BLOOD PRESSURE: 91 MMHG | WEIGHT: 225.06 LBS | BODY MASS INDEX: 41.42 KG/M2 | SYSTOLIC BLOOD PRESSURE: 149 MMHG | HEIGHT: 62 IN | HEART RATE: 53 BPM

## 2021-01-14 DIAGNOSIS — M54.16 LUMBAR RADICULOPATHY: Primary | ICD-10-CM

## 2021-01-14 DIAGNOSIS — M47.816 LUMBAR SPONDYLOSIS: ICD-10-CM

## 2021-01-14 DIAGNOSIS — M54.32 BILATERAL SCIATICA: ICD-10-CM

## 2021-01-14 DIAGNOSIS — M54.31 BILATERAL SCIATICA: ICD-10-CM

## 2021-01-14 PROCEDURE — 99213 PR OFFICE/OUTPT VISIT, EST, LEVL III, 20-29 MIN: ICD-10-PCS | Mod: S$GLB,,, | Performed by: ANESTHESIOLOGY

## 2021-01-14 PROCEDURE — 99999 PR PBB SHADOW E&M-EST. PATIENT-LVL IV: CPT | Mod: PBBFAC,,, | Performed by: ANESTHESIOLOGY

## 2021-01-14 PROCEDURE — 99999 PR PBB SHADOW E&M-EST. PATIENT-LVL IV: ICD-10-PCS | Mod: PBBFAC,,, | Performed by: ANESTHESIOLOGY

## 2021-01-14 PROCEDURE — 3288F FALL RISK ASSESSMENT DOCD: CPT | Mod: CPTII,S$GLB,, | Performed by: ANESTHESIOLOGY

## 2021-01-14 PROCEDURE — 3077F SYST BP >= 140 MM HG: CPT | Mod: CPTII,S$GLB,, | Performed by: ANESTHESIOLOGY

## 2021-01-14 PROCEDURE — 3288F PR FALLS RISK ASSESSMENT DOCUMENTED: ICD-10-PCS | Mod: CPTII,S$GLB,, | Performed by: ANESTHESIOLOGY

## 2021-01-14 PROCEDURE — 1159F PR MEDICATION LIST DOCUMENTED IN MEDICAL RECORD: ICD-10-PCS | Mod: S$GLB,,, | Performed by: ANESTHESIOLOGY

## 2021-01-14 PROCEDURE — 1101F PT FALLS ASSESS-DOCD LE1/YR: CPT | Mod: CPTII,S$GLB,, | Performed by: ANESTHESIOLOGY

## 2021-01-14 PROCEDURE — 3077F PR MOST RECENT SYSTOLIC BLOOD PRESSURE >= 140 MM HG: ICD-10-PCS | Mod: CPTII,S$GLB,, | Performed by: ANESTHESIOLOGY

## 2021-01-14 PROCEDURE — 3008F PR BODY MASS INDEX (BMI) DOCUMENTED: ICD-10-PCS | Mod: CPTII,S$GLB,, | Performed by: ANESTHESIOLOGY

## 2021-01-14 PROCEDURE — 1125F PR PAIN SEVERITY QUANTIFIED, PAIN PRESENT: ICD-10-PCS | Mod: S$GLB,,, | Performed by: ANESTHESIOLOGY

## 2021-01-14 PROCEDURE — 3008F BODY MASS INDEX DOCD: CPT | Mod: CPTII,S$GLB,, | Performed by: ANESTHESIOLOGY

## 2021-01-14 PROCEDURE — 1159F MED LIST DOCD IN RCRD: CPT | Mod: S$GLB,,, | Performed by: ANESTHESIOLOGY

## 2021-01-14 PROCEDURE — 3080F PR MOST RECENT DIASTOLIC BLOOD PRESSURE >= 90 MM HG: ICD-10-PCS | Mod: CPTII,S$GLB,, | Performed by: ANESTHESIOLOGY

## 2021-01-14 PROCEDURE — 3080F DIAST BP >= 90 MM HG: CPT | Mod: CPTII,S$GLB,, | Performed by: ANESTHESIOLOGY

## 2021-01-14 PROCEDURE — 1125F AMNT PAIN NOTED PAIN PRSNT: CPT | Mod: S$GLB,,, | Performed by: ANESTHESIOLOGY

## 2021-01-14 PROCEDURE — 1101F PR PT FALLS ASSESS DOC 0-1 FALLS W/OUT INJ PAST YR: ICD-10-PCS | Mod: CPTII,S$GLB,, | Performed by: ANESTHESIOLOGY

## 2021-01-14 PROCEDURE — 99213 OFFICE O/P EST LOW 20 MIN: CPT | Mod: S$GLB,,, | Performed by: ANESTHESIOLOGY

## 2021-01-14 RX ORDER — GABAPENTIN 300 MG/1
CAPSULE ORAL
Qty: 90 CAPSULE | Refills: 0 | Status: SHIPPED | OUTPATIENT
Start: 2021-01-14 | End: 2021-02-01

## 2021-01-19 ENCOUNTER — TELEPHONE (OUTPATIENT)
Dept: PAIN MEDICINE | Facility: CLINIC | Age: 71
End: 2021-01-19

## 2021-01-19 DIAGNOSIS — M54.16 LUMBAR RADICULOPATHY: Primary | ICD-10-CM

## 2021-01-20 ENCOUNTER — OFFICE VISIT (OUTPATIENT)
Dept: OPHTHALMOLOGY | Facility: CLINIC | Age: 71
End: 2021-01-20
Payer: MEDICARE

## 2021-01-20 ENCOUNTER — TELEPHONE (OUTPATIENT)
Dept: INTERNAL MEDICINE | Facility: CLINIC | Age: 71
End: 2021-01-20

## 2021-01-20 DIAGNOSIS — H26.9 CATARACT, UNSPECIFIED CATARACT TYPE, UNSPECIFIED LATERALITY: Primary | ICD-10-CM

## 2021-01-20 PROCEDURE — 99499 UNLISTED E&M SERVICE: CPT | Mod: S$GLB,,, | Performed by: STUDENT IN AN ORGANIZED HEALTH CARE EDUCATION/TRAINING PROGRAM

## 2021-01-20 PROCEDURE — 99499 NO LOS: ICD-10-PCS | Mod: S$GLB,,, | Performed by: STUDENT IN AN ORGANIZED HEALTH CARE EDUCATION/TRAINING PROGRAM

## 2021-01-22 ENCOUNTER — TELEPHONE (OUTPATIENT)
Dept: INTERNAL MEDICINE | Facility: CLINIC | Age: 71
End: 2021-01-22

## 2021-01-25 ENCOUNTER — OFFICE VISIT (OUTPATIENT)
Dept: OPHTHALMOLOGY | Facility: CLINIC | Age: 71
End: 2021-01-25
Payer: MEDICARE

## 2021-01-25 ENCOUNTER — OFFICE VISIT (OUTPATIENT)
Dept: ORTHOPEDICS | Facility: CLINIC | Age: 71
End: 2021-01-25
Payer: MEDICARE

## 2021-01-25 VITALS
WEIGHT: 225.06 LBS | DIASTOLIC BLOOD PRESSURE: 84 MMHG | HEART RATE: 51 BPM | HEIGHT: 62 IN | BODY MASS INDEX: 41.42 KG/M2 | SYSTOLIC BLOOD PRESSURE: 143 MMHG

## 2021-01-25 DIAGNOSIS — M79.605 BILATERAL LEG PAIN: ICD-10-CM

## 2021-01-25 DIAGNOSIS — H52.4 MYOPIA WITH ASTIGMATISM AND PRESBYOPIA, RIGHT: ICD-10-CM

## 2021-01-25 DIAGNOSIS — M70.62 GREATER TROCHANTERIC BURSITIS, LEFT: Primary | ICD-10-CM

## 2021-01-25 DIAGNOSIS — H52.202 HYPEROPIA OF LEFT EYE WITH ASTIGMATISM AND PRESBYOPIA: ICD-10-CM

## 2021-01-25 DIAGNOSIS — H52.11 MYOPIA WITH ASTIGMATISM AND PRESBYOPIA, RIGHT: ICD-10-CM

## 2021-01-25 DIAGNOSIS — H40.013 OPEN ANGLE WITH BORDERLINE FINDINGS OF BOTH EYES: ICD-10-CM

## 2021-01-25 DIAGNOSIS — H25.13 NUCLEAR SCLEROSIS, BILATERAL: ICD-10-CM

## 2021-01-25 DIAGNOSIS — H52.02 HYPEROPIA OF LEFT EYE WITH ASTIGMATISM AND PRESBYOPIA: ICD-10-CM

## 2021-01-25 DIAGNOSIS — H52.4 HYPEROPIA OF LEFT EYE WITH ASTIGMATISM AND PRESBYOPIA: ICD-10-CM

## 2021-01-25 DIAGNOSIS — H04.123 BILATERAL DRY EYES: Primary | ICD-10-CM

## 2021-01-25 DIAGNOSIS — H52.201 MYOPIA WITH ASTIGMATISM AND PRESBYOPIA, RIGHT: ICD-10-CM

## 2021-01-25 DIAGNOSIS — M79.604 BILATERAL LEG PAIN: ICD-10-CM

## 2021-01-25 PROCEDURE — 99999 PR PBB SHADOW E&M-EST. PATIENT-LVL II: ICD-10-PCS | Mod: PBBFAC,,, | Performed by: OPTOMETRIST

## 2021-01-25 PROCEDURE — 3288F FALL RISK ASSESSMENT DOCD: CPT | Mod: CPTII,S$GLB,, | Performed by: FAMILY MEDICINE

## 2021-01-25 PROCEDURE — 3079F DIAST BP 80-89 MM HG: CPT | Mod: CPTII,S$GLB,, | Performed by: FAMILY MEDICINE

## 2021-01-25 PROCEDURE — 92015 DETERMINE REFRACTIVE STATE: CPT | Mod: S$GLB,,, | Performed by: OPTOMETRIST

## 2021-01-25 PROCEDURE — 92014 COMPRE OPH EXAM EST PT 1/>: CPT | Mod: S$GLB,,, | Performed by: OPTOMETRIST

## 2021-01-25 PROCEDURE — 1125F AMNT PAIN NOTED PAIN PRSNT: CPT | Mod: S$GLB,,, | Performed by: FAMILY MEDICINE

## 2021-01-25 PROCEDURE — 92014 PR EYE EXAM, EST PATIENT,COMPREHESV: ICD-10-PCS | Mod: S$GLB,,, | Performed by: OPTOMETRIST

## 2021-01-25 PROCEDURE — 92015 PR REFRACTION: ICD-10-PCS | Mod: S$GLB,,, | Performed by: OPTOMETRIST

## 2021-01-25 PROCEDURE — 1101F PT FALLS ASSESS-DOCD LE1/YR: CPT | Mod: CPTII,S$GLB,, | Performed by: FAMILY MEDICINE

## 2021-01-25 PROCEDURE — 1125F PR PAIN SEVERITY QUANTIFIED, PAIN PRESENT: ICD-10-PCS | Mod: S$GLB,,, | Performed by: FAMILY MEDICINE

## 2021-01-25 PROCEDURE — 20610 DRAIN/INJ JOINT/BURSA W/O US: CPT | Mod: LT,S$GLB,, | Performed by: FAMILY MEDICINE

## 2021-01-25 PROCEDURE — 99999 PR PBB SHADOW E&M-EST. PATIENT-LVL III: CPT | Mod: PBBFAC,,, | Performed by: FAMILY MEDICINE

## 2021-01-25 PROCEDURE — 3077F SYST BP >= 140 MM HG: CPT | Mod: CPTII,S$GLB,, | Performed by: FAMILY MEDICINE

## 2021-01-25 PROCEDURE — 99999 PR PBB SHADOW E&M-EST. PATIENT-LVL II: CPT | Mod: PBBFAC,,, | Performed by: OPTOMETRIST

## 2021-01-25 PROCEDURE — 99214 PR OFFICE/OUTPT VISIT, EST, LEVL IV, 30-39 MIN: ICD-10-PCS | Mod: 25,S$GLB,, | Performed by: FAMILY MEDICINE

## 2021-01-25 PROCEDURE — 20610 LARGE JOINT ASPIRATION/INJECTION: L GREATER TROCHANTERIC BURSA: ICD-10-PCS | Mod: LT,S$GLB,, | Performed by: FAMILY MEDICINE

## 2021-01-25 PROCEDURE — 3079F PR MOST RECENT DIASTOLIC BLOOD PRESSURE 80-89 MM HG: ICD-10-PCS | Mod: CPTII,S$GLB,, | Performed by: FAMILY MEDICINE

## 2021-01-25 PROCEDURE — 3288F PR FALLS RISK ASSESSMENT DOCUMENTED: ICD-10-PCS | Mod: CPTII,S$GLB,, | Performed by: FAMILY MEDICINE

## 2021-01-25 PROCEDURE — 99999 PR PBB SHADOW E&M-EST. PATIENT-LVL III: ICD-10-PCS | Mod: PBBFAC,,, | Performed by: FAMILY MEDICINE

## 2021-01-25 PROCEDURE — 1159F MED LIST DOCD IN RCRD: CPT | Mod: S$GLB,,, | Performed by: FAMILY MEDICINE

## 2021-01-25 PROCEDURE — 3008F PR BODY MASS INDEX (BMI) DOCUMENTED: ICD-10-PCS | Mod: CPTII,S$GLB,, | Performed by: FAMILY MEDICINE

## 2021-01-25 PROCEDURE — 3077F PR MOST RECENT SYSTOLIC BLOOD PRESSURE >= 140 MM HG: ICD-10-PCS | Mod: CPTII,S$GLB,, | Performed by: FAMILY MEDICINE

## 2021-01-25 PROCEDURE — 1159F PR MEDICATION LIST DOCUMENTED IN MEDICAL RECORD: ICD-10-PCS | Mod: S$GLB,,, | Performed by: FAMILY MEDICINE

## 2021-01-25 PROCEDURE — 1101F PR PT FALLS ASSESS DOC 0-1 FALLS W/OUT INJ PAST YR: ICD-10-PCS | Mod: CPTII,S$GLB,, | Performed by: FAMILY MEDICINE

## 2021-01-25 PROCEDURE — 3008F BODY MASS INDEX DOCD: CPT | Mod: CPTII,S$GLB,, | Performed by: FAMILY MEDICINE

## 2021-01-25 PROCEDURE — 99214 OFFICE O/P EST MOD 30 MIN: CPT | Mod: 25,S$GLB,, | Performed by: FAMILY MEDICINE

## 2021-01-25 RX ORDER — BETAMETHASONE SODIUM PHOSPHATE AND BETAMETHASONE ACETATE 3; 3 MG/ML; MG/ML
6 INJECTION, SUSPENSION INTRA-ARTICULAR; INTRALESIONAL; INTRAMUSCULAR; SOFT TISSUE
Status: DISCONTINUED | OUTPATIENT
Start: 2021-01-25 | End: 2021-01-25 | Stop reason: HOSPADM

## 2021-01-25 RX ADMIN — BETAMETHASONE SODIUM PHOSPHATE AND BETAMETHASONE ACETATE 6 MG: 3; 3 INJECTION, SUSPENSION INTRA-ARTICULAR; INTRALESIONAL; INTRAMUSCULAR; SOFT TISSUE at 11:01

## 2021-01-29 ENCOUNTER — TELEPHONE (OUTPATIENT)
Dept: PAIN MEDICINE | Facility: CLINIC | Age: 71
End: 2021-01-29

## 2021-02-01 ENCOUNTER — OFFICE VISIT (OUTPATIENT)
Dept: INTERNAL MEDICINE | Facility: CLINIC | Age: 71
End: 2021-02-01
Payer: MEDICARE

## 2021-02-01 VITALS
OXYGEN SATURATION: 100 % | SYSTOLIC BLOOD PRESSURE: 134 MMHG | BODY MASS INDEX: 40.93 KG/M2 | TEMPERATURE: 98 F | WEIGHT: 223.75 LBS | HEART RATE: 96 BPM | DIASTOLIC BLOOD PRESSURE: 88 MMHG

## 2021-02-01 DIAGNOSIS — E78.2 MIXED HYPERLIPIDEMIA: ICD-10-CM

## 2021-02-01 DIAGNOSIS — L72.3 INFECTED SEBACEOUS CYST OF SKIN: ICD-10-CM

## 2021-02-01 DIAGNOSIS — D32.0 MENINGIOMA OF CEREBELLUM: ICD-10-CM

## 2021-02-01 DIAGNOSIS — G31.84 MILD COGNITIVE IMPAIRMENT WITH MEMORY LOSS: ICD-10-CM

## 2021-02-01 DIAGNOSIS — E66.01 MORBID OBESITY WITH BMI OF 40.0-44.9, ADULT: ICD-10-CM

## 2021-02-01 DIAGNOSIS — L08.9 INFECTED SEBACEOUS CYST OF SKIN: ICD-10-CM

## 2021-02-01 DIAGNOSIS — I10 ESSENTIAL HYPERTENSION: Primary | ICD-10-CM

## 2021-02-01 DIAGNOSIS — G47.33 OSA ON CPAP: ICD-10-CM

## 2021-02-01 DIAGNOSIS — M47.26 OSTEOARTHRITIS OF SPINE WITH RADICULOPATHY, LUMBAR REGION: ICD-10-CM

## 2021-02-01 DIAGNOSIS — E55.9 VITAMIN D DEFICIENCY: ICD-10-CM

## 2021-02-01 DIAGNOSIS — E04.2 MULTIPLE THYROID NODULES: ICD-10-CM

## 2021-02-01 PROBLEM — M25.559 HIP PAIN: Status: RESOLVED | Noted: 2020-10-20 | Resolved: 2021-02-01

## 2021-02-01 PROBLEM — M79.604 BILATERAL LEG PAIN: Status: RESOLVED | Noted: 2020-10-20 | Resolved: 2021-02-01

## 2021-02-01 PROBLEM — M79.605 BILATERAL LEG PAIN: Status: RESOLVED | Noted: 2020-10-20 | Resolved: 2021-02-01

## 2021-02-01 PROBLEM — M70.62 GREATER TROCHANTERIC BURSITIS, LEFT: Status: RESOLVED | Noted: 2021-01-25 | Resolved: 2021-02-01

## 2021-02-01 PROCEDURE — 3008F BODY MASS INDEX DOCD: CPT | Mod: CPTII,S$GLB,, | Performed by: FAMILY MEDICINE

## 2021-02-01 PROCEDURE — 99214 PR OFFICE/OUTPT VISIT, EST, LEVL IV, 30-39 MIN: ICD-10-PCS | Mod: S$GLB,,, | Performed by: FAMILY MEDICINE

## 2021-02-01 PROCEDURE — 3079F PR MOST RECENT DIASTOLIC BLOOD PRESSURE 80-89 MM HG: ICD-10-PCS | Mod: CPTII,S$GLB,, | Performed by: FAMILY MEDICINE

## 2021-02-01 PROCEDURE — 99999 PR PBB SHADOW E&M-EST. PATIENT-LVL III: ICD-10-PCS | Mod: PBBFAC,,, | Performed by: FAMILY MEDICINE

## 2021-02-01 PROCEDURE — 3008F PR BODY MASS INDEX (BMI) DOCUMENTED: ICD-10-PCS | Mod: CPTII,S$GLB,, | Performed by: FAMILY MEDICINE

## 2021-02-01 PROCEDURE — 3075F PR MOST RECENT SYSTOLIC BLOOD PRESS GE 130-139MM HG: ICD-10-PCS | Mod: CPTII,S$GLB,, | Performed by: FAMILY MEDICINE

## 2021-02-01 PROCEDURE — 99214 OFFICE O/P EST MOD 30 MIN: CPT | Mod: S$GLB,,, | Performed by: FAMILY MEDICINE

## 2021-02-01 PROCEDURE — 1125F AMNT PAIN NOTED PAIN PRSNT: CPT | Mod: S$GLB,,, | Performed by: FAMILY MEDICINE

## 2021-02-01 PROCEDURE — 3288F PR FALLS RISK ASSESSMENT DOCUMENTED: ICD-10-PCS | Mod: CPTII,S$GLB,, | Performed by: FAMILY MEDICINE

## 2021-02-01 PROCEDURE — 1159F PR MEDICATION LIST DOCUMENTED IN MEDICAL RECORD: ICD-10-PCS | Mod: S$GLB,,, | Performed by: FAMILY MEDICINE

## 2021-02-01 PROCEDURE — 1125F PR PAIN SEVERITY QUANTIFIED, PAIN PRESENT: ICD-10-PCS | Mod: S$GLB,,, | Performed by: FAMILY MEDICINE

## 2021-02-01 PROCEDURE — 3075F SYST BP GE 130 - 139MM HG: CPT | Mod: CPTII,S$GLB,, | Performed by: FAMILY MEDICINE

## 2021-02-01 PROCEDURE — 3288F FALL RISK ASSESSMENT DOCD: CPT | Mod: CPTII,S$GLB,, | Performed by: FAMILY MEDICINE

## 2021-02-01 PROCEDURE — 1101F PR PT FALLS ASSESS DOC 0-1 FALLS W/OUT INJ PAST YR: ICD-10-PCS | Mod: CPTII,S$GLB,, | Performed by: FAMILY MEDICINE

## 2021-02-01 PROCEDURE — 99999 PR PBB SHADOW E&M-EST. PATIENT-LVL III: CPT | Mod: PBBFAC,,, | Performed by: FAMILY MEDICINE

## 2021-02-01 PROCEDURE — 1159F MED LIST DOCD IN RCRD: CPT | Mod: S$GLB,,, | Performed by: FAMILY MEDICINE

## 2021-02-01 PROCEDURE — 1101F PT FALLS ASSESS-DOCD LE1/YR: CPT | Mod: CPTII,S$GLB,, | Performed by: FAMILY MEDICINE

## 2021-02-01 PROCEDURE — 3079F DIAST BP 80-89 MM HG: CPT | Mod: CPTII,S$GLB,, | Performed by: FAMILY MEDICINE

## 2021-02-02 ENCOUNTER — TELEPHONE (OUTPATIENT)
Dept: RADIOLOGY | Facility: HOSPITAL | Age: 71
End: 2021-02-02

## 2021-02-03 ENCOUNTER — TELEPHONE (OUTPATIENT)
Dept: PULMONOLOGY | Facility: CLINIC | Age: 71
End: 2021-02-03

## 2021-02-08 ENCOUNTER — HOSPITAL ENCOUNTER (OUTPATIENT)
Dept: RADIOLOGY | Facility: HOSPITAL | Age: 71
Discharge: HOME | End: 2021-02-08
Attending: FAMILY MEDICINE
Payer: MEDICARE

## 2021-02-08 ENCOUNTER — OFFICE VISIT (OUTPATIENT)
Dept: PAIN MEDICINE | Facility: CLINIC | Age: 71
End: 2021-02-08
Payer: MEDICARE

## 2021-02-08 VITALS
SYSTOLIC BLOOD PRESSURE: 136 MMHG | HEART RATE: 50 BPM | HEIGHT: 62 IN | BODY MASS INDEX: 41.17 KG/M2 | DIASTOLIC BLOOD PRESSURE: 90 MMHG | WEIGHT: 223.75 LBS

## 2021-02-08 DIAGNOSIS — M54.32 BILATERAL SCIATICA: ICD-10-CM

## 2021-02-08 DIAGNOSIS — M54.16 LEFT LUMBAR RADICULOPATHY: ICD-10-CM

## 2021-02-08 DIAGNOSIS — M51.36 DDD (DEGENERATIVE DISC DISEASE), LUMBAR: ICD-10-CM

## 2021-02-08 DIAGNOSIS — M54.31 BILATERAL SCIATICA: ICD-10-CM

## 2021-02-08 DIAGNOSIS — E04.2 MULTIPLE THYROID NODULES: ICD-10-CM

## 2021-02-08 DIAGNOSIS — M48.061 DEGENERATIVE LUMBAR SPINAL STENOSIS: Primary | ICD-10-CM

## 2021-02-08 PROCEDURE — 1159F PR MEDICATION LIST DOCUMENTED IN MEDICAL RECORD: ICD-10-PCS | Mod: S$GLB,,, | Performed by: PHYSICIAN ASSISTANT

## 2021-02-08 PROCEDURE — 3008F PR BODY MASS INDEX (BMI) DOCUMENTED: ICD-10-PCS | Mod: CPTII,S$GLB,, | Performed by: PHYSICIAN ASSISTANT

## 2021-02-08 PROCEDURE — 1125F PR PAIN SEVERITY QUANTIFIED, PAIN PRESENT: ICD-10-PCS | Mod: S$GLB,,, | Performed by: PHYSICIAN ASSISTANT

## 2021-02-08 PROCEDURE — 99214 OFFICE O/P EST MOD 30 MIN: CPT | Mod: S$GLB,,, | Performed by: PHYSICIAN ASSISTANT

## 2021-02-08 PROCEDURE — 3080F PR MOST RECENT DIASTOLIC BLOOD PRESSURE >= 90 MM HG: ICD-10-PCS | Mod: CPTII,S$GLB,, | Performed by: PHYSICIAN ASSISTANT

## 2021-02-08 PROCEDURE — 99214 PR OFFICE/OUTPT VISIT, EST, LEVL IV, 30-39 MIN: ICD-10-PCS | Mod: S$GLB,,, | Performed by: PHYSICIAN ASSISTANT

## 2021-02-08 PROCEDURE — 1159F MED LIST DOCD IN RCRD: CPT | Mod: S$GLB,,, | Performed by: PHYSICIAN ASSISTANT

## 2021-02-08 PROCEDURE — 3075F SYST BP GE 130 - 139MM HG: CPT | Mod: CPTII,S$GLB,, | Performed by: PHYSICIAN ASSISTANT

## 2021-02-08 PROCEDURE — 3080F DIAST BP >= 90 MM HG: CPT | Mod: CPTII,S$GLB,, | Performed by: PHYSICIAN ASSISTANT

## 2021-02-08 PROCEDURE — 76536 US EXAM OF HEAD AND NECK: CPT | Mod: TC

## 2021-02-08 PROCEDURE — 76536 US EXAM OF HEAD AND NECK: CPT | Mod: 26,,, | Performed by: RADIOLOGY

## 2021-02-08 PROCEDURE — 3075F PR MOST RECENT SYSTOLIC BLOOD PRESS GE 130-139MM HG: ICD-10-PCS | Mod: CPTII,S$GLB,, | Performed by: PHYSICIAN ASSISTANT

## 2021-02-08 PROCEDURE — 1125F AMNT PAIN NOTED PAIN PRSNT: CPT | Mod: S$GLB,,, | Performed by: PHYSICIAN ASSISTANT

## 2021-02-08 PROCEDURE — 99999 PR PBB SHADOW E&M-EST. PATIENT-LVL III: CPT | Mod: PBBFAC,,, | Performed by: PHYSICIAN ASSISTANT

## 2021-02-08 PROCEDURE — 99999 PR PBB SHADOW E&M-EST. PATIENT-LVL III: ICD-10-PCS | Mod: PBBFAC,,, | Performed by: PHYSICIAN ASSISTANT

## 2021-02-08 PROCEDURE — 76536 US SOFT TISSUE HEAD NECK THYROID: ICD-10-PCS | Mod: 26,,, | Performed by: RADIOLOGY

## 2021-02-08 PROCEDURE — 3008F BODY MASS INDEX DOCD: CPT | Mod: CPTII,S$GLB,, | Performed by: PHYSICIAN ASSISTANT

## 2021-02-08 RX ORDER — ACETAMINOPHEN 500 MG
500 TABLET ORAL DAILY PRN
COMMUNITY

## 2021-02-09 DIAGNOSIS — E55.9 VITAMIN D DEFICIENCY: Primary | ICD-10-CM

## 2021-02-09 RX ORDER — ERGOCALCIFEROL 1.25 MG/1
50000 CAPSULE ORAL
Qty: 10 CAPSULE | Refills: 0 | Status: SHIPPED | OUTPATIENT
Start: 2021-02-09 | End: 2021-04-18

## 2021-02-11 DIAGNOSIS — E78.2 MIXED HYPERLIPIDEMIA: Primary | ICD-10-CM

## 2021-02-11 RX ORDER — ATORVASTATIN CALCIUM 20 MG/1
20 TABLET, FILM COATED ORAL DAILY
Qty: 90 TABLET | Refills: 3 | Status: SHIPPED | OUTPATIENT
Start: 2021-02-11 | End: 2021-06-30 | Stop reason: SDUPTHER

## 2021-02-12 ENCOUNTER — HOSPITAL ENCOUNTER (OUTPATIENT)
Dept: RADIOLOGY | Facility: HOSPITAL | Age: 71
Discharge: HOME | End: 2021-02-12
Attending: NEUROLOGICAL SURGERY
Payer: MEDICARE

## 2021-02-12 DIAGNOSIS — D32.9 MENINGIOMA: ICD-10-CM

## 2021-02-12 DIAGNOSIS — D49.7 NEOPLASM OF CENTRAL NERVOUS SYSTEM: ICD-10-CM

## 2021-02-12 PROCEDURE — 70553 MRI BRAIN STEM W/O & W/DYE: CPT | Mod: 26,,, | Performed by: RADIOLOGY

## 2021-02-12 PROCEDURE — A9585 GADOBUTROL INJECTION: HCPCS | Performed by: NEUROLOGICAL SURGERY

## 2021-02-12 PROCEDURE — 70553 MRI BRAIN SYNAPTIVE STEALTH W/WO CONTRAST: ICD-10-PCS | Mod: 26,,, | Performed by: RADIOLOGY

## 2021-02-12 PROCEDURE — 25500020 PHARM REV CODE 255: Performed by: NEUROLOGICAL SURGERY

## 2021-02-12 PROCEDURE — 70553 MRI BRAIN STEM W/O & W/DYE: CPT | Mod: TC

## 2021-02-12 RX ORDER — GADOBUTROL 604.72 MG/ML
10 INJECTION INTRAVENOUS
Status: COMPLETED | OUTPATIENT
Start: 2021-02-12 | End: 2021-02-12

## 2021-02-12 RX ADMIN — GADOBUTROL 10 ML: 604.72 INJECTION INTRAVENOUS at 07:02

## 2021-02-14 ENCOUNTER — TELEPHONE (OUTPATIENT)
Dept: NEUROSURGERY | Facility: CLINIC | Age: 71
End: 2021-02-14

## 2021-02-17 ENCOUNTER — TELEPHONE (OUTPATIENT)
Dept: NEUROSURGERY | Facility: CLINIC | Age: 71
End: 2021-02-17

## 2021-02-18 ENCOUNTER — TELEPHONE (OUTPATIENT)
Dept: NEUROSURGERY | Facility: CLINIC | Age: 71
End: 2021-02-18

## 2021-02-18 ENCOUNTER — OFFICE VISIT (OUTPATIENT)
Dept: NEUROSURGERY | Facility: CLINIC | Age: 71
End: 2021-02-18
Payer: MEDICARE

## 2021-02-18 VITALS
BODY MASS INDEX: 41.22 KG/M2 | RESPIRATION RATE: 16 BRPM | WEIGHT: 224 LBS | HEART RATE: 54 BPM | HEIGHT: 62 IN | SYSTOLIC BLOOD PRESSURE: 130 MMHG | DIASTOLIC BLOOD PRESSURE: 87 MMHG

## 2021-02-18 DIAGNOSIS — D32.9 MENINGIOMA: Chronic | ICD-10-CM

## 2021-02-18 DIAGNOSIS — R19.00 INTRA-ABDOMINAL AND PELVIC SWELLING, MASS AND LUMP, UNSPECIFIED SITE: ICD-10-CM

## 2021-02-18 DIAGNOSIS — G31.84 MILD COGNITIVE IMPAIRMENT WITH MEMORY LOSS: ICD-10-CM

## 2021-02-18 DIAGNOSIS — R19.00 ABDOMINAL WALL MASS OF RIGHT FLANK: Primary | ICD-10-CM

## 2021-02-18 PROCEDURE — 1159F MED LIST DOCD IN RCRD: CPT | Mod: S$GLB,,, | Performed by: NEUROLOGICAL SURGERY

## 2021-02-18 PROCEDURE — 99999 PR PBB SHADOW E&M-EST. PATIENT-LVL IV: ICD-10-PCS | Mod: PBBFAC,,, | Performed by: NEUROLOGICAL SURGERY

## 2021-02-18 PROCEDURE — 99213 PR OFFICE/OUTPT VISIT, EST, LEVL III, 20-29 MIN: ICD-10-PCS | Mod: S$GLB,,, | Performed by: NEUROLOGICAL SURGERY

## 2021-02-18 PROCEDURE — 1101F PT FALLS ASSESS-DOCD LE1/YR: CPT | Mod: CPTII,S$GLB,, | Performed by: NEUROLOGICAL SURGERY

## 2021-02-18 PROCEDURE — 3008F BODY MASS INDEX DOCD: CPT | Mod: CPTII,S$GLB,, | Performed by: NEUROLOGICAL SURGERY

## 2021-02-18 PROCEDURE — 3008F PR BODY MASS INDEX (BMI) DOCUMENTED: ICD-10-PCS | Mod: CPTII,S$GLB,, | Performed by: NEUROLOGICAL SURGERY

## 2021-02-18 PROCEDURE — 3079F DIAST BP 80-89 MM HG: CPT | Mod: CPTII,S$GLB,, | Performed by: NEUROLOGICAL SURGERY

## 2021-02-18 PROCEDURE — 1159F PR MEDICATION LIST DOCUMENTED IN MEDICAL RECORD: ICD-10-PCS | Mod: S$GLB,,, | Performed by: NEUROLOGICAL SURGERY

## 2021-02-18 PROCEDURE — 3075F SYST BP GE 130 - 139MM HG: CPT | Mod: CPTII,S$GLB,, | Performed by: NEUROLOGICAL SURGERY

## 2021-02-18 PROCEDURE — 3079F PR MOST RECENT DIASTOLIC BLOOD PRESSURE 80-89 MM HG: ICD-10-PCS | Mod: CPTII,S$GLB,, | Performed by: NEUROLOGICAL SURGERY

## 2021-02-18 PROCEDURE — 3288F FALL RISK ASSESSMENT DOCD: CPT | Mod: CPTII,S$GLB,, | Performed by: NEUROLOGICAL SURGERY

## 2021-02-18 PROCEDURE — 3075F PR MOST RECENT SYSTOLIC BLOOD PRESS GE 130-139MM HG: ICD-10-PCS | Mod: CPTII,S$GLB,, | Performed by: NEUROLOGICAL SURGERY

## 2021-02-18 PROCEDURE — 3288F PR FALLS RISK ASSESSMENT DOCUMENTED: ICD-10-PCS | Mod: CPTII,S$GLB,, | Performed by: NEUROLOGICAL SURGERY

## 2021-02-18 PROCEDURE — 1101F PR PT FALLS ASSESS DOC 0-1 FALLS W/OUT INJ PAST YR: ICD-10-PCS | Mod: CPTII,S$GLB,, | Performed by: NEUROLOGICAL SURGERY

## 2021-02-18 PROCEDURE — 99213 OFFICE O/P EST LOW 20 MIN: CPT | Mod: S$GLB,,, | Performed by: NEUROLOGICAL SURGERY

## 2021-02-18 PROCEDURE — 99999 PR PBB SHADOW E&M-EST. PATIENT-LVL IV: CPT | Mod: PBBFAC,,, | Performed by: NEUROLOGICAL SURGERY

## 2021-02-18 PROCEDURE — 1126F PR PAIN SEVERITY QUANTIFIED, NO PAIN PRESENT: ICD-10-PCS | Mod: S$GLB,,, | Performed by: NEUROLOGICAL SURGERY

## 2021-02-18 PROCEDURE — 1126F AMNT PAIN NOTED NONE PRSNT: CPT | Mod: S$GLB,,, | Performed by: NEUROLOGICAL SURGERY

## 2021-02-18 RX ORDER — DOXYCYCLINE HYCLATE 100 MG/1
100 TABLET, DELAYED RELEASE ORAL 2 TIMES DAILY
COMMUNITY
End: 2021-03-31

## 2021-02-22 ENCOUNTER — HOSPITAL ENCOUNTER (OUTPATIENT)
Dept: RADIOLOGY | Facility: HOSPITAL | Age: 71
Discharge: HOME | End: 2021-02-22
Attending: NEUROLOGICAL SURGERY
Payer: MEDICARE

## 2021-02-22 DIAGNOSIS — R19.00 INTRA-ABDOMINAL AND PELVIC SWELLING, MASS AND LUMP, UNSPECIFIED SITE: ICD-10-CM

## 2021-02-22 PROCEDURE — 74176 CT ABD & PELVIS W/O CONTRAST: CPT | Mod: TC

## 2021-03-02 ENCOUNTER — OFFICE VISIT (OUTPATIENT)
Dept: PULMONOLOGY | Facility: CLINIC | Age: 71
End: 2021-03-02
Payer: MEDICARE

## 2021-03-02 VITALS
RESPIRATION RATE: 16 BRPM | HEART RATE: 65 BPM | SYSTOLIC BLOOD PRESSURE: 118 MMHG | BODY MASS INDEX: 39.27 KG/M2 | DIASTOLIC BLOOD PRESSURE: 80 MMHG | HEIGHT: 62 IN | WEIGHT: 213.38 LBS | OXYGEN SATURATION: 98 %

## 2021-03-02 DIAGNOSIS — E66.01 SEVERE OBESITY (BMI 35.0-39.9) WITH COMORBIDITY: ICD-10-CM

## 2021-03-02 DIAGNOSIS — I10 ESSENTIAL HYPERTENSION: ICD-10-CM

## 2021-03-02 DIAGNOSIS — G47.33 OSA ON CPAP: Primary | ICD-10-CM

## 2021-03-02 DIAGNOSIS — Z86.16 HISTORY OF COVID-19: ICD-10-CM

## 2021-03-02 DIAGNOSIS — G31.84 MILD COGNITIVE IMPAIRMENT WITH MEMORY LOSS: ICD-10-CM

## 2021-03-02 PROCEDURE — 99214 PR OFFICE/OUTPT VISIT, EST, LEVL IV, 30-39 MIN: ICD-10-PCS | Mod: S$GLB,,, | Performed by: NURSE PRACTITIONER

## 2021-03-02 PROCEDURE — 3079F DIAST BP 80-89 MM HG: CPT | Mod: CPTII,S$GLB,, | Performed by: NURSE PRACTITIONER

## 2021-03-02 PROCEDURE — 3079F PR MOST RECENT DIASTOLIC BLOOD PRESSURE 80-89 MM HG: ICD-10-PCS | Mod: CPTII,S$GLB,, | Performed by: NURSE PRACTITIONER

## 2021-03-02 PROCEDURE — 99214 OFFICE O/P EST MOD 30 MIN: CPT | Mod: S$GLB,,, | Performed by: NURSE PRACTITIONER

## 2021-03-02 PROCEDURE — 3008F PR BODY MASS INDEX (BMI) DOCUMENTED: ICD-10-PCS | Mod: CPTII,S$GLB,, | Performed by: NURSE PRACTITIONER

## 2021-03-02 PROCEDURE — 99999 PR PBB SHADOW E&M-EST. PATIENT-LVL III: CPT | Mod: PBBFAC,,, | Performed by: NURSE PRACTITIONER

## 2021-03-02 PROCEDURE — 3008F BODY MASS INDEX DOCD: CPT | Mod: CPTII,S$GLB,, | Performed by: NURSE PRACTITIONER

## 2021-03-02 PROCEDURE — 3074F PR MOST RECENT SYSTOLIC BLOOD PRESSURE < 130 MM HG: ICD-10-PCS | Mod: CPTII,S$GLB,, | Performed by: NURSE PRACTITIONER

## 2021-03-02 PROCEDURE — 3074F SYST BP LT 130 MM HG: CPT | Mod: CPTII,S$GLB,, | Performed by: NURSE PRACTITIONER

## 2021-03-02 PROCEDURE — 1159F PR MEDICATION LIST DOCUMENTED IN MEDICAL RECORD: ICD-10-PCS | Mod: S$GLB,,, | Performed by: NURSE PRACTITIONER

## 2021-03-02 PROCEDURE — 99999 PR PBB SHADOW E&M-EST. PATIENT-LVL III: ICD-10-PCS | Mod: PBBFAC,,, | Performed by: NURSE PRACTITIONER

## 2021-03-02 PROCEDURE — 1159F MED LIST DOCD IN RCRD: CPT | Mod: S$GLB,,, | Performed by: NURSE PRACTITIONER

## 2021-03-08 ENCOUNTER — OFFICE VISIT (OUTPATIENT)
Dept: OPHTHALMOLOGY | Facility: CLINIC | Age: 71
End: 2021-03-08
Payer: MEDICARE

## 2021-03-08 DIAGNOSIS — H40.013 OPEN ANGLE WITH BORDERLINE FINDINGS OF BOTH EYES: Primary | ICD-10-CM

## 2021-03-08 PROCEDURE — 92083 HUMPHREY VISUAL FIELD - OU - BOTH EYES: ICD-10-PCS | Mod: S$GLB,,, | Performed by: OPTOMETRIST

## 2021-03-08 PROCEDURE — 92083 EXTENDED VISUAL FIELD XM: CPT | Mod: S$GLB,,, | Performed by: OPTOMETRIST

## 2021-03-08 PROCEDURE — 76514 ECHO EXAM OF EYE THICKNESS: CPT | Mod: S$GLB,,, | Performed by: OPTOMETRIST

## 2021-03-08 PROCEDURE — 99999 PR PBB SHADOW E&M-EST. PATIENT-LVL II: CPT | Mod: PBBFAC,,, | Performed by: OPTOMETRIST

## 2021-03-08 PROCEDURE — 92133 POSTERIOR SEGMENT OCT OPTIC NERVE(OCULAR COHERENCE TOMOGRAPHY) - OU - BOTH EYES: ICD-10-PCS | Mod: S$GLB,,, | Performed by: OPTOMETRIST

## 2021-03-08 PROCEDURE — 92012 INTRM OPH EXAM EST PATIENT: CPT | Mod: S$GLB,,, | Performed by: OPTOMETRIST

## 2021-03-08 PROCEDURE — 92012 PR EYE EXAM, EST PATIENT,INTERMED: ICD-10-PCS | Mod: S$GLB,,, | Performed by: OPTOMETRIST

## 2021-03-08 PROCEDURE — 92133 CPTRZD OPH DX IMG PST SGM ON: CPT | Mod: S$GLB,,, | Performed by: OPTOMETRIST

## 2021-03-08 PROCEDURE — 76514 PR  US, EYE, FOR CORNEAL THICKNESS: ICD-10-PCS | Mod: S$GLB,,, | Performed by: OPTOMETRIST

## 2021-03-08 PROCEDURE — 99999 PR PBB SHADOW E&M-EST. PATIENT-LVL II: ICD-10-PCS | Mod: PBBFAC,,, | Performed by: OPTOMETRIST

## 2021-03-22 ENCOUNTER — OFFICE VISIT (OUTPATIENT)
Dept: SURGERY | Facility: CLINIC | Age: 71
End: 2021-03-22
Payer: MEDICARE

## 2021-03-22 VITALS
DIASTOLIC BLOOD PRESSURE: 79 MMHG | HEART RATE: 60 BPM | SYSTOLIC BLOOD PRESSURE: 111 MMHG | BODY MASS INDEX: 40.24 KG/M2 | WEIGHT: 220 LBS

## 2021-03-22 DIAGNOSIS — L72.0 CYST OF SKIN AND SUBCUTANEOUS TISSUE: Primary | ICD-10-CM

## 2021-03-22 PROCEDURE — 3074F SYST BP LT 130 MM HG: CPT | Mod: CPTII,S$GLB,, | Performed by: SURGERY

## 2021-03-22 PROCEDURE — 3288F PR FALLS RISK ASSESSMENT DOCUMENTED: ICD-10-PCS | Mod: CPTII,S$GLB,, | Performed by: SURGERY

## 2021-03-22 PROCEDURE — 1101F PR PT FALLS ASSESS DOC 0-1 FALLS W/OUT INJ PAST YR: ICD-10-PCS | Mod: CPTII,S$GLB,, | Performed by: SURGERY

## 2021-03-22 PROCEDURE — 3074F PR MOST RECENT SYSTOLIC BLOOD PRESSURE < 130 MM HG: ICD-10-PCS | Mod: CPTII,S$GLB,, | Performed by: SURGERY

## 2021-03-22 PROCEDURE — 1159F PR MEDICATION LIST DOCUMENTED IN MEDICAL RECORD: ICD-10-PCS | Mod: S$GLB,,, | Performed by: SURGERY

## 2021-03-22 PROCEDURE — 99214 OFFICE O/P EST MOD 30 MIN: CPT | Mod: S$GLB,,, | Performed by: SURGERY

## 2021-03-22 PROCEDURE — 3078F DIAST BP <80 MM HG: CPT | Mod: CPTII,S$GLB,, | Performed by: SURGERY

## 2021-03-22 PROCEDURE — 1101F PT FALLS ASSESS-DOCD LE1/YR: CPT | Mod: CPTII,S$GLB,, | Performed by: SURGERY

## 2021-03-22 PROCEDURE — 3288F FALL RISK ASSESSMENT DOCD: CPT | Mod: CPTII,S$GLB,, | Performed by: SURGERY

## 2021-03-22 PROCEDURE — 99999 PR PBB SHADOW E&M-EST. PATIENT-LVL III: CPT | Mod: PBBFAC,,, | Performed by: SURGERY

## 2021-03-22 PROCEDURE — 99214 PR OFFICE/OUTPT VISIT, EST, LEVL IV, 30-39 MIN: ICD-10-PCS | Mod: S$GLB,,, | Performed by: SURGERY

## 2021-03-22 PROCEDURE — 1125F AMNT PAIN NOTED PAIN PRSNT: CPT | Mod: S$GLB,,, | Performed by: SURGERY

## 2021-03-22 PROCEDURE — 1125F PR PAIN SEVERITY QUANTIFIED, PAIN PRESENT: ICD-10-PCS | Mod: S$GLB,,, | Performed by: SURGERY

## 2021-03-22 PROCEDURE — 1159F MED LIST DOCD IN RCRD: CPT | Mod: S$GLB,,, | Performed by: SURGERY

## 2021-03-22 PROCEDURE — 3008F PR BODY MASS INDEX (BMI) DOCUMENTED: ICD-10-PCS | Mod: CPTII,S$GLB,, | Performed by: SURGERY

## 2021-03-22 PROCEDURE — 3078F PR MOST RECENT DIASTOLIC BLOOD PRESSURE < 80 MM HG: ICD-10-PCS | Mod: CPTII,S$GLB,, | Performed by: SURGERY

## 2021-03-22 PROCEDURE — 99999 PR PBB SHADOW E&M-EST. PATIENT-LVL III: ICD-10-PCS | Mod: PBBFAC,,, | Performed by: SURGERY

## 2021-03-22 PROCEDURE — 3008F BODY MASS INDEX DOCD: CPT | Mod: CPTII,S$GLB,, | Performed by: SURGERY

## 2021-03-23 ENCOUNTER — TELEPHONE (OUTPATIENT)
Dept: INTERNAL MEDICINE | Facility: CLINIC | Age: 71
End: 2021-03-23

## 2021-03-31 ENCOUNTER — OFFICE VISIT (OUTPATIENT)
Dept: INTERNAL MEDICINE | Facility: CLINIC | Age: 71
End: 2021-03-31
Payer: MEDICARE

## 2021-03-31 VITALS
HEIGHT: 62 IN | DIASTOLIC BLOOD PRESSURE: 82 MMHG | HEART RATE: 71 BPM | WEIGHT: 218.06 LBS | RESPIRATION RATE: 18 BRPM | OXYGEN SATURATION: 98 % | TEMPERATURE: 98 F | BODY MASS INDEX: 40.13 KG/M2 | SYSTOLIC BLOOD PRESSURE: 128 MMHG

## 2021-03-31 DIAGNOSIS — D32.9 MENINGIOMA: Chronic | ICD-10-CM

## 2021-03-31 DIAGNOSIS — G31.84 MILD COGNITIVE IMPAIRMENT WITH MEMORY LOSS: ICD-10-CM

## 2021-03-31 DIAGNOSIS — E04.1 THYROID NODULE: ICD-10-CM

## 2021-03-31 DIAGNOSIS — G47.33 OSA ON CPAP: ICD-10-CM

## 2021-03-31 DIAGNOSIS — E78.2 MIXED HYPERLIPIDEMIA: ICD-10-CM

## 2021-03-31 DIAGNOSIS — D32.0 MENINGIOMA OF CEREBELLUM: ICD-10-CM

## 2021-03-31 DIAGNOSIS — E04.2 MULTIPLE THYROID NODULES: ICD-10-CM

## 2021-03-31 DIAGNOSIS — E66.01 SEVERE OBESITY (BMI 35.0-39.9) WITH COMORBIDITY: ICD-10-CM

## 2021-03-31 DIAGNOSIS — I10 ESSENTIAL HYPERTENSION: ICD-10-CM

## 2021-03-31 DIAGNOSIS — M47.26 OSTEOARTHRITIS OF SPINE WITH RADICULOPATHY, LUMBAR REGION: ICD-10-CM

## 2021-03-31 DIAGNOSIS — E55.9 VITAMIN D DEFICIENCY: Primary | ICD-10-CM

## 2021-03-31 PROCEDURE — 99999 PR PBB SHADOW E&M-EST. PATIENT-LVL III: ICD-10-PCS | Mod: PBBFAC,,, | Performed by: PEDIATRICS

## 2021-03-31 PROCEDURE — 3079F PR MOST RECENT DIASTOLIC BLOOD PRESSURE 80-89 MM HG: ICD-10-PCS | Mod: CPTII,S$GLB,, | Performed by: PEDIATRICS

## 2021-03-31 PROCEDURE — 3008F PR BODY MASS INDEX (BMI) DOCUMENTED: ICD-10-PCS | Mod: CPTII,S$GLB,, | Performed by: PEDIATRICS

## 2021-03-31 PROCEDURE — 3074F SYST BP LT 130 MM HG: CPT | Mod: CPTII,S$GLB,, | Performed by: PEDIATRICS

## 2021-03-31 PROCEDURE — 99214 PR OFFICE/OUTPT VISIT, EST, LEVL IV, 30-39 MIN: ICD-10-PCS | Mod: S$GLB,,, | Performed by: PEDIATRICS

## 2021-03-31 PROCEDURE — 3079F DIAST BP 80-89 MM HG: CPT | Mod: CPTII,S$GLB,, | Performed by: PEDIATRICS

## 2021-03-31 PROCEDURE — 1159F PR MEDICATION LIST DOCUMENTED IN MEDICAL RECORD: ICD-10-PCS | Mod: S$GLB,,, | Performed by: PEDIATRICS

## 2021-03-31 PROCEDURE — 3008F BODY MASS INDEX DOCD: CPT | Mod: CPTII,S$GLB,, | Performed by: PEDIATRICS

## 2021-03-31 PROCEDURE — 1159F MED LIST DOCD IN RCRD: CPT | Mod: S$GLB,,, | Performed by: PEDIATRICS

## 2021-03-31 PROCEDURE — 1126F AMNT PAIN NOTED NONE PRSNT: CPT | Mod: S$GLB,,, | Performed by: PEDIATRICS

## 2021-03-31 PROCEDURE — 99999 PR PBB SHADOW E&M-EST. PATIENT-LVL III: CPT | Mod: PBBFAC,,, | Performed by: PEDIATRICS

## 2021-03-31 PROCEDURE — 3074F PR MOST RECENT SYSTOLIC BLOOD PRESSURE < 130 MM HG: ICD-10-PCS | Mod: CPTII,S$GLB,, | Performed by: PEDIATRICS

## 2021-03-31 PROCEDURE — 99214 OFFICE O/P EST MOD 30 MIN: CPT | Mod: S$GLB,,, | Performed by: PEDIATRICS

## 2021-03-31 PROCEDURE — 1126F PR PAIN SEVERITY QUANTIFIED, NO PAIN PRESENT: ICD-10-PCS | Mod: S$GLB,,, | Performed by: PEDIATRICS

## 2021-03-31 RX ORDER — LISINOPRIL AND HYDROCHLOROTHIAZIDE 20; 25 MG/1; MG/1
1 TABLET ORAL DAILY
Qty: 90 TABLET | Refills: 3 | Status: SHIPPED | OUTPATIENT
Start: 2021-03-31 | End: 2022-05-13

## 2021-04-09 ENCOUNTER — TELEPHONE (OUTPATIENT)
Dept: INTERNAL MEDICINE | Facility: CLINIC | Age: 71
End: 2021-04-09

## 2021-04-09 DIAGNOSIS — L72.0 CYST OF SKIN AND SUBCUTANEOUS TISSUE: Primary | ICD-10-CM

## 2021-04-21 ENCOUNTER — LAB VISIT (OUTPATIENT)
Dept: LAB | Facility: HOSPITAL | Age: 71
End: 2021-04-21
Attending: PEDIATRICS
Payer: MEDICARE

## 2021-04-21 ENCOUNTER — TELEPHONE (OUTPATIENT)
Dept: PAIN MEDICINE | Facility: CLINIC | Age: 71
End: 2021-04-21

## 2021-04-21 ENCOUNTER — OFFICE VISIT (OUTPATIENT)
Dept: INTERNAL MEDICINE | Facility: CLINIC | Age: 71
End: 2021-04-21
Payer: MEDICARE

## 2021-04-21 VITALS
HEIGHT: 62 IN | WEIGHT: 224.63 LBS | HEART RATE: 56 BPM | TEMPERATURE: 97 F | OXYGEN SATURATION: 96 % | BODY MASS INDEX: 41.34 KG/M2 | DIASTOLIC BLOOD PRESSURE: 78 MMHG | SYSTOLIC BLOOD PRESSURE: 130 MMHG | RESPIRATION RATE: 18 BRPM

## 2021-04-21 DIAGNOSIS — M12.9 ARTHRITIS, MULTIPLE JOINT INVOLVEMENT: Primary | ICD-10-CM

## 2021-04-21 DIAGNOSIS — M47.26 OSTEOARTHRITIS OF SPINE WITH RADICULOPATHY, LUMBAR REGION: ICD-10-CM

## 2021-04-21 DIAGNOSIS — M12.9 ARTHRITIS, MULTIPLE JOINT INVOLVEMENT: ICD-10-CM

## 2021-04-21 LAB — ERYTHROCYTE [SEDIMENTATION RATE] IN BLOOD BY WESTERGREN METHOD: 3 MM/HR (ref 0–20)

## 2021-04-21 PROCEDURE — 99999 PR PBB SHADOW E&M-EST. PATIENT-LVL IV: ICD-10-PCS | Mod: PBBFAC,,, | Performed by: PEDIATRICS

## 2021-04-21 PROCEDURE — 1125F AMNT PAIN NOTED PAIN PRSNT: CPT | Mod: S$GLB,,, | Performed by: PEDIATRICS

## 2021-04-21 PROCEDURE — 86038 ANTINUCLEAR ANTIBODIES: CPT | Performed by: PEDIATRICS

## 2021-04-21 PROCEDURE — 3008F PR BODY MASS INDEX (BMI) DOCUMENTED: ICD-10-PCS | Mod: CPTII,S$GLB,, | Performed by: PEDIATRICS

## 2021-04-21 PROCEDURE — 85651 RBC SED RATE NONAUTOMATED: CPT | Performed by: PEDIATRICS

## 2021-04-21 PROCEDURE — 86431 RHEUMATOID FACTOR QUANT: CPT | Performed by: PEDIATRICS

## 2021-04-21 PROCEDURE — 3008F BODY MASS INDEX DOCD: CPT | Mod: CPTII,S$GLB,, | Performed by: PEDIATRICS

## 2021-04-21 PROCEDURE — 86140 C-REACTIVE PROTEIN: CPT | Performed by: PEDIATRICS

## 2021-04-21 PROCEDURE — 1125F PR PAIN SEVERITY QUANTIFIED, PAIN PRESENT: ICD-10-PCS | Mod: S$GLB,,, | Performed by: PEDIATRICS

## 2021-04-21 PROCEDURE — 99214 OFFICE O/P EST MOD 30 MIN: CPT | Mod: S$GLB,,, | Performed by: PEDIATRICS

## 2021-04-21 PROCEDURE — 99214 PR OFFICE/OUTPT VISIT, EST, LEVL IV, 30-39 MIN: ICD-10-PCS | Mod: S$GLB,,, | Performed by: PEDIATRICS

## 2021-04-21 PROCEDURE — 99999 PR PBB SHADOW E&M-EST. PATIENT-LVL IV: CPT | Mod: PBBFAC,,, | Performed by: PEDIATRICS

## 2021-04-21 PROCEDURE — 1159F PR MEDICATION LIST DOCUMENTED IN MEDICAL RECORD: ICD-10-PCS | Mod: S$GLB,,, | Performed by: PEDIATRICS

## 2021-04-21 PROCEDURE — 36415 COLL VENOUS BLD VENIPUNCTURE: CPT | Performed by: PEDIATRICS

## 2021-04-21 PROCEDURE — 1159F MED LIST DOCD IN RCRD: CPT | Mod: S$GLB,,, | Performed by: PEDIATRICS

## 2021-04-21 RX ORDER — SODIUM PICOSULFATE, MAGNESIUM OXIDE, AND ANHYDROUS CITRIC ACID 10; 3.5; 12 MG/160ML; G/160ML; G/160ML
LIQUID ORAL
COMMUNITY
Start: 2021-04-12 | End: 2021-06-02

## 2021-04-21 RX ORDER — TOPIRAMATE 50 MG/1
TABLET, FILM COATED ORAL
Qty: 120 TABLET | Refills: 11 | Status: SHIPPED | OUTPATIENT
Start: 2021-04-21 | End: 2021-06-02

## 2021-04-22 ENCOUNTER — OFFICE VISIT (OUTPATIENT)
Dept: SURGERY | Facility: CLINIC | Age: 71
End: 2021-04-22
Payer: MEDICARE

## 2021-04-22 VITALS
TEMPERATURE: 99 F | BODY MASS INDEX: 41.13 KG/M2 | SYSTOLIC BLOOD PRESSURE: 135 MMHG | WEIGHT: 224.88 LBS | HEART RATE: 60 BPM | DIASTOLIC BLOOD PRESSURE: 85 MMHG

## 2021-04-22 DIAGNOSIS — L72.0 CYST OF SKIN AND SUBCUTANEOUS TISSUE: ICD-10-CM

## 2021-04-22 LAB
ANA SER QL IF: NORMAL
CRP SERPL-MCNC: 4.2 MG/L (ref 0–8.2)
RHEUMATOID FACT SERPL-ACNC: 11 IU/ML (ref 0–15)

## 2021-04-22 PROCEDURE — 1126F AMNT PAIN NOTED NONE PRSNT: CPT | Mod: S$GLB,,, | Performed by: SURGERY

## 2021-04-22 PROCEDURE — 99213 OFFICE O/P EST LOW 20 MIN: CPT | Mod: S$GLB,,, | Performed by: SURGERY

## 2021-04-22 PROCEDURE — 3008F BODY MASS INDEX DOCD: CPT | Mod: CPTII,S$GLB,, | Performed by: SURGERY

## 2021-04-22 PROCEDURE — 1126F PR PAIN SEVERITY QUANTIFIED, NO PAIN PRESENT: ICD-10-PCS | Mod: S$GLB,,, | Performed by: SURGERY

## 2021-04-22 PROCEDURE — 99499 UNLISTED E&M SERVICE: CPT | Mod: S$GLB,,, | Performed by: SURGERY

## 2021-04-22 PROCEDURE — 3008F PR BODY MASS INDEX (BMI) DOCUMENTED: ICD-10-PCS | Mod: CPTII,S$GLB,, | Performed by: SURGERY

## 2021-04-22 PROCEDURE — 99213 PR OFFICE/OUTPT VISIT, EST, LEVL III, 20-29 MIN: ICD-10-PCS | Mod: S$GLB,,, | Performed by: SURGERY

## 2021-04-22 PROCEDURE — 99999 PR PBB SHADOW E&M-EST. PATIENT-LVL III: CPT | Mod: PBBFAC,,, | Performed by: SURGERY

## 2021-04-22 PROCEDURE — 1159F PR MEDICATION LIST DOCUMENTED IN MEDICAL RECORD: ICD-10-PCS | Mod: S$GLB,,, | Performed by: SURGERY

## 2021-04-22 PROCEDURE — 99999 PR PBB SHADOW E&M-EST. PATIENT-LVL III: ICD-10-PCS | Mod: PBBFAC,,, | Performed by: SURGERY

## 2021-04-22 PROCEDURE — 1159F MED LIST DOCD IN RCRD: CPT | Mod: S$GLB,,, | Performed by: SURGERY

## 2021-04-22 PROCEDURE — 99499 RISK ADDL DX/OHS AUDIT: ICD-10-PCS | Mod: S$GLB,,, | Performed by: SURGERY

## 2021-04-29 ENCOUNTER — OFFICE VISIT (OUTPATIENT)
Dept: PAIN MEDICINE | Facility: CLINIC | Age: 71
End: 2021-04-29
Payer: MEDICARE

## 2021-04-29 VITALS
BODY MASS INDEX: 40.85 KG/M2 | RESPIRATION RATE: 18 BRPM | SYSTOLIC BLOOD PRESSURE: 115 MMHG | DIASTOLIC BLOOD PRESSURE: 72 MMHG | HEIGHT: 62 IN | WEIGHT: 222 LBS | HEART RATE: 67 BPM

## 2021-04-29 DIAGNOSIS — M48.061 DEGENERATIVE LUMBAR SPINAL STENOSIS: Primary | ICD-10-CM

## 2021-04-29 DIAGNOSIS — M51.36 DDD (DEGENERATIVE DISC DISEASE), LUMBAR: ICD-10-CM

## 2021-04-29 DIAGNOSIS — M54.32 BILATERAL SCIATICA: ICD-10-CM

## 2021-04-29 DIAGNOSIS — M54.31 BILATERAL SCIATICA: ICD-10-CM

## 2021-04-29 PROCEDURE — 99499 RISK ADDL DX/OHS AUDIT: ICD-10-PCS | Mod: S$GLB,,, | Performed by: PHYSICIAN ASSISTANT

## 2021-04-29 PROCEDURE — 3008F BODY MASS INDEX DOCD: CPT | Mod: CPTII,S$GLB,, | Performed by: PHYSICIAN ASSISTANT

## 2021-04-29 PROCEDURE — 1125F PR PAIN SEVERITY QUANTIFIED, PAIN PRESENT: ICD-10-PCS | Mod: S$GLB,,, | Performed by: PHYSICIAN ASSISTANT

## 2021-04-29 PROCEDURE — 3008F PR BODY MASS INDEX (BMI) DOCUMENTED: ICD-10-PCS | Mod: CPTII,S$GLB,, | Performed by: PHYSICIAN ASSISTANT

## 2021-04-29 PROCEDURE — 99999 PR PBB SHADOW E&M-EST. PATIENT-LVL III: CPT | Mod: PBBFAC,,, | Performed by: PHYSICIAN ASSISTANT

## 2021-04-29 PROCEDURE — 1159F PR MEDICATION LIST DOCUMENTED IN MEDICAL RECORD: ICD-10-PCS | Mod: S$GLB,,, | Performed by: PHYSICIAN ASSISTANT

## 2021-04-29 PROCEDURE — 99214 PR OFFICE/OUTPT VISIT, EST, LEVL IV, 30-39 MIN: ICD-10-PCS | Mod: S$GLB,,, | Performed by: PHYSICIAN ASSISTANT

## 2021-04-29 PROCEDURE — 99999 PR PBB SHADOW E&M-EST. PATIENT-LVL III: ICD-10-PCS | Mod: PBBFAC,,, | Performed by: PHYSICIAN ASSISTANT

## 2021-04-29 PROCEDURE — 99499 UNLISTED E&M SERVICE: CPT | Mod: S$GLB,,, | Performed by: PHYSICIAN ASSISTANT

## 2021-04-29 PROCEDURE — 99214 OFFICE O/P EST MOD 30 MIN: CPT | Mod: S$GLB,,, | Performed by: PHYSICIAN ASSISTANT

## 2021-04-29 PROCEDURE — 1159F MED LIST DOCD IN RCRD: CPT | Mod: S$GLB,,, | Performed by: PHYSICIAN ASSISTANT

## 2021-04-29 PROCEDURE — 1125F AMNT PAIN NOTED PAIN PRSNT: CPT | Mod: S$GLB,,, | Performed by: PHYSICIAN ASSISTANT

## 2021-06-02 ENCOUNTER — OFFICE VISIT (OUTPATIENT)
Dept: PULMONOLOGY | Facility: CLINIC | Age: 71
End: 2021-06-02
Payer: MEDICARE

## 2021-06-02 VITALS
WEIGHT: 223.56 LBS | HEIGHT: 62 IN | OXYGEN SATURATION: 96 % | BODY MASS INDEX: 41.14 KG/M2 | HEART RATE: 56 BPM | RESPIRATION RATE: 18 BRPM | SYSTOLIC BLOOD PRESSURE: 150 MMHG | DIASTOLIC BLOOD PRESSURE: 100 MMHG

## 2021-06-02 DIAGNOSIS — I10 ESSENTIAL HYPERTENSION: ICD-10-CM

## 2021-06-02 DIAGNOSIS — Z86.16 HISTORY OF COVID-19: ICD-10-CM

## 2021-06-02 DIAGNOSIS — G47.33 OSA ON CPAP: Primary | ICD-10-CM

## 2021-06-02 DIAGNOSIS — E66.01 CLASS 3 SEVERE OBESITY DUE TO EXCESS CALORIES WITH SERIOUS COMORBIDITY AND BODY MASS INDEX (BMI) OF 40.0 TO 44.9 IN ADULT: ICD-10-CM

## 2021-06-02 PROBLEM — E66.813 CLASS 3 SEVERE OBESITY DUE TO EXCESS CALORIES WITH SERIOUS COMORBIDITY AND BODY MASS INDEX (BMI) OF 40.0 TO 44.9 IN ADULT: Status: ACTIVE | Noted: 2018-07-16

## 2021-06-02 PROCEDURE — 99999 PR PBB SHADOW E&M-EST. PATIENT-LVL III: CPT | Mod: PBBFAC,,, | Performed by: NURSE PRACTITIONER

## 2021-06-02 PROCEDURE — 3008F PR BODY MASS INDEX (BMI) DOCUMENTED: ICD-10-PCS | Mod: CPTII,S$GLB,, | Performed by: NURSE PRACTITIONER

## 2021-06-02 PROCEDURE — 99214 PR OFFICE/OUTPT VISIT, EST, LEVL IV, 30-39 MIN: ICD-10-PCS | Mod: S$GLB,,, | Performed by: NURSE PRACTITIONER

## 2021-06-02 PROCEDURE — 3008F BODY MASS INDEX DOCD: CPT | Mod: CPTII,S$GLB,, | Performed by: NURSE PRACTITIONER

## 2021-06-02 PROCEDURE — 1101F PT FALLS ASSESS-DOCD LE1/YR: CPT | Mod: CPTII,S$GLB,, | Performed by: NURSE PRACTITIONER

## 2021-06-02 PROCEDURE — 99999 PR PBB SHADOW E&M-EST. PATIENT-LVL III: ICD-10-PCS | Mod: PBBFAC,,, | Performed by: NURSE PRACTITIONER

## 2021-06-02 PROCEDURE — 3288F PR FALLS RISK ASSESSMENT DOCUMENTED: ICD-10-PCS | Mod: CPTII,S$GLB,, | Performed by: NURSE PRACTITIONER

## 2021-06-02 PROCEDURE — 1159F MED LIST DOCD IN RCRD: CPT | Mod: S$GLB,,, | Performed by: NURSE PRACTITIONER

## 2021-06-02 PROCEDURE — 99214 OFFICE O/P EST MOD 30 MIN: CPT | Mod: S$GLB,,, | Performed by: NURSE PRACTITIONER

## 2021-06-02 PROCEDURE — 1101F PR PT FALLS ASSESS DOC 0-1 FALLS W/OUT INJ PAST YR: ICD-10-PCS | Mod: CPTII,S$GLB,, | Performed by: NURSE PRACTITIONER

## 2021-06-02 PROCEDURE — 1159F PR MEDICATION LIST DOCUMENTED IN MEDICAL RECORD: ICD-10-PCS | Mod: S$GLB,,, | Performed by: NURSE PRACTITIONER

## 2021-06-02 PROCEDURE — 3288F FALL RISK ASSESSMENT DOCD: CPT | Mod: CPTII,S$GLB,, | Performed by: NURSE PRACTITIONER

## 2021-06-25 ENCOUNTER — TELEPHONE (OUTPATIENT)
Dept: PULMONOLOGY | Facility: CLINIC | Age: 71
End: 2021-06-25

## 2021-06-30 ENCOUNTER — OFFICE VISIT (OUTPATIENT)
Dept: INTERNAL MEDICINE | Facility: CLINIC | Age: 71
End: 2021-06-30
Payer: MEDICARE

## 2021-06-30 VITALS
TEMPERATURE: 98 F | DIASTOLIC BLOOD PRESSURE: 64 MMHG | OXYGEN SATURATION: 97 % | BODY MASS INDEX: 41.3 KG/M2 | HEART RATE: 72 BPM | WEIGHT: 224.44 LBS | SYSTOLIC BLOOD PRESSURE: 130 MMHG | HEIGHT: 62 IN

## 2021-06-30 DIAGNOSIS — E78.2 MIXED HYPERLIPIDEMIA: ICD-10-CM

## 2021-06-30 DIAGNOSIS — M47.26 OSTEOARTHRITIS OF SPINE WITH RADICULOPATHY, LUMBAR REGION: Primary | ICD-10-CM

## 2021-06-30 DIAGNOSIS — M16.0 PRIMARY OSTEOARTHRITIS OF BOTH HIPS: ICD-10-CM

## 2021-06-30 PROCEDURE — 3008F BODY MASS INDEX DOCD: CPT | Mod: CPTII,S$GLB,, | Performed by: PEDIATRICS

## 2021-06-30 PROCEDURE — 1126F AMNT PAIN NOTED NONE PRSNT: CPT | Mod: S$GLB,,, | Performed by: PEDIATRICS

## 2021-06-30 PROCEDURE — 99999 PR PBB SHADOW E&M-EST. PATIENT-LVL IV: ICD-10-PCS | Mod: PBBFAC,,, | Performed by: PEDIATRICS

## 2021-06-30 PROCEDURE — 1159F MED LIST DOCD IN RCRD: CPT | Mod: S$GLB,,, | Performed by: PEDIATRICS

## 2021-06-30 PROCEDURE — 99214 OFFICE O/P EST MOD 30 MIN: CPT | Mod: S$GLB,,, | Performed by: PEDIATRICS

## 2021-06-30 PROCEDURE — 3008F PR BODY MASS INDEX (BMI) DOCUMENTED: ICD-10-PCS | Mod: CPTII,S$GLB,, | Performed by: PEDIATRICS

## 2021-06-30 PROCEDURE — 99999 PR PBB SHADOW E&M-EST. PATIENT-LVL IV: CPT | Mod: PBBFAC,,, | Performed by: PEDIATRICS

## 2021-06-30 PROCEDURE — 99214 PR OFFICE/OUTPT VISIT, EST, LEVL IV, 30-39 MIN: ICD-10-PCS | Mod: S$GLB,,, | Performed by: PEDIATRICS

## 2021-06-30 PROCEDURE — 3288F FALL RISK ASSESSMENT DOCD: CPT | Mod: CPTII,S$GLB,, | Performed by: PEDIATRICS

## 2021-06-30 PROCEDURE — 1126F PR PAIN SEVERITY QUANTIFIED, NO PAIN PRESENT: ICD-10-PCS | Mod: S$GLB,,, | Performed by: PEDIATRICS

## 2021-06-30 PROCEDURE — 1159F PR MEDICATION LIST DOCUMENTED IN MEDICAL RECORD: ICD-10-PCS | Mod: S$GLB,,, | Performed by: PEDIATRICS

## 2021-06-30 PROCEDURE — 3288F PR FALLS RISK ASSESSMENT DOCUMENTED: ICD-10-PCS | Mod: CPTII,S$GLB,, | Performed by: PEDIATRICS

## 2021-06-30 PROCEDURE — 1101F PR PT FALLS ASSESS DOC 0-1 FALLS W/OUT INJ PAST YR: ICD-10-PCS | Mod: CPTII,S$GLB,, | Performed by: PEDIATRICS

## 2021-06-30 PROCEDURE — 1101F PT FALLS ASSESS-DOCD LE1/YR: CPT | Mod: CPTII,S$GLB,, | Performed by: PEDIATRICS

## 2021-06-30 RX ORDER — TOPIRAMATE 50 MG/1
TABLET, FILM COATED ORAL
Qty: 120 TABLET | Refills: 11 | Status: SHIPPED | OUTPATIENT
Start: 2021-06-30 | End: 2021-10-26

## 2021-06-30 RX ORDER — ATORVASTATIN CALCIUM 20 MG/1
20 TABLET, FILM COATED ORAL DAILY
Qty: 90 TABLET | Refills: 3 | Status: SHIPPED | OUTPATIENT
Start: 2021-06-30 | End: 2022-10-25

## 2021-07-01 ENCOUNTER — PATIENT MESSAGE (OUTPATIENT)
Dept: ADMINISTRATIVE | Facility: OTHER | Age: 71
End: 2021-07-01

## 2021-07-27 ENCOUNTER — OFFICE VISIT (OUTPATIENT)
Dept: INTERNAL MEDICINE | Facility: CLINIC | Age: 71
End: 2021-07-27
Payer: MEDICARE

## 2021-07-27 VITALS
OXYGEN SATURATION: 97 % | HEART RATE: 83 BPM | TEMPERATURE: 99 F | BODY MASS INDEX: 40.72 KG/M2 | DIASTOLIC BLOOD PRESSURE: 66 MMHG | WEIGHT: 221.31 LBS | SYSTOLIC BLOOD PRESSURE: 136 MMHG | HEIGHT: 62 IN

## 2021-07-27 DIAGNOSIS — J06.9 VIRAL URI WITH COUGH: Primary | ICD-10-CM

## 2021-07-27 DIAGNOSIS — I10 ESSENTIAL HYPERTENSION: ICD-10-CM

## 2021-07-27 PROCEDURE — 1101F PR PT FALLS ASSESS DOC 0-1 FALLS W/OUT INJ PAST YR: ICD-10-PCS | Mod: CPTII,S$GLB,, | Performed by: PHYSICIAN ASSISTANT

## 2021-07-27 PROCEDURE — 3288F FALL RISK ASSESSMENT DOCD: CPT | Mod: CPTII,S$GLB,, | Performed by: PHYSICIAN ASSISTANT

## 2021-07-27 PROCEDURE — 1101F PT FALLS ASSESS-DOCD LE1/YR: CPT | Mod: CPTII,S$GLB,, | Performed by: PHYSICIAN ASSISTANT

## 2021-07-27 PROCEDURE — 3078F DIAST BP <80 MM HG: CPT | Mod: CPTII,S$GLB,, | Performed by: PHYSICIAN ASSISTANT

## 2021-07-27 PROCEDURE — 99999 PR PBB SHADOW E&M-EST. PATIENT-LVL IV: ICD-10-PCS | Mod: PBBFAC,,, | Performed by: PHYSICIAN ASSISTANT

## 2021-07-27 PROCEDURE — 1160F PR REVIEW ALL MEDS BY PRESCRIBER/CLIN PHARMACIST DOCUMENTED: ICD-10-PCS | Mod: CPTII,S$GLB,, | Performed by: PHYSICIAN ASSISTANT

## 2021-07-27 PROCEDURE — 99999 PR PBB SHADOW E&M-EST. PATIENT-LVL IV: CPT | Mod: PBBFAC,,, | Performed by: PHYSICIAN ASSISTANT

## 2021-07-27 PROCEDURE — 1160F RVW MEDS BY RX/DR IN RCRD: CPT | Mod: CPTII,S$GLB,, | Performed by: PHYSICIAN ASSISTANT

## 2021-07-27 PROCEDURE — 99499 RISK ADDL DX/OHS AUDIT: ICD-10-PCS | Mod: S$GLB,,, | Performed by: PHYSICIAN ASSISTANT

## 2021-07-27 PROCEDURE — 3008F BODY MASS INDEX DOCD: CPT | Mod: CPTII,S$GLB,, | Performed by: PHYSICIAN ASSISTANT

## 2021-07-27 PROCEDURE — 3288F PR FALLS RISK ASSESSMENT DOCUMENTED: ICD-10-PCS | Mod: CPTII,S$GLB,, | Performed by: PHYSICIAN ASSISTANT

## 2021-07-27 PROCEDURE — 99214 PR OFFICE/OUTPT VISIT, EST, LEVL IV, 30-39 MIN: ICD-10-PCS | Mod: S$GLB,,, | Performed by: PHYSICIAN ASSISTANT

## 2021-07-27 PROCEDURE — 3008F PR BODY MASS INDEX (BMI) DOCUMENTED: ICD-10-PCS | Mod: CPTII,S$GLB,, | Performed by: PHYSICIAN ASSISTANT

## 2021-07-27 PROCEDURE — 1126F AMNT PAIN NOTED NONE PRSNT: CPT | Mod: CPTII,S$GLB,, | Performed by: PHYSICIAN ASSISTANT

## 2021-07-27 PROCEDURE — 99214 OFFICE O/P EST MOD 30 MIN: CPT | Mod: S$GLB,,, | Performed by: PHYSICIAN ASSISTANT

## 2021-07-27 PROCEDURE — 1159F MED LIST DOCD IN RCRD: CPT | Mod: CPTII,S$GLB,, | Performed by: PHYSICIAN ASSISTANT

## 2021-07-27 PROCEDURE — 3075F PR MOST RECENT SYSTOLIC BLOOD PRESS GE 130-139MM HG: ICD-10-PCS | Mod: CPTII,S$GLB,, | Performed by: PHYSICIAN ASSISTANT

## 2021-07-27 PROCEDURE — 1126F PR PAIN SEVERITY QUANTIFIED, NO PAIN PRESENT: ICD-10-PCS | Mod: CPTII,S$GLB,, | Performed by: PHYSICIAN ASSISTANT

## 2021-07-27 PROCEDURE — 3075F SYST BP GE 130 - 139MM HG: CPT | Mod: CPTII,S$GLB,, | Performed by: PHYSICIAN ASSISTANT

## 2021-07-27 PROCEDURE — 1159F PR MEDICATION LIST DOCUMENTED IN MEDICAL RECORD: ICD-10-PCS | Mod: CPTII,S$GLB,, | Performed by: PHYSICIAN ASSISTANT

## 2021-07-27 PROCEDURE — 3078F PR MOST RECENT DIASTOLIC BLOOD PRESSURE < 80 MM HG: ICD-10-PCS | Mod: CPTII,S$GLB,, | Performed by: PHYSICIAN ASSISTANT

## 2021-07-27 PROCEDURE — 99499 UNLISTED E&M SERVICE: CPT | Mod: S$GLB,,, | Performed by: PHYSICIAN ASSISTANT

## 2021-07-27 RX ORDER — FLUTICASONE PROPIONATE 50 MCG
1 SPRAY, SUSPENSION (ML) NASAL DAILY
Qty: 18.2 ML | Refills: 0 | Status: SHIPPED | OUTPATIENT
Start: 2021-07-27 | End: 2022-07-29 | Stop reason: SDUPTHER

## 2021-07-28 ENCOUNTER — TELEPHONE (OUTPATIENT)
Dept: INTERNAL MEDICINE | Facility: CLINIC | Age: 71
End: 2021-07-28

## 2021-07-30 ENCOUNTER — TELEPHONE (OUTPATIENT)
Dept: INTERNAL MEDICINE | Facility: CLINIC | Age: 71
End: 2021-07-30

## 2021-07-30 DIAGNOSIS — J06.9 VIRAL URI WITH COUGH: Primary | ICD-10-CM

## 2021-07-30 RX ORDER — METHYLPREDNISOLONE ACETATE 40 MG/ML
60 INJECTION, SUSPENSION INTRA-ARTICULAR; INTRALESIONAL; INTRAMUSCULAR; SOFT TISSUE ONCE
Status: DISCONTINUED | OUTPATIENT
Start: 2021-07-30 | End: 2022-04-04

## 2021-08-02 ENCOUNTER — TELEPHONE (OUTPATIENT)
Dept: INTERNAL MEDICINE | Facility: CLINIC | Age: 71
End: 2021-08-02

## 2021-08-02 DIAGNOSIS — R09.81 NASAL CONGESTION: Primary | ICD-10-CM

## 2021-08-02 RX ORDER — GABAPENTIN 100 MG/1
300 CAPSULE ORAL NIGHTLY
Qty: 90 CAPSULE | Refills: 11 | Status: SHIPPED | OUTPATIENT
Start: 2021-08-02 | End: 2021-10-26

## 2021-08-03 ENCOUNTER — LAB VISIT (OUTPATIENT)
Dept: INTERNAL MEDICINE | Facility: CLINIC | Age: 71
End: 2021-08-03
Payer: MEDICARE

## 2021-08-03 DIAGNOSIS — R09.81 NASAL CONGESTION: ICD-10-CM

## 2021-08-03 LAB
SARS-COV-2 RNA RESP QL NAA+PROBE: NOT DETECTED
SARS-COV-2- CYCLE NUMBER: -1

## 2021-08-03 PROCEDURE — U0003 INFECTIOUS AGENT DETECTION BY NUCLEIC ACID (DNA OR RNA); SEVERE ACUTE RESPIRATORY SYNDROME CORONAVIRUS 2 (SARS-COV-2) (CORONAVIRUS DISEASE [COVID-19]), AMPLIFIED PROBE TECHNIQUE, MAKING USE OF HIGH THROUGHPUT TECHNOLOGIES AS DESCRIBED BY CMS-2020-01-R: HCPCS | Performed by: PEDIATRICS

## 2021-08-03 PROCEDURE — U0005 INFEC AGEN DETEC AMPLI PROBE: HCPCS | Performed by: PEDIATRICS

## 2021-08-09 DIAGNOSIS — R43.2 ABSENCE OF SENSE OF TASTE: Primary | ICD-10-CM

## 2021-08-09 DIAGNOSIS — M79.661 PAIN AND SWELLING OF RIGHT LOWER LEG: Primary | ICD-10-CM

## 2021-08-09 DIAGNOSIS — E55.9 VITAMIN D DEFICIENCY: ICD-10-CM

## 2021-08-09 DIAGNOSIS — M79.89 PAIN AND SWELLING OF RIGHT LOWER LEG: Primary | ICD-10-CM

## 2021-08-09 RX ORDER — ERGOCALCIFEROL 1.25 MG/1
50000 CAPSULE ORAL
Qty: 12 CAPSULE | Refills: 3 | Status: SHIPPED | OUTPATIENT
Start: 2021-08-09 | End: 2022-11-10

## 2021-08-10 ENCOUNTER — PATIENT OUTREACH (OUTPATIENT)
Dept: ADMINISTRATIVE | Facility: OTHER | Age: 71
End: 2021-08-10

## 2021-08-11 ENCOUNTER — HOSPITAL ENCOUNTER (OUTPATIENT)
Dept: RADIOLOGY | Facility: HOSPITAL | Age: 71
Discharge: HOME | End: 2021-08-11
Attending: STUDENT IN AN ORGANIZED HEALTH CARE EDUCATION/TRAINING PROGRAM
Payer: MEDICARE

## 2021-08-11 ENCOUNTER — TELEPHONE (OUTPATIENT)
Dept: ORTHOPEDICS | Facility: CLINIC | Age: 71
End: 2021-08-11

## 2021-08-11 DIAGNOSIS — M79.89 PAIN AND SWELLING OF RIGHT LOWER LEG: ICD-10-CM

## 2021-08-11 DIAGNOSIS — M79.661 PAIN AND SWELLING OF RIGHT LOWER LEG: ICD-10-CM

## 2021-08-11 DIAGNOSIS — M25.571 ACUTE RIGHT ANKLE PAIN: ICD-10-CM

## 2021-08-11 PROCEDURE — 73590 X-RAY EXAM OF LOWER LEG: CPT | Mod: 26,RT,, | Performed by: RADIOLOGY

## 2021-08-11 PROCEDURE — 73610 X-RAY EXAM OF ANKLE: CPT | Mod: TC,RT

## 2021-08-11 PROCEDURE — 73610 XR ANKLE COMPLETE 3 VIEW RIGHT: ICD-10-PCS | Mod: 26,RT,, | Performed by: RADIOLOGY

## 2021-08-11 PROCEDURE — 73590 XR TIBIA FIBULA 2 VIEW RIGHT: ICD-10-PCS | Mod: 26,RT,, | Performed by: RADIOLOGY

## 2021-08-11 PROCEDURE — 73590 X-RAY EXAM OF LOWER LEG: CPT | Mod: TC,RT

## 2021-08-11 PROCEDURE — 73610 X-RAY EXAM OF ANKLE: CPT | Mod: 26,RT,, | Performed by: RADIOLOGY

## 2021-08-18 ENCOUNTER — TELEPHONE (OUTPATIENT)
Dept: NEUROSURGERY | Facility: CLINIC | Age: 71
End: 2021-08-18

## 2021-08-18 ENCOUNTER — TELEPHONE (OUTPATIENT)
Dept: ORTHOPEDICS | Facility: CLINIC | Age: 71
End: 2021-08-18

## 2021-08-18 ENCOUNTER — OFFICE VISIT (OUTPATIENT)
Dept: NEUROLOGY | Facility: CLINIC | Age: 71
End: 2021-08-18
Payer: MEDICARE

## 2021-08-18 ENCOUNTER — LAB VISIT (OUTPATIENT)
Dept: LAB | Facility: HOSPITAL | Age: 71
End: 2021-08-18
Attending: NURSE PRACTITIONER
Payer: MEDICARE

## 2021-08-18 VITALS
WEIGHT: 224.44 LBS | DIASTOLIC BLOOD PRESSURE: 60 MMHG | HEART RATE: 70 BPM | HEIGHT: 62 IN | RESPIRATION RATE: 16 BRPM | BODY MASS INDEX: 41.3 KG/M2 | SYSTOLIC BLOOD PRESSURE: 122 MMHG

## 2021-08-18 DIAGNOSIS — F10.20 ALCOHOL DEPENDENCE, UNCOMPLICATED: ICD-10-CM

## 2021-08-18 DIAGNOSIS — D32.9 MENINGIOMA: ICD-10-CM

## 2021-08-18 DIAGNOSIS — D32.0 MENINGIOMA OF CEREBELLUM: ICD-10-CM

## 2021-08-18 DIAGNOSIS — R51.9 ACUTE NONINTRACTABLE HEADACHE, UNSPECIFIED HEADACHE TYPE: ICD-10-CM

## 2021-08-18 DIAGNOSIS — G31.84 MILD COGNITIVE IMPAIRMENT WITH MEMORY LOSS: ICD-10-CM

## 2021-08-18 DIAGNOSIS — M54.2 CERVICALGIA: ICD-10-CM

## 2021-08-18 DIAGNOSIS — R68.89 MULTIPLE COMPLAINTS: ICD-10-CM

## 2021-08-18 DIAGNOSIS — M54.81 OCCIPITAL NEURALGIA, UNSPECIFIED LATERALITY: ICD-10-CM

## 2021-08-18 DIAGNOSIS — G31.84 MILD COGNITIVE IMPAIRMENT: ICD-10-CM

## 2021-08-18 DIAGNOSIS — M47.892 OTHER SPONDYLOSIS, CERVICAL REGION: ICD-10-CM

## 2021-08-18 DIAGNOSIS — M54.81 OCCIPITAL NEURALGIA, UNSPECIFIED LATERALITY: Primary | ICD-10-CM

## 2021-08-18 LAB
CREAT SERPL-MCNC: 0.7 MG/DL (ref 0.5–1.4)
EST. GFR  (AFRICAN AMERICAN): >60 ML/MIN/1.73 M^2
EST. GFR  (NON AFRICAN AMERICAN): >60 ML/MIN/1.73 M^2
FOLATE SERPL-MCNC: 8.8 NG/ML (ref 4–24)
HCYS SERPL-SCNC: 5.4 UMOL/L (ref 4–15.5)
VIT B12 SERPL-MCNC: 611 PG/ML (ref 210–950)

## 2021-08-18 PROCEDURE — 83090 ASSAY OF HOMOCYSTEINE: CPT | Performed by: NURSE PRACTITIONER

## 2021-08-18 PROCEDURE — 36415 COLL VENOUS BLD VENIPUNCTURE: CPT | Performed by: NURSE PRACTITIONER

## 2021-08-18 PROCEDURE — 3074F SYST BP LT 130 MM HG: CPT | Mod: CPTII,S$GLB,, | Performed by: NURSE PRACTITIONER

## 2021-08-18 PROCEDURE — 99417 PROLNG OP E/M EACH 15 MIN: CPT | Mod: S$GLB,,, | Performed by: NURSE PRACTITIONER

## 2021-08-18 PROCEDURE — 3288F PR FALLS RISK ASSESSMENT DOCUMENTED: ICD-10-PCS | Mod: CPTII,S$GLB,, | Performed by: NURSE PRACTITIONER

## 2021-08-18 PROCEDURE — 99499 RISK ADDL DX/OHS AUDIT: ICD-10-PCS | Mod: S$GLB,,, | Performed by: NURSE PRACTITIONER

## 2021-08-18 PROCEDURE — 3078F DIAST BP <80 MM HG: CPT | Mod: CPTII,S$GLB,, | Performed by: NURSE PRACTITIONER

## 2021-08-18 PROCEDURE — 82565 ASSAY OF CREATININE: CPT | Performed by: NURSE PRACTITIONER

## 2021-08-18 PROCEDURE — 84425 ASSAY OF VITAMIN B-1: CPT | Performed by: NURSE PRACTITIONER

## 2021-08-18 PROCEDURE — 99417 PR PROLONGED SVC, OUTPT, W/WO DIRECT PT CONTACT,  EA ADDTL 15 MIN: ICD-10-PCS | Mod: S$GLB,,, | Performed by: NURSE PRACTITIONER

## 2021-08-18 PROCEDURE — 99999 PR PBB SHADOW E&M-EST. PATIENT-LVL IV: ICD-10-PCS | Mod: PBBFAC,,, | Performed by: NURSE PRACTITIONER

## 2021-08-18 PROCEDURE — 99215 OFFICE O/P EST HI 40 MIN: CPT | Mod: S$GLB,,, | Performed by: NURSE PRACTITIONER

## 2021-08-18 PROCEDURE — 1125F AMNT PAIN NOTED PAIN PRSNT: CPT | Mod: CPTII,S$GLB,, | Performed by: NURSE PRACTITIONER

## 2021-08-18 PROCEDURE — 1159F MED LIST DOCD IN RCRD: CPT | Mod: CPTII,S$GLB,, | Performed by: NURSE PRACTITIONER

## 2021-08-18 PROCEDURE — 1159F PR MEDICATION LIST DOCUMENTED IN MEDICAL RECORD: ICD-10-PCS | Mod: CPTII,S$GLB,, | Performed by: NURSE PRACTITIONER

## 2021-08-18 PROCEDURE — 3078F PR MOST RECENT DIASTOLIC BLOOD PRESSURE < 80 MM HG: ICD-10-PCS | Mod: CPTII,S$GLB,, | Performed by: NURSE PRACTITIONER

## 2021-08-18 PROCEDURE — 84207 ASSAY OF VITAMIN B-6: CPT | Performed by: NURSE PRACTITIONER

## 2021-08-18 PROCEDURE — 83921 ORGANIC ACID SINGLE QUANT: CPT | Performed by: NURSE PRACTITIONER

## 2021-08-18 PROCEDURE — 99499 UNLISTED E&M SERVICE: CPT | Mod: S$GLB,,, | Performed by: NURSE PRACTITIONER

## 2021-08-18 PROCEDURE — 99999 PR PBB SHADOW E&M-EST. PATIENT-LVL IV: CPT | Mod: PBBFAC,,, | Performed by: NURSE PRACTITIONER

## 2021-08-18 PROCEDURE — 99215 PR OFFICE/OUTPT VISIT, EST, LEVL V, 40-54 MIN: ICD-10-PCS | Mod: S$GLB,,, | Performed by: NURSE PRACTITIONER

## 2021-08-18 PROCEDURE — 3074F PR MOST RECENT SYSTOLIC BLOOD PRESSURE < 130 MM HG: ICD-10-PCS | Mod: CPTII,S$GLB,, | Performed by: NURSE PRACTITIONER

## 2021-08-18 PROCEDURE — 3008F PR BODY MASS INDEX (BMI) DOCUMENTED: ICD-10-PCS | Mod: CPTII,S$GLB,, | Performed by: NURSE PRACTITIONER

## 2021-08-18 PROCEDURE — 82607 VITAMIN B-12: CPT | Performed by: NURSE PRACTITIONER

## 2021-08-18 PROCEDURE — 1125F PR PAIN SEVERITY QUANTIFIED, PAIN PRESENT: ICD-10-PCS | Mod: CPTII,S$GLB,, | Performed by: NURSE PRACTITIONER

## 2021-08-18 PROCEDURE — 1101F PT FALLS ASSESS-DOCD LE1/YR: CPT | Mod: CPTII,S$GLB,, | Performed by: NURSE PRACTITIONER

## 2021-08-18 PROCEDURE — 1101F PR PT FALLS ASSESS DOC 0-1 FALLS W/OUT INJ PAST YR: ICD-10-PCS | Mod: CPTII,S$GLB,, | Performed by: NURSE PRACTITIONER

## 2021-08-18 PROCEDURE — 3008F BODY MASS INDEX DOCD: CPT | Mod: CPTII,S$GLB,, | Performed by: NURSE PRACTITIONER

## 2021-08-18 PROCEDURE — 82746 ASSAY OF FOLIC ACID SERUM: CPT | Performed by: NURSE PRACTITIONER

## 2021-08-18 PROCEDURE — 1160F PR REVIEW ALL MEDS BY PRESCRIBER/CLIN PHARMACIST DOCUMENTED: ICD-10-PCS | Mod: CPTII,S$GLB,, | Performed by: NURSE PRACTITIONER

## 2021-08-18 PROCEDURE — 1160F RVW MEDS BY RX/DR IN RCRD: CPT | Mod: CPTII,S$GLB,, | Performed by: NURSE PRACTITIONER

## 2021-08-18 PROCEDURE — 3288F FALL RISK ASSESSMENT DOCD: CPT | Mod: CPTII,S$GLB,, | Performed by: NURSE PRACTITIONER

## 2021-08-19 ENCOUNTER — OFFICE VISIT (OUTPATIENT)
Dept: ORTHOPEDICS | Facility: CLINIC | Age: 71
End: 2021-08-19
Payer: MEDICARE

## 2021-08-19 ENCOUNTER — OFFICE VISIT (OUTPATIENT)
Dept: OTOLARYNGOLOGY | Facility: CLINIC | Age: 71
End: 2021-08-19
Payer: MEDICARE

## 2021-08-19 VITALS
HEIGHT: 62 IN | BODY MASS INDEX: 41.02 KG/M2 | TEMPERATURE: 98 F | BODY MASS INDEX: 41.02 KG/M2 | WEIGHT: 222.88 LBS | WEIGHT: 222.88 LBS | RESPIRATION RATE: 18 BRPM | HEIGHT: 62 IN

## 2021-08-19 DIAGNOSIS — Z86.16 HISTORY OF COVID-19: Primary | ICD-10-CM

## 2021-08-19 DIAGNOSIS — J30.1 NON-SEASONAL ALLERGIC RHINITIS DUE TO POLLEN: ICD-10-CM

## 2021-08-19 DIAGNOSIS — M25.571 ACUTE RIGHT ANKLE PAIN: ICD-10-CM

## 2021-08-19 DIAGNOSIS — M19.079 ARTHRITIS OF MIDFOOT: Primary | ICD-10-CM

## 2021-08-19 PROCEDURE — 1159F PR MEDICATION LIST DOCUMENTED IN MEDICAL RECORD: ICD-10-PCS | Mod: CPTII,S$GLB,, | Performed by: OTOLARYNGOLOGY

## 2021-08-19 PROCEDURE — 99999 PR PBB SHADOW E&M-EST. PATIENT-LVL III: ICD-10-PCS | Mod: PBBFAC,,, | Performed by: OTOLARYNGOLOGY

## 2021-08-19 PROCEDURE — 99214 OFFICE O/P EST MOD 30 MIN: CPT | Mod: S$GLB,,, | Performed by: STUDENT IN AN ORGANIZED HEALTH CARE EDUCATION/TRAINING PROGRAM

## 2021-08-19 PROCEDURE — 1160F PR REVIEW ALL MEDS BY PRESCRIBER/CLIN PHARMACIST DOCUMENTED: ICD-10-PCS | Mod: CPTII,S$GLB,, | Performed by: OTOLARYNGOLOGY

## 2021-08-19 PROCEDURE — 1101F PT FALLS ASSESS-DOCD LE1/YR: CPT | Mod: CPTII,S$GLB,, | Performed by: STUDENT IN AN ORGANIZED HEALTH CARE EDUCATION/TRAINING PROGRAM

## 2021-08-19 PROCEDURE — 99214 PR OFFICE/OUTPT VISIT, EST, LEVL IV, 30-39 MIN: ICD-10-PCS | Mod: S$GLB,,, | Performed by: STUDENT IN AN ORGANIZED HEALTH CARE EDUCATION/TRAINING PROGRAM

## 2021-08-19 PROCEDURE — 3008F PR BODY MASS INDEX (BMI) DOCUMENTED: ICD-10-PCS | Mod: CPTII,S$GLB,, | Performed by: STUDENT IN AN ORGANIZED HEALTH CARE EDUCATION/TRAINING PROGRAM

## 2021-08-19 PROCEDURE — 1125F PR PAIN SEVERITY QUANTIFIED, PAIN PRESENT: ICD-10-PCS | Mod: CPTII,S$GLB,, | Performed by: STUDENT IN AN ORGANIZED HEALTH CARE EDUCATION/TRAINING PROGRAM

## 2021-08-19 PROCEDURE — 99203 PR OFFICE/OUTPT VISIT, NEW, LEVL III, 30-44 MIN: ICD-10-PCS | Mod: S$GLB,,, | Performed by: OTOLARYNGOLOGY

## 2021-08-19 PROCEDURE — 1159F MED LIST DOCD IN RCRD: CPT | Mod: CPTII,S$GLB,, | Performed by: OTOLARYNGOLOGY

## 2021-08-19 PROCEDURE — 1125F AMNT PAIN NOTED PAIN PRSNT: CPT | Mod: CPTII,S$GLB,, | Performed by: STUDENT IN AN ORGANIZED HEALTH CARE EDUCATION/TRAINING PROGRAM

## 2021-08-19 PROCEDURE — 99203 OFFICE O/P NEW LOW 30 MIN: CPT | Mod: S$GLB,,, | Performed by: OTOLARYNGOLOGY

## 2021-08-19 PROCEDURE — 99999 PR PBB SHADOW E&M-EST. PATIENT-LVL III: CPT | Mod: PBBFAC,,, | Performed by: STUDENT IN AN ORGANIZED HEALTH CARE EDUCATION/TRAINING PROGRAM

## 2021-08-19 PROCEDURE — 1160F RVW MEDS BY RX/DR IN RCRD: CPT | Mod: CPTII,S$GLB,, | Performed by: OTOLARYNGOLOGY

## 2021-08-19 PROCEDURE — 1101F PR PT FALLS ASSESS DOC 0-1 FALLS W/OUT INJ PAST YR: ICD-10-PCS | Mod: CPTII,S$GLB,, | Performed by: STUDENT IN AN ORGANIZED HEALTH CARE EDUCATION/TRAINING PROGRAM

## 2021-08-19 PROCEDURE — 1126F PR PAIN SEVERITY QUANTIFIED, NO PAIN PRESENT: ICD-10-PCS | Mod: CPTII,S$GLB,, | Performed by: OTOLARYNGOLOGY

## 2021-08-19 PROCEDURE — 3288F FALL RISK ASSESSMENT DOCD: CPT | Mod: CPTII,S$GLB,, | Performed by: STUDENT IN AN ORGANIZED HEALTH CARE EDUCATION/TRAINING PROGRAM

## 2021-08-19 PROCEDURE — 99999 PR PBB SHADOW E&M-EST. PATIENT-LVL III: CPT | Mod: PBBFAC,,, | Performed by: OTOLARYNGOLOGY

## 2021-08-19 PROCEDURE — 3008F BODY MASS INDEX DOCD: CPT | Mod: CPTII,S$GLB,, | Performed by: OTOLARYNGOLOGY

## 2021-08-19 PROCEDURE — 1126F AMNT PAIN NOTED NONE PRSNT: CPT | Mod: CPTII,S$GLB,, | Performed by: OTOLARYNGOLOGY

## 2021-08-19 PROCEDURE — 3288F PR FALLS RISK ASSESSMENT DOCUMENTED: ICD-10-PCS | Mod: CPTII,S$GLB,, | Performed by: STUDENT IN AN ORGANIZED HEALTH CARE EDUCATION/TRAINING PROGRAM

## 2021-08-19 PROCEDURE — 3008F BODY MASS INDEX DOCD: CPT | Mod: CPTII,S$GLB,, | Performed by: STUDENT IN AN ORGANIZED HEALTH CARE EDUCATION/TRAINING PROGRAM

## 2021-08-19 PROCEDURE — 3008F PR BODY MASS INDEX (BMI) DOCUMENTED: ICD-10-PCS | Mod: CPTII,S$GLB,, | Performed by: OTOLARYNGOLOGY

## 2021-08-19 PROCEDURE — 99999 PR PBB SHADOW E&M-EST. PATIENT-LVL III: ICD-10-PCS | Mod: PBBFAC,,, | Performed by: STUDENT IN AN ORGANIZED HEALTH CARE EDUCATION/TRAINING PROGRAM

## 2021-08-24 LAB
METHYLMALONATE SERPL-SCNC: 0.16 UMOL/L
VIT B1 BLD-MCNC: 59 UG/L (ref 38–122)

## 2021-08-26 LAB — PYRIDOXAL SERPL-MCNC: 11 UG/L (ref 5–50)

## 2021-09-14 ENCOUNTER — HOSPITAL ENCOUNTER (OUTPATIENT)
Dept: RADIOLOGY | Facility: HOSPITAL | Age: 71
Discharge: HOME | End: 2021-09-14
Attending: NURSE PRACTITIONER
Payer: MEDICARE

## 2021-09-14 DIAGNOSIS — M47.892 OTHER SPONDYLOSIS, CERVICAL REGION: ICD-10-CM

## 2021-09-14 DIAGNOSIS — D32.9 MENINGIOMA: ICD-10-CM

## 2021-09-14 DIAGNOSIS — M50.30 DDD (DEGENERATIVE DISC DISEASE), CERVICAL: ICD-10-CM

## 2021-09-14 DIAGNOSIS — R68.89 MULTIPLE COMPLAINTS: ICD-10-CM

## 2021-09-14 DIAGNOSIS — G31.84 MILD COGNITIVE IMPAIRMENT WITH MEMORY LOSS: ICD-10-CM

## 2021-09-14 DIAGNOSIS — R51.9 ACUTE NONINTRACTABLE HEADACHE, UNSPECIFIED HEADACHE TYPE: ICD-10-CM

## 2021-09-14 DIAGNOSIS — M47.812 CERVICAL SPONDYLOSIS WITHOUT MYELOPATHY: Primary | ICD-10-CM

## 2021-09-14 DIAGNOSIS — D32.0 MENINGIOMA OF CEREBELLUM: ICD-10-CM

## 2021-09-14 DIAGNOSIS — M54.2 CERVICALGIA: ICD-10-CM

## 2021-09-14 PROCEDURE — 72156 MRI NECK SPINE W/O & W/DYE: CPT | Mod: 26,,, | Performed by: RADIOLOGY

## 2021-09-14 PROCEDURE — A9585 GADOBUTROL INJECTION: HCPCS | Performed by: NURSE PRACTITIONER

## 2021-09-14 PROCEDURE — 25500020 PHARM REV CODE 255: Performed by: NURSE PRACTITIONER

## 2021-09-14 PROCEDURE — 70553 MRI BRAIN STEM W/O & W/DYE: CPT | Mod: TC

## 2021-09-14 PROCEDURE — 72156 MRI CERVICAL SPINE W WO CONTRAST: ICD-10-PCS | Mod: 26,,, | Performed by: RADIOLOGY

## 2021-09-14 PROCEDURE — 70553 MRI BRAIN STEM W/O & W/DYE: CPT | Mod: 26,,, | Performed by: RADIOLOGY

## 2021-09-14 PROCEDURE — 72156 MRI NECK SPINE W/O & W/DYE: CPT | Mod: TC

## 2021-09-14 PROCEDURE — 70553 MRI BRAIN W WO CONTRAST: ICD-10-PCS | Mod: 26,,, | Performed by: RADIOLOGY

## 2021-09-14 RX ORDER — GADOBUTROL 604.72 MG/ML
10 INJECTION INTRAVENOUS
Status: COMPLETED | OUTPATIENT
Start: 2021-09-14 | End: 2021-09-14

## 2021-09-14 RX ADMIN — GADOBUTROL 10 ML: 604.72 INJECTION INTRAVENOUS at 01:09

## 2021-09-15 ENCOUNTER — TELEPHONE (OUTPATIENT)
Dept: NEUROLOGY | Facility: CLINIC | Age: 71
End: 2021-09-15

## 2021-09-17 ENCOUNTER — TELEPHONE (OUTPATIENT)
Dept: ORTHOPEDICS | Facility: CLINIC | Age: 71
End: 2021-09-17

## 2021-09-23 ENCOUNTER — LAB VISIT (OUTPATIENT)
Dept: LAB | Facility: HOSPITAL | Age: 71
End: 2021-09-23
Attending: PEDIATRICS
Payer: MEDICARE

## 2021-09-23 DIAGNOSIS — E78.2 MIXED HYPERLIPIDEMIA: ICD-10-CM

## 2021-09-23 DIAGNOSIS — I10 ESSENTIAL HYPERTENSION: ICD-10-CM

## 2021-09-23 LAB
ALBUMIN SERPL BCP-MCNC: 3.5 G/DL (ref 3.5–5.2)
ALP SERPL-CCNC: 81 U/L (ref 55–135)
ALT SERPL W/O P-5'-P-CCNC: 12 U/L (ref 10–44)
ANION GAP SERPL CALC-SCNC: 11 MMOL/L (ref 8–16)
AST SERPL-CCNC: 10 U/L (ref 10–40)
BILIRUB SERPL-MCNC: 0.5 MG/DL (ref 0.1–1)
BUN SERPL-MCNC: 12 MG/DL (ref 8–23)
CALCIUM SERPL-MCNC: 9.1 MG/DL (ref 8.7–10.5)
CHLORIDE SERPL-SCNC: 102 MMOL/L (ref 95–110)
CHOLEST SERPL-MCNC: 210 MG/DL (ref 120–199)
CHOLEST/HDLC SERPL: 3.8 {RATIO} (ref 2–5)
CO2 SERPL-SCNC: 29 MMOL/L (ref 23–29)
CREAT SERPL-MCNC: 0.7 MG/DL (ref 0.5–1.4)
EST. GFR  (AFRICAN AMERICAN): >60 ML/MIN/1.73 M^2
EST. GFR  (NON AFRICAN AMERICAN): >60 ML/MIN/1.73 M^2
GLUCOSE SERPL-MCNC: 99 MG/DL (ref 70–110)
HDLC SERPL-MCNC: 55 MG/DL (ref 40–75)
HDLC SERPL: 26.2 % (ref 20–50)
LDLC SERPL CALC-MCNC: 128 MG/DL (ref 63–159)
NONHDLC SERPL-MCNC: 155 MG/DL
POTASSIUM SERPL-SCNC: 4.2 MMOL/L (ref 3.5–5.1)
PROT SERPL-MCNC: 7.1 G/DL (ref 6–8.4)
SODIUM SERPL-SCNC: 142 MMOL/L (ref 136–145)
TRIGL SERPL-MCNC: 135 MG/DL (ref 30–150)

## 2021-09-23 PROCEDURE — 80053 COMPREHEN METABOLIC PANEL: CPT | Performed by: PEDIATRICS

## 2021-09-23 PROCEDURE — 36415 COLL VENOUS BLD VENIPUNCTURE: CPT | Performed by: PEDIATRICS

## 2021-09-23 PROCEDURE — 80061 LIPID PANEL: CPT | Performed by: PEDIATRICS

## 2021-09-28 ENCOUNTER — TELEPHONE (OUTPATIENT)
Dept: NEUROSURGERY | Facility: CLINIC | Age: 71
End: 2021-09-28

## 2021-10-19 ENCOUNTER — OFFICE VISIT (OUTPATIENT)
Dept: SPORTS MEDICINE | Facility: CLINIC | Age: 71
End: 2021-10-19
Payer: MEDICARE

## 2021-10-19 VITALS — WEIGHT: 224.44 LBS | RESPIRATION RATE: 20 BRPM | HEIGHT: 62 IN | BODY MASS INDEX: 41.3 KG/M2

## 2021-10-19 DIAGNOSIS — M19.079 ARTHRITIS OF MIDFOOT: Primary | ICD-10-CM

## 2021-10-19 PROCEDURE — 1101F PT FALLS ASSESS-DOCD LE1/YR: CPT | Mod: CPTII,S$GLB,, | Performed by: STUDENT IN AN ORGANIZED HEALTH CARE EDUCATION/TRAINING PROGRAM

## 2021-10-19 PROCEDURE — 1101F PR PT FALLS ASSESS DOC 0-1 FALLS W/OUT INJ PAST YR: ICD-10-PCS | Mod: CPTII,S$GLB,, | Performed by: STUDENT IN AN ORGANIZED HEALTH CARE EDUCATION/TRAINING PROGRAM

## 2021-10-19 PROCEDURE — 3008F PR BODY MASS INDEX (BMI) DOCUMENTED: ICD-10-PCS | Mod: CPTII,S$GLB,, | Performed by: STUDENT IN AN ORGANIZED HEALTH CARE EDUCATION/TRAINING PROGRAM

## 2021-10-19 PROCEDURE — 3008F BODY MASS INDEX DOCD: CPT | Mod: CPTII,S$GLB,, | Performed by: STUDENT IN AN ORGANIZED HEALTH CARE EDUCATION/TRAINING PROGRAM

## 2021-10-19 PROCEDURE — 3288F FALL RISK ASSESSMENT DOCD: CPT | Mod: CPTII,S$GLB,, | Performed by: STUDENT IN AN ORGANIZED HEALTH CARE EDUCATION/TRAINING PROGRAM

## 2021-10-19 PROCEDURE — 99213 PR OFFICE/OUTPT VISIT, EST, LEVL III, 20-29 MIN: ICD-10-PCS | Mod: S$GLB,,, | Performed by: STUDENT IN AN ORGANIZED HEALTH CARE EDUCATION/TRAINING PROGRAM

## 2021-10-19 PROCEDURE — 4010F ACE/ARB THERAPY RXD/TAKEN: CPT | Mod: CPTII,S$GLB,, | Performed by: STUDENT IN AN ORGANIZED HEALTH CARE EDUCATION/TRAINING PROGRAM

## 2021-10-19 PROCEDURE — 99213 OFFICE O/P EST LOW 20 MIN: CPT | Mod: S$GLB,,, | Performed by: STUDENT IN AN ORGANIZED HEALTH CARE EDUCATION/TRAINING PROGRAM

## 2021-10-19 PROCEDURE — 4010F PR ACE/ARB THEARPY RXD/TAKEN: ICD-10-PCS | Mod: CPTII,S$GLB,, | Performed by: STUDENT IN AN ORGANIZED HEALTH CARE EDUCATION/TRAINING PROGRAM

## 2021-10-19 PROCEDURE — 99999 PR PBB SHADOW E&M-EST. PATIENT-LVL III: CPT | Mod: PBBFAC,,, | Performed by: STUDENT IN AN ORGANIZED HEALTH CARE EDUCATION/TRAINING PROGRAM

## 2021-10-19 PROCEDURE — 1125F AMNT PAIN NOTED PAIN PRSNT: CPT | Mod: CPTII,S$GLB,, | Performed by: STUDENT IN AN ORGANIZED HEALTH CARE EDUCATION/TRAINING PROGRAM

## 2021-10-19 PROCEDURE — 3288F PR FALLS RISK ASSESSMENT DOCUMENTED: ICD-10-PCS | Mod: CPTII,S$GLB,, | Performed by: STUDENT IN AN ORGANIZED HEALTH CARE EDUCATION/TRAINING PROGRAM

## 2021-10-19 PROCEDURE — 99999 PR PBB SHADOW E&M-EST. PATIENT-LVL III: ICD-10-PCS | Mod: PBBFAC,,, | Performed by: STUDENT IN AN ORGANIZED HEALTH CARE EDUCATION/TRAINING PROGRAM

## 2021-10-19 PROCEDURE — 1125F PR PAIN SEVERITY QUANTIFIED, PAIN PRESENT: ICD-10-PCS | Mod: CPTII,S$GLB,, | Performed by: STUDENT IN AN ORGANIZED HEALTH CARE EDUCATION/TRAINING PROGRAM

## 2021-10-20 ENCOUNTER — OFFICE VISIT (OUTPATIENT)
Dept: PODIATRY | Facility: CLINIC | Age: 71
End: 2021-10-20
Payer: MEDICARE

## 2021-10-20 ENCOUNTER — TELEPHONE (OUTPATIENT)
Dept: ADMINISTRATIVE | Facility: HOSPITAL | Age: 71
End: 2021-10-20

## 2021-10-20 DIAGNOSIS — M47.26 OSTEOARTHRITIS OF SPINE WITH RADICULOPATHY, LUMBAR REGION: ICD-10-CM

## 2021-10-20 DIAGNOSIS — M19.071 PRIMARY OSTEOARTHRITIS OF RIGHT FOOT: Primary | ICD-10-CM

## 2021-10-20 DIAGNOSIS — M54.31 BILATERAL SCIATICA: ICD-10-CM

## 2021-10-20 DIAGNOSIS — M21.41 PES PLANUS OF BOTH FEET: ICD-10-CM

## 2021-10-20 DIAGNOSIS — M54.32 BILATERAL SCIATICA: ICD-10-CM

## 2021-10-20 DIAGNOSIS — M21.42 PES PLANUS OF BOTH FEET: ICD-10-CM

## 2021-10-20 PROCEDURE — 1160F RVW MEDS BY RX/DR IN RCRD: CPT | Mod: CPTII,S$GLB,, | Performed by: PODIATRIST

## 2021-10-20 PROCEDURE — 1160F PR REVIEW ALL MEDS BY PRESCRIBER/CLIN PHARMACIST DOCUMENTED: ICD-10-PCS | Mod: CPTII,S$GLB,, | Performed by: PODIATRIST

## 2021-10-20 PROCEDURE — 1159F PR MEDICATION LIST DOCUMENTED IN MEDICAL RECORD: ICD-10-PCS | Mod: CPTII,S$GLB,, | Performed by: PODIATRIST

## 2021-10-20 PROCEDURE — 99999 PR PBB SHADOW E&M-EST. PATIENT-LVL II: ICD-10-PCS | Mod: PBBFAC,,, | Performed by: PODIATRIST

## 2021-10-20 PROCEDURE — 20605 INTERMEDIATE JOINT ASPIRATION/INJECTION: ICD-10-PCS | Mod: RT,S$GLB,, | Performed by: PODIATRIST

## 2021-10-20 PROCEDURE — 3288F FALL RISK ASSESSMENT DOCD: CPT | Mod: CPTII,S$GLB,, | Performed by: PODIATRIST

## 2021-10-20 PROCEDURE — 99214 PR OFFICE/OUTPT VISIT, EST, LEVL IV, 30-39 MIN: ICD-10-PCS | Mod: 25,S$GLB,, | Performed by: PODIATRIST

## 2021-10-20 PROCEDURE — 99499 UNLISTED E&M SERVICE: CPT | Mod: S$GLB,,, | Performed by: PODIATRIST

## 2021-10-20 PROCEDURE — 1101F PT FALLS ASSESS-DOCD LE1/YR: CPT | Mod: CPTII,S$GLB,, | Performed by: PODIATRIST

## 2021-10-20 PROCEDURE — 20605 DRAIN/INJ JOINT/BURSA W/O US: CPT | Mod: RT,S$GLB,, | Performed by: PODIATRIST

## 2021-10-20 PROCEDURE — 1101F PR PT FALLS ASSESS DOC 0-1 FALLS W/OUT INJ PAST YR: ICD-10-PCS | Mod: CPTII,S$GLB,, | Performed by: PODIATRIST

## 2021-10-20 PROCEDURE — 99499 RISK ADDL DX/OHS AUDIT: ICD-10-PCS | Mod: S$GLB,,, | Performed by: PODIATRIST

## 2021-10-20 PROCEDURE — 4010F PR ACE/ARB THEARPY RXD/TAKEN: ICD-10-PCS | Mod: CPTII,S$GLB,, | Performed by: PODIATRIST

## 2021-10-20 PROCEDURE — 99214 OFFICE O/P EST MOD 30 MIN: CPT | Mod: 25,S$GLB,, | Performed by: PODIATRIST

## 2021-10-20 PROCEDURE — 3288F PR FALLS RISK ASSESSMENT DOCUMENTED: ICD-10-PCS | Mod: CPTII,S$GLB,, | Performed by: PODIATRIST

## 2021-10-20 PROCEDURE — 1159F MED LIST DOCD IN RCRD: CPT | Mod: CPTII,S$GLB,, | Performed by: PODIATRIST

## 2021-10-20 PROCEDURE — 99999 PR PBB SHADOW E&M-EST. PATIENT-LVL II: CPT | Mod: PBBFAC,,, | Performed by: PODIATRIST

## 2021-10-20 PROCEDURE — 4010F ACE/ARB THERAPY RXD/TAKEN: CPT | Mod: CPTII,S$GLB,, | Performed by: PODIATRIST

## 2021-10-20 RX ORDER — DEXAMETHASONE SODIUM PHOSPHATE 4 MG/ML
4 INJECTION, SOLUTION INTRA-ARTICULAR; INTRALESIONAL; INTRAMUSCULAR; INTRAVENOUS; SOFT TISSUE
Status: DISCONTINUED | OUTPATIENT
Start: 2021-10-20 | End: 2021-10-20 | Stop reason: HOSPADM

## 2021-10-20 RX ORDER — TRIAMCINOLONE ACETONIDE 40 MG/ML
40 INJECTION, SUSPENSION INTRA-ARTICULAR; INTRAMUSCULAR
Status: DISCONTINUED | OUTPATIENT
Start: 2021-10-20 | End: 2021-10-20 | Stop reason: HOSPADM

## 2021-10-20 RX ADMIN — TRIAMCINOLONE ACETONIDE 40 MG: 40 INJECTION, SUSPENSION INTRA-ARTICULAR; INTRAMUSCULAR at 09:10

## 2021-10-20 RX ADMIN — DEXAMETHASONE SODIUM PHOSPHATE 4 MG: 4 INJECTION, SOLUTION INTRA-ARTICULAR; INTRALESIONAL; INTRAMUSCULAR; INTRAVENOUS; SOFT TISSUE at 09:10

## 2021-10-21 ENCOUNTER — TELEPHONE (OUTPATIENT)
Dept: NEUROSURGERY | Facility: CLINIC | Age: 71
End: 2021-10-21

## 2021-10-26 ENCOUNTER — OFFICE VISIT (OUTPATIENT)
Dept: INTERNAL MEDICINE | Facility: CLINIC | Age: 71
End: 2021-10-26
Payer: MEDICARE

## 2021-10-26 VITALS
SYSTOLIC BLOOD PRESSURE: 138 MMHG | HEIGHT: 62 IN | WEIGHT: 222.69 LBS | BODY MASS INDEX: 40.98 KG/M2 | DIASTOLIC BLOOD PRESSURE: 88 MMHG

## 2021-10-26 VITALS
HEIGHT: 62 IN | TEMPERATURE: 97 F | BODY MASS INDEX: 40.93 KG/M2 | DIASTOLIC BLOOD PRESSURE: 100 MMHG | OXYGEN SATURATION: 97 % | WEIGHT: 222.44 LBS | HEART RATE: 56 BPM | SYSTOLIC BLOOD PRESSURE: 142 MMHG

## 2021-10-26 DIAGNOSIS — E78.2 MIXED HYPERLIPIDEMIA: ICD-10-CM

## 2021-10-26 DIAGNOSIS — I10 ESSENTIAL HYPERTENSION: ICD-10-CM

## 2021-10-26 DIAGNOSIS — D32.0 MENINGIOMA OF CEREBELLUM: ICD-10-CM

## 2021-10-26 DIAGNOSIS — M47.26 OSTEOARTHRITIS OF SPINE WITH RADICULOPATHY, LUMBAR REGION: ICD-10-CM

## 2021-10-26 DIAGNOSIS — R26.9 ABNORMALITY OF GAIT AND MOBILITY: ICD-10-CM

## 2021-10-26 DIAGNOSIS — G31.84 MILD COGNITIVE IMPAIRMENT WITH MEMORY LOSS: ICD-10-CM

## 2021-10-26 DIAGNOSIS — D32.9 MENINGIOMA: Chronic | ICD-10-CM

## 2021-10-26 DIAGNOSIS — M19.90 ARTHRITIS: ICD-10-CM

## 2021-10-26 DIAGNOSIS — G47.33 OSA ON CPAP: ICD-10-CM

## 2021-10-26 DIAGNOSIS — E66.01 CLASS 3 SEVERE OBESITY DUE TO EXCESS CALORIES WITH SERIOUS COMORBIDITY AND BODY MASS INDEX (BMI) OF 40.0 TO 44.9 IN ADULT: ICD-10-CM

## 2021-10-26 DIAGNOSIS — E55.9 VITAMIN D DEFICIENCY: ICD-10-CM

## 2021-10-26 DIAGNOSIS — I10 ESSENTIAL HYPERTENSION: Primary | ICD-10-CM

## 2021-10-26 DIAGNOSIS — Z00.00 ENCOUNTER FOR PREVENTIVE HEALTH EXAMINATION: Primary | ICD-10-CM

## 2021-10-26 DIAGNOSIS — E04.2 MULTIPLE THYROID NODULES: ICD-10-CM

## 2021-10-26 DIAGNOSIS — Z12.31 ENCOUNTER FOR SCREENING MAMMOGRAM FOR BREAST CANCER: ICD-10-CM

## 2021-10-26 PROCEDURE — 99999 PR PBB SHADOW E&M-EST. PATIENT-LVL IV: ICD-10-PCS | Mod: PBBFAC,,, | Performed by: PEDIATRICS

## 2021-10-26 PROCEDURE — G0439 PPPS, SUBSEQ VISIT: HCPCS | Mod: S$GLB,,, | Performed by: PHYSICIAN ASSISTANT

## 2021-10-26 PROCEDURE — G0439 PR MEDICARE ANNUAL WELLNESS SUBSEQUENT VISIT: ICD-10-PCS | Mod: S$GLB,,, | Performed by: PHYSICIAN ASSISTANT

## 2021-10-26 PROCEDURE — 3008F BODY MASS INDEX DOCD: CPT | Mod: CPTII,S$GLB,, | Performed by: PEDIATRICS

## 2021-10-26 PROCEDURE — 3288F FALL RISK ASSESSMENT DOCD: CPT | Mod: CPTII,S$GLB,, | Performed by: PHYSICIAN ASSISTANT

## 2021-10-26 PROCEDURE — 1126F PR PAIN SEVERITY QUANTIFIED, NO PAIN PRESENT: ICD-10-PCS | Mod: CPTII,S$GLB,, | Performed by: PHYSICIAN ASSISTANT

## 2021-10-26 PROCEDURE — 1125F PR PAIN SEVERITY QUANTIFIED, PAIN PRESENT: ICD-10-PCS | Mod: CPTII,S$GLB,, | Performed by: PEDIATRICS

## 2021-10-26 PROCEDURE — 1159F PR MEDICATION LIST DOCUMENTED IN MEDICAL RECORD: ICD-10-PCS | Mod: CPTII,S$GLB,, | Performed by: PHYSICIAN ASSISTANT

## 2021-10-26 PROCEDURE — 1159F PR MEDICATION LIST DOCUMENTED IN MEDICAL RECORD: ICD-10-PCS | Mod: CPTII,S$GLB,, | Performed by: PEDIATRICS

## 2021-10-26 PROCEDURE — 99214 OFFICE O/P EST MOD 30 MIN: CPT | Mod: S$GLB,,, | Performed by: PEDIATRICS

## 2021-10-26 PROCEDURE — 1101F PR PT FALLS ASSESS DOC 0-1 FALLS W/OUT INJ PAST YR: ICD-10-PCS | Mod: CPTII,S$GLB,, | Performed by: PEDIATRICS

## 2021-10-26 PROCEDURE — 1101F PT FALLS ASSESS-DOCD LE1/YR: CPT | Mod: CPTII,S$GLB,, | Performed by: PHYSICIAN ASSISTANT

## 2021-10-26 PROCEDURE — 3075F SYST BP GE 130 - 139MM HG: CPT | Mod: CPTII,S$GLB,, | Performed by: PHYSICIAN ASSISTANT

## 2021-10-26 PROCEDURE — 3008F BODY MASS INDEX DOCD: CPT | Mod: CPTII,S$GLB,, | Performed by: PHYSICIAN ASSISTANT

## 2021-10-26 PROCEDURE — 1170F PR FUNCTIONAL STATUS ASSESSED: ICD-10-PCS | Mod: CPTII,S$GLB,, | Performed by: PHYSICIAN ASSISTANT

## 2021-10-26 PROCEDURE — 1101F PT FALLS ASSESS-DOCD LE1/YR: CPT | Mod: CPTII,S$GLB,, | Performed by: PEDIATRICS

## 2021-10-26 PROCEDURE — 1160F RVW MEDS BY RX/DR IN RCRD: CPT | Mod: CPTII,S$GLB,, | Performed by: PEDIATRICS

## 2021-10-26 PROCEDURE — 3288F PR FALLS RISK ASSESSMENT DOCUMENTED: ICD-10-PCS | Mod: CPTII,S$GLB,, | Performed by: PEDIATRICS

## 2021-10-26 PROCEDURE — 1159F MED LIST DOCD IN RCRD: CPT | Mod: CPTII,S$GLB,, | Performed by: PEDIATRICS

## 2021-10-26 PROCEDURE — 3008F PR BODY MASS INDEX (BMI) DOCUMENTED: ICD-10-PCS | Mod: CPTII,S$GLB,, | Performed by: PHYSICIAN ASSISTANT

## 2021-10-26 PROCEDURE — 3080F DIAST BP >= 90 MM HG: CPT | Mod: CPTII,S$GLB,, | Performed by: PEDIATRICS

## 2021-10-26 PROCEDURE — 1160F RVW MEDS BY RX/DR IN RCRD: CPT | Mod: CPTII,S$GLB,, | Performed by: PHYSICIAN ASSISTANT

## 2021-10-26 PROCEDURE — 3077F PR MOST RECENT SYSTOLIC BLOOD PRESSURE >= 140 MM HG: ICD-10-PCS | Mod: CPTII,S$GLB,, | Performed by: PEDIATRICS

## 2021-10-26 PROCEDURE — 3288F PR FALLS RISK ASSESSMENT DOCUMENTED: ICD-10-PCS | Mod: CPTII,S$GLB,, | Performed by: PHYSICIAN ASSISTANT

## 2021-10-26 PROCEDURE — 99999 PR PBB SHADOW E&M-EST. PATIENT-LVL IV: CPT | Mod: PBBFAC,,, | Performed by: PEDIATRICS

## 2021-10-26 PROCEDURE — 99999 PR PBB SHADOW E&M-EST. PATIENT-LVL III: ICD-10-PCS | Mod: PBBFAC,,, | Performed by: PHYSICIAN ASSISTANT

## 2021-10-26 PROCEDURE — 1159F MED LIST DOCD IN RCRD: CPT | Mod: CPTII,S$GLB,, | Performed by: PHYSICIAN ASSISTANT

## 2021-10-26 PROCEDURE — 3077F SYST BP >= 140 MM HG: CPT | Mod: CPTII,S$GLB,, | Performed by: PEDIATRICS

## 2021-10-26 PROCEDURE — 4010F ACE/ARB THERAPY RXD/TAKEN: CPT | Mod: CPTII,S$GLB,, | Performed by: PHYSICIAN ASSISTANT

## 2021-10-26 PROCEDURE — 1126F AMNT PAIN NOTED NONE PRSNT: CPT | Mod: CPTII,S$GLB,, | Performed by: PHYSICIAN ASSISTANT

## 2021-10-26 PROCEDURE — 99999 PR PBB SHADOW E&M-EST. PATIENT-LVL III: CPT | Mod: PBBFAC,,, | Performed by: PHYSICIAN ASSISTANT

## 2021-10-26 PROCEDURE — 4010F ACE/ARB THERAPY RXD/TAKEN: CPT | Mod: CPTII,S$GLB,, | Performed by: PEDIATRICS

## 2021-10-26 PROCEDURE — 4010F PR ACE/ARB THEARPY RXD/TAKEN: ICD-10-PCS | Mod: CPTII,S$GLB,, | Performed by: PHYSICIAN ASSISTANT

## 2021-10-26 PROCEDURE — 3075F PR MOST RECENT SYSTOLIC BLOOD PRESS GE 130-139MM HG: ICD-10-PCS | Mod: CPTII,S$GLB,, | Performed by: PHYSICIAN ASSISTANT

## 2021-10-26 PROCEDURE — 1160F PR REVIEW ALL MEDS BY PRESCRIBER/CLIN PHARMACIST DOCUMENTED: ICD-10-PCS | Mod: CPTII,S$GLB,, | Performed by: PEDIATRICS

## 2021-10-26 PROCEDURE — 3008F PR BODY MASS INDEX (BMI) DOCUMENTED: ICD-10-PCS | Mod: CPTII,S$GLB,, | Performed by: PEDIATRICS

## 2021-10-26 PROCEDURE — 3079F PR MOST RECENT DIASTOLIC BLOOD PRESSURE 80-89 MM HG: ICD-10-PCS | Mod: CPTII,S$GLB,, | Performed by: PHYSICIAN ASSISTANT

## 2021-10-26 PROCEDURE — 1170F FXNL STATUS ASSESSED: CPT | Mod: CPTII,S$GLB,, | Performed by: PHYSICIAN ASSISTANT

## 2021-10-26 PROCEDURE — 3079F DIAST BP 80-89 MM HG: CPT | Mod: CPTII,S$GLB,, | Performed by: PHYSICIAN ASSISTANT

## 2021-10-26 PROCEDURE — 1101F PR PT FALLS ASSESS DOC 0-1 FALLS W/OUT INJ PAST YR: ICD-10-PCS | Mod: CPTII,S$GLB,, | Performed by: PHYSICIAN ASSISTANT

## 2021-10-26 PROCEDURE — 4010F PR ACE/ARB THEARPY RXD/TAKEN: ICD-10-PCS | Mod: CPTII,S$GLB,, | Performed by: PEDIATRICS

## 2021-10-26 PROCEDURE — 1160F PR REVIEW ALL MEDS BY PRESCRIBER/CLIN PHARMACIST DOCUMENTED: ICD-10-PCS | Mod: CPTII,S$GLB,, | Performed by: PHYSICIAN ASSISTANT

## 2021-10-26 PROCEDURE — 99214 PR OFFICE/OUTPT VISIT, EST, LEVL IV, 30-39 MIN: ICD-10-PCS | Mod: S$GLB,,, | Performed by: PEDIATRICS

## 2021-10-26 PROCEDURE — 3288F FALL RISK ASSESSMENT DOCD: CPT | Mod: CPTII,S$GLB,, | Performed by: PEDIATRICS

## 2021-10-26 PROCEDURE — 3080F PR MOST RECENT DIASTOLIC BLOOD PRESSURE >= 90 MM HG: ICD-10-PCS | Mod: CPTII,S$GLB,, | Performed by: PEDIATRICS

## 2021-10-26 PROCEDURE — 1125F AMNT PAIN NOTED PAIN PRSNT: CPT | Mod: CPTII,S$GLB,, | Performed by: PEDIATRICS

## 2021-10-26 RX ORDER — AMLODIPINE BESYLATE 5 MG/1
5 TABLET ORAL NIGHTLY
Qty: 90 TABLET | Refills: 3 | Status: SHIPPED | OUTPATIENT
Start: 2021-10-26 | End: 2022-04-04

## 2021-10-26 RX ORDER — GABAPENTIN 100 MG/1
600 CAPSULE ORAL NIGHTLY
Qty: 180 CAPSULE | Refills: 11 | Status: SHIPPED | OUTPATIENT
Start: 2021-10-26 | End: 2022-02-14

## 2021-10-28 ENCOUNTER — TELEPHONE (OUTPATIENT)
Dept: INTERNAL MEDICINE | Facility: CLINIC | Age: 71
End: 2021-10-28
Payer: MEDICARE

## 2021-10-29 ENCOUNTER — CLINICAL SUPPORT (OUTPATIENT)
Dept: INTERNAL MEDICINE | Facility: CLINIC | Age: 71
End: 2021-10-29
Payer: MEDICARE

## 2021-11-11 ENCOUNTER — TELEPHONE (OUTPATIENT)
Dept: INTERNAL MEDICINE | Facility: CLINIC | Age: 71
End: 2021-11-11
Payer: MEDICARE

## 2021-11-11 ENCOUNTER — PATIENT OUTREACH (OUTPATIENT)
Dept: ADMINISTRATIVE | Facility: OTHER | Age: 71
End: 2021-11-11
Payer: MEDICARE

## 2021-11-12 ENCOUNTER — HOSPITAL ENCOUNTER (OUTPATIENT)
Dept: RADIOLOGY | Facility: HOSPITAL | Age: 71
Discharge: HOME | End: 2021-11-12
Attending: NEUROLOGICAL SURGERY
Payer: MEDICARE

## 2021-11-12 ENCOUNTER — TELEPHONE (OUTPATIENT)
Dept: INTERNAL MEDICINE | Facility: CLINIC | Age: 71
End: 2021-11-12

## 2021-11-12 ENCOUNTER — CLINICAL SUPPORT (OUTPATIENT)
Dept: INTERNAL MEDICINE | Facility: CLINIC | Age: 71
End: 2021-11-12
Payer: MEDICARE

## 2021-11-12 ENCOUNTER — OFFICE VISIT (OUTPATIENT)
Dept: NEUROSURGERY | Facility: CLINIC | Age: 71
End: 2021-11-12
Payer: MEDICARE

## 2021-11-12 VITALS
BODY MASS INDEX: 40.85 KG/M2 | WEIGHT: 222 LBS | RESPIRATION RATE: 16 BRPM | HEIGHT: 62 IN | DIASTOLIC BLOOD PRESSURE: 83 MMHG | HEART RATE: 53 BPM | SYSTOLIC BLOOD PRESSURE: 114 MMHG

## 2021-11-12 VITALS — DIASTOLIC BLOOD PRESSURE: 72 MMHG | HEART RATE: 70 BPM | SYSTOLIC BLOOD PRESSURE: 118 MMHG

## 2021-11-12 DIAGNOSIS — D32.0 CEREBRAL MENINGIOMA: ICD-10-CM

## 2021-11-12 DIAGNOSIS — M43.16 SPONDYLOLISTHESIS, LUMBAR REGION: ICD-10-CM

## 2021-11-12 DIAGNOSIS — M47.812 CERVICAL SPONDYLOSIS WITHOUT MYELOPATHY: ICD-10-CM

## 2021-11-12 DIAGNOSIS — M50.30 DDD (DEGENERATIVE DISC DISEASE), CERVICAL: ICD-10-CM

## 2021-11-12 DIAGNOSIS — M51.36 DEGENERATIVE DISC DISEASE, LUMBAR: ICD-10-CM

## 2021-11-12 DIAGNOSIS — Z01.30 BLOOD PRESSURE CHECK: Primary | ICD-10-CM

## 2021-11-12 DIAGNOSIS — M50.30 DEGENERATIVE DISC DISEASE, CERVICAL: ICD-10-CM

## 2021-11-12 DIAGNOSIS — M51.36 DEGENERATIVE DISC DISEASE, LUMBAR: Primary | ICD-10-CM

## 2021-11-12 PROCEDURE — 3074F PR MOST RECENT SYSTOLIC BLOOD PRESSURE < 130 MM HG: ICD-10-PCS | Mod: CPTII,S$GLB,, | Performed by: NEUROLOGICAL SURGERY

## 2021-11-12 PROCEDURE — 1126F PR PAIN SEVERITY QUANTIFIED, NO PAIN PRESENT: ICD-10-PCS | Mod: CPTII,S$GLB,, | Performed by: NEUROLOGICAL SURGERY

## 2021-11-12 PROCEDURE — 99999 PR PBB SHADOW E&M-EST. PATIENT-LVL III: CPT | Mod: PBBFAC,,, | Performed by: NEUROLOGICAL SURGERY

## 2021-11-12 PROCEDURE — 72110 X-RAY EXAM L-2 SPINE 4/>VWS: CPT | Mod: 26,,, | Performed by: RADIOLOGY

## 2021-11-12 PROCEDURE — 3074F SYST BP LT 130 MM HG: CPT | Mod: CPTII,S$GLB,, | Performed by: NEUROLOGICAL SURGERY

## 2021-11-12 PROCEDURE — 99999 PR PBB SHADOW E&M-EST. PATIENT-LVL II: CPT | Mod: PBBFAC,,,

## 2021-11-12 PROCEDURE — 3008F BODY MASS INDEX DOCD: CPT | Mod: CPTII,S$GLB,, | Performed by: NEUROLOGICAL SURGERY

## 2021-11-12 PROCEDURE — 3288F FALL RISK ASSESSMENT DOCD: CPT | Mod: CPTII,S$GLB,, | Performed by: NEUROLOGICAL SURGERY

## 2021-11-12 PROCEDURE — 3079F DIAST BP 80-89 MM HG: CPT | Mod: CPTII,S$GLB,, | Performed by: NEUROLOGICAL SURGERY

## 2021-11-12 PROCEDURE — 3008F PR BODY MASS INDEX (BMI) DOCUMENTED: ICD-10-PCS | Mod: CPTII,S$GLB,, | Performed by: NEUROLOGICAL SURGERY

## 2021-11-12 PROCEDURE — 99214 PR OFFICE/OUTPT VISIT, EST, LEVL IV, 30-39 MIN: ICD-10-PCS | Mod: S$GLB,,, | Performed by: NEUROLOGICAL SURGERY

## 2021-11-12 PROCEDURE — 72110 X-RAY EXAM L-2 SPINE 4/>VWS: CPT | Mod: TC

## 2021-11-12 PROCEDURE — 72110 XR LUMBAR SPINE 5 VIEW WITH FLEX AND EXT: ICD-10-PCS | Mod: 26,,, | Performed by: RADIOLOGY

## 2021-11-12 PROCEDURE — 99214 OFFICE O/P EST MOD 30 MIN: CPT | Mod: S$GLB,,, | Performed by: NEUROLOGICAL SURGERY

## 2021-11-12 PROCEDURE — 1101F PT FALLS ASSESS-DOCD LE1/YR: CPT | Mod: CPTII,S$GLB,, | Performed by: NEUROLOGICAL SURGERY

## 2021-11-12 PROCEDURE — 99999 PR PBB SHADOW E&M-EST. PATIENT-LVL III: ICD-10-PCS | Mod: PBBFAC,,, | Performed by: NEUROLOGICAL SURGERY

## 2021-11-12 PROCEDURE — 1101F PR PT FALLS ASSESS DOC 0-1 FALLS W/OUT INJ PAST YR: ICD-10-PCS | Mod: CPTII,S$GLB,, | Performed by: NEUROLOGICAL SURGERY

## 2021-11-12 PROCEDURE — 1126F AMNT PAIN NOTED NONE PRSNT: CPT | Mod: CPTII,S$GLB,, | Performed by: NEUROLOGICAL SURGERY

## 2021-11-12 PROCEDURE — 99999 PR PBB SHADOW E&M-EST. PATIENT-LVL II: ICD-10-PCS | Mod: PBBFAC,,,

## 2021-11-12 PROCEDURE — 3288F PR FALLS RISK ASSESSMENT DOCUMENTED: ICD-10-PCS | Mod: CPTII,S$GLB,, | Performed by: NEUROLOGICAL SURGERY

## 2021-11-12 PROCEDURE — 3079F PR MOST RECENT DIASTOLIC BLOOD PRESSURE 80-89 MM HG: ICD-10-PCS | Mod: CPTII,S$GLB,, | Performed by: NEUROLOGICAL SURGERY

## 2021-11-12 NOTE — PROGRESS NOTES
Patient ID: Beth Souza is a 71 y.o. female.    Chief Complaint: No chief complaint on file.    Patient is here for follow up cerebellar meningioma          Past Medical History:   Diagnosis Date    Cataract     Hyperlipidemia     Hypertension     Lumbar spondylosis     Meningioma 2021    Obesity     Sleep apnea     Vitamin D deficiency      Past Surgical History:   Procedure Laterality Date    HYSTERECTOMY      PARTIAL HYSTERECTOMY       Family History   Problem Relation Age of Onset    Hypertension Mother     Diabetes Daughter     Melanoma Neg Hx     Psoriasis Neg Hx     Lupus Neg Hx     Eczema Neg Hx     Breast cancer Neg Hx     Colon cancer Neg Hx     Ovarian cancer Neg Hx     Thrombosis Neg Hx      Social History     Tobacco Use    Smoking status: Former Smoker     Quit date: 2004     Years since quittin.8    Smokeless tobacco: Never Used   Substance Use Topics    Alcohol use: Yes     Comment: occasionally    Drug use: No     Current Outpatient Medications on File Prior to Visit   Medication Sig Dispense Refill    acetaminophen (TYLENOL) 500 MG tablet Take 500 mg by mouth daily as needed for Pain.      amLODIPine (NORVASC) 5 MG tablet Take 1 tablet (5 mg total) by mouth every evening. 90 tablet 3    atorvastatin (LIPITOR) 20 MG tablet Take 1 tablet (20 mg total) by mouth once daily. 90 tablet 3    ergocalciferol (ERGOCALCIFEROL) 50,000 unit Cap Take 1 capsule (50,000 Units total) by mouth every 7 days. 12 capsule 3    fluticasone propionate (FLONASE) 50 mcg/actuation nasal spray 1 spray (50 mcg total) by Each Nostril route once daily. 18.2 mL 0    gabapentin (NEURONTIN) 100 MG capsule Take 6 capsules (600 mg total) by mouth every evening. Start off at 300 mg at night, increase 100 mg at night each week until 600 mg at night. 180 capsule 11    lisinopriL-hydrochlorothiazide (PRINZIDE,ZESTORETIC) 20-25 mg Tab Take 1 tablet by mouth once daily. 90 tablet 3      Current Facility-Administered Medications on File Prior to Visit   Medication Dose Route Frequency Provider Last Rate Last Admin    methylPREDNISolone acetate injection 60 mg  60 mg Intramuscular Once Dyan Carney PA-C         Review of Systems   Constitutional: Negative for diaphoresis, fatigue and unexpected weight change.   HENT: Negative for hearing loss, rhinorrhea, trouble swallowing and voice change.    Eyes: Negative for photophobia and visual disturbance.   Respiratory: Negative for chest tightness and shortness of breath.    Cardiovascular: Negative for chest pain.   Gastrointestinal: Negative for constipation, nausea and vomiting.   Endocrine: Negative for cold intolerance, heat intolerance, polydipsia, polyphagia and polyuria.   Genitourinary: Negative for difficulty urinating.   Musculoskeletal: Negative for back pain, neck pain and neck stiffness.   Skin: Negative.    Allergic/Immunologic: Negative.    Neurological: Negative for dizziness, tremors, seizures, syncope, facial asymmetry, speech difficulty, weakness, numbness and headaches.   Hematological: Negative for adenopathy. Does not bruise/bleed easily.   Psychiatric/Behavioral: Negative for behavioral problems, confusion and decreased concentration.        Complains of mild memory problem         Objective:     Vitals:    11/12/21 1143   BP: 114/83   Pulse: (!) 53   Resp: 16      Review of patient's allergies indicates:   Allergen Reactions    Aspirin Other (See Comments)     Bruising      Body mass index is 40.6 kg/m².     Physical Exam:  Nursing note and vitals reviewed.    Constitutional: She appears well-developed and well-nourished.     Eyes: Pupils are equal, round, and reactive to light. Conjunctivae and EOM are normal.     Cardiovascular: Normal rate, regular rhythm and normal pulses.     Abdominal: Soft. Bowel sounds are normal.     Skin: Skin displays lesions on trunk.   There appears to be a palpable 2 by 2 cm mass in  the soft tissues of the right flank that corresponds to an area of significant tenderness.  This reliably causes discomfort when palpated.  It is non fixed and rubbery in consistency to palpation.     Psych/Behavior: She is alert. She is oriented to person, place, and time. She has a normal mood and affect.     Musculoskeletal: Gait is normal.        Neck: Range of motion is full. Muscle strength is 5/5. Tone is normal.        Back: Range of motion is full. Muscle strength is 5/5. Tone is normal.        Right Upper Extremities: Range of motion is full. Muscle strength is 5/5. Tone is normal.        Left Upper Extremities: Range of motion is full. Muscle strength is 5/5. Tone is normal.       Right Lower Extremities: Range of motion is full. Muscle strength is 5/5.        Left Lower Extremities: Range of motion is full. Muscle strength is 5/5.     Neurological:        Coordination: She has a normal Romberg Test, normal finger to nose coordination and normal tandem walking coordination.        Sensory: There is no sensory deficit in the trunk. There is no sensory deficit in the extremities.        DTRs: DTRs are DTRS NORMAL AND SYMMETRICnormal and symmetric. Tricep reflexes are 2+ on the right side and 2+ on the left side. Bicep reflexes are 2+ on the right side and 2+ on the left side. Brachioradialis reflexes are 2+ on the right side and 2+ on the left side. Patellar reflexes are 2+ on the right side and 2+ on the left side. Achilles reflexes are 2+ on the right side and 2+ on the left side. She displays no Babinski's sign on the right side. She displays no Babinski's sign on the left side.        Cranial nerves: Cranial nerve(s) II, III, IV, V, VI, VII, VIII, IX, X, XI and XII are intact.     Neurologic Exam     Mental Status   Oriented to person, place, and time.     Cranial Nerves     CN III, IV, VI   Pupils are equal, round, and reactive to light.  Extraocular motions are normal.     CN VIII   CN VIII normal.      CN IX, X   CN IX normal.   CN X normal.     CN XI   CN XI normal.     CN XII   CN XII normal.     Gait, Coordination, and Reflexes     Coordination   Romberg: negative    Reflexes   Right brachioradialis: 2+  Left brachioradialis: 2+  Right biceps: 2+  Left biceps: 2+  Right triceps: 2+  Left triceps: 2+  Right patellar: 2+  Left patellar: 2+  Right achilles: 2+  Left achilles: 2+      Assessment:   The patient has a stable left cerebellar convexity meningioma.  The patient has a new and subjectively enlarging mass in the soft tissues of the right flank that is quite tender.  1. Degenerative disc disease, lumbar    2. Cervical spondylosis without myelopathy    3. DDD (degenerative disc disease), cervical    4. Degenerative disc disease, cervical    5. Spondylolisthesis, lumbar region    6. Cerebral meningioma      Plan:   I  Degenerative disc disease, lumbar    Cervical spondylosis without myelopathy  -     Ambulatory consult to Neurosurgery    DDD (degenerative disc disease), cervical  -     Ambulatory consult to Neurosurgery    Degenerative disc disease, cervical    Spondylolisthesis, lumbar region    Cerebral meningioma        Meningioma remains stable will monitor this yearly    patient having b/l LE symptoms   She had DDD cervical spine without surgical intervention needed  She has a G1 spondylolisthesis at L 4-5 with stenosis   Taking gabapentin for symptom relief   Last MR lumbar was in 2016  Her symptoms seem to related to her stenosis at L 4-5   Will get an updated MR lumbar spine without  Contrast adnd flexion extension XR     Thank you for the referral   Please call with any questions    Juan Pablo Gautam MD  Neurosurgery     Disclaimer: This note was prepared using a voice recognition system and is likely to have sound alike errors within the text.

## 2021-11-12 NOTE — Clinical Note
November 12, 2021      Madison Manuel NP  [address]  [place] [address]           [facility]  [address]  [place] [address]  Phone: [phone]  Fax: [phone]          Patient: Beth Souza   MR Number: [id number]   YOB: 1950   Date of Visit: 11/12/2021       Dear Madison Manuel:    Thank you for referring Beth Souza to me for evaluation. Attached you will find relevant portions of my assessment and plan of care.    If you have questions, please do not hesitate to call me. I look forward to following Beth Souza along with you.    Sincerely,    Juan Pablo Gautam MD    Enclosure  CC:  No Recipients    If you would like to receive this communication electronically, please contact [email or web address] or [phone] to request more information on [employer] access.    For providers and/or their staff who would like to refer a patient to [facility], please contact us through our one-stop-shop provider referral line, [facility], at [phone].    If you feel you have received this communication in error or would no longer like to receive these types of communications, please e-mail [email or web address]

## 2021-11-16 ENCOUNTER — PATIENT OUTREACH (OUTPATIENT)
Dept: ADMINISTRATIVE | Facility: HOSPITAL | Age: 71
End: 2021-11-16
Payer: MEDICARE

## 2021-11-17 ENCOUNTER — HOSPITAL ENCOUNTER (OUTPATIENT)
Dept: RADIOLOGY | Facility: HOSPITAL | Age: 71
Discharge: HOME | End: 2021-11-17
Attending: PEDIATRICS
Payer: MEDICARE

## 2021-11-17 DIAGNOSIS — Z12.31 ENCOUNTER FOR SCREENING MAMMOGRAM FOR BREAST CANCER: ICD-10-CM

## 2021-11-17 PROCEDURE — 77067 MAMMO DIGITAL SCREENING BILAT WITH TOMO: ICD-10-PCS | Mod: 26,,, | Performed by: RADIOLOGY

## 2021-11-17 PROCEDURE — 77067 SCR MAMMO BI INCL CAD: CPT | Mod: TC

## 2021-11-17 PROCEDURE — 77067 SCR MAMMO BI INCL CAD: CPT | Mod: 26,,, | Performed by: RADIOLOGY

## 2021-11-17 PROCEDURE — 77063 MAMMO DIGITAL SCREENING BILAT WITH TOMO: ICD-10-PCS | Mod: 26,,, | Performed by: RADIOLOGY

## 2021-11-17 PROCEDURE — 77063 BREAST TOMOSYNTHESIS BI: CPT | Mod: 26,,, | Performed by: RADIOLOGY

## 2021-11-22 ENCOUNTER — TELEPHONE (OUTPATIENT)
Dept: NEUROSURGERY | Facility: CLINIC | Age: 71
End: 2021-11-22
Payer: MEDICARE

## 2021-11-23 ENCOUNTER — TELEPHONE (OUTPATIENT)
Dept: RADIOLOGY | Facility: HOSPITAL | Age: 71
End: 2021-11-23

## 2021-11-29 ENCOUNTER — TELEPHONE (OUTPATIENT)
Dept: NEUROSURGERY | Facility: CLINIC | Age: 71
End: 2021-11-29
Payer: MEDICARE

## 2021-12-01 ENCOUNTER — TELEPHONE (OUTPATIENT)
Dept: RADIOLOGY | Facility: HOSPITAL | Age: 71
End: 2021-12-01
Payer: MEDICARE

## 2021-12-02 ENCOUNTER — HOSPITAL ENCOUNTER (OUTPATIENT)
Dept: RADIOLOGY | Facility: HOSPITAL | Age: 71
Discharge: HOME | End: 2021-12-02
Attending: NEUROLOGICAL SURGERY
Payer: MEDICARE

## 2021-12-02 DIAGNOSIS — M50.30 DDD (DEGENERATIVE DISC DISEASE), CERVICAL: ICD-10-CM

## 2021-12-02 DIAGNOSIS — M47.812 CERVICAL SPONDYLOSIS WITHOUT MYELOPATHY: ICD-10-CM

## 2021-12-02 DIAGNOSIS — M51.36 DEGENERATIVE DISC DISEASE, LUMBAR: ICD-10-CM

## 2021-12-02 DIAGNOSIS — M43.16 SPONDYLOLISTHESIS, LUMBAR REGION: ICD-10-CM

## 2021-12-02 PROCEDURE — 72148 MRI LUMBAR SPINE W/O DYE: CPT | Mod: TC

## 2021-12-02 PROCEDURE — 72148 MRI LUMBAR SPINE W/O DYE: CPT | Mod: 26,,, | Performed by: RADIOLOGY

## 2021-12-02 PROCEDURE — 72148 MRI LUMBAR SPINE WITHOUT CONTRAST: ICD-10-PCS | Mod: 26,,, | Performed by: RADIOLOGY

## 2021-12-09 ENCOUNTER — OFFICE VISIT (OUTPATIENT)
Dept: NEUROSURGERY | Facility: CLINIC | Age: 71
End: 2021-12-09
Payer: MEDICARE

## 2021-12-09 VITALS — RESPIRATION RATE: 18 BRPM | HEIGHT: 62 IN | WEIGHT: 220.44 LBS | BODY MASS INDEX: 40.57 KG/M2

## 2021-12-09 DIAGNOSIS — M43.16 SPONDYLOLISTHESIS, LUMBAR REGION: ICD-10-CM

## 2021-12-09 DIAGNOSIS — M51.36 DEGENERATIVE DISC DISEASE, LUMBAR: Primary | ICD-10-CM

## 2021-12-09 DIAGNOSIS — M54.16 LUMBAR RADICULOPATHY: ICD-10-CM

## 2021-12-09 PROCEDURE — 99214 OFFICE O/P EST MOD 30 MIN: CPT | Mod: S$GLB,,, | Performed by: NEUROLOGICAL SURGERY

## 2021-12-09 PROCEDURE — 99999 PR PBB SHADOW E&M-EST. PATIENT-LVL III: ICD-10-PCS | Mod: PBBFAC,,, | Performed by: NEUROLOGICAL SURGERY

## 2021-12-09 PROCEDURE — 4010F PR ACE/ARB THEARPY RXD/TAKEN: ICD-10-PCS | Mod: CPTII,S$GLB,, | Performed by: NEUROLOGICAL SURGERY

## 2021-12-09 PROCEDURE — 99214 PR OFFICE/OUTPT VISIT, EST, LEVL IV, 30-39 MIN: ICD-10-PCS | Mod: S$GLB,,, | Performed by: NEUROLOGICAL SURGERY

## 2021-12-09 PROCEDURE — 4010F ACE/ARB THERAPY RXD/TAKEN: CPT | Mod: CPTII,S$GLB,, | Performed by: NEUROLOGICAL SURGERY

## 2021-12-09 PROCEDURE — 99999 PR PBB SHADOW E&M-EST. PATIENT-LVL III: CPT | Mod: PBBFAC,,, | Performed by: NEUROLOGICAL SURGERY

## 2021-12-09 RX ORDER — METHOCARBAMOL 750 MG/1
750 TABLET, FILM COATED ORAL 3 TIMES DAILY PRN
Qty: 60 TABLET | Refills: 0 | Status: SHIPPED | OUTPATIENT
Start: 2021-12-09 | End: 2022-06-22

## 2021-12-17 ENCOUNTER — PATIENT OUTREACH (OUTPATIENT)
Dept: ADMINISTRATIVE | Facility: OTHER | Age: 71
End: 2021-12-17
Payer: MEDICARE

## 2022-01-10 ENCOUNTER — TELEPHONE (OUTPATIENT)
Dept: NEUROLOGY | Facility: CLINIC | Age: 72
End: 2022-01-10
Payer: MEDICARE

## 2022-02-02 ENCOUNTER — TELEPHONE (OUTPATIENT)
Dept: PULMONOLOGY | Facility: CLINIC | Age: 72
End: 2022-02-02
Payer: MEDICARE

## 2022-02-02 NOTE — TELEPHONE ENCOUNTER
Called pt , no answer, left voicemail and call back number----- Message from Veronica Maldonado sent at 2/2/2022 11:50 AM CST -----  Regarding: appt  Contact: pt  Type:  Sooner Appointment Request    Caller is requesting a sooner appointment.  Caller declined first available appointment listed below.  Caller will not accept being placed on the waitlist and is requesting a message be sent to doctor.  Name of Caller: pt  When is the first available appointment? 03/14  Symptoms: f/u  Would the patient rather a call back or a response via [facility]? Call back  Best Call Back Number:[phone]  Additional Information: n/a

## 2022-02-03 ENCOUNTER — PATIENT OUTREACH (OUTPATIENT)
Dept: ADMINISTRATIVE | Facility: OTHER | Age: 72
End: 2022-02-03
Payer: MEDICARE

## 2022-02-04 NOTE — PROGRESS NOTES
Health Maintenance Due   Topic Date Due    Shingles Vaccine (1 of 2) Never done    Influenza Vaccine (1) 09/01/2021     Updates were requested from care everywhere.  Chart was reviewed for overdue Proactive [facility] Encounters (SHARLA) topics (CRS, Breast Cancer Screening, Eye exam)  Health Maintenance has been updated.  LINKS immunization registry triggered.  Immunizations were reconciled.

## 2022-02-08 ENCOUNTER — DOCUMENTATION ONLY (OUTPATIENT)
Dept: INTERNAL MEDICINE | Facility: CLINIC | Age: 72
End: 2022-02-08
Payer: MEDICARE

## 2022-02-14 ENCOUNTER — OFFICE VISIT (OUTPATIENT)
Dept: NEUROLOGY | Facility: CLINIC | Age: 72
End: 2022-02-14
Payer: MEDICARE

## 2022-02-14 VITALS
RESPIRATION RATE: 16 BRPM | BODY MASS INDEX: 42.07 KG/M2 | HEART RATE: 64 BPM | WEIGHT: 228.63 LBS | SYSTOLIC BLOOD PRESSURE: 132 MMHG | HEIGHT: 62 IN | OXYGEN SATURATION: 96 % | DIASTOLIC BLOOD PRESSURE: 82 MMHG

## 2022-02-14 DIAGNOSIS — M54.2 CERVICALGIA: ICD-10-CM

## 2022-02-14 DIAGNOSIS — G31.84 MILD COGNITIVE IMPAIRMENT WITH MEMORY LOSS: Primary | ICD-10-CM

## 2022-02-14 DIAGNOSIS — G47.33 OSA ON CPAP: ICD-10-CM

## 2022-02-14 DIAGNOSIS — G31.84 MILD COGNITIVE IMPAIRMENT: ICD-10-CM

## 2022-02-14 DIAGNOSIS — M47.812 CERVICAL SPONDYLOSIS WITHOUT MYELOPATHY: ICD-10-CM

## 2022-02-14 DIAGNOSIS — M16.0 PRIMARY OSTEOARTHRITIS OF BOTH HIPS: ICD-10-CM

## 2022-02-14 DIAGNOSIS — M50.30 DDD (DEGENERATIVE DISC DISEASE), CERVICAL: ICD-10-CM

## 2022-02-14 DIAGNOSIS — D32.9 MENINGIOMA: ICD-10-CM

## 2022-02-14 DIAGNOSIS — M47.892 OTHER SPONDYLOSIS, CERVICAL REGION: ICD-10-CM

## 2022-02-14 DIAGNOSIS — D32.0 MENINGIOMA OF CEREBELLUM: ICD-10-CM

## 2022-02-14 DIAGNOSIS — R51.9 ACUTE NONINTRACTABLE HEADACHE, UNSPECIFIED HEADACHE TYPE: ICD-10-CM

## 2022-02-14 DIAGNOSIS — R68.89 MULTIPLE COMPLAINTS: ICD-10-CM

## 2022-02-14 DIAGNOSIS — F10.20 ALCOHOL DEPENDENCE, UNCOMPLICATED: ICD-10-CM

## 2022-02-14 DIAGNOSIS — M54.81 OCCIPITAL NEURALGIA, UNSPECIFIED LATERALITY: ICD-10-CM

## 2022-02-14 PROCEDURE — 3075F SYST BP GE 130 - 139MM HG: CPT | Mod: CPTII,S$GLB,, | Performed by: NURSE PRACTITIONER

## 2022-02-14 PROCEDURE — 99214 OFFICE O/P EST MOD 30 MIN: CPT | Mod: S$GLB,,, | Performed by: NURSE PRACTITIONER

## 2022-02-14 PROCEDURE — 99499 UNLISTED E&M SERVICE: CPT | Mod: S$GLB,,, | Performed by: NURSE PRACTITIONER

## 2022-02-14 PROCEDURE — 3288F FALL RISK ASSESSMENT DOCD: CPT | Mod: CPTII,S$GLB,, | Performed by: NURSE PRACTITIONER

## 2022-02-14 PROCEDURE — 99499 RISK ADDL DX/OHS AUDIT: ICD-10-PCS | Mod: S$GLB,,, | Performed by: NURSE PRACTITIONER

## 2022-02-14 PROCEDURE — 99214 PR OFFICE/OUTPT VISIT, EST, LEVL IV, 30-39 MIN: ICD-10-PCS | Mod: S$GLB,,, | Performed by: NURSE PRACTITIONER

## 2022-02-14 PROCEDURE — 3079F PR MOST RECENT DIASTOLIC BLOOD PRESSURE 80-89 MM HG: ICD-10-PCS | Mod: CPTII,S$GLB,, | Performed by: NURSE PRACTITIONER

## 2022-02-14 PROCEDURE — 4010F PR ACE/ARB THEARPY RXD/TAKEN: ICD-10-PCS | Mod: CPTII,S$GLB,, | Performed by: NURSE PRACTITIONER

## 2022-02-14 PROCEDURE — 1159F PR MEDICATION LIST DOCUMENTED IN MEDICAL RECORD: ICD-10-PCS | Mod: CPTII,S$GLB,, | Performed by: NURSE PRACTITIONER

## 2022-02-14 PROCEDURE — 1125F AMNT PAIN NOTED PAIN PRSNT: CPT | Mod: CPTII,S$GLB,, | Performed by: NURSE PRACTITIONER

## 2022-02-14 PROCEDURE — 3079F DIAST BP 80-89 MM HG: CPT | Mod: CPTII,S$GLB,, | Performed by: NURSE PRACTITIONER

## 2022-02-14 PROCEDURE — 1101F PT FALLS ASSESS-DOCD LE1/YR: CPT | Mod: CPTII,S$GLB,, | Performed by: NURSE PRACTITIONER

## 2022-02-14 PROCEDURE — 99999 PR PBB SHADOW E&M-EST. PATIENT-LVL IV: CPT | Mod: PBBFAC,,, | Performed by: NURSE PRACTITIONER

## 2022-02-14 PROCEDURE — 3008F BODY MASS INDEX DOCD: CPT | Mod: CPTII,S$GLB,, | Performed by: NURSE PRACTITIONER

## 2022-02-14 PROCEDURE — 3288F PR FALLS RISK ASSESSMENT DOCUMENTED: ICD-10-PCS | Mod: CPTII,S$GLB,, | Performed by: NURSE PRACTITIONER

## 2022-02-14 PROCEDURE — 99999 PR PBB SHADOW E&M-EST. PATIENT-LVL IV: ICD-10-PCS | Mod: PBBFAC,,, | Performed by: NURSE PRACTITIONER

## 2022-02-14 PROCEDURE — 4010F ACE/ARB THERAPY RXD/TAKEN: CPT | Mod: CPTII,S$GLB,, | Performed by: NURSE PRACTITIONER

## 2022-02-14 PROCEDURE — 1101F PR PT FALLS ASSESS DOC 0-1 FALLS W/OUT INJ PAST YR: ICD-10-PCS | Mod: CPTII,S$GLB,, | Performed by: NURSE PRACTITIONER

## 2022-02-14 PROCEDURE — 1125F PR PAIN SEVERITY QUANTIFIED, PAIN PRESENT: ICD-10-PCS | Mod: CPTII,S$GLB,, | Performed by: NURSE PRACTITIONER

## 2022-02-14 PROCEDURE — 1160F PR REVIEW ALL MEDS BY PRESCRIBER/CLIN PHARMACIST DOCUMENTED: ICD-10-PCS | Mod: CPTII,S$GLB,, | Performed by: NURSE PRACTITIONER

## 2022-02-14 PROCEDURE — 3008F PR BODY MASS INDEX (BMI) DOCUMENTED: ICD-10-PCS | Mod: CPTII,S$GLB,, | Performed by: NURSE PRACTITIONER

## 2022-02-14 PROCEDURE — 1160F RVW MEDS BY RX/DR IN RCRD: CPT | Mod: CPTII,S$GLB,, | Performed by: NURSE PRACTITIONER

## 2022-02-14 PROCEDURE — 3075F PR MOST RECENT SYSTOLIC BLOOD PRESS GE 130-139MM HG: ICD-10-PCS | Mod: CPTII,S$GLB,, | Performed by: NURSE PRACTITIONER

## 2022-02-14 PROCEDURE — 1159F MED LIST DOCD IN RCRD: CPT | Mod: CPTII,S$GLB,, | Performed by: NURSE PRACTITIONER

## 2022-02-14 RX ORDER — GABAPENTIN 300 MG/1
300 CAPSULE ORAL NIGHTLY
Qty: 30 CAPSULE | Refills: 11 | Status: SHIPPED | OUTPATIENT
Start: 2022-02-14 | End: 2022-06-22

## 2022-02-14 NOTE — PROGRESS NOTES
Patient, Beth Souza (MRN #[id number]), presented with a recorded BMI of 41.81 kg/m^2 consistent with the definition of morbid obesity (ICD-10 E66.01). The patient's morbid obesity was monitored, evaluated, addressed and/or treated. This addendum to the medical record is made on 02/14/2022.

## 2022-02-14 NOTE — PROGRESS NOTES
Subjective:       Patient ID: Beth Souza is a 71 y.o. female.    Chief Complaint: Occipital neuralgia, uspecified laterality     Summary: Former Patient of Dr. Renee previously managed for Memory changes, Hx: COVID-19 and Hx of  Meningoma. Patient has multiple complaints of current new onset headache pain likely Occipital Neuralgia, Memory complaint, loss sense of smell and taste (now resolved). I recommend following up with Neurosurgery sooner for Meningoma if MRI Brain w/wo show changes. I recommended starting Amitriptyline 10 mg po for ON and Sleep disturbance Patient declined. Patient started on  mg po HS for neuropathic complaints.        HPI The patient is here for multiple complaints. She report last year she had COVID -19 and began having memory issues. The patient did not give a clear indication if  memory changes proceeded her having COVID-19. Patient reports that she has issues with memory she forget where she place things such as her car keys, for get to do task, she go in one room and forget what she had plan to do.The main problems the patient has are related to progressive short term memory loss. The patient is driving and denies getting lost a problem now. Reports it happened one time only.  The patient is not losing personal items and does not put them in odd places. No confusion around and inside the house. No trouble remembering the date and time, keeping up with medications and appointments and keeping up with major holidays and political changes. The patient is independent in handling finances. The patient is still independent with ADLs. No hallucinations or delusions. No seizures. No behavioral problems. No language problems. No problems handling tools. No history of strokes. Patient states she use to get an occasional headache when she was younger but reports lately she has been experiencing pain to the left lateral side of head and scalp tenderness. Patient states the pain  "is sharp and short lasting she states it like a "brief attack that comes and go away, within the last couple of weeks its been occurring more often, the patient states she has also had loss of taste and smell for  two weeks as well but those issue have resolved unlike the headache. The patient denies taking anything for pain. States she try to deter from taking medications. No history of hypothyroidism. Reports she drink alcohol  "Absolute" is drink of choice. States she consume 4 to 5 shots on Friday and Sunday . No history of B12 deficiency. No history of depression. No history of STDs.  No history of HIV infection. No toxic exposures.  No history of traumatic brain injury. History of findings of Meningoma initially evaluated by Dr. Barr no surgical intervention recommended at that time, instructed to follow up No tremors or abnormal movements. No falls or instability. No urinary incontinence. No change in sleep and appetite. No family history of dementia.     No current complaint of loss of taste sensation,  no decrease in smell sensation. Patient states she can't go into deep sleep and often wake up feeling very tired. Patient has a history of sleep apnea but unable to use CPAP machine due to recent recall the patient has not been able to use it.        Interval Noted: Patient present for follow up.  Patient reports she did see Neurosurgery regarding back pain and neck pain complaints. Was tried on  mg po HS, her dose was on a  ramping taper schedule up to 600 mg po HS. Patient states she never took more than 300 mg po HS because "I didn't want to become addicted".  GPB was helpful prior to stopping treatment. Patient complains of Numbness, tingling, pins and needle pain radiating down legs.  Denies headache pain. Devon reports that Memory is still a concern and she is now wanting to complete CNP testing. No change in cognitive function noted, continues to have ongoing forgetfulness.        Review of " Systems   Constitutional: Negative for activity change, appetite change, chills, diaphoresis, fatigue, fever and unexpected weight change.   HENT: Negative for congestion, dental problem, drooling, ear discharge, ear pain, facial swelling, hearing loss and tinnitus.    Eyes: Negative for photophobia and visual disturbance.   Respiratory: Negative for apnea and shortness of breath.    Cardiovascular: Negative for chest pain and palpitations.   Gastrointestinal: Negative for abdominal distention, abdominal pain, anal bleeding, blood in stool, constipation, diarrhea, nausea and vomiting.   Endocrine: Negative for cold intolerance and heat intolerance.   Genitourinary: Negative for difficulty urinating, dyspareunia, dysuria, enuresis, flank pain and urgency.   Musculoskeletal: Positive for arthralgias, myalgias and neck pain. Negative for back pain, gait problem, joint swelling and neck stiffness.   Skin: Negative for color change and rash.   Allergic/Immunologic: Negative for environmental allergies and immunocompromised state.   Neurological: Positive for numbness and headaches. Negative for dizziness, tremors, seizures, syncope, facial asymmetry, speech difficulty, weakness and light-headedness.   Hematological: Negative for adenopathy. Does not bruise/bleed easily.   Psychiatric/Behavioral: Positive for confusion, decreased concentration, dysphoric mood and sleep disturbance. Negative for agitation, behavioral problems, hallucinations, self-injury and suicidal ideas. The patient is nervous/anxious. The patient is not hyperactive.                  Current Outpatient Medications:     amLODIPine (NORVASC) 5 MG tablet, Take 1 tablet (5 mg total) by mouth every evening., Disp: 90 tablet, Rfl: 3    acetaminophen (TYLENOL) 500 MG tablet, Take 500 mg by mouth daily as needed for Pain., Disp: , Rfl:     atorvastatin (LIPITOR) 20 MG tablet, Take 1 tablet (20 mg total) by mouth once daily. (Patient not taking: Reported on  2022), Disp: 90 tablet, Rfl: 3    ergocalciferol (ERGOCALCIFEROL) 50,000 unit Cap, Take 1 capsule (50,000 Units total) by mouth every 7 days. (Patient not taking: Reported on 2022), Disp: 12 capsule, Rfl: 3    fluticasone propionate (FLONASE) 50 mcg/actuation nasal spray, 1 spray (50 mcg total) by Each Nostril route once daily. (Patient not taking: Reported on 2022), Disp: 18.2 mL, Rfl: 0    gabapentin (NEURONTIN) 300 MG capsule, Take 1 capsule (300 mg total) by mouth every evening., Disp: 30 capsule, Rfl: 11    lisinopriL-hydrochlorothiazide (PRINZIDE,ZESTORETIC) 20-25 mg Tab, Take 1 tablet by mouth once daily. (Patient not taking: Reported on 2022), Disp: 90 tablet, Rfl: 3    methocarbamoL (ROBAXIN) 750 MG Tab, Take 1 tablet (750 mg total) by mouth 3 (three) times daily as needed (muscle spasms). (Patient not taking: Reported on 2022), Disp: 60 tablet, Rfl: 0    Current Facility-Administered Medications:     methylPREDNISolone acetate injection 60 mg, 60 mg, Intramuscular, Once, Dyan Carney PA-C  Past Medical History:   Diagnosis Date    Cataract     Hyperlipidemia     Hypertension     Lumbar spondylosis     Meningioma 2021    Obesity     Sleep apnea     Vitamin D deficiency      Past Surgical History:   Procedure Laterality Date    HYSTERECTOMY      PARTIAL HYSTERECTOMY       Social History     Socioeconomic History    Marital status:     Number of children: 3   Occupational History    Occupation: Cleaning houses     Employer: not employed   Tobacco Use    Smoking status: Former Smoker     Quit date: 2004     Years since quittin.1    Smokeless tobacco: Never Used   Substance and Sexual Activity    Alcohol use: Yes     Comment: occasionally    Drug use: No    Sexual activity: Not Currently     Partners: Male   Other Topics Concern    Are you pregnant or think you may be? No    Breast-feeding No   Social History Narrative    Live w/  alone              Past/Current Medical/Surgical History, Past/Current Social History, Past/Current Family History and Past/Current Medications were reviewed in detail.        Objective:           VITAL SIGNS WERE REVIEWED      GENERAL APPEARANCE:     The patient looks comfortable.    Body habitus is overweight     No signs of respiratory distress.    Normal breathing pattern.    No dysmorphic features    Normal eye contact.     GENERAL MEDICAL EXAM:    HEENT:  Head is atraumatic normocephalic.     No tender temporal arteries. Fundoscopic (Ophthalmoscopic) exam showed no disc edema.      Neck and Axillae: No JVD. No visible lesions.    No carotid bruits. No thyromegaly. No lymphadenopathy.    Cardiopulmonary: No cyanosis. No tachypnea. Normal respiratory effort.    Gastrointestinal/Urogenital:  No jaundice. No stomas or lesions. No visible hernias. No catheters.     Abdomen is soft non-tender. No masses or organomegaly.    Skin, Hair and Nails: No pathognonomic skin rash. No neurofibromatosis. No visible lesions.No stigmata of autoimmune disease. No clubbing.    Skin is warm and moist. No palpable masses.    Limbs: No varicose veins. No visible swelling.    No palpable edema. Pulses are symmetric. Pedal pulses are palpable.      Muskoskeletal: No visible deformities.No visible lesions.    No spine tenderness. No signs of longstanding neuropathy. No dislocations or fractures.            Neurologic Exam     Mental Status   Oriented to person, place, and time.   Oriented to person.   Oriented to place. Oriented to country, city, area, street and number.   Oriented to year, month, date, day and season.   Registration: recalls 3 of 3 objects. Recall at 5 minutes: recalls 3 of 3 objects. Follows 3 step commands.   Attention: decreased. Concentration: decreased.   Speech: speech is normal   Level of consciousness: alert  Knowledge: good and consistent with education. Unable to perform simple calculations.   Able to name  object. Able to read. Able to repeat. Able to write. Abnormal comprehension.     MOCA 28/30    Visuospatial/Executive 4/5    Naming                            3/3    Attention                         4/6    Language                         2/3    Abstraction                    2/2    Recall                                5/5    Orientation                     6/6        NORMAL-MILD NCD 26-30    1 point added for education (10 grade level)     Previous MOCA conducted Per Dr. Renee 2020 22/30          Cranial Nerves     CN II   Visual fields full to confrontation.   Visual acuity: normal with correction  Right visual field deficit: none  Left visual field deficit: none     CN III, IV, VI   Pupils are equal, round, and reactive to light.  Right pupil: Size: 2 mm. Shape: regular. Reactivity: brisk.   Left pupil: Size: 2 mm. Shape: regular. Reactivity: brisk.   Nystagmus: none   Nystagmus type: slow  Ophthalmoparesis: none  Upgaze: normal  Downgaze: normal  Conjugate gaze: present    CN V   Facial sensation intact.   Right facial sensation deficit: none  Left facial sensation deficit: none    CN VII   Right facial weakness: none  Left facial weakness: none    CN VIII   Hearing: intact    CN XI   Right sternocleidomastoid strength: normal  Left sternocleidomastoid strength: normal    CN XII   Tongue: not atrophic    Motor Exam   Muscle bulk: normal  Overall muscle tone: normal  Right arm tone: normal  Left arm tone: normal  Right leg tone: normal  Left leg tone: normal    Strength   Right neck flexion: 5/5  Left neck flexion: 5/5  Right neck extension: 5/5  Left neck extension: 5/5  Right deltoid: 5/5  Left deltoid: 5/5  Right biceps: 5/5  Left biceps: 5/5  Right triceps: 5/5  Left triceps: 5/5  Right wrist flexion: 5/5  Left wrist flexion: 5/5  Right wrist extension: 5/5  Left wrist extension: 5/5  Right interossei: 5/5  Left interossei: 5/5  Right iliopsoas: 5/5  Left iliopsoas: 5/5  Right quadriceps: 5/5  Left  quadriceps: 5/5  Right hamstrin/5  Left hamstrin/5  Right glutei: 5/5  Left glutei: 5/5  Right anterior tibial: 5/5  Left anterior tibial: 5/5  Right posterior tibial: 5/5  Left posterior tibial: 5/5  Right peroneal: 5/5  Left peroneal: 5/5  Right gastroc: 5/5  Left gastroc: 5/5    Sensory Exam   Light touch normal.   Right arm light touch: normal  Left arm light touch: normal  Right leg light touch: normal  Left leg light touch: normal  Right arm vibration: normal  Left arm vibration: normal  Right leg vibration: decreased from toes  Left leg vibration: decreased from toes  Right arm proprioception: normal  Left arm proprioception: normal  Right leg proprioception: normal  Left leg proprioception: normal  Pinprick normal.   Right arm pinprick: normal  Left arm pinprick: normal  Right leg pinprick: normal  Left leg pinprick: normal  Graphesthesia: normal  Stereognosis: normal    Gait, Coordination, and Reflexes     Gait  Gait: wide-based (atalgic gait related to back pain )    Coordination   Romberg: negative  Finger to nose coordination: normal  Tandem walking coordination: normal    Tremor   Resting tremor: absent  Intention tremor: absent  Action tremor: absent    Reflexes   Right brachioradialis: 2+  Left brachioradialis: 2+  Right biceps: 2+  Left biceps: 2+  Right triceps: 2+  Left triceps: 2+  Right patellar: 2+  Left patellar: 2+  Right achilles: 0  Left achilles: 0  Right : 2+  Left : 2+      Lab Results   Component Value Date    WBC 5.88 2021    HGB 14.0 2021    HCT 45.4 2021    MCV 95 2021     2021     Sodium   Date Value Ref Range Status   2021 142 136 - 145 mmol/L Final     Potassium   Date Value Ref Range Status   2021 4.2 3.5 - 5.1 mmol/L Final     Chloride   Date Value Ref Range Status   2021 102 95 - 110 mmol/L Final     CO2   Date Value Ref Range Status   2021 29 23 - 29 mmol/L Final     Glucose   Date Value Ref Range  Status   09/23/2021 99 70 - 110 mg/dL Final     BUN   Date Value Ref Range Status   09/23/2021 12 8 - 23 mg/dL Final     Creatinine   Date Value Ref Range Status   09/23/2021 0.7 0.5 - 1.4 mg/dL Final     Calcium   Date Value Ref Range Status   09/23/2021 9.1 8.7 - 10.5 mg/dL Final     Total Protein   Date Value Ref Range Status   09/23/2021 7.1 6.0 - 8.4 g/dL Final     Albumin   Date Value Ref Range Status   09/23/2021 3.5 3.5 - 5.2 g/dL Final     Total Bilirubin   Date Value Ref Range Status   09/23/2021 0.5 0.1 - 1.0 mg/dL Final     Comment:     For infants and newborns, interpretation of results should be based  on gestational age, weight and in agreement with clinical  observations.    Premature Infant recommended reference ranges:  Up to 24 hours.............<8.0 mg/dL  Up to 48 hours............<12.0 mg/dL  3-5 days..................<15.0 mg/dL  6-29 days.................<15.0 mg/dL       Alkaline Phosphatase   Date Value Ref Range Status   09/23/2021 81 55 - 135 U/L Final     AST   Date Value Ref Range Status   09/23/2021 10 10 - 40 U/L Final     ALT   Date Value Ref Range Status   09/23/2021 12 10 - 44 U/L Final     Anion Gap   Date Value Ref Range Status   09/23/2021 11 8 - 16 mmol/L Final     eGFR if    Date Value Ref Range Status   09/23/2021 >60.0 >60 mL/min/1.73 m^2 Final     eGFR if non    Date Value Ref Range Status   09/23/2021 >60.0 >60 mL/min/1.73 m^2 Final     Comment:     Calculation used to obtain the estimated glomerular filtration  rate (eGFR) is the CKD-EPI equation.        Lab Results   Component Value Date    DFWEIDBT34 611 08/18/2021     Lab Results   Component Value Date    TSH 2.362 02/08/2021    G3OPAFL 88 05/29/2020    FREET4 1.06 05/29/2020       MRI Brain w/wo synaptic  02-     Stable appearance of a meningioma projecting off the inner table of the left posterior cranial fossa.   Remaining findings as discussed above and also stable.      MRI  Brain w/wo contrast 07-     Meningioma within the left aspect of the posterior cranial fossa with remodeling of the adjacent cerebellum.  No attenuation of the 4th     ventricle or shift of midline.    Nonspecific white matter T2 changes consistent with moderate chronic microvascular ischemia.    MRI Brain W/o contrast 07-     No acute intracranial abnormality.    2.5 cm extra-axial appearing lesion within the left posterior fossa is most compatible with a meningioma.  Mass effect on the adjacent left cerebellum without associated edema.  Findings could be confirmed with post contrasted brain MRI.    Moderate degree of chronic microvascular ischemic change including a small remote hemorrhage or calcification in the right basal ganglia.     Reviewed the neuroimaging independently       Assessment:       1. Mild cognitive impairment with memory loss    2. Meningioma of cerebellum    3. Cervicalgia    4. DDD (degenerative disc disease), cervical    5. Other spondylosis, cervical region    6. Occipital neuralgia, unspecified laterality    7. Acute nonintractable headache, unspecified headache type    8. Cervical spondylosis without myelopathy    9. Primary osteoarthritis of both hips    10. Alcohol dependence, uncomplicated     11. Mild cognitive impairment    12. LEN on CPAP    13. Meningioma    14. Multiple complaints        Plan:        NUMEROUS COMPLIANT'S: HX COVID, MEMORY CHANGES, ON, HX OF MENINGIOMA, HX OF ALCOHOL DEPENDENCE     EVALUATION       NEW ONSET OCCIPITAL NEURALGIA WITH NECK AND SHOULDER PAIN LEFT SIDE      mg po Bedtime       HISTORY OF MENINGIOMA     Follow up NEUROSURGERY IF MRI BRAIN SHOW CHANGE.     If MRI unchanged recommend following up with neurosurgery in 1 year as directed.     MILD COGNITIVE COMPLAINT    Comprehensive Neuropsychological Testing     DriveAble to assess the cognitive aspects of driving Down the road        Proofing the house and use labeling.    Avoid  antihistamines and anticholinergics.    Avoid changing routine.    Use written reminders.    Avoid multitasking.    Healthy diet, exercise (physical and cognitive).    Good sleep hygiene.      OCCIPITAL NEURALGIA  LT Sided     HEADACHE DIARY      DISCUSSED THE THREE-FOLD MANAGEMENT OF MIGRAINE:       LIFESTYLE CHANGES:      Good sleep hygiene  Avoid general triggers like lack of sleep/too much sleep, prolonged sun exposure, excessive screen time and specific triggers based on you own diary   Minimize physical and emotional stress  Smoking avoidance and cessation  Limit caffeine drinks to 1-2 a day   Good hydration   Small frequent meals and avoid skipping meals   Moderate 30-minute-long aerobic exercises 3 times/week. Avoid strenuous exercise      Recommend Amitrypline 10 mg po Patient would like to hold off starting medication at this time     Will try  mg po HS     MEDICAL/SURGICAL COMORBIDITIES     All relevant medical comorbidities noted and managed by primary care physician and medical care team.          MISCELLANEOUS MEDICAL PROBLEMS       HEALTHY LIFESTYLE AND PREVENTATIVE CARE    Encouraged the patient to adhere to the age-appropriate health maintenance guidelines including screening tests and vaccinations.     Discussed the overall importance of healthy lifestyle, optimal weight, exercise, healthy diet, good sleep hygiene and avoiding drugs including smoking, alcohol and recreational drugs. The patient verbalized full understanding.       Advised the patient to follow COVID-19 prevention measures.       I spent 33 minutes total E/M more than half spent  face to face with the patient         Sparkle Manuel, MSN NP      Collaborating Provider: Krishna Lakhani MD, FAAN Neurologist/Epileptologist

## 2022-02-14 NOTE — PATIENT INSTRUCTIONS
"Patient Education       Mild Cognitive Impairment   The Basics   Written by the doctors and editors at [facility]   What is mild cognitive impairment? -- Mild cognitive impairment (MCI) is a brain disorder that causes trouble with memory or thinking. The word "cognitive" has to do with memory and thinking. The word "impairment" means having trouble doing something.  It is normal for adults to have slight memory problems as they get older. But the problems in MCI are more significant than those of normal aging. For example, you might forget things more often than before, or you might forget more important things.  Some people with MCI later develop a condition called "dementia." Dementia is the general term for a group of brain disorders that cause problems with memory and thinking (figure 1).  Dementia is a more serious problem than MCI. People with MCI usually don't have many problems doing their daily tasks and activities. But people with dementia might not be able to do their daily tasks or activities correctly or at all.  What are the symptoms of MCI? -- Memory problems are the most common symptom of MCI. Some people have other types of thinking problems. They might have trouble concentrating, reasoning, or remembering the correct word to use. It can also be more difficult to make decisions. Some people with MCI might get lost.  Some people with MCI might also feel sad, worried, or angry; act in a threatening way; or believe things that aren't true. Other people with MCI might seem less interested in activities or other things that used to be important to them.  Some people with MCI are aware of their memory changes and problems. In other cases, family members, friends, or co-workers might notice these problems first.  How can my doctor or nurse tell if I have MCI? -- To tell if you have MCI, your doctor or nurse will:  · Talk with you and your family - He or she will ask about your symptoms, behavior, mood, and " "daily activities, and how these have changed over time. He or she will also ask about your medical conditions and medicines. That's because some medical conditions and medicines can also cause memory problems. In older adults, depression can cause symptoms similar to MCI.   · Do an exam  · Ask questions to check your memory and thinking  Your doctor might do tests to check that another medical condition isn't causing your symptoms. These tests can include:  · Blood tests  · A CT or MRI scan of your brain - These are imaging tests that can create pictures of your brain.  · More detailed tests to check your memory, language, and thinking - This is called "neuropsychological" testing because it is done to learn more about how your brain and mind are working. It involves testing how well you are able to:  ? Speak  ? Write and understand language  ? Learn and remember information  ? Use reason and logic  ? Do tasks related to math and numbers.  This type of detailed testing can take 1 to several hours. It is usually done by a doctor called a "neuropsychologist."  How is MCI treated? -- Sadly, there really aren't helpful treatments for MCI. As of now, no medicines can prevent MCI from turning into dementia.  If you have a lot of memory problems, your doctor might prescribe a medicine used to treat a type of dementia called Alzheimer disease. These medicines can sometimes help improve symptoms. Plus, your doctor might suggest that you avoid certain medicines, which can impair thinking.  Your doctor will also talk with you about ways to keep your brain as healthy as possible. These include:  · Getting regular exercise  · Having an active social life  · Keeping your brain busy and active  · Making sure your blood pressure is not too high  Do people with MCI always get dementia? -- No. Although some people with MCI get dementia later on, some do not.  There is no way to know which people with MCI will get dementia. That's why " it's important to have regular follow-ups with your doctor. He or she can follow your symptoms to see if they change or get worse. If your symptoms do get worse, this is a sign that you are more likely to develop dementia. Although most forms of dementia cannot be treated, it can be helpful to know you are at risk. For example, if you know that your problems might affect certain daily tasks or activities, then you can get help in those areas. Plus, if you know that your condition is not likely to improve, you and your family can make plans for the future.  All topics are updated as new evidence becomes available and our peer review process is complete.  This topic retrieved from [employer] on: Sep 21, 2021.  Topic 20172 Version 6.0  Release: 29.4.2 - C29.263  © 2021 UpToDate, Inc. and/or its affiliates. All rights reserved.  figure 1: Dementia caused by changes in the brain     Different forms of dementia are linked to changes in the brain. Alzheimer disease causes many parts of the brain to shrink. Parkinson disease damages parts of the brain involved in movement. Vascular dementia happens when the blood vessels that supply the brain are diseased.  Graphic 48808 Version 2.0    Consumer Information Use and Disclaimer   This information is not specific medical advice and does not replace information you receive from your health care provider. This is only a brief summary of general information. It does NOT include all information about conditions, illnesses, injuries, tests, procedures, treatments, therapies, discharge instructions or life-style choices that may apply to you. You must talk with your health care provider for complete information about your health and treatment options. This information should not be used to decide whether or not to accept your health care provider's advice, instructions or recommendations. Only your health care provider has the knowledge and training to provide advice that is right for  you. The use of this information is governed by the [employer] End User License Agreement, available at https://www.Buy.On.Social.com/en/solutions/On Networks/about/chris.The use of [employer] content is governed by the [employer] Terms of Use. ©2021 UpToDate, Inc. All rights reserved.  Copyright   © 2021 UpToDate, Inc. and/or its affiliates. All rights reserved.  Patient Education       Gabapentin (GA ba pen tin)   Brand Names: DataSift Gralise; Neurontin   Brand Names: Bill ACT Gabapentin [DSC]; AG-Gabapentin; APO-Gabapentin; Auro-Gabapentin; BIO-Gabapentin; DOM-Gabapentin; GD-Gabapentin [DSC]; GLN-Gabapentin; JAMP-Gabapentin; Mar-Gabapentin; MYLAN-Gabapentin [DSC]; Neurontin; PMS-Gabapentin; Priva-Gabapentin; PRO-Gabapentin; RAN-Gabapentin; CHAPO-Gabapentin; TARO-Gabapentin; TEVA-Gabapentin; VAN-Gabapentin [DSC]   What is this drug used for?   · It is used to treat painful nerve diseases.  · It is used to help control certain kinds of seizures.  · It may be given to you for other reasons. Talk with the doctor.    What do I need to tell my doctor BEFORE I take this drug?   · If you are allergic to this drug; any part of this drug; or any other drugs, foods, or substances. Tell your doctor about the allergy and what signs you had.  · If you have kidney disease or are on dialysis.  This is not a list of all drugs or health problems that interact with this drug.  Tell your doctor and pharmacist about all of your drugs (prescription or OTC, natural products, vitamins) and health problems. You must check to make sure that it is safe for you to take this drug with all of your drugs and health problems. Do not start, stop, or change the dose of any drug without checking with your doctor.  What are some things I need to know or do while I take this drug?   For all uses of this drug:   · Tell all of your health care providers that you take this drug. This includes your doctors, nurses, pharmacists, and dentists.  · Avoid driving and  doing other tasks or actions that call for you to be alert until you see how this drug affects you.  · This drug may affect certain lab tests. Tell all of your health care providers and lab workers that you take this drug.  · Have blood work checked as you have been told by the doctor. Talk with the doctor.  · Talk with your doctor before you use alcohol, marijuana or other forms of cannabis, or prescription or OTC drugs that may slow your actions.  · This drug is not the same as gabapentin enacarbil (Horizant). Do not use in its place. Talk with the doctor.  · Do not stop taking this drug all of a sudden without calling your doctor. You may have a greater risk of side effects. If you need to stop this drug, you will want to slowly stop it as ordered by your doctor.  · A severe and sometimes deadly reaction has happened. Most of the time, this reaction has signs like fever, rash, or swollen glands with problems in body organs like the liver, kidney, blood, heart, muscles and joints, or lungs. If you have questions, talk with the doctor.  · Severe breathing problems have happened with this drug in people taking certain other drugs (like opioid pain drugs). This has also happened in people who already have lung or breathing problems. The risk may also be greater in people who are older than 65. Sometimes, breathing problems have been deadly. If you have questions, talk with the doctor.  · If you are 65 or older, use this drug with care. You could have more side effects.  · If the patient is 3 to 12 years of age, use this drug with care. The risk of mood or behavior problems may be higher in these children.  · Tell your doctor if you are pregnant, plan on getting pregnant, or are breast-feeding. You will need to talk about the benefits and risks to you and the baby.  For seizures:   · If seizures are different or worse after starting this drug, talk with the doctor.  What are some side effects that I need to call my  doctor about right away?   WARNING/CAUTION: Even though it may be rare, some people may have very bad and sometimes deadly side effects when taking a drug. Tell your doctor or get medical help right away if you have any of the following signs or symptoms that may be related to a very bad side effect:  · Signs of an allergic reaction, like rash; hives; itching; red, swollen, blistered, or peeling skin with or without fever; wheezing; tightness in the chest or throat; trouble breathing, swallowing, or talking; unusual hoarseness; or swelling of the mouth, face, lips, tongue, or throat.  · Signs of liver problems like dark urine, feeling tired, not hungry, upset stomach or stomach pain, light-colored stools, throwing up, or yellow skin or eyes.  · Signs of kidney problems like unable to pass urine, change in how much urine is passed, blood in the urine, or a big weight gain.  · Trouble controlling body movements, twitching, change in balance, trouble swallowing or speaking.  · Memory problems or loss.  · Change in eyesight.  · Feeling confused, not able to focus, or change in behavior.  · Shakiness.  · Trouble breathing, slow breathing, or shallow breathing.  · Change in color of skin to a bluish color like on the lips, nail beds, fingers, or toes.  · Shortness of breath, a big weight gain, or swelling in the arms or legs.  · Not able to control eye movements.  · Swollen gland.  · Fever, chills, or sore throat; any unexplained bruising or bleeding; or feeling very tired or weak.  · Muscle pain or weakness.  · Get medical help right away if you feel very sleepy, very dizzy, or if you pass out. Caregivers or others need to get medical help right away if the patient does not respond, does not answer or react like normal, or will not wake up.  · Like other drugs that may be used for seizures, this drug may rarely raise the risk of suicidal thoughts or actions. The risk may be higher in people who have had suicidal thoughts  or actions in the past. Call the doctor right away about any new or worse signs like depression; feeling nervous, restless, or grouchy; panic attacks; or other changes in mood or behavior. Call the doctor right away if any suicidal thoughts or actions occur.  What are some other side effects of this drug?   All drugs may cause side effects. However, many people have no side effects or only have minor side effects. Call your doctor or get medical help if any of these side effects or any other side effects bother you or do not go away:  · Feeling dizzy, sleepy, tired, or weak.  · Diarrhea, upset stomach, or throwing up.  · Dry mouth.  These are not all of the side effects that may occur. If you have questions about side effects, call your doctor. Call your doctor for medical advice about side effects.  You may report side effects to your national health agency.  You may report side effects to the FDA at 1-900.424.4680. You may also report side effects at https://www.fda.gov/medwatch.  How is this drug best taken?   Use this drug as ordered by your doctor. Read all information given to you. Follow all instructions closely.  All products:   · Keep taking this drug as you have been told by your doctor or other health care provider, even if you feel well.  · If you are taking an antacid that has aluminum or magnesium in it, take this drug at least 2 hours after taking the antacid.  Gralise:   · Take with the evening meal.  · Swallow whole. Do not chew, break, or crush.  All other products:   · Take with or without food.  Capsules:   · Swallow whole with a full glass of water.  Tablets:   · You may break the tablet in half. Do not chew or crush.  · If you break the tablet in half, use the other half of the tablet for the next dose, as told by the doctor. Throw away half-tablets not used within 28 days.  Liquid (solution):   · Measure liquid doses carefully. Use the measuring device that comes with this drug. If there is  none, ask the pharmacist for a device to measure this drug.  What do I do if I miss a dose?   · Take a missed dose as soon as you think about it.  · If it is close to the time for your next dose, skip the missed dose and go back to your normal time.  · Do not take 2 doses at the same time or extra doses.    How do I store and/or throw out this drug?   Liquid (solution):   · Store in a refrigerator. Do not freeze.  All other products:   · Store at room temperature in a dry place. Do not store in a bathroom.  All dose forms:   · Keep all drugs in a safe place. Keep all drugs out of the reach of children and pets.  · Throw away unused or  drugs. Do not flush down a toilet or pour down a drain unless you are told to do so. Check with your pharmacist if you have questions about the best way to throw out drugs. There may be drug take-back programs in your area.  General drug facts   · If your symptoms or health problems do not get better or if they become worse, call your doctor.  · Do not share your drugs with others and do not take anyone else's drugs.  · Some drugs may have another patient information leaflet. If you have any questions about this drug, please talk with your doctor, nurse, pharmacist, or other health care provider.  · Some drugs may have another patient information leaflet. Check with your pharmacist. If you have any questions about this drug, please talk with your doctor, nurse, pharmacist, or other health care provider.  · If you think there has been an overdose, call your poison control center or get medical care right away. Be ready to tell or show what was taken, how much, and when it happened.    Consumer Information Use and Disclaimer   This generalized information is a limited summary of diagnosis, treatment, and/or medication information. It is not meant to be comprehensive and should be used as a tool to help the user understand and/or assess potential diagnostic and treatment options.  It does NOT include all information about conditions, treatments, medications, side effects, or risks that may apply to a specific patient. It is not intended to be medical advice or a substitute for the medical advice, diagnosis, or treatment of a health care provider based on the health care provider's examination and assessment of a patient's specific and unique circumstances. Patients must speak with a health care provider for complete information about their health, medical questions, and treatment options, including any risks or benefits regarding use of medications. This information does not endorse any treatments or medications as safe, effective, or approved for treating a specific patient. UpToDate, Inc. and its affiliates disclaim any warranty or liability relating to this information or the use thereof. The use of this information is governed by the Terms of Use, available at https://www.GoGoVan.com/en/solutions/lexicomp/about/chris.  Last Reviewed Date   2020-06-01  Copyright   © 2021 UpToDate, Inc. and its affiliates and/or licensors. All rights reserved.

## 2022-02-15 ENCOUNTER — TELEPHONE (OUTPATIENT)
Dept: NEUROLOGY | Facility: CLINIC | Age: 72
End: 2022-02-15
Payer: MEDICARE

## 2022-02-15 NOTE — TELEPHONE ENCOUNTER
Spoke to pt to schedule intake and testing appt with Dr. Newton. Sent new pt packet and appt letters in mail.

## 2022-02-25 ENCOUNTER — TELEPHONE (OUTPATIENT)
Dept: NEUROLOGY | Facility: CLINIC | Age: 72
End: 2022-02-25
Payer: MEDICARE

## 2022-02-25 NOTE — TELEPHONE ENCOUNTER
----- Message from Shabbir Bond sent at 2/25/2022  2:05 PM CST -----  Pt would like return call regarding question about Gabapentin medication she is taking.  Please call back at .[phone].  Michaelx Md

## 2022-02-25 NOTE — TELEPHONE ENCOUNTER
Contacted pt in regards to medication question. Patient wanted to know if she still needs to be taking Gabapentin. She did state everything has been working well since she started medication. Patient stated if she still needs to countine the medication she wants to know if it is okay to drink while taking medication?.

## 2022-03-21 ENCOUNTER — PATIENT OUTREACH (OUTPATIENT)
Dept: ADMINISTRATIVE | Facility: OTHER | Age: 72
End: 2022-03-21
Payer: MEDICARE

## 2022-03-21 NOTE — PROGRESS NOTES
Health Maintenance Due   Topic Date Due    Shingles Vaccine (1 of 2) Never done    Influenza Vaccine (1) 09/01/2021    COVID-19 Vaccine (4 - Booster for Moderna series) 04/16/2022     Updates were requested from care everywhere.  Chart was reviewed for overdue Proactive [facility] Encounters (SHARLA) topics (CRS, Breast Cancer Screening, Eye exam)  Health Maintenance has been updated.  LINKS immunization registry triggered.  Immunizations were reconciled.

## 2022-03-22 ENCOUNTER — OFFICE VISIT (OUTPATIENT)
Dept: OPHTHALMOLOGY | Facility: CLINIC | Age: 72
End: 2022-03-22
Payer: MEDICARE

## 2022-03-22 DIAGNOSIS — H04.123 BILATERAL DRY EYES: ICD-10-CM

## 2022-03-22 DIAGNOSIS — H52.4 ASTIGMATISM WITH PRESBYOPIA, BILATERAL: ICD-10-CM

## 2022-03-22 DIAGNOSIS — H40.013 OPEN ANGLE WITH BORDERLINE FINDINGS OF BOTH EYES: ICD-10-CM

## 2022-03-22 DIAGNOSIS — H52.203 ASTIGMATISM WITH PRESBYOPIA, BILATERAL: ICD-10-CM

## 2022-03-22 DIAGNOSIS — D32.0 MENINGIOMA OF CEREBELLUM: ICD-10-CM

## 2022-03-22 DIAGNOSIS — H25.13 NUCLEAR SCLEROSIS, BILATERAL: Primary | ICD-10-CM

## 2022-03-22 PROCEDURE — 1160F PR REVIEW ALL MEDS BY PRESCRIBER/CLIN PHARMACIST DOCUMENTED: ICD-10-PCS | Mod: CPTII,S$GLB,, | Performed by: OPTOMETRIST

## 2022-03-22 PROCEDURE — 92133 POSTERIOR SEGMENT OCT OPTIC NERVE(OCULAR COHERENCE TOMOGRAPHY) - OU - BOTH EYES: ICD-10-PCS | Mod: S$GLB,,, | Performed by: OPTOMETRIST

## 2022-03-22 PROCEDURE — 4010F PR ACE/ARB THEARPY RXD/TAKEN: ICD-10-PCS | Mod: CPTII,S$GLB,, | Performed by: OPTOMETRIST

## 2022-03-22 PROCEDURE — 92014 PR EYE EXAM, EST PATIENT,COMPREHESV: ICD-10-PCS | Mod: S$GLB,,, | Performed by: OPTOMETRIST

## 2022-03-22 PROCEDURE — 99999 PR PBB SHADOW E&M-EST. PATIENT-LVL II: ICD-10-PCS | Mod: PBBFAC,,, | Performed by: OPTOMETRIST

## 2022-03-22 PROCEDURE — 99499 UNLISTED E&M SERVICE: CPT | Mod: S$GLB,,, | Performed by: OPTOMETRIST

## 2022-03-22 PROCEDURE — 1160F RVW MEDS BY RX/DR IN RCRD: CPT | Mod: CPTII,S$GLB,, | Performed by: OPTOMETRIST

## 2022-03-22 PROCEDURE — 99499 RISK ADDL DX/OHS AUDIT: ICD-10-PCS | Mod: S$GLB,,, | Performed by: OPTOMETRIST

## 2022-03-22 PROCEDURE — 92015 DETERMINE REFRACTIVE STATE: CPT | Mod: S$GLB,,, | Performed by: OPTOMETRIST

## 2022-03-22 PROCEDURE — 1159F PR MEDICATION LIST DOCUMENTED IN MEDICAL RECORD: ICD-10-PCS | Mod: CPTII,S$GLB,, | Performed by: OPTOMETRIST

## 2022-03-22 PROCEDURE — 92133 CPTRZD OPH DX IMG PST SGM ON: CPT | Mod: S$GLB,,, | Performed by: OPTOMETRIST

## 2022-03-22 PROCEDURE — 1159F MED LIST DOCD IN RCRD: CPT | Mod: CPTII,S$GLB,, | Performed by: OPTOMETRIST

## 2022-03-22 PROCEDURE — 92014 COMPRE OPH EXAM EST PT 1/>: CPT | Mod: S$GLB,,, | Performed by: OPTOMETRIST

## 2022-03-22 PROCEDURE — 4010F ACE/ARB THERAPY RXD/TAKEN: CPT | Mod: CPTII,S$GLB,, | Performed by: OPTOMETRIST

## 2022-03-22 PROCEDURE — 92015 PR REFRACTION: ICD-10-PCS | Mod: S$GLB,,, | Performed by: OPTOMETRIST

## 2022-03-22 PROCEDURE — 99999 PR PBB SHADOW E&M-EST. PATIENT-LVL II: CPT | Mod: PBBFAC,,, | Performed by: OPTOMETRIST

## 2022-03-22 NOTE — PROGRESS NOTES
HPI     Last visit with [facility] on 03/08/2021.  Patient states trouble with focusing while reading.   When putting glasses on to read, eyes start burning and both eyes are sore   to touch.   Occassionally wear PAL glasses     Last edited by Verónica Lazo on 3/22/2022  1:20 PM. (History)            Assessment /Plan     For exam results, see Encounter Report.    Nuclear sclerosis, bilateral  Cataracts not significantly affecting activities of daily living and therefore surgery is not indicated at this time.   Will continue to monitor over the next 12 months. Pt to call or RTC with any significant change in vision prior to next visit.     Open angle with borderline findings of both eyes  -     Posterior Segment OCT Optic Nerve- Both eyes  IOP normal today OU  Stable gOCT today OU, no changes compared to previous scans  No evidence of disease at this time  Monitor 12 months    Bilateral dry eyes  Recommended artificial tears bid OU    Meningioma of cerebellum  Normal VF 3/8/21  Followed by neuro  Will get vf at next visit    Astigmatism with presbyopia, bilateral  Eyeglass Final Rx     Eyeglass Final Rx       Sphere Cylinder Axis Add    Right -0.75 +0.75 015 +2.50    Left Fyffe +0.75 015 +2.50    Expiration Date: 3/22/2023          Eyeglass Final Rx #2       Sphere Cylinder Axis Add    Right +1.75 +0.75 015     Left +2.50 +0.75 015     Type: SVL reading    Expiration Date: 3/22/2023                RTC 1 yr for 24-2VF, dilated eye exam and gOCT or PRN if any problems.   Discussed above and answered questions.

## 2022-04-04 ENCOUNTER — OFFICE VISIT (OUTPATIENT)
Dept: INTERNAL MEDICINE | Facility: CLINIC | Age: 72
End: 2022-04-04
Payer: MEDICARE

## 2022-04-04 VITALS
HEART RATE: 57 BPM | BODY MASS INDEX: 41.25 KG/M2 | TEMPERATURE: 98 F | DIASTOLIC BLOOD PRESSURE: 74 MMHG | OXYGEN SATURATION: 99 % | WEIGHT: 225.5 LBS | SYSTOLIC BLOOD PRESSURE: 130 MMHG

## 2022-04-04 DIAGNOSIS — Z00.00 ROUTINE GENERAL MEDICAL EXAMINATION AT A HEALTH CARE FACILITY: Primary | ICD-10-CM

## 2022-04-04 DIAGNOSIS — E04.2 MULTIPLE THYROID NODULES: ICD-10-CM

## 2022-04-04 DIAGNOSIS — I10 ESSENTIAL HYPERTENSION: ICD-10-CM

## 2022-04-04 DIAGNOSIS — E66.01 MORBID OBESITY WITH BMI OF 40.0-44.9, ADULT: ICD-10-CM

## 2022-04-04 DIAGNOSIS — G31.84 MILD COGNITIVE IMPAIRMENT WITH MEMORY LOSS: ICD-10-CM

## 2022-04-04 DIAGNOSIS — G47.33 OSA ON CPAP: ICD-10-CM

## 2022-04-04 DIAGNOSIS — Z78.0 ASYMPTOMATIC MENOPAUSE: ICD-10-CM

## 2022-04-04 DIAGNOSIS — E78.2 MIXED HYPERLIPIDEMIA: ICD-10-CM

## 2022-04-04 DIAGNOSIS — M47.26 OSTEOARTHRITIS OF SPINE WITH RADICULOPATHY, LUMBAR REGION: ICD-10-CM

## 2022-04-04 DIAGNOSIS — G89.29 CHRONIC PAIN OF RIGHT ANKLE: ICD-10-CM

## 2022-04-04 DIAGNOSIS — M16.0 PRIMARY OSTEOARTHRITIS OF BOTH HIPS: ICD-10-CM

## 2022-04-04 DIAGNOSIS — M25.571 CHRONIC PAIN OF RIGHT ANKLE: ICD-10-CM

## 2022-04-04 DIAGNOSIS — E55.9 VITAMIN D DEFICIENCY: ICD-10-CM

## 2022-04-04 LAB
ALBUMIN SERPL BCP-MCNC: 3.5 G/DL (ref 3.5–5.2)
ALP SERPL-CCNC: 84 U/L (ref 55–135)
ALT SERPL W/O P-5'-P-CCNC: 11 U/L (ref 10–44)
ANION GAP SERPL CALC-SCNC: 12 MMOL/L (ref 8–16)
AST SERPL-CCNC: 13 U/L (ref 10–40)
BASOPHILS # BLD AUTO: 0.03 K/UL (ref 0–0.2)
BASOPHILS NFR BLD: 0.5 % (ref 0–1.9)
BILIRUB SERPL-MCNC: 0.3 MG/DL (ref 0.1–1)
BUN SERPL-MCNC: 17 MG/DL (ref 8–23)
CALCIUM SERPL-MCNC: 9.2 MG/DL (ref 8.7–10.5)
CHLORIDE SERPL-SCNC: 102 MMOL/L (ref 95–110)
CHOLEST SERPL-MCNC: 187 MG/DL (ref 120–199)
CHOLEST/HDLC SERPL: 3.5 {RATIO} (ref 2–5)
CO2 SERPL-SCNC: 26 MMOL/L (ref 23–29)
CREAT SERPL-MCNC: 0.8 MG/DL (ref 0.5–1.4)
DIFFERENTIAL METHOD: ABNORMAL
EOSINOPHIL # BLD AUTO: 0.2 K/UL (ref 0–0.5)
EOSINOPHIL NFR BLD: 2.6 % (ref 0–8)
ERYTHROCYTE [DISTWIDTH] IN BLOOD BY AUTOMATED COUNT: 14.6 % (ref 11.5–14.5)
EST. GFR  (AFRICAN AMERICAN): >60 ML/MIN/1.73 M^2
EST. GFR  (NON AFRICAN AMERICAN): >60 ML/MIN/1.73 M^2
GLUCOSE SERPL-MCNC: 120 MG/DL (ref 70–110)
HCT VFR BLD AUTO: 42.5 % (ref 37–48.5)
HDLC SERPL-MCNC: 54 MG/DL (ref 40–75)
HDLC SERPL: 28.9 % (ref 20–50)
HGB BLD-MCNC: 13.4 G/DL (ref 12–16)
IMM GRANULOCYTES # BLD AUTO: 0.01 K/UL (ref 0–0.04)
IMM GRANULOCYTES NFR BLD AUTO: 0.2 % (ref 0–0.5)
LDLC SERPL CALC-MCNC: 114.2 MG/DL (ref 63–159)
LYMPHOCYTES # BLD AUTO: 2.1 K/UL (ref 1–4.8)
LYMPHOCYTES NFR BLD: 36.1 % (ref 18–48)
MCH RBC QN AUTO: 29.3 PG (ref 27–31)
MCHC RBC AUTO-ENTMCNC: 31.5 G/DL (ref 32–36)
MCV RBC AUTO: 93 FL (ref 82–98)
MONOCYTES # BLD AUTO: 0.6 K/UL (ref 0.3–1)
MONOCYTES NFR BLD: 10.3 % (ref 4–15)
NEUTROPHILS # BLD AUTO: 2.9 K/UL (ref 1.8–7.7)
NEUTROPHILS NFR BLD: 50.3 % (ref 38–73)
NONHDLC SERPL-MCNC: 133 MG/DL
NRBC BLD-RTO: 0 /100 WBC
PLATELET # BLD AUTO: 306 K/UL (ref 150–450)
PMV BLD AUTO: 10.6 FL (ref 9.2–12.9)
POTASSIUM SERPL-SCNC: 3.6 MMOL/L (ref 3.5–5.1)
PROT SERPL-MCNC: 6.9 G/DL (ref 6–8.4)
RBC # BLD AUTO: 4.57 M/UL (ref 4–5.4)
SODIUM SERPL-SCNC: 140 MMOL/L (ref 136–145)
TRIGL SERPL-MCNC: 94 MG/DL (ref 30–150)
TSH SERPL DL<=0.005 MIU/L-ACNC: 0.71 UIU/ML (ref 0.4–4)
URATE SERPL-MCNC: 7 MG/DL (ref 2.4–5.7)
WBC # BLD AUTO: 5.73 K/UL (ref 3.9–12.7)

## 2022-04-04 PROCEDURE — 99214 OFFICE O/P EST MOD 30 MIN: CPT | Mod: S$GLB,,, | Performed by: FAMILY MEDICINE

## 2022-04-04 PROCEDURE — 3288F PR FALLS RISK ASSESSMENT DOCUMENTED: ICD-10-PCS | Mod: CPTII,S$GLB,, | Performed by: FAMILY MEDICINE

## 2022-04-04 PROCEDURE — 99999 PR PBB SHADOW E&M-EST. PATIENT-LVL IV: ICD-10-PCS | Mod: PBBFAC,,, | Performed by: FAMILY MEDICINE

## 2022-04-04 PROCEDURE — 1160F RVW MEDS BY RX/DR IN RCRD: CPT | Mod: CPTII,S$GLB,, | Performed by: FAMILY MEDICINE

## 2022-04-04 PROCEDURE — 3078F DIAST BP <80 MM HG: CPT | Mod: CPTII,S$GLB,, | Performed by: FAMILY MEDICINE

## 2022-04-04 PROCEDURE — 84550 ASSAY OF BLOOD/URIC ACID: CPT | Performed by: FAMILY MEDICINE

## 2022-04-04 PROCEDURE — 1159F MED LIST DOCD IN RCRD: CPT | Mod: CPTII,S$GLB,, | Performed by: FAMILY MEDICINE

## 2022-04-04 PROCEDURE — 3075F SYST BP GE 130 - 139MM HG: CPT | Mod: CPTII,S$GLB,, | Performed by: FAMILY MEDICINE

## 2022-04-04 PROCEDURE — 1160F PR REVIEW ALL MEDS BY PRESCRIBER/CLIN PHARMACIST DOCUMENTED: ICD-10-PCS | Mod: CPTII,S$GLB,, | Performed by: FAMILY MEDICINE

## 2022-04-04 PROCEDURE — 85025 COMPLETE CBC W/AUTO DIFF WBC: CPT | Performed by: FAMILY MEDICINE

## 2022-04-04 PROCEDURE — 1125F PR PAIN SEVERITY QUANTIFIED, PAIN PRESENT: ICD-10-PCS | Mod: CPTII,S$GLB,, | Performed by: FAMILY MEDICINE

## 2022-04-04 PROCEDURE — 3008F BODY MASS INDEX DOCD: CPT | Mod: CPTII,S$GLB,, | Performed by: FAMILY MEDICINE

## 2022-04-04 PROCEDURE — 1125F AMNT PAIN NOTED PAIN PRSNT: CPT | Mod: CPTII,S$GLB,, | Performed by: FAMILY MEDICINE

## 2022-04-04 PROCEDURE — 1101F PT FALLS ASSESS-DOCD LE1/YR: CPT | Mod: CPTII,S$GLB,, | Performed by: FAMILY MEDICINE

## 2022-04-04 PROCEDURE — 80053 COMPREHEN METABOLIC PANEL: CPT | Performed by: FAMILY MEDICINE

## 2022-04-04 PROCEDURE — 3078F PR MOST RECENT DIASTOLIC BLOOD PRESSURE < 80 MM HG: ICD-10-PCS | Mod: CPTII,S$GLB,, | Performed by: FAMILY MEDICINE

## 2022-04-04 PROCEDURE — 4010F ACE/ARB THERAPY RXD/TAKEN: CPT | Mod: CPTII,S$GLB,, | Performed by: FAMILY MEDICINE

## 2022-04-04 PROCEDURE — 99999 PR PBB SHADOW E&M-EST. PATIENT-LVL IV: CPT | Mod: PBBFAC,,, | Performed by: FAMILY MEDICINE

## 2022-04-04 PROCEDURE — 3075F PR MOST RECENT SYSTOLIC BLOOD PRESS GE 130-139MM HG: ICD-10-PCS | Mod: CPTII,S$GLB,, | Performed by: FAMILY MEDICINE

## 2022-04-04 PROCEDURE — 3008F PR BODY MASS INDEX (BMI) DOCUMENTED: ICD-10-PCS | Mod: CPTII,S$GLB,, | Performed by: FAMILY MEDICINE

## 2022-04-04 PROCEDURE — 4010F PR ACE/ARB THEARPY RXD/TAKEN: ICD-10-PCS | Mod: CPTII,S$GLB,, | Performed by: FAMILY MEDICINE

## 2022-04-04 PROCEDURE — 1159F PR MEDICATION LIST DOCUMENTED IN MEDICAL RECORD: ICD-10-PCS | Mod: CPTII,S$GLB,, | Performed by: FAMILY MEDICINE

## 2022-04-04 PROCEDURE — 3288F FALL RISK ASSESSMENT DOCD: CPT | Mod: CPTII,S$GLB,, | Performed by: FAMILY MEDICINE

## 2022-04-04 PROCEDURE — 84443 ASSAY THYROID STIM HORMONE: CPT | Performed by: FAMILY MEDICINE

## 2022-04-04 PROCEDURE — 99214 PR OFFICE/OUTPT VISIT, EST, LEVL IV, 30-39 MIN: ICD-10-PCS | Mod: S$GLB,,, | Performed by: FAMILY MEDICINE

## 2022-04-04 PROCEDURE — 80061 LIPID PANEL: CPT | Performed by: FAMILY MEDICINE

## 2022-04-04 PROCEDURE — 1101F PR PT FALLS ASSESS DOC 0-1 FALLS W/OUT INJ PAST YR: ICD-10-PCS | Mod: CPTII,S$GLB,, | Performed by: FAMILY MEDICINE

## 2022-04-04 NOTE — PROGRESS NOTES
Subjective:       Patient ID: Beth Souza is a 71 y.o. female.    Chief Complaint: Establish Care    71-year-old  female patient with Patient Active Problem List:     Essential hypertension     Vitamin D deficiency     Mixed hyperlipidemia     Cataract     Osteoarthritis of spine with radiculopathy, lumbar region     Morbid obesity with BMI of 40.0-44.9, adult     LEN on CPAP     Mild cognitive impairment with memory loss     Meningioma of cerebellum     Multiple thyroid nodules     History of COVID-19     Primary osteoarthritis of both hips  Here for routine annual physicals.  Patient here to reestablish care with me previously seen by Dr. Alston.   Has been taking her medications regularly and denies any chest pain or difficulty breathing with palpitations.   Patient continues to have chronic pain to the right ankle close to the malleolus, for the past couple of months to year, up to 5/10.  Denies any previous episodes of gout.   Denies any significant back pain at this time    Review of Systems   Constitutional: Negative for fatigue.   Eyes: Negative for visual disturbance.   Respiratory: Negative for shortness of breath.    Cardiovascular: Negative for chest pain and leg swelling.   Gastrointestinal: Negative for abdominal pain, nausea and vomiting.   Musculoskeletal: Positive for arthralgias, gait problem and myalgias.   Skin: Negative for rash.   Neurological: Negative for weakness, light-headedness, numbness and headaches.   Psychiatric/Behavioral: Negative for sleep disturbance.         /74 (BP Location: Right arm, Patient Position: Sitting, BP Method: Large (Manual))   Pulse (!) 57   Temp 97.7 °F (36.5 °C) (Tympanic)   Wt 102.3 kg (225 lb 8.5 oz)   SpO2 99%   BMI 41.25 kg/m²   Objective:      Physical Exam  Constitutional:       Appearance: She is well-developed.   HENT:      Head: Normocephalic and atraumatic.   Cardiovascular:      Rate and Rhythm: Normal rate and  regular rhythm.      Heart sounds: Normal heart sounds. No murmur heard.  Pulmonary:      Effort: Pulmonary effort is normal.      Breath sounds: Normal breath sounds. No wheezing.   Abdominal:      General: Bowel sounds are normal.      Palpations: Abdomen is soft.      Tenderness: There is no abdominal tenderness.   Musculoskeletal:         General: Tenderness present.      Comments: Positive for tenderness to the right ankle to the medial malleolus   Skin:     General: Skin is warm and dry.      Findings: No rash.   Neurological:      Mental Status: She is alert and oriented to person, place, and time.   Psychiatric:         Mood and Affect: Mood normal.           Assessment/Plan:   1. Routine general medical examination at a health care facility  - CBC Auto Differential  - Comprehensive Metabolic Panel  - Lipid Panel  - TSH  - Urinalysis, Reflex to Urine Culture Urine, Clean Catch  Vital signs stable today.  Clinical exam stable  Continue lifestyle modifications with low-fat and low-cholesterol diet and exercise 30 minutes daily      2. Essential hypertension  - Comprehensive Metabolic Panel  - Lipid Panel  - TSH  - Urinalysis, Reflex to Urine Culture Urine, Clean Catch  Blood pressure is stable currently on lisinopril hydrochlorothiazide 20/25 mg  Was taking amlodipine 5 mg as needed probably once a week advised to discontinue and encouraged to monitor blood pressure trends and restrict salt intake  If remains elevated beyond 140/90 patient to let us know    3. Mixed hyperlipidemia  - Lipid Panel  Currently taking Lipitor 20 mg daily    4. Osteoarthritis of spine with radiculopathy, lumbar region  5. Primary osteoarthritis of both hips  Currently taking gabapentin and methocarbamol as needed    6. Mild cognitive impairment with memory loss    7. LEN on CPAP  Stable    8. Multiple thyroid nodules  - TSH  Clinically stable and asymptomatic    9. Vitamin D deficiency  - CBC Auto Differential  Taking vitamin-D by  prescription weekly    10. Morbid obesity with BMI of 40.0-44.9, adult  Lifestyle modifications recommended to lose weight with BMI 41    11. Chronic pain of right ankle  - Ambulatory referral/consult to Podiatry; Future  - Uric Acid  Will check uric acid levels and will refer to Podiatry secondary to chronic ankle pain    12. Asymptomatic menopause  - DXA Bone Density Spine And Hip; Future   Due for DEXA scan

## 2022-04-05 ENCOUNTER — OFFICE VISIT (OUTPATIENT)
Dept: PODIATRY | Facility: CLINIC | Age: 72
End: 2022-04-05
Payer: MEDICARE

## 2022-04-05 DIAGNOSIS — E66.01 MORBID OBESITY: ICD-10-CM

## 2022-04-05 DIAGNOSIS — M25.571 CHRONIC PAIN OF RIGHT ANKLE: Primary | ICD-10-CM

## 2022-04-05 DIAGNOSIS — M21.41 PES PLANUS OF BOTH FEET: ICD-10-CM

## 2022-04-05 DIAGNOSIS — M21.42 PES PLANUS OF BOTH FEET: ICD-10-CM

## 2022-04-05 DIAGNOSIS — M19.071 PRIMARY OSTEOARTHRITIS OF RIGHT FOOT: ICD-10-CM

## 2022-04-05 DIAGNOSIS — G89.29 CHRONIC PAIN OF RIGHT ANKLE: Primary | ICD-10-CM

## 2022-04-05 PROCEDURE — 1159F PR MEDICATION LIST DOCUMENTED IN MEDICAL RECORD: ICD-10-PCS | Mod: CPTII,S$GLB,, | Performed by: PODIATRIST

## 2022-04-05 PROCEDURE — 1160F RVW MEDS BY RX/DR IN RCRD: CPT | Mod: CPTII,S$GLB,, | Performed by: PODIATRIST

## 2022-04-05 PROCEDURE — 1101F PR PT FALLS ASSESS DOC 0-1 FALLS W/OUT INJ PAST YR: ICD-10-PCS | Mod: CPTII,S$GLB,, | Performed by: PODIATRIST

## 2022-04-05 PROCEDURE — 1159F MED LIST DOCD IN RCRD: CPT | Mod: CPTII,S$GLB,, | Performed by: PODIATRIST

## 2022-04-05 PROCEDURE — 99999 PR PBB SHADOW E&M-EST. PATIENT-LVL III: ICD-10-PCS | Mod: PBBFAC,,, | Performed by: PODIATRIST

## 2022-04-05 PROCEDURE — 3288F PR FALLS RISK ASSESSMENT DOCUMENTED: ICD-10-PCS | Mod: CPTII,S$GLB,, | Performed by: PODIATRIST

## 2022-04-05 PROCEDURE — 99214 PR OFFICE/OUTPT VISIT, EST, LEVL IV, 30-39 MIN: ICD-10-PCS | Mod: 25,S$GLB,, | Performed by: PODIATRIST

## 2022-04-05 PROCEDURE — 20605 DRAIN/INJ JOINT/BURSA W/O US: CPT | Mod: 50,S$GLB,, | Performed by: PODIATRIST

## 2022-04-05 PROCEDURE — 3288F FALL RISK ASSESSMENT DOCD: CPT | Mod: CPTII,S$GLB,, | Performed by: PODIATRIST

## 2022-04-05 PROCEDURE — 4010F PR ACE/ARB THEARPY RXD/TAKEN: ICD-10-PCS | Mod: CPTII,S$GLB,, | Performed by: PODIATRIST

## 2022-04-05 PROCEDURE — 1101F PT FALLS ASSESS-DOCD LE1/YR: CPT | Mod: CPTII,S$GLB,, | Performed by: PODIATRIST

## 2022-04-05 PROCEDURE — 99999 PR PBB SHADOW E&M-EST. PATIENT-LVL III: CPT | Mod: PBBFAC,,, | Performed by: PODIATRIST

## 2022-04-05 PROCEDURE — 4010F ACE/ARB THERAPY RXD/TAKEN: CPT | Mod: CPTII,S$GLB,, | Performed by: PODIATRIST

## 2022-04-05 PROCEDURE — 99214 OFFICE O/P EST MOD 30 MIN: CPT | Mod: 25,S$GLB,, | Performed by: PODIATRIST

## 2022-04-05 PROCEDURE — 20605 INTERMEDIATE JOINT ASPIRATION/INJECTION: ICD-10-PCS | Mod: 50,S$GLB,, | Performed by: PODIATRIST

## 2022-04-05 PROCEDURE — 1160F PR REVIEW ALL MEDS BY PRESCRIBER/CLIN PHARMACIST DOCUMENTED: ICD-10-PCS | Mod: CPTII,S$GLB,, | Performed by: PODIATRIST

## 2022-04-05 RX ORDER — DEXAMETHASONE SODIUM PHOSPHATE 4 MG/ML
4 INJECTION, SOLUTION INTRA-ARTICULAR; INTRALESIONAL; INTRAMUSCULAR; INTRAVENOUS; SOFT TISSUE
Status: DISCONTINUED | OUTPATIENT
Start: 2022-04-05 | End: 2022-04-05 | Stop reason: HOSPADM

## 2022-04-05 RX ORDER — TRIAMCINOLONE ACETONIDE 40 MG/ML
40 INJECTION, SUSPENSION INTRA-ARTICULAR; INTRAMUSCULAR
Status: DISCONTINUED | OUTPATIENT
Start: 2022-04-05 | End: 2022-04-05 | Stop reason: HOSPADM

## 2022-04-05 RX ADMIN — TRIAMCINOLONE ACETONIDE 40 MG: 40 INJECTION, SUSPENSION INTRA-ARTICULAR; INTRAMUSCULAR at 02:04

## 2022-04-05 RX ADMIN — DEXAMETHASONE SODIUM PHOSPHATE 4 MG: 4 INJECTION, SOLUTION INTRA-ARTICULAR; INTRALESIONAL; INTRAMUSCULAR; INTRAVENOUS; SOFT TISSUE at 02:04

## 2022-04-05 NOTE — PROCEDURES
Intermediate Joint Aspiration/Injection    Date/Time: 4/5/2022 2:00 PM  Performed by: Nadia Smith DPM  Authorized by: Nadia Smith DPM     Consent Done?:  Yes (Verbal)  Indications:  Pain and joint swelling  Site marked: The procedure site was marked    Timeout: Prior to procedure the correct patient, procedure, and site was verified      Location: Right subtalar.  Prep: Patient was prepped and draped in usual sterile fashion    Needle size:  25 G  Approach:  Anteromedial  Medications:  4 mg dexamethasone 4 mg/mL; 40 mg triamcinolone acetonide 40 mg/mL  Patient tolerance:  Patient tolerated the procedure well with no immediate complications

## 2022-04-05 NOTE — PROGRESS NOTES
Subjective:       Patient ID: Beth Souza is a 71 y.o. female.    Chief Complaint: Ankle Pain (Patient complains of 9/10 pain at present to pain in her right ankle and heel. She states her left hurts as well but more so her right. )      HPI:  Beth Souza presents to the office this afternoon at the referral of Dr. Oliva in regards to chronic right foot and ankle pain.  States having 9/10 pain at present.  States that the left is also hurting but she is having significant and severe pain on the right.  She also states long history of pes planus foot deformity is noted changes to her foot with ambulation over the past few years.  She is also complaining of some sciatica symptoms and numbness to the lower extremities.  States that increased periods of walking and standing does exacerbate her pain.  No significant trauma or injury  Patient's PMD is Edna Oliva MD.  Limiting weight-bearing and ambulation does seem to alleviate the discomfort.    Review of patient's allergies indicates:   Allergen Reactions    Aspirin Other (See Comments)     Bruising       Past Medical History:   Diagnosis Date    Cataract     Hyperlipidemia     Hypertension     Lumbar spondylosis     Meningioma 2021    Obesity     Sleep apnea     Vitamin D deficiency        Family History   Problem Relation Age of Onset    Hypertension Mother     Diabetes Daughter     Melanoma Neg Hx     Psoriasis Neg Hx     Lupus Neg Hx     Eczema Neg Hx     Breast cancer Neg Hx     Colon cancer Neg Hx     Ovarian cancer Neg Hx     Thrombosis Neg Hx        Social History     Socioeconomic History    Marital status:     Number of children: 3   Occupational History    Occupation: Cleaning houses     Employer: not employed   Tobacco Use    Smoking status: Former Smoker     Quit date: 2004     Years since quittin.2    Smokeless tobacco: Never Used   Substance and Sexual Activity    Alcohol use: Yes      Comment: occasionally    Drug use: No    Sexual activity: Not Currently     Partners: Male   Other Topics Concern    Are you pregnant or think you may be? No    Breast-feeding No   Social History Narrative    Live w/ alone        Past Surgical History:   Procedure Laterality Date    HYSTERECTOMY      PARTIAL HYSTERECTOMY         Review of Systems       Objective:   There were no vitals taken for this visit.    Posterior Segment OCT Optic Nerve- Both eyes  Right Eye  Quality was good. Scan locations included Retinal Nerve Fiber Layer   (RNFL), Optic Nerve Head.     Left Eye  Quality was good. Scan locations included Optic Nerve Head, Retinal Nerve   Fiber Layer (RNFL).     Findings  Right Eye  Normal. No. Progression has been stable.     Left Eye  Normal. No. Progression has been stable.     Notes  GCL: 31/32       Physical Exam  LOWER EXTREMITY PHYSICAL EXAMINATION    VASCULAR: The right DP pulse is 2/4 and the left DP is 2/4. The right PT pulse is 2/4 and the left PT pulse is 2/4. Proximal to distal, warm to warm. No dependent rubor or elevation palor is noted. Capillary refill time is less than 3 seconds. Hair growth is appreciated to the dorsal foot and digits.    NEUROLOGY: Proprioception is intact. Sensation to light touch is intact. Protective sensation is intact via 5.07 [place] Yuniel monofilament. Straight leg raise is negative. Negative Tinel's Sign and negative Valleix sign. No neurological sensations with compression of the area of Trujillo's Nerve in the area of the Abductor Hallucis muscle belly. Upon palpation of the interspaces, there are no neurological sensations stated that radiate proximal or distal. Upon compression of the metatarsal heads from medial to lateral, no neurological sensations or symptoms are stated.    DERMATOLOGY: Skin is supple, dry and intact. No ecchymosis is noted. No hypertrophic skin formation. No erythema or cellulitis is noted.     ORTHOPEDIC:  Severe pain on light  palpation of the ankle joint most specifically at the medial and lateral ankle gutters.  There is also severe pain on palpation of the sinus tarsi to the lateral aspect of the foot as well.  Pain is noted with dorsiflexion plantar flexion ankle joint.  Severe pain is noted with eversion inversion of the subtalar joint.  She is ambulating with a severely antalgic gait.  Does have loss of the medial longitudinal arch and pes planus foot deformity.    Assessment:     1. Chronic pain of right ankle    2. Primary osteoarthritis of right foot    3. Pes planus of both feet    4. Morbid obesity        Plan:     Chronic pain of right ankle  -     Ambulatory referral/consult to Podiatry  -     Intermediate Joint Aspiration/Injection: R ankle    Primary osteoarthritis of right foot  -     Intermediate Joint Aspiration/Injection    Pes planus of both feet    Morbid obesity      Thorough discussion is had with the patient this afternoon, concerning the diagnosis, its etiology, and the treatment algorithm at present.    Previous x-rays were reviewed in the room showing significant arthritis of the midfoot and hindfoot.  Ankle joint appears to be well intact without signs of medial or lateral deviation.  There is no significant arthritis present as well.  As patient does have severe pain with range of motion and ambulation, would recommend steroid injection into both the subtalar joint as well as the ankle joint hopes to alleviate some discomfort and provide a better sense of which specific joints causing her significant pain.  Did discuss steroid injection versus surgical intervention.  Patient like to continue with conservative options as relates to steroid injections.    Following injection, patient did have increased range of motion of both the ankle joint and subtalar joint.  She was mostly pain-free upon leaving the room.  Ambulatory ability did increase.  Patient follow-up p.r.n.    Patient is counseled and reminded  concerning the importance of good nutrition and healthy eating habits, which may include blood sugar control, to prevent and/or help podiatric foot and ankle complications.      Future Appointments   Date Time Provider Department Center   4/20/2022  1:30 PM [facility] BMD1 [facility] DEXABMD [facility]   5/4/2022  1:00 PM Taco Newton, PhD ON NEURO [facility]   5/12/2022 12:30 PM NEUROPSYCH JOSÉ MIGUEL, TITI [facility] NEURO [facility]   6/2/2022  9:00 AM Zohreh Best NP [facility] PULMSVC [facility]   10/4/2022 11:00 AM Edna Oliva MD [facility] IM [facility]   3/23/2023  1:00 PM FIELDS, VISUAL-ONE [facility] OPHTHAL [facility]   3/23/2023  1:30 PM Balta Aranda, OD [facility] OPHTHAL [place]

## 2022-04-05 NOTE — PROCEDURES
Intermediate Joint Aspiration/Injection: R ankle    Date/Time: 4/5/2022 2:00 PM  Performed by: Nadia Smith DPM  Authorized by: Nadia Smith DPM     Consent Done?:  Yes (Verbal)  Indications:  Pain and joint swelling  Site marked: The procedure site was marked    Timeout: Prior to procedure the correct patient, procedure, and site was verified      Location:  Ankle  Site:  R ankle  Prep: Patient was prepped and draped in usual sterile fashion    Needle size:  25 G  Approach:  Anteromedial  Medications:  4 mg dexamethasone 4 mg/mL; 40 mg triamcinolone acetonide 40 mg/mL  Patient tolerance:  Patient tolerated the procedure well with no immediate complications

## 2022-04-06 DIAGNOSIS — M25.571 CHRONIC PAIN OF RIGHT ANKLE: Primary | ICD-10-CM

## 2022-04-06 DIAGNOSIS — G89.29 CHRONIC PAIN OF RIGHT ANKLE: Primary | ICD-10-CM

## 2022-04-06 RX ORDER — METHYLPREDNISOLONE 4 MG/1
TABLET ORAL
Qty: 1 EACH | Refills: 0 | Status: SHIPPED | OUTPATIENT
Start: 2022-04-06 | End: 2022-06-22

## 2022-04-08 ENCOUNTER — TELEPHONE (OUTPATIENT)
Dept: INTERNAL MEDICINE | Facility: CLINIC | Age: 72
End: 2022-04-08
Payer: MEDICARE

## 2022-04-08 ENCOUNTER — PES CALL (OUTPATIENT)
Dept: ADMINISTRATIVE | Facility: CLINIC | Age: 72
End: 2022-04-08
Payer: MEDICARE

## 2022-04-08 NOTE — TELEPHONE ENCOUNTER
----- Message from Edna Oliva MD sent at 4/6/2022  3:57 PM CDT -----  Noted elevated uric acid level likely causing ankle pain due to gout, will get started on Medrol Dosepak.   If continues to have ongoing ankle pain, patient to discuss further with podiatry  Otherwise stable labs

## 2022-04-08 NOTE — TELEPHONE ENCOUNTER
.Left message on patient voicemail regarding lab results and medication sent to pharmacy./colette

## 2022-04-11 PROBLEM — Z86.39 HISTORY OF HYPERPARATHYROIDISM: Status: ACTIVE | Noted: 2022-04-11

## 2022-04-11 PROBLEM — I70.0 CALCIFICATION OF AORTA: Status: ACTIVE | Noted: 2022-04-11

## 2022-05-02 ENCOUNTER — TELEPHONE (OUTPATIENT)
Dept: NEUROLOGY | Facility: CLINIC | Age: 72
End: 2022-05-02
Payer: MEDICARE

## 2022-05-02 NOTE — TELEPHONE ENCOUNTER
----- Message from Amber Larsen sent at 4/29/2022  2:51 PM CDT -----  Contact: Patient, [phone]  Calling to speak with the nurse regarding her appointment. Please call her. Thanks.

## 2022-05-02 NOTE — TELEPHONE ENCOUNTER
Pt wanted to know if she needed to bring her daughter with her   For CNP testing advised her that she is not needed for test. It will help if she came for the initial consult . She verbalized understanding .

## 2022-05-03 ENCOUNTER — PATIENT OUTREACH (OUTPATIENT)
Dept: ADMINISTRATIVE | Facility: OTHER | Age: 72
End: 2022-05-03
Payer: MEDICARE

## 2022-05-03 NOTE — PROGRESS NOTES
Health Maintenance Due   Topic Date Due    Shingles Vaccine (1 of 2) Never done    COVID-19 Vaccine (4 - Booster for Moderna series) 02/16/2022    DEXA Scan  04/30/2022     Updates were requested from care everywhere.  Chart was reviewed for overdue Proactive [facility] Encounters ([facility]) topics (CRS, Breast Cancer Screening, Eye exam)  Health Maintenance has been updated.  LINKS immunization registry triggered.  Immunizations were reconciled.

## 2022-05-04 ENCOUNTER — OFFICE VISIT (OUTPATIENT)
Dept: NEUROLOGY | Facility: CLINIC | Age: 72
End: 2022-05-04
Payer: MEDICARE

## 2022-05-04 DIAGNOSIS — R41.9 COGNITIVE COMPLAINTS: Primary | ICD-10-CM

## 2022-05-04 DIAGNOSIS — G47.33 OSA ON CPAP: ICD-10-CM

## 2022-05-04 DIAGNOSIS — R90.89 ABNORMAL BRAIN MRI: ICD-10-CM

## 2022-05-04 DIAGNOSIS — Z86.16 HISTORY OF 2019 NOVEL CORONAVIRUS DISEASE (COVID-19): ICD-10-CM

## 2022-05-04 DIAGNOSIS — D32.0 MENINGIOMA OF CEREBELLUM: ICD-10-CM

## 2022-05-04 PROCEDURE — 1159F PR MEDICATION LIST DOCUMENTED IN MEDICAL RECORD: ICD-10-PCS | Mod: CPTII,S$GLB,, | Performed by: STUDENT IN AN ORGANIZED HEALTH CARE EDUCATION/TRAINING PROGRAM

## 2022-05-04 PROCEDURE — 99999 PR PBB SHADOW E&M-EST. PATIENT-LVL II: CPT | Mod: PBBFAC,,, | Performed by: STUDENT IN AN ORGANIZED HEALTH CARE EDUCATION/TRAINING PROGRAM

## 2022-05-04 PROCEDURE — 96116 PR NEUROBEHAVIORAL STATUS EXAM BY PSYCH/PHYS: ICD-10-PCS | Mod: S$GLB,,, | Performed by: STUDENT IN AN ORGANIZED HEALTH CARE EDUCATION/TRAINING PROGRAM

## 2022-05-04 PROCEDURE — 4010F ACE/ARB THERAPY RXD/TAKEN: CPT | Mod: CPTII,S$GLB,, | Performed by: STUDENT IN AN ORGANIZED HEALTH CARE EDUCATION/TRAINING PROGRAM

## 2022-05-04 PROCEDURE — 4010F PR ACE/ARB THEARPY RXD/TAKEN: ICD-10-PCS | Mod: CPTII,S$GLB,, | Performed by: STUDENT IN AN ORGANIZED HEALTH CARE EDUCATION/TRAINING PROGRAM

## 2022-05-04 PROCEDURE — 1159F MED LIST DOCD IN RCRD: CPT | Mod: CPTII,S$GLB,, | Performed by: STUDENT IN AN ORGANIZED HEALTH CARE EDUCATION/TRAINING PROGRAM

## 2022-05-04 PROCEDURE — 96116 NUBHVL XM PHYS/QHP 1ST HR: CPT | Mod: S$GLB,,, | Performed by: STUDENT IN AN ORGANIZED HEALTH CARE EDUCATION/TRAINING PROGRAM

## 2022-05-04 PROCEDURE — 99499 UNLISTED E&M SERVICE: CPT | Mod: S$GLB,,, | Performed by: STUDENT IN AN ORGANIZED HEALTH CARE EDUCATION/TRAINING PROGRAM

## 2022-05-04 PROCEDURE — 99999 PR PBB SHADOW E&M-EST. PATIENT-LVL II: ICD-10-PCS | Mod: PBBFAC,,, | Performed by: STUDENT IN AN ORGANIZED HEALTH CARE EDUCATION/TRAINING PROGRAM

## 2022-05-04 PROCEDURE — 96121 NUBHVL XM PHY/QHP EA ADDL HR: CPT | Mod: S$GLB,,, | Performed by: STUDENT IN AN ORGANIZED HEALTH CARE EDUCATION/TRAINING PROGRAM

## 2022-05-04 PROCEDURE — 99499 NO LOS: ICD-10-PCS | Mod: S$GLB,,, | Performed by: STUDENT IN AN ORGANIZED HEALTH CARE EDUCATION/TRAINING PROGRAM

## 2022-05-04 PROCEDURE — 96121 PR NEUROBEHAVIORAL STAT EXAM, EA ADDTL HR: ICD-10-PCS | Mod: S$GLB,,, | Performed by: STUDENT IN AN ORGANIZED HEALTH CARE EDUCATION/TRAINING PROGRAM

## 2022-05-04 NOTE — PATIENT INSTRUCTIONS
Today you completed the interview portion of your neuropsychological evaluation. The next step will be to come in for a testing appointment on 05/09/2022 at 08:30. Some helpful information is included below to help you be ready for your testing appointment.    Please call Dr. Newton at [phone] or send a [email or web address] message with any questions.     In the meantime, please see below for recommendations and resources we discussed during your visit.     Please review the sleep hygiene handout we discussed.        _____________________  Taco Newton, Ph.D.  Neuropsychologist  [facility]  Department of Neurology      Instructions for your upcoming neuropsychological assessment    If you must cancel your appointment please do so 48 hours in advance of your scheduled appointment.   Please take all of your medications as prescribed on the date of testing, unless specifically asked not to do so.   Be sure to eat a full breakfast the morning before testing if you typically eat in the morning.   Bring any water, snacks, or other food supplies you may need for a 2-4 hour appointment.  Do your best to get a good night of sleep before testing.  There is nothing you should do or need to do to study for the tests. Just come ready to do your best.

## 2022-05-04 NOTE — PROGRESS NOTES
NEUROPSYCHOLOGY CONSULT    Referral Information  NAME:  Beth Souza DATE OF SERVICE: 2022   MRN#:  [id number] EDUCATION: 12   AGE: 71 y.o. HANDEDNESS: Right    : 1950 RACE: Black or    SEX: Female REFERRAL: Madison Manuel NP ;  Neurology, [facility]     Evaluation methods: I had the pleasure of seeing Beth Souza on 2022 in person at the [facility], Department of Neurology. Data sources for the below report include review of the available medical record and an interview with the patient. At the outset of the appointment, the undersigned explained the rationale for the evaluation along with the limits of confidentiality; and verbal informed consent for this evaluation was obtained.    Note: Due to safety concerns and administrative policy during the COVID-19 pandemic the patient, the undersigned, and the examiner wore masks throughout our interview.     The chief complaint/medical necessity leading to consultation/medical necessity is: cognitive decline    NEUROPSYCHOLOGICAL EVALUATION - CONFIDENTIAL    SUMMARY/ IMPRESSIONS     Ms. Souza is a 71 y.o., right-handed, Black or , female with 12 years of formal education. She was referred by her neurology nurse practitioner due to cognitive concerns in the context of a meningioma and history of COVID-19 infection in 2020. Cognitive screening administered by her neurology team on 2022 was above cutoff for cognitive impairment (Education-corrected MoCA = 28/30; Attention = 4/6, Language = 2/3). Neurologic examination was documented as normal other than decreased vibration sense from bilateral toes, wide-based and antalgic gait due to back pain, and absent bilateral achilles reflexes. Serial Brain MRIs between 2020 and 2021 documented stable meningioma projecting from left posterior cranial fossa with cerebellar remodeling and moderate microvascular ischemic changes.     During  interview, Ms. Souza reported primary difficulties with attention, processing speed, and memory that also affect her ability to stay organized due to reduced persistence on tasks. Emotionally, Ms. Souza denied any clinically-significant mental health concerns.     Ms. Souza has a history of progressively-worsening cognitive difficulties in the context of a cerebellar meningioma and moderate white matter ischemic changes on MRI; and a history of cognitive changes following infections with COVID-19. She does not have psychological or medical concerns that would preclude gathering neuropsychological test data at this time. As such, formal neuropsychological testing is clinically indicated in order to aid in differential diagnosis and treatment planning.    Diagnoses  1. Cognitive complaints     2. Meningioma of cerebellum     3. Abnormal brain MRI     4. LEN on CPAP     5. History of 2019 novel coronavirus disease (COVID-19)         PLAN/ RECOMMENDATIONS     Provider Recommendations:   On the basis of the above summary, neuropsychological testing is clinically indicated at this time. Ms. Souza will be scheduled for comprehensive neuropsychological testing. A detailed report including detailed diagnostic information and recommendations will be completed after testing has been completed.     Patient Recommendations:  The next step in your care is to complete neuropsychological testing. Our office staff will reach out to you to schedule an appointment for the testing portion of your evaluation. Please review your after visit summary for more information about your testing appointment.     The above plan/ recommendations were discussed with Ms. Souza during her appointment today.     Thank you for allowing me to participate in Ms. Vogel care.  If you have any questions, please contact me at [phone].        _____________________  Taco Newton, Ph.D.  Neuropsychologist  [facility]  Department of Neurology    HISTORY OF  PRESENT ILLNESS: Ms. Beth Souza is an 71 y.o., right-handed, -American female with 12 years of education who was referred for a neuropsychological evaluation in the setting of cognitive changes. She has a medical history notable for meningioma, moderate white matter ischemic changes on MRI, and COVID-19.     Onset of Cognitive Concerns: Insidious, reportedly beginning after her COVID-19 infection in 03/2020. She was unable to provide more specificity about when her symptoms began.     Course of Cognitive Concerns: Ms. Souza reported that her cognitive skills have been slowly worsening.    Characterization of Cognitive Concerns  Attention/ working memory: Ms. Souza reported difficulties with concentration and paying attention to others. She also reported that she has attention errors when driving that lead to her missing her turn. She also reported reduced persistence on tasks.  Processing speed: Ms. Souza reported difficulties with slowed thinking speed.  Language: Ms. Souza denied language difficulties.  Visual-spatial/ navigation: Ms. Souza denied difficulties with visual-spatial skills or navigation.  Psychomotor: Ms. Souza denied psychomotor difficulties.  Memory: Ms. Souza reported difficulties with repeating conversations that her daughter comments on. She has difficulties recalling details about current events.   Decision making: Ms. Souza denied difficulties with decision-making or reasoning skills. She reported it is harder to stay organized due to her attention concerns.   Orientation: Ms. Souza denied errors in orientation to person, place, time, or situation.    Neuropsychiatric Symptoms:  Hallucinations: Denied  Delusional/Paranoid Thinking: Denied  Apathy: Endorsed and this affects her engagement in volitional activities.  Irritability/Agitation: Denied  Disinhibition: Denied  Depression/Labile Mood: Denied   Anxiety: Denied    DAILY FUNCTIONING:  BASIC ADLS:  Feeding: independent  Dressing:  independent  Bathing: independent  Toileting: independent    IADLS:  Support System: Daughter who lives locally.   Appointment Management: independent and uses a planner.   Medication Compliance: independent but reported occasional errors taking her blood pressure medications when she leaves the house and takes those medications.   Financial Management: independent and uses a planner. She reported occasional forgetting.   Cooking: independent  Driving: Independent and effective.       BRAIN HEALTH RISK FACTORS:  Hearing Loss: Endorsed but she is unsure if that is a comprehension issue or a primary hearing problem. She reported that her TV and radio are on loud.   Physical Activity: Endorsed limitations other than PT for her back.   Social Isolation: Denied  Falls: Denied  Sleep: Ms. Souza reported poor sleep. She sleeps with her CPAP and the TV on. She awakens at night often for up to an hour. She reported that she achieves fewer than 8 hours but had a hard time estimating more specifically. She reported that these changes happened after she had COVID.     MEDICAL HISTORY: Ms. Souza  has a past medical history of Cataract, Hyperlipidemia, Hypertension, Lumbar spondylosis, Meningioma (2/18/2021), Obesity, Sleep apnea, and Vitamin D deficiency.    NEUROIMAGING:  Results for orders placed or performed during the hospital encounter of 09/14/21   MRI Brain W WO Contrast    Narrative    EXAMINATION:  MRI BRAIN W WO CONTRAST    CLINICAL HISTORY:  Headache, acute, normal neuro exam;meningioma; Headache, unspecified    TECHNIQUE:  Pre and postcontrast multiplanar multisequence MR imaging of the brain was performed using 10 mL Gadavist intravenous contrast.    COMPARISON:  02/12/2021.    FINDINGS:  There is mild generalized cerebral and cerebellar atrophy.  Multifocal T2/FLAIR hyperintense signal changes are noted in the subcortical and periventricular white matter bilaterally consistent with chronic ischemia in small vessel  distributions.  No acute brain edema, hemorrhage, or infarction is demonstrated.  Unchanged small focus of gradient susceptibility is noted in the right corona radiata, possibly chronic sequelae of prior hemorrhage.    There is redemonstration of a oval circumscribed dural based mass with intense uniform enhancement in the posterolateral left posterior fossa.  Mass is not significantly changed in size measuring 2.4 x 1.9 cm.  Osseous hypertrophy is present along the adjacent inner table of the occipital calvarium similar to prior.  Local mass effect upon the left cerebellar hemisphere is again noted without appreciable midline displacement.  No associated vasogenic edema.    Normal flow voids are observed in the major vascular structures.  The orbits, sinuses, and mastoids are unremarkable.  Bony calvarium appears intact.      Impression    1. Stable left posterior fossa mass most consistent with meningioma.  2. No acute intracranial abnormality.      Electronically signed by: THIAGO Levy MD  Date:    09/14/2021  Time:    15:07   Results for orders placed or performed during the hospital encounter of 07/17/20   MRI Brain Without Contrast    Narrative    EXAMINATION:  MRI BRAIN WITHOUT CONTRAST    CLINICAL HISTORY:  Mild cognitive impairment with memory loss;.  Mild cognitive impairment, so stated    TECHNIQUE:  Multiplanar multisequence MR imaging of the brain was performed without contrast.    COMPARISON:  CT soft tissue neck 10/26/2016    FINDINGS:  Intracranial compartment:    Ventricles and sulci are normal in size for age without evidence of hydrocephalus. No extra-axial blood or fluid collections.    Brain parenchyma demonstrates patchy and confluent areas of periventricular and subcortical T2 hyperintensity, nonspecific most suggestive of chronic microvascular ischemic change.  No restricted diffusion to suggest acute intracranial infarct.  There is a focus of calcification or prior hemorrhage image the  right basal ganglia.  There is a small focus of hyperostosis within the left occipital calvarium with a adjacent, rounded focus in the left cerebellum measuring 1.9 x 2.5 cm, which matches in gray matter on all sequences, likely a meningioma.  No adjacent edema.    Normal vascular flow voids are preserved.    Skull/extracranial contents (limited evaluation): Bone marrow signal intensity is normal.  Tiny mucous retention cyst in the right maxillary sinus.  Remaining visualized paranasal sinuses and bilateral mastoid air cells are clear.      Impression    No acute intracranial abnormality.    2.5 cm extra-axial appearing lesion within the left posterior fossa is most compatible with a meningioma.  Mass effect on the adjacent left cerebellum without associated edema.  Findings could be confirmed with post contrasted brain MRI.    Moderate degree of chronic microvascular ischemic change including a small remote hemorrhage or calcification in the right basal ganglia.      Electronically signed by: Herrera Burciaga  Date:    07/17/2020  Time:    10:10     Current medications: Ms. Souza has a current medication list which includes the following prescription(s): acetaminophen, atorvastatin, ergocalciferol, fluticasone propionate, gabapentin, lisinopril-hydrochlorothiazide, methocarbamol, and methylprednisolone.    Review of patient's allergies indicates:   Allergen Reactions    Aspirin Other (See Comments)     Bruising       PSYCHIATRIC HISTORY: Other than grief counseling denied any history of treatment in the early 2000's.     SUICIDAL IDEATION:  History: Denied  Active Suicidal Ideation:Denied  Plan/Intent: Denied    FAMILY HISTORY: family history includes Diabetes in her daughter; Hypertension in her mother.    PSYCHOSOCIAL HISTORY:   Education:   Level Attained: 12   Learning Difficulties: Denied   Special Education: Denied   Repeated Grade: Endorsed repeating either 9th or 10th grade.      Vocation:   Highest  "Attained: Commercial cleaning contractor.    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     Relationship Status:   : no   : yes   Children: 3 daughters.     SUBSTANCE USE: Denied. She is a former smoker who quit "a long time ago." She reported reduced alcohol use recently due to lack of perceived effect when she drinks.     MENTAL STATUS AND OBSERVATIONS:  APPEARANCE: Casually dressed and adequate grooming/hygiene.   ALERTNESS/ORIENTATION: Alert and fully oriented. She had notable difficulties with attention during interview.   GAIT/MOTOR: Gait was slow and wide-based. No fine motor abnormalities observed on testing and informal exam.  SENSORY: Mild hearing difficulty was noted versus inattention. Vision appeared adequate for testing.   SPEECH/LANGUAGE: Normal in rate, rhythm, tone, and volume. Expressive and receptive language were grossly intact.  MOOD/AFFECT: Mood was euthymic and affect was mood-congruent.  INTERPERSONAL BEHAVIOR: Rapport was quickly and easily established   THOUGHT PROCESSES: Thoughts seemed logical and goal-directed. She had noticeable difficulty reporting many aspects of her timeline (e.g. whether she quit smoking as a child, young adult, or adult) and specifics of recent current events. She consistently benefited from reminder cueing and multiple choice prompts during interview.     Billing Documentation     Time spent in review of pertinent records, conducting face to face interview with the patient, and documenting history: 92 minutes; [id number] & [id number](x1).  "

## 2022-05-10 ENCOUNTER — TELEPHONE (OUTPATIENT)
Dept: NEUROLOGY | Facility: CLINIC | Age: 72
End: 2022-05-10
Payer: MEDICARE

## 2022-05-10 NOTE — TELEPHONE ENCOUNTER
----- Message from Suki Mathew sent at 5/10/2022  9:41 AM CDT -----  .Type:  Needs Medical Advice    Who Called: YUDITH HELM [[id number]]  Symptoms (please be specific):   How long has patient had these symptoms:    Pharmacy name and phone #:    Would the patient rather a call back or a response via [facility]?   Best Call Back Number:  [phone]  Additional Information: pt is requesting a call from office to r/s appt, Please call

## 2022-05-12 ENCOUNTER — TELEPHONE (OUTPATIENT)
Dept: NEUROLOGY | Facility: CLINIC | Age: 72
End: 2022-05-12
Payer: MEDICARE

## 2022-05-12 NOTE — TELEPHONE ENCOUNTER
Andrea Wallace, patient daughter wanted know if we can send the Neuropsychology else where because she wants to get it done sooner. She states her mother is getting to the point where she can burn down the house. Please advise. ( on the waiting list with  she had an appointment before but no showed it )

## 2022-05-12 NOTE — TELEPHONE ENCOUNTER
----- Message from Floridalma Emanuel sent at 5/12/2022  1:03 PM CDT -----  Contact: Pt gr daughter      Who Called:Keyanna      Does the patient know what this is regarding? R/s evaulation    Would the patient rather a call back or a response via [facility]?  Callback    Best Call Back Number: .[phone]         Additional Information: .[phone] (home) [phone] (work)

## 2022-05-13 DIAGNOSIS — I10 ESSENTIAL HYPERTENSION: ICD-10-CM

## 2022-05-13 RX ORDER — LISINOPRIL AND HYDROCHLOROTHIAZIDE 20; 25 MG/1; MG/1
1 TABLET ORAL DAILY
Qty: 90 TABLET | Refills: 3 | OUTPATIENT
Start: 2022-05-13 | End: 2022-07-27

## 2022-05-13 NOTE — TELEPHONE ENCOUNTER
Refill Authorization Note   Beth Souza  is requesting a refill authorization.  Brief Assessment and Rationale for Refill:  Approve     Medication Therapy Plan:       Medication Reconciliation Completed: No   Comments:     No Care Gaps recommended.     Note composed:12:10 PM 05/13/2022

## 2022-05-13 NOTE — TELEPHONE ENCOUNTER
No new care gaps identified.  [facility] Embedded Care Gaps. Reference number: [id number]. 5/13/2022   9:17:16 AM CDT

## 2022-06-01 ENCOUNTER — TELEPHONE (OUTPATIENT)
Dept: INTERNAL MEDICINE | Facility: CLINIC | Age: 72
End: 2022-06-01
Payer: MEDICARE

## 2022-06-01 NOTE — TELEPHONE ENCOUNTER
Good morning, Gianna chanel grandchild called in regards to the neuropsychology testing. Her grandchild wants to get her testing as soon as available will allow it. Even if she can just get a certain date for an appointment. If you can reach out to her in regards to this matter.     Thank you for all the help in advance.

## 2022-06-01 NOTE — TELEPHONE ENCOUNTER
----- Message from Daniela Garcia sent at 6/1/2022  9:13 AM CDT -----  Contact: Antonio agrawal Ms. Briceño Mobile# [phone]  Patients granddaughter Ms. Briceño would like a call back in regards to her saying that the patient was suppose to have a test done on her brain to check her brain function, and she would like a call back to speak with you about this please.

## 2022-06-02 ENCOUNTER — OFFICE VISIT (OUTPATIENT)
Dept: PULMONOLOGY | Facility: CLINIC | Age: 72
End: 2022-06-02
Payer: MEDICARE

## 2022-06-02 VITALS
RESPIRATION RATE: 20 BRPM | OXYGEN SATURATION: 98 % | HEIGHT: 62 IN | DIASTOLIC BLOOD PRESSURE: 70 MMHG | SYSTOLIC BLOOD PRESSURE: 132 MMHG | WEIGHT: 222.25 LBS | HEART RATE: 53 BPM | BODY MASS INDEX: 40.9 KG/M2

## 2022-06-02 DIAGNOSIS — G31.84 MILD COGNITIVE IMPAIRMENT WITH MEMORY LOSS: ICD-10-CM

## 2022-06-02 DIAGNOSIS — E66.01 MORBID OBESITY WITH BMI OF 40.0-44.9, ADULT: ICD-10-CM

## 2022-06-02 DIAGNOSIS — G47.33 OSA ON CPAP: Primary | ICD-10-CM

## 2022-06-02 DIAGNOSIS — I10 ESSENTIAL HYPERTENSION: ICD-10-CM

## 2022-06-02 PROCEDURE — 3008F PR BODY MASS INDEX (BMI) DOCUMENTED: ICD-10-PCS | Mod: CPTII,S$GLB,, | Performed by: NURSE PRACTITIONER

## 2022-06-02 PROCEDURE — 3008F BODY MASS INDEX DOCD: CPT | Mod: CPTII,S$GLB,, | Performed by: NURSE PRACTITIONER

## 2022-06-02 PROCEDURE — 99499 UNLISTED E&M SERVICE: CPT | Mod: S$GLB,,, | Performed by: NURSE PRACTITIONER

## 2022-06-02 PROCEDURE — 99214 PR OFFICE/OUTPT VISIT, EST, LEVL IV, 30-39 MIN: ICD-10-PCS | Mod: S$GLB,,, | Performed by: NURSE PRACTITIONER

## 2022-06-02 PROCEDURE — 99999 PR PBB SHADOW E&M-EST. PATIENT-LVL III: ICD-10-PCS | Mod: PBBFAC,,, | Performed by: NURSE PRACTITIONER

## 2022-06-02 PROCEDURE — 3078F PR MOST RECENT DIASTOLIC BLOOD PRESSURE < 80 MM HG: ICD-10-PCS | Mod: CPTII,S$GLB,, | Performed by: NURSE PRACTITIONER

## 2022-06-02 PROCEDURE — 1160F RVW MEDS BY RX/DR IN RCRD: CPT | Mod: CPTII,S$GLB,, | Performed by: NURSE PRACTITIONER

## 2022-06-02 PROCEDURE — 3288F PR FALLS RISK ASSESSMENT DOCUMENTED: ICD-10-PCS | Mod: CPTII,S$GLB,, | Performed by: NURSE PRACTITIONER

## 2022-06-02 PROCEDURE — 3288F FALL RISK ASSESSMENT DOCD: CPT | Mod: CPTII,S$GLB,, | Performed by: NURSE PRACTITIONER

## 2022-06-02 PROCEDURE — 1101F PT FALLS ASSESS-DOCD LE1/YR: CPT | Mod: CPTII,S$GLB,, | Performed by: NURSE PRACTITIONER

## 2022-06-02 PROCEDURE — 99214 OFFICE O/P EST MOD 30 MIN: CPT | Mod: S$GLB,,, | Performed by: NURSE PRACTITIONER

## 2022-06-02 PROCEDURE — 4010F PR ACE/ARB THEARPY RXD/TAKEN: ICD-10-PCS | Mod: CPTII,S$GLB,, | Performed by: NURSE PRACTITIONER

## 2022-06-02 PROCEDURE — 1159F PR MEDICATION LIST DOCUMENTED IN MEDICAL RECORD: ICD-10-PCS | Mod: CPTII,S$GLB,, | Performed by: NURSE PRACTITIONER

## 2022-06-02 PROCEDURE — 3078F DIAST BP <80 MM HG: CPT | Mod: CPTII,S$GLB,, | Performed by: NURSE PRACTITIONER

## 2022-06-02 PROCEDURE — 99499 RISK ADDL DX/OHS AUDIT: ICD-10-PCS | Mod: S$GLB,,, | Performed by: NURSE PRACTITIONER

## 2022-06-02 PROCEDURE — 3075F SYST BP GE 130 - 139MM HG: CPT | Mod: CPTII,S$GLB,, | Performed by: NURSE PRACTITIONER

## 2022-06-02 PROCEDURE — 4010F ACE/ARB THERAPY RXD/TAKEN: CPT | Mod: CPTII,S$GLB,, | Performed by: NURSE PRACTITIONER

## 2022-06-02 PROCEDURE — 99999 PR PBB SHADOW E&M-EST. PATIENT-LVL III: CPT | Mod: PBBFAC,,, | Performed by: NURSE PRACTITIONER

## 2022-06-02 PROCEDURE — 3075F PR MOST RECENT SYSTOLIC BLOOD PRESS GE 130-139MM HG: ICD-10-PCS | Mod: CPTII,S$GLB,, | Performed by: NURSE PRACTITIONER

## 2022-06-02 PROCEDURE — 1159F MED LIST DOCD IN RCRD: CPT | Mod: CPTII,S$GLB,, | Performed by: NURSE PRACTITIONER

## 2022-06-02 PROCEDURE — 1101F PR PT FALLS ASSESS DOC 0-1 FALLS W/OUT INJ PAST YR: ICD-10-PCS | Mod: CPTII,S$GLB,, | Performed by: NURSE PRACTITIONER

## 2022-06-02 PROCEDURE — 1160F PR REVIEW ALL MEDS BY PRESCRIBER/CLIN PHARMACIST DOCUMENTED: ICD-10-PCS | Mod: CPTII,S$GLB,, | Performed by: NURSE PRACTITIONER

## 2022-06-02 NOTE — ASSESSMENT & PLAN NOTE
Encouraged calorie reduction and 30 minutes of exercise daily. Discussed impact of obesity on general health.  Wt Readings from Last 9 Encounters:   06/02/22 100.8 kg (222 lb 3.6 oz)   04/04/22 102.3 kg (225 lb 8.5 oz)   02/14/22 103.7 kg (228 lb 9.9 oz)   12/09/21 100 kg (220 lb 7.4 oz)   11/12/21 100.7 kg (222 lb)   10/26/21 100.9 kg (222 lb 7.1 oz)   10/26/21 101 kg (222 lb 10.6 oz)   10/19/21 101.8 kg (224 lb 6.9 oz)   08/19/21 101.1 kg (222 lb 14.2 oz)   Body mass index is 40.65 kg/m².

## 2022-06-02 NOTE — ASSESSMENT & PLAN NOTE
Benefits and compliant with Auto CPAP 4-16 cm  AHI: 1.9  Dreamwear nasal mask  [facility]: [facility]   Adherence  Follow up June 2023 for yearly CPAP compliance download and supply order.

## 2022-06-02 NOTE — PROGRESS NOTES
Subjective:      Patient ID: Beth Souza is a 71 y.o. female.    Chief Complaint: Sleep Apnea    HPI: Beth Souza presents to clinic for follow up for LEN with yearly CPAP complaince assessment.  She is on Auto CPAP of 4-16 cmH2O pressure which is optimally controlling sleep apnea with apneic index (AHI) 1.9 events an hour.   She is compliant with CPAP use. Complaince download today reveals 63.3% of days with greater than 4 hours of device use.   Patient reports benefit from CPAP use in past.   She has continued CPAP use and trying to adapt since she does find benefit. Has improved use from 10% to 56% now using about 63.3%.  Dream wear nasal mask is used.   Patient wants to work on improving use of CPAP.    Advised patient of the risk of untreated sleep apnea: Left untreated, sleep apnea can have serious and life-shortening consequences: depression, headaches, diabetes, high blood pressure, heart disease, heart failure, irregular heart beats, and heart attacks, stroke, automobile accidents caused by falling asleep at the wheel and low productivity at work and at home.     Home Sleep Studies 3 night 08/20/2018, 08/22/2018, 08/23/2018 Mild obstructive sleep apnea-hypopnea syndrome. Apnea-hypopnea index: AHI: 11.7 events per hour. Total events 80.     2021 obtained replacement CPAP machine Charles Dreamstation 2    Compliance Summary  4/25/2022 - 5/24/2022 (30 days)  Days with Device Usage 22 days  Days without Device Usage 8 days  Percent Days with Device Usage 73.3%  Cumulative Usage 4 days 21 hrs. 7 mins. 47 secs.  Maximum Usage (1 Day) 7 hrs. 14 mins. 25 secs.  Average Usage (All Days) 3 hrs. 54 mins. 15 secs.  Average Usage (Days Used) 5 hrs. 19 mins. 26 secs.  Minimum Usage (1 Day) 7 secs.  Percent of Days with Usage >= 4 Hours 63.3%  Percent of Days with Usage < 4 Hours 36.7%  Date Range  Total Blower Time 5 days 4 hrs. 8 mins. 32 secs.  Average AHI 1.9  Auto-CPAP Summary  Auto-CPAP Mean  Pressure 5.3 cmH2O  Auto-CPAP Peak Average Pressure 7.1 cmH2O  Device Pressure <= 90% of Time 7.2 cmH2O  Average Time in Large Leak Per Day 1 mins. 49 secs.    Previous Report Reviewed: lab reports and office notes     Past Medical History: The following portions of the patient's history were reviewed and updated as appropriate:   She  has a past surgical history that includes Partial hysterectomy and Hysterectomy.  Her family history includes Diabetes in her daughter; Hypertension in her mother.  She  reports that she quit smoking about 18 years ago. She has never used smokeless tobacco. She reports current alcohol use. She reports that she does not use drugs.  She has a current medication list which includes the following prescription(s): acetaminophen, atorvastatin, ergocalciferol, fluticasone propionate, gabapentin, lisinopril-hydrochlorothiazide, methocarbamol, and methylprednisolone.  She is allergic to aspirin..    The following portions of the patient's history were reviewed and updated as appropriate: allergies, current medications, past family history, past medical history, past social history, past surgical history and problem list.    Review of Systems   Constitutional: Negative for fever, chills, weight loss, weight gain, activity change, appetite change, fatigue and night sweats.   HENT: Negative for postnasal drip, rhinorrhea, sinus pressure, voice change and congestion.    Eyes: Negative for redness and itching.   Respiratory: Negative for snoring, cough, sputum production, chest tightness, shortness of breath, wheezing, orthopnea, asthma nighttime symptoms, dyspnea on extertion, use of rescue inhaler and somnolence.    Cardiovascular: Negative.  Negative for chest pain, palpitations and leg swelling.   Genitourinary: Negative for difficulty urinating and hematuria.   Endocrine: Negative for cold intolerance and heat intolerance.    Musculoskeletal: Negative for arthralgias, gait problem, joint  "swelling and myalgias.   Skin: Negative.    Gastrointestinal: Negative for nausea, vomiting, abdominal pain and acid reflux.   Neurological: Negative for dizziness, weakness, light-headedness and headaches.   Hematological: Negative for adenopathy. No excessive bruising.   All other systems reviewed and are negative.     Objective:   /70   Pulse (!) 53   Resp 20   Ht 5' 2" (1.575 m)   Wt 100.8 kg (222 lb 3.6 oz)   SpO2 98%   BMI 40.65 kg/m²   Physical Exam  Vitals and nursing note reviewed.   Constitutional:       General: She is not in acute distress.     Appearance: She is well-developed. She is not ill-appearing or toxic-appearing.   HENT:      Head: Normocephalic.      Right Ear: External ear normal.      Left Ear: External ear normal.      Nose: Nose normal.      Mouth/Throat:      Pharynx: No oropharyngeal exudate.   Eyes:      Conjunctiva/sclera: Conjunctivae normal.   Cardiovascular:      Rate and Rhythm: Normal rate and regular rhythm.      Heart sounds: Normal heart sounds.   Pulmonary:      Effort: Pulmonary effort is normal.      Breath sounds: Normal breath sounds. No stridor.   Abdominal:      Palpations: Abdomen is soft.   Musculoskeletal:         General: Normal range of motion.      Cervical back: Normal range of motion and neck supple.   Lymphadenopathy:      Cervical: No cervical adenopathy.   Skin:     General: Skin is warm and dry.   Neurological:      Mental Status: She is alert and oriented to person, place, and time.   Psychiatric:         Behavior: Behavior normal. Behavior is cooperative.         Thought Content: Thought content normal.         Judgment: Judgment normal.       Personal Diagnostic Review  CPAP download    Home Sleep Studies 3 night 08/20/2018, 08/22/2018, 08/23/2018 Mild obstructive sleep apnea-hypopnea syndrome. Apnea-hypopnea index: AHI: 11.7 events per hour. Total events 80.     Assessment:     1. LEN on CPAP    2. Morbid obesity with BMI of 40.0-44.9, adult  "   3. Mild cognitive impairment with memory loss    4. Essential hypertension        Orders Placed This Encounter   Procedures    CPAP/BIPAP SUPPLIES     Benefits and compliant  90 day supply. 4 refills  HME: [facility]     Order Specific Question:   Length of need (1-99 months):     Answer:   99     Order Specific Question:   Choose ONE mask type and its corresponding cushions and/or pillows:     Answer:    Nasal Cushion Mask, 1 per 90 days:  Nasal Cushions, (6 per 90 days)     Order Specific Question:   Choose EITHER Heated or Non-Heated Tubjing     Answer:    Non-Heated Tubing, 1 per 90 days     Order Specific Question:   Number of Days Needed:     Answer:   99     Order Specific Question:   All other supplies as needed as listed below:     Answer:    Headgear, 1 per 180 days     Order Specific Question:   All other supplies as needed as listed below:     Answer:    Chin Strap, 1 per 180 days     Order Specific Question:   All other supplies as needed as listed below:     Answer:    Disposable Filter, 6 per 90 days     Order Specific Question:   All other supplies as needed as listed below:     Answer:    Humidifier Chamber, 1 per 180 days     Order Specific Question:   All other supplies as needed as listed below:     Answer:    Non-Disposable Filter, 1 per 180 days     Plan:     Problem List Items Addressed This Visit     LEN on CPAP - Primary     Benefits and compliant with Auto CPAP 4-16 cm  AHI: 1.9  Dreamwear nasal mask  HME: [facility]   Adherence  Follow up June 2023 for yearly CPAP compliance download and supply order.             Relevant Orders    CPAP/BIPAP SUPPLIES    Morbid obesity with BMI of 40.0-44.9, adult     Encouraged calorie reduction and 30 minutes of exercise daily. Discussed impact of obesity on general health.  Wt Readings from Last 9 Encounters:   06/02/22 100.8 kg (222 lb 3.6 oz)   04/04/22 102.3 kg (225 lb 8.5 oz)   02/14/22 103.7 kg (228 lb 9.9 oz)    12/09/21 100 kg (220 lb 7.4 oz)   11/12/21 100.7 kg (222 lb)   10/26/21 100.9 kg (222 lb 7.1 oz)   10/26/21 101 kg (222 lb 10.6 oz)   10/19/21 101.8 kg (224 lb 6.9 oz)   08/19/21 101.1 kg (222 lb 14.2 oz)   Body mass index is 40.65 kg/m².             Mild cognitive impairment with memory loss     Assisted by daughter, Tara            Essential hypertension     Stable and controlled. Continue current treatment plan as previously prescribed with your PCP.                     (DME) - [facility]  Reviewed therapeutic goals for positive airway pressure therapy Auto CPAP  Ideal is usage 100% of nights for 6 - 8 hours per night. Minimum usage is 70% of night for at least 4 hours per night used.     Follow up in about 1 year (around 6/2/2023) for CPAP 1 year compliance download.

## 2022-06-03 ENCOUNTER — OFFICE VISIT (OUTPATIENT)
Dept: OBSTETRICS AND GYNECOLOGY | Facility: CLINIC | Age: 72
End: 2022-06-03
Payer: MEDICARE

## 2022-06-03 VITALS — BODY MASS INDEX: 41.67 KG/M2 | WEIGHT: 226.44 LBS | HEIGHT: 62 IN

## 2022-06-03 DIAGNOSIS — Z01.419 ROUTINE GYNECOLOGICAL EXAMINATION: Primary | ICD-10-CM

## 2022-06-03 DIAGNOSIS — N95.1 VAGINAL DRYNESS, MENOPAUSAL: ICD-10-CM

## 2022-06-03 DIAGNOSIS — Z12.31 BREAST CANCER SCREENING BY MAMMOGRAM: ICD-10-CM

## 2022-06-03 PROCEDURE — 1160F RVW MEDS BY RX/DR IN RCRD: CPT | Mod: CPTII,S$GLB,, | Performed by: NURSE PRACTITIONER

## 2022-06-03 PROCEDURE — 1101F PR PT FALLS ASSESS DOC 0-1 FALLS W/OUT INJ PAST YR: ICD-10-PCS | Mod: CPTII,S$GLB,, | Performed by: NURSE PRACTITIONER

## 2022-06-03 PROCEDURE — 4010F PR ACE/ARB THEARPY RXD/TAKEN: ICD-10-PCS | Mod: CPTII,S$GLB,, | Performed by: NURSE PRACTITIONER

## 2022-06-03 PROCEDURE — 1159F PR MEDICATION LIST DOCUMENTED IN MEDICAL RECORD: ICD-10-PCS | Mod: CPTII,S$GLB,, | Performed by: NURSE PRACTITIONER

## 2022-06-03 PROCEDURE — 1159F MED LIST DOCD IN RCRD: CPT | Mod: CPTII,S$GLB,, | Performed by: NURSE PRACTITIONER

## 2022-06-03 PROCEDURE — G0101 PR CA SCREEN;PELVIC/BREAST EXAM: ICD-10-PCS | Mod: S$GLB,,, | Performed by: NURSE PRACTITIONER

## 2022-06-03 PROCEDURE — 1101F PT FALLS ASSESS-DOCD LE1/YR: CPT | Mod: CPTII,S$GLB,, | Performed by: NURSE PRACTITIONER

## 2022-06-03 PROCEDURE — 1126F PR PAIN SEVERITY QUANTIFIED, NO PAIN PRESENT: ICD-10-PCS | Mod: CPTII,S$GLB,, | Performed by: NURSE PRACTITIONER

## 2022-06-03 PROCEDURE — 1160F PR REVIEW ALL MEDS BY PRESCRIBER/CLIN PHARMACIST DOCUMENTED: ICD-10-PCS | Mod: CPTII,S$GLB,, | Performed by: NURSE PRACTITIONER

## 2022-06-03 PROCEDURE — 99999 PR PBB SHADOW E&M-EST. PATIENT-LVL III: CPT | Mod: PBBFAC,,, | Performed by: NURSE PRACTITIONER

## 2022-06-03 PROCEDURE — 3288F FALL RISK ASSESSMENT DOCD: CPT | Mod: CPTII,S$GLB,, | Performed by: NURSE PRACTITIONER

## 2022-06-03 PROCEDURE — 3008F BODY MASS INDEX DOCD: CPT | Mod: CPTII,S$GLB,, | Performed by: NURSE PRACTITIONER

## 2022-06-03 PROCEDURE — 3008F PR BODY MASS INDEX (BMI) DOCUMENTED: ICD-10-PCS | Mod: CPTII,S$GLB,, | Performed by: NURSE PRACTITIONER

## 2022-06-03 PROCEDURE — 99999 PR PBB SHADOW E&M-EST. PATIENT-LVL III: ICD-10-PCS | Mod: PBBFAC,,, | Performed by: NURSE PRACTITIONER

## 2022-06-03 PROCEDURE — 4010F ACE/ARB THERAPY RXD/TAKEN: CPT | Mod: CPTII,S$GLB,, | Performed by: NURSE PRACTITIONER

## 2022-06-03 PROCEDURE — G0101 CA SCREEN;PELVIC/BREAST EXAM: HCPCS | Mod: S$GLB,,, | Performed by: NURSE PRACTITIONER

## 2022-06-03 PROCEDURE — 3288F PR FALLS RISK ASSESSMENT DOCUMENTED: ICD-10-PCS | Mod: CPTII,S$GLB,, | Performed by: NURSE PRACTITIONER

## 2022-06-03 PROCEDURE — 1126F AMNT PAIN NOTED NONE PRSNT: CPT | Mod: CPTII,S$GLB,, | Performed by: NURSE PRACTITIONER

## 2022-06-03 RX ORDER — ESTRADIOL 10 UG/1
INSERT VAGINAL
Qty: 30 TABLET | Refills: 1 | Status: SHIPPED | OUTPATIENT
Start: 2022-06-03 | End: 2022-11-10

## 2022-06-03 NOTE — PROGRESS NOTES
CC: Well woman exam    HPI  Beth Souza is a 71 y.o. female  presents for a well woman exam.  LMP: No LMP recorded. Patient has had a hysterectomy..  No issues, problems, or complaints.    Past Medical History:   Diagnosis Date    Cataract     Hyperlipidemia     Hypertension     Lumbar spondylosis     Meningioma 2021    Obesity     Sleep apnea     Vitamin D deficiency      Past Surgical History:   Procedure Laterality Date    HYSTERECTOMY      PARTIAL HYSTERECTOMY       Social History     Socioeconomic History    Marital status:     Number of children: 3   Occupational History    Occupation: Cleaning houses     Employer: not employed   Tobacco Use    Smoking status: Former Smoker     Quit date: 2004     Years since quittin.4    Smokeless tobacco: Never Used   Substance and Sexual Activity    Alcohol use: Yes     Comment: occasionally    Drug use: No    Sexual activity: Not Currently     Partners: Male   Other Topics Concern    Are you pregnant or think you may be? No    Breast-feeding No   Social History Narrative    Live w/ alone      Family History   Problem Relation Age of Onset    Hypertension Mother     Diabetes Daughter     Melanoma Neg Hx     Psoriasis Neg Hx     Lupus Neg Hx     Eczema Neg Hx     Breast cancer Neg Hx     Colon cancer Neg Hx     Ovarian cancer Neg Hx     Thrombosis Neg Hx      OB History        3    Para   3    Term   3            AB        Living   3       SAB        IAB        Ectopic        Multiple        Live Births   3                 Current Outpatient Medications:     acetaminophen (TYLENOL) 500 MG tablet, Take 500 mg by mouth daily as needed for Pain., Disp: , Rfl:     atorvastatin (LIPITOR) 20 MG tablet, Take 1 tablet (20 mg total) by mouth once daily., Disp: 90 tablet, Rfl: 3    ergocalciferol (ERGOCALCIFEROL) 50,000 unit Cap, Take 1 capsule (50,000 Units total) by mouth every 7 days., Disp: 12  "capsule, Rfl: 3    fluticasone propionate (FLONASE) 50 mcg/actuation nasal spray, 1 spray (50 mcg total) by Each Nostril route once daily., Disp: 18.2 mL, Rfl: 0    gabapentin (NEURONTIN) 300 MG capsule, Take 1 capsule (300 mg total) by mouth every evening., Disp: 30 capsule, Rfl: 11    lisinopriL-hydrochlorothiazide (PRINZIDE,ZESTORETIC) 20-25 mg Tab, Take 1 tablet by mouth once daily, Disp: 90 tablet, Rfl: 3    methocarbamoL (ROBAXIN) 750 MG Tab, Take 1 tablet (750 mg total) by mouth 3 (three) times daily as needed (muscle spasms)., Disp: 60 tablet, Rfl: 0    methylPREDNISolone (MEDROL DOSEPACK) 4 mg tablet, use as directed, Disp: 1 each, Rfl: 0    estradioL (VAGIFEM) 10 mcg Tab, Insert one tablet vaginally twice per week. Insert it before bed. No sex on the night of insertion, Disp: 30 tablet, Rfl: 1    GYNECOLOGY HISTORY:      DATA REVIEWED:  Last pap:Hysterectomy re:noncancerous    Ht 5' 2" (1.575 m)   Wt 102.7 kg (226 lb 6.6 oz)   BMI 41.41 kg/m²     Review of Systems    Physical Exam  Constitutional:       Appearance: She is well-developed.   Genitourinary:      Vulva and bladder normal.      Cervix is absent.      Uterus is absent.   HENT:      Head: Normocephalic.      Nose: Nose normal.      Mouth/Throat:      Mouth: Mucous membranes are moist.   Eyes:      Extraocular Movements: Extraocular movements intact.   Pulmonary:      Effort: Pulmonary effort is normal.   Abdominal:      General: Abdomen is flat.      Palpations: Abdomen is soft.   Musculoskeletal:         General: Normal range of motion.      Cervical back: Normal range of motion.   Neurological:      Mental Status: She is alert and oriented to person, place, and time.   Skin:     General: Skin is warm and dry.   Psychiatric:         Mood and Affect: Mood normal.         Behavior: Behavior normal.         Thought Content: Thought content normal.         Judgment: Judgment normal.   Vitals reviewed.           Routine gynecological " examination    Breast cancer screening by mammogram  -     Mammo Digital Screening Bilat w/ [facility]; Future; Expected date: 06/03/2022    Vaginal dryness, menopausal  -     estradioL (VAGIFEM) 10 mcg Tab; Insert one tablet vaginally twice per week. Insert it before bed. No sex on the night of insertion  Dispense: 30 tablet; Refill: 1        Patient was counseled today on A.C.S. Pap guidelines (Q3) and recommendations for yearly pelvic exams, yearly mammograms starting age 40, and clinical breast exams; to see her PCP for other health maintenance.

## 2022-06-06 ENCOUNTER — OFFICE VISIT (OUTPATIENT)
Dept: NEUROLOGY | Facility: CLINIC | Age: 72
End: 2022-06-06
Payer: MEDICARE

## 2022-06-06 DIAGNOSIS — D32.0 MENINGIOMA OF CEREBELLUM: ICD-10-CM

## 2022-06-06 DIAGNOSIS — G47.33 OSA ON CPAP: ICD-10-CM

## 2022-06-06 DIAGNOSIS — I10 ESSENTIAL HYPERTENSION: ICD-10-CM

## 2022-06-06 DIAGNOSIS — F02.80 MAJOR NEUROCOGNITIVE DISORDER DUE TO ANOTHER MEDICAL CONDITION: Primary | ICD-10-CM

## 2022-06-06 PROCEDURE — 99499 UNLISTED E&M SERVICE: CPT | Mod: S$GLB,,, | Performed by: STUDENT IN AN ORGANIZED HEALTH CARE EDUCATION/TRAINING PROGRAM

## 2022-06-06 PROCEDURE — 1159F MED LIST DOCD IN RCRD: CPT | Mod: CPTII,S$GLB,, | Performed by: STUDENT IN AN ORGANIZED HEALTH CARE EDUCATION/TRAINING PROGRAM

## 2022-06-06 PROCEDURE — 1159F PR MEDICATION LIST DOCUMENTED IN MEDICAL RECORD: ICD-10-PCS | Mod: CPTII,S$GLB,, | Performed by: STUDENT IN AN ORGANIZED HEALTH CARE EDUCATION/TRAINING PROGRAM

## 2022-06-06 PROCEDURE — 99499 NO LOS: ICD-10-PCS | Mod: S$GLB,,, | Performed by: STUDENT IN AN ORGANIZED HEALTH CARE EDUCATION/TRAINING PROGRAM

## 2022-06-06 PROCEDURE — 96132 PR NEUROPSYCHOLOGIC TEST EVAL SVCS, 1ST HR: ICD-10-PCS | Mod: S$GLB,,, | Performed by: STUDENT IN AN ORGANIZED HEALTH CARE EDUCATION/TRAINING PROGRAM

## 2022-06-06 PROCEDURE — 96138 PSYCL/NRPSYC TECH 1ST: CPT | Mod: S$GLB,,, | Performed by: STUDENT IN AN ORGANIZED HEALTH CARE EDUCATION/TRAINING PROGRAM

## 2022-06-06 PROCEDURE — 96133 NRPSYC TST EVAL PHYS/QHP EA: CPT | Mod: S$GLB,,, | Performed by: STUDENT IN AN ORGANIZED HEALTH CARE EDUCATION/TRAINING PROGRAM

## 2022-06-06 PROCEDURE — 4010F PR ACE/ARB THEARPY RXD/TAKEN: ICD-10-PCS | Mod: CPTII,S$GLB,, | Performed by: STUDENT IN AN ORGANIZED HEALTH CARE EDUCATION/TRAINING PROGRAM

## 2022-06-06 PROCEDURE — 96139 PR PSYCH/NEUROPSYCH TEST ADMIN/SCORING, BY TECH, 2+ TESTS, EA ADDTL 30 MIN: ICD-10-PCS | Mod: S$GLB,,, | Performed by: STUDENT IN AN ORGANIZED HEALTH CARE EDUCATION/TRAINING PROGRAM

## 2022-06-06 PROCEDURE — 99999 PR PBB SHADOW E&M-EST. PATIENT-LVL II: CPT | Mod: PBBFAC,,, | Performed by: STUDENT IN AN ORGANIZED HEALTH CARE EDUCATION/TRAINING PROGRAM

## 2022-06-06 PROCEDURE — 4010F ACE/ARB THERAPY RXD/TAKEN: CPT | Mod: CPTII,S$GLB,, | Performed by: STUDENT IN AN ORGANIZED HEALTH CARE EDUCATION/TRAINING PROGRAM

## 2022-06-06 PROCEDURE — 96139 PSYCL/NRPSYC TST TECH EA: CPT | Mod: S$GLB,,, | Performed by: STUDENT IN AN ORGANIZED HEALTH CARE EDUCATION/TRAINING PROGRAM

## 2022-06-06 PROCEDURE — 96133 PR NEUROPSYCHOLOGIC TEST EVAL SVCS, EA ADDTL HR: ICD-10-PCS | Mod: S$GLB,,, | Performed by: STUDENT IN AN ORGANIZED HEALTH CARE EDUCATION/TRAINING PROGRAM

## 2022-06-06 PROCEDURE — 96132 NRPSYC TST EVAL PHYS/QHP 1ST: CPT | Mod: S$GLB,,, | Performed by: STUDENT IN AN ORGANIZED HEALTH CARE EDUCATION/TRAINING PROGRAM

## 2022-06-06 PROCEDURE — 96138 PR PSYCH/NEUROPSYCH TEST ADMIN/SCORING, BY TECH, 2+ TESTS, 1ST 30 MIN: ICD-10-PCS | Mod: S$GLB,,, | Performed by: STUDENT IN AN ORGANIZED HEALTH CARE EDUCATION/TRAINING PROGRAM

## 2022-06-06 PROCEDURE — 99999 PR PBB SHADOW E&M-EST. PATIENT-LVL II: ICD-10-PCS | Mod: PBBFAC,,, | Performed by: STUDENT IN AN ORGANIZED HEALTH CARE EDUCATION/TRAINING PROGRAM

## 2022-06-06 NOTE — PROGRESS NOTES
NEUROPSYCHOLOGY CONSULT    Referral Information  NAME:  Beth Souza DATE OF SERVICE: 2022   MRN#:  [id number] EDUCATION: 12   AGE: 72 y.o. HANDEDNESS: Right    : 1950 RACE: Black or    SEX: Female REFERRAL: Madison Manuel NP;  Neurology, [facility]     Evaluation methods: I had the pleasure of seeing Beth Souza on 2022 in person at the [facility], Department of Neurology. Data sources for the below report include review of the available medical record, an interview with the patient on 2022, and administration of a series of neuropsychological tests listed in the Results section of this report. At the outset of the appointment, the undersigned explained the rationale for the evaluation along with the limits of confidentiality; and verbal informed consent for this evaluation was obtained.    Note: Due to safety concerns and administrative policy during the COVID-19 pandemic the patient, the undersigned, and the examiner wore masks throughout our interview.     The chief complaint/medical necessity leading to consultation/medical necessity is: cognitive decline    NEUROPSYCHOLOGICAL EVALUATION - CONFIDENTIAL    SUMMARY/TREATMENT PLAN   Summary of History:  Ms. Souza is a 71 y.o., right-handed, Black or , woman with 12 years of formal education. She was referred by her neurology nurse practitioner due to cognitive concerns in the context of a meningioma and history of COVID-19 infection in 2020. Cognitive screening administered by her neurology team on 2022 was above cutoff for cognitive impairment (Education-corrected MoCA = 28/30; Attention = 4/6, Language = 2/3). Neurologic examination was documented as normal other than decreased vibration sense from bilateral toes, wide-based and antalgic gait due to back pain, and absent bilateral achilles reflexes. Serial Brain MRIs between 2020 and 2021 documented stable  meningioma projecting from left posterior cranial fossa with cerebellar remodeling and moderate microvascular ischemic changes.    During interview, Ms. Souza reported primary difficulties with attention, processing speed, and memory that also affect her ability to stay organized due to reduced persistence on tasks. Emotionally, Ms. Souza denied any clinically-significant mental health concerns. Educationally, Ms. Souza reported having completed 12 years of education although her trajectory was delayed. She repeated either the 9th or 10th grade and during testing she recalled that she had early life difficulties with reading.    Test Results:  [place] Findings:    Ms. Souza is a woman of estimated below average baseline cognitive functioning on the basis of demographic data; her performance on a single-word reading task are thought to underestimate her baseline cognitive functioning due to reported early life reading difficulty.   Performances in the following areas were within expectations, i.e., below average or better, suggesting against significant declines in relevant domains:   Information processing speed (personal and normative strength)   Visuospatial skills   Executive functions   Expressive language   Auditory and visual attention, and working memory   Expressive language skills   Performances in the following areas were below normal limits or atypical, suggesting weakness or decline in relevant domains:   Verbal and visual learning and memory characterized by reduced encoding and subtle inconsistent benefit from recognition cueing; verbal list-learning performance is notable for a subtle recency effect   Right-lateralized fine motor dexterity relative weakness (within normal limits)   On performance-based measures of activities of daily living, Ms. Souza's scores were exceptionally low on a measure of financial decision-making and management skills; and below average on a measure of health and safety  decision-making and skills.   On screening measures of depression and anxiety, Ms. Souza did not endorse clinically significant levels of depression or anxiety.    Diagnostic Considerations: Ms. Souza is a woman of estimated below-average cognitive functioning and subtle declines are difficult to detect. In that context, performances are notable for declines across verbal and visual memory skills. These declines are accompanied by self-reported difficulties with instrumental activities of daily living due to cognitive concerns in the form of forgetting bills; and below-expectations performances on independent living skills inventories during formal testing. As such, Ms. Souza qualifies for a diagnosis of major neurocognitive disorder. It is important to note that the degree of Ms. Souza's functional impairment is mild at this time given her report during interview, and due to baseline inefficiencies caution is warranted in over-interpreting her level of decline based on this diagnosis. Ms. Souza did not report or endorse symptoms diagnostic of a contributing mood disorder.     Etiologic Considerations: Most likely cerebrovascular disease, though possibly multifactorial as below.    Ms. Souza's pattern of memory difficulties notable for primary difficulties with encoding and subtle benefit from recognition cueing, and her lateralized motor performances are consistent with a cerebrovascular etiology for cognitive declines; however, her strong processing speed and set-shifting performances in particular are atypical in this context. Ultimately, given evidence of moderate ischemic changes, a cerebrovascular etiology is suspected; however an atypical Alzheimer's process cannot be ruled out. Although Ms. Souza has a cerebellar meningioma with cerebellar remodeling, this mass has been stable on repeat brain MRI studies and would not explain her report of progressive declines. Lateralized motor performance was noted, but her  poorer right-hand fine motor dexterity would not be expected to be caused by her left cerebellar findings on MRI. Finally, her history of COVID-19 infection would not explain her reported progressive course of cognitive declines. Serial testing will be particularly important in this case to assess for interval declines and determine whether a co-occurring neurodegenerative process is present given memory declines and possible recency effect on a list-learning task.    Diagnoses  1. Major neurocognitive disorder due to another medical condition  Ambulatory consult to Neuropsychology    Cerebrovascular disease  Rule-out Alzheimer's disease   2. Meningioma of cerebellum     3. LEN on CPAP     4. Essential hypertension          Provider Recommendations:   1. Ms. Souza will be encouraged to follow up with her referring neurology team in order to integrate these results into the broader context of her care.     2. Ms. Souza will be encouraged to continue to follow closely with her family medicine physician to continue management of her cerebrovascular risk factors.     3. Medications for cognition may be considered at the discretion of her referring provider.     4. Ms. Souza will be provided with the below recommendations during a feedback session in order to help coordinate her care.     5. Continue to reinforce good sleep hygiene habits. Ms. Souza has been provided with some specific instruction during this assessment.      6. I recommend that Ms. Souza be referred for re-evaluation in approximately 12 months to track cognitive functioning and update her differential diagnosis and treatment plan.    Patient Recommendations:  1. The next step in your care is to follow up with your referring provider in order to help with continued management of your care. The below recommendations may help you and your family compensate for your difficulties and better understand the reasons for your cognitive changes.     2. I recommended  that your providers consider a referral to  home health to complete an occupational therapy evaluation of independent living skills and related safety concerns. This assessment may help you develop supports to maintain independence.     3. You may benefit from the use of compensatory strategies to optimize your daily cognitive functioning, including the following:  · Face towards people and make eye contact when speaking with them - this helps you stay focused.  · Work in a quiet place and block out distractions when possible. It may be helpful to use earplugs or a white noise machine (a fan works too) to reduce noises.   · Do your most important tasks during the time of day when you feel most alert/awake.  · Ask people to repeat or clarify information as needed. Have them summarize the take home points from a conversation.   · Repeat important information back to someone to make sure you got it right and to improve later recall.  · Put important items like keys, wallet, cell phone, and glasses in the same place every day so you're less likely to misplace them.  · Use Post-Its, calendar reminders, or other strategies to jog your memory. You benefit from reminder cues.  · Set aside some time each week to plan ahead for the tasks you will need to complete. Use this time to prioritize the tasks and set alarms so you won't forget to complete them before the deadline.  · Use alarms, notes, and checklists as needed to keep track of tasks, deadlines, and appointments.  · Use self-talk to help you concentrate and keep track of the steps in a task. For example, talk yourself (out loud or internally) though the steps involved in a recipe as you complete them.    4. Given your cognitive concerns, the following safety recommendations are made to help keep you and others safe:  · At this time, you demonstrated relative strengths in areas of processing speed, multitasking, attention, and visuospatial skills and testing does  not raise specific concern for your ability to safely drive. You reported that you are driving safely now. If you have concerns about your driving safety in the future, I recommend that you complete a formal on-road driving evaluation to assess for driving safety.  · I suggest that you use alarms and other systems when cooking to be sure that the stove and oven are not left on or that food is not burned.  · You would benefit from either an automated reminder system or assistance with medication management to ensure that you take the correct medications and doses at the right times and you obtain refills as needed, particularly when you have to take medications away from your home.     5. There are steps you can take to improve your long-term brain health and reduce your risk for cognitive decline in the future. I encourage you to follow the recommendations in your daily life:   Engage in physical activity (i.e., something that gets your heart rate up) totaling at least 15-30 minutes per day. Regular exercise is very important for both long-term health and stress management. Walking, hiking, elliptical, stationary biking, dancing, and yoga are all good options. You can also consult with your physical therapist to develop a regimen that works for you.   Work closely with your doctor(s) to manage your high blood pressure, high cholesterol, and sleep apnea. Follow the management guidelines from the American Heart Association at www.heart.org.   Remain mentally engaged by keeping socially active, reading, learning new skills, playing games, or participating in a hobby.   Get a good night's sleep by avoiding screens 30-60 minutes before bed and sticking to a regular sleep schedule. Continue to implement the strategies from the sleep hygiene handout we discussed.    Eat a balanced, heart-healthy diet that is low in salt, saturated fats, and added sugar. The Mediterranean diet has been shown to be especially healthy;  studies show that it may reduce the risk of developing dementia and slow the rate of age-related cognitive decline.     6. I recommend that you complete a repeat assessment in approximately one year. At that time, scores from this testing should be used in order to determine whether you continue to experience changes over time.     Ms. Souza will be provided the results of the evaluation.     Thank you for allowing me to participate in Ms. Vogel care.  If you have any questions, please contact me at [phone].        _____________________  Taco Newton, Ph.D.  Neuropsychologist  [facility] Department of Neurology    HISTORY OF PRESENT ILLNESS: Ms. Beth Souza is an 71 y.o., right-handed, -American female with 12 years of education who was referred for a neuropsychological evaluation in the setting of cognitive changes. She has a medical history notable for meningioma, moderate white matter ischemic changes on MRI, and COVID-19.      Onset of Cognitive Concerns: Insidious, reportedly beginning after her COVID-19 infection in 03/2020. She was unable to provide more specificity about when her symptoms began.      Course of Cognitive Concerns: Ms. Souza reported that her cognitive skills have been slowly worsening.     Characterization of Cognitive Concerns  Attention/ working memory: Ms. Souza reported difficulties with concentration and paying attention to others. She also reported that she has attention errors when driving that lead to her missing her turn. She also reported reduced persistence on tasks.  Processing speed: Ms. Souza reported difficulties with slowed thinking speed.  Language: Ms. Souza denied language difficulties.  Visual-spatial/ navigation: Ms. Souza denied difficulties with visual-spatial skills or navigation.  Psychomotor: Ms. Souza denied psychomotor difficulties.  Memory: Ms. Souza reported difficulties with repeating conversations that her daughter comments on. She has  difficulties recalling details about current events.   Decision making: Ms. Souza denied difficulties with decision-making or reasoning skills. She reported it is harder to stay organized due to her attention concerns.   Orientation: Ms. Souza denied errors in orientation to person, place, time, or situation.     Neuropsychiatric Symptoms:  Hallucinations: Denied  Delusional/Paranoid Thinking: Denied  Apathy: Endorsed and this affects her engagement in volitional activities.  Irritability/Agitation: Denied  Disinhibition: Denied  Depression/Labile Mood: Denied   Anxiety: Denied     DAILY FUNCTIONING:  BASIC ADLS:  Feeding: independent  Dressing: independent  Bathing: independent  Toileting: independent     IADLS:  Support System: Daughter who lives locally.   Appointment Management: independent and uses a planner.   Medication Compliance: independent but reported occasional errors taking her blood pressure medications when she leaves the house and must take medications in a context outside her routine.   Financial Management: independent and uses a planner. She reported occasional errors due to forgetting.   Cooking: independent  Driving: Independent and effective.        BRAIN HEALTH RISK FACTORS:  Hearing Loss: Endorsed but she is unsure if that is a comprehension issue or a primary hearing problem. She reported that her TV and radio are on loud.   Physical Activity: Endorsed that she does not engage in exercise other than PT for her back.   Social Isolation: Denied  Falls: Denied  Sleep: Ms. Souza reported poor sleep. She sleeps with her CPAP and the TV on. She awakens at night often for up to an hour. She reported that she achieves fewer than 8 hours but had a hard time estimating more specifically. She reported that these changes happened after she had COVID.     MEDICAL HISTORY: Ms. Souza  has a past medical history of Cataract, Hyperlipidemia, Hypertension, Lumbar spondylosis, Meningioma (2/18/2021), Obesity,  Sleep apnea, and Vitamin D deficiency.    NEUROIMAGING:  Results for orders placed or performed during the hospital encounter of 09/14/21   MRI Brain W WO Contrast    Narrative    EXAMINATION:  MRI BRAIN W WO CONTRAST    CLINICAL HISTORY:  Headache, acute, normal neuro exam;meningioma; Headache, unspecified    TECHNIQUE:  Pre and postcontrast multiplanar multisequence MR imaging of the brain was performed using 10 mL Gadavist intravenous contrast.    COMPARISON:  02/12/2021.    FINDINGS:  There is mild generalized cerebral and cerebellar atrophy.  Multifocal T2/FLAIR hyperintense signal changes are noted in the subcortical and periventricular white matter bilaterally consistent with chronic ischemia in small vessel distributions.  No acute brain edema, hemorrhage, or infarction is demonstrated.  Unchanged small focus of gradient susceptibility is noted in the right corona radiata, possibly chronic sequelae of prior hemorrhage.    There is redemonstration of a oval circumscribed dural based mass with intense uniform enhancement in the posterolateral left posterior fossa.  Mass is not significantly changed in size measuring 2.4 x 1.9 cm.  Osseous hypertrophy is present along the adjacent inner table of the occipital calvarium similar to prior.  Local mass effect upon the left cerebellar hemisphere is again noted without appreciable midline displacement.  No associated vasogenic edema.    Normal flow voids are observed in the major vascular structures.  The orbits, sinuses, and mastoids are unremarkable.  Bony calvarium appears intact.      Impression    1. Stable left posterior fossa mass most consistent with meningioma.  2. No acute intracranial abnormality.      Electronically signed by: THIAGO Levy MD  Date:    09/14/2021  Time:    15:07   Results for orders placed or performed during the hospital encounter of 07/17/20   MRI Brain Without Contrast    Narrative    EXAMINATION:  MRI BRAIN WITHOUT  CONTRAST    CLINICAL HISTORY:  Mild cognitive impairment with memory loss;.  Mild cognitive impairment, so stated    TECHNIQUE:  Multiplanar multisequence MR imaging of the brain was performed without contrast.    COMPARISON:  CT soft tissue neck 10/26/2016    FINDINGS:  Intracranial compartment:    Ventricles and sulci are normal in size for age without evidence of hydrocephalus. No extra-axial blood or fluid collections.    Brain parenchyma demonstrates patchy and confluent areas of periventricular and subcortical T2 hyperintensity, nonspecific most suggestive of chronic microvascular ischemic change.  No restricted diffusion to suggest acute intracranial infarct.  There is a focus of calcification or prior hemorrhage image the right basal ganglia.  There is a small focus of hyperostosis within the left occipital calvarium with a adjacent, rounded focus in the left cerebellum measuring 1.9 x 2.5 cm, which matches in gray matter on all sequences, likely a meningioma.  No adjacent edema.    Normal vascular flow voids are preserved.    Skull/extracranial contents (limited evaluation): Bone marrow signal intensity is normal.  Tiny mucous retention cyst in the right maxillary sinus.  Remaining visualized paranasal sinuses and bilateral mastoid air cells are clear.      Impression    No acute intracranial abnormality.    2.5 cm extra-axial appearing lesion within the left posterior fossa is most compatible with a meningioma.  Mass effect on the adjacent left cerebellum without associated edema.  Findings could be confirmed with post contrasted brain MRI.    Moderate degree of chronic microvascular ischemic change including a small remote hemorrhage or calcification in the right basal ganglia.      Electronically signed by: Herrera Burciaga  Date:    07/17/2020  Time:    10:10     Current medications: Ms. Souza has a current medication list which includes the following prescription(s): acetaminophen, atorvastatin,  "ergocalciferol, estradiol, fluticasone propionate, gabapentin, lisinopril-hydrochlorothiazide, methocarbamol, and methylprednisolone.     Review of patient's allergies indicates:   Allergen Reactions    Aspirin Other (See Comments)     Bruising       PSYCHIATRIC HISTORY: She denied any history of treatment other than grief counseling in the early 2000's.      SUICIDAL IDEATION:  History: Denied  Active Suicidal Ideation:Denied  Plan/Intent: Denied     FAMILY HISTORY: family history includes Diabetes in her daughter; Hypertension in her mother.     PSYCHOSOCIAL HISTORY:   Education:              Level Attained: 12              Learning Difficulties: Denied during interview. She reported longstanding reading difficulties during testing.               Special Education: Denied              Repeated Grade: Endorsed repeating either 9th or 10th grade for unspecified reasons.                 Vocation:              Highest Attained: Commercial cleaning contractor.               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      Relationship Status:              : no              : yes              Children: 3 daughters.      SUBSTANCE USE: Denied. She is a former smoker who quit "a long time ago." She reported reduced alcohol use recently due to lack of perceived effect when she drinks.    MENTAL STATUS AND OBSERVATIONS:  APPEARANCE: Casually dressed and adequate grooming/hygiene.   ALERTNESS/ORIENTATION: Attentive and alert.   GAIT/MOTOR: Gait not assessed, No fine motor abnormalities were observed.    SENSORY: Hearing appeared adequate for testing. Ms. Souza did not bring her glasses to the appointment but this did not appear to affect her ability to perceive task stimuli.   SPEECH/LANGUAGE: Normal in rate, rhythm, tone, and volume. Expressive and receptive language were grossly intact, with the exception of occasional neologisms. " "  MOOD/AFFECT: Mood was euthymic and affect was mood-congruent.   INTERPERSONAL BEHAVIOR: Rapport was quickly and easily established   THOUGHT PROCESSES: Thoughts seemed logical and goal-directed.   TESTING OBSERVATIONS: Ms. Souza completed testing at a steady pace, attended to task instructions, and followed directions as given. She occasionally required repetition or clarification of test instructions. She had notable difficulty with tasks that required reading or word-pronunciation, often making atypical pronunciation errors; she informed the examiner that she has had longstanding reading difficulty, "even in school." Due to reading difficulty, mood screening measures were read to her.    RESULTS     Tests Administered (manual norms used unless otherwise indicated): [employer] - Test of Premorbid Functioning (TOPF), Wechsler Adult Intelligence Scale 4th Ed. (WAIS-IV) select subtests (Digit Span, Coding, Similarities, Block Design, and Matrix Reasoning), Digit Vigilance Test (DVT; [facility] norms), Controlled Oral Word Association Test (COWAT; [facility] norms), Semantic Fluency (Animals, Fruits, Vegetbales; [facility] norms), [place] Naming Test (BNT2; [facility] norms), Johnson Verbal Learning Test, Revised (HVLT-R), Wechsler Memory Scale, 4th Ed. Logical Memory (WMS IV-LM), Brief Visuospatial Memory Test, Revised (BVMT-R), Trail Making Test (Trails A/B; [facility] norms), Grooved Pegboard Test, Independent Living Scales (ILS) Health & Safety and Money Management subtests, Geriatric Depression Scale (GDS) and Larsen Anxiety Inventory (BELKYS).    Performance Validity: Ms. Souza completed both stand-alone and embedded measures of task engagement. Below-cutoff performance on any one stand-alone measure and/ or any two embedded measures of task engagement is highly unlikely to occur outside the context of poor or inconsistent effort during testing. Ms. Souza scored above predetermined cutoffs on all administered measures of " task engagement. As such, there is no evidence to suggest poor or inconsistent engagement and their performance is deemed to be a reasonable reflection of their day-to-day cognitive status.     PREMORBID FUNCTIONING Raw Score Type of Standardized Score Standardized Score Percentile/CP Descriptor   TOPF simple dem. eFSIQ - SS 77 6 Below Average   TOPF pred. eFSIQ - SS 65 1 Exceptionally Low    TOPF simple + pred. eFSIQ - SS 69 2 Below Average   LANGUAGE FUNCTIONING Raw Score Type of Standardized Score Standardized Score Percentile/CP Descriptor   TOPF Word Reading 11 SS 71 3 Below Average   BNT-60 18 ss 4 2 Below Average   CFL 11 ss 5 5 Below Average   Animal/Fruit/Veg. 26 ss 6 9 Low Average   VISUOSPATIAL FUNCTIONING Raw Score Type of Standardized Score Standardized Score Percentile/CP Descriptor   WAIS-IV Block Design 20 ss 7 16 Low Average   BVMT-R Copy 10 - - - WNL   LEARNING & MEMORY Raw Score Type of Standardized Score Standardized Score Percentile/CP Descriptor   HVLT-R         Total Immediate (1, 4, 5) 10 Tscore 23 0.3 Exceptionally Low    Delayed Recall 3 Tscore 29 2 Below Average   Retention % 60 Tscore 36 8 Below Average   Discrimination (9 hits, 0 FP) 9 Tscore 42 21 Low Average   WMS-IV Subtests         LM I 18 ss 5 5 Below Average   LM II 6 ss 5 5 Below Average   LM Recognition 14 - - 3-9 Below Average   BVMT-R         IR (1, 2, 2) 5 Tscore 22 0.3 Exceptionally Low    DR 2 Tscore 24 0.5 Exceptionally Low    Discrimination Index 2 - - 3-5 Below Average   ATTENTION/WORKING MEMORY Raw Score Type of Standardized Score Standardized Score Percentile/CP Descriptor   WAIS-IV Digit Span 15 ss 4 2 Below Average         DS Forward 7 ss 7 16 Low Average         DS Backward 6 ss 8 25 Average         DS Sequence 2 ss 4 2 Below Average         Longest Digit Forward 5 - - - -         Longest Digit Backward 3 - - - -         Longest Digit Sequence 2 - - - -   DVT Time 507 Tscore 42 21 Low Average   DVT Errors 8 Tscore 50  50 Average   MENTAL PROCESSING SPEED Raw Score Type of Standardized Score Standardized Score Percentile/CP Descriptor   WAIS-IV Coding 48 ss 10 50 Average   TMT A (0 errors) 32 ss 14 91 Above Average   EXECUTIVE FUNCTIONING Raw Score Type of Standardized Score Standardized Score Percentile/CP Descriptor   TMT B (1 error) 133 ss 11 63 Average   WAIS-IV Similarities 12 ss 5 5 Below Average   WAIS-IV Matrix Reasoning 6 ss 6 9 Low Average   FRONTOMOTOR  Raw Score Type of Standardized Score Standardized Score Percentile/CP Descriptor   GPT DH 90L Tscore 49 46 Average   GPD NDH 86 Tscore 60 84 High Average   FUNCTIONAL  Raw Score Type of Standardized Score Standardized Score Percentile/CP Descriptor   ILS Managing Money 19 Tscore 28 1 Dependent   ILS Health & Safety 29 Tscore 34 5 Dependent   MOOD & PERSONALITY Raw Score Type of Standardized Score Standardized Score Percentile/CP Descriptor   GDS-30 5 - - - WNL   BELKYS 9 - - - Minimal   ss = scaled score (mean = 10, SD = 3); SS = standard score (mean = 100, SD = 15); Tscore mean = 50, SD = 10; zscore (mean = 0.00, SD = 1)     Billing Documentation     Time spent in review of pertinent records, conducting face to face interview with the patient, and documenting history: 92 minutes; 70477 & 39386(x1) Billed separately.  Time on review of neuropsychological test data, data interpretation, providing feedback about these results, and documentation of my interpretation: 202 minutes; [id number] &55134 (x2).  Psychometrist time spent in the administration and scoring of 2 or more neuropsychological tests 215 minutes; 17640 & 19614 (x6).

## 2022-06-14 ENCOUNTER — OFFICE VISIT (OUTPATIENT)
Dept: NEUROLOGY | Facility: CLINIC | Age: 72
End: 2022-06-14
Payer: MEDICARE

## 2022-06-14 DIAGNOSIS — F02.80 MAJOR NEUROCOGNITIVE DISORDER DUE TO ANOTHER MEDICAL CONDITION: Primary | ICD-10-CM

## 2022-06-14 PROCEDURE — 4010F PR ACE/ARB THEARPY RXD/TAKEN: ICD-10-PCS | Mod: CPTII,S$GLB,, | Performed by: STUDENT IN AN ORGANIZED HEALTH CARE EDUCATION/TRAINING PROGRAM

## 2022-06-14 PROCEDURE — 4010F ACE/ARB THERAPY RXD/TAKEN: CPT | Mod: CPTII,S$GLB,, | Performed by: STUDENT IN AN ORGANIZED HEALTH CARE EDUCATION/TRAINING PROGRAM

## 2022-06-14 PROCEDURE — 99999 PR PBB SHADOW E&M-EST. PATIENT-LVL II: CPT | Mod: PBBFAC,,, | Performed by: STUDENT IN AN ORGANIZED HEALTH CARE EDUCATION/TRAINING PROGRAM

## 2022-06-14 PROCEDURE — 1159F PR MEDICATION LIST DOCUMENTED IN MEDICAL RECORD: ICD-10-PCS | Mod: CPTII,S$GLB,, | Performed by: STUDENT IN AN ORGANIZED HEALTH CARE EDUCATION/TRAINING PROGRAM

## 2022-06-14 PROCEDURE — 1159F MED LIST DOCD IN RCRD: CPT | Mod: CPTII,S$GLB,, | Performed by: STUDENT IN AN ORGANIZED HEALTH CARE EDUCATION/TRAINING PROGRAM

## 2022-06-14 PROCEDURE — 99499 NO LOS: ICD-10-PCS | Mod: S$GLB,,, | Performed by: STUDENT IN AN ORGANIZED HEALTH CARE EDUCATION/TRAINING PROGRAM

## 2022-06-14 PROCEDURE — 99499 UNLISTED E&M SERVICE: CPT | Mod: S$GLB,,, | Performed by: STUDENT IN AN ORGANIZED HEALTH CARE EDUCATION/TRAINING PROGRAM

## 2022-06-14 PROCEDURE — 99999 PR PBB SHADOW E&M-EST. PATIENT-LVL II: ICD-10-PCS | Mod: PBBFAC,,, | Performed by: STUDENT IN AN ORGANIZED HEALTH CARE EDUCATION/TRAINING PROGRAM

## 2022-06-14 NOTE — PROGRESS NOTES
Ms. Souza attended a in-person feedback session to discuss the results from her recent neuropsychological testing (see report dated 06/06/2022). We discussed her test results, diagnoses, and my recommendations. Ms. Souza voiced her understanding of the information provided, and voiced her intention to follow through on the recommendations provided. All questions were answered to her satisfaction and Ms. Souza was provided with a copy of her report. she was encouraged to contact our office with any future questions or concerns.         _____________________  Taco Newton, Ph.D.  Neuropsychologist  [facility]  Department of Neurology     Billing Information:   Time spent discussing test results with the patient: 55 minutes  Total time spent for this encounter, including discussion of test results, review of pertinent records, and documentation of this encounter: 60 minutes  22015 (1) Billed separately.

## 2022-06-14 NOTE — PATIENT INSTRUCTIONS
It was a pleasure working with you to complete your assessment and to provide you with feedback regarding your results. If you have any questions or concerns after reading your report and my recommendations that did not come up during our appointment, please do not hesitate to call our clinic at [phone] or send a [employer] message with any questions.       You reported that you are having difficulty with the settings on your new CPAP device. I recommend that you follow up with your sleep medicine team to help better work with your CPAP because proper treatment of sleep apnea is important for your long-term brain health.       _____________________  Taco Newton, Ph.D.  Neuropsychologist  [facility]  Department of Neurology

## 2022-06-15 ENCOUNTER — TELEPHONE (OUTPATIENT)
Dept: PULMONOLOGY | Facility: CLINIC | Age: 72
End: 2022-06-15
Payer: MEDICARE

## 2022-06-15 DIAGNOSIS — G47.33 OSA ON CPAP: Primary | ICD-10-CM

## 2022-06-15 NOTE — TELEPHONE ENCOUNTER
----- Message from Elvi Jones MA sent at 6/14/2022 11:48 AM CDT -----  Patient came by the office states cpap machine need to be reset no enough air.

## 2022-06-15 NOTE — TELEPHONE ENCOUNTER
"Orders Placed This Encounter   Procedures    HME - OTHER     Please change remotely to Auto CPAP 5-16 cm. Thank you    [facility]     Order Specific Question:   Type of Equipment:     Answer:   CPAP     Order Specific Question:   Height:     Answer:   5'2"     Order Specific Question:   Weight:     Answer:   226 lbs     Order Specific Question:   Does patient have medical equipment at home?     Answer:   CPAP     1. LEN on CPAP  HME - OTHER       "

## 2022-06-15 NOTE — TELEPHONE ENCOUNTER
Her settings on the Dream station 2 are same as prior on Dream station 1. Auto CPAP 4-16 cm.  Her pressures required to control sleep apnea are from 5 - 7.2 cm.    Please call patient and ask what pressure does she fell would be most acceptable to her.  Does she want starting auto cpap pressure raised to 5, 6 or 7 cm. Or we can place on a set pressure of 7 cm which is pressure 90% of time.     Thank you .    Device Settings  Device DreamStation 2 Auto CPAP Advanced Mode AutoCPAP - A-Flex  Last Modified 6/12/2022 Min Pressure 4 cmH2O  Max Pressure 16 cmH2O  AHI  2  Clear Airway Apnea  Index  0  Obst. Airway Apnea  Index  0.3  Hypopnea Index  1.7  RERA Index  0.4  Vibratory Snore Index  2.2  Flow Limitation Index  1.4  %of Night In Periodic  Breathing  0.3  Average CPAP  5.2 cmH2O  Average Unintended  Leak  5.6 l/min  Median Unintended  Leak  4 l/min  Ave. Large Leak/Day  00:02:27  Ave % Large Leak  0.6  Ave % Mask Fit  99.4  90% CPAP  7.2 cmH2O        Compliance Summary  4/25/2022 - 5/24/2022 (30 days)  Days with Device Usage 22 days  Days without Device Usage 8 days  Percent Days with Device Usage 73.3%  Cumulative Usage 4 days 21 hrs. 7 mins. 47 secs.  Maximum Usage (1 Day) 7 hrs. 14 mins. 25 secs.  Average Usage (All Days) 3 hrs. 54 mins. 15 secs.  Average Usage (Days Used) 5 hrs. 19 mins. 26 secs.  Minimum Usage (1 Day) 7 secs.  Percent of Days with Usage >= 4 Hours 63.3%  Percent of Days with Usage < 4 Hours 36.7%  Date Range  Total Blower Time 5 days 4 hrs. 8 mins. 32 secs.  Average AHI 1.9  Auto-CPAP Summary  Auto-CPAP Mean Pressure 5.3 cmH2O  Auto-CPAP Peak Average Pressure 7.1 cmH2O  Device Pressure <= 90% of Time 7.2 cmH2O  Average Time in Large Leak Per Day 1 mins. 49 secs.

## 2022-06-16 ENCOUNTER — OFFICE VISIT (OUTPATIENT)
Dept: INTERNAL MEDICINE | Facility: CLINIC | Age: 72
End: 2022-06-16
Payer: MEDICARE

## 2022-06-16 VITALS
HEART RATE: 66 BPM | DIASTOLIC BLOOD PRESSURE: 82 MMHG | RESPIRATION RATE: 16 BRPM | WEIGHT: 224.44 LBS | BODY MASS INDEX: 41.3 KG/M2 | OXYGEN SATURATION: 94 % | SYSTOLIC BLOOD PRESSURE: 124 MMHG | HEIGHT: 62 IN

## 2022-06-16 DIAGNOSIS — G47.33 OSA ON CPAP: Primary | ICD-10-CM

## 2022-06-16 DIAGNOSIS — R53.83 FATIGUE, UNSPECIFIED TYPE: ICD-10-CM

## 2022-06-16 LAB
CTP QC/QA: YES
CTP QC/QA: YES
POC MOLECULAR INFLUENZA A AGN: NEGATIVE
POC MOLECULAR INFLUENZA B AGN: NEGATIVE
SARS-COV-2 RDRP RESP QL NAA+PROBE: NEGATIVE

## 2022-06-16 PROCEDURE — U0002 COVID-19 LAB TEST NON-CDC: HCPCS | Mod: QW,S$GLB,, | Performed by: NURSE PRACTITIONER

## 2022-06-16 PROCEDURE — 99999 PR PBB SHADOW E&M-EST. PATIENT-LVL III: ICD-10-PCS | Mod: PBBFAC,,, | Performed by: NURSE PRACTITIONER

## 2022-06-16 PROCEDURE — 3079F PR MOST RECENT DIASTOLIC BLOOD PRESSURE 80-89 MM HG: ICD-10-PCS | Mod: CPTII,S$GLB,, | Performed by: NURSE PRACTITIONER

## 2022-06-16 PROCEDURE — 3079F DIAST BP 80-89 MM HG: CPT | Mod: CPTII,S$GLB,, | Performed by: NURSE PRACTITIONER

## 2022-06-16 PROCEDURE — 99213 OFFICE O/P EST LOW 20 MIN: CPT | Mod: S$GLB,,, | Performed by: NURSE PRACTITIONER

## 2022-06-16 PROCEDURE — U0002: ICD-10-PCS | Mod: QW,S$GLB,, | Performed by: NURSE PRACTITIONER

## 2022-06-16 PROCEDURE — 99999 PR PBB SHADOW E&M-EST. PATIENT-LVL III: CPT | Mod: PBBFAC,,, | Performed by: NURSE PRACTITIONER

## 2022-06-16 PROCEDURE — 1159F PR MEDICATION LIST DOCUMENTED IN MEDICAL RECORD: ICD-10-PCS | Mod: CPTII,S$GLB,, | Performed by: NURSE PRACTITIONER

## 2022-06-16 PROCEDURE — 4010F PR ACE/ARB THEARPY RXD/TAKEN: ICD-10-PCS | Mod: CPTII,S$GLB,, | Performed by: NURSE PRACTITIONER

## 2022-06-16 PROCEDURE — 1159F MED LIST DOCD IN RCRD: CPT | Mod: CPTII,S$GLB,, | Performed by: NURSE PRACTITIONER

## 2022-06-16 PROCEDURE — 3074F PR MOST RECENT SYSTOLIC BLOOD PRESSURE < 130 MM HG: ICD-10-PCS | Mod: CPTII,S$GLB,, | Performed by: NURSE PRACTITIONER

## 2022-06-16 PROCEDURE — 87502 POCT INFLUENZA A/B MOLECULAR: ICD-10-PCS | Mod: QW,S$GLB,, | Performed by: NURSE PRACTITIONER

## 2022-06-16 PROCEDURE — 3074F SYST BP LT 130 MM HG: CPT | Mod: CPTII,S$GLB,, | Performed by: NURSE PRACTITIONER

## 2022-06-16 PROCEDURE — 4010F ACE/ARB THERAPY RXD/TAKEN: CPT | Mod: CPTII,S$GLB,, | Performed by: NURSE PRACTITIONER

## 2022-06-16 PROCEDURE — 3008F BODY MASS INDEX DOCD: CPT | Mod: CPTII,S$GLB,, | Performed by: NURSE PRACTITIONER

## 2022-06-16 PROCEDURE — 99213 PR OFFICE/OUTPT VISIT, EST, LEVL III, 20-29 MIN: ICD-10-PCS | Mod: S$GLB,,, | Performed by: NURSE PRACTITIONER

## 2022-06-16 PROCEDURE — 3008F PR BODY MASS INDEX (BMI) DOCUMENTED: ICD-10-PCS | Mod: CPTII,S$GLB,, | Performed by: NURSE PRACTITIONER

## 2022-06-16 PROCEDURE — 87502 INFLUENZA DNA AMP PROBE: CPT | Mod: QW,S$GLB,, | Performed by: NURSE PRACTITIONER

## 2022-06-20 NOTE — PROGRESS NOTES
Subjective:       Patient ID: Beth Souza is a 72 y.o. female.    Chief Complaint: Fatigue    HPI    Patient here complaining of fatigue she would like to rule out COVID and flu no other symptoms patient does state that she has not been getting very much sleep lately secondary to working extra hours    Past Medical History:   Diagnosis Date    Cataract     Hyperlipidemia     Hypertension     Lumbar spondylosis     Meningioma 2021    Obesity     Sleep apnea     Vitamin D deficiency    It is  Past Surgical History:   Procedure Laterality Date    HYSTERECTOMY      PARTIAL HYSTERECTOMY       Social History     Socioeconomic History    Marital status:     Number of children: 3   Occupational History    Occupation: Cleaning houses     Employer: not employed   Tobacco Use    Smoking status: Former Smoker     Quit date: 2004     Years since quittin.4    Smokeless tobacco: Never Used   Substance and Sexual Activity    Alcohol use: Yes     Comment: occasionally    Drug use: No    Sexual activity: Not Currently     Partners: Male   Other Topics Concern    Are you pregnant or think you may be? No    Breast-feeding No   Social History Narrative    Live w/ alone      Review of patient's allergies indicates:   Allergen Reactions    Aspirin Other (See Comments)     Bruising     Current Outpatient Medications   Medication Sig    acetaminophen (TYLENOL) 500 MG tablet Take 500 mg by mouth daily as needed for Pain.    atorvastatin (LIPITOR) 20 MG tablet Take 1 tablet (20 mg total) by mouth once daily.    ergocalciferol (ERGOCALCIFEROL) 50,000 unit Cap Take 1 capsule (50,000 Units total) by mouth every 7 days.    estradioL (VAGIFEM) 10 mcg Tab Insert one tablet vaginally twice per week. Insert it before bed. No sex on the night of insertion    fluticasone propionate (FLONASE) 50 mcg/actuation nasal spray 1 spray (50 mcg total) by Each Nostril route once daily.    gabapentin  (NEURONTIN) 300 MG capsule Take 1 capsule (300 mg total) by mouth every evening.    lisinopriL-hydrochlorothiazide (PRINZIDE,ZESTORETIC) 20-25 mg Tab Take 1 tablet by mouth once daily    methocarbamoL (ROBAXIN) 750 MG Tab Take 1 tablet (750 mg total) by mouth 3 (three) times daily as needed (muscle spasms).    methylPREDNISolone (MEDROL DOSEPACK) 4 mg tablet use as directed (Patient not taking: Reported on 6/16/2022)     No current facility-administered medications for this visit.           Review of Systems   Constitutional: Positive for fatigue. Negative for activity change, appetite change, chills, diaphoresis, fever and unexpected weight change.   HENT: Negative for congestion, ear pain, postnasal drip, rhinorrhea, sinus pressure, sinus pain, sneezing, sore throat, tinnitus, trouble swallowing and voice change.    Eyes: Negative for photophobia, pain and visual disturbance.   Respiratory: Negative for cough, chest tightness, shortness of breath and wheezing.    Cardiovascular: Negative for chest pain, palpitations and leg swelling.   Gastrointestinal: Negative for abdominal distention, abdominal pain, constipation, diarrhea, nausea and vomiting.   Genitourinary: Negative for decreased urine volume, difficulty urinating, dysuria, flank pain, frequency, hematuria and urgency.   Musculoskeletal: Negative for arthralgias, back pain, joint swelling, neck pain and neck stiffness.   Allergic/Immunologic: Negative for immunocompromised state.   Neurological: Negative for dizziness, tremors, seizures, syncope, facial asymmetry, speech difficulty, weakness, light-headedness, numbness and headaches.   Hematological: Negative for adenopathy. Does not bruise/bleed easily.   Psychiatric/Behavioral: Negative for confusion and sleep disturbance.       Objective:      Physical Exam  Vitals reviewed.   Cardiovascular:      Rate and Rhythm: Normal rate and regular rhythm.      Heart sounds: Normal heart sounds.   Pulmonary:       Effort: Pulmonary effort is normal.      Breath sounds: Normal breath sounds.   Neurological:      Mental Status: She is alert and oriented to person, place, and time.   Psychiatric:         Mood and Affect: Mood normal.         Assessment:     Vitals:    06/16/22 1710   BP: 124/82   Pulse: 66   Resp: 16         1. LEN on CPAP    2. Fatigue, unspecified type        Plan:   LEN on CPAP    Fatigue, unspecified type  -     POCT COVID-19 Rapid Screening; Future; Expected date: 06/16/2022  -     POCT Influenza A/B Molecular      COVID and flu negative

## 2022-06-22 ENCOUNTER — LAB VISIT (OUTPATIENT)
Dept: LAB | Facility: HOSPITAL | Age: 72
End: 2022-06-22
Attending: FAMILY MEDICINE
Payer: MEDICARE

## 2022-06-22 ENCOUNTER — OFFICE VISIT (OUTPATIENT)
Dept: INTERNAL MEDICINE | Facility: CLINIC | Age: 72
End: 2022-06-22
Payer: MEDICARE

## 2022-06-22 VITALS
BODY MASS INDEX: 41.71 KG/M2 | DIASTOLIC BLOOD PRESSURE: 86 MMHG | WEIGHT: 226.63 LBS | RESPIRATION RATE: 16 BRPM | OXYGEN SATURATION: 97 % | HEIGHT: 62 IN | SYSTOLIC BLOOD PRESSURE: 132 MMHG | HEART RATE: 51 BPM

## 2022-06-22 DIAGNOSIS — R53.83 FATIGUE, UNSPECIFIED TYPE: Primary | ICD-10-CM

## 2022-06-22 DIAGNOSIS — E55.9 VITAMIN D DEFICIENCY: ICD-10-CM

## 2022-06-22 DIAGNOSIS — G47.33 OSA ON CPAP: ICD-10-CM

## 2022-06-22 DIAGNOSIS — R73.01 IMPAIRED FASTING GLUCOSE: ICD-10-CM

## 2022-06-22 DIAGNOSIS — R53.83 FATIGUE, UNSPECIFIED TYPE: ICD-10-CM

## 2022-06-22 DIAGNOSIS — M46.99 UNSPECIFIED INFLAMMATORY SPONDYLOPATHY, MULTIPLE SITES IN SPINE: ICD-10-CM

## 2022-06-22 DIAGNOSIS — I70.0 CALCIFICATION OF AORTA: ICD-10-CM

## 2022-06-22 DIAGNOSIS — E66.01 MORBID OBESITY WITH BMI OF 40.0-44.9, ADULT: ICD-10-CM

## 2022-06-22 DIAGNOSIS — F33.0 MAJOR DEPRESSIVE DISORDER, RECURRENT, MILD: ICD-10-CM

## 2022-06-22 DIAGNOSIS — I10 ESSENTIAL HYPERTENSION: ICD-10-CM

## 2022-06-22 LAB
ESTIMATED AVG GLUCOSE: 117 MG/DL (ref 68–131)
HBA1C MFR BLD: 5.7 % (ref 4–5.6)

## 2022-06-22 PROCEDURE — 1160F RVW MEDS BY RX/DR IN RCRD: CPT | Mod: CPTII,S$GLB,, | Performed by: FAMILY MEDICINE

## 2022-06-22 PROCEDURE — 3288F PR FALLS RISK ASSESSMENT DOCUMENTED: ICD-10-PCS | Mod: CPTII,S$GLB,, | Performed by: FAMILY MEDICINE

## 2022-06-22 PROCEDURE — 3079F PR MOST RECENT DIASTOLIC BLOOD PRESSURE 80-89 MM HG: ICD-10-PCS | Mod: CPTII,S$GLB,, | Performed by: FAMILY MEDICINE

## 2022-06-22 PROCEDURE — 99499 UNLISTED E&M SERVICE: CPT | Mod: S$GLB,,, | Performed by: FAMILY MEDICINE

## 2022-06-22 PROCEDURE — 3288F FALL RISK ASSESSMENT DOCD: CPT | Mod: CPTII,S$GLB,, | Performed by: FAMILY MEDICINE

## 2022-06-22 PROCEDURE — 99999 PR PBB SHADOW E&M-EST. PATIENT-LVL IV: CPT | Mod: PBBFAC,,, | Performed by: FAMILY MEDICINE

## 2022-06-22 PROCEDURE — 99499 RISK ADDL DX/OHS AUDIT: ICD-10-PCS | Mod: S$GLB,,, | Performed by: FAMILY MEDICINE

## 2022-06-22 PROCEDURE — 3075F SYST BP GE 130 - 139MM HG: CPT | Mod: CPTII,S$GLB,, | Performed by: FAMILY MEDICINE

## 2022-06-22 PROCEDURE — 4010F PR ACE/ARB THEARPY RXD/TAKEN: ICD-10-PCS | Mod: CPTII,S$GLB,, | Performed by: FAMILY MEDICINE

## 2022-06-22 PROCEDURE — 3008F PR BODY MASS INDEX (BMI) DOCUMENTED: ICD-10-PCS | Mod: CPTII,S$GLB,, | Performed by: FAMILY MEDICINE

## 2022-06-22 PROCEDURE — 3075F PR MOST RECENT SYSTOLIC BLOOD PRESS GE 130-139MM HG: ICD-10-PCS | Mod: CPTII,S$GLB,, | Performed by: FAMILY MEDICINE

## 2022-06-22 PROCEDURE — 99214 OFFICE O/P EST MOD 30 MIN: CPT | Mod: S$GLB,,, | Performed by: FAMILY MEDICINE

## 2022-06-22 PROCEDURE — 99214 PR OFFICE/OUTPT VISIT, EST, LEVL IV, 30-39 MIN: ICD-10-PCS | Mod: S$GLB,,, | Performed by: FAMILY MEDICINE

## 2022-06-22 PROCEDURE — 1101F PT FALLS ASSESS-DOCD LE1/YR: CPT | Mod: CPTII,S$GLB,, | Performed by: FAMILY MEDICINE

## 2022-06-22 PROCEDURE — 36415 COLL VENOUS BLD VENIPUNCTURE: CPT | Performed by: FAMILY MEDICINE

## 2022-06-22 PROCEDURE — 83036 HEMOGLOBIN GLYCOSYLATED A1C: CPT | Performed by: FAMILY MEDICINE

## 2022-06-22 PROCEDURE — 1101F PR PT FALLS ASSESS DOC 0-1 FALLS W/OUT INJ PAST YR: ICD-10-PCS | Mod: CPTII,S$GLB,, | Performed by: FAMILY MEDICINE

## 2022-06-22 PROCEDURE — 4010F ACE/ARB THERAPY RXD/TAKEN: CPT | Mod: CPTII,S$GLB,, | Performed by: FAMILY MEDICINE

## 2022-06-22 PROCEDURE — 99999 PR PBB SHADOW E&M-EST. PATIENT-LVL IV: ICD-10-PCS | Mod: PBBFAC,,, | Performed by: FAMILY MEDICINE

## 2022-06-22 PROCEDURE — 3008F BODY MASS INDEX DOCD: CPT | Mod: CPTII,S$GLB,, | Performed by: FAMILY MEDICINE

## 2022-06-22 PROCEDURE — 1160F PR REVIEW ALL MEDS BY PRESCRIBER/CLIN PHARMACIST DOCUMENTED: ICD-10-PCS | Mod: CPTII,S$GLB,, | Performed by: FAMILY MEDICINE

## 2022-06-22 PROCEDURE — 1159F PR MEDICATION LIST DOCUMENTED IN MEDICAL RECORD: ICD-10-PCS | Mod: CPTII,S$GLB,, | Performed by: FAMILY MEDICINE

## 2022-06-22 PROCEDURE — 1159F MED LIST DOCD IN RCRD: CPT | Mod: CPTII,S$GLB,, | Performed by: FAMILY MEDICINE

## 2022-06-22 PROCEDURE — 3079F DIAST BP 80-89 MM HG: CPT | Mod: CPTII,S$GLB,, | Performed by: FAMILY MEDICINE

## 2022-06-22 NOTE — PROGRESS NOTES
"Subjective:       Patient ID: Beth Souza is a 72 y.o. female.    Chief Complaint: Follow-up and Fatigue    72-year-old  female patient with Patient Active Problem List:     Essential hypertension     Vitamin D deficiency     Mixed hyperlipidemia     Cataract     Osteoarthritis of spine with radiculopathy, lumbar region     Morbid obesity with BMI of 40.0-44.9, adult     LEN on CPAP     Mild cognitive impairment with memory loss     Meningioma of cerebellum     Multiple thyroid nodules     History of COVID-19     Primary osteoarthritis of both hips     Calcification of aorta     History of hyperparathyroidism  Here reports that patient has been having fatigue, Has been trying to eat well and drinking adequate fluids.  Denies any chest pain or difficulty breathing with palpitations, nausea vomiting  Reports initially had minimal sleep disturbances but since CPAP settings have been made patient has been sleeping better since last night.     Review of Systems   Constitutional: Positive for fatigue.   Eyes: Negative for visual disturbance.   Respiratory: Negative for shortness of breath.    Cardiovascular: Negative for chest pain and leg swelling.   Gastrointestinal: Negative for abdominal pain, nausea and vomiting.   Musculoskeletal: Negative for myalgias.   Skin: Negative for rash.   Neurological: Negative for light-headedness and headaches.   Psychiatric/Behavioral: Positive for sleep disturbance. Negative for dysphoric mood. The patient is not nervous/anxious.          /86 (BP Location: Right arm, Patient Position: Sitting, BP Method: Large (Manual))   Pulse (!) 51   Resp 16   Ht 5' 2" (1.575 m)   Wt 102.8 kg (226 lb 10.1 oz)   SpO2 97%   BMI 41.45 kg/m²   Objective:      Physical Exam  Constitutional:       Appearance: She is well-developed.   HENT:      Head: Normocephalic and atraumatic.   Cardiovascular:      Rate and Rhythm: Normal rate and regular rhythm.      Heart sounds: " Normal heart sounds. No murmur heard.  Pulmonary:      Effort: Pulmonary effort is normal.      Breath sounds: Normal breath sounds. No wheezing.   Abdominal:      General: Bowel sounds are normal.      Palpations: Abdomen is soft.      Tenderness: There is no abdominal tenderness.   Skin:     General: Skin is warm and dry.      Findings: No rash.   Neurological:      Mental Status: She is alert and oriented to person, place, and time.   Psychiatric:         Mood and Affect: Mood normal.         Office Visit on 06/16/2022   Component Date Value Ref Range Status    POC Molecular Influenza A Ag 06/16/2022 Negative  Negative, Not Reported Final    POC Molecular Influenza B Ag 06/16/2022 Negative  Negative, Not Reported Final     Acceptable 06/16/2022 Yes   Final    POC Rapid COVID 06/16/2022 Negative  Negative Final     Acceptable 06/16/2022 Yes   Final   Office Visit on 04/04/2022   Component Date Value Ref Range Status    WBC 04/04/2022 5.73  3.90 - 12.70 K/uL Final    RBC 04/04/2022 4.57  4.00 - 5.40 M/uL Final    Hemoglobin 04/04/2022 13.4  12.0 - 16.0 g/dL Final    Hematocrit 04/04/2022 42.5  37.0 - 48.5 % Final    MCV 04/04/2022 93  82 - 98 fL Final    MCH 04/04/2022 29.3  27.0 - 31.0 pg Final    MCHC 04/04/2022 31.5 (A) 32.0 - 36.0 g/dL Final    RDW 04/04/2022 14.6 (A) 11.5 - 14.5 % Final    Platelets 04/04/2022 306  150 - 450 K/uL Final    MPV 04/04/2022 10.6  9.2 - 12.9 fL Final    Immature Granulocytes 04/04/2022 0.2  0.0 - 0.5 % Final    Gran # (ANC) 04/04/2022 2.9  1.8 - 7.7 K/uL Final    Immature Grans (Abs) 04/04/2022 0.01  0.00 - 0.04 K/uL Final    Comment: Mild elevation in immature granulocytes is non specific and   can be seen in a variety of conditions including stress response,   acute inflammation, trauma and pregnancy. Correlation with other   laboratory and clinical findings is essential.      Lymph # 04/04/2022 2.1  1.0 - 4.8 K/uL Final    Mono #  04/04/2022 0.6  0.3 - 1.0 K/uL Final    Eos # 04/04/2022 0.2  0.0 - 0.5 K/uL Final    Baso # 04/04/2022 0.03  0.00 - 0.20 K/uL Final    nRBC 04/04/2022 0  0 /100 WBC Final    Gran % 04/04/2022 50.3  38.0 - 73.0 % Final    Lymph % 04/04/2022 36.1  18.0 - 48.0 % Final    Mono % 04/04/2022 10.3  4.0 - 15.0 % Final    Eosinophil % 04/04/2022 2.6  0.0 - 8.0 % Final    Basophil % 04/04/2022 0.5  0.0 - 1.9 % Final    Differential Method 04/04/2022 Automated   Final    Sodium 04/04/2022 140  136 - 145 mmol/L Final    Potassium 04/04/2022 3.6  3.5 - 5.1 mmol/L Final    Chloride 04/04/2022 102  95 - 110 mmol/L Final    CO2 04/04/2022 26  23 - 29 mmol/L Final    Glucose 04/04/2022 120 (A) 70 - 110 mg/dL Final    BUN 04/04/2022 17  8 - 23 mg/dL Final    Creatinine 04/04/2022 0.8  0.5 - 1.4 mg/dL Final    Calcium 04/04/2022 9.2  8.7 - 10.5 mg/dL Final    Total Protein 04/04/2022 6.9  6.0 - 8.4 g/dL Final    Albumin 04/04/2022 3.5  3.5 - 5.2 g/dL Final    Total Bilirubin 04/04/2022 0.3  0.1 - 1.0 mg/dL Final    Comment: For infants and newborns, interpretation of results should be based  on gestational age, weight and in agreement with clinical  observations.    Premature Infant recommended reference ranges:  Up to 24 hours.............<8.0 mg/dL  Up to 48 hours............<12.0 mg/dL  3-5 days..................<15.0 mg/dL  6-29 days.................<15.0 mg/dL      Alkaline Phosphatase 04/04/2022 84  55 - 135 U/L Final    AST 04/04/2022 13  10 - 40 U/L Final    ALT 04/04/2022 11  10 - 44 U/L Final    Anion Gap 04/04/2022 12  8 - 16 mmol/L Final    eGFR if African American 04/04/2022 >60.0  >60 mL/min/1.73 m^2 Final    eGFR if non African American 04/04/2022 >60.0  >60 mL/min/1.73 m^2 Final    Comment: Calculation used to obtain the estimated glomerular filtration  rate (eGFR) is the CKD-EPI equation.       Cholesterol 04/04/2022 187  120 - 199 mg/dL Final    Comment: The National Cholesterol Education  Program (NCEP) has set the  following guidelines (reference ranges) for Cholesterol:  Optimal.....................<200 mg/dL  Borderline High.............200-239 mg/dL  High........................> or = 240 mg/dL      Triglycerides 04/04/2022 94  30 - 150 mg/dL Final    Comment: The National Cholesterol Education Program (NCEP) has set the  following guidelines (reference values) for triglycerides:  Normal......................<150 mg/dL  Borderline High.............150-199 mg/dL  High........................200-499 mg/dL      HDL 04/04/2022 54  40 - 75 mg/dL Final    Comment: The National Cholesterol Education Program (NCEP) has set the  following guidelines (reference values) for HDL Cholesterol:  Low...............<40 mg/dL  Optimal...........>60 mg/dL      LDL Cholesterol 04/04/2022 114.2  63.0 - 159.0 mg/dL Final    Comment: The National Cholesterol Education Program (NCEP) has set the  following guidelines (reference values) for LDL Cholesterol:  Optimal.......................<130 mg/dL  Borderline High...............130-159 mg/dL  High..........................160-189 mg/dL  Very High.....................>190 mg/dL      HDL/Cholesterol Ratio 04/04/2022 28.9  20.0 - 50.0 % Final    Total Cholesterol/HDL Ratio 04/04/2022 3.5  2.0 - 5.0 Final    Non-HDL Cholesterol 04/04/2022 133  mg/dL Final    Comment: Risk category and Non-HDL cholesterol goals:  Coronary heart disease (CHD)or equivalent (10-year risk of CHD >20%):  Non-HDL cholesterol goal     <130 mg/dL  Two or more CHD risk factors and 10-year risk of CHD <= 20%:  Non-HDL cholesterol goal     <160 mg/dL  0 to 1 CHD risk factor:  Non-HDL cholesterol goal     <190 mg/dL      TSH 04/04/2022 0.708  0.400 - 4.000 uIU/mL Final    Uric Acid 04/04/2022 7.0 (A) 2.4 - 5.7 mg/dL Final         Assessment/Plan:   1. Fatigue, unspecified type  - Hemoglobin A1C; Future  Reviewed recent labs which looks stable except mildly elevated blood glucose levels at 120   Will check A1c to rule out prediabetes/diabetes  Encouraged to eat protein rich diet    2. LEN on CPAP  Stable    3. Essential hypertension  Blood pressure is stable today currently taking lisinopril hydrochlorothiazide 20/25 mg daily    4. Vitamin D deficiency  Taking vitamin-D by prescription weekly    5. Morbid obesity with BMI of 40.0-44.9, adult  Lifestyle modifications discussed to lose weight with BMI 41    6. Impaired fasting glucose  - Hemoglobin A1C; Future

## 2022-07-06 ENCOUNTER — PES CALL (OUTPATIENT)
Dept: ADMINISTRATIVE | Facility: CLINIC | Age: 72
End: 2022-07-06
Payer: MEDICARE

## 2022-07-11 ENCOUNTER — TELEPHONE (OUTPATIENT)
Dept: INTERNAL MEDICINE | Facility: CLINIC | Age: 72
End: 2022-07-11
Payer: MEDICARE

## 2022-07-12 ENCOUNTER — OFFICE VISIT (OUTPATIENT)
Dept: INTERNAL MEDICINE | Facility: CLINIC | Age: 72
End: 2022-07-12
Payer: MEDICARE

## 2022-07-12 VITALS
SYSTOLIC BLOOD PRESSURE: 130 MMHG | BODY MASS INDEX: 40.12 KG/M2 | WEIGHT: 226.44 LBS | OXYGEN SATURATION: 95 % | HEART RATE: 64 BPM | DIASTOLIC BLOOD PRESSURE: 82 MMHG | HEIGHT: 63 IN

## 2022-07-12 DIAGNOSIS — Z00.00 ENCOUNTER FOR PREVENTIVE HEALTH EXAMINATION: Primary | ICD-10-CM

## 2022-07-12 DIAGNOSIS — R73.03 PREDIABETES: ICD-10-CM

## 2022-07-12 DIAGNOSIS — G47.33 OSA (OBSTRUCTIVE SLEEP APNEA): ICD-10-CM

## 2022-07-12 DIAGNOSIS — D32.0 MENINGIOMA OF CEREBELLUM: ICD-10-CM

## 2022-07-12 DIAGNOSIS — E04.2 MULTIPLE THYROID NODULES: ICD-10-CM

## 2022-07-12 DIAGNOSIS — R20.2 NUMBNESS AND TINGLING: ICD-10-CM

## 2022-07-12 DIAGNOSIS — E66.01 OBESITY, CLASS III, BMI 40-49.9 (MORBID OBESITY): ICD-10-CM

## 2022-07-12 DIAGNOSIS — I10 ESSENTIAL HYPERTENSION: ICD-10-CM

## 2022-07-12 DIAGNOSIS — G31.84 MILD COGNITIVE IMPAIRMENT WITH MEMORY LOSS: ICD-10-CM

## 2022-07-12 DIAGNOSIS — I70.0 CALCIFICATION OF AORTA: ICD-10-CM

## 2022-07-12 DIAGNOSIS — Z86.39 HISTORY OF HYPERPARATHYROIDISM: ICD-10-CM

## 2022-07-12 DIAGNOSIS — E78.5 HYPERLIPIDEMIA, UNSPECIFIED HYPERLIPIDEMIA TYPE: ICD-10-CM

## 2022-07-12 DIAGNOSIS — R20.0 NUMBNESS AND TINGLING: ICD-10-CM

## 2022-07-12 PROCEDURE — 1125F PR PAIN SEVERITY QUANTIFIED, PAIN PRESENT: ICD-10-PCS | Mod: CPTII,S$GLB,, | Performed by: NURSE PRACTITIONER

## 2022-07-12 PROCEDURE — 1101F PT FALLS ASSESS-DOCD LE1/YR: CPT | Mod: CPTII,S$GLB,, | Performed by: NURSE PRACTITIONER

## 2022-07-12 PROCEDURE — 1170F PR FUNCTIONAL STATUS ASSESSED: ICD-10-PCS | Mod: CPTII,S$GLB,, | Performed by: NURSE PRACTITIONER

## 2022-07-12 PROCEDURE — 1159F PR MEDICATION LIST DOCUMENTED IN MEDICAL RECORD: ICD-10-PCS | Mod: CPTII,S$GLB,, | Performed by: NURSE PRACTITIONER

## 2022-07-12 PROCEDURE — 3079F DIAST BP 80-89 MM HG: CPT | Mod: CPTII,S$GLB,, | Performed by: NURSE PRACTITIONER

## 2022-07-12 PROCEDURE — 3075F SYST BP GE 130 - 139MM HG: CPT | Mod: CPTII,S$GLB,, | Performed by: NURSE PRACTITIONER

## 2022-07-12 PROCEDURE — 99499 UNLISTED E&M SERVICE: CPT | Mod: S$GLB,,, | Performed by: NURSE PRACTITIONER

## 2022-07-12 PROCEDURE — 1101F PR PT FALLS ASSESS DOC 0-1 FALLS W/OUT INJ PAST YR: ICD-10-PCS | Mod: CPTII,S$GLB,, | Performed by: NURSE PRACTITIONER

## 2022-07-12 PROCEDURE — 1159F MED LIST DOCD IN RCRD: CPT | Mod: CPTII,S$GLB,, | Performed by: NURSE PRACTITIONER

## 2022-07-12 PROCEDURE — 1160F PR REVIEW ALL MEDS BY PRESCRIBER/CLIN PHARMACIST DOCUMENTED: ICD-10-PCS | Mod: CPTII,S$GLB,, | Performed by: NURSE PRACTITIONER

## 2022-07-12 PROCEDURE — 3008F BODY MASS INDEX DOCD: CPT | Mod: CPTII,S$GLB,, | Performed by: NURSE PRACTITIONER

## 2022-07-12 PROCEDURE — 3075F PR MOST RECENT SYSTOLIC BLOOD PRESS GE 130-139MM HG: ICD-10-PCS | Mod: CPTII,S$GLB,, | Performed by: NURSE PRACTITIONER

## 2022-07-12 PROCEDURE — 1160F RVW MEDS BY RX/DR IN RCRD: CPT | Mod: CPTII,S$GLB,, | Performed by: NURSE PRACTITIONER

## 2022-07-12 PROCEDURE — 4010F ACE/ARB THERAPY RXD/TAKEN: CPT | Mod: CPTII,S$GLB,, | Performed by: NURSE PRACTITIONER

## 2022-07-12 PROCEDURE — 99999 PR PBB SHADOW E&M-EST. PATIENT-LVL III: ICD-10-PCS | Mod: PBBFAC,,, | Performed by: NURSE PRACTITIONER

## 2022-07-12 PROCEDURE — 3044F PR MOST RECENT HEMOGLOBIN A1C LEVEL <7.0%: ICD-10-PCS | Mod: CPTII,S$GLB,, | Performed by: NURSE PRACTITIONER

## 2022-07-12 PROCEDURE — G0439 PR MEDICARE ANNUAL WELLNESS SUBSEQUENT VISIT: ICD-10-PCS | Mod: S$GLB,,, | Performed by: NURSE PRACTITIONER

## 2022-07-12 PROCEDURE — 3008F PR BODY MASS INDEX (BMI) DOCUMENTED: ICD-10-PCS | Mod: CPTII,S$GLB,, | Performed by: NURSE PRACTITIONER

## 2022-07-12 PROCEDURE — G0439 PPPS, SUBSEQ VISIT: HCPCS | Mod: S$GLB,,, | Performed by: NURSE PRACTITIONER

## 2022-07-12 PROCEDURE — 3288F FALL RISK ASSESSMENT DOCD: CPT | Mod: CPTII,S$GLB,, | Performed by: NURSE PRACTITIONER

## 2022-07-12 PROCEDURE — 3079F PR MOST RECENT DIASTOLIC BLOOD PRESSURE 80-89 MM HG: ICD-10-PCS | Mod: CPTII,S$GLB,, | Performed by: NURSE PRACTITIONER

## 2022-07-12 PROCEDURE — 3044F HG A1C LEVEL LT 7.0%: CPT | Mod: CPTII,S$GLB,, | Performed by: NURSE PRACTITIONER

## 2022-07-12 PROCEDURE — 99499 RISK ADDL DX/OHS AUDIT: ICD-10-PCS | Mod: S$GLB,,, | Performed by: NURSE PRACTITIONER

## 2022-07-12 PROCEDURE — 99999 PR PBB SHADOW E&M-EST. PATIENT-LVL III: CPT | Mod: PBBFAC,,, | Performed by: NURSE PRACTITIONER

## 2022-07-12 PROCEDURE — 4010F PR ACE/ARB THEARPY RXD/TAKEN: ICD-10-PCS | Mod: CPTII,S$GLB,, | Performed by: NURSE PRACTITIONER

## 2022-07-12 PROCEDURE — 1125F AMNT PAIN NOTED PAIN PRSNT: CPT | Mod: CPTII,S$GLB,, | Performed by: NURSE PRACTITIONER

## 2022-07-12 PROCEDURE — 3288F PR FALLS RISK ASSESSMENT DOCUMENTED: ICD-10-PCS | Mod: CPTII,S$GLB,, | Performed by: NURSE PRACTITIONER

## 2022-07-12 PROCEDURE — 1170F FXNL STATUS ASSESSED: CPT | Mod: CPTII,S$GLB,, | Performed by: NURSE PRACTITIONER

## 2022-07-12 NOTE — PROGRESS NOTES
"  Beth Souza presented for a  Medicare AWV and comprehensive Health Risk Assessment today. The following components were reviewed and updated:    · Medical history  · Family History  · Social history  · Allergies and Current Medications  · Health Risk Assessment  · Health Maintenance  · Care Team         ** See Completed Assessments for Annual Wellness Visit within the encounter summary.**         The following assessments were completed:  · Living Situation  · CAGE  · Depression Screening  · Timed Get Up and Go  · Whisper Test  · Cognitive Function Screening  · Nutrition Screening  · ADL Screening  · PAQ Screening        Vitals:    07/12/22 1232   BP: 130/82   BP Location: Left arm   Patient Position: Sitting   Pulse: 64   SpO2: 95%   Weight: 102.7 kg (226 lb 6.6 oz)   Height: 5' 3" (1.6 m)     Body mass index is 40.11 kg/m².  Physical Exam  Vitals and nursing note reviewed.   Constitutional:       Appearance: She is well-developed.   HENT:      Head: Normocephalic.   Cardiovascular:      Rate and Rhythm: Normal rate and regular rhythm.      Heart sounds: Normal heart sounds.   Pulmonary:      Effort: Pulmonary effort is normal. No respiratory distress.      Breath sounds: Normal breath sounds.   Abdominal:      Palpations: Abdomen is soft. There is no mass.      Tenderness: There is no abdominal tenderness.   Musculoskeletal:         General: Normal range of motion.   Skin:     General: Skin is warm and dry.   Neurological:      Mental Status: She is alert and oriented to person, place, and time.      Motor: No abnormal muscle tone.   Psychiatric:         Speech: Speech normal.         Behavior: Behavior normal.               Diagnoses and health risks identified today and associated recommendations/orders:    1. Encounter for preventive health examination  Discussed receiving Shingrix at pharmacy.    Discussed locations to receive COVID booster    Abnormal timed get up and go test due to knee pain. Denies " falls. Has Apt tomorrow with internal medicine to address.     2. Essential hypertension  Stable and controlled. Continue current treatment plan as previously prescribed with your PCP and cardiologist.     3. Hyperlipidemia, unspecified hyperlipidemia type  Continue current treatment plan as previously prescribed with your  pcp and cardiologist.     4. Calcification of aorta  xray 11/21  Stable. Continue current treatment plan as previously prescribed with your  pcp     5. Meningioma of cerebellum  Continue current treatment plan as previously prescribed with your  Neurologist.     6. Mild cognitive impairment with memory loss  normal cognitive function screening.    Continue current treatment plan as previously prescribed with your  Neurologist.     7. Obesity, Class III, BMI 40-49.9 (morbid obesity)  Encouraged healthy diet and exercise as tolerated to help bring BMI into normal range.    Continue current treatment plan as previously prescribed with your  pcp     8. Multiple thyroid nodules  US 2/21  Continue current treatment plan as previously prescribed with your  pcp     9. Prediabetes  a1c 5.7  Continue current treatment plan as previously prescribed with your  pcp     10. History of hyperparathyroidism  No recent check  Advised to follow up with PCP for further evaluation and recommendations. Patient expressed understanding.      11. LEN (obstructive sleep apnea)  Continue current treatment plan as previously prescribed with your  Pulmonologist.     12. Numbness and tingling  BLE  Chronic  Advised to follow up with PCP for further evaluation and recommendations. Patient expressed understanding.        Provided Beth with a 5-10 year written screening schedule and personal prevention plan. Recommendations were developed using the USPSTF age appropriate recommendations. Education, counseling, and referrals were provided as needed. After Visit Summary printed and given to patient which includes a list of  additional screenings\tests needed.    Follow up in about 1 year (around 7/12/2023) for awv.    Joy Eubanks, NP  I offered to discuss advanced care planning, including how to pick a person who would make decisions for you if you were unable to make them for yourself, called a health care power of , and what kind of decisions you might make such as use of life sustaining treatments such as ventilators and tube feeding when faced with a life limiting illness recorded on a living will that they will need to know. (How you want to be cared for as you near the end of your natural life)     X Patient is interested in learning more about how to make advanced directives.  I provided them paperwork and offered to discuss this with them.

## 2022-07-12 NOTE — PATIENT INSTRUCTIONS
Counseling and Referral of Other Preventative  (Italic type indicates deductible and co-insurance are waived)    Patient Name: Beth Souza  Today's Date: 7/12/2022    Health Maintenance       Date Due Completion Date    Shingles Vaccine (1 of 2) Never done ---    COVID-19 Vaccine (4 - Booster for Moderna series) 02/16/2022 11/16/2021    DEXA Scan 04/30/2022 4/30/2019    Override on 4/22/2016: Done    Influenza Vaccine (1) 09/01/2022 10/12/2020    Mammogram 11/17/2022 11/17/2021    Override on 4/22/2016: Done (Dr Miroslava Bray)    Override on 4/15/2015: Done (Dr Bray)    Override on 3/11/2014: Done (Dr bray)    Override on 3/14/2013: Done (Dr Miroslava bray)    High Dose Statin 06/22/2023 6/22/2022    TETANUS VACCINE 02/10/2027 2/10/2017 (Declined)    Override on 2/10/2017: Declined    Lipid Panel 04/04/2027 4/4/2022    Colorectal Cancer Screening 10/27/2031 10/27/2021    Override on 11/14/2011: Done (Dr Campbell- [facility] - normal)        No orders of the defined types were placed in this encounter.    The following information is provided to all patients.  This information is to help you find resources for any of the problems found today that may be affecting your health:                Living healthy guide: www.[facility].[place].gov      Understanding Diabetes: www.diabetes.org      Eating healthy: www.cdc.gov/healthyweight      CDC home safety checklist: www.cdc.gov/steadi/patient.html      Agency on Aging: www.goea.[place].[facility]      Alcoholics anonymous (AA): www.aa.org      Physical Activity: www.marina.nih.gov/do2erdo      Tobacco use: www.quitwithusla.org

## 2022-07-13 ENCOUNTER — OFFICE VISIT (OUTPATIENT)
Dept: INTERNAL MEDICINE | Facility: CLINIC | Age: 72
End: 2022-07-13
Payer: MEDICARE

## 2022-07-13 ENCOUNTER — HOSPITAL ENCOUNTER (OUTPATIENT)
Dept: RADIOLOGY | Facility: HOSPITAL | Age: 72
Discharge: HOME | End: 2022-07-13
Attending: PHYSICIAN ASSISTANT
Payer: MEDICARE

## 2022-07-13 VITALS
OXYGEN SATURATION: 99 % | BODY MASS INDEX: 39.39 KG/M2 | WEIGHT: 222.31 LBS | SYSTOLIC BLOOD PRESSURE: 130 MMHG | HEART RATE: 65 BPM | TEMPERATURE: 98 F | DIASTOLIC BLOOD PRESSURE: 76 MMHG | HEIGHT: 63 IN

## 2022-07-13 DIAGNOSIS — M25.561 ACUTE PAIN OF RIGHT KNEE: Primary | ICD-10-CM

## 2022-07-13 DIAGNOSIS — M25.561 ACUTE PAIN OF RIGHT KNEE: ICD-10-CM

## 2022-07-13 PROCEDURE — 1101F PR PT FALLS ASSESS DOC 0-1 FALLS W/OUT INJ PAST YR: ICD-10-PCS | Mod: CPTII,S$GLB,, | Performed by: PHYSICIAN ASSISTANT

## 2022-07-13 PROCEDURE — 3075F SYST BP GE 130 - 139MM HG: CPT | Mod: CPTII,S$GLB,, | Performed by: PHYSICIAN ASSISTANT

## 2022-07-13 PROCEDURE — 1101F PT FALLS ASSESS-DOCD LE1/YR: CPT | Mod: CPTII,S$GLB,, | Performed by: PHYSICIAN ASSISTANT

## 2022-07-13 PROCEDURE — 1160F RVW MEDS BY RX/DR IN RCRD: CPT | Mod: CPTII,S$GLB,, | Performed by: PHYSICIAN ASSISTANT

## 2022-07-13 PROCEDURE — 99213 OFFICE O/P EST LOW 20 MIN: CPT | Mod: S$GLB,,, | Performed by: PHYSICIAN ASSISTANT

## 2022-07-13 PROCEDURE — 1125F PR PAIN SEVERITY QUANTIFIED, PAIN PRESENT: ICD-10-PCS | Mod: CPTII,S$GLB,, | Performed by: PHYSICIAN ASSISTANT

## 2022-07-13 PROCEDURE — 3288F PR FALLS RISK ASSESSMENT DOCUMENTED: ICD-10-PCS | Mod: CPTII,S$GLB,, | Performed by: PHYSICIAN ASSISTANT

## 2022-07-13 PROCEDURE — 73560 X-RAY EXAM OF KNEE 1 OR 2: CPT | Mod: 26,LT,, | Performed by: RADIOLOGY

## 2022-07-13 PROCEDURE — 3288F FALL RISK ASSESSMENT DOCD: CPT | Mod: CPTII,S$GLB,, | Performed by: PHYSICIAN ASSISTANT

## 2022-07-13 PROCEDURE — 73562 X-RAY EXAM OF KNEE 3: CPT | Mod: 26,RT,, | Performed by: RADIOLOGY

## 2022-07-13 PROCEDURE — 73560 X-RAY EXAM OF KNEE 1 OR 2: CPT | Mod: TC,LT

## 2022-07-13 PROCEDURE — 3008F PR BODY MASS INDEX (BMI) DOCUMENTED: ICD-10-PCS | Mod: CPTII,S$GLB,, | Performed by: PHYSICIAN ASSISTANT

## 2022-07-13 PROCEDURE — 99213 PR OFFICE/OUTPT VISIT, EST, LEVL III, 20-29 MIN: ICD-10-PCS | Mod: S$GLB,,, | Performed by: PHYSICIAN ASSISTANT

## 2022-07-13 PROCEDURE — 73562 XR KNEE ORTHO RIGHT: ICD-10-PCS | Mod: 26,RT,, | Performed by: RADIOLOGY

## 2022-07-13 PROCEDURE — 99999 PR PBB SHADOW E&M-EST. PATIENT-LVL IV: ICD-10-PCS | Mod: PBBFAC,,, | Performed by: PHYSICIAN ASSISTANT

## 2022-07-13 PROCEDURE — 3044F PR MOST RECENT HEMOGLOBIN A1C LEVEL <7.0%: ICD-10-PCS | Mod: CPTII,S$GLB,, | Performed by: PHYSICIAN ASSISTANT

## 2022-07-13 PROCEDURE — 3008F BODY MASS INDEX DOCD: CPT | Mod: CPTII,S$GLB,, | Performed by: PHYSICIAN ASSISTANT

## 2022-07-13 PROCEDURE — 1159F PR MEDICATION LIST DOCUMENTED IN MEDICAL RECORD: ICD-10-PCS | Mod: CPTII,S$GLB,, | Performed by: PHYSICIAN ASSISTANT

## 2022-07-13 PROCEDURE — 99999 PR PBB SHADOW E&M-EST. PATIENT-LVL IV: CPT | Mod: PBBFAC,,, | Performed by: PHYSICIAN ASSISTANT

## 2022-07-13 PROCEDURE — 4010F ACE/ARB THERAPY RXD/TAKEN: CPT | Mod: CPTII,S$GLB,, | Performed by: PHYSICIAN ASSISTANT

## 2022-07-13 PROCEDURE — 3075F PR MOST RECENT SYSTOLIC BLOOD PRESS GE 130-139MM HG: ICD-10-PCS | Mod: CPTII,S$GLB,, | Performed by: PHYSICIAN ASSISTANT

## 2022-07-13 PROCEDURE — 3044F HG A1C LEVEL LT 7.0%: CPT | Mod: CPTII,S$GLB,, | Performed by: PHYSICIAN ASSISTANT

## 2022-07-13 PROCEDURE — 3078F DIAST BP <80 MM HG: CPT | Mod: CPTII,S$GLB,, | Performed by: PHYSICIAN ASSISTANT

## 2022-07-13 PROCEDURE — 73560 XR KNEE ORTHO RIGHT: ICD-10-PCS | Mod: 26,LT,, | Performed by: RADIOLOGY

## 2022-07-13 PROCEDURE — 1125F AMNT PAIN NOTED PAIN PRSNT: CPT | Mod: CPTII,S$GLB,, | Performed by: PHYSICIAN ASSISTANT

## 2022-07-13 PROCEDURE — 3078F PR MOST RECENT DIASTOLIC BLOOD PRESSURE < 80 MM HG: ICD-10-PCS | Mod: CPTII,S$GLB,, | Performed by: PHYSICIAN ASSISTANT

## 2022-07-13 PROCEDURE — 4010F PR ACE/ARB THEARPY RXD/TAKEN: ICD-10-PCS | Mod: CPTII,S$GLB,, | Performed by: PHYSICIAN ASSISTANT

## 2022-07-13 PROCEDURE — 1160F PR REVIEW ALL MEDS BY PRESCRIBER/CLIN PHARMACIST DOCUMENTED: ICD-10-PCS | Mod: CPTII,S$GLB,, | Performed by: PHYSICIAN ASSISTANT

## 2022-07-13 PROCEDURE — 1159F MED LIST DOCD IN RCRD: CPT | Mod: CPTII,S$GLB,, | Performed by: PHYSICIAN ASSISTANT

## 2022-07-13 RX ORDER — PREDNISONE 10 MG/1
10 TABLET ORAL 2 TIMES DAILY
Qty: 10 TABLET | Refills: 0 | Status: SHIPPED | OUTPATIENT
Start: 2022-07-13 | End: 2022-07-18

## 2022-07-13 NOTE — PROGRESS NOTES
Subjective:       Patient ID: Beth Souza is a 72 y.o. female.    Chief Complaint: Leg Pain      Patient Care Team:  Edna Oliva MD as PCP - General (Family Medicine)  Nelson Riley MD as Consulting Physician (Dermatology)  Madison Manuel NP as Nurse Practitioner (Neurology)  Juan Pablo Gautam MD as Consulting Physician (Neurosurgery)  Balta Aranda OD as Consulting Physician (Optometry)  Yolanda Sagastume PA-C as Physician Assistant (Interventional Pain Medicine)  Nadia Smith DPM as Consulting Physician (Podiatry)  Zohreh Best NP as Nurse Practitioner (Pulmonary Disease)  Christiano Javier MD as Consulting Physician (Orthopedic Surgery)  Duane James MD as Consulting Physician (Cardiology)  Miroslava De Leon NP as Nurse Practitioner (Obstetrics and Gynecology)    HPI  Patient is new to me.    Beth Souza is a 72 y.o. female who presents today with complaints of Leg Pain  Right anterior knee pain that started 2 and half weeks ago without known injury or trauma.  She denies previous history of knee problem.  She reports pain is aching, constant and unchanged since starting.  Pain is present with a seated position as well as ambulation.  No radiation of pain, paresthesias, weakness or loss of movement.  She has tried Tylenol and hot compresses without relief.    Review of Systems   Constitutional: Negative for chills and fever.   Respiratory: Negative for shortness of breath.    Cardiovascular: Negative for chest pain.   Musculoskeletal: Positive for arthralgias and joint swelling.   Neurological: Negative for weakness and numbness.       Objective:      Physical Exam  Vitals and nursing note reviewed.   Constitutional:       General: She is not in acute distress.     Appearance: She is well-developed.   HENT:      Head: Normocephalic and atraumatic.   Eyes:      General: Lids are normal. No scleral icterus.     Extraocular Movements: Extraocular movements intact.       Conjunctiva/sclera: Conjunctivae normal.   Cardiovascular:      Pulses:           Dorsalis pedis pulses are 2+ on the right side.        Posterior tibial pulses are 2+ on the right side.   Pulmonary:      Effort: Pulmonary effort is normal.   Musculoskeletal:      Right knee: Bony tenderness ( Anterior knee joint and posterior knee joint) present. No swelling, deformity, effusion, erythema or ecchymosis. Normal range of motion. No LCL laxity, MCL laxity, ACL laxity or PCL laxity.      Right lower leg: No edema.      Left lower leg: No edema.      Comments: Limping on right leg but ambulatory without assistive device   Neurological:      Mental Status: She is alert.      Cranial Nerves: No cranial nerve deficit.   Psychiatric:         Mood and Affect: Mood and affect normal.         Assessment:       1. Acute pain of right knee        Plan:   1. Acute pain of right knee  -     Ambulatory referral/consult to Orthopedics  -     predniSONE (DELTASONE) 10 MG tablet      X-ray Knee Ortho Right  Narrative: EXAMINATION:  XR KNEE ORTHO RIGHT    CLINICAL HISTORY:  Pain in right knee    TECHNIQUE:  AP standing of both knees, Merchant views of both knees as well as a lateral view of the right knee were performed.    COMPARISON:  None    FINDINGS:  No right knee fracture.  Mild degenerative changes involving the right knee medial and patellofemoral compartments.  No osseous erosion or suspicious osseous lesion.  No definite right knee effusion.  No acute soft tissue abnormality is identified.    Limited evaluation of the left knee is unremarkable.  Impression: As above.    Electronically signed by: Jeff Abdi  Date:    07/13/2022  Time:    13:20

## 2022-07-14 ENCOUNTER — TELEPHONE (OUTPATIENT)
Dept: INTERNAL MEDICINE | Facility: CLINIC | Age: 72
End: 2022-07-14
Payer: MEDICARE

## 2022-07-14 NOTE — TELEPHONE ENCOUNTER
----- Message from Gemini Parks sent at 7/14/2022  9:00 AM CDT -----  Contact: Beth cristina [phone]  [email or web address]     Patient is calling for Medical Advice regarding:    How long has patient had these symptoms:    Pharmacy name and phone#:    Would like response via [facility]: call back    Comments: Pt is calling to get test results from her xray

## 2022-07-14 NOTE — TELEPHONE ENCOUNTER
Arthritic changes are just what is seen on xray but not necessarily the cause of her pain and swelling. Xrays were performed to check for any acute findings such as a fracture/broken bone. Continue with treatment plan discussed. F/u with orthopedist if symptoms persist or worsen. Ice compresses and stretches as shown in clinic.

## 2022-07-14 NOTE — TELEPHONE ENCOUNTER
"Spoke to pt and communicated results of xray.  Pt is requesting clarification.  Pt states "can arthritis make it swollen like this because it is even more swollen today.  I thought arthritis hurt all the time continually." Please advise  "

## 2022-07-29 ENCOUNTER — TELEPHONE (OUTPATIENT)
Dept: INTERNAL MEDICINE | Facility: CLINIC | Age: 72
End: 2022-07-29
Payer: MEDICARE

## 2022-07-29 ENCOUNTER — OFFICE VISIT (OUTPATIENT)
Dept: ORTHOPEDICS | Facility: CLINIC | Age: 72
End: 2022-07-29
Payer: MEDICARE

## 2022-07-29 VITALS
DIASTOLIC BLOOD PRESSURE: 86 MMHG | WEIGHT: 222 LBS | HEART RATE: 60 BPM | SYSTOLIC BLOOD PRESSURE: 133 MMHG | HEIGHT: 63 IN | BODY MASS INDEX: 39.34 KG/M2

## 2022-07-29 DIAGNOSIS — J06.9 VIRAL URI WITH COUGH: ICD-10-CM

## 2022-07-29 DIAGNOSIS — M25.561 ACUTE PAIN OF RIGHT KNEE: ICD-10-CM

## 2022-07-29 DIAGNOSIS — M17.0 BILATERAL PRIMARY OSTEOARTHRITIS OF KNEE: Primary | ICD-10-CM

## 2022-07-29 PROCEDURE — 1159F PR MEDICATION LIST DOCUMENTED IN MEDICAL RECORD: ICD-10-PCS | Mod: CPTII,S$GLB,, | Performed by: PHYSICIAN ASSISTANT

## 2022-07-29 PROCEDURE — 99213 OFFICE O/P EST LOW 20 MIN: CPT | Mod: S$GLB,,, | Performed by: PHYSICIAN ASSISTANT

## 2022-07-29 PROCEDURE — 3079F DIAST BP 80-89 MM HG: CPT | Mod: CPTII,S$GLB,, | Performed by: PHYSICIAN ASSISTANT

## 2022-07-29 PROCEDURE — 4010F ACE/ARB THERAPY RXD/TAKEN: CPT | Mod: CPTII,S$GLB,, | Performed by: PHYSICIAN ASSISTANT

## 2022-07-29 PROCEDURE — 1101F PR PT FALLS ASSESS DOC 0-1 FALLS W/OUT INJ PAST YR: ICD-10-PCS | Mod: CPTII,S$GLB,, | Performed by: PHYSICIAN ASSISTANT

## 2022-07-29 PROCEDURE — 3075F PR MOST RECENT SYSTOLIC BLOOD PRESS GE 130-139MM HG: ICD-10-PCS | Mod: CPTII,S$GLB,, | Performed by: PHYSICIAN ASSISTANT

## 2022-07-29 PROCEDURE — 3044F HG A1C LEVEL LT 7.0%: CPT | Mod: CPTII,S$GLB,, | Performed by: PHYSICIAN ASSISTANT

## 2022-07-29 PROCEDURE — 3008F PR BODY MASS INDEX (BMI) DOCUMENTED: ICD-10-PCS | Mod: CPTII,S$GLB,, | Performed by: PHYSICIAN ASSISTANT

## 2022-07-29 PROCEDURE — 1160F PR REVIEW ALL MEDS BY PRESCRIBER/CLIN PHARMACIST DOCUMENTED: ICD-10-PCS | Mod: CPTII,S$GLB,, | Performed by: PHYSICIAN ASSISTANT

## 2022-07-29 PROCEDURE — 4010F PR ACE/ARB THEARPY RXD/TAKEN: ICD-10-PCS | Mod: CPTII,S$GLB,, | Performed by: PHYSICIAN ASSISTANT

## 2022-07-29 PROCEDURE — 1160F RVW MEDS BY RX/DR IN RCRD: CPT | Mod: CPTII,S$GLB,, | Performed by: PHYSICIAN ASSISTANT

## 2022-07-29 PROCEDURE — 1125F AMNT PAIN NOTED PAIN PRSNT: CPT | Mod: CPTII,S$GLB,, | Performed by: PHYSICIAN ASSISTANT

## 2022-07-29 PROCEDURE — 1101F PT FALLS ASSESS-DOCD LE1/YR: CPT | Mod: CPTII,S$GLB,, | Performed by: PHYSICIAN ASSISTANT

## 2022-07-29 PROCEDURE — 1125F PR PAIN SEVERITY QUANTIFIED, PAIN PRESENT: ICD-10-PCS | Mod: CPTII,S$GLB,, | Performed by: PHYSICIAN ASSISTANT

## 2022-07-29 PROCEDURE — 3079F PR MOST RECENT DIASTOLIC BLOOD PRESSURE 80-89 MM HG: ICD-10-PCS | Mod: CPTII,S$GLB,, | Performed by: PHYSICIAN ASSISTANT

## 2022-07-29 PROCEDURE — 3288F FALL RISK ASSESSMENT DOCD: CPT | Mod: CPTII,S$GLB,, | Performed by: PHYSICIAN ASSISTANT

## 2022-07-29 PROCEDURE — 99999 PR PBB SHADOW E&M-EST. PATIENT-LVL IV: ICD-10-PCS | Mod: PBBFAC,,, | Performed by: PHYSICIAN ASSISTANT

## 2022-07-29 PROCEDURE — 3075F SYST BP GE 130 - 139MM HG: CPT | Mod: CPTII,S$GLB,, | Performed by: PHYSICIAN ASSISTANT

## 2022-07-29 PROCEDURE — 99213 PR OFFICE/OUTPT VISIT, EST, LEVL III, 20-29 MIN: ICD-10-PCS | Mod: S$GLB,,, | Performed by: PHYSICIAN ASSISTANT

## 2022-07-29 PROCEDURE — 1159F MED LIST DOCD IN RCRD: CPT | Mod: CPTII,S$GLB,, | Performed by: PHYSICIAN ASSISTANT

## 2022-07-29 PROCEDURE — 3008F BODY MASS INDEX DOCD: CPT | Mod: CPTII,S$GLB,, | Performed by: PHYSICIAN ASSISTANT

## 2022-07-29 PROCEDURE — 3044F PR MOST RECENT HEMOGLOBIN A1C LEVEL <7.0%: ICD-10-PCS | Mod: CPTII,S$GLB,, | Performed by: PHYSICIAN ASSISTANT

## 2022-07-29 PROCEDURE — 99999 PR PBB SHADOW E&M-EST. PATIENT-LVL IV: CPT | Mod: PBBFAC,,, | Performed by: PHYSICIAN ASSISTANT

## 2022-07-29 PROCEDURE — 3288F PR FALLS RISK ASSESSMENT DOCUMENTED: ICD-10-PCS | Mod: CPTII,S$GLB,, | Performed by: PHYSICIAN ASSISTANT

## 2022-07-29 NOTE — TELEPHONE ENCOUNTER
No new care gaps identified.  [facility] Embedded Care Gaps. Reference number: [id number]. 7/29/2022   2:17:45 PM CDT

## 2022-07-29 NOTE — TELEPHONE ENCOUNTER
----- Message from Bolivar Mcrae sent at 7/29/2022  1:28 PM CDT -----  Contact: self  Type:  RX Refill Request    Who Called: Beth Souza   Refill or New Rx:refill   RX Name and Strength: Fluticasone 50 mg  How is the patient currently taking it? (ex. 1XDay): as needed   Is this a 30 day or 90 day RX: 30   Preferred Pharmacy with phone number:   [facility] [address] [place], [place] - [address]  [address] [facility] [address]  Phone: [phone] Fax: [phone]  Local or Mail Order: local   Ordering Provider: n/a  Would the patient rather a call back or a response via [facility]? Call back   Best Call Back Number: [phone]  Additional Information:

## 2022-07-29 NOTE — TELEPHONE ENCOUNTER
----- Message from Aury Camp sent at 7/29/2022 12:07 PM CDT -----  Contact: Patient  Patient called to consult with nurse or staff regarding something for sinus. She states she is having some pressure and would like a something called in if possible. Patient can be reached at [phone]. Thanks/MR

## 2022-07-29 NOTE — TELEPHONE ENCOUNTER
Returned patient's call.  Lm suggesting to consult with her pharmacist for an OTC medication since provider is unavailable, or if symptoms worsen to report to urgent care for evaluation.

## 2022-07-29 NOTE — PROGRESS NOTES
Patient ID: Beth Souza is a 72 y.o. female.    Chief Complaint: Pain and Swelling of the Right Knee      HPI: Beth Souza  is a 72 y.o. female who c/o Pain and Swelling of the Right Knee     Ms. Gillette presents to me today as a new patient with chief complaint of bilateral knee pain, right greater than left.  She notes pain is at worst a 10/10.  She notes onset has been on for a while roughly 2 months in nature.  She states the pain last all day with all activities never stops hurting.  She notes she is unable to stand long distances, walk long distances, go from a sitting to standing or standing to sitting position as easily as she could before, walk on level terrain or stairs.  She is not using any braces to assist in ambulation or using any assistant devices.  She has not tried any formal PT program.   She was given a Medrol Dosepak by outside provider and states that this helped very minimally to ease the pain  She takes Tylenol as needed, we discussed taking 650 mg up to 3 times a day    She prefers to take a very conservative approach which we discussed in detail today.    She is not a candidate for any NSAIDS that she has an aspirin allergy.  She has not previously received any injections into her knees    Patient is presently denying any shortness of breath, chest pain, fever/chills, nausea/vomiting, loss of taste or smell, numbness/tingling or sensation changes, loss of bladder or bowel function.    Past Medical History:   Diagnosis Date    Cataract     Hyperlipidemia     Hypertension     Lumbar spondylosis     Meningioma 2/18/2021    Obesity     Sleep apnea     Vitamin D deficiency        Past Surgical History:   Procedure Laterality Date    HYSTERECTOMY      PARTIAL HYSTERECTOMY         Family History   Problem Relation Age of Onset    Hypertension Mother     Diabetes Daughter     Melanoma Neg Hx     Psoriasis Neg Hx     Lupus Neg Hx     Eczema Neg Hx     Breast cancer  Neg Hx     Colon cancer Neg Hx     Ovarian cancer Neg Hx     Thrombosis Neg Hx        Social History     Socioeconomic History    Marital status:     Number of children: 3   Occupational History    Occupation: Cleaning [employer]     Employer: not employed   Tobacco Use    Smoking status: Former Smoker     Quit date: 2004     Years since quittin.5    Smokeless tobacco: Never Used   Substance and Sexual Activity    Alcohol use: Yes     Comment: occasionally    Drug use: No    Sexual activity: Not Currently     Partners: Male   Other Topics Concern    Are you pregnant or think you may be? No    Breast-feeding No   Social History Narrative    Live w/ alone      Social Determinants of Health     Financial Resource Strain: Low Risk     Difficulty of Paying Living Expenses: Not hard at all   Food Insecurity: No Food Insecurity    Worried About Running Out of Food in the Last Year: Never true    Ran Out of Food in the Last Year: Never true   Transportation Needs: No Transportation Needs    Lack of Transportation (Medical): No    Lack of Transportation (Non-Medical): No   Physical Activity: Inactive    Days of Exercise per Week: 0 days    Minutes of Exercise per Session: 0 min   Stress: No Stress Concern Present    Feeling of Stress : Not at all   Social Connections: Moderately Isolated    Frequency of Communication with Friends and Family: More than three times a week    Frequency of Social Gatherings with Friends and Family: More than three times a week    Attends Taoist Services: More than 4 times per year    Active Member of Clubs or Organizations: No    Attends Club or Organization Meetings: Never    Marital Status:    Housing Stability: Low Risk     Unable to Pay for Housing in the Last Year: No    Number of Places Lived in the Last Year: 1    Unstable Housing in the Last Year: No       Medication List with Changes/Refills   Current Medications    ACETAMINOPHEN  (TYLENOL) 500 MG TABLET    Take 500 mg by mouth daily as needed for Pain.    ATORVASTATIN (LIPITOR) 20 MG TABLET    Take 1 tablet (20 mg total) by mouth once daily.    ERGOCALCIFEROL (ERGOCALCIFEROL) 50,000 UNIT CAP    Take 1 capsule (50,000 Units total) by mouth every 7 days.    ESTRADIOL (VAGIFEM) 10 MCG TAB    Insert one tablet vaginally twice per week. Insert it before bed. No sex on the night of insertion    FLUTICASONE PROPIONATE (FLONASE) 50 MCG/ACTUATION NASAL SPRAY    1 spray (50 mcg total) by Each Nostril route once daily.    LISINOPRIL-HYDROCHLOROTHIAZIDE (PRINZIDE,ZESTORETIC) 20-25 MG TAB    Take 1 tablet by mouth once daily       Review of patient's allergies indicates:   Allergen Reactions    Aspirin Other (See Comments)     Bruising         Objective:      Lower Extremity    HIP:  Pelvis equal   Pain to palpation along the bursa and trochanters are not reproduced  Groin pain not reproduced with internal and external rotation  Abbductors/Adductors 5/5  Quad resistance 5 bilaterally    Left KNEE:  ROM: passive flex/ ext full  Patella midling, crepitus noted   Ligaments stable  Pain on palpation not seen in any positioning  Calf NT, soft  (-) Drew sign  DF/PF full  Wiggles toes  Sensation intact to light touch   No pitting edema appreciated   NVI  Cap refill < 2 sec    Right KNEE:  ROM: passive flex/ ext fall  Patella midling, moderate crepitus noted   Ligaments stable  Pain on palpation to medial aspect  Calf NT, soft  (-) Drew sign  DF/PF full  Wiggles toes  Sensation intact to light touch   No pitting edema appreciated   NVI  Cap refill < 2 sec    Skin warm to touch, no obvious lesion noted     IMAGING:    XRAY:  FINDINGS:  No right knee fracture.  Mild degenerative changes involving the right knee medial and patellofemoral compartments.  No osseous erosion or suspicious osseous lesion.  No definite right knee effusion.  No acute soft tissue abnormality is identified.    Kellgren Ish scale :  2       Assessment:       Encounter Diagnoses   Name Primary?    Acute pain of right knee     Bilateral primary osteoarthritis of knee Yes          Plan:       Beth was seen today for pain and swelling.    Diagnoses and all orders for this visit:    Bilateral primary osteoarthritis of knee  -     Ambulatory referral/consult to Physical/Occupational Therapy; Future    Acute pain of right knee  -     Ambulatory referral/consult to Orthopedics        Beth Souza is a new patient who presents to me today with chief complaint of bilateral knee pain, right greater than left.  She is not a candidate for NSAIDs secondary to aspirin allergy.  She will continue Tylenol up to 650 mg 3 times a day as needed.  Additionally patient has some minimal relief with steroid Dosepak.  We discussed formal PT program and the patient would like to try Zakia off of seeing in.  Additionally we discussed a natural approach including adding vitamin supplementation center regimen.  Tumeric, multi B vitamin as well as calcium and vitamin D as directed with over-the-counter supplements.  I would like to see her back in 3 months for further evaluation.  She may call with any questions or concerns in the interim.  We had a long discussion today regarding degenerative arthritis in the knees. The patient understands that arthritis is chronic and will worsen over time.  The patient also understands that arthritis may cause episodic flare-ups in pain. Management or if arthritis is achieved through a multi-modal approach including weight loss in obese individuals, activity modification, NSAIDs (topical vs oral) where appropriate, periodic intra-articular steroid injections, viscosupplementation, physical therapy, knee bracing, ambulatory aids, as well as geniculate nerve blocks.      Dr. Bray is aware of the patient & current presentation. He agrees with the current plan above.     Patient verbalized understanding of all instructions  and agreed with the above plan.    No follow-ups on file.    The patient understands, chooses and consents to this plan and accepts all   the risks which include but are not limited to the risks mentioned above.     Disclaimer: This note was prepared using a voice recognition system and is likely to have sound alike errors within the text.

## 2022-07-31 RX ORDER — FLUTICASONE PROPIONATE 50 MCG
1 SPRAY, SUSPENSION (ML) NASAL DAILY
Qty: 18.2 ML | Refills: 0 | Status: SHIPPED | OUTPATIENT
Start: 2022-07-31 | End: 2022-11-10

## 2022-08-03 ENCOUNTER — OFFICE VISIT (OUTPATIENT)
Dept: INTERNAL MEDICINE | Facility: CLINIC | Age: 72
End: 2022-08-03
Payer: MEDICARE

## 2022-08-03 VITALS
BODY MASS INDEX: 40.23 KG/M2 | WEIGHT: 227.06 LBS | HEART RATE: 84 BPM | HEIGHT: 63 IN | TEMPERATURE: 98 F | DIASTOLIC BLOOD PRESSURE: 82 MMHG | OXYGEN SATURATION: 95 % | SYSTOLIC BLOOD PRESSURE: 132 MMHG

## 2022-08-03 DIAGNOSIS — M17.0 PRIMARY OSTEOARTHRITIS OF BOTH KNEES: ICD-10-CM

## 2022-08-03 DIAGNOSIS — E66.01 MORBID OBESITY WITH BMI OF 40.0-44.9, ADULT: ICD-10-CM

## 2022-08-03 DIAGNOSIS — L30.8 PRURITIC DERMATITIS: Primary | ICD-10-CM

## 2022-08-03 PROCEDURE — 4010F ACE/ARB THERAPY RXD/TAKEN: CPT | Mod: CPTII,S$GLB,, | Performed by: FAMILY MEDICINE

## 2022-08-03 PROCEDURE — 99999 PR PBB SHADOW E&M-EST. PATIENT-LVL IV: CPT | Mod: PBBFAC,,, | Performed by: FAMILY MEDICINE

## 2022-08-03 PROCEDURE — 1159F PR MEDICATION LIST DOCUMENTED IN MEDICAL RECORD: ICD-10-PCS | Mod: CPTII,S$GLB,, | Performed by: FAMILY MEDICINE

## 2022-08-03 PROCEDURE — 4010F PR ACE/ARB THEARPY RXD/TAKEN: ICD-10-PCS | Mod: CPTII,S$GLB,, | Performed by: FAMILY MEDICINE

## 2022-08-03 PROCEDURE — 3075F PR MOST RECENT SYSTOLIC BLOOD PRESS GE 130-139MM HG: ICD-10-PCS | Mod: CPTII,S$GLB,, | Performed by: FAMILY MEDICINE

## 2022-08-03 PROCEDURE — 3044F PR MOST RECENT HEMOGLOBIN A1C LEVEL <7.0%: ICD-10-PCS | Mod: CPTII,S$GLB,, | Performed by: FAMILY MEDICINE

## 2022-08-03 PROCEDURE — 3008F BODY MASS INDEX DOCD: CPT | Mod: CPTII,S$GLB,, | Performed by: FAMILY MEDICINE

## 2022-08-03 PROCEDURE — 3079F DIAST BP 80-89 MM HG: CPT | Mod: CPTII,S$GLB,, | Performed by: FAMILY MEDICINE

## 2022-08-03 PROCEDURE — 3008F PR BODY MASS INDEX (BMI) DOCUMENTED: ICD-10-PCS | Mod: CPTII,S$GLB,, | Performed by: FAMILY MEDICINE

## 2022-08-03 PROCEDURE — 1159F MED LIST DOCD IN RCRD: CPT | Mod: CPTII,S$GLB,, | Performed by: FAMILY MEDICINE

## 2022-08-03 PROCEDURE — 3044F HG A1C LEVEL LT 7.0%: CPT | Mod: CPTII,S$GLB,, | Performed by: FAMILY MEDICINE

## 2022-08-03 PROCEDURE — 1160F RVW MEDS BY RX/DR IN RCRD: CPT | Mod: CPTII,S$GLB,, | Performed by: FAMILY MEDICINE

## 2022-08-03 PROCEDURE — 99214 PR OFFICE/OUTPT VISIT, EST, LEVL IV, 30-39 MIN: ICD-10-PCS | Mod: S$GLB,,, | Performed by: FAMILY MEDICINE

## 2022-08-03 PROCEDURE — 99999 PR PBB SHADOW E&M-EST. PATIENT-LVL IV: ICD-10-PCS | Mod: PBBFAC,,, | Performed by: FAMILY MEDICINE

## 2022-08-03 PROCEDURE — 1125F AMNT PAIN NOTED PAIN PRSNT: CPT | Mod: CPTII,S$GLB,, | Performed by: FAMILY MEDICINE

## 2022-08-03 PROCEDURE — 99214 OFFICE O/P EST MOD 30 MIN: CPT | Mod: S$GLB,,, | Performed by: FAMILY MEDICINE

## 2022-08-03 PROCEDURE — 3075F SYST BP GE 130 - 139MM HG: CPT | Mod: CPTII,S$GLB,, | Performed by: FAMILY MEDICINE

## 2022-08-03 PROCEDURE — 1125F PR PAIN SEVERITY QUANTIFIED, PAIN PRESENT: ICD-10-PCS | Mod: CPTII,S$GLB,, | Performed by: FAMILY MEDICINE

## 2022-08-03 PROCEDURE — 1160F PR REVIEW ALL MEDS BY PRESCRIBER/CLIN PHARMACIST DOCUMENTED: ICD-10-PCS | Mod: CPTII,S$GLB,, | Performed by: FAMILY MEDICINE

## 2022-08-03 PROCEDURE — 3079F PR MOST RECENT DIASTOLIC BLOOD PRESSURE 80-89 MM HG: ICD-10-PCS | Mod: CPTII,S$GLB,, | Performed by: FAMILY MEDICINE

## 2022-08-03 RX ORDER — METHYLPREDNISOLONE 4 MG/1
TABLET ORAL
Qty: 1 EACH | Refills: 0 | Status: SHIPPED | OUTPATIENT
Start: 2022-08-03 | End: 2022-08-29 | Stop reason: ALTCHOICE

## 2022-08-03 NOTE — PROGRESS NOTES
"Subjective:       Patient ID: Beth Souza is a 72 y.o. female.    Chief Complaint: Itching    72-year-old  female patient with Patient Active Problem List:     Essential hypertension     Vitamin D deficiency     Mixed hyperlipidemia     Cataract     Osteoarthritis of spine with radiculopathy, lumbar region     Morbid obesity with BMI of 40.0-44.9, adult     LEN on CPAP     Mild cognitive impairment with memory loss     Meningioma of cerebellum     Multiple thyroid nodules     History of COVID-19     Primary osteoarthritis of both hips     Calcification of aorta     History of hyperparathyroidism     Prediabetes  Reports that she started having itchy rash all over the body off and on lately for the past 1 month especially early in the morning after waking up, has tried washing her linen last night with cold water.  Denies any changing soaps or detergents recently  Patient has been having right knee swelling secondary to knee pain and had seen orthopedic physician last week but no improvement noted, waiting to get started on physical therapy  No injury or trauma reported    Review of Systems   Constitutional: Negative for fatigue.   Eyes: Negative for visual disturbance.   Respiratory: Negative for shortness of breath.    Cardiovascular: Negative for chest pain and leg swelling.   Gastrointestinal: Negative for abdominal pain, nausea and vomiting.   Musculoskeletal: Positive for arthralgias and joint swelling. Negative for myalgias.   Skin: Positive for rash.   Neurological: Negative for light-headedness and headaches.   Psychiatric/Behavioral: Negative for sleep disturbance.         /82 (BP Location: Left arm, Patient Position: Sitting, BP Method: Large (Manual))   Pulse 84   Temp 98.3 °F (36.8 °C) (Tympanic)   Ht 5' 3" (1.6 m)   Wt 103 kg (227 lb 1.2 oz)   SpO2 95%   BMI 40.22 kg/m²   Objective:      Physical Exam  Constitutional:       Appearance: She is well-developed.   HENT: "      Head: Normocephalic and atraumatic.   Cardiovascular:      Rate and Rhythm: Normal rate and regular rhythm.      Heart sounds: Normal heart sounds. No murmur heard.  Pulmonary:      Effort: Pulmonary effort is normal.      Breath sounds: Normal breath sounds. No wheezing.   Abdominal:      General: Bowel sounds are normal.      Palpations: Abdomen is soft.      Tenderness: There is no abdominal tenderness.   Musculoskeletal:         General: Swelling and tenderness present.      Comments: Positive for swelling and tenderness to the right knee anteriorly   Skin:     General: Skin is warm and dry.      Findings: Erythema and rash present.      Comments: Positive for erythematous rash scattered all over the body-could be secondary to bedbugs   Neurological:      Mental Status: She is alert and oriented to person, place, and time.           Assessment/Plan:   1. Pruritic dermatitis  - methylPREDNISolone (MEDROL DOSEPACK) 4 mg tablet; use as directed  Dispense: 1 each; Refill: 0  Patient was advised to continue using over-the-counter topical anti inflammatory creams and Medrol Dosepak as prescribed today  Advised to wash bed linens in hot water and watch out for bedbugs    2. Primary osteoarthritis of both knees  Graded exercise regimen recommended and Medrol Dosepak and may helpful with some relief  Patient was advised to start physical therapy as recommended by Orthopedic physician    3. Morbid obesity with BMI of 40.0-44.9, adult   Encouraged to work on lifestyle modifications with diet and exercise to lose weight with BMI 40

## 2022-08-05 ENCOUNTER — TELEPHONE (OUTPATIENT)
Dept: INTERNAL MEDICINE | Facility: CLINIC | Age: 72
End: 2022-08-05
Payer: MEDICARE

## 2022-08-05 NOTE — TELEPHONE ENCOUNTER
S/w Dr. Lopez regarding this. S/w pt and informed her to inform the dentist about her taking this medication and see if he will proceed with procedure. Pt voiced understanding./Mono

## 2022-08-05 NOTE — TELEPHONE ENCOUNTER
----- Message from Dillon Jarrell sent at 8/5/2022  8:39 AM CDT -----  Contact: [phone]  Pt is calling regarding medication, Methylpred . States she is getting a root canal pulled and wants to know if its ok to have root canal while on med. Please call back at [phone] . Thanks/desi

## 2022-08-29 ENCOUNTER — OFFICE VISIT (OUTPATIENT)
Dept: INTERNAL MEDICINE | Facility: CLINIC | Age: 72
End: 2022-08-29
Payer: MEDICARE

## 2022-08-29 VITALS
SYSTOLIC BLOOD PRESSURE: 130 MMHG | HEART RATE: 69 BPM | DIASTOLIC BLOOD PRESSURE: 90 MMHG | TEMPERATURE: 99 F | HEIGHT: 63 IN | WEIGHT: 222 LBS | BODY MASS INDEX: 39.34 KG/M2 | OXYGEN SATURATION: 96 %

## 2022-08-29 DIAGNOSIS — I10 ESSENTIAL HYPERTENSION: ICD-10-CM

## 2022-08-29 DIAGNOSIS — M47.26 OSTEOARTHRITIS OF SPINE WITH RADICULOPATHY, LUMBAR REGION: ICD-10-CM

## 2022-08-29 DIAGNOSIS — R11.0 NAUSEA: Primary | ICD-10-CM

## 2022-08-29 DIAGNOSIS — M17.11 PRIMARY OSTEOARTHRITIS OF RIGHT KNEE: ICD-10-CM

## 2022-08-29 PROCEDURE — 3075F PR MOST RECENT SYSTOLIC BLOOD PRESS GE 130-139MM HG: ICD-10-PCS | Mod: CPTII,S$GLB,, | Performed by: PHYSICIAN ASSISTANT

## 2022-08-29 PROCEDURE — 3288F PR FALLS RISK ASSESSMENT DOCUMENTED: ICD-10-PCS | Mod: CPTII,S$GLB,, | Performed by: PHYSICIAN ASSISTANT

## 2022-08-29 PROCEDURE — 3080F DIAST BP >= 90 MM HG: CPT | Mod: CPTII,S$GLB,, | Performed by: PHYSICIAN ASSISTANT

## 2022-08-29 PROCEDURE — 4010F ACE/ARB THERAPY RXD/TAKEN: CPT | Mod: CPTII,S$GLB,, | Performed by: PHYSICIAN ASSISTANT

## 2022-08-29 PROCEDURE — 99999 PR PBB SHADOW E&M-EST. PATIENT-LVL V: ICD-10-PCS | Mod: PBBFAC,,, | Performed by: PHYSICIAN ASSISTANT

## 2022-08-29 PROCEDURE — 3080F PR MOST RECENT DIASTOLIC BLOOD PRESSURE >= 90 MM HG: ICD-10-PCS | Mod: CPTII,S$GLB,, | Performed by: PHYSICIAN ASSISTANT

## 2022-08-29 PROCEDURE — 3008F PR BODY MASS INDEX (BMI) DOCUMENTED: ICD-10-PCS | Mod: CPTII,S$GLB,, | Performed by: PHYSICIAN ASSISTANT

## 2022-08-29 PROCEDURE — 1160F PR REVIEW ALL MEDS BY PRESCRIBER/CLIN PHARMACIST DOCUMENTED: ICD-10-PCS | Mod: CPTII,S$GLB,, | Performed by: PHYSICIAN ASSISTANT

## 2022-08-29 PROCEDURE — 99214 PR OFFICE/OUTPT VISIT, EST, LEVL IV, 30-39 MIN: ICD-10-PCS | Mod: S$GLB,,, | Performed by: PHYSICIAN ASSISTANT

## 2022-08-29 PROCEDURE — 1159F MED LIST DOCD IN RCRD: CPT | Mod: CPTII,S$GLB,, | Performed by: PHYSICIAN ASSISTANT

## 2022-08-29 PROCEDURE — 99999 PR PBB SHADOW E&M-EST. PATIENT-LVL V: CPT | Mod: PBBFAC,,, | Performed by: PHYSICIAN ASSISTANT

## 2022-08-29 PROCEDURE — 3008F BODY MASS INDEX DOCD: CPT | Mod: CPTII,S$GLB,, | Performed by: PHYSICIAN ASSISTANT

## 2022-08-29 PROCEDURE — 1101F PR PT FALLS ASSESS DOC 0-1 FALLS W/OUT INJ PAST YR: ICD-10-PCS | Mod: CPTII,S$GLB,, | Performed by: PHYSICIAN ASSISTANT

## 2022-08-29 PROCEDURE — 1159F PR MEDICATION LIST DOCUMENTED IN MEDICAL RECORD: ICD-10-PCS | Mod: CPTII,S$GLB,, | Performed by: PHYSICIAN ASSISTANT

## 2022-08-29 PROCEDURE — 4010F PR ACE/ARB THEARPY RXD/TAKEN: ICD-10-PCS | Mod: CPTII,S$GLB,, | Performed by: PHYSICIAN ASSISTANT

## 2022-08-29 PROCEDURE — 3288F FALL RISK ASSESSMENT DOCD: CPT | Mod: CPTII,S$GLB,, | Performed by: PHYSICIAN ASSISTANT

## 2022-08-29 PROCEDURE — 3075F SYST BP GE 130 - 139MM HG: CPT | Mod: CPTII,S$GLB,, | Performed by: PHYSICIAN ASSISTANT

## 2022-08-29 PROCEDURE — 1160F RVW MEDS BY RX/DR IN RCRD: CPT | Mod: CPTII,S$GLB,, | Performed by: PHYSICIAN ASSISTANT

## 2022-08-29 PROCEDURE — 3044F HG A1C LEVEL LT 7.0%: CPT | Mod: CPTII,S$GLB,, | Performed by: PHYSICIAN ASSISTANT

## 2022-08-29 PROCEDURE — 1125F PR PAIN SEVERITY QUANTIFIED, PAIN PRESENT: ICD-10-PCS | Mod: CPTII,S$GLB,, | Performed by: PHYSICIAN ASSISTANT

## 2022-08-29 PROCEDURE — 1101F PT FALLS ASSESS-DOCD LE1/YR: CPT | Mod: CPTII,S$GLB,, | Performed by: PHYSICIAN ASSISTANT

## 2022-08-29 PROCEDURE — 3044F PR MOST RECENT HEMOGLOBIN A1C LEVEL <7.0%: ICD-10-PCS | Mod: CPTII,S$GLB,, | Performed by: PHYSICIAN ASSISTANT

## 2022-08-29 PROCEDURE — 1125F AMNT PAIN NOTED PAIN PRSNT: CPT | Mod: CPTII,S$GLB,, | Performed by: PHYSICIAN ASSISTANT

## 2022-08-29 PROCEDURE — 99214 OFFICE O/P EST MOD 30 MIN: CPT | Mod: S$GLB,,, | Performed by: PHYSICIAN ASSISTANT

## 2022-08-29 RX ORDER — ONDANSETRON 8 MG/1
8 TABLET, ORALLY DISINTEGRATING ORAL EVERY 8 HOURS PRN
Qty: 20 TABLET | Refills: 0 | Status: SHIPPED | OUTPATIENT
Start: 2022-08-29 | End: 2022-11-10

## 2022-08-29 RX ORDER — MELOXICAM 15 MG/1
15 TABLET ORAL DAILY PRN
Qty: 30 TABLET | Refills: 0 | Status: SHIPPED | OUTPATIENT
Start: 2022-08-29 | End: 2023-05-09 | Stop reason: SDUPTHER

## 2022-08-29 RX ORDER — DICLOFENAC SODIUM 10 MG/G
2 GEL TOPICAL 2 TIMES DAILY PRN
Qty: 100 G | Refills: 0 | Status: SHIPPED | OUTPATIENT
Start: 2022-08-29 | End: 2022-12-05

## 2022-08-29 NOTE — PROGRESS NOTES
Subjective:      Patient ID: Beth Souza is a 72 y.o. female.    Chief Complaint: Nausea    Nausea  This is a new problem. The current episode started in the past 7 days. The problem occurs intermittently. The problem has been waxing and waning. Associated symptoms include abdominal pain (epigastric), anorexia (mildly reduced appetite last 2 weeks), diaphoresis, fatigue, headaches (left side of head, resolved with meds) and nausea. Pertinent negatives include no arthralgias, change in bowel habit, chest pain, chills, congestion, coughing, fever, joint swelling, myalgias, neck pain, numbness, rash, sore throat, swollen glands, urinary symptoms, vertigo, visual change, vomiting or weakness. Nothing aggravates the symptoms. She has tried nothing for the symptoms.     BP a little elevated today.     Currently on amoxicillin for dental procedure./ Nausea started the same day she started the medication. No nausea today (forgot to take the medication).   Patient Active Problem List   Diagnosis    Essential hypertension    Vitamin D deficiency    Mixed hyperlipidemia    Cataract    Osteoarthritis of spine with radiculopathy, lumbar region    Morbid obesity with BMI of 40.0-44.9, adult    LEN on CPAP    Mild cognitive impairment with memory loss    Meningioma of cerebellum    Multiple thyroid nodules    History of COVID-19    Primary osteoarthritis of both hips    Calcification of aorta    History of hyperparathyroidism    Prediabetes       Current Outpatient Medications:     acetaminophen (TYLENOL) 500 MG tablet, Take 500 mg by mouth daily as needed for Pain., Disp: , Rfl:     ergocalciferol (ERGOCALCIFEROL) 50,000 unit Cap, Take 1 capsule (50,000 Units total) by mouth every 7 days., Disp: 12 capsule, Rfl: 3    estradioL (VAGIFEM) 10 mcg Tab, Insert one tablet vaginally twice per week. Insert it before bed. No sex on the night of insertion, Disp: 30 tablet, Rfl: 1    fluticasone propionate (FLONASE) 50  "mcg/actuation nasal spray, 1 spray (50 mcg total) by Each Nostril route once daily., Disp: 18.2 mL, Rfl: 0    lisinopriL-hydrochlorothiazide (PRINZIDE,ZESTORETIC) 20-25 mg Tab, Take 1 tablet by mouth once daily, Disp: 90 tablet, Rfl: 0    atorvastatin (LIPITOR) 20 MG tablet, Take 1 tablet (20 mg total) by mouth once daily. (Patient taking differently: Take 20 mg by mouth once daily. Takes 4 days a week), Disp: 90 tablet, Rfl: 3    diclofenac sodium (VOLTAREN) 1 % Gel, Apply 2 g topically 2 (two) times daily as needed (knee pain)., Disp: 100 g, Rfl: 0    meloxicam (MOBIC) 15 MG tablet, Take 1 tablet (15 mg total) by mouth daily as needed for Pain., Disp: 30 tablet, Rfl: 0    ondansetron (ZOFRAN-ODT) 8 MG TbDL, Take 1 tablet (8 mg total) by mouth every 8 (eight) hours as needed (nausea)., Disp: 20 tablet, Rfl: 0    Review of Systems   Constitutional:  Positive for diaphoresis and fatigue. Negative for chills and fever.   HENT:  Negative for congestion and sore throat.    Respiratory:  Negative for cough.    Cardiovascular:  Negative for chest pain.   Gastrointestinal:  Positive for abdominal pain (epigastric), anorexia (mildly reduced appetite last 2 weeks) and nausea. Negative for change in bowel habit and vomiting.   Musculoskeletal:  Negative for arthralgias, joint swelling, myalgias and neck pain.   Skin:  Negative for rash.   Neurological:  Positive for headaches (left side of head, resolved with meds). Negative for vertigo, weakness and numbness.   Objective:   BP (!) 130/90 (BP Location: Left arm, Patient Position: Sitting, BP Method: Large (Manual))   Pulse 69   Temp 98.7 °F (37.1 °C) (Tympanic)   Ht 5' 3" (1.6 m)   Wt 100.7 kg (222 lb 0.1 oz)   SpO2 96%   BMI 39.33 kg/m²     Physical Exam  Vitals reviewed.   Constitutional:       General: She is not in acute distress.     Appearance: Normal appearance. She is well-developed. She is obese. She is not ill-appearing, toxic-appearing or diaphoretic.   HENT: "      Head: Normocephalic and atraumatic.      Right Ear: External ear normal.      Left Ear: External ear normal.      Nose: Nose normal.   Eyes:      Conjunctiva/sclera: Conjunctivae normal.      Pupils: Pupils are equal, round, and reactive to light.   Cardiovascular:      Rate and Rhythm: Normal rate and regular rhythm.      Heart sounds: Normal heart sounds. No murmur heard.    No friction rub. No gallop.   Pulmonary:      Effort: Pulmonary effort is normal. No respiratory distress.      Breath sounds: Normal breath sounds. No wheezing or rales.   Chest:      Chest wall: No tenderness.   Abdominal:      General: There is no distension.      Palpations: Abdomen is soft.      Tenderness: There is no abdominal tenderness.   Musculoskeletal:         General: Normal range of motion.      Cervical back: Normal range of motion and neck supple.   Lymphadenopathy:      Cervical: No cervical adenopathy.   Skin:     General: Skin is warm and dry.      Capillary Refill: Capillary refill takes less than 2 seconds.      Findings: No rash.   Neurological:      Mental Status: She is alert and oriented to person, place, and time.      Motor: No weakness.      Coordination: Coordination normal.      Gait: Gait normal.   Psychiatric:         Mood and Affect: Mood normal.         Behavior: Behavior normal.         Thought Content: Thought content normal.         Judgment: Judgment normal.       Assessment:     1. Nausea    2. Primary osteoarthritis of right knee    3. Osteoarthritis of spine with radiculopathy, lumbar region    4. Essential hypertension      Plan:   Nausea  -     ondansetron (ZOFRAN-ODT) 8 MG TbDL; Take 1 tablet (8 mg total) by mouth every 8 (eight) hours as needed (nausea).  Dispense: 20 tablet; Refill: 0  -nausea due to amoxicillin. If persists once complete with the antibiotic contact me.   -finish abx as prescribed by dentist    Primary osteoarthritis of right knee  -     diclofenac sodium (VOLTAREN) 1 % Gel;  Apply 2 g topically 2 (two) times daily as needed (knee pain).  Dispense: 100 g; Refill: 0  -     meloxicam (MOBIC) 15 MG tablet; Take 1 tablet (15 mg total) by mouth daily as needed for Pain.  Dispense: 30 tablet; Refill: 0    Osteoarthritis of spine with radiculopathy, lumbar region  -     meloxicam (MOBIC) 15 MG tablet; Take 1 tablet (15 mg total) by mouth daily as needed for Pain.  Dispense: 30 tablet; Refill: 0    Essential hypertension  -nurses visit to recheck blood pressure.     Follow up if symptoms worsen or fail to improve.

## 2022-08-30 ENCOUNTER — TELEPHONE (OUTPATIENT)
Dept: NEUROLOGY | Facility: CLINIC | Age: 72
End: 2022-08-30
Payer: MEDICARE

## 2022-08-30 NOTE — TELEPHONE ENCOUNTER
Pt wants to know if she could have an extraction done on the left side top and bottom where she has an Meningioma of cerebellum

## 2022-08-31 NOTE — TELEPHONE ENCOUNTER
The Meningioma is not a contraindication for surgery but the patient could have possible increased confusion or delirium following the procedure related Major Neurocognitive disorder.

## 2022-09-06 ENCOUNTER — CLINICAL SUPPORT (OUTPATIENT)
Dept: INTERNAL MEDICINE | Facility: CLINIC | Age: 72
End: 2022-09-06
Payer: MEDICARE

## 2022-09-06 VITALS — DIASTOLIC BLOOD PRESSURE: 86 MMHG | SYSTOLIC BLOOD PRESSURE: 132 MMHG

## 2022-09-06 PROCEDURE — 99999 PR PBB SHADOW E&M-EST. PATIENT-LVL II: ICD-10-PCS | Mod: PBBFAC,,,

## 2022-09-06 PROCEDURE — 99999 PR PBB SHADOW E&M-EST. PATIENT-LVL II: CPT | Mod: PBBFAC,,,

## 2022-09-21 ENCOUNTER — HOSPITAL ENCOUNTER (EMERGENCY)
Facility: HOSPITAL | Age: 72
Discharge: HOME | End: 2022-09-22
Attending: EMERGENCY MEDICINE
Payer: MEDICARE

## 2022-09-21 DIAGNOSIS — R51.9 ACUTE NONINTRACTABLE HEADACHE, UNSPECIFIED HEADACHE TYPE: ICD-10-CM

## 2022-09-21 DIAGNOSIS — H11.32 SUBCONJUNCTIVAL HEMORRHAGE OF LEFT EYE: Primary | ICD-10-CM

## 2022-09-21 DIAGNOSIS — I10 PRIMARY HYPERTENSION: ICD-10-CM

## 2022-09-21 PROCEDURE — 99284 EMERGENCY DEPT VISIT MOD MDM: CPT | Mod: 25

## 2022-09-22 VITALS
TEMPERATURE: 99 F | BODY MASS INDEX: 38.44 KG/M2 | HEIGHT: 64 IN | DIASTOLIC BLOOD PRESSURE: 89 MMHG | OXYGEN SATURATION: 97 % | WEIGHT: 225.19 LBS | SYSTOLIC BLOOD PRESSURE: 130 MMHG | HEART RATE: 77 BPM | RESPIRATION RATE: 18 BRPM

## 2022-10-18 ENCOUNTER — OFFICE VISIT (OUTPATIENT)
Dept: INTERNAL MEDICINE | Facility: CLINIC | Age: 72
End: 2022-10-18
Payer: MEDICARE

## 2022-10-18 VITALS
HEART RATE: 55 BPM | BODY MASS INDEX: 38.12 KG/M2 | DIASTOLIC BLOOD PRESSURE: 88 MMHG | WEIGHT: 223.31 LBS | RESPIRATION RATE: 16 BRPM | HEIGHT: 64 IN | SYSTOLIC BLOOD PRESSURE: 132 MMHG | OXYGEN SATURATION: 96 %

## 2022-10-18 DIAGNOSIS — M47.26 OSTEOARTHRITIS OF SPINE WITH RADICULOPATHY, LUMBAR REGION: ICD-10-CM

## 2022-10-18 DIAGNOSIS — E66.01 SEVERE OBESITY WITH BODY MASS INDEX (BMI) OF 35.0 TO 39.9 WITH SERIOUS COMORBIDITY: ICD-10-CM

## 2022-10-18 DIAGNOSIS — I10 ESSENTIAL HYPERTENSION: ICD-10-CM

## 2022-10-18 DIAGNOSIS — M54.42 ACUTE LEFT-SIDED LOW BACK PAIN WITH LEFT-SIDED SCIATICA: Primary | ICD-10-CM

## 2022-10-18 PROCEDURE — 1125F AMNT PAIN NOTED PAIN PRSNT: CPT | Mod: CPTII,S$GLB,, | Performed by: FAMILY MEDICINE

## 2022-10-18 PROCEDURE — 4010F ACE/ARB THERAPY RXD/TAKEN: CPT | Mod: CPTII,S$GLB,, | Performed by: FAMILY MEDICINE

## 2022-10-18 PROCEDURE — 1125F PR PAIN SEVERITY QUANTIFIED, PAIN PRESENT: ICD-10-PCS | Mod: CPTII,S$GLB,, | Performed by: FAMILY MEDICINE

## 2022-10-18 PROCEDURE — 99214 OFFICE O/P EST MOD 30 MIN: CPT | Mod: S$GLB,,, | Performed by: FAMILY MEDICINE

## 2022-10-18 PROCEDURE — 3288F FALL RISK ASSESSMENT DOCD: CPT | Mod: CPTII,S$GLB,, | Performed by: FAMILY MEDICINE

## 2022-10-18 PROCEDURE — 99214 PR OFFICE/OUTPT VISIT, EST, LEVL IV, 30-39 MIN: ICD-10-PCS | Mod: S$GLB,,, | Performed by: FAMILY MEDICINE

## 2022-10-18 PROCEDURE — 1101F PR PT FALLS ASSESS DOC 0-1 FALLS W/OUT INJ PAST YR: ICD-10-PCS | Mod: CPTII,S$GLB,, | Performed by: FAMILY MEDICINE

## 2022-10-18 PROCEDURE — 3079F DIAST BP 80-89 MM HG: CPT | Mod: CPTII,S$GLB,, | Performed by: FAMILY MEDICINE

## 2022-10-18 PROCEDURE — 3288F PR FALLS RISK ASSESSMENT DOCUMENTED: ICD-10-PCS | Mod: CPTII,S$GLB,, | Performed by: FAMILY MEDICINE

## 2022-10-18 PROCEDURE — 99999 PR PBB SHADOW E&M-EST. PATIENT-LVL IV: ICD-10-PCS | Mod: PBBFAC,,, | Performed by: FAMILY MEDICINE

## 2022-10-18 PROCEDURE — 3075F SYST BP GE 130 - 139MM HG: CPT | Mod: CPTII,S$GLB,, | Performed by: FAMILY MEDICINE

## 2022-10-18 PROCEDURE — 3044F PR MOST RECENT HEMOGLOBIN A1C LEVEL <7.0%: ICD-10-PCS | Mod: CPTII,S$GLB,, | Performed by: FAMILY MEDICINE

## 2022-10-18 PROCEDURE — 1101F PT FALLS ASSESS-DOCD LE1/YR: CPT | Mod: CPTII,S$GLB,, | Performed by: FAMILY MEDICINE

## 2022-10-18 PROCEDURE — 1159F PR MEDICATION LIST DOCUMENTED IN MEDICAL RECORD: ICD-10-PCS | Mod: CPTII,S$GLB,, | Performed by: FAMILY MEDICINE

## 2022-10-18 PROCEDURE — 3044F HG A1C LEVEL LT 7.0%: CPT | Mod: CPTII,S$GLB,, | Performed by: FAMILY MEDICINE

## 2022-10-18 PROCEDURE — 1159F MED LIST DOCD IN RCRD: CPT | Mod: CPTII,S$GLB,, | Performed by: FAMILY MEDICINE

## 2022-10-18 PROCEDURE — 99999 PR PBB SHADOW E&M-EST. PATIENT-LVL IV: CPT | Mod: PBBFAC,,, | Performed by: FAMILY MEDICINE

## 2022-10-18 PROCEDURE — 3079F PR MOST RECENT DIASTOLIC BLOOD PRESSURE 80-89 MM HG: ICD-10-PCS | Mod: CPTII,S$GLB,, | Performed by: FAMILY MEDICINE

## 2022-10-18 PROCEDURE — 3075F PR MOST RECENT SYSTOLIC BLOOD PRESS GE 130-139MM HG: ICD-10-PCS | Mod: CPTII,S$GLB,, | Performed by: FAMILY MEDICINE

## 2022-10-18 PROCEDURE — 1160F PR REVIEW ALL MEDS BY PRESCRIBER/CLIN PHARMACIST DOCUMENTED: ICD-10-PCS | Mod: CPTII,S$GLB,, | Performed by: FAMILY MEDICINE

## 2022-10-18 PROCEDURE — 4010F PR ACE/ARB THEARPY RXD/TAKEN: ICD-10-PCS | Mod: CPTII,S$GLB,, | Performed by: FAMILY MEDICINE

## 2022-10-18 PROCEDURE — 1160F RVW MEDS BY RX/DR IN RCRD: CPT | Mod: CPTII,S$GLB,, | Performed by: FAMILY MEDICINE

## 2022-10-18 RX ORDER — GABAPENTIN 100 MG/1
100 CAPSULE ORAL NIGHTLY PRN
Qty: 30 CAPSULE | Refills: 1 | Status: SHIPPED | OUTPATIENT
Start: 2022-10-18 | End: 2022-11-04

## 2022-10-18 RX ORDER — AMOXICILLIN 500 MG/1
CAPSULE ORAL
COMMUNITY
Start: 2022-10-14 | End: 2022-12-20

## 2022-10-18 RX ORDER — METHYLPREDNISOLONE 4 MG/1
TABLET ORAL
Qty: 1 EACH | Refills: 0 | Status: SHIPPED | OUTPATIENT
Start: 2022-10-18 | End: 2022-11-04

## 2022-10-18 RX ORDER — HYDROCODONE BITARTRATE AND ACETAMINOPHEN 5; 325 MG/1; MG/1
1 TABLET ORAL EVERY 6 HOURS PRN
COMMUNITY
Start: 2022-10-14 | End: 2022-11-04

## 2022-10-18 NOTE — PROGRESS NOTES
"Subjective:       Patient ID: Beth Souza is a 72 y.o. female.    Chief Complaint: Leg Pain (Left)    72-year-old  female patient with Patient Active Problem List:     Essential hypertension     Vitamin D deficiency     Mixed hyperlipidemia     Cataract     Osteoarthritis of spine with radiculopathy, lumbar region     LEN on CPAP     Mild cognitive impairment with memory loss     Meningioma of cerebellum     Multiple thyroid nodules     History of COVID-19     Primary osteoarthritis of both hips     Calcification of aorta     History of hyperparathyroidism     Prediabetes  Here reports that patient started having severe acute left low back pain radiating to the left leg up to 10/10 since Saturday and does not recall having any injury or trauma or bending down to lift any heavy stuff.   Has been taking Norco as prescribed by her dentist but no relief noted.  Denies any trouble with bowel movements or urine    Review of Systems   Constitutional:  Negative for fatigue.   Eyes:  Negative for visual disturbance.   Respiratory:  Negative for shortness of breath.    Cardiovascular:  Negative for chest pain and leg swelling.   Gastrointestinal:  Negative for abdominal pain, nausea and vomiting.   Genitourinary:  Negative for difficulty urinating.   Musculoskeletal:  Positive for back pain and myalgias.   Skin:  Negative for rash.   Neurological:  Positive for numbness. Negative for weakness, light-headedness and headaches.   Psychiatric/Behavioral:  Negative for sleep disturbance.        /88 (BP Location: Right arm, Patient Position: Sitting, BP Method: Large (Manual))   Pulse (!) 55   Resp 16   Ht 5' 4" (1.626 m)   Wt 101.3 kg (223 lb 5.2 oz)   SpO2 96%   BMI 38.33 kg/m²   Objective:      Physical Exam  Constitutional:       Appearance: She is well-developed.   HENT:      Head: Normocephalic and atraumatic.   Cardiovascular:      Rate and Rhythm: Normal rate and regular rhythm.      " Heart sounds: Normal heart sounds. No murmur heard.  Pulmonary:      Effort: Pulmonary effort is normal.      Breath sounds: Normal breath sounds. No wheezing.   Abdominal:      General: Bowel sounds are normal.      Palpations: Abdomen is soft.      Tenderness: There is no abdominal tenderness.   Musculoskeletal:         General: Tenderness present.      Comments: Positive for paraspinal lumbar muscle tenderness on the left side with straight leg raise test positive   Skin:     General: Skin is warm and dry.      Findings: No rash.   Neurological:      Mental Status: She is alert and oriented to person, place, and time.   Psychiatric:         Mood and Affect: Mood normal.         Assessment/Plan:   1. Acute left-sided low back pain with left-sided sciatica  - gabapentin (NEURONTIN) 100 MG capsule; Take 1 capsule (100 mg total) by mouth nightly as needed (pain).  Dispense: 30 capsule; Refill: 1  - methylPREDNISolone (MEDROL DOSEPACK) 4 mg tablet; use as directed  Dispense: 1 each; Refill: 0  2. Osteoarthritis of spine with radiculopathy, lumbar region    Medrol Dosepak and gabapentin has been sent to the pharmacy  Patient was advised to do warm compresses and stretches  Sedation potential discussed with gabapentin    3. Essential hypertension  Blood pressure is stable currently on lisinopril hydrochlorothiazide 20/25 mg daily    4. Severe obesity with body mass index (BMI) of 35.0 to 39.9 with serious comorbidity   Lifestyle modifications discussed

## 2022-10-20 ENCOUNTER — TELEPHONE (OUTPATIENT)
Dept: INTERNAL MEDICINE | Facility: CLINIC | Age: 72
End: 2022-10-20
Payer: MEDICARE

## 2022-10-20 NOTE — TELEPHONE ENCOUNTER
----- Message from Edna Oliva MD sent at 10/20/2022 10:57 AM CDT -----  Contact: Beth  Please advise patient to increase gabapentin to 2 to 3 tablets at bedtime of 100 mg if 1 tablet of 100 mg has not been helpful and discuss further with pain management secondary to chronic pain with sciatica    Dr. Oliva  ----- Message -----  From: Mandy Palomo MA  Sent: 10/20/2022  10:44 AM CDT  To: Edna Oliva MD    Pt requesting medication for pain stating Gabapentin prescribed is not working for her. Please Advise.  ----- Message -----  From: Meghan Elder  Sent: 10/20/2022   8:55 AM CDT  To: [facility] Staff    Patient is calling to speak with the nurse regarding medication for pain. Explains medication gabapentin (NEURONTIN) 100 MG capsule is not working and requesting a different medication. Please give patient a call back at [phone] as requested.  Thanks  LR

## 2022-10-21 ENCOUNTER — TELEPHONE (OUTPATIENT)
Dept: PAIN MEDICINE | Facility: CLINIC | Age: 72
End: 2022-10-21
Payer: MEDICARE

## 2022-10-21 NOTE — TELEPHONE ENCOUNTER
Patient was scheduled for 11/8/22 @ 9:20 am @ [facility].           ----- Message from Michelle Newsome sent at 10/21/2022 10:38 AM CDT -----  Pt would like to schedule an appt for nerve pain. Call back number is [phone]. Thx. EL

## 2022-10-24 ENCOUNTER — HOSPITAL ENCOUNTER (EMERGENCY)
Facility: HOSPITAL | Age: 72
Discharge: HOME | End: 2022-10-24
Attending: EMERGENCY MEDICINE
Payer: MEDICARE

## 2022-10-24 ENCOUNTER — TELEPHONE (OUTPATIENT)
Dept: INTERNAL MEDICINE | Facility: CLINIC | Age: 72
End: 2022-10-24
Payer: MEDICARE

## 2022-10-24 VITALS
DIASTOLIC BLOOD PRESSURE: 66 MMHG | RESPIRATION RATE: 16 BRPM | BODY MASS INDEX: 37.61 KG/M2 | SYSTOLIC BLOOD PRESSURE: 135 MMHG | TEMPERATURE: 98 F | OXYGEN SATURATION: 97 % | WEIGHT: 219.13 LBS | HEART RATE: 66 BPM

## 2022-10-24 DIAGNOSIS — M54.32 SCIATICA OF LEFT SIDE: ICD-10-CM

## 2022-10-24 DIAGNOSIS — M47.26 OSTEOARTHRITIS OF SPINE WITH RADICULOPATHY, LUMBAR REGION: Primary | ICD-10-CM

## 2022-10-24 DIAGNOSIS — M79.605 LEFT LEG PAIN: ICD-10-CM

## 2022-10-24 PROCEDURE — 63600175 PHARM REV CODE 636 W HCPCS: Performed by: NURSE PRACTITIONER

## 2022-10-24 PROCEDURE — 96372 THER/PROPH/DIAG INJ SC/IM: CPT | Performed by: NURSE PRACTITIONER

## 2022-10-24 PROCEDURE — 99285 EMERGENCY DEPT VISIT HI MDM: CPT | Mod: 25

## 2022-10-24 RX ORDER — ORPHENADRINE CITRATE 30 MG/ML
30 INJECTION INTRAMUSCULAR; INTRAVENOUS
Status: DISCONTINUED | OUTPATIENT
Start: 2022-10-24 | End: 2022-10-24 | Stop reason: HOSPADM

## 2022-10-24 RX ORDER — DEXAMETHASONE SODIUM PHOSPHATE 4 MG/ML
4 INJECTION, SOLUTION INTRA-ARTICULAR; INTRALESIONAL; INTRAMUSCULAR; INTRAVENOUS; SOFT TISSUE
Status: COMPLETED | OUTPATIENT
Start: 2022-10-24 | End: 2022-10-24

## 2022-10-24 RX ADMIN — DEXAMETHASONE SODIUM PHOSPHATE 4 MG: 4 INJECTION INTRA-ARTICULAR; INTRALESIONAL; INTRAMUSCULAR; INTRAVENOUS; SOFT TISSUE at 01:10

## 2022-10-24 NOTE — ED PROVIDER NOTES
SCRIBE #1 NOTE: I, Naomi Lafleur, am scribing for, and in the presence of, Natividad Solorio MD. I have scribed the entire note.      History      Chief Complaint   Patient presents with    Hip Pain     Left hip pain with radiation down left leg to ankle, denies trauma.       Review of patient's allergies indicates:   Allergen Reactions    Aspirin Other (See Comments)     Bruising        HPI   HPI    10/24/2022, 12:47 PM   History obtained from the patient      History of Present Illness: Beth Souza is a 72 y.o. female patient with a PMHx of L-sided sciatica, HLD, HTN, lumbar spondylosis, and sleep apnea who presents to the Emergency Department for LLE pain from the hip down to the L ankle which onset gradually. She denies any recent trauma or injury. The pt reports that she was seen by her PCP for this complaint last week, dx with L-sided sciatica, and was prescribed gabapentin and steroids. She states that she if she takes 3 gabapentin before bed, that will help with her pain, but her pain will worsen in the morning. Now, the pt reports that the pain in her L thigh is not as bad, but she is still having pain from the L knee down to the L ankle. Symptoms are constant and moderate in severity. Pt reports that her pain is better with rest. No exacerbating factors reported. Associated sxs include L hip pain, L knee pain, L ankle pain, and gait difficulty secondary to pain. Patient denies any fever, chills, weakness, numbness, SOB, CP, and all other sxs at this time. No further complaints or concerns at this time.         Arrival mode: Personal vehicle     PCP: Edna Oliva MD       Past Medical History:  Past Medical History:   Diagnosis Date    Cataract     Hyperlipidemia     Hypertension     Lumbar spondylosis     Meningioma 2/18/2021    Obesity     Sleep apnea     Vitamin D deficiency        Past Surgical History:  Past Surgical History:   Procedure Laterality Date    HYSTERECTOMY      PARTIAL HYSTERECTOMY            Family History:  Family History   Problem Relation Age of Onset    Hypertension Mother     Diabetes Daughter     Melanoma Neg Hx     Psoriasis Neg Hx     Lupus Neg Hx     Eczema Neg Hx     Breast cancer Neg Hx     Colon cancer Neg Hx     Ovarian cancer Neg Hx     Thrombosis Neg Hx        Social History:  Social History     Tobacco Use    Smoking status: Former     Types: Cigarettes     Quit date: 2004     Years since quittin.8    Smokeless tobacco: Never   Substance and Sexual Activity    Alcohol use: Yes     Comment: occasionally    Drug use: No    Sexual activity: Not Currently     Partners: Male       ROS   Review of Systems   Constitutional:  Negative for chills and fever.   HENT:  Negative for sore throat.    Respiratory:  Negative for shortness of breath.    Cardiovascular:  Negative for chest pain.   Gastrointestinal:  Negative for nausea.   Genitourinary:  Negative for dysuria.   Musculoskeletal:  Positive for arthralgias (L hip, L knee, and L ankle), gait problem (difficulty secondary to pain) and myalgias (LLE). Negative for back pain.   Skin:  Negative for rash.   Neurological:  Negative for weakness and numbness.   Hematological:  Does not bruise/bleed easily.   All other systems reviewed and are negative.    Physical Exam      Initial Vitals [10/24/22 1233]   BP Pulse Resp Temp SpO2   135/66 66 16 98 °F (36.7 °C) 97 %      MAP       --          Physical Exam  Nursing Notes and Vital Signs Reviewed.  Constitutional: Patient is in no acute distress. Well-developed and well-nourished.  Head: Atraumatic. Normocephalic.  Eyes: PERRL. EOM intact. Conjunctivae are not pale. No scleral icterus.  ENT: Mucous membranes are moist. Oropharynx is clear and symmetric.    Neck: Supple. Full ROM. No lymphadenopathy.  Cardiovascular: Regular rate. Regular rhythm. No murmurs, rubs, or gallops. Distal pulses are 2+ and symmetric.  Pulmonary/Chest: No respiratory distress. Clear to auscultation  bilaterally. No wheezing or rales.  Abdominal: Soft and non-distended.  There is no tenderness.  No rebound, guarding, or rigidity.   Musculoskeletal: Moves all extremities. No obvious deformities. No edema.  Skin: Warm and dry.  Neurological:  Alert, awake, and appropriate. Negative straight leg test on the L and R. Normal speech.  No acute focal neurological deficits are appreciated.  Psychiatric: Normal affect. Good eye contact. Appropriate in content.    ED Course    Procedures  ED Vital Signs:  Vitals:    10/24/22 1233   BP: 135/66   Pulse: 66   Resp: 16   Temp: 98 °F (36.7 °C)   TempSrc: Oral   SpO2: 97%   Weight: 99.4 kg (219 lb 2.2 oz)       Abnormal Lab Results:  Labs Reviewed - No data to display       Imaging Results:  Imaging Results              X-Ray Lumbar Spine Ap And Lateral (Final result)  Result time 10/24/22 13:45:56      Final result by Amador Fernandes MD (10/24/22 13:45:56)                   Impression:      No acute abnormality.      Electronically signed by: Amador Fernandes  Date:    10/24/2022  Time:    13:45               Narrative:    EXAMINATION:  XR LUMBAR SPINE AP AND LATERAL    CLINICAL HISTORY:  lumbar back pain;    TECHNIQUE:  Three views of the lumbar spine were performed.    COMPARISON:  11/12/2021    FINDINGS:  Unchanged grade 1 anterolisthesis of L4 on L5.  No acute fracture or dislocation.  Mild multilevel discogenic degenerative change, with severe discogenic changes at L5-S1 similar to prior exam.  Moderate severe lower lumbar hypertrophic facet arthropathy.                                       US Lower Extremity Veins Left (Final result)  Result time 10/24/22 14:11:13      Final result by Amador Fernandes MD (10/24/22 14:11:13)                   Impression:      No evidence of deep venous thrombosis in the left lower extremity.      Electronically signed by: Amador Fernandes  Date:    10/24/2022  Time:    14:11               Narrative:    EXAMINATION:  US LOWER EXTREMITY VEINS  LEFT    CLINICAL HISTORY:  Pain in left leg    TECHNIQUE:  Duplex and color flow Doppler evaluation and graded compression of the left lower extremity veins was performed.    COMPARISON:  None    FINDINGS:  Left thigh veins: The common femoral, femoral, popliteal, upper greater saphenous, and deep femoral veins are patent and free of thrombus. The veins are normally compressible and have normal phasic flow and augmentation response.    Left calf veins: The visualized calf veins are patent.    Contralateral CFV: The contralateral (right) common femoral vein is patent and free of thrombus.    Miscellaneous: None                                              The Emergency Provider reviewed the vital signs and test results, which are outlined above.    ED Discussion     2:43 PM: Reassessed pt at this time.  Discussed with pt all pertinent ED information and results. Discussed pt dx and plan of tx. Gave pt all f/u and return to the ED instructions. All questions and concerns were addressed at this time. Pt expresses understanding of information and instructions, and is comfortable with plan to discharge. Pt is stable for discharge.    I discussed with patient and/or family/caretaker that evaluation in the ED does not suggest any emergent or life threatening medical conditions requiring immediate intervention beyond what was provided in the ED, and I believe patient is safe for discharge.  Regardless, an unremarkable evaluation in the ED does not preclude the development or presence of a serious of life threatening condition. As such, patient was instructed to return immediately for any worsening or change in current symptoms.           ED Medication(s):  Medications   orphenadrine injection 30 mg (0 mg Intramuscular Hold 10/24/22 1245)   dexAMETHasone injection 4 mg (4 mg Intramuscular Given 10/24/22 1311)     New Prescriptions    No medications on file        Follow-up Information       Edna Oliva MD. Schedule an  appointment as soon as possible for a visit in 2 days.    Specialty: Family Medicine  Contact information:  [address]  [place] [address] [phone]                               Medical Decision Making    Medical Decision Making:   Clinical Tests:   Radiological Study: Ordered and Reviewed         Scribe Attestation:   Scribe #1: I performed the above scribed service and the documentation accurately describes the services I performed. I attest to the accuracy of the note.    Attending:   Physician Attestation Statement for Scribe #1: I, Natividad Solorio MD, personally performed the services described in this documentation, as scribed by Naomi Lafleur, in my presence, and it is both accurate and complete.          Clinical Impression       ICD-10-CM ICD-9-CM   1. Osteoarthritis of spine with radiculopathy, lumbar region  M47.26 721.3   2. Left leg pain  M79.605 729.5   3. Sciatica of left side  M54.32 724.3       Disposition:   Disposition: Discharged  Condition: Stable       Natividad Solorio MD  10/24/22 1534

## 2022-10-24 NOTE — FIRST PROVIDER EVALUATION
Medical screening examination initiated.  I have conducted a focused provider triage encounter, findings are as follows:    Brief history of present illness:  left leg pain. Reports hx of sciatica     There were no vitals filed for this visit.    Pertinent physical exam:  nad    Brief workup plan:  meds     Preliminary workup initiated; this workup will be continued and followed by the physician or advanced practice provider that is assigned to the patient when roomed.

## 2022-10-24 NOTE — TELEPHONE ENCOUNTER
----- Message from Nedra Smith sent at 10/24/2022  8:39 AM CDT -----  Contact: Beth  Type:  Needs Medical Advice    Who Called: Beth   Symptoms (please be specific):  ongoing pain  How long has patient had these symptoms:   2 weeks     Pharmacy name and phone #:    [id number] – [place], [place] - [address]  [address] [facility] [address]  Phone: [phone] Fax: [phone]     Would the patient rather a call back or a response via My [employer]? call  Best Call Back Number: [phone] (home) [phone] (work)   Additional Information: the patient says the medication is not helping with pain at all and asks if she can stop taking the RX: gabapentin (NEURONTIN) 100 MG capsule or and asks if she need be weaned off of it. The pain was from her hip to the ankle and now it the from the knee to ankle

## 2022-10-27 NOTE — PROGRESS NOTES
"Subjective:       Patient ID: Beth Souza is a 67 y.o. female.    Chief Complaint: Follow-up    67-year-old  female patient with Patient Active Problem List:     Hypertension     Vitamin D deficiency     Spondylolisthesis of lumbar region     Hyperparathyroidism, primary     Thyroid nodule     Peripheral polyneuropathy     Cataract     Severe obesity (BMI 35.0-39.9)  Here complains of chronic fatigue.  Has been taking vitamin-D by prescription weekly, patient has been taking her blood pressure medication regularly.   Has been staying physically active but continues to have chronic fatigue  Denies of any night sweats, occasionally has leg cramps.       Review of Systems   Constitutional: Positive for fatigue.   Eyes: Negative for visual disturbance.   Respiratory: Negative for shortness of breath.    Cardiovascular: Negative for chest pain and leg swelling.   Gastrointestinal: Negative for abdominal pain, nausea and vomiting.   Musculoskeletal: Positive for myalgias.   Skin: Negative for rash.   Neurological: Negative for light-headedness and headaches.   Psychiatric/Behavioral: Negative for sleep disturbance.         /84   Pulse (!) 56   Temp 97.9 °F (36.6 °C) (Tympanic)   Ht 5' 5" (1.651 m)   Wt 100.5 kg (221 lb 9 oz)   SpO2 98%   BMI 36.87 kg/m²   Objective:      Physical Exam   Constitutional: She is oriented to person, place, and time. She appears well-developed and well-nourished.   HENT:   Head: Normocephalic and atraumatic.   Mouth/Throat: Oropharynx is clear and moist.   Cardiovascular: Normal rate, regular rhythm and normal heart sounds.    No murmur heard.  Pulmonary/Chest: Effort normal and breath sounds normal. She has no wheezes.   Abdominal: Soft. Bowel sounds are normal. There is no tenderness.   Musculoskeletal: She exhibits no edema.   Neurological: She is alert and oriented to person, place, and time.   Skin: Skin is warm and dry. No rash noted.   Psychiatric: " She has a normal mood and affect.         Assessment:       1. Chronic fatigue    2. Essential hypertension    3. Vitamin D deficiency    4. Hyperparathyroidism, primary    5. Spondylolisthesis of lumbar region    6. Thyroid nodule    7. Severe obesity (BMI 35.0-39.9)    8. Need for prophylactic vaccination with Streptococcus pneumoniae (Pneumococcus) and Influenza vaccines        Plan:   Chronic fatigue  -     CBC auto differential; Future; Expected date: 05/14/2018  Will check further labs to identify etiology for chronic fatigue  Continue vitamin-D by prescription weekly  Encouraged to eat protein rich diet and stay physically active    Essential hypertension  -     Comprehensive metabolic panel; Future; Expected date: 05/14/2018  -     Lipid panel; Future; Expected date: 05/14/2018  -     TSH; Future; Expected date: 05/14/2018  -     Urinalysis; Future; Expected date: 05/14/2018  Blood pressure stable today currently on lisinopril hydrochlorothiazide 20/20 5 mg daily  Restrict salt intake    Vitamin D deficiency  -     Vitamin D; Future; Expected date: 05/14/2018  On prescription vitamin-D weekly    Hyperparathyroidism, primary  -     PTH, intact; Future; Expected date: 05/14/2018  Spondylolisthesis of lumbar region  Thyroid nodule  -     TSH; Future; Expected date: 05/14/2018  Stable.  Will check further labs    Severe obesity (BMI 35.0-39.9)-life style modifications recommended to lose weight BMI 36    Need for prophylactic vaccination with Streptococcus pneumoniae (Pneumococcus) and Influenza vaccines  -     (In Office Administered) Pneumococcal Polysaccharide Vaccine (23 Valent) (SQ/IM)  Pneumovax given today to finish pneumococcal series       Instructions: This plan will send the code FBSE to the PM system.  DO NOT or CHANGE the price. Price (Do Not Change): 0.00 Detail Level: Simple Body Of Note (Please Add Your Own Text Here): S/p TBSE today (except genitals - patient declined)

## 2022-10-31 ENCOUNTER — OFFICE VISIT (OUTPATIENT)
Dept: ORTHOPEDICS | Facility: CLINIC | Age: 72
End: 2022-10-31
Payer: MEDICARE

## 2022-10-31 ENCOUNTER — HOSPITAL ENCOUNTER (OUTPATIENT)
Dept: RADIOLOGY | Facility: HOSPITAL | Age: 72
Discharge: HOME | End: 2022-10-31
Attending: PHYSICIAN ASSISTANT
Payer: MEDICARE

## 2022-10-31 VITALS — WEIGHT: 219.13 LBS | BODY MASS INDEX: 37.41 KG/M2 | HEIGHT: 64 IN

## 2022-10-31 DIAGNOSIS — M25.559 HIP PAIN: ICD-10-CM

## 2022-10-31 DIAGNOSIS — M47.26 OSTEOARTHRITIS OF SPINE WITH RADICULOPATHY, LUMBAR REGION: ICD-10-CM

## 2022-10-31 PROCEDURE — 1159F MED LIST DOCD IN RCRD: CPT | Mod: CPTII,S$GLB,, | Performed by: PHYSICIAN ASSISTANT

## 2022-10-31 PROCEDURE — 1160F PR REVIEW ALL MEDS BY PRESCRIBER/CLIN PHARMACIST DOCUMENTED: ICD-10-PCS | Mod: CPTII,S$GLB,, | Performed by: PHYSICIAN ASSISTANT

## 2022-10-31 PROCEDURE — 99999 PR PBB SHADOW E&M-EST. PATIENT-LVL IV: CPT | Mod: PBBFAC,,, | Performed by: PHYSICIAN ASSISTANT

## 2022-10-31 PROCEDURE — 4010F ACE/ARB THERAPY RXD/TAKEN: CPT | Mod: CPTII,S$GLB,, | Performed by: PHYSICIAN ASSISTANT

## 2022-10-31 PROCEDURE — 1101F PR PT FALLS ASSESS DOC 0-1 FALLS W/OUT INJ PAST YR: ICD-10-PCS | Mod: CPTII,S$GLB,, | Performed by: PHYSICIAN ASSISTANT

## 2022-10-31 PROCEDURE — 1160F RVW MEDS BY RX/DR IN RCRD: CPT | Mod: CPTII,S$GLB,, | Performed by: PHYSICIAN ASSISTANT

## 2022-10-31 PROCEDURE — 1125F AMNT PAIN NOTED PAIN PRSNT: CPT | Mod: CPTII,S$GLB,, | Performed by: PHYSICIAN ASSISTANT

## 2022-10-31 PROCEDURE — 99214 OFFICE O/P EST MOD 30 MIN: CPT | Mod: S$GLB,,, | Performed by: PHYSICIAN ASSISTANT

## 2022-10-31 PROCEDURE — 73521 XR HIPS BILATERAL 2 VIEW INCL AP PELVIS: ICD-10-PCS | Mod: 26,,, | Performed by: RADIOLOGY

## 2022-10-31 PROCEDURE — 3288F FALL RISK ASSESSMENT DOCD: CPT | Mod: CPTII,S$GLB,, | Performed by: PHYSICIAN ASSISTANT

## 2022-10-31 PROCEDURE — 3044F HG A1C LEVEL LT 7.0%: CPT | Mod: CPTII,S$GLB,, | Performed by: PHYSICIAN ASSISTANT

## 2022-10-31 PROCEDURE — 1101F PT FALLS ASSESS-DOCD LE1/YR: CPT | Mod: CPTII,S$GLB,, | Performed by: PHYSICIAN ASSISTANT

## 2022-10-31 PROCEDURE — 3288F PR FALLS RISK ASSESSMENT DOCUMENTED: ICD-10-PCS | Mod: CPTII,S$GLB,, | Performed by: PHYSICIAN ASSISTANT

## 2022-10-31 PROCEDURE — 73521 X-RAY EXAM HIPS BI 2 VIEWS: CPT | Mod: 26,,, | Performed by: RADIOLOGY

## 2022-10-31 PROCEDURE — 99214 PR OFFICE/OUTPT VISIT, EST, LEVL IV, 30-39 MIN: ICD-10-PCS | Mod: S$GLB,,, | Performed by: PHYSICIAN ASSISTANT

## 2022-10-31 PROCEDURE — 3044F PR MOST RECENT HEMOGLOBIN A1C LEVEL <7.0%: ICD-10-PCS | Mod: CPTII,S$GLB,, | Performed by: PHYSICIAN ASSISTANT

## 2022-10-31 PROCEDURE — 4010F PR ACE/ARB THEARPY RXD/TAKEN: ICD-10-PCS | Mod: CPTII,S$GLB,, | Performed by: PHYSICIAN ASSISTANT

## 2022-10-31 PROCEDURE — 1125F PR PAIN SEVERITY QUANTIFIED, PAIN PRESENT: ICD-10-PCS | Mod: CPTII,S$GLB,, | Performed by: PHYSICIAN ASSISTANT

## 2022-10-31 PROCEDURE — 1159F PR MEDICATION LIST DOCUMENTED IN MEDICAL RECORD: ICD-10-PCS | Mod: CPTII,S$GLB,, | Performed by: PHYSICIAN ASSISTANT

## 2022-10-31 PROCEDURE — 99999 PR PBB SHADOW E&M-EST. PATIENT-LVL IV: ICD-10-PCS | Mod: PBBFAC,,, | Performed by: PHYSICIAN ASSISTANT

## 2022-10-31 PROCEDURE — 73521 X-RAY EXAM HIPS BI 2 VIEWS: CPT | Mod: TC

## 2022-10-31 RX ORDER — PREGABALIN 25 MG/1
25 CAPSULE ORAL 2 TIMES DAILY
Qty: 30 CAPSULE | Refills: 0 | Status: SHIPPED | OUTPATIENT
Start: 2022-10-31 | End: 2022-11-08 | Stop reason: SDUPTHER

## 2022-10-31 NOTE — PROGRESS NOTES
Patient ID: Beth Souza is a 72 y.o. female.    Chief Complaint: Pain and Swelling of the Left Knee      HPI: Beth Souza  is a 72 y.o. female who c/o Pain and Swelling of the Left Knee     Ms. Gillette presents to me today as a new patient with chief complaint of bilateral knee pain, right greater than left.  She notes pain is at worst a 10/10.  She notes onset has been on for a while roughly 2 months in nature.  She states the pain last all day with all activities never stops hurting.  She notes she is unable to stand long distances, walk long distances, go from a sitting to standing or standing to sitting position as easily as she could before, walk on level terrain or stairs.  She is not using any braces to assist in ambulation or using any assistant devices.  She has not tried any formal PT program.   She was given a Medrol Dosepak by outside provider and states that this helped very minimally to ease the pain  She takes Tylenol as needed, we discussed taking 650 mg up to 3 times a day    She prefers to take a very conservative approach which we discussed in detail today.    She is not a candidate for any NSAIDS that she has an aspirin allergy.  She has not previously received any injections into her knees    Patient is presently denying any shortness of breath, chest pain, fever/chills, nausea/vomiting, loss of taste or smell, numbness/tingling or sensation changes, loss of bladder or bowel function.    10/31/2022  Patient presents today with chief complaint of left hip pain.  Patient states she knee went to the emergency room on 10/24/2022 and received a dexamethasone injection into her left hip.  She states the pain in the hip started roughly 1 month ago.  She denies any trauma.  She states the pain is 10/10 radiating from her left low back/hip down the side of the hip down the side of the leg into the left knee and across the shin.  She notes the pain in the left hip overrides the pain in  the the knees for the most part.  She states she stopped taking the gabapentin she did not note any improvement.  Additionally she has stopped taking the Medrol Dosepak she notes minimal at best relief if any.  She is unable to take NSAIDs secondary to aspirin allergy.  Additionally she is using Voltaren gel on her knees which she does take provide some relief but she has not tried it on her back.  She did try to get a topical pain patch to her back but did not provide further relief.  She states she finally took 1 of her daughters pain pills last night and was able to rest from 9-2 roughly but awoke at 2:00 a.m. with the intense pain again.  She additionally has been trying heat throughout the day.  She notes the pain is now starting to radiate to numbness and tingling to the bottom of her feet like they are going to sleep at times.    Patient is presently denying any shortness of breath, chest pain, fever/chills, nausea/vomiting, loss of taste or smell, numbness/tingling or sensation changes, loss of bladder or bowel function, loss of taste/smell.       Past Medical History:   Diagnosis Date    Cataract     Hyperlipidemia     Hypertension     Lumbar spondylosis     Meningioma 2021    Obesity     Sleep apnea     Vitamin D deficiency        Past Surgical History:   Procedure Laterality Date    HYSTERECTOMY      PARTIAL HYSTERECTOMY         Family History   Problem Relation Age of Onset    Hypertension Mother     Diabetes Daughter     Melanoma Neg Hx     Psoriasis Neg Hx     Lupus Neg Hx     Eczema Neg Hx     Breast cancer Neg Hx     Colon cancer Neg Hx     Ovarian cancer Neg Hx     Thrombosis Neg Hx        Social History     Socioeconomic History    Marital status:     Number of children: 3   Occupational History    Occupation: Cleaning houses     Employer: not employed   Tobacco Use    Smoking status: Former     Types: Cigarettes     Quit date: 2004     Years since quittin.8    Smokeless  tobacco: Never   Substance and Sexual Activity    Alcohol use: Yes     Comment: occasionally    Drug use: No    Sexual activity: Not Currently     Partners: Male   Other Topics Concern    Are you pregnant or think you may be? No    Breast-feeding No   Social History Narrative    Live w/ alone      Social Determinants of Health     Financial Resource Strain: Low Risk     Difficulty of Paying Living Expenses: Not hard at all   Food Insecurity: No Food Insecurity    Worried About Running Out of Food in the Last Year: Never true    Ran Out of Food in the Last Year: Never true   Transportation Needs: No Transportation Needs    Lack of Transportation (Medical): No    Lack of Transportation (Non-Medical): No   Physical Activity: Inactive    Days of Exercise per Week: 0 days    Minutes of Exercise per Session: 0 min   Stress: No Stress Concern Present    Feeling of Stress : Not at all   Social Connections: Moderately Isolated    Frequency of Communication with Friends and Family: More than three times a week    Frequency of Social Gatherings with Friends and Family: More than three times a week    Attends Mu-ism Services: More than 4 times per year    Active Member of Clubs or Organizations: No    Attends Club or Organization Meetings: Never    Marital Status:    Housing Stability: Low Risk     Unable to Pay for Housing in the Last Year: No    Number of Places Lived in the Last Year: 1    Unstable Housing in the Last Year: No       Medication List with Changes/Refills   New Medications    PREGABALIN (LYRICA) 25 MG CAPSULE    Take 1 capsule (25 mg total) by mouth 2 (two) times daily.   Current Medications    ACETAMINOPHEN (TYLENOL) 500 MG TABLET    Take 500 mg by mouth daily as needed for Pain.    AMOXICILLIN (AMOXIL) 500 MG CAPSULE    Take by mouth.    ATORVASTATIN (LIPITOR) 20 MG TABLET    Take 1 tablet by mouth once daily    DICLOFENAC SODIUM (VOLTAREN) 1 % GEL    Apply 2 g topically 2 (two) times daily as  needed (knee pain).    ERGOCALCIFEROL (ERGOCALCIFEROL) 50,000 UNIT CAP    Take 1 capsule (50,000 Units total) by mouth every 7 days.    ESTRADIOL (VAGIFEM) 10 MCG TAB    Insert one tablet vaginally twice per week. Insert it before bed. No sex on the night of insertion    FLUTICASONE PROPIONATE (FLONASE) 50 MCG/ACTUATION NASAL SPRAY    1 spray (50 mcg total) by Each Nostril route once daily.    GABAPENTIN (NEURONTIN) 100 MG CAPSULE    Take 1 capsule (100 mg total) by mouth nightly as needed (pain).    HYDROCODONE-ACETAMINOPHEN (NORCO) 5-325 MG PER TABLET    Take 1 tablet by mouth every 6 (six) hours as needed.    LISINOPRIL-HYDROCHLOROTHIAZIDE (PRINZIDE,ZESTORETIC) 20-25 MG TAB    Take 1 tablet by mouth once daily    METHYLPREDNISOLONE (MEDROL DOSEPACK) 4 MG TABLET    use as directed    ONDANSETRON (ZOFRAN-ODT) 8 MG TBDL    Take 1 tablet (8 mg total) by mouth every 8 (eight) hours as needed (nausea).       Review of patient's allergies indicates:   Allergen Reactions    Aspirin Other (See Comments)     Bruising         Objective:      Lower Extremity    HIP:  Pelvis equal   Pain to palpation along the bursa and trochanters   Groin pain not reproduced with internal and external rotation  Abbductors/Adductors 4+/5  Quad resistance 5 bilaterally    Left KNEE:  ROM: passive flex/ ext full  Patella midling, crepitus noted   Ligaments stable  Pain on palpation not seen in any positioning  Calf NT, soft  (-) Drew sign  DF/PF full  Wiggles toes  Sensation intact to light touch   No pitting edema appreciated   NVI  Cap refill < 2 sec    Right KNEE:  ROM: passive flex/ ext fall  Patella midling, moderate crepitus noted   Ligaments stable  Pain on palpation to medial aspect  Calf NT, soft  (-) Drew sign  DF/PF full  Wiggles toes  Sensation intact to light touch   No pitting edema appreciated   NVI  Cap refill < 2 sec    Skin warm to touch, no obvious lesion noted     IMAGING:    XRAY:  FINDINGS:  No right knee fracture.  Mild  degenerative changes involving the right knee medial and patellofemoral compartments.  No osseous erosion or suspicious osseous lesion.  No definite right knee effusion.  No acute soft tissue abnormality is identified.    Kellgren Ish scale : 2     Xray of lumbar  FINDINGS:  Unchanged grade 1 anterolisthesis of L4 on L5.  No acute fracture or dislocation.  Mild multilevel discogenic degenerative change, with severe discogenic changes at L5-S1 similar to prior exam.  Moderate severe lower lumbar hypertrophic facet arthropathy.     Impression:     No acute abnormality.     Assessment:       Encounter Diagnoses   Name Primary?    Osteoarthritis of spine with radiculopathy, lumbar region     Hip pain           Plan:       Beth was seen today for pain and swelling.    Diagnoses and all orders for this visit:    Osteoarthritis of spine with radiculopathy, lumbar region  -     X-Ray Hips Bilateral 2 View Incl AP Pelvis; Future  -     Ambulatory referral/consult to [facility] Sp[facility] Clinic; Future    Hip pain  -     X-Ray Hips Bilateral 2 View Incl AP Pelvis; Future    Other orders  -     Cancel: Large Joint Aspiration/Injection  -     pregabalin (LYRICA) 25 MG capsule; Take 1 capsule (25 mg total) by mouth 2 (two) times daily.      Beth Souza presents to me today with chief complaint of left low back/hip pain radiating left knee pain.  I do believe she is presently in seeing exacerbation of sciatica.  She has not had any relief with dexamethasone injection, Medrol Dosepak, gabapentin.  She is not a candidate for NSAIDs secondary to aspirin allergy.  She will continue Tylenol up to 650 mg 3 times a day as needed and voltaren gel.  Additionally I would like the patient to try a short course of Lyrica to see if this would provide any additional nerve relief.  I would also like to get a referral and for her to see her back in spine clinic as she states has been quite awhile since she has seen anyone for her back.  I also want to obtain bilateral hip xrays today for further evaluation.      For the knees, We had a long discussion today regarding degenerative arthritis in the knees. The patient understands that arthritis is chronic and will worsen over time.  The patient also understands that arthritis may cause episodic flare-ups in pain. Management or if arthritis is achieved through a multi-modal approach including weight loss in obese individuals, activity modification, NSAIDs (topical vs oral) where appropriate, periodic intra-articular steroid injections, viscosupplementation, physical therapy, knee bracing, ambulatory aids, as well as geniculate nerve blocks.  we will hold off on any further injections in the knee today so that she may receive injections in the back if needed. She will return in 3 months.     Dr. Bray is aware of the patient & current presentation. He agrees with the current plan above.     Patient verbalized understanding of all instructions and agreed with the above plan.    No follow-ups on file.    The patient understands, chooses and consents to this plan and accepts all   the risks which include but are not limited to the risks mentioned above.     Disclaimer: This note was prepared using a voice recognition system and is likely to have sound alike errors within the text.

## 2022-11-04 ENCOUNTER — OFFICE VISIT (OUTPATIENT)
Dept: INTERNAL MEDICINE | Facility: CLINIC | Age: 72
End: 2022-11-04
Payer: MEDICARE

## 2022-11-04 VITALS
DIASTOLIC BLOOD PRESSURE: 74 MMHG | HEIGHT: 64 IN | WEIGHT: 222 LBS | SYSTOLIC BLOOD PRESSURE: 138 MMHG | BODY MASS INDEX: 37.9 KG/M2 | HEART RATE: 55 BPM | TEMPERATURE: 98 F | OXYGEN SATURATION: 93 %

## 2022-11-04 DIAGNOSIS — M47.26 OSTEOARTHRITIS OF SPINE WITH RADICULOPATHY, LUMBAR REGION: Primary | ICD-10-CM

## 2022-11-04 DIAGNOSIS — M16.0 PRIMARY OSTEOARTHRITIS OF BOTH HIPS: ICD-10-CM

## 2022-11-04 DIAGNOSIS — E66.01 SEVERE OBESITY WITH BODY MASS INDEX (BMI) OF 35.0 TO 39.9 WITH SERIOUS COMORBIDITY: ICD-10-CM

## 2022-11-04 PROCEDURE — 1160F PR REVIEW ALL MEDS BY PRESCRIBER/CLIN PHARMACIST DOCUMENTED: ICD-10-PCS | Mod: CPTII,S$GLB,, | Performed by: FAMILY MEDICINE

## 2022-11-04 PROCEDURE — 99214 OFFICE O/P EST MOD 30 MIN: CPT | Mod: S$GLB,,, | Performed by: FAMILY MEDICINE

## 2022-11-04 PROCEDURE — 1159F MED LIST DOCD IN RCRD: CPT | Mod: CPTII,S$GLB,, | Performed by: FAMILY MEDICINE

## 2022-11-04 PROCEDURE — 1125F PR PAIN SEVERITY QUANTIFIED, PAIN PRESENT: ICD-10-PCS | Mod: CPTII,S$GLB,, | Performed by: FAMILY MEDICINE

## 2022-11-04 PROCEDURE — 3288F PR FALLS RISK ASSESSMENT DOCUMENTED: ICD-10-PCS | Mod: CPTII,S$GLB,, | Performed by: FAMILY MEDICINE

## 2022-11-04 PROCEDURE — 3075F SYST BP GE 130 - 139MM HG: CPT | Mod: CPTII,S$GLB,, | Performed by: FAMILY MEDICINE

## 2022-11-04 PROCEDURE — 3008F PR BODY MASS INDEX (BMI) DOCUMENTED: ICD-10-PCS | Mod: CPTII,S$GLB,, | Performed by: FAMILY MEDICINE

## 2022-11-04 PROCEDURE — 99999 PR PBB SHADOW E&M-EST. PATIENT-LVL IV: CPT | Mod: PBBFAC,,, | Performed by: FAMILY MEDICINE

## 2022-11-04 PROCEDURE — 3044F HG A1C LEVEL LT 7.0%: CPT | Mod: CPTII,S$GLB,, | Performed by: FAMILY MEDICINE

## 2022-11-04 PROCEDURE — 3075F PR MOST RECENT SYSTOLIC BLOOD PRESS GE 130-139MM HG: ICD-10-PCS | Mod: CPTII,S$GLB,, | Performed by: FAMILY MEDICINE

## 2022-11-04 PROCEDURE — 99214 PR OFFICE/OUTPT VISIT, EST, LEVL IV, 30-39 MIN: ICD-10-PCS | Mod: S$GLB,,, | Performed by: FAMILY MEDICINE

## 2022-11-04 PROCEDURE — 1101F PT FALLS ASSESS-DOCD LE1/YR: CPT | Mod: CPTII,S$GLB,, | Performed by: FAMILY MEDICINE

## 2022-11-04 PROCEDURE — 1101F PR PT FALLS ASSESS DOC 0-1 FALLS W/OUT INJ PAST YR: ICD-10-PCS | Mod: CPTII,S$GLB,, | Performed by: FAMILY MEDICINE

## 2022-11-04 PROCEDURE — 3078F PR MOST RECENT DIASTOLIC BLOOD PRESSURE < 80 MM HG: ICD-10-PCS | Mod: CPTII,S$GLB,, | Performed by: FAMILY MEDICINE

## 2022-11-04 PROCEDURE — 99999 PR PBB SHADOW E&M-EST. PATIENT-LVL IV: ICD-10-PCS | Mod: PBBFAC,,, | Performed by: FAMILY MEDICINE

## 2022-11-04 PROCEDURE — 4010F PR ACE/ARB THEARPY RXD/TAKEN: ICD-10-PCS | Mod: CPTII,S$GLB,, | Performed by: FAMILY MEDICINE

## 2022-11-04 PROCEDURE — 3008F BODY MASS INDEX DOCD: CPT | Mod: CPTII,S$GLB,, | Performed by: FAMILY MEDICINE

## 2022-11-04 PROCEDURE — 3044F PR MOST RECENT HEMOGLOBIN A1C LEVEL <7.0%: ICD-10-PCS | Mod: CPTII,S$GLB,, | Performed by: FAMILY MEDICINE

## 2022-11-04 PROCEDURE — 4010F ACE/ARB THERAPY RXD/TAKEN: CPT | Mod: CPTII,S$GLB,, | Performed by: FAMILY MEDICINE

## 2022-11-04 PROCEDURE — 1159F PR MEDICATION LIST DOCUMENTED IN MEDICAL RECORD: ICD-10-PCS | Mod: CPTII,S$GLB,, | Performed by: FAMILY MEDICINE

## 2022-11-04 PROCEDURE — 1160F RVW MEDS BY RX/DR IN RCRD: CPT | Mod: CPTII,S$GLB,, | Performed by: FAMILY MEDICINE

## 2022-11-04 PROCEDURE — 3288F FALL RISK ASSESSMENT DOCD: CPT | Mod: CPTII,S$GLB,, | Performed by: FAMILY MEDICINE

## 2022-11-04 PROCEDURE — 1125F AMNT PAIN NOTED PAIN PRSNT: CPT | Mod: CPTII,S$GLB,, | Performed by: FAMILY MEDICINE

## 2022-11-04 PROCEDURE — 3078F DIAST BP <80 MM HG: CPT | Mod: CPTII,S$GLB,, | Performed by: FAMILY MEDICINE

## 2022-11-04 NOTE — PROGRESS NOTES
"Subjective:       Patient ID: Beth Souza is a 72 y.o. female.    Chief Complaint: 6m follow up    72-year-old  female patient with Patient Active Problem List:     Essential hypertension     Vitamin D deficiency     Mixed hyperlipidemia     Cataract     Osteoarthritis of spine with radiculopathy, lumbar region     LEN on CPAP     Mild cognitive impairment with memory loss     Meningioma of cerebellum     Multiple thyroid nodules     History of COVID-19     Primary osteoarthritis of both hips     Calcification of aorta     History of hyperparathyroidism     Prediabetes  Here reports that patient has been having left-sided low back pain radiating to the left lower leg off and on lately for the past 1 month and has tried taking gabapentin with no relief and had seen orthopedic physician recently and was changed Lyrica 25 mg twice daily but no improvement noted.  No recent falls or injuries reported and reports pain up to 5 to 6/10.  Denies any bowel or bladder accidents    Review of Systems   Constitutional:  Negative for fatigue.   Eyes:  Negative for visual disturbance.   Respiratory:  Negative for shortness of breath.    Cardiovascular:  Negative for chest pain and leg swelling.   Gastrointestinal:  Negative for abdominal pain, nausea and vomiting.   Genitourinary:  Negative for difficulty urinating.   Musculoskeletal:  Positive for back pain and myalgias.   Skin:  Negative for rash.   Neurological:  Positive for numbness. Negative for weakness, light-headedness and headaches.   Psychiatric/Behavioral:  Negative for sleep disturbance.        /74   Pulse (!) 55   Temp 97.6 °F (36.4 °C)   Ht 5' 4" (1.626 m)   Wt 100.7 kg (222 lb 0.1 oz)   SpO2 (!) 93%   BMI 38.11 kg/m²   Objective:      Physical Exam  Constitutional:       Appearance: She is well-developed.   HENT:      Head: Normocephalic and atraumatic.   Cardiovascular:      Rate and Rhythm: Normal rate and regular rhythm.      " Heart sounds: Normal heart sounds. No murmur heard.  Pulmonary:      Effort: Pulmonary effort is normal.      Breath sounds: Normal breath sounds. No wheezing.   Abdominal:      General: Bowel sounds are normal.      Palpations: Abdomen is soft.      Tenderness: There is no abdominal tenderness.   Musculoskeletal:         General: Tenderness present.      Comments: Positive for paraspinal lumbar muscle tenderness bilaterally with straight leg raise test positive on the left side   Skin:     General: Skin is warm and dry.      Findings: No rash.   Neurological:      Mental Status: She is alert and oriented to person, place, and time.   Psychiatric:         Mood and Affect: Mood normal.         Assessment/Plan:   1. Osteoarthritis of spine with radiculopathy, lumbar region  - Ambulatory referral/consult to Physical/Occupational Therapy; Future  2. Primary osteoarthritis of both hips  - Ambulatory referral/consult to Physical/Occupational Therapy; Future    Secondary to ongoing pain Will refer to physical therapy for further evaluation patient was advised to increase Lyrica from 25 mg twice a day to 3 times a day  Sedation potential discussed  Warm compresses recommended    Patient to keep appointment as scheduled with pain management next week for further evaluation

## 2022-11-07 NOTE — PROGRESS NOTES
"Chief Pain Complaint:  Back Pain  with BLE pain - R>L - now mainly LLE     Interval History (11/8/2022):  Beth Souza presents today for follow-up visit.  Patient was last seen on 4/29/21 (1.5 years ago). At that visit, the plan was to start topamax for sciatica. Patient was referred back to our clinic by Alyssia Ventura PA (Orthopedics). Patient reports pain as "0/10 today. She reports pain has been bad x about a month. She went to the ER because the pain was so severe. She wants to know when it will return and what to do when it returns. She got no relief with medrol dose misa or gabapentin.      Orthopedics note: Beth Souza presents to me today with chief complaint of left low back/hip pain radiating left knee pain.  I do believe she is presently in seeing exacerbation of sciatica.  She has not had any relief with dexamethasone injection, Medrol Dosepak, gabapentin.  She is not a candidate for NSAIDs secondary to aspirin allergy.  She will continue Tylenol up to 650 mg 3 times a day as needed and voltaren gel.  Additionally I would like the patient to try a short course of Lyrica to see if this would provide any additional nerve relief.  I would also like to get a referral and for her to see her back in spine clinic as she states has been quite awhile since she has seen anyone for her back. I also want to obtain bilateral hip xrays today for further evaluation.              Interval History (4/29/2021): Patient was last seen on 2/8/2021. She is here today for low back pain with bilateral leg pain (R>L).  Patient reports pain as 10/10 today.  She recently saw PCP who started her on Topamax with titration schedule. She reports pain worse with walking and in the morning.     History of Present Illness (2019):   Beth Souza is a 68 y.o. female  who is presenting with a chief complaint of Back Pain (radiating down bilateral legs). The patient began experiencing this problem insidiously, and " the pain has been gradually worsening. The pain is described as throbbing, shooting, cramping, burning, aching and electrical and is located in the bilateral lumbar spine . Pain is intermittent and lasts hours. The pain radiates to bilateral lower extremities, L>R. Pain is 40% axial and 60% radicular. The patient rates her pain a 8 out of ten and interferes with activities of daily living a 8 out of ten. Pain is exacerbated by ambulation, and is improved by rest. Patient reports no prior trauma, no prior spinal surgery     - pertinent negatives: No fever, No chills, No weight loss, No bladder dysfunction, No bowel dysfunction, No saddle anesthesia  - pertinent positives: none    - medications, other therapies tried (physical therapy, injections):     >> NSAIDs, Tylenol, gabapentin, zanaflex and flexeril    >> Has previously undergone Physical Therapy    >> Has previously undergone spinal injection/s   - Lumbar LAWSON with relief for less than 1 week - at external facility      Imaging / Labs / Studies (reviewed on 11/8/2022):    10/31/22 X-Ray Hips Bilateral 2 View Incl AP Pelvis  COMPARISON:  10/19/2020  FINDINGS:  Mild joint space narrowing is in both hips with femoral head neck junction osteophyte formation, similar to the prior exam.  There is sclerosis about both sacroiliac joints and at the pubic symphysis as before.  Numerous calcific densities overlying the lower abdomen and pelvis likely reflect phleboliths.  No acute fracture, dislocation or osteonecrosis.       Results for orders placed during the hospital encounter of 02/13/19   MRI Lumbar Spine Without Contrast    Narrative COMPARISON:  08/03/2016  FINDINGS:  There is grade 1 spondylolisthesis of L4 on L5. The vertebral body heights are well maintained, with no fracture.  No marrow signal abnormality suspicious for an infiltrative process.  The conus medullaris terminates at approximately the L2-3 disc space.  The adjacent soft tissue structures show no  significant abnormalities.  There is disc desiccation noted throughout the lumbar spine with mild-to-moderate disc space narrowing noted at the L4-5 and L5-S1 levels.  Mild diffuse disc bulge is present at the T11-T12 and T12-L1 levels.  No abutment of the anterior cord.  L1-L2: No significant central canal or neural foraminal narrowing.Mild bilateral facet arthropathy noted at this level.  L2-L3: No significant central canal or neural foraminal narrowing.Mild-to-moderate bilateral facet arthropathy noted at this level.  L3-L4: Severe bilateral facet arthropathy and ligamentum flavum hypertrophy resulting moderate narrowing of the central canal and mild to moderate narrowing of either neural foraminal canal.  L4-L5:  Grade 1 spondylolisthesis severe bilateral facet arthropathy and ligamentum flavum hypertrophy resulting in severe narrowing of the central canal.  The right neural foraminal canal is mildly to moderately narrowed.  The left neural foraminal canal is moderately narrowed  L5-S1:  Diffuse disc bulge and mild bilateral facet arthropathy resulting in no significant central canal narrowing.  The bilateral neural foraminal canals are moderately to severely narrowed.    Impression 1. Prominent degenerative changes within the mid to lower cervical spine as detailed above.  The overall appearance is similar to the study from 2016.       Results for orders placed during the hospital encounter of 05/13/16   X-Ray Lumbar Spine Complete 5 View    Narrative Five view lumbar spine.  Findings: There is multilevel degenerative vertebral endplate spurring, disc space narrowing, and facet arthropathy.  Vacuum disc phenomenon at L5-S1.  Grade one anterolisthesis at L4-L5.  Pedicles appear intact.  No compression fracture.     Results for orders placed during the hospital encounter of 10/04/18   X-Ray Lumbar Complete With Flex And Ext    Narrative COMPARISON:  None.  FINDINGS:  Vertebral body heights are within normal  "limits.  There is grade 1 anterolisthesis of L4 on L5 which appears unchanged from prior.  No definite change in spinal alignment with flexion or extension to suggest instability.  There is moderate disc height loss at L4-5 and L5-S1.  Multilevel facet arthropathy noted in the lower lumbar spine.  No pars defects visualized.  Posterior elements appear grossly intact. No acute fractures or subluxations are demonstrated.  The remaining visualized osseous and soft tissue structures demonstrate no appreciable abnormality.  No appreciable change from prior.    Impression Unchanged appearance of the lumbar spine with multilevel degenerative changes and grade 1 anterolisthesis of L4 on L5.     Results for orders placed during the hospital encounter of 02/25/15   X-Ray Lumbar Spine AP And Lateral    Narrative Findings: Compared to previous 01/23/14.  No new osseous abnormalities are identified. 2 views    Impression  Stable appearance to osseous structures.  Grade 1 spondylolisthesis L4 on L5 and degenerative change.         Review of Systems:  CONSTITUTIONAL: patient denies any fever, chills, or weight loss  SKIN: patient denies any rash or itching  RESPIRATORY: patient denies having any shortness of breath  GASTROINTESTINAL: patient denies having any diarrhea, constipation, or bowel incontinence  GENITOURINARY: patient denies having any abnormal bladder function    MUSCULOSKELETAL:  - patient complains of the above noted pain/s (see chief pain complaint)    NEUROLOGICAL:   - pain as above  - strength in Lower extremities is intact, BILATERALLY  - sensation in Lower extremities is intact, BILATERALLY  - patient denies any loss of bowel or bladder control      PSYCHIATRIC: patient denies any change in mood    Other:  All other systems reviewed and are negative      Physical Exam:  Vitals:    11/08/22 0929   BP: 130/77   Pulse: (!) 55   Resp: 17   Weight: 101 kg (222 lb 10.6 oz)   Height: 5' 4" (1.626 m)   PainSc: 0-No pain "   PainLoc: Back    Body mass index is 38.22 kg/m².   (reviewed on 11/8/2022)    General: alert and oriented, in no apparent distress. Obese.   Gait: antalgic gait.  Skin: no rashes, no discoloration, no obvious lesions  HEENT: normocephalic, atraumatic. Pupils equal and round.  Respiratory: without use of accessory muscles of respiration.    Musculoskeletal - Lumbar Spine:  - ROM fairly preserved   - Pain on flexion of lumbar spine: Present  - Pain on extension of lumbar spine: Present   - Lumbar facet loading: Present   - TTP over the lumbar facet joints: Present Bilaterally, worse at L5-S1    - TTP over the lumbar paraspinals: Present   - TTP over the SI joints: Absent  - Straight Leg Raise: Positive, R>L    - YURI/ Tung's: Negative    Neuro - Lower Extremities:  - RLE Strength:     >> 5/5 strength with right hip flexion/ extension    >> 5/5 strength with right knee flexion/ extension    >> 5/5 strength in right ankle with dorsiflexion    >> 5/5 strength in right ankle with plantar flexion  - LLE Strength:     >> 5/5 strength with left hip flexion/ extension    >> 5/5 strength with knee flexion extension on the left     >> 5/5 strength in left ankle with dorsiflexion    >> 5/5 strength in left ankle with plantar flexion  - Extremity Reflexes: Brisk and symmetric throughout  - Sensory: Sensation to light touch intact bilaterally      Psych:  Mood and affect is appropriate          Assessment:  Beth Souza is a 72 y.o. year old female who is presenting with       ICD-10-CM ICD-9-CM    1. Left lumbar radiculopathy  M54.16 724.4       2. Osteoarthritis of spine with radiculopathy, lumbar region  M47.26 721.3 Ambulatory referral/consult to [facility]      3. DDD (degenerative disc disease), lumbar  M51.36 722.52            Plan:  1. Interventional: Consider left L4/5 + L5/S1 TF LAWSON - if pain returns. Will hold off since pain is 0/10 today. She can call to schedule LAWSON.    2. Pharmacologic:   - Can  restart Lyrica 25mg BID - recently started by orthopedics.  She stopped taking because the pain subsided, but seems to have been tolerating okay. Consider Lyrica Increase; can call for Refill.  Tried gabapentin and Topamax previously.  - Continue Tylenol 500mg, which she normally takes about 3 per day PRN. Informed she can Increase to (1000mg) TID PRN, not to exceed 3000mg per day.    3. Rehabilitative: She stopped physical therapy due to worsening pain previously.    4. Diagnostic: None.     5. Consult: Encouraged Neurosurgical follow-up for severe L4-5 canal stenosis but patient wants to hold off for now.  She recently saw Neurosurgery for meningioma (Dr. Barr) on 2/18/21. Most recently saw Dr. Gautam; note below from 12/2021:   She has DDD L3-4   Spondylolisthesis L4-5   R herniated disc L5-S1 on the R   Fortunately she is doing well without complaints today   Will hold on any surgical recs unless she develops significant symptoms   If her symptoms flair will rec referral back to Dr Swanson for injection     6. Other: Temp handicap tag paperwork filled out and given for patient previously to take to [facility].     7. Follow-up: PRN    - This condition does not require this patient to take time off of work, and the primary goal of our Pain Management services is to improve the patient's functional capacity.   - I discussed the risks, benefits, and alternatives to potential treatment options. All questions and concerns were fully addressed today in clinic.           Disclaimer:  This note was prepared using voice recognition system and is likely to have sound alike errors that may have been overlooked even after proof reading.  Please call me with any questions.

## 2022-11-08 ENCOUNTER — OFFICE VISIT (OUTPATIENT)
Dept: PAIN MEDICINE | Facility: CLINIC | Age: 72
End: 2022-11-08
Payer: MEDICARE

## 2022-11-08 VITALS
RESPIRATION RATE: 17 BRPM | WEIGHT: 222.69 LBS | SYSTOLIC BLOOD PRESSURE: 130 MMHG | DIASTOLIC BLOOD PRESSURE: 77 MMHG | HEART RATE: 55 BPM | BODY MASS INDEX: 38.02 KG/M2 | HEIGHT: 64 IN

## 2022-11-08 DIAGNOSIS — M47.26 OSTEOARTHRITIS OF SPINE WITH RADICULOPATHY, LUMBAR REGION: ICD-10-CM

## 2022-11-08 DIAGNOSIS — M51.36 DDD (DEGENERATIVE DISC DISEASE), LUMBAR: ICD-10-CM

## 2022-11-08 DIAGNOSIS — M54.16 LEFT LUMBAR RADICULOPATHY: Primary | ICD-10-CM

## 2022-11-08 PROCEDURE — 99214 PR OFFICE/OUTPT VISIT, EST, LEVL IV, 30-39 MIN: ICD-10-PCS | Mod: S$GLB,,, | Performed by: PHYSICIAN ASSISTANT

## 2022-11-08 PROCEDURE — 4010F ACE/ARB THERAPY RXD/TAKEN: CPT | Mod: CPTII,S$GLB,, | Performed by: PHYSICIAN ASSISTANT

## 2022-11-08 PROCEDURE — 1101F PT FALLS ASSESS-DOCD LE1/YR: CPT | Mod: CPTII,S$GLB,, | Performed by: PHYSICIAN ASSISTANT

## 2022-11-08 PROCEDURE — 99214 OFFICE O/P EST MOD 30 MIN: CPT | Mod: S$GLB,,, | Performed by: PHYSICIAN ASSISTANT

## 2022-11-08 PROCEDURE — 3044F HG A1C LEVEL LT 7.0%: CPT | Mod: CPTII,S$GLB,, | Performed by: PHYSICIAN ASSISTANT

## 2022-11-08 PROCEDURE — 1160F RVW MEDS BY RX/DR IN RCRD: CPT | Mod: CPTII,S$GLB,, | Performed by: PHYSICIAN ASSISTANT

## 2022-11-08 PROCEDURE — 1126F PR PAIN SEVERITY QUANTIFIED, NO PAIN PRESENT: ICD-10-PCS | Mod: CPTII,S$GLB,, | Performed by: PHYSICIAN ASSISTANT

## 2022-11-08 PROCEDURE — 99999 PR PBB SHADOW E&M-EST. PATIENT-LVL III: ICD-10-PCS | Mod: PBBFAC,,, | Performed by: PHYSICIAN ASSISTANT

## 2022-11-08 PROCEDURE — 1159F MED LIST DOCD IN RCRD: CPT | Mod: CPTII,S$GLB,, | Performed by: PHYSICIAN ASSISTANT

## 2022-11-08 PROCEDURE — 4010F PR ACE/ARB THEARPY RXD/TAKEN: ICD-10-PCS | Mod: CPTII,S$GLB,, | Performed by: PHYSICIAN ASSISTANT

## 2022-11-08 PROCEDURE — 1159F PR MEDICATION LIST DOCUMENTED IN MEDICAL RECORD: ICD-10-PCS | Mod: CPTII,S$GLB,, | Performed by: PHYSICIAN ASSISTANT

## 2022-11-08 PROCEDURE — 3044F PR MOST RECENT HEMOGLOBIN A1C LEVEL <7.0%: ICD-10-PCS | Mod: CPTII,S$GLB,, | Performed by: PHYSICIAN ASSISTANT

## 2022-11-08 PROCEDURE — 3008F BODY MASS INDEX DOCD: CPT | Mod: CPTII,S$GLB,, | Performed by: PHYSICIAN ASSISTANT

## 2022-11-08 PROCEDURE — 3078F DIAST BP <80 MM HG: CPT | Mod: CPTII,S$GLB,, | Performed by: PHYSICIAN ASSISTANT

## 2022-11-08 PROCEDURE — 1101F PR PT FALLS ASSESS DOC 0-1 FALLS W/OUT INJ PAST YR: ICD-10-PCS | Mod: CPTII,S$GLB,, | Performed by: PHYSICIAN ASSISTANT

## 2022-11-08 PROCEDURE — 99999 PR PBB SHADOW E&M-EST. PATIENT-LVL III: CPT | Mod: PBBFAC,,, | Performed by: PHYSICIAN ASSISTANT

## 2022-11-08 PROCEDURE — 1160F PR REVIEW ALL MEDS BY PRESCRIBER/CLIN PHARMACIST DOCUMENTED: ICD-10-PCS | Mod: CPTII,S$GLB,, | Performed by: PHYSICIAN ASSISTANT

## 2022-11-08 PROCEDURE — 3078F PR MOST RECENT DIASTOLIC BLOOD PRESSURE < 80 MM HG: ICD-10-PCS | Mod: CPTII,S$GLB,, | Performed by: PHYSICIAN ASSISTANT

## 2022-11-08 PROCEDURE — 3075F PR MOST RECENT SYSTOLIC BLOOD PRESS GE 130-139MM HG: ICD-10-PCS | Mod: CPTII,S$GLB,, | Performed by: PHYSICIAN ASSISTANT

## 2022-11-08 PROCEDURE — 1126F AMNT PAIN NOTED NONE PRSNT: CPT | Mod: CPTII,S$GLB,, | Performed by: PHYSICIAN ASSISTANT

## 2022-11-08 PROCEDURE — 3288F PR FALLS RISK ASSESSMENT DOCUMENTED: ICD-10-PCS | Mod: CPTII,S$GLB,, | Performed by: PHYSICIAN ASSISTANT

## 2022-11-08 PROCEDURE — 3288F FALL RISK ASSESSMENT DOCD: CPT | Mod: CPTII,S$GLB,, | Performed by: PHYSICIAN ASSISTANT

## 2022-11-08 PROCEDURE — 3075F SYST BP GE 130 - 139MM HG: CPT | Mod: CPTII,S$GLB,, | Performed by: PHYSICIAN ASSISTANT

## 2022-11-08 PROCEDURE — 3008F PR BODY MASS INDEX (BMI) DOCUMENTED: ICD-10-PCS | Mod: CPTII,S$GLB,, | Performed by: PHYSICIAN ASSISTANT

## 2022-11-09 ENCOUNTER — TELEPHONE (OUTPATIENT)
Dept: INTERNAL MEDICINE | Facility: CLINIC | Age: 72
End: 2022-11-09
Payer: MEDICARE

## 2022-11-09 NOTE — TELEPHONE ENCOUNTER
----- Message from Bolivar Mcrae sent at 11/9/2022  3:41 PM CST -----  Contact: Patient  Type:  Needs Medical Advice    Who Called: Beth Souza   Symptoms (please be specific): upper stomach pain, weak, no appetite    How long has patient had these symptoms:   started on today   Pharmacy name and phone #:     [facility] [address] [place], [place] - [address]  [address] [address]  Phone: [phone] Fax: [phone]  Would the patient rather a call back or a response via [facility]? Call back   Best Call Back Number:  [phone]   Additional Information:

## 2022-11-10 ENCOUNTER — OFFICE VISIT (OUTPATIENT)
Dept: INTERNAL MEDICINE | Facility: CLINIC | Age: 72
End: 2022-11-10
Payer: MEDICARE

## 2022-11-10 VITALS
DIASTOLIC BLOOD PRESSURE: 90 MMHG | WEIGHT: 220.25 LBS | SYSTOLIC BLOOD PRESSURE: 142 MMHG | HEART RATE: 66 BPM | OXYGEN SATURATION: 95 % | BODY MASS INDEX: 37.6 KG/M2 | TEMPERATURE: 99 F | HEIGHT: 64 IN

## 2022-11-10 DIAGNOSIS — I10 ESSENTIAL HYPERTENSION: ICD-10-CM

## 2022-11-10 DIAGNOSIS — R10.13 EPIGASTRIC PAIN: Primary | ICD-10-CM

## 2022-11-10 PROCEDURE — 99214 OFFICE O/P EST MOD 30 MIN: CPT | Mod: S$GLB,,, | Performed by: FAMILY MEDICINE

## 2022-11-10 PROCEDURE — 99999 PR PBB SHADOW E&M-EST. PATIENT-LVL III: CPT | Mod: PBBFAC,,, | Performed by: FAMILY MEDICINE

## 2022-11-10 PROCEDURE — 3008F PR BODY MASS INDEX (BMI) DOCUMENTED: ICD-10-PCS | Mod: CPTII,S$GLB,, | Performed by: FAMILY MEDICINE

## 2022-11-10 PROCEDURE — 3077F SYST BP >= 140 MM HG: CPT | Mod: CPTII,S$GLB,, | Performed by: FAMILY MEDICINE

## 2022-11-10 PROCEDURE — 1159F MED LIST DOCD IN RCRD: CPT | Mod: CPTII,S$GLB,, | Performed by: FAMILY MEDICINE

## 2022-11-10 PROCEDURE — 1125F AMNT PAIN NOTED PAIN PRSNT: CPT | Mod: CPTII,S$GLB,, | Performed by: FAMILY MEDICINE

## 2022-11-10 PROCEDURE — 3288F PR FALLS RISK ASSESSMENT DOCUMENTED: ICD-10-PCS | Mod: CPTII,S$GLB,, | Performed by: FAMILY MEDICINE

## 2022-11-10 PROCEDURE — 99999 PR PBB SHADOW E&M-EST. PATIENT-LVL III: ICD-10-PCS | Mod: PBBFAC,,, | Performed by: FAMILY MEDICINE

## 2022-11-10 PROCEDURE — 4010F ACE/ARB THERAPY RXD/TAKEN: CPT | Mod: CPTII,S$GLB,, | Performed by: FAMILY MEDICINE

## 2022-11-10 PROCEDURE — 3080F PR MOST RECENT DIASTOLIC BLOOD PRESSURE >= 90 MM HG: ICD-10-PCS | Mod: CPTII,S$GLB,, | Performed by: FAMILY MEDICINE

## 2022-11-10 PROCEDURE — 3080F DIAST BP >= 90 MM HG: CPT | Mod: CPTII,S$GLB,, | Performed by: FAMILY MEDICINE

## 2022-11-10 PROCEDURE — 4010F PR ACE/ARB THEARPY RXD/TAKEN: ICD-10-PCS | Mod: CPTII,S$GLB,, | Performed by: FAMILY MEDICINE

## 2022-11-10 PROCEDURE — 1101F PR PT FALLS ASSESS DOC 0-1 FALLS W/OUT INJ PAST YR: ICD-10-PCS | Mod: CPTII,S$GLB,, | Performed by: FAMILY MEDICINE

## 2022-11-10 PROCEDURE — 3044F HG A1C LEVEL LT 7.0%: CPT | Mod: CPTII,S$GLB,, | Performed by: FAMILY MEDICINE

## 2022-11-10 PROCEDURE — 3044F PR MOST RECENT HEMOGLOBIN A1C LEVEL <7.0%: ICD-10-PCS | Mod: CPTII,S$GLB,, | Performed by: FAMILY MEDICINE

## 2022-11-10 PROCEDURE — 3288F FALL RISK ASSESSMENT DOCD: CPT | Mod: CPTII,S$GLB,, | Performed by: FAMILY MEDICINE

## 2022-11-10 PROCEDURE — 1159F PR MEDICATION LIST DOCUMENTED IN MEDICAL RECORD: ICD-10-PCS | Mod: CPTII,S$GLB,, | Performed by: FAMILY MEDICINE

## 2022-11-10 PROCEDURE — 1125F PR PAIN SEVERITY QUANTIFIED, PAIN PRESENT: ICD-10-PCS | Mod: CPTII,S$GLB,, | Performed by: FAMILY MEDICINE

## 2022-11-10 PROCEDURE — 1101F PT FALLS ASSESS-DOCD LE1/YR: CPT | Mod: CPTII,S$GLB,, | Performed by: FAMILY MEDICINE

## 2022-11-10 PROCEDURE — 3008F BODY MASS INDEX DOCD: CPT | Mod: CPTII,S$GLB,, | Performed by: FAMILY MEDICINE

## 2022-11-10 PROCEDURE — 99214 PR OFFICE/OUTPT VISIT, EST, LEVL IV, 30-39 MIN: ICD-10-PCS | Mod: S$GLB,,, | Performed by: FAMILY MEDICINE

## 2022-11-10 PROCEDURE — 3077F PR MOST RECENT SYSTOLIC BLOOD PRESSURE >= 140 MM HG: ICD-10-PCS | Mod: CPTII,S$GLB,, | Performed by: FAMILY MEDICINE

## 2022-11-10 NOTE — PROGRESS NOTES
Subjective:      Patient ID: Beth Souza is a 72 y.o. female.    Chief Complaint: Abdominal Pain,  Nausea,     HPI yesterday gradual onset of epigastric pain without radiation some nausea and an acid taste in her mouth without any vomiting she did have some diarrhea no complaints of blood in her stool no urinary changes pain lasted several hours without any mitigating or relieving factors or exacerbating factors and has resolved by this morning no history of similar    Has been off Lyrica gabapentin and sciatica mostly resolved  Past Medical History:   Diagnosis Date    Cataract     Hyperlipidemia     Hypertension     Lumbar spondylosis     Meningioma 2021    Obesity     Sleep apnea     Vitamin D deficiency       Past Surgical History:   Procedure Laterality Date    HYSTERECTOMY      PARTIAL HYSTERECTOMY        Social History     Socioeconomic History    Marital status:     Number of children: 3   Occupational History    Occupation: Cleaning houses     Employer: not employed   Tobacco Use    Smoking status: Former     Types: Cigarettes     Quit date: 2004     Years since quittin.8    Smokeless tobacco: Never   Substance and Sexual Activity    Alcohol use: Yes     Comment: occasionally    Drug use: No    Sexual activity: Not Currently     Partners: Male   Other Topics Concern    Are you pregnant or think you may be? No    Breast-feeding No   Social History Narrative    Live w/ alone      Social Determinants of Health     Financial Resource Strain: Low Risk     Difficulty of Paying Living Expenses: Not hard at all   Food Insecurity: No Food Insecurity    Worried About Running Out of Food in the Last Year: Never true    Ran Out of Food in the Last Year: Never true   Transportation Needs: No Transportation Needs    Lack of Transportation (Medical): No    Lack of Transportation (Non-Medical): No   Physical Activity: Inactive    Days of Exercise per Week: 0 days    Minutes of Exercise per  Session: 0 min   Stress: No Stress Concern Present    Feeling of Stress : Not at all   Social Connections: Moderately Isolated    Frequency of Communication with Friends and Family: More than three times a week    Frequency of Social Gatherings with Friends and Family: More than three times a week    Attends Jain Services: More than 4 times per year    Active Member of Clubs or Organizations: No    Attends Club or Organization Meetings: Never    Marital Status:    Housing Stability: Low Risk     Unable to Pay for Housing in the Last Year: No    Number of Places Lived in the Last Year: 1    Unstable Housing in the Last Year: No      Family History   Problem Relation Age of Onset    Hypertension Mother     Diabetes Daughter     Melanoma Neg Hx     Psoriasis Neg Hx     Lupus Neg Hx     Eczema Neg Hx     Breast cancer Neg Hx     Colon cancer Neg Hx     Ovarian cancer Neg Hx     Thrombosis Neg Hx       Review of Systems        Objective:     Physical Exam  Vitals and nursing note reviewed.   Constitutional:       Appearance: She is well-developed.   HENT:      Head: Normocephalic and atraumatic.   Neck:      Vascular: No carotid bruit.   Cardiovascular:      Rate and Rhythm: Normal rate and regular rhythm.   Pulmonary:      Effort: Pulmonary effort is normal.      Breath sounds: Normal breath sounds.   Abdominal:      General: Bowel sounds are normal. There is no distension.      Palpations: Abdomen is soft. There is no mass.      Tenderness: There is no abdominal tenderness (mild epig tend). There is no guarding or rebound.   Musculoskeletal:      Cervical back: Normal range of motion and neck supple.   Lymphadenopathy:      Cervical: No cervical adenopathy.   Skin:     General: Skin is warm and dry.   Neurological:      Mental Status: She is alert and oriented to person, place, and time.   Psychiatric:         Behavior: Behavior normal.         Judgment: Judgment normal.     Assessment:         ICD-10-CM  ICD-9-CM   1. Epigastric pain  R10.13 789.06   2. Essential hypertension  I10 401.9      Plan:      Follow-up if any recurrence of abdominal pain new symptoms     Nurse blood pressure check PCP staff couple weeks monitor home blood pressures    Follow up with Dr. Oliva when due    Troup diet chicken noodle soup etc. and advance as tolerated  Epigastric pain    Essential hypertension

## 2022-11-28 ENCOUNTER — HOSPITAL ENCOUNTER (OUTPATIENT)
Dept: RADIOLOGY | Facility: HOSPITAL | Age: 72
Discharge: HOME | End: 2022-11-28
Attending: NURSE PRACTITIONER
Payer: MEDICARE

## 2022-11-28 DIAGNOSIS — Z12.31 BREAST CANCER SCREENING BY MAMMOGRAM: ICD-10-CM

## 2022-11-28 PROCEDURE — 77067 SCR MAMMO BI INCL CAD: CPT | Mod: TC

## 2022-11-28 PROCEDURE — 77063 MAMMO DIGITAL SCREENING BILAT WITH TOMO: ICD-10-PCS | Mod: 26,,, | Performed by: RADIOLOGY

## 2022-11-28 PROCEDURE — 77067 MAMMO DIGITAL SCREENING BILAT WITH TOMO: ICD-10-PCS | Mod: 26,,, | Performed by: RADIOLOGY

## 2022-11-28 PROCEDURE — 77067 SCR MAMMO BI INCL CAD: CPT | Mod: 26,,, | Performed by: RADIOLOGY

## 2022-11-28 PROCEDURE — 77063 BREAST TOMOSYNTHESIS BI: CPT | Mod: TC

## 2022-11-28 PROCEDURE — 77063 BREAST TOMOSYNTHESIS BI: CPT | Mod: 26,,, | Performed by: RADIOLOGY

## 2022-12-05 ENCOUNTER — OFFICE VISIT (OUTPATIENT)
Dept: PODIATRY | Facility: CLINIC | Age: 72
End: 2022-12-05
Payer: MEDICARE

## 2022-12-05 VITALS — BODY MASS INDEX: 37.6 KG/M2 | WEIGHT: 220.25 LBS | HEIGHT: 64 IN

## 2022-12-05 DIAGNOSIS — M21.42 PES PLANUS OF BOTH FEET: ICD-10-CM

## 2022-12-05 DIAGNOSIS — E66.01 MORBID OBESITY: ICD-10-CM

## 2022-12-05 DIAGNOSIS — M19.071 PRIMARY OSTEOARTHRITIS OF RIGHT FOOT: ICD-10-CM

## 2022-12-05 DIAGNOSIS — M25.571 CHRONIC PAIN OF RIGHT ANKLE: Primary | ICD-10-CM

## 2022-12-05 DIAGNOSIS — M21.41 PES PLANUS OF BOTH FEET: ICD-10-CM

## 2022-12-05 DIAGNOSIS — G89.29 CHRONIC PAIN OF RIGHT ANKLE: Primary | ICD-10-CM

## 2022-12-05 PROCEDURE — 1101F PT FALLS ASSESS-DOCD LE1/YR: CPT | Mod: CPTII,S$GLB,, | Performed by: PODIATRIST

## 2022-12-05 PROCEDURE — 99999 PR PBB SHADOW E&M-EST. PATIENT-LVL II: CPT | Mod: PBBFAC,,, | Performed by: PODIATRIST

## 2022-12-05 PROCEDURE — 3044F PR MOST RECENT HEMOGLOBIN A1C LEVEL <7.0%: ICD-10-PCS | Mod: CPTII,S$GLB,, | Performed by: PODIATRIST

## 2022-12-05 PROCEDURE — 99213 PR OFFICE/OUTPT VISIT, EST, LEVL III, 20-29 MIN: ICD-10-PCS | Mod: S$GLB,,, | Performed by: PODIATRIST

## 2022-12-05 PROCEDURE — 99213 OFFICE O/P EST LOW 20 MIN: CPT | Mod: S$GLB,,, | Performed by: PODIATRIST

## 2022-12-05 PROCEDURE — 3008F BODY MASS INDEX DOCD: CPT | Mod: CPTII,S$GLB,, | Performed by: PODIATRIST

## 2022-12-05 PROCEDURE — 3044F HG A1C LEVEL LT 7.0%: CPT | Mod: CPTII,S$GLB,, | Performed by: PODIATRIST

## 2022-12-05 PROCEDURE — 4010F ACE/ARB THERAPY RXD/TAKEN: CPT | Mod: CPTII,S$GLB,, | Performed by: PODIATRIST

## 2022-12-05 PROCEDURE — 3288F PR FALLS RISK ASSESSMENT DOCUMENTED: ICD-10-PCS | Mod: CPTII,S$GLB,, | Performed by: PODIATRIST

## 2022-12-05 PROCEDURE — 3008F PR BODY MASS INDEX (BMI) DOCUMENTED: ICD-10-PCS | Mod: CPTII,S$GLB,, | Performed by: PODIATRIST

## 2022-12-05 PROCEDURE — 1160F RVW MEDS BY RX/DR IN RCRD: CPT | Mod: CPTII,S$GLB,, | Performed by: PODIATRIST

## 2022-12-05 PROCEDURE — 1159F PR MEDICATION LIST DOCUMENTED IN MEDICAL RECORD: ICD-10-PCS | Mod: CPTII,S$GLB,, | Performed by: PODIATRIST

## 2022-12-05 PROCEDURE — 1159F MED LIST DOCD IN RCRD: CPT | Mod: CPTII,S$GLB,, | Performed by: PODIATRIST

## 2022-12-05 PROCEDURE — 1160F PR REVIEW ALL MEDS BY PRESCRIBER/CLIN PHARMACIST DOCUMENTED: ICD-10-PCS | Mod: CPTII,S$GLB,, | Performed by: PODIATRIST

## 2022-12-05 PROCEDURE — 1101F PR PT FALLS ASSESS DOC 0-1 FALLS W/OUT INJ PAST YR: ICD-10-PCS | Mod: CPTII,S$GLB,, | Performed by: PODIATRIST

## 2022-12-05 PROCEDURE — 99999 PR PBB SHADOW E&M-EST. PATIENT-LVL II: ICD-10-PCS | Mod: PBBFAC,,, | Performed by: PODIATRIST

## 2022-12-05 PROCEDURE — 4010F PR ACE/ARB THEARPY RXD/TAKEN: ICD-10-PCS | Mod: CPTII,S$GLB,, | Performed by: PODIATRIST

## 2022-12-05 PROCEDURE — 3288F FALL RISK ASSESSMENT DOCD: CPT | Mod: CPTII,S$GLB,, | Performed by: PODIATRIST

## 2022-12-05 NOTE — PROGRESS NOTES
Subjective:       Patient ID: Beth Souza is a 72 y.o. female.    Chief Complaint: Foot Pain (C/o right foot pain, rates pain 4/10, nondaibetic, wearing socks and shoes, last seen PCP Dr. Oliva on 2022.)      HPI:  Beth Souza presents to the office this afternoon in regards to chronic right foot and ankle pain.  States having 4/10 pain at present.  States that when she made this appointment initially, she is in moderate to severe pain but this has subsequently decreased significantly.  States she did increase her ambulation and walking while on Thanksgiving break.  She states that once return home, she is noticed an improvement in her pain as she is not been ambulatory.  She also states long history of pes planus foot deformity is noted changes to her foot with ambulation over the past few years.  Patient's PMD is Edna Oliva MD.  Limiting weight-bearing and ambulation does seem to alleviate the discomfort.    Review of patient's allergies indicates:   Allergen Reactions    Aspirin Other (See Comments)     Bruising       Past Medical History:   Diagnosis Date    Cataract     Hyperlipidemia     Hypertension     Lumbar spondylosis     Meningioma 2021    Obesity     Sleep apnea     Vitamin D deficiency        Family History   Problem Relation Age of Onset    Hypertension Mother     Diabetes Daughter     Melanoma Neg Hx     Psoriasis Neg Hx     Lupus Neg Hx     Eczema Neg Hx     Breast cancer Neg Hx     Colon cancer Neg Hx     Ovarian cancer Neg Hx     Thrombosis Neg Hx        Social History     Socioeconomic History    Marital status:     Number of children: 3   Occupational History    Occupation: Cleaning houses     Employer: not employed   Tobacco Use    Smoking status: Former     Types: Cigarettes     Quit date: 2004     Years since quittin.9    Smokeless tobacco: Never   Substance and Sexual Activity    Alcohol use: Yes     Comment: occasionally    Drug use: No     "Sexual activity: Not Currently     Partners: Male   Other Topics Concern    Are you pregnant or think you may be? No    Breast-feeding No   Social History Narrative    Live w/ alone      Social Determinants of Health     Financial Resource Strain: Low Risk     Difficulty of Paying Living Expenses: Not hard at all   Food Insecurity: No Food Insecurity    Worried About Running Out of Food in the Last Year: Never true    Ran Out of Food in the Last Year: Never true   Transportation Needs: No Transportation Needs    Lack of Transportation (Medical): No    Lack of Transportation (Non-Medical): No   Physical Activity: Inactive    Days of Exercise per Week: 0 days    Minutes of Exercise per Session: 0 min   Stress: No Stress Concern Present    Feeling of Stress : Not at all   Social Connections: Moderately Isolated    Frequency of Communication with Friends and Family: More than three times a week    Frequency of Social Gatherings with Friends and Family: More than three times a week    Attends [employer]ian Services: More than 4 times per year    Active Member of Clubs or Organizations: No    Attends Club or Organization Meetings: Never    Marital Status:    Housing Stability: Low Risk     Unable to Pay for Housing in the Last Year: No    Number of Places Lived in the Last Year: 1    Unstable Housing in the Last Year: No       Past Surgical History:   Procedure Laterality Date    HYSTERECTOMY      PARTIAL HYSTERECTOMY         Review of Systems       Objective:   Ht 5' 4" (1.626 m)   Wt 99.9 kg (220 lb 3.8 oz)   BMI 37.80 kg/m²     Mammo Digital Screening Bilat w/ Coleman  Narrative: Result:  Mammo Digital Screening Bilat w/ Coleman    History:  Patient is 72 y.o. and is seen for a screening mammogram.    Films Compared:  Prior images (if available) were compared.     Findings:   This procedure was performed using tomosynthesis.   Computer-aided detection was utilized in the interpretation of this   examination.    The " breasts have scattered areas of fibroglandular density. There is no   evidence of suspicious masses, microcalcifications or architectural   distortion.  Impression:    No mammographic evidence of malignancy.    BI-RADS Category 1: Negative    Recommendation:  Routine screening mammogram in 1 year is recommended.    Your estimated lifetime risk of breast cancer (to age 85) based on   Tyrer-Cuzick risk assessment model is 1.3 %.  According to the American   Cancer Society, patients with a lifetime breast cancer risk of 20% or   higher might benefit from supplemental screening tests. ??        Physical Exam  LOWER EXTREMITY PHYSICAL EXAMINATION    VASCULAR: The right DP pulse is 2/4 and the left DP is 2/4. The right PT pulse is 2/4 and the left PT pulse is 2/4. Proximal to distal, warm to warm. No dependent rubor or elevation palor is noted. Capillary refill time is less than 3 seconds. Hair growth is appreciated to the dorsal foot and digits.    NEUROLOGY: Proprioception is intact. Sensation to light touch is intact. Protective sensation is intact via 5.07 [place] Yuniel monofilament. Straight leg raise is negative. Negative Tinel's Sign and negative Valleix sign. No neurological sensations with compression of the area of Trujillo's Nerve in the area of the Abductor Hallucis muscle belly. Upon palpation of the interspaces, there are no neurological sensations stated that radiate proximal or distal. Upon compression of the metatarsal heads from medial to lateral, no neurological sensations or symptoms are stated.    DERMATOLOGY: Skin is supple, dry and intact. No ecchymosis is noted. No hypertrophic skin formation. No erythema or cellulitis is noted.     ORTHOPEDIC:  Severe pain on light palpation of the ankle joint most specifically at the medial and lateral ankle gutters.  There is also severe pain on palpation of the sinus tarsi to the lateral aspect of the foot as well.  Pain is noted with dorsiflexion plantar  flexion ankle joint.  Severe pain is noted with eversion inversion of the subtalar joint.  She is ambulating with a severely antalgic gait.  Does have loss of the medial longitudinal arch and pes planus foot deformity.    Assessment:     1. Chronic pain of right ankle    2. Primary osteoarthritis of right foot    3. Pes planus of both feet    4. Morbid obesity        Plan:     Chronic pain of right ankle    Primary osteoarthritis of right foot    Pes planus of both feet    Morbid obesity    Thorough discussion is had with the patient this afternoon, concerning the diagnosis, its etiology, and the treatment algorithm at present.    Previous x-rays were reviewed in the room showing significant arthritis of the midfoot and hindfoot.  Ankle joint appears to be well intact without signs of medial or lateral deviation.  There is no significant arthritis present as well.  Patient states that as she is had improvement in her pain with decreasing her ambulation, she would like to wait on a possible repeat steroid injection this time.    We did discuss appropriate shoes to utilize with pes planus foot deformity and osteoarthritis.    Patient is counseled and reminded concerning the importance of good nutrition and healthy eating habits, which may include blood sugar control, to prevent and/or help podiatric foot and ankle complications.    Future Appointments   Date Time Provider Department Center   12/6/2022 11:40 AM Tony Gusman MD [facility]   2/2/2023  9:30 AM GILLIAN Jones [facility]   3/23/2023  1:00 PM FIELDS, VISUAL-ONE [facility]   3/23/2023  1:30 PM Balta Aranda OD [facility]

## 2022-12-06 ENCOUNTER — OFFICE VISIT (OUTPATIENT)
Dept: INTERNAL MEDICINE | Facility: CLINIC | Age: 72
End: 2022-12-06
Payer: MEDICARE

## 2022-12-06 VITALS
WEIGHT: 219.56 LBS | DIASTOLIC BLOOD PRESSURE: 88 MMHG | OXYGEN SATURATION: 95 % | HEART RATE: 60 BPM | SYSTOLIC BLOOD PRESSURE: 128 MMHG | TEMPERATURE: 98 F | HEIGHT: 64 IN | RESPIRATION RATE: 18 BRPM | BODY MASS INDEX: 37.48 KG/M2

## 2022-12-06 DIAGNOSIS — E66.01 MORBID OBESITY WITH BMI OF 40.0-44.9, ADULT: Primary | ICD-10-CM

## 2022-12-06 DIAGNOSIS — M17.11 PRIMARY OSTEOARTHRITIS OF RIGHT KNEE: ICD-10-CM

## 2022-12-06 DIAGNOSIS — I10 ESSENTIAL HYPERTENSION: ICD-10-CM

## 2022-12-06 DIAGNOSIS — R73.03 PREDIABETES: ICD-10-CM

## 2022-12-06 PROCEDURE — 99999 PR PBB SHADOW E&M-EST. PATIENT-LVL III: CPT | Mod: PBBFAC,,, | Performed by: FAMILY MEDICINE

## 2022-12-06 PROCEDURE — 1101F PR PT FALLS ASSESS DOC 0-1 FALLS W/OUT INJ PAST YR: ICD-10-PCS | Mod: CPTII,S$GLB,, | Performed by: FAMILY MEDICINE

## 2022-12-06 PROCEDURE — 3044F HG A1C LEVEL LT 7.0%: CPT | Mod: CPTII,S$GLB,, | Performed by: FAMILY MEDICINE

## 2022-12-06 PROCEDURE — 3288F PR FALLS RISK ASSESSMENT DOCUMENTED: ICD-10-PCS | Mod: CPTII,S$GLB,, | Performed by: FAMILY MEDICINE

## 2022-12-06 PROCEDURE — 90694 FLU VACCINE - QUADRIVALENT - ADJUVANTED: ICD-10-PCS | Mod: S$GLB,,, | Performed by: FAMILY MEDICINE

## 2022-12-06 PROCEDURE — 1125F AMNT PAIN NOTED PAIN PRSNT: CPT | Mod: CPTII,S$GLB,, | Performed by: FAMILY MEDICINE

## 2022-12-06 PROCEDURE — 99214 OFFICE O/P EST MOD 30 MIN: CPT | Mod: S$GLB,,, | Performed by: FAMILY MEDICINE

## 2022-12-06 PROCEDURE — 3079F DIAST BP 80-89 MM HG: CPT | Mod: CPTII,S$GLB,, | Performed by: FAMILY MEDICINE

## 2022-12-06 PROCEDURE — 1125F PR PAIN SEVERITY QUANTIFIED, PAIN PRESENT: ICD-10-PCS | Mod: CPTII,S$GLB,, | Performed by: FAMILY MEDICINE

## 2022-12-06 PROCEDURE — 3079F PR MOST RECENT DIASTOLIC BLOOD PRESSURE 80-89 MM HG: ICD-10-PCS | Mod: CPTII,S$GLB,, | Performed by: FAMILY MEDICINE

## 2022-12-06 PROCEDURE — 3074F SYST BP LT 130 MM HG: CPT | Mod: CPTII,S$GLB,, | Performed by: FAMILY MEDICINE

## 2022-12-06 PROCEDURE — 99999 PR PBB SHADOW E&M-EST. PATIENT-LVL III: ICD-10-PCS | Mod: PBBFAC,,, | Performed by: FAMILY MEDICINE

## 2022-12-06 PROCEDURE — G0008 FLU VACCINE - QUADRIVALENT - ADJUVANTED: ICD-10-PCS | Mod: S$GLB,,, | Performed by: FAMILY MEDICINE

## 2022-12-06 PROCEDURE — 1159F MED LIST DOCD IN RCRD: CPT | Mod: CPTII,S$GLB,, | Performed by: FAMILY MEDICINE

## 2022-12-06 PROCEDURE — 3008F BODY MASS INDEX DOCD: CPT | Mod: CPTII,S$GLB,, | Performed by: FAMILY MEDICINE

## 2022-12-06 PROCEDURE — 3074F PR MOST RECENT SYSTOLIC BLOOD PRESSURE < 130 MM HG: ICD-10-PCS | Mod: CPTII,S$GLB,, | Performed by: FAMILY MEDICINE

## 2022-12-06 PROCEDURE — 90694 VACC AIIV4 NO PRSRV 0.5ML IM: CPT | Mod: S$GLB,,, | Performed by: FAMILY MEDICINE

## 2022-12-06 PROCEDURE — 4010F PR ACE/ARB THEARPY RXD/TAKEN: ICD-10-PCS | Mod: CPTII,S$GLB,, | Performed by: FAMILY MEDICINE

## 2022-12-06 PROCEDURE — 3044F PR MOST RECENT HEMOGLOBIN A1C LEVEL <7.0%: ICD-10-PCS | Mod: CPTII,S$GLB,, | Performed by: FAMILY MEDICINE

## 2022-12-06 PROCEDURE — G0008 ADMIN INFLUENZA VIRUS VAC: HCPCS | Mod: S$GLB,,, | Performed by: FAMILY MEDICINE

## 2022-12-06 PROCEDURE — 3008F PR BODY MASS INDEX (BMI) DOCUMENTED: ICD-10-PCS | Mod: CPTII,S$GLB,, | Performed by: FAMILY MEDICINE

## 2022-12-06 PROCEDURE — 1101F PT FALLS ASSESS-DOCD LE1/YR: CPT | Mod: CPTII,S$GLB,, | Performed by: FAMILY MEDICINE

## 2022-12-06 PROCEDURE — 1159F PR MEDICATION LIST DOCUMENTED IN MEDICAL RECORD: ICD-10-PCS | Mod: CPTII,S$GLB,, | Performed by: FAMILY MEDICINE

## 2022-12-06 PROCEDURE — 3288F FALL RISK ASSESSMENT DOCD: CPT | Mod: CPTII,S$GLB,, | Performed by: FAMILY MEDICINE

## 2022-12-06 PROCEDURE — 4010F ACE/ARB THERAPY RXD/TAKEN: CPT | Mod: CPTII,S$GLB,, | Performed by: FAMILY MEDICINE

## 2022-12-06 PROCEDURE — 99214 PR OFFICE/OUTPT VISIT, EST, LEVL IV, 30-39 MIN: ICD-10-PCS | Mod: S$GLB,,, | Performed by: FAMILY MEDICINE

## 2022-12-06 RX ORDER — ERGOCALCIFEROL 1.25 MG/1
50000 CAPSULE ORAL
COMMUNITY
Start: 2022-11-10 | End: 2023-01-27

## 2022-12-06 NOTE — PROGRESS NOTES
Subjective:       Patient ID: Beth Souza is a 72 y.o. female.    Chief Complaint: Follow-up    HPI    Patient Active Problem List   Diagnosis    Essential hypertension    Vitamin D deficiency    Mixed hyperlipidemia    Cataract    Osteoarthritis of spine with radiculopathy, lumbar region    Severe obesity with body mass index (BMI) of 35.0 to 39.9 with serious comorbidity    LEN on CPAP    Mild cognitive impairment with memory loss    Meningioma of cerebellum    Multiple thyroid nodules    History of COVID-19    Primary osteoarthritis of both hips    Calcification of aorta    History of hyperparathyroidism    Prediabetes       Past Medical History:   Diagnosis Date    Cataract     Hyperlipidemia     Hypertension     Lumbar spondylosis     Meningioma 2021    Obesity     Sleep apnea     Vitamin D deficiency        Past Surgical History:   Procedure Laterality Date    HYSTERECTOMY      PARTIAL HYSTERECTOMY         Family History   Problem Relation Age of Onset    Hypertension Mother     Diabetes Daughter     Melanoma Neg Hx     Psoriasis Neg Hx     Lupus Neg Hx     Eczema Neg Hx     Breast cancer Neg Hx     Colon cancer Neg Hx     Ovarian cancer Neg Hx     Thrombosis Neg Hx        Social History     Tobacco Use   Smoking Status Former    Types: Cigarettes    Quit date: 2004    Years since quittin.9   Smokeless Tobacco Never       Wt Readings from Last 5 Encounters:   22 99.6 kg (219 lb 9.3 oz)   22 99.9 kg (220 lb 3.8 oz)   11/10/22 99.9 kg (220 lb 3.8 oz)   22 101 kg (222 lb 10.6 oz)   22 100.7 kg (222 lb 0.1 oz)       For further HPI details, see assessment and plan.    Review of Systems    Objective:      Vitals:    22 1209   BP: 128/88   Pulse: 60   Resp: 18   Temp: 97.7 °F (36.5 °C)       Physical Exam  Constitutional:       General: She is not in acute distress.     Appearance: Normal appearance. She is well-developed.   Cardiovascular:      Rate and Rhythm:  Normal rate and regular rhythm.   Pulmonary:      Effort: Pulmonary effort is normal. No respiratory distress.      Breath sounds: Normal breath sounds.   Musculoskeletal:         General: Normal range of motion.   Neurological:      Mental Status: She is alert. She is not disoriented.   Psychiatric:         Mood and Affect: Mood normal.         Speech: Speech normal.       Assessment:       1. Morbid obesity with BMI of 40.0-44.9, adult    2. Primary osteoarthritis of right knee    3. Essential hypertension    4. Prediabetes        Plan:   Morbid obesity with BMI of 40.0-44.9, adult    Primary osteoarthritis of right knee    Essential hypertension    Prediabetes        Presents as was unable to see her PCP  She was in pain at time of scheduling but doesn't know what was hurting when scheduled visit.    OA/ DDD  Has all types of various pains.  Tried lycia  Wasn't helping  It stopped  At present not in pain  Given not in pain - not treatment planning needed.      PreDM  Lab Results   Component Value Date    HGBA1C 5.7 (H) 06/22/2022   Encouraged weight loss.  She wants to avoid    Obesity  BMI 37  Calorie deficits discussed.        HTN  BP Readings from Last 3 Encounters:   12/06/22 128/88   11/10/22 (!) 142/90   11/08/22 130/77   Controlled  No changes  Continue current tx  Lisinopril-hctz - discussed risk of angioedema  She hasnt taken med in 2 days  I question need for BP med  She has a machine at home  Doesn't know how to take it.  Set up nursing visit to educate her on how to use her machine and f/u with her PCP.      Flu shot    F/u with pcp

## 2022-12-20 ENCOUNTER — HOSPITAL ENCOUNTER (OUTPATIENT)
Dept: RADIOLOGY | Facility: HOSPITAL | Age: 72
Discharge: HOME | End: 2022-12-20
Attending: FAMILY MEDICINE
Payer: MEDICARE

## 2022-12-20 ENCOUNTER — OFFICE VISIT (OUTPATIENT)
Dept: INTERNAL MEDICINE | Facility: CLINIC | Age: 72
End: 2022-12-20
Payer: MEDICARE

## 2022-12-20 VITALS
WEIGHT: 223.13 LBS | OXYGEN SATURATION: 96 % | RESPIRATION RATE: 18 BRPM | DIASTOLIC BLOOD PRESSURE: 78 MMHG | HEART RATE: 67 BPM | SYSTOLIC BLOOD PRESSURE: 136 MMHG | HEIGHT: 64 IN | BODY MASS INDEX: 38.09 KG/M2

## 2022-12-20 DIAGNOSIS — J40 BRONCHITIS: Primary | ICD-10-CM

## 2022-12-20 DIAGNOSIS — E66.01 SEVERE OBESITY WITH BODY MASS INDEX (BMI) OF 35.0 TO 39.9 WITH SERIOUS COMORBIDITY: ICD-10-CM

## 2022-12-20 DIAGNOSIS — I10 ESSENTIAL HYPERTENSION: ICD-10-CM

## 2022-12-20 DIAGNOSIS — J40 BRONCHITIS: ICD-10-CM

## 2022-12-20 LAB
CTP QC/QA: YES
SARS-COV-2 RDRP RESP QL NAA+PROBE: NEGATIVE

## 2022-12-20 PROCEDURE — 87635: ICD-10-PCS | Mod: QW,S$GLB,, | Performed by: FAMILY MEDICINE

## 2022-12-20 PROCEDURE — 3008F PR BODY MASS INDEX (BMI) DOCUMENTED: ICD-10-PCS | Mod: CPTII,S$GLB,, | Performed by: FAMILY MEDICINE

## 2022-12-20 PROCEDURE — 1159F MED LIST DOCD IN RCRD: CPT | Mod: CPTII,S$GLB,, | Performed by: FAMILY MEDICINE

## 2022-12-20 PROCEDURE — 3075F PR MOST RECENT SYSTOLIC BLOOD PRESS GE 130-139MM HG: ICD-10-PCS | Mod: CPTII,S$GLB,, | Performed by: FAMILY MEDICINE

## 2022-12-20 PROCEDURE — 1101F PT FALLS ASSESS-DOCD LE1/YR: CPT | Mod: CPTII,S$GLB,, | Performed by: FAMILY MEDICINE

## 2022-12-20 PROCEDURE — 71046 X-RAY EXAM CHEST 2 VIEWS: CPT | Mod: 26,,, | Performed by: STUDENT IN AN ORGANIZED HEALTH CARE EDUCATION/TRAINING PROGRAM

## 2022-12-20 PROCEDURE — 1159F PR MEDICATION LIST DOCUMENTED IN MEDICAL RECORD: ICD-10-PCS | Mod: CPTII,S$GLB,, | Performed by: FAMILY MEDICINE

## 2022-12-20 PROCEDURE — 3044F HG A1C LEVEL LT 7.0%: CPT | Mod: CPTII,S$GLB,, | Performed by: FAMILY MEDICINE

## 2022-12-20 PROCEDURE — 87635 SARS-COV-2 COVID-19 AMP PRB: CPT | Mod: QW,S$GLB,, | Performed by: FAMILY MEDICINE

## 2022-12-20 PROCEDURE — 99999 PR PBB SHADOW E&M-EST. PATIENT-LVL III: ICD-10-PCS | Mod: PBBFAC,,, | Performed by: FAMILY MEDICINE

## 2022-12-20 PROCEDURE — 4010F PR ACE/ARB THEARPY RXD/TAKEN: ICD-10-PCS | Mod: CPTII,S$GLB,, | Performed by: FAMILY MEDICINE

## 2022-12-20 PROCEDURE — 3288F FALL RISK ASSESSMENT DOCD: CPT | Mod: CPTII,S$GLB,, | Performed by: FAMILY MEDICINE

## 2022-12-20 PROCEDURE — 1101F PR PT FALLS ASSESS DOC 0-1 FALLS W/OUT INJ PAST YR: ICD-10-PCS | Mod: CPTII,S$GLB,, | Performed by: FAMILY MEDICINE

## 2022-12-20 PROCEDURE — 3008F BODY MASS INDEX DOCD: CPT | Mod: CPTII,S$GLB,, | Performed by: FAMILY MEDICINE

## 2022-12-20 PROCEDURE — 3288F PR FALLS RISK ASSESSMENT DOCUMENTED: ICD-10-PCS | Mod: CPTII,S$GLB,, | Performed by: FAMILY MEDICINE

## 2022-12-20 PROCEDURE — 3078F DIAST BP <80 MM HG: CPT | Mod: CPTII,S$GLB,, | Performed by: FAMILY MEDICINE

## 2022-12-20 PROCEDURE — 1160F PR REVIEW ALL MEDS BY PRESCRIBER/CLIN PHARMACIST DOCUMENTED: ICD-10-PCS | Mod: CPTII,S$GLB,, | Performed by: FAMILY MEDICINE

## 2022-12-20 PROCEDURE — 3044F PR MOST RECENT HEMOGLOBIN A1C LEVEL <7.0%: ICD-10-PCS | Mod: CPTII,S$GLB,, | Performed by: FAMILY MEDICINE

## 2022-12-20 PROCEDURE — 4010F ACE/ARB THERAPY RXD/TAKEN: CPT | Mod: CPTII,S$GLB,, | Performed by: FAMILY MEDICINE

## 2022-12-20 PROCEDURE — 71046 XR CHEST PA AND LATERAL: ICD-10-PCS | Mod: 26,,, | Performed by: STUDENT IN AN ORGANIZED HEALTH CARE EDUCATION/TRAINING PROGRAM

## 2022-12-20 PROCEDURE — 3078F PR MOST RECENT DIASTOLIC BLOOD PRESSURE < 80 MM HG: ICD-10-PCS | Mod: CPTII,S$GLB,, | Performed by: FAMILY MEDICINE

## 2022-12-20 PROCEDURE — 1160F RVW MEDS BY RX/DR IN RCRD: CPT | Mod: CPTII,S$GLB,, | Performed by: FAMILY MEDICINE

## 2022-12-20 PROCEDURE — 3075F SYST BP GE 130 - 139MM HG: CPT | Mod: CPTII,S$GLB,, | Performed by: FAMILY MEDICINE

## 2022-12-20 PROCEDURE — 71046 X-RAY EXAM CHEST 2 VIEWS: CPT | Mod: TC

## 2022-12-20 PROCEDURE — 99214 OFFICE O/P EST MOD 30 MIN: CPT | Mod: S$GLB,,, | Performed by: FAMILY MEDICINE

## 2022-12-20 PROCEDURE — 99214 PR OFFICE/OUTPT VISIT, EST, LEVL IV, 30-39 MIN: ICD-10-PCS | Mod: S$GLB,,, | Performed by: FAMILY MEDICINE

## 2022-12-20 PROCEDURE — 99999 PR PBB SHADOW E&M-EST. PATIENT-LVL III: CPT | Mod: PBBFAC,,, | Performed by: FAMILY MEDICINE

## 2022-12-20 RX ORDER — PROMETHAZINE HYDROCHLORIDE AND DEXTROMETHORPHAN HYDROBROMIDE 6.25; 15 MG/5ML; MG/5ML
5 SYRUP ORAL NIGHTLY PRN
Qty: 118 ML | Refills: 0 | Status: SHIPPED | OUTPATIENT
Start: 2022-12-20 | End: 2022-12-30

## 2022-12-20 RX ORDER — METHYLPREDNISOLONE 4 MG/1
TABLET ORAL
Qty: 1 EACH | Refills: 0 | Status: SHIPPED | OUTPATIENT
Start: 2022-12-20 | End: 2023-02-07 | Stop reason: ALTCHOICE

## 2022-12-20 NOTE — PROGRESS NOTES
"Subjective:       Patient ID: Beth Souza is a 72 y.o. female.    Chief Complaint: Cough and Chest Congestion    72-year-old  female patient with Patient Active Problem List:     Essential hypertension     Vitamin D deficiency     Mixed hyperlipidemia     Cataract     Osteoarthritis of spine with radiculopathy, lumbar region     Severe obesity with body mass index (BMI) of 35.0 to 39.9 with serious comorbidity     LEN on CPAP     Mild cognitive impairment with memory loss     Meningioma of cerebellum     Multiple thyroid nodules     History of COVID-19     Primary osteoarthritis of both hips     Calcification of aorta     History of hyperparathyroidism     Prediabetes  Reports cough and congestion for the past 1 week, denies any fever with chills but has been having occasional wheezing especially in the nighttime.  Reports minimal sore throat and has been taking over-the-counter Coricidin with some relief.  Denies any chest tightness nausea vomiting    Cough  Associated symptoms include wheezing. Pertinent negatives include no chest pain, chills, fever, headaches, myalgias, rash or shortness of breath.   Review of Systems   Constitutional:  Negative for chills, fatigue and fever.   HENT:  Positive for congestion.    Eyes:  Negative for visual disturbance.   Respiratory:  Positive for cough and wheezing. Negative for shortness of breath.    Cardiovascular:  Negative for chest pain and leg swelling.   Gastrointestinal:  Negative for abdominal pain, nausea and vomiting.   Musculoskeletal:  Negative for myalgias.   Skin:  Negative for rash.   Neurological:  Negative for light-headedness and headaches.   Psychiatric/Behavioral:  Negative for sleep disturbance.        /78 (BP Location: Right leg, Patient Position: Sitting, BP Method: Large (Manual))   Pulse 67   Resp 18   Ht 5' 4" (1.626 m)   Wt 101.2 kg (223 lb 1.7 oz)   SpO2 96%   BMI 38.30 kg/m²   Objective:      Physical " Exam  Constitutional:       Appearance: She is well-developed.   HENT:      Head: Normocephalic and atraumatic.      Mouth/Throat:      Mouth: Mucous membranes are moist.      Pharynx: No oropharyngeal exudate or posterior oropharyngeal erythema.   Cardiovascular:      Rate and Rhythm: Normal rate and regular rhythm.      Heart sounds: Normal heart sounds. No murmur heard.  Pulmonary:      Effort: Pulmonary effort is normal. No respiratory distress.      Breath sounds: Normal breath sounds. No wheezing.   Chest:      Chest wall: No tenderness.   Abdominal:      General: Bowel sounds are normal.      Palpations: Abdomen is soft.      Tenderness: There is no abdominal tenderness.   Lymphadenopathy:      Cervical: No cervical adenopathy.   Skin:     General: Skin is warm and dry.      Findings: No rash.   Neurological:      Mental Status: She is alert and oriented to person, place, and time.   Psychiatric:         Mood and Affect: Mood normal.         Assessment/Plan:   1. Bronchitis  - POCT COVID-19 Rapid Screening; Future  - X-Ray Chest PA And Lateral; Future  - CBC Auto Differential; Future  - promethazine-dextromethorphan (PROMETHAZINE-DM) 6.25-15 mg/5 mL Syrp; Take 5 mLs by mouth nightly as needed.  Dispense: 118 mL; Refill: 0  - methylPREDNISolone (MEDROL DOSEPACK) 4 mg tablet; use as directed  Dispense: 1 each; Refill: 0  - POCT COVID-19 Rapid Screening  COVID test negative in the clinic  Will check further labs and chest x-ray to rule out pneumonia  Promethazine DM cough syrup and Medrol Dosepak prescribed today for symptomatic relief  Patient was advised to drink adequate fluids and saltwater gargles recommended    2. Essential hypertension  Blood pressure is stable currently on lisinopril hydrochlorothiazide 20/25 mg    3. Severe obesity with body mass index (BMI) of 35.0 to 39.9 with serious comorbidity  Lifestyle modifications recommended to lose weight with BMI 38

## 2022-12-21 ENCOUNTER — TELEPHONE (OUTPATIENT)
Dept: INTERNAL MEDICINE | Facility: CLINIC | Age: 72
End: 2022-12-21
Payer: MEDICARE

## 2022-12-21 NOTE — TELEPHONE ENCOUNTER
----- Message from Sabrina Caruso sent at 12/21/2022 12:15 PM CST -----  Contact: Self  Pt is asking for an return call in reference to questions concerning visit on yesterday,please call back at .[phone] Thx CJ

## 2022-12-21 NOTE — TELEPHONE ENCOUNTER
Spoke with patient regarding lab results. Patient voiced no further questions or concerns./colette

## 2022-12-27 NOTE — PROGRESS NOTES
"Subjective:       Patient ID: Beth Souza is a 69 y.o. female.    Chief Complaint: Fever; Chills; Fatigue; and Anorexia    69-year-old  female patient with Patient Active Problem List:     Essential hypertension     Vitamin D deficiency     Mixed hyperlipidemia     Hyperparathyroidism, primary     Thyroid nodule     Peripheral polyneuropathy     Cataract     Osteoarthritis of spine with radiculopathy, lumbar region     LEN on CPAP     Myalgia  Here reports that she has been having fever with chills and minimal cough which has gradually improved since past 5 days, denies any difficulty breathing but reports that she has been feeling tired, and has decreased appetite.   Reports feeling weak  Has been taking her medications regularly  Patient reports that she has been to the clinic on 03/12/2020 and was check for flu which was negative.   Reported that she went to [facility] this morning and was told that she came back negative for flu.   Patient wanted to get further evaluated and is here in my office      Review of Systems   Constitutional: Positive for appetite change, chills, fatigue and fever.   Eyes: Negative for visual disturbance.   Respiratory: Positive for cough. Negative for shortness of breath and wheezing.    Cardiovascular: Negative for chest pain and leg swelling.   Gastrointestinal: Negative for abdominal pain, nausea and vomiting.   Musculoskeletal: Positive for myalgias.   Skin: Negative for rash.   Neurological: Negative for light-headedness and headaches.   Psychiatric/Behavioral: Negative for sleep disturbance.         /76 (BP Location: Right arm, Patient Position: Sitting, BP Method: Large (Manual))   Pulse 81   Temp (!) 100.8 °F (38.2 °C) (Oral)   Resp 18   Ht 5' 2" (1.575 m)   Wt 94.4 kg (208 lb 1.8 oz)   SpO2 98%   BMI 38.06 kg/m²   Objective:      Physical Exam   Constitutional: She is oriented to person, place, and time. She appears " well-developed and well-nourished.   HENT:   Head: Normocephalic and atraumatic.   Right Ear: External ear normal.   Left Ear: External ear normal.   Mouth/Throat: Oropharynx is clear and moist. No oropharyngeal exudate.   Neck: Neck supple.   Cardiovascular: Normal rate, regular rhythm and normal heart sounds.   No murmur heard.  Pulmonary/Chest: Effort normal and breath sounds normal. No respiratory distress. She has no wheezes.   Abdominal: Soft. Bowel sounds are normal. There is no tenderness.   Musculoskeletal: She exhibits no edema.   Lymphadenopathy:     She has no cervical adenopathy.   Neurological: She is alert and oriented to person, place, and time.   Skin: Skin is warm and dry. No rash noted.   Psychiatric: She has a normal mood and affect.         Assessment/Plan:   1. Fever, unspecified fever cause  - Influenza A & B by Molecular  2. Flu-like symptoms  3. Fatigue, unspecified type    With patient's ongoing symptoms,   Patient may benefit from COVID 19 testing  Orders has been given to patient to get it done at [facility] COVID testing site today as noted in the after visit summary  Patient was encouraged to continue taking Tylenol/ibuprofen as needed for fever/body aches and taking Tessalon Perles for cough      4. Essential hypertension  Blood pressure is stable today currently on lisinopril hydrochlorothiazide 20/25 mg daily    5. Severe obesity (BMI 35.0-39.9) with comorbidity    6. Vitamin D deficiency          No

## 2023-02-07 ENCOUNTER — DOCUMENTATION ONLY (OUTPATIENT)
Dept: SPORTS MEDICINE | Facility: CLINIC | Age: 73
End: 2023-02-07

## 2023-02-07 ENCOUNTER — OFFICE VISIT (OUTPATIENT)
Dept: SPORTS MEDICINE | Facility: CLINIC | Age: 73
End: 2023-02-07
Payer: MEDICARE

## 2023-02-07 ENCOUNTER — TELEPHONE (OUTPATIENT)
Dept: NEUROSURGERY | Facility: CLINIC | Age: 73
End: 2023-02-07
Payer: MEDICARE

## 2023-02-07 VITALS — BODY MASS INDEX: 38.56 KG/M2 | WEIGHT: 224.63 LBS

## 2023-02-07 DIAGNOSIS — M17.11 PRIMARY OSTEOARTHRITIS OF RIGHT KNEE: Primary | ICD-10-CM

## 2023-02-07 DIAGNOSIS — G89.29 CHRONIC PAIN OF RIGHT KNEE: ICD-10-CM

## 2023-02-07 DIAGNOSIS — M25.561 CHRONIC PAIN OF RIGHT KNEE: ICD-10-CM

## 2023-02-07 DIAGNOSIS — M25.461 EFFUSION OF RIGHT KNEE: ICD-10-CM

## 2023-02-07 PROCEDURE — 3288F PR FALLS RISK ASSESSMENT DOCUMENTED: ICD-10-PCS | Mod: CPTII,S$GLB,, | Performed by: STUDENT IN AN ORGANIZED HEALTH CARE EDUCATION/TRAINING PROGRAM

## 2023-02-07 PROCEDURE — 99999 PR PBB SHADOW E&M-EST. PATIENT-LVL III: CPT | Mod: PBBFAC,,, | Performed by: STUDENT IN AN ORGANIZED HEALTH CARE EDUCATION/TRAINING PROGRAM

## 2023-02-07 PROCEDURE — 3008F BODY MASS INDEX DOCD: CPT | Mod: CPTII,S$GLB,, | Performed by: STUDENT IN AN ORGANIZED HEALTH CARE EDUCATION/TRAINING PROGRAM

## 2023-02-07 PROCEDURE — 1101F PR PT FALLS ASSESS DOC 0-1 FALLS W/OUT INJ PAST YR: ICD-10-PCS | Mod: CPTII,S$GLB,, | Performed by: STUDENT IN AN ORGANIZED HEALTH CARE EDUCATION/TRAINING PROGRAM

## 2023-02-07 PROCEDURE — 3288F FALL RISK ASSESSMENT DOCD: CPT | Mod: CPTII,S$GLB,, | Performed by: STUDENT IN AN ORGANIZED HEALTH CARE EDUCATION/TRAINING PROGRAM

## 2023-02-07 PROCEDURE — 20611 LARGE JOINT ASPIRATION/INJECTION: R KNEE: ICD-10-PCS | Mod: RT,S$GLB,, | Performed by: STUDENT IN AN ORGANIZED HEALTH CARE EDUCATION/TRAINING PROGRAM

## 2023-02-07 PROCEDURE — 1101F PT FALLS ASSESS-DOCD LE1/YR: CPT | Mod: CPTII,S$GLB,, | Performed by: STUDENT IN AN ORGANIZED HEALTH CARE EDUCATION/TRAINING PROGRAM

## 2023-02-07 PROCEDURE — 1159F MED LIST DOCD IN RCRD: CPT | Mod: CPTII,S$GLB,, | Performed by: STUDENT IN AN ORGANIZED HEALTH CARE EDUCATION/TRAINING PROGRAM

## 2023-02-07 PROCEDURE — 3008F PR BODY MASS INDEX (BMI) DOCUMENTED: ICD-10-PCS | Mod: CPTII,S$GLB,, | Performed by: STUDENT IN AN ORGANIZED HEALTH CARE EDUCATION/TRAINING PROGRAM

## 2023-02-07 PROCEDURE — 99213 PR OFFICE/OUTPT VISIT, EST, LEVL III, 20-29 MIN: ICD-10-PCS | Mod: 25,S$GLB,, | Performed by: STUDENT IN AN ORGANIZED HEALTH CARE EDUCATION/TRAINING PROGRAM

## 2023-02-07 PROCEDURE — 1125F AMNT PAIN NOTED PAIN PRSNT: CPT | Mod: CPTII,S$GLB,, | Performed by: STUDENT IN AN ORGANIZED HEALTH CARE EDUCATION/TRAINING PROGRAM

## 2023-02-07 PROCEDURE — 99213 OFFICE O/P EST LOW 20 MIN: CPT | Mod: 25,S$GLB,, | Performed by: STUDENT IN AN ORGANIZED HEALTH CARE EDUCATION/TRAINING PROGRAM

## 2023-02-07 PROCEDURE — 20611 DRAIN/INJ JOINT/BURSA W/US: CPT | Mod: RT,S$GLB,, | Performed by: STUDENT IN AN ORGANIZED HEALTH CARE EDUCATION/TRAINING PROGRAM

## 2023-02-07 PROCEDURE — 99999 PR PBB SHADOW E&M-EST. PATIENT-LVL III: ICD-10-PCS | Mod: PBBFAC,,, | Performed by: STUDENT IN AN ORGANIZED HEALTH CARE EDUCATION/TRAINING PROGRAM

## 2023-02-07 PROCEDURE — 1159F PR MEDICATION LIST DOCUMENTED IN MEDICAL RECORD: ICD-10-PCS | Mod: CPTII,S$GLB,, | Performed by: STUDENT IN AN ORGANIZED HEALTH CARE EDUCATION/TRAINING PROGRAM

## 2023-02-07 PROCEDURE — 1160F RVW MEDS BY RX/DR IN RCRD: CPT | Mod: CPTII,S$GLB,, | Performed by: STUDENT IN AN ORGANIZED HEALTH CARE EDUCATION/TRAINING PROGRAM

## 2023-02-07 PROCEDURE — 1160F PR REVIEW ALL MEDS BY PRESCRIBER/CLIN PHARMACIST DOCUMENTED: ICD-10-PCS | Mod: CPTII,S$GLB,, | Performed by: STUDENT IN AN ORGANIZED HEALTH CARE EDUCATION/TRAINING PROGRAM

## 2023-02-07 PROCEDURE — 1125F PR PAIN SEVERITY QUANTIFIED, PAIN PRESENT: ICD-10-PCS | Mod: CPTII,S$GLB,, | Performed by: STUDENT IN AN ORGANIZED HEALTH CARE EDUCATION/TRAINING PROGRAM

## 2023-02-07 RX ORDER — TRIAMCINOLONE ACETONIDE 40 MG/ML
40 INJECTION, SUSPENSION INTRA-ARTICULAR; INTRAMUSCULAR
Status: DISCONTINUED | OUTPATIENT
Start: 2023-02-07 | End: 2023-02-07 | Stop reason: HOSPADM

## 2023-02-07 RX ORDER — MELOXICAM 15 MG/1
15 TABLET ORAL DAILY PRN
Qty: 30 TABLET | Refills: 2 | Status: ON HOLD | OUTPATIENT
Start: 2023-02-07 | End: 2023-03-21

## 2023-02-07 RX ADMIN — TRIAMCINOLONE ACETONIDE 40 MG: 40 INJECTION, SUSPENSION INTRA-ARTICULAR; INTRAMUSCULAR at 03:02

## 2023-02-07 NOTE — PROCEDURES
Large Joint Aspiration/Injection: R knee    Date/Time: 2/7/2023 3:00 PM  Performed by: Rudy Ayala MD  Authorized by: Rudy Ayala MD     Consent Done?:  Yes (Verbal)  Indications:  Pain, joint swelling and arthritis  Site marked: the procedure site was marked    Timeout: prior to procedure the correct patient, procedure, and site was verified    Prep: patient was prepped and draped in usual sterile fashion      Local anesthesia used?: Yes    Anesthesia:  Local infiltration  Local anesthetic:  Bupivacaine 0.5% without epinephrine, lidocaine 1% without epinephrine and topical anesthetic  Anesthetic total (ml):  4      Details:  Needle Size:  21 G  Ultrasonic Guidance for needle placement?: Yes    Images are saved and documented.  Approach:  Superior  Location:  Knee  Site:  R knee  Medications:  40 mg triamcinolone acetonide 40 mg/mL  Aspirate amount (mL):  21  Aspirate:  Clear and yellow  Patient tolerance:  Patient tolerated the procedure well with no immediate complications     Ultrasound guidance was used for needle localization. Images were saved and stored for documentation. The appropriate structures were visualized. Dynamic visualization of the needle was continuous throughout the procedures and maintained good position.     We discussed the proper protocols after the injection such as no submerging pools, baths tubs, or hot tubs for 48 hr.  Showering is okay today.  We also discussed that blood sugars can be elevated after an injection and asked patient to properly check their sugars over the next few days and contact their PCP if there are any concerns.  We discussed red flags such as fevers, chills, red, warm, tender joint at the area of injection to please seek medical care immediately.

## 2023-02-07 NOTE — TELEPHONE ENCOUNTER
I called the pt and LVM informing her that her appt on 2/9 has to be rescheduled d/t the provider being booked into sx. I instructed the pt to review her new appt via her Screen[employer] daniel and if she has any further questions or concerns to reach back out to us.

## 2023-02-07 NOTE — PROGRESS NOTES
Patient ID: Beth Souza  YOB: 1950  MRN: [id number]    Chief Complaint: Pain and Swelling of the Right Knee    Referred By: Self    Occupation: Part time sitter    History of Present Illness: Beth Souza is a 72 y.o. female who presents today with Pain and Swelling of the Right Knee    She complains of 2 weeks of progressively worsening right knee pain with no injury.  The pain is all over the knee.  She has a lot of swelling.  The pain is constant and aching, worsening with prolonged sitting and weightbearing.  She has treated her pain with tylenol and prescription strength ibuprofen without relief.  Hot baths give her temporary relief for about 15 minutes.    Past Medical History:   Past Medical History:   Diagnosis Date    Cataract     Hyperlipidemia     Hypertension     Lumbar spondylosis     Meningioma 2021    Obesity     Sleep apnea     Vitamin D deficiency      Past Surgical History:   Procedure Laterality Date    HYSTERECTOMY      PARTIAL HYSTERECTOMY       Family History   Problem Relation Age of Onset    Hypertension Mother     Diabetes Daughter     Melanoma Neg Hx     Psoriasis Neg Hx     Lupus Neg Hx     Eczema Neg Hx     Breast cancer Neg Hx     Colon cancer Neg Hx     Ovarian cancer Neg Hx     Thrombosis Neg Hx      Social History     Socioeconomic History    Marital status:     Number of children: 3   Occupational History    Occupation: Cleaning houses     Employer: not employed   Tobacco Use    Smoking status: Former     Types: Cigarettes     Quit date: 2004     Years since quittin.1    Smokeless tobacco: Never   Substance and Sexual Activity    Alcohol use: Yes     Comment: occasionally    Drug use: No    Sexual activity: Not Currently     Partners: Male   Other Topics Concern    Are you pregnant or think you may be? No    Breast-feeding No   Social History Narrative    Live w/ alone      Social Determinants of Health     Financial Resource  Strain: Low Risk     Difficulty of Paying Living Expenses: Not hard at all   Food Insecurity: No Food Insecurity    Worried About Running Out of Food in the Last Year: Never true    Ran Out of Food in the Last Year: Never true   Transportation Needs: No Transportation Needs    Lack of Transportation (Medical): No    Lack of Transportation (Non-Medical): No   Physical Activity: Inactive    Days of Exercise per Week: 0 days    Minutes of Exercise per Session: 0 min   Stress: No Stress Concern Present    Feeling of Stress : Not at all   Social Connections: Moderately Isolated    Frequency of Communication with Friends and Family: More than three times a week    Frequency of Social Gatherings with Friends and Family: More than three times a week    Attends Confucianist Services: More than 4 times per year    Active Member of Clubs or Organizations: No    Attends Club or Organization Meetings: Never    Marital Status:    Housing Stability: Low Risk     Unable to Pay for Housing in the Last Year: No    Number of Places Lived in the Last Year: 1    Unstable Housing in the Last Year: No     Medication List with Changes/Refills   New Medications    MELOXICAM (MOBIC) 15 MG TABLET    Take 1 tablet (15 mg total) by mouth daily as needed for Pain.   Current Medications    ACETAMINOPHEN (TYLENOL) 500 MG TABLET    Take 500 mg by mouth daily as needed for Pain.    ERGOCALCIFEROL (ERGOCALCIFEROL) 50,000 UNIT CAP    Take 1 capsule by mouth once a week    LISINOPRIL-HYDROCHLOROTHIAZIDE (PRINZIDE,ZESTORETIC) 20-25 MG TAB    Take 1 tablet by mouth once daily   Discontinued Medications    METHYLPREDNISOLONE (MEDROL DOSEPACK) 4 MG TABLET    use as directed     Review of patient's allergies indicates:   Allergen Reactions    Aspirin Other (See Comments)     Bruising       Physical Exam:   Body mass index is 38.56 kg/m².    Physical Exam  Constitutional:       General: She is not in acute distress.     Appearance: Normal appearance.    HENT:      Head: Normocephalic and atraumatic.   Eyes:      Extraocular Movements: Extraocular movements intact.      Conjunctiva/sclera: Conjunctivae normal.   Pulmonary:      Effort: Pulmonary effort is normal. No respiratory distress.   Skin:     General: Skin is warm and dry.   Neurological:      General: No focal deficit present.      Mental Status: She is alert and oriented to person, place, and time.   Psychiatric:         Mood and Affect: Mood normal.     Detailed MSK exam:     Right Knee:  Inspection: Moderate effusion  Palpation tenderness: Medial, lateral, patellofemoral joint line  Range of motion: 0 deg extension - 90 deg flexion  Strength:  5/5 Extension    5/5 Flexion  Stability: Stable ACL/Lachman      Stable Posterior Drawer      Stable Valgus Stress      Stable Varus Stress  Patella Exam: Negative J-sign   Negative Patellar apprehension   Positive Patellar crepitus   N/V Exam:  Tibial:    Normal sensory (plantar foot)  Normal motor (FHL)    Sup Peroneal:   Normal sensory (dorsal foot)  Normal motor (Peroneals)            Deep Peroneal:   Normal sensory (1st web space)  Normal motor (EHL)    Sural:   Normal sensory (lateral foot)   Saphenous:   Normal sensory (medial lower leg)   Normal pedal pulses, warm and well perfused with capillary refill < 2 sec        Imaging:   XR Results:  Results for orders placed during the hospital encounter of 07/13/22    X-ray Knee Ortho Right    Narrative  EXAMINATION:  XR KNEE ORTHO RIGHT    CLINICAL HISTORY:  Pain in right knee    TECHNIQUE:  AP standing of both knees, Merchant views of both knees as well as a lateral view of the right knee were performed.    COMPARISON:  None    FINDINGS:  No right knee fracture.  Mild degenerative changes involving the right knee medial and patellofemoral compartments.  No osseous erosion or suspicious osseous lesion.  No definite right knee effusion.  No acute soft tissue abnormality is identified.    Limited evaluation of the  left knee is unremarkable.    Impression  As above.      Electronically signed by: Jeff Abdi  Date:    07/13/2022  Time:    13:20    Relevant imaging results were reviewed and interpreted by me and per my read:  Mild-to-moderate amount of tricompartmental degenerative changes, with large posterior osteophyte.  Normal alignment.  No fractures or other acute abnormalities.    This was discussed with the patient and / or family today.       Patient Instructions   Assessment:  Beth Souza is a 72 y.o. female with a chief complaint of Pain and Swelling of the Right Knee    Encounter Diagnosis   Name Primary?    Primary osteoarthritis of right knee Yes      Plan:  XR reviewed today mild-to-moderate tricompartmental changes, with significant posterior osteophyte  The patient's history, clinical exam, and imaging findings are consistent with right knee osteoarthritis.   We have reviewed the natural history of this disorder and discussed treatment options.   Given her pain level, resultant debility, and effusion, we will proceed with knee aspiration followed by corticosteroid injection (2/2/1 Kenalog)  21 cc of clear yellow joint fluid aspirated  We will start on Mobic 15 mg daily as needed  [facility]    Follow-up:  3 months or sooner if there are any problems between now and then.    Thank you for choosing [facility] and Dr. Rudy Ayala for your orthopedic & sports medicine care. It is our goal to provide you with exceptional care that will help keep you healthy, active, and get you back in the game.    Please do not hesitate to reach out to us via email, phone, or MyChart with any questions, concerns, or feedback.    If you are experiencing pain/discomfort ,or have questions after 5pm and would like to be connected to the [facility] on-call team, please call this number and specify which Sports Medicine provider is treating you: [phone]    Although there is not a cure for arthritis, there are effective ways to improve symptoms.     Exercise & Activity:  I recommend low impact activities such as elliptical and bicycle   Walking is great for arthritis: https://www.Dialogfeed.Umeng/3-reasons-walking-with-knee-arthritis/  If walking long distances, I recommend good quality well-cushioned shoes. Varsity sports can help you find the right ones: https://www.Picklify.Umeng/  Aquatic and pool therapy is often helpful because it lessens the impact on the joint, strengthens the leg and thigh muscles, and helps to control swelling.   Knee motion is important to the health of the knee.     Knee Braces:  A compression knee sleeve can help limit swelling and provide proprioceptive feedback.     Prescriptions & Medications:  I do recommend formal physical therapy or at minimum a home exercise program.   Over the counter analgesic (pain-relieving) medications can help. Examples are Tylenol, Ibuprofen, and Aleve. Check with your primary care physician to make sure you don't have contra-indications to taking those medicines.  Some over the counter supplement solutions such as glucosamine and chondroitin may help with symptoms, although the evidence is mixed.    Healthy Lifestyle:  Excess body weight can have a negative impact on joint health and on pain. I recommend healthy lifestyle choices including nutrition and exercise that help reach and maintain an ideal body weight. Tips for Exercise: https://www.Dialogfeed.Umeng/13-exercise-tips-for-a-healthier-you/  Some diets cause increased inflammation. I recommend a balanced wholesome diet including some foods such as olives that are shown to decrease inflammation. More diet information available here: https://www.Dialogfeed.com/8-best-foods-for-knee-arthritis/          A copy of today's visit note has been sent to the referring provider.       Rudy Ayala MD  Primary Care Sports  Medicine        Disclaimer: This note was prepared using a voice recognition system and is likely to have sound alike errors within the text.

## 2023-02-07 NOTE — PATIENT INSTRUCTIONS
Assessment:  Beth Souza is a 72 y.o. female with a chief complaint of Pain and Swelling of the Right Knee    Encounter Diagnoses   Name Primary?    Primary osteoarthritis of right knee Yes    Effusion of right knee     Chronic pain of right knee       Plan:  XR reviewed today mild-to-moderate tricompartmental changes, with significant posterior osteophyte  Labs reviewed - A1c 5.7%, Cr 0.8, GFR WNL, LFTs WNL, TSH WNL  The patient's history, clinical exam, and imaging findings are consistent with right knee osteoarthritis.   We have reviewed the natural history of this disorder and discussed treatment options.   Given her pain level, resultant debility, and effusion, we will proceed with knee aspiration followed by corticosteroid injection (2/2/1 Kenalog)  21 cc of clear yellow joint fluid aspirated  We will start on Mobic 15 mg daily as needed  [facility]    Follow-up:  3 months or sooner if there are any problems between now and then.    Thank you for choosing [facility] and Dr. Rudy Ayala for your orthopedic & sports medicine care. It is our goal to provide you with exceptional care that will help keep you healthy, active, and get you back in the game.    Please do not hesitate to reach out to us via email, phone, or MyChart with any questions, concerns, or feedback.    If you are experiencing pain/discomfort ,or have questions after 5pm and would like to be connected to the [facility] on-call team, please call this number and specify which Sports Medicine provider is treating you: [phone]    Although there is not a cure for arthritis, there are effective ways to improve symptoms.     Exercise & Activity:  I recommend low impact activities such as elliptical and bicycle   Walking is great for arthritis: https://www.addwishmd.com/3-reasons-walking-with-knee-arthritis/  If walking long distances, I recommend good quality  well-cushioned shoes. Varsity sports can help you find the right ones: https://www.[employer].com/  Aquatic and pool therapy is often helpful because it lessens the impact on the joint, strengthens the leg and thigh muscles, and helps to control swelling.   Knee motion is important to the health of the knee.     Knee Braces:  A compression knee sleeve can help limit swelling and provide proprioceptive feedback.     Prescriptions & Medications:  I do recommend formal physical therapy or at minimum a home exercise program.   Over the counter analgesic (pain-relieving) medications can help. Examples are Tylenol, Ibuprofen, and Aleve. Check with your primary care physician to make sure you don't have contra-indications to taking those medicines.  Some over the counter supplement solutions such as glucosamine and chondroitin may help with symptoms, although the evidence is mixed.    Healthy Lifestyle:  Excess body weight can have a negative impact on joint health and on pain. I recommend healthy lifestyle choices including nutrition and exercise that help reach and maintain an ideal body weight. Tips for Exercise: https://www.CENTERSONIC.GreenBytes/13-exercise-tips-for-a-healthier-you/  Some diets cause increased inflammation. I recommend a balanced wholesome diet including some foods such as olives that are shown to decrease inflammation. More diet information available here: https://www.CENTERSONIC.GreenBytes/8-best-foods-for-knee-arthritis/

## 2023-02-08 ENCOUNTER — TELEPHONE (OUTPATIENT)
Dept: ORTHOPEDICS | Facility: CLINIC | Age: 73
End: 2023-02-08
Payer: MEDICARE

## 2023-02-08 NOTE — TELEPHONE ENCOUNTER
Spoke with pt in regards to taking meloxicam with aspirin allergy. Per Dr. Ayala, informed her that the medication does not contain aspirin but is in the same family of medication called NSAIDs. Explained that it does not share ingredients with aspirin. Pt verbalized understanding and was grateful for the call.    ----- Message from Mayra Pettit sent at 2/8/2023 12:40 PM CST -----  Contact: lea  Patient is calling to speak with the nurse regarding medication. Reports needing to know if the medication meloxicam (MOBIC) 15 MG tablet has aspirin in it because she allergic to aspirin, also stats she is in pain. Please give the patient a call back at .[phone]  Thanks apolinar

## 2023-02-22 ENCOUNTER — OFFICE VISIT (OUTPATIENT)
Dept: INTERNAL MEDICINE | Facility: CLINIC | Age: 73
End: 2023-02-22
Payer: MEDICARE

## 2023-02-22 ENCOUNTER — HOSPITAL ENCOUNTER (OUTPATIENT)
Dept: RADIOLOGY | Facility: HOSPITAL | Age: 73
Discharge: HOME | End: 2023-02-22
Attending: FAMILY MEDICINE
Payer: MEDICARE

## 2023-02-22 VITALS
WEIGHT: 221.31 LBS | HEART RATE: 64 BPM | DIASTOLIC BLOOD PRESSURE: 86 MMHG | RESPIRATION RATE: 20 BRPM | OXYGEN SATURATION: 97 % | HEIGHT: 64 IN | BODY MASS INDEX: 37.78 KG/M2 | SYSTOLIC BLOOD PRESSURE: 138 MMHG

## 2023-02-22 DIAGNOSIS — M47.26 OSTEOARTHRITIS OF SPINE WITH RADICULOPATHY, LUMBAR REGION: ICD-10-CM

## 2023-02-22 DIAGNOSIS — R04.2 HEMOPTYSIS: ICD-10-CM

## 2023-02-22 DIAGNOSIS — I10 ESSENTIAL HYPERTENSION: ICD-10-CM

## 2023-02-22 DIAGNOSIS — E66.01 SEVERE OBESITY WITH BODY MASS INDEX (BMI) OF 35.0 TO 39.9 WITH SERIOUS COMORBIDITY: ICD-10-CM

## 2023-02-22 DIAGNOSIS — M47.26 OSTEOARTHRITIS OF SPINE WITH RADICULOPATHY, LUMBAR REGION: Primary | ICD-10-CM

## 2023-02-22 DIAGNOSIS — M16.0 PRIMARY OSTEOARTHRITIS OF BOTH HIPS: ICD-10-CM

## 2023-02-22 PROCEDURE — 99214 OFFICE O/P EST MOD 30 MIN: CPT | Mod: S$GLB,,, | Performed by: FAMILY MEDICINE

## 2023-02-22 PROCEDURE — 72110 X-RAY EXAM L-2 SPINE 4/>VWS: CPT | Mod: 26,,, | Performed by: STUDENT IN AN ORGANIZED HEALTH CARE EDUCATION/TRAINING PROGRAM

## 2023-02-22 PROCEDURE — 3075F SYST BP GE 130 - 139MM HG: CPT | Mod: CPTII,S$GLB,, | Performed by: FAMILY MEDICINE

## 2023-02-22 PROCEDURE — 1125F PR PAIN SEVERITY QUANTIFIED, PAIN PRESENT: ICD-10-PCS | Mod: CPTII,S$GLB,, | Performed by: FAMILY MEDICINE

## 2023-02-22 PROCEDURE — 71046 XR CHEST PA AND LATERAL: ICD-10-PCS | Mod: 26,,, | Performed by: RADIOLOGY

## 2023-02-22 PROCEDURE — 3008F BODY MASS INDEX DOCD: CPT | Mod: CPTII,S$GLB,, | Performed by: FAMILY MEDICINE

## 2023-02-22 PROCEDURE — 72110 XR LUMBAR SPINE COMPLETE 5 VIEW: ICD-10-PCS | Mod: 26,,, | Performed by: STUDENT IN AN ORGANIZED HEALTH CARE EDUCATION/TRAINING PROGRAM

## 2023-02-22 PROCEDURE — 3288F PR FALLS RISK ASSESSMENT DOCUMENTED: ICD-10-PCS | Mod: CPTII,S$GLB,, | Performed by: FAMILY MEDICINE

## 2023-02-22 PROCEDURE — 3075F PR MOST RECENT SYSTOLIC BLOOD PRESS GE 130-139MM HG: ICD-10-PCS | Mod: CPTII,S$GLB,, | Performed by: FAMILY MEDICINE

## 2023-02-22 PROCEDURE — 71046 X-RAY EXAM CHEST 2 VIEWS: CPT | Mod: 26,,, | Performed by: RADIOLOGY

## 2023-02-22 PROCEDURE — 71046 X-RAY EXAM CHEST 2 VIEWS: CPT | Mod: TC

## 2023-02-22 PROCEDURE — 1125F AMNT PAIN NOTED PAIN PRSNT: CPT | Mod: CPTII,S$GLB,, | Performed by: FAMILY MEDICINE

## 2023-02-22 PROCEDURE — 99999 PR PBB SHADOW E&M-EST. PATIENT-LVL V: ICD-10-PCS | Mod: PBBFAC,,, | Performed by: FAMILY MEDICINE

## 2023-02-22 PROCEDURE — 1159F MED LIST DOCD IN RCRD: CPT | Mod: CPTII,S$GLB,, | Performed by: FAMILY MEDICINE

## 2023-02-22 PROCEDURE — 99999 PR PBB SHADOW E&M-EST. PATIENT-LVL V: CPT | Mod: PBBFAC,,, | Performed by: FAMILY MEDICINE

## 2023-02-22 PROCEDURE — 1101F PT FALLS ASSESS-DOCD LE1/YR: CPT | Mod: CPTII,S$GLB,, | Performed by: FAMILY MEDICINE

## 2023-02-22 PROCEDURE — 99214 PR OFFICE/OUTPT VISIT, EST, LEVL IV, 30-39 MIN: ICD-10-PCS | Mod: S$GLB,,, | Performed by: FAMILY MEDICINE

## 2023-02-22 PROCEDURE — 3079F DIAST BP 80-89 MM HG: CPT | Mod: CPTII,S$GLB,, | Performed by: FAMILY MEDICINE

## 2023-02-22 PROCEDURE — 72110 X-RAY EXAM L-2 SPINE 4/>VWS: CPT | Mod: TC

## 2023-02-22 PROCEDURE — 1160F PR REVIEW ALL MEDS BY PRESCRIBER/CLIN PHARMACIST DOCUMENTED: ICD-10-PCS | Mod: CPTII,S$GLB,, | Performed by: FAMILY MEDICINE

## 2023-02-22 PROCEDURE — 1160F RVW MEDS BY RX/DR IN RCRD: CPT | Mod: CPTII,S$GLB,, | Performed by: FAMILY MEDICINE

## 2023-02-22 PROCEDURE — 1101F PR PT FALLS ASSESS DOC 0-1 FALLS W/OUT INJ PAST YR: ICD-10-PCS | Mod: CPTII,S$GLB,, | Performed by: FAMILY MEDICINE

## 2023-02-22 PROCEDURE — 3008F PR BODY MASS INDEX (BMI) DOCUMENTED: ICD-10-PCS | Mod: CPTII,S$GLB,, | Performed by: FAMILY MEDICINE

## 2023-02-22 PROCEDURE — 3288F FALL RISK ASSESSMENT DOCD: CPT | Mod: CPTII,S$GLB,, | Performed by: FAMILY MEDICINE

## 2023-02-22 PROCEDURE — 3079F PR MOST RECENT DIASTOLIC BLOOD PRESSURE 80-89 MM HG: ICD-10-PCS | Mod: CPTII,S$GLB,, | Performed by: FAMILY MEDICINE

## 2023-02-22 PROCEDURE — 1159F PR MEDICATION LIST DOCUMENTED IN MEDICAL RECORD: ICD-10-PCS | Mod: CPTII,S$GLB,, | Performed by: FAMILY MEDICINE

## 2023-02-22 RX ORDER — GABAPENTIN 100 MG/1
100 CAPSULE ORAL NIGHTLY
Qty: 30 CAPSULE | Refills: 1 | Status: ON HOLD | OUTPATIENT
Start: 2023-02-22 | End: 2023-03-21

## 2023-02-22 RX ORDER — PANTOPRAZOLE SODIUM 40 MG/1
40 TABLET, DELAYED RELEASE ORAL DAILY
Qty: 30 TABLET | Refills: 0 | Status: ON HOLD | OUTPATIENT
Start: 2023-02-22 | End: 2023-03-21

## 2023-02-22 NOTE — PROGRESS NOTES
"Subjective:       Patient ID: Beth Souza is a 72 y.o. female.    Chief Complaint: Leg Pain    72-year-old  female patient with Patient Active Problem List:     Essential hypertension     Vitamin D deficiency     Mixed hyperlipidemia     Cataract     Osteoarthritis of spine with radiculopathy, lumbar region     Severe obesity with body mass index (BMI) of 35.0 to 39.9 with serious comorbidity     LEN on CPAP     Mild cognitive impairment with memory loss     Meningioma of cerebellum     Multiple thyroid nodules     History of COVID-19     Primary osteoarthritis of both hips     Calcification of aorta     History of hyperparathyroidism     Prediabetes  Here reports that patient has been having left leg pain off and on lately for the past few days, initially noted pain to the right leg but has subsided and switch to left leg  Continues to have ongoing back pain but does not recall having any injury or trauma  Reports that she is noticed blood in the sputum especially at nighttime recently but denies any persistent cough or difficulty breathing  Denies any acid reflux symptoms lately and does not have any allergy like symptoms  Denies any weight loss    Review of Systems   Constitutional:  Negative for fatigue.   Eyes:  Negative for visual disturbance.   Respiratory:  Negative for shortness of breath.         Positive for hemoptysis   Cardiovascular:  Negative for chest pain and leg swelling.   Gastrointestinal:  Negative for abdominal pain, nausea and vomiting.   Genitourinary:  Negative for difficulty urinating.   Musculoskeletal:  Positive for back pain and myalgias.   Skin:  Negative for rash.   Neurological:  Positive for numbness. Negative for weakness, light-headedness and headaches.   Psychiatric/Behavioral:  Negative for sleep disturbance.        /86   Pulse 64   Resp 20   Ht 5' 4" (1.626 m)   Wt 100.4 kg (221 lb 5.5 oz)   SpO2 97%   BMI 37.99 kg/m²   Objective:    "   Physical Exam  Constitutional:       Appearance: She is well-developed.   HENT:      Head: Normocephalic and atraumatic.   Cardiovascular:      Rate and Rhythm: Normal rate and regular rhythm.      Heart sounds: Normal heart sounds. No murmur heard.  Pulmonary:      Effort: Pulmonary effort is normal. No respiratory distress.      Breath sounds: Normal breath sounds. No wheezing.   Chest:      Chest wall: No tenderness.   Abdominal:      General: Bowel sounds are normal.      Palpations: Abdomen is soft.      Tenderness: There is no abdominal tenderness.   Musculoskeletal:         General: Tenderness present.   Skin:     General: Skin is warm and dry.      Findings: No rash.   Neurological:      Mental Status: She is alert and oriented to person, place, and time.   Psychiatric:         Mood and Affect: Mood normal.         Assessment/Plan:   1. Osteoarthritis of spine with radiculopathy, lumbar region  -     Ambulatory referral/consult to Pain Clinic; Future; Expected date: 03/01/2023  -     gabapentin (NEURONTIN) 100 MG capsule; Take 1 capsule (100 mg total) by mouth every evening.  Dispense: 30 capsule; Refill: 1  -     X-Ray Lumbar Spine 5 View; Future; Expected date: 02/22/2023  Will get x-ray of the lumbar spine and gabapentin prescribed today for symptomatic relief  Okay to take Tylenol as needed for pain and will refer to Pain Management for further evaluation    2. Primary osteoarthritis of both hips  Clinically doing well with meloxicam as needed    3. Essential hypertension  Blood pressure reports being stable with taking lisinopril hydrochlorothiazide 20/25 mg    4. Severe obesity with body mass index (BMI) of 35.0 to 39.9 with serious comorbidity  Strict lifestyle changes recommended with diet and exercise to lose weight with BMI 37    5. Hemoptysis  -     X-Ray Chest PA And Lateral; Future; Expected date: 02/22/2023  -     pantoprazole (PROTONIX) 40 MG tablet; Take 1 tablet (40 mg total) by mouth once  daily.  Dispense: 30 tablet; Refill: 0  Will get chest x-ray for further evaluation and Protonix prescribed today for 2 weeks  Advised to discontinue after 2 weeks if no symptoms  Patient was encouraged to eat small frequent meals and try to avoid taking meloxicam daily

## 2023-02-24 ENCOUNTER — TELEPHONE (OUTPATIENT)
Dept: ORTHOPEDICS | Facility: CLINIC | Age: 73
End: 2023-02-24
Payer: MEDICARE

## 2023-02-24 NOTE — TELEPHONE ENCOUNTER
LVM for pt to call back in regards to left leg pain. Offered to r/s f/u with Dr. Jamie howard. Provided call back number.    ----- Message from Brian Hutchinson sent at 2/24/2023  1:47 PM CST -----  The pain meds given is not working    The knee that was worked on is fine    The left leg is beginning to give her trouble      Pls call pt back to consult      [phone]

## 2023-02-27 ENCOUNTER — TELEPHONE (OUTPATIENT)
Dept: INTERNAL MEDICINE | Facility: CLINIC | Age: 73
End: 2023-02-27
Payer: MEDICARE

## 2023-02-27 NOTE — TELEPHONE ENCOUNTER
----- Message from Leonor Scott sent at 2/27/2023  2:22 PM CST -----  Contact: Beth  Patient stated that while taking gabapentin pain has been getting worse. She would like to know the best way to when herself off meds. Please call her back at   [phone].          Thanks  DD

## 2023-02-28 ENCOUNTER — OFFICE VISIT (OUTPATIENT)
Dept: NEUROSURGERY | Facility: CLINIC | Age: 73
End: 2023-02-28
Payer: MEDICARE

## 2023-02-28 VITALS
HEIGHT: 64 IN | DIASTOLIC BLOOD PRESSURE: 89 MMHG | HEART RATE: 50 BPM | SYSTOLIC BLOOD PRESSURE: 134 MMHG | WEIGHT: 221 LBS | RESPIRATION RATE: 18 BRPM | BODY MASS INDEX: 37.73 KG/M2

## 2023-02-28 DIAGNOSIS — M54.9 DORSALGIA, UNSPECIFIED: ICD-10-CM

## 2023-02-28 DIAGNOSIS — M54.16 LUMBAR RADICULOPATHY: ICD-10-CM

## 2023-02-28 DIAGNOSIS — D32.0 MENINGIOMA, CEREBRAL: ICD-10-CM

## 2023-02-28 DIAGNOSIS — M51.36 DEGENERATIVE DISC DISEASE, LUMBAR: Primary | ICD-10-CM

## 2023-02-28 DIAGNOSIS — M43.16 SPONDYLOLISTHESIS, LUMBAR REGION: ICD-10-CM

## 2023-02-28 PROCEDURE — 1125F PR PAIN SEVERITY QUANTIFIED, PAIN PRESENT: ICD-10-PCS | Mod: CPTII,S$GLB,, | Performed by: NEUROLOGICAL SURGERY

## 2023-02-28 PROCEDURE — 1101F PT FALLS ASSESS-DOCD LE1/YR: CPT | Mod: CPTII,S$GLB,, | Performed by: NEUROLOGICAL SURGERY

## 2023-02-28 PROCEDURE — 99999 PR PBB SHADOW E&M-EST. PATIENT-LVL III: CPT | Mod: PBBFAC,,, | Performed by: NEUROLOGICAL SURGERY

## 2023-02-28 PROCEDURE — 99999 PR PBB SHADOW E&M-EST. PATIENT-LVL III: ICD-10-PCS | Mod: PBBFAC,,, | Performed by: NEUROLOGICAL SURGERY

## 2023-02-28 PROCEDURE — 1159F MED LIST DOCD IN RCRD: CPT | Mod: CPTII,S$GLB,, | Performed by: NEUROLOGICAL SURGERY

## 2023-02-28 PROCEDURE — 3075F SYST BP GE 130 - 139MM HG: CPT | Mod: CPTII,S$GLB,, | Performed by: NEUROLOGICAL SURGERY

## 2023-02-28 PROCEDURE — 1125F AMNT PAIN NOTED PAIN PRSNT: CPT | Mod: CPTII,S$GLB,, | Performed by: NEUROLOGICAL SURGERY

## 2023-02-28 PROCEDURE — 3079F DIAST BP 80-89 MM HG: CPT | Mod: CPTII,S$GLB,, | Performed by: NEUROLOGICAL SURGERY

## 2023-02-28 PROCEDURE — 99214 OFFICE O/P EST MOD 30 MIN: CPT | Mod: S$GLB,,, | Performed by: NEUROLOGICAL SURGERY

## 2023-02-28 PROCEDURE — 3008F PR BODY MASS INDEX (BMI) DOCUMENTED: ICD-10-PCS | Mod: CPTII,S$GLB,, | Performed by: NEUROLOGICAL SURGERY

## 2023-02-28 PROCEDURE — 1159F PR MEDICATION LIST DOCUMENTED IN MEDICAL RECORD: ICD-10-PCS | Mod: CPTII,S$GLB,, | Performed by: NEUROLOGICAL SURGERY

## 2023-02-28 PROCEDURE — 3008F BODY MASS INDEX DOCD: CPT | Mod: CPTII,S$GLB,, | Performed by: NEUROLOGICAL SURGERY

## 2023-02-28 PROCEDURE — 3075F PR MOST RECENT SYSTOLIC BLOOD PRESS GE 130-139MM HG: ICD-10-PCS | Mod: CPTII,S$GLB,, | Performed by: NEUROLOGICAL SURGERY

## 2023-02-28 PROCEDURE — 3079F PR MOST RECENT DIASTOLIC BLOOD PRESSURE 80-89 MM HG: ICD-10-PCS | Mod: CPTII,S$GLB,, | Performed by: NEUROLOGICAL SURGERY

## 2023-02-28 PROCEDURE — 99214 PR OFFICE/OUTPT VISIT, EST, LEVL IV, 30-39 MIN: ICD-10-PCS | Mod: S$GLB,,, | Performed by: NEUROLOGICAL SURGERY

## 2023-02-28 PROCEDURE — 3288F FALL RISK ASSESSMENT DOCD: CPT | Mod: CPTII,S$GLB,, | Performed by: NEUROLOGICAL SURGERY

## 2023-02-28 PROCEDURE — 3288F PR FALLS RISK ASSESSMENT DOCUMENTED: ICD-10-PCS | Mod: CPTII,S$GLB,, | Performed by: NEUROLOGICAL SURGERY

## 2023-02-28 PROCEDURE — 1101F PR PT FALLS ASSESS DOC 0-1 FALLS W/OUT INJ PAST YR: ICD-10-PCS | Mod: CPTII,S$GLB,, | Performed by: NEUROLOGICAL SURGERY

## 2023-02-28 RX ORDER — OXYCODONE AND ACETAMINOPHEN 10; 325 MG/1; MG/1
1 TABLET ORAL EVERY 8 HOURS PRN
Qty: 10 TABLET | Refills: 0 | Status: ON HOLD | OUTPATIENT
Start: 2023-02-28 | End: 2023-03-21

## 2023-02-28 NOTE — PROGRESS NOTES
Subjective:      Patient ID: Beth Souza is a 72 y.o. female.    Chief Complaint: Lumbar Spine Pain (L-Spine) (Pt is here today c/o back pain with radiation left leg pt states that the pain worsens when she sits too long and when she's active. Pt denies any recent falls and currently takes Tylenol & Mobic to help ease pain. )    Here today for eval lower back   Pain today is 10/10   States this is acutely worse in the past several days \    Worse through the L LE hurts at rest and with activity   States she has to hold onto something to walk  3 weeks ago had fluid drained from R knee which helped with knee symptoms   After noticed pain in leg     Taking mobic and tylenol     Also with hx of cerebellar meningioma   No HA, DZ, N/V, SZ     Has not had any recent MRI of brain or lumbar spine     Previously seen for spondylolisthesis with severe stenosis L4-5 and herniated disc L5-S1 with facet disease         XR   Unchanged grade 1 anterolisthesis of L4 on L5.  No acute fracture or dislocation.  Mild multilevel discogenic degenerative change, with severe discogenic changes at L5-S1 similar to prior exam.  Moderate severe lower lumbar hypertrophic facet arthropathy.      MR lumbar 2021   L4-L5:  Severe bilateral facet arthropathy along with grade 1 spondylolisthesis and ligamentum flavum hypertrophy resulting in severe narrowing of the central canal.  The bilateral neural foraminal canals are moderately to severely narrowed.     L5-S1:  Mild diffuse disc bulge with the superimposed right paracentral disc extrusion which appears to efface the intracanalicular portion of the right S1 nerve root.  No significant central canal narrowing otherwise noted.  The bilateral neural foraminal canals are severely narrowed.     Impression:     1. Degenerative changes of the lower lumbar spine as detailed above and most prominent L4-5 and L5-S1.    MR brain 2021      1. Stable left posterior fossa mass most consistent with  meningioma.  2. No acute intracranial abnormality.       Review of Systems      Objective:       Neurosurgery Physical Exam  Ortho/SPM Exam  Ortho Exam    Nursing note and vitals reviewed  Gen:Oriented to person, place, and time.             Appears stated age   Skin: no rashes or lesions identified   Head:Normocephalic and atraumatic.  Nose: Nose normal.    Eyes: EOM are normal. Pupils are equal, round, and reactive to light.   Neck: Neck supple. No masses or lesions palpated  Cardiovascular: Intact distal pulses.    Abdominal: Soft.   Neurological: Alert and oriented to person, place, and time.  No cranial nerve deficit.  Coordination normal. Normal muscle tone  Psychiatric: Normal mood and affect. Behavior is normal.      Back:    Motor   Right Right Left Left  Level Group   5  5  L2 Hip flexor (Psoas)   5  5  L3 Leg extension (Quads)   5  5  L4 Dorsiflexion & foot inversion (Tibialis Anterior)   5  5  L5 Great toe extension ( EHL)   5  5  S1 Foot eversion (Gastroc, PL & PB)     Sensation  NL Decreased (R/L/BL) Level Sensation    X  L2 Anterio-medial thigh   X  L3 Medial thigh around knee   X  L4 Medial foot   X  L5 Dorsum foot   X  S1 Lateral foot     Reflex  2+  Patellar tendon (L4)   2+  Achilles tendon (S1)           Results for orders placed during the hospital encounter of 12/02/21    MRI Lumbar Spine Without Contrast    Narrative  EXAMINATION:  MRI LUMBAR SPINE WITHOUT CONTRAST    CLINICAL HISTORY:  Lumbar Radiculopathy; Spondylosis without myelopathy or radiculopathy, cervical region    TECHNIQUE:  Multiplanar, multisequence MR images were acquired from the thoracolumbar junction to the sacrum without the administration of contrast.    COMPARISON:  Plain films from [id number]    FINDINGS:  There is a couple mm retrolisthesis of L5 on S1.  There is grade 1 spondylolisthesis of L4 on L5. The vertebral body heights are well maintained, with no fracture.  No marrow signal abnormality suspicious for an  infiltrative process.  Modic type 1 discogenic endplate changes seen at the L5-S1 level.    The conus medullaris terminates at approximately the inferior endplate of L2.  The adjacent soft tissue structures show no significant abnormalities.  There is disc desiccation seen throughout the lumbar spine with at least mild disc space narrowing seen at L4-5 and more mild-to-moderate disc space narrowing seen at L5-S1.    L1-L2: No significant central canal or neural foraminal narrowing.    L2-L3: No significant central canal or neural foraminal narrowing.Mild-to-moderate bilateral facet arthropathy noted.    L3-L4: Moderate to severe bilateral facet arthropathy resulting in mild narrowing of the central canal.  The bilateral neural foraminal canals are mildly narrowed.    L4-L5:  Severe bilateral facet arthropathy along with grade 1 spondylolisthesis and ligamentum flavum hypertrophy resulting in severe narrowing of the central canal.  The bilateral neural foraminal canals are moderately to severely narrowed.    L5-S1:  Mild diffuse disc bulge with the superimposed right paracentral disc extrusion which appears to efface the intracanalicular portion of the right S1 nerve root.  No significant central canal narrowing otherwise noted.  The bilateral neural foraminal canals are severely narrowed.    Impression  1. Degenerative changes of the lower lumbar spine as detailed above and most prominent L4-5 and L5-S1.      Electronically signed by: Alex Oliva DO  Date:    12/02/2021  Time:    15:17     Results for orders placed during the hospital encounter of 11/12/21    X-Ray Lumbar Complete Including Flex And Ext    Narrative  EXAMINATION:  XR LUMBAR SPINE 5 VIEW WITH FLEX AND EXT    CLINICAL HISTORY:  Spondylosis without myelopathy or radiculopathy, cervical region    TECHNIQUE:  Five views of the lumbar spine plus flexion extension views were performed.    COMPARISON:  CT lumbar spine 04/25/2019.  MRI lumbar spine  02/13/2019.  Radiographs 10/04/2018.  MRI lumbar spine 08/03/2016.  Lumbar spine radiographs 05/13/2016.  Lumbar spine radiographs 02/25/2015.  Lumbar spine radiographs 01/23/2014    FINDINGS:  There is grade 1 anterolisthesis of L4 on L5, unchanged when compared to the most recent prior radiographs from 10/04/2018.  This does not change with flexion/extension maneuvers.  There is multilevel marginal spurring with disc space narrowing at L4-L5 and L5-S1 with multilevel facet arthropathy.  Findings are unchanged.  No acute osseous findings.    Scattered calcified plaque in the aorta.    Impression  As above      Electronically signed by: Dwain Churchill MD  Date:    11/12/2021  Time:    14:15    Results for orders placed during the hospital encounter of 10/24/22    X-Ray Lumbar Spine Ap And Lateral    Narrative  EXAMINATION:  XR LUMBAR SPINE AP AND LATERAL    CLINICAL HISTORY:  lumbar back pain;    TECHNIQUE:  Three views of the lumbar spine were performed.    COMPARISON:  11/12/2021    FINDINGS:  Unchanged grade 1 anterolisthesis of L4 on L5.  No acute fracture or dislocation.  Mild multilevel discogenic degenerative change, with severe discogenic changes at L5-S1 similar to prior exam.  Moderate severe lower lumbar hypertrophic facet arthropathy.    Impression  No acute abnormality.      Electronically signed by: Amador Fernandes  Date:    10/24/2022  Time:    13:45         I  reviewed all pertinent imaging regarding this case.  Assessment:     1. Degenerative disc disease, lumbar    2. Dorsalgia, unspecified    3. Meningioma, cerebral    4. Spondylolisthesis, lumbar region    5. Lumbar radiculopathy      Plan:     Degenerative disc disease, lumbar  -     MRI Lumbar Spine Without Contrast; Future; Expected date: 02/28/2023    Dorsalgia, unspecified  -     MRI Lumbar Spine Without Contrast; Future; Expected date: 02/28/2023    Meningioma, cerebral  -     MRI Brain W WO Contrast; Future; Expected date: 02/28/2023  -      Creatinine, serum; Future; Expected date: 02/28/2023    Spondylolisthesis, lumbar region    Lumbar radiculopathy    Other orders  -     oxyCODONE-acetaminophen (PERCOCET)  mg per tablet; Take 1 tablet by mouth every 8 (eight) hours as needed for Pain.  Dispense: 10 tablet; Refill: 0    Acute onset worsening lower back and L LE pain   10/10 without activity   No weakness   Will order new MRI   Hx of severe stenosis with spondy L4-5/ large R L5-S1 disc back in 2021  Also hx of cerebellar meningioma she is due to have surveillance imaging for   Will prescribe pain medication for acute event     Thank you for the referral   Please call with any questions    Juan Pablo Gautam MD  Neurosurgery     Disclaimer: This note was prepared using a voice recognition system and is likely to have sound alike errors within the text.

## 2023-03-01 ENCOUNTER — TELEPHONE (OUTPATIENT)
Dept: INTERNAL MEDICINE | Facility: CLINIC | Age: 73
End: 2023-03-01
Payer: MEDICARE

## 2023-03-01 NOTE — TELEPHONE ENCOUNTER
Attempt to contact pt, no answer LVM.     Patient can start taking gabapentin every other day for a week and discontinue     Dr Oliva

## 2023-03-02 NOTE — PROGRESS NOTES
Interventional Pain Established Patient Visit    Chief Pain Complaint:  Low-back Pain      Interval Hx: 3/6/23  Ms. Souza is a 72-year-old lady with past medical history significant for cataracts, hypertension, hyperlipidemia, history of COVID-19, morbid obesity, prediabetes, history of cerebellar meningioma, obstructive sleep apnea on CPAP who presents to establish care, previous Dr. Swanson patient.    Today patient presents for lower back and leg pain.  Patient reports pain is intermittent and today is rated a 5/10.  Patient reports pain in the lower back which radiates into the buttock and down the posterior aspects of bilateral lower extremities in L5-S2 distribution to the feet.  Pain is exacerbated with standing or walking, even exceeding 5-10 minutes.  Patient reports when she has her Rollator, she is able to walk further distances.  Patient does endorse associated weakness in the lower extremities associated with her pain.  Pain is more severe on the right than on the left.  Patient reports completing 1 session of physical therapy 1 week prior with pain extremely severe in the lower extremities which was debilitating.  She reports secondary to this she can not continue physical therapy at this time.    Of note patient saw Dr. Gautam 02/28/2023 with new MRI ordered to evaluate severe stenosis and spondylolisthesis L4-5 and L5-S1 disc herniation and short prescription of Percocet provided.      Patient reports significant motor weakness and loss of sensations.  Patient denies night fever/night sweats, urinary incontinence, bowel incontinence, and significant weight loss.      Pain Disability Index Review:     Last 3 PDI Scores 10/4/2018 6/19/2017 4/24/2017   Pain Disability Index (PDI) 5 14 55       Non-Pharmacologic Treatments:  Physical Therapy/Home Exercise: yes  Ice/Heat:yes  TENS: no  Acupuncture: no  Massage: no  Chiropractic: no    Other: no      Pain Medications:  - Opioids: Percocet  "(Oxycodone/Acetaminophen)  - Adjuvant Medications: Neurontin (Gabapentin) and Tylenol (Acetaminophen)        Interval History (11/8/2022):  Beth Souza presents today for follow-up visit.  Patient was last seen on 4/29/21 (1.5 years ago). At that visit, the plan was to start topamax for sciatica. Patient was referred back to our clinic by Alyssia Ventura PA (Orthopedics). Patient reports pain as "0/10 today. She reports pain has been bad x about a month. She went to the ER because the pain was so severe. She wants to know when it will return and what to do when it returns. She got no relief with medrol dose misa or gabapentin.      Orthopedics note: Beth Souza presents to me today with chief complaint of left low back/hip pain radiating left knee pain.  I do believe she is presently in seeing exacerbation of sciatica.  She has not had any relief with dexamethasone injection, Medrol Dosepak, gabapentin.  She is not a candidate for NSAIDs secondary to aspirin allergy.  She will continue Tylenol up to 650 mg 3 times a day as needed and voltaren gel.  Additionally I would like the patient to try a short course of Lyrica to see if this would provide any additional nerve relief.  I would also like to get a referral and for her to see her back in spine clinic as she states has been quite awhile since she has seen anyone for her back. I also want to obtain bilateral hip xrays today for further evaluation.              Interval History (4/29/2021): Patient was last seen on 2/8/2021. She is here today for low back pain with bilateral leg pain (R>L).  Patient reports pain as 10/10 today.  She recently saw PCP who started her on Topamax with titration schedule. She reports pain worse with walking and in the morning.     History of Present Illness (2019):   Beth oSuza is a 68 y.o. female  who is presenting with a chief complaint of Back Pain (radiating down bilateral legs). The patient began " experiencing this problem insidiously, and the pain has been gradually worsening. The pain is described as throbbing, shooting, cramping, burning, aching and electrical and is located in the bilateral lumbar spine . Pain is intermittent and lasts hours. The pain radiates to bilateral lower extremities, L>R. Pain is 40% axial and 60% radicular. The patient rates her pain a 8 out of ten and interferes with activities of daily living a 8 out of ten. Pain is exacerbated by ambulation, and is improved by rest. Patient reports no prior trauma, no prior spinal surgery     - pertinent negatives: No fever, No chills, No weight loss, No bladder dysfunction, No bowel dysfunction, No saddle anesthesia  - pertinent positives: none    - medications, other therapies tried (physical therapy, injections):     >> NSAIDs, Tylenol, gabapentin, zanaflex and flexeril    >> Has previously undergone Physical Therapy    >> Has previously undergone spinal injection/s   - Lumbar LAWSON with relief for less than 1 week - at external facility      Imaging / Labs / Studies (reviewed on 3/6/2023):    10/31/22 X-Ray Hips Bilateral 2 View Incl AP Pelvis  COMPARISON:  10/19/2020  FINDINGS:  Mild joint space narrowing is in both hips with femoral head neck junction osteophyte formation, similar to the prior exam.  There is sclerosis about both sacroiliac joints and at the pubic symphysis as before.  Numerous calcific densities overlying the lower abdomen and pelvis likely reflect phleboliths.  No acute fracture, dislocation or osteonecrosis.    MRI lumbar spine 12/02/2021  FINDINGS:  There is a couple mm retrolisthesis of L5 on S1.  There is grade 1 spondylolisthesis of L4 on L5. The vertebral body heights are well maintained, with no fracture.  No marrow signal abnormality suspicious for an infiltrative process.  Modic type 1 discogenic endplate changes seen at the L5-S1 level.     The conus medullaris terminates at approximately the inferior endplate  of L2.  The adjacent soft tissue structures show no significant abnormalities.  There is disc desiccation seen throughout the lumbar spine with at least mild disc space narrowing seen at L4-5 and more mild-to-moderate disc space narrowing seen at L5-S1.     L1-L2: No significant central canal or neural foraminal narrowing.     L2-L3: No significant central canal or neural foraminal narrowing.Mild-to-moderate bilateral facet arthropathy noted.     L3-L4: Moderate to severe bilateral facet arthropathy resulting in mild narrowing of the central canal.  The bilateral neural foraminal canals are mildly narrowed.     L4-L5:  Severe bilateral facet arthropathy along with grade 1 spondylolisthesis and ligamentum flavum hypertrophy resulting in severe narrowing of the central canal.  The bilateral neural foraminal canals are moderately to severely narrowed.     L5-S1:  Mild diffuse disc bulge with the superimposed right paracentral disc extrusion which appears to efface the intracanalicular portion of the right S1 nerve root.  No significant central canal narrowing otherwise noted.  The bilateral neural foraminal canals are severely narrowed.       Review of Systems:  CONSTITUTIONAL: patient denies any fever, chills, or weight loss  SKIN: patient denies any rash or itching  RESPIRATORY: patient denies having any shortness of breath  GASTROINTESTINAL: patient denies having any diarrhea, constipation, or bowel incontinence  GENITOURINARY: patient denies having any abnormal bladder function    MUSCULOSKELETAL:  - patient complains of the above noted pain/s (see chief pain complaint)    NEUROLOGICAL:   - pain as above  - strength in Lower extremities is intact, BILATERALLY  - sensation in Lower extremities is intact, BILATERALLY  - patient denies any loss of bowel or bladder control      PSYCHIATRIC: patient denies any change in mood    Other:  All other systems reviewed and are negative      Physical Exam:  Vitals:    03/06/23  "1007   BP: (!) 147/100   Pulse: (!) 53   Resp: 16   Weight: 99 kg (218 lb 4.1 oz)   Height: 5' 4" (1.626 m)   PainSc:   5   PainLoc: Back      Body mass index is 37.46 kg/m².   (reviewed on 3/6/2023)    General: alert and oriented, in no apparent distress. Obese.   Gait: antalgic gait.  Skin: no rashes, no discoloration, no obvious lesions  HEENT: normocephalic, atraumatic. Pupils equal and round.  Respiratory: without use of accessory muscles of respiration.    Musculoskeletal - Lumbar Spine:  - ROM fairly preserved   - Pain on flexion of lumbar spine: Present  - Pain on extension of lumbar spine: Present   - Lumbar facet loading: Present   - TTP over the lumbar facet joints: Present Bilaterally, worse at L5-S1    - TTP over the lumbar paraspinals: Present   - TTP over the SI joints: Absent  - Straight Leg Raise: Positive, R>L    - YURI/ Tung's: Negative    Neuro - Lower Extremities:  - RLE Strength:     >> 5/5 strength with right hip flexion/ extension    >> 5/5 strength with right knee flexion/ extension    >> 5/5 strength in right ankle with dorsiflexion    >> 5/5 strength in right ankle with plantar flexion  - LLE Strength:     >> 5/5 strength with left hip flexion/ extension    >> 5/5 strength with knee flexion extension on the left     >> 5/5 strength in left ankle with dorsiflexion    >> 5/5 strength in left ankle with plantar flexion  - Extremity Reflexes: Brisk and symmetric throughout  - Sensory: Sensation to light touch intact bilaterally      Psych:  Mood and affect is appropriate      Assessment:  Beth Souza is a 72 y.o. year old female who is presenting with       ICD-10-CM ICD-9-CM    1. Lumbar facet arthropathy  M47.816 721.3       2. Osteoarthritis of spine with radiculopathy, lumbar region  M47.26 721.3 Ambulatory referral/consult to Pain Clinic      3. Left lumbar radiculopathy  M54.16 724.4       4. Lumbar radiculopathy  M54.16 724.4 IR Epidural Transfor 1st Vert Lumbar El      " Case Request-RAD/Other Procedure Area: Bilateral L5/S1 transforaminal epidural steroid injection             Plan:  1. Interventional:  Schedule for bilateral L5/S1 transforaminal epidural steroid injection to see if this helps with radicular pain.  We discussed the procedure, benefits, potential risk and alternative options in detail.  Patient has elected to pursue this procedure.    Anticoagulation:  None, no anticoagulation    2. Pharmacologic:         -  We have discussed continuing judicious use of Tylenol, not to exceed 3000 mg daily to avoid acute hepatotoxicity.      3. Rehabilitative:  -We discussed RE-initiating physical therapy after interventional treatment to help manage the patient/s painful condition. The patient was counseled that muscle strengthening will improve the long term prognosis in regards to pain and may also help increase range of motion and mobility. They were told that one of the goals of physical therapy is that they learn how to do the exercises so that they can do them independently at home daily upon completion.       4. Diagnostic:  Relevant imaging reviewed in patient's questions answered.      5. Consult:Neurosurgery PRN    6. Follow-up:  4-6 weeks post injection      The above plan and management options were discussed at length with patient. Patient is in agreement with the above and verbalized understanding.    - I discussed the goals of interventional chronic pain management with the patient on today's visit. We discussed a multimodal and systematic approach to pain.  This includes diagnostic and therapeutic injections, adjuvant pharmacologic treatment, physical therapy, and at times psychiatry.  I emphasized the importance of regular exercise, core strengthening and stretching, diet and weight loss as a cornerstone of long-term pain management.    - This condition does not require this patient to take time off of work, and the primary goal of our Pain Management services is  to improve the patient's functional capacity.  - Patient Questions: Answered all of the patient's questions regarding diagnoses, therapy, treatment and next steps        Kush Jha MD    Disclaimer:  This note was prepared using voice recognition system and is likely to have sound alike errors that may have been overlooked even after proof reading.  Please call me with any questions.

## 2023-03-02 NOTE — H&P (VIEW-ONLY)
Interventional Pain Established Patient Visit    Chief Pain Complaint:  Low-back Pain      Interval Hx: 3/6/23  Ms. Souza is a 72-year-old lady with past medical history significant for cataracts, hypertension, hyperlipidemia, history of COVID-19, morbid obesity, prediabetes, history of cerebellar meningioma, obstructive sleep apnea on CPAP who presents to establish care, previous Dr. Swanson patient.    Today patient presents for lower back and leg pain.  Patient reports pain is intermittent and today is rated a 5/10.  Patient reports pain in the lower back which radiates into the buttock and down the posterior aspects of bilateral lower extremities in L5-S2 distribution to the feet.  Pain is exacerbated with standing or walking, even exceeding 5-10 minutes.  Patient reports when she has her Rollator, she is able to walk further distances.  Patient does endorse associated weakness in the lower extremities associated with her pain.  Pain is more severe on the right than on the left.  Patient reports completing 1 session of physical therapy 1 week prior with pain extremely severe in the lower extremities which was debilitating.  She reports secondary to this she can not continue physical therapy at this time.    Of note patient saw Dr. Gautam 02/28/2023 with new MRI ordered to evaluate severe stenosis and spondylolisthesis L4-5 and L5-S1 disc herniation and short prescription of Percocet provided.      Patient reports significant motor weakness and loss of sensations.  Patient denies night fever/night sweats, urinary incontinence, bowel incontinence, and significant weight loss.      Pain Disability Index Review:     Last 3 PDI Scores 10/4/2018 6/19/2017 4/24/2017   Pain Disability Index (PDI) 5 14 55       Non-Pharmacologic Treatments:  Physical Therapy/Home Exercise: yes  Ice/Heat:yes  TENS: no  Acupuncture: no  Massage: no  Chiropractic: no    Other: no      Pain Medications:  - Opioids: Percocet  "(Oxycodone/Acetaminophen)  - Adjuvant Medications: Neurontin (Gabapentin) and Tylenol (Acetaminophen)        Interval History (11/8/2022):  Beth Souza presents today for follow-up visit.  Patient was last seen on 4/29/21 (1.5 years ago). At that visit, the plan was to start topamax for sciatica. Patient was referred back to our clinic by Alyssia Ventura PA (Orthopedics). Patient reports pain as "0/10 today. She reports pain has been bad x about a month. She went to the ER because the pain was so severe. She wants to know when it will return and what to do when it returns. She got no relief with medrol dose misa or gabapentin.      Orthopedics note: Beth Souza presents to me today with chief complaint of left low back/hip pain radiating left knee pain.  I do believe she is presently in seeing exacerbation of sciatica.  She has not had any relief with dexamethasone injection, Medrol Dosepak, gabapentin.  She is not a candidate for NSAIDs secondary to aspirin allergy.  She will continue Tylenol up to 650 mg 3 times a day as needed and voltaren gel.  Additionally I would like the patient to try a short course of Lyrica to see if this would provide any additional nerve relief.  I would also like to get a referral and for her to see her back in spine clinic as she states has been quite awhile since she has seen anyone for her back. I also want to obtain bilateral hip xrays today for further evaluation.              Interval History (4/29/2021): Patient was last seen on 2/8/2021. She is here today for low back pain with bilateral leg pain (R>L).  Patient reports pain as 10/10 today.  She recently saw PCP who started her on Topamax with titration schedule. She reports pain worse with walking and in the morning.     History of Present Illness (2019):   Beth Souza is a 68 y.o. female  who is presenting with a chief complaint of Back Pain (radiating down bilateral legs). The patient began " experiencing this problem insidiously, and the pain has been gradually worsening. The pain is described as throbbing, shooting, cramping, burning, aching and electrical and is located in the bilateral lumbar spine . Pain is intermittent and lasts hours. The pain radiates to bilateral lower extremities, L>R. Pain is 40% axial and 60% radicular. The patient rates her pain a 8 out of ten and interferes with activities of daily living a 8 out of ten. Pain is exacerbated by ambulation, and is improved by rest. Patient reports no prior trauma, no prior spinal surgery     - pertinent negatives: No fever, No chills, No weight loss, No bladder dysfunction, No bowel dysfunction, No saddle anesthesia  - pertinent positives: none    - medications, other therapies tried (physical therapy, injections):     >> NSAIDs, Tylenol, gabapentin, zanaflex and flexeril    >> Has previously undergone Physical Therapy    >> Has previously undergone spinal injection/s   - Lumbar LAWSON with relief for less than 1 week - at external facility      Imaging / Labs / Studies (reviewed on 3/6/2023):    10/31/22 X-Ray Hips Bilateral 2 View Incl AP Pelvis  COMPARISON:  10/19/2020  FINDINGS:  Mild joint space narrowing is in both hips with femoral head neck junction osteophyte formation, similar to the prior exam.  There is sclerosis about both sacroiliac joints and at the pubic symphysis as before.  Numerous calcific densities overlying the lower abdomen and pelvis likely reflect phleboliths.  No acute fracture, dislocation or osteonecrosis.    MRI lumbar spine 12/02/2021  FINDINGS:  There is a couple mm retrolisthesis of L5 on S1.  There is grade 1 spondylolisthesis of L4 on L5. The vertebral body heights are well maintained, with no fracture.  No marrow signal abnormality suspicious for an infiltrative process.  Modic type 1 discogenic endplate changes seen at the L5-S1 level.     The conus medullaris terminates at approximately the inferior endplate  of L2.  The adjacent soft tissue structures show no significant abnormalities.  There is disc desiccation seen throughout the lumbar spine with at least mild disc space narrowing seen at L4-5 and more mild-to-moderate disc space narrowing seen at L5-S1.     L1-L2: No significant central canal or neural foraminal narrowing.     L2-L3: No significant central canal or neural foraminal narrowing.Mild-to-moderate bilateral facet arthropathy noted.     L3-L4: Moderate to severe bilateral facet arthropathy resulting in mild narrowing of the central canal.  The bilateral neural foraminal canals are mildly narrowed.     L4-L5:  Severe bilateral facet arthropathy along with grade 1 spondylolisthesis and ligamentum flavum hypertrophy resulting in severe narrowing of the central canal.  The bilateral neural foraminal canals are moderately to severely narrowed.     L5-S1:  Mild diffuse disc bulge with the superimposed right paracentral disc extrusion which appears to efface the intracanalicular portion of the right S1 nerve root.  No significant central canal narrowing otherwise noted.  The bilateral neural foraminal canals are severely narrowed.       Review of Systems:  CONSTITUTIONAL: patient denies any fever, chills, or weight loss  SKIN: patient denies any rash or itching  RESPIRATORY: patient denies having any shortness of breath  GASTROINTESTINAL: patient denies having any diarrhea, constipation, or bowel incontinence  GENITOURINARY: patient denies having any abnormal bladder function    MUSCULOSKELETAL:  - patient complains of the above noted pain/s (see chief pain complaint)    NEUROLOGICAL:   - pain as above  - strength in Lower extremities is intact, BILATERALLY  - sensation in Lower extremities is intact, BILATERALLY  - patient denies any loss of bowel or bladder control      PSYCHIATRIC: patient denies any change in mood    Other:  All other systems reviewed and are negative      Physical Exam:  Vitals:    03/06/23  "1007   BP: (!) 147/100   Pulse: (!) 53   Resp: 16   Weight: 99 kg (218 lb 4.1 oz)   Height: 5' 4" (1.626 m)   PainSc:   5   PainLoc: Back      Body mass index is 37.46 kg/m².   (reviewed on 3/6/2023)    General: alert and oriented, in no apparent distress. Obese.   Gait: antalgic gait.  Skin: no rashes, no discoloration, no obvious lesions  HEENT: normocephalic, atraumatic. Pupils equal and round.  Respiratory: without use of accessory muscles of respiration.    Musculoskeletal - Lumbar Spine:  - ROM fairly preserved   - Pain on flexion of lumbar spine: Present  - Pain on extension of lumbar spine: Present   - Lumbar facet loading: Present   - TTP over the lumbar facet joints: Present Bilaterally, worse at L5-S1    - TTP over the lumbar paraspinals: Present   - TTP over the SI joints: Absent  - Straight Leg Raise: Positive, R>L    - YURI/ Tung's: Negative    Neuro - Lower Extremities:  - RLE Strength:     >> 5/5 strength with right hip flexion/ extension    >> 5/5 strength with right knee flexion/ extension    >> 5/5 strength in right ankle with dorsiflexion    >> 5/5 strength in right ankle with plantar flexion  - LLE Strength:     >> 5/5 strength with left hip flexion/ extension    >> 5/5 strength with knee flexion extension on the left     >> 5/5 strength in left ankle with dorsiflexion    >> 5/5 strength in left ankle with plantar flexion  - Extremity Reflexes: Brisk and symmetric throughout  - Sensory: Sensation to light touch intact bilaterally      Psych:  Mood and affect is appropriate      Assessment:  Beth Souza is a 72 y.o. year old female who is presenting with       ICD-10-CM ICD-9-CM    1. Lumbar facet arthropathy  M47.816 721.3       2. Osteoarthritis of spine with radiculopathy, lumbar region  M47.26 721.3 Ambulatory referral/consult to Pain Clinic      3. Left lumbar radiculopathy  M54.16 724.4       4. Lumbar radiculopathy  M54.16 724.4 IR Epidural Transfor 1st Vert Lumbar El      " Case Request-RAD/Other Procedure Area: Bilateral L5/S1 transforaminal epidural steroid injection             Plan:  1. Interventional:  Schedule for bilateral L5/S1 transforaminal epidural steroid injection to see if this helps with radicular pain.  We discussed the procedure, benefits, potential risk and alternative options in detail.  Patient has elected to pursue this procedure.    Anticoagulation:  None, no anticoagulation    2. Pharmacologic:         -  We have discussed continuing judicious use of Tylenol, not to exceed 3000 mg daily to avoid acute hepatotoxicity.      3. Rehabilitative:  -We discussed RE-initiating physical therapy after interventional treatment to help manage the patient/s painful condition. The patient was counseled that muscle strengthening will improve the long term prognosis in regards to pain and may also help increase range of motion and mobility. They were told that one of the goals of physical therapy is that they learn how to do the exercises so that they can do them independently at home daily upon completion.       4. Diagnostic:  Relevant imaging reviewed in patient's questions answered.      5. Consult:Neurosurgery PRN    6. Follow-up:  4-6 weeks post injection      The above plan and management options were discussed at length with patient. Patient is in agreement with the above and verbalized understanding.    - I discussed the goals of interventional chronic pain management with the patient on today's visit. We discussed a multimodal and systematic approach to pain.  This includes diagnostic and therapeutic injections, adjuvant pharmacologic treatment, physical therapy, and at times psychiatry.  I emphasized the importance of regular exercise, core strengthening and stretching, diet and weight loss as a cornerstone of long-term pain management.    - This condition does not require this patient to take time off of work, and the primary goal of our Pain Management services is  to improve the patient's functional capacity.  - Patient Questions: Answered all of the patient's questions regarding diagnoses, therapy, treatment and next steps        Kush Jha MD    Disclaimer:  This note was prepared using voice recognition system and is likely to have sound alike errors that may have been overlooked even after proof reading.  Please call me with any questions.

## 2023-03-06 ENCOUNTER — OFFICE VISIT (OUTPATIENT)
Dept: PAIN MEDICINE | Facility: CLINIC | Age: 73
End: 2023-03-06
Payer: MEDICARE

## 2023-03-06 VITALS
BODY MASS INDEX: 37.26 KG/M2 | HEART RATE: 53 BPM | WEIGHT: 218.25 LBS | SYSTOLIC BLOOD PRESSURE: 147 MMHG | RESPIRATION RATE: 16 BRPM | HEIGHT: 64 IN | DIASTOLIC BLOOD PRESSURE: 100 MMHG

## 2023-03-06 DIAGNOSIS — M47.816 LUMBAR FACET ARTHROPATHY: Primary | ICD-10-CM

## 2023-03-06 DIAGNOSIS — M47.26 OSTEOARTHRITIS OF SPINE WITH RADICULOPATHY, LUMBAR REGION: ICD-10-CM

## 2023-03-06 DIAGNOSIS — M54.16 LUMBAR RADICULOPATHY: ICD-10-CM

## 2023-03-06 DIAGNOSIS — M54.16 LEFT LUMBAR RADICULOPATHY: ICD-10-CM

## 2023-03-06 PROCEDURE — 1159F PR MEDICATION LIST DOCUMENTED IN MEDICAL RECORD: ICD-10-PCS | Mod: CPTII,S$GLB,, | Performed by: ANESTHESIOLOGY

## 2023-03-06 PROCEDURE — 99999 PR PBB SHADOW E&M-EST. PATIENT-LVL IV: CPT | Mod: PBBFAC,,, | Performed by: ANESTHESIOLOGY

## 2023-03-06 PROCEDURE — 1101F PT FALLS ASSESS-DOCD LE1/YR: CPT | Mod: CPTII,S$GLB,, | Performed by: ANESTHESIOLOGY

## 2023-03-06 PROCEDURE — 99214 OFFICE O/P EST MOD 30 MIN: CPT | Mod: S$GLB,,, | Performed by: ANESTHESIOLOGY

## 2023-03-06 PROCEDURE — 3080F PR MOST RECENT DIASTOLIC BLOOD PRESSURE >= 90 MM HG: ICD-10-PCS | Mod: CPTII,S$GLB,, | Performed by: ANESTHESIOLOGY

## 2023-03-06 PROCEDURE — 3288F PR FALLS RISK ASSESSMENT DOCUMENTED: ICD-10-PCS | Mod: CPTII,S$GLB,, | Performed by: ANESTHESIOLOGY

## 2023-03-06 PROCEDURE — 3288F FALL RISK ASSESSMENT DOCD: CPT | Mod: CPTII,S$GLB,, | Performed by: ANESTHESIOLOGY

## 2023-03-06 PROCEDURE — 1159F MED LIST DOCD IN RCRD: CPT | Mod: CPTII,S$GLB,, | Performed by: ANESTHESIOLOGY

## 2023-03-06 PROCEDURE — 99214 PR OFFICE/OUTPT VISIT, EST, LEVL IV, 30-39 MIN: ICD-10-PCS | Mod: S$GLB,,, | Performed by: ANESTHESIOLOGY

## 2023-03-06 PROCEDURE — 99999 PR PBB SHADOW E&M-EST. PATIENT-LVL IV: ICD-10-PCS | Mod: PBBFAC,,, | Performed by: ANESTHESIOLOGY

## 2023-03-06 PROCEDURE — 3077F PR MOST RECENT SYSTOLIC BLOOD PRESSURE >= 140 MM HG: ICD-10-PCS | Mod: CPTII,S$GLB,, | Performed by: ANESTHESIOLOGY

## 2023-03-06 PROCEDURE — 3077F SYST BP >= 140 MM HG: CPT | Mod: CPTII,S$GLB,, | Performed by: ANESTHESIOLOGY

## 2023-03-06 PROCEDURE — 1101F PR PT FALLS ASSESS DOC 0-1 FALLS W/OUT INJ PAST YR: ICD-10-PCS | Mod: CPTII,S$GLB,, | Performed by: ANESTHESIOLOGY

## 2023-03-06 PROCEDURE — 3080F DIAST BP >= 90 MM HG: CPT | Mod: CPTII,S$GLB,, | Performed by: ANESTHESIOLOGY

## 2023-03-06 PROCEDURE — 1160F RVW MEDS BY RX/DR IN RCRD: CPT | Mod: CPTII,S$GLB,, | Performed by: ANESTHESIOLOGY

## 2023-03-06 PROCEDURE — 1125F AMNT PAIN NOTED PAIN PRSNT: CPT | Mod: CPTII,S$GLB,, | Performed by: ANESTHESIOLOGY

## 2023-03-06 PROCEDURE — 3008F PR BODY MASS INDEX (BMI) DOCUMENTED: ICD-10-PCS | Mod: CPTII,S$GLB,, | Performed by: ANESTHESIOLOGY

## 2023-03-06 PROCEDURE — 1125F PR PAIN SEVERITY QUANTIFIED, PAIN PRESENT: ICD-10-PCS | Mod: CPTII,S$GLB,, | Performed by: ANESTHESIOLOGY

## 2023-03-06 PROCEDURE — 3008F BODY MASS INDEX DOCD: CPT | Mod: CPTII,S$GLB,, | Performed by: ANESTHESIOLOGY

## 2023-03-06 PROCEDURE — 1160F PR REVIEW ALL MEDS BY PRESCRIBER/CLIN PHARMACIST DOCUMENTED: ICD-10-PCS | Mod: CPTII,S$GLB,, | Performed by: ANESTHESIOLOGY

## 2023-03-10 ENCOUNTER — TELEPHONE (OUTPATIENT)
Dept: PAIN MEDICINE | Facility: CLINIC | Age: 73
End: 2023-03-10
Payer: MEDICARE

## 2023-03-10 NOTE — TELEPHONE ENCOUNTER
----- Message from Floridalma Maldonado sent at 3/10/2023  2:31 PM CST -----  Contact: self [phone]  Patient called in this afternoon stating no one has called her about her upcoming procedure. She needs to know all the information and the arrival time. Please call back [phone]. Thanks tierra

## 2023-03-10 NOTE — TELEPHONE ENCOUNTER
Notified patient someone from the surgery team will call 1-3 days prior to procedure with arrival time. Patient verbalized understanding, all questions answered.       Notified / [facility] was incorrect arrival time is NOT 10:15am to wait for surgery staff to call.

## 2023-03-14 ENCOUNTER — OFFICE VISIT (OUTPATIENT)
Dept: INTERNAL MEDICINE | Facility: CLINIC | Age: 73
End: 2023-03-14
Payer: MEDICARE

## 2023-03-14 VITALS
TEMPERATURE: 98 F | SYSTOLIC BLOOD PRESSURE: 120 MMHG | OXYGEN SATURATION: 97 % | HEIGHT: 64 IN | BODY MASS INDEX: 38.09 KG/M2 | WEIGHT: 223.13 LBS | DIASTOLIC BLOOD PRESSURE: 70 MMHG | HEART RATE: 68 BPM

## 2023-03-14 DIAGNOSIS — R09.82 POSTNASAL DRIP: ICD-10-CM

## 2023-03-14 DIAGNOSIS — K14.8 TONGUE DISCOLORATION: ICD-10-CM

## 2023-03-14 DIAGNOSIS — R10.13 EPIGASTRIC PAIN: Primary | ICD-10-CM

## 2023-03-14 PROCEDURE — 1101F PT FALLS ASSESS-DOCD LE1/YR: CPT | Mod: CPTII,S$GLB,, | Performed by: NURSE PRACTITIONER

## 2023-03-14 PROCEDURE — 3288F PR FALLS RISK ASSESSMENT DOCUMENTED: ICD-10-PCS | Mod: CPTII,S$GLB,, | Performed by: NURSE PRACTITIONER

## 2023-03-14 PROCEDURE — 1159F MED LIST DOCD IN RCRD: CPT | Mod: CPTII,S$GLB,, | Performed by: NURSE PRACTITIONER

## 2023-03-14 PROCEDURE — 3074F PR MOST RECENT SYSTOLIC BLOOD PRESSURE < 130 MM HG: ICD-10-PCS | Mod: CPTII,S$GLB,, | Performed by: NURSE PRACTITIONER

## 2023-03-14 PROCEDURE — 3078F PR MOST RECENT DIASTOLIC BLOOD PRESSURE < 80 MM HG: ICD-10-PCS | Mod: CPTII,S$GLB,, | Performed by: NURSE PRACTITIONER

## 2023-03-14 PROCEDURE — 3008F BODY MASS INDEX DOCD: CPT | Mod: CPTII,S$GLB,, | Performed by: NURSE PRACTITIONER

## 2023-03-14 PROCEDURE — 3078F DIAST BP <80 MM HG: CPT | Mod: CPTII,S$GLB,, | Performed by: NURSE PRACTITIONER

## 2023-03-14 PROCEDURE — 99999 PR PBB SHADOW E&M-EST. PATIENT-LVL III: ICD-10-PCS | Mod: PBBFAC,,, | Performed by: NURSE PRACTITIONER

## 2023-03-14 PROCEDURE — 1159F PR MEDICATION LIST DOCUMENTED IN MEDICAL RECORD: ICD-10-PCS | Mod: CPTII,S$GLB,, | Performed by: NURSE PRACTITIONER

## 2023-03-14 PROCEDURE — 1101F PR PT FALLS ASSESS DOC 0-1 FALLS W/OUT INJ PAST YR: ICD-10-PCS | Mod: CPTII,S$GLB,, | Performed by: NURSE PRACTITIONER

## 2023-03-14 PROCEDURE — 99214 PR OFFICE/OUTPT VISIT, EST, LEVL IV, 30-39 MIN: ICD-10-PCS | Mod: S$GLB,,, | Performed by: NURSE PRACTITIONER

## 2023-03-14 PROCEDURE — 1125F AMNT PAIN NOTED PAIN PRSNT: CPT | Mod: CPTII,S$GLB,, | Performed by: NURSE PRACTITIONER

## 2023-03-14 PROCEDURE — 3288F FALL RISK ASSESSMENT DOCD: CPT | Mod: CPTII,S$GLB,, | Performed by: NURSE PRACTITIONER

## 2023-03-14 PROCEDURE — 99214 OFFICE O/P EST MOD 30 MIN: CPT | Mod: S$GLB,,, | Performed by: NURSE PRACTITIONER

## 2023-03-14 PROCEDURE — 99999 PR PBB SHADOW E&M-EST. PATIENT-LVL III: CPT | Mod: PBBFAC,,, | Performed by: NURSE PRACTITIONER

## 2023-03-14 PROCEDURE — 3008F PR BODY MASS INDEX (BMI) DOCUMENTED: ICD-10-PCS | Mod: CPTII,S$GLB,, | Performed by: NURSE PRACTITIONER

## 2023-03-14 PROCEDURE — 1125F PR PAIN SEVERITY QUANTIFIED, PAIN PRESENT: ICD-10-PCS | Mod: CPTII,S$GLB,, | Performed by: NURSE PRACTITIONER

## 2023-03-14 PROCEDURE — 3074F SYST BP LT 130 MM HG: CPT | Mod: CPTII,S$GLB,, | Performed by: NURSE PRACTITIONER

## 2023-03-14 RX ORDER — FLUTICASONE PROPIONATE 50 MCG
2 SPRAY, SUSPENSION (ML) NASAL DAILY
Qty: 16 G | Refills: 0 | Status: SHIPPED | OUTPATIENT
Start: 2023-03-14 | End: 2023-05-09

## 2023-03-14 RX ORDER — CETIRIZINE HYDROCHLORIDE 10 MG/1
10 TABLET ORAL DAILY
Qty: 14 TABLET | Refills: 0 | Status: SHIPPED | OUTPATIENT
Start: 2023-03-14 | End: 2023-05-09

## 2023-03-14 NOTE — PROGRESS NOTES
Subjective:       Patient ID: Beth Souza is a 72 y.o. female.    Chief Complaint: No chief complaint on file.    HPI    Pt reports brown tongue/nasty taste in her mouth- not metallic/not emesis like- sometimes goes away on its own, other times after brushing teeth-usually noticed in AM  Reports epigastric tenderness couple months,  given protonix per pcp, she did not start  Also having post nasal drip    Past Medical History:   Diagnosis Date    Cataract     Hyperlipidemia     Hypertension     Lumbar spondylosis     Meningioma 2021    Obesity     Sleep apnea     Vitamin D deficiency        Past Surgical History:   Procedure Laterality Date    HYSTERECTOMY      PARTIAL HYSTERECTOMY       Social History     Socioeconomic History    Marital status:     Number of children: 3   Occupational History    Occupation: Cleaning houses     Employer: not employed   Tobacco Use    Smoking status: Former     Types: Cigarettes     Quit date: 2004     Years since quittin.2    Smokeless tobacco: Never   Substance and Sexual Activity    Alcohol use: Yes     Comment: occasionally    Drug use: No    Sexual activity: Not Currently     Partners: Male   Other Topics Concern    Are you pregnant or think you may be? No    Breast-feeding No   Social History Narrative    Live w/ alone      Social Determinants of Health     Financial Resource Strain: Low Risk     Difficulty of Paying Living Expenses: Not hard at all   Food Insecurity: No Food Insecurity    Worried About Running Out of Food in the Last Year: Never true    Ran Out of Food in the Last Year: Never true   Transportation Needs: No Transportation Needs    Lack of Transportation (Medical): No    Lack of Transportation (Non-Medical): No   Physical Activity: Inactive    Days of Exercise per Week: 0 days    Minutes of Exercise per Session: 0 min   Stress: No Stress Concern Present    Feeling of Stress : Not at all   Social Connections: Moderately Isolated     Frequency of Communication with Friends and Family: More than three times a week    Frequency of Social Gatherings with Friends and Family: More than three times a week    Attends Christian Services: More than 4 times per year    Active Member of Clubs or Organizations: No    Attends Club or Organization Meetings: Never    Marital Status:    Housing Stability: Low Risk     Unable to Pay for Housing in the Last Year: No    Number of Places Lived in the Last Year: 1    Unstable Housing in the Last Year: No     Review of patient's allergies indicates:   Allergen Reactions    Aspirin Other (See Comments)     Bruising     Current Outpatient Medications   Medication Sig    acetaminophen (TYLENOL) 500 MG tablet Take 500 mg by mouth daily as needed for Pain.    ergocalciferol (ERGOCALCIFEROL) 50,000 unit Cap Take 1 capsule by mouth once a week    lisinopriL-hydrochlorothiazide (PRINZIDE,ZESTORETIC) 20-25 mg Tab Take 1 tablet by mouth once daily    cetirizine (ZYRTEC) 10 MG tablet Take 1 tablet (10 mg total) by mouth once daily.    fluticasone propionate (FLONASE) 50 mcg/actuation nasal spray 2 sprays (100 mcg total) by Each Nostril route once daily.    gabapentin (NEURONTIN) 100 MG capsule Take 1 capsule (100 mg total) by mouth every evening. (Patient not taking: Reported on 3/6/2023)    meloxicam (MOBIC) 15 MG tablet Take 1 tablet (15 mg total) by mouth daily as needed for Pain. (Patient not taking: Reported on 3/6/2023)    oxyCODONE-acetaminophen (PERCOCET)  mg per tablet Take 1 tablet by mouth every 8 (eight) hours as needed for Pain. (Patient not taking: Reported on 3/6/2023)    pantoprazole (PROTONIX) 40 MG tablet Take 1 tablet (40 mg total) by mouth once daily. (Patient not taking: Reported on 3/6/2023)     No current facility-administered medications for this visit.           Review of Systems   Constitutional:  Negative for activity change, appetite change, chills, diaphoresis, fatigue, fever and  unexpected weight change.   HENT:  Positive for postnasal drip. Negative for congestion, ear pain, rhinorrhea, sinus pressure, sinus pain, sneezing, sore throat, tinnitus, trouble swallowing and voice change.    Eyes:  Negative for photophobia, pain and visual disturbance.   Respiratory:  Negative for cough, chest tightness, shortness of breath and wheezing.    Cardiovascular:  Negative for chest pain, palpitations and leg swelling.   Gastrointestinal:  Positive for abdominal pain. Negative for abdominal distention, constipation, diarrhea, nausea and vomiting.   Genitourinary:  Negative for decreased urine volume, difficulty urinating, dysuria, flank pain, frequency, hematuria and urgency.   Musculoskeletal:  Negative for arthralgias, back pain, joint swelling, neck pain and neck stiffness.   Allergic/Immunologic: Negative for immunocompromised state.   Neurological:  Negative for dizziness, tremors, seizures, syncope, facial asymmetry, speech difficulty, weakness, light-headedness, numbness and headaches.   Hematological:  Negative for adenopathy. Does not bruise/bleed easily.   Psychiatric/Behavioral:  Negative for confusion and sleep disturbance.      Objective:      Physical Exam  HENT:      Head: Normocephalic and atraumatic.      Right Ear: Tympanic membrane normal.      Left Ear: Tympanic membrane normal.      Nose: Rhinorrhea present.   Eyes:      Conjunctiva/sclera: Conjunctivae normal.   Cardiovascular:      Rate and Rhythm: Normal rate and regular rhythm.      Heart sounds: Normal heart sounds.   Pulmonary:      Effort: Pulmonary effort is normal.      Breath sounds: Normal breath sounds.   Abdominal:      General: Bowel sounds are normal.      Palpations: Abdomen is soft.      Tenderness: There is abdominal tenderness in the epigastric area.   Musculoskeletal:         General: Normal range of motion.      Cervical back: Normal range of motion and neck supple.   Skin:     General: Skin is warm and dry.    Neurological:      Mental Status: She is alert and oriented to person, place, and time.       Assessment:     Vitals:    03/14/23 1314   BP: 120/70   Pulse: 68   Temp: 97.7 °F (36.5 °C)         1. Epigastric pain    2. Tongue discoloration    3. Postnasal drip          Plan:   Epigastric pain    Tongue discoloration    Postnasal drip    Other orders  -     fluticasone propionate (FLONASE) 50 mcg/actuation nasal spray; 2 sprays (100 mcg total) by Each Nostril route once daily.  Dispense: 16 g; Refill: 0  -     cetirizine (ZYRTEC) 10 MG tablet; Take 1 tablet (10 mg total) by mouth once daily.  Dispense: 14 tablet; Refill: 0      Start protonix as prev ordered  Add zyrtec/flonase  Gerd/sinus appears to be the issue here  Follow w PCP

## 2023-03-17 NOTE — PRE-PROCEDURE INSTRUCTIONS
Spoke with patient regarding procedure scheduled on 3.21     Arrival time 0915     Has patient been sick with fever or on antibiotics within the last 7 days? No     Does the patient have any open wounds, sores or rashes? No     Does the patient have any recent fractures? no     Has patient received a vaccination within the last 7 days? No     Received the COVID vaccination?      Has the patient stopped all medications as directed? na     Does patient have a pacemaker and or defibrillator? no     Does the patient have a ride to and from procedure and someone reliable to remain with patient? ioana     Is the patient diabetic? no     Does the patient have sleep apnea? Or use O2 at home? queta cpap     Is the patient receiving sedation?      Is the patient instructed to remain NPO beginning at midnight the night before their procedure? yes     Procedure location confirmed with patient? Yes     Covid- Denies signs/symptoms. Instructed to notify PAT/MD if any changes.

## 2023-03-21 ENCOUNTER — HOSPITAL ENCOUNTER (OUTPATIENT)
Facility: HOSPITAL | Age: 73
Discharge: HOME | End: 2023-03-21
Attending: ANESTHESIOLOGY | Admitting: ANESTHESIOLOGY
Payer: MEDICARE

## 2023-03-21 VITALS
HEART RATE: 50 BPM | RESPIRATION RATE: 17 BRPM | SYSTOLIC BLOOD PRESSURE: 155 MMHG | BODY MASS INDEX: 37.63 KG/M2 | OXYGEN SATURATION: 99 % | DIASTOLIC BLOOD PRESSURE: 87 MMHG | HEIGHT: 64 IN | TEMPERATURE: 98 F | WEIGHT: 220.44 LBS

## 2023-03-21 DIAGNOSIS — M54.16 LUMBAR RADICULOPATHY: ICD-10-CM

## 2023-03-21 PROCEDURE — 25500020 PHARM REV CODE 255: Performed by: ANESTHESIOLOGY

## 2023-03-21 PROCEDURE — 63600175 PHARM REV CODE 636 W HCPCS: Performed by: ANESTHESIOLOGY

## 2023-03-21 PROCEDURE — 25000003 PHARM REV CODE 250: Performed by: ANESTHESIOLOGY

## 2023-03-21 PROCEDURE — 64483 PR EPIDURAL INJ, ANES/STEROID, TRANSFORAMINAL, LUMB/SACR, SNGL LEVL: ICD-10-PCS | Mod: 50,,, | Performed by: ANESTHESIOLOGY

## 2023-03-21 PROCEDURE — 64483 NJX AA&/STRD TFRM EPI L/S 1: CPT | Mod: RT | Performed by: ANESTHESIOLOGY

## 2023-03-21 PROCEDURE — 64483 NJX AA&/STRD TFRM EPI L/S 1: CPT | Mod: 50,,, | Performed by: ANESTHESIOLOGY

## 2023-03-21 RX ORDER — DEXAMETHASONE SODIUM PHOSPHATE 10 MG/ML
INJECTION INTRAMUSCULAR; INTRAVENOUS
Status: DISCONTINUED | OUTPATIENT
Start: 2023-03-21 | End: 2023-03-21 | Stop reason: HOSPADM

## 2023-03-21 RX ORDER — FENTANYL CITRATE 50 UG/ML
INJECTION, SOLUTION INTRAMUSCULAR; INTRAVENOUS
Status: DISCONTINUED | OUTPATIENT
Start: 2023-03-21 | End: 2023-03-21 | Stop reason: HOSPADM

## 2023-03-21 RX ORDER — INDOMETHACIN 25 MG/1
CAPSULE ORAL
Status: DISCONTINUED | OUTPATIENT
Start: 2023-03-21 | End: 2023-03-21 | Stop reason: HOSPADM

## 2023-03-21 RX ORDER — BUPIVACAINE HYDROCHLORIDE 2.5 MG/ML
INJECTION, SOLUTION EPIDURAL; INFILTRATION; INTRACAUDAL
Status: DISCONTINUED | OUTPATIENT
Start: 2023-03-21 | End: 2023-03-21 | Stop reason: HOSPADM

## 2023-03-21 RX ORDER — MIDAZOLAM HYDROCHLORIDE 1 MG/ML
INJECTION, SOLUTION INTRAMUSCULAR; INTRAVENOUS
Status: DISCONTINUED | OUTPATIENT
Start: 2023-03-21 | End: 2023-03-21 | Stop reason: HOSPADM

## 2023-03-21 NOTE — DISCHARGE SUMMARY
Discharge Note  Short Stay      SUMMARY     Admit Date: 3/21/2023    Attending Physician: Kush Jha MD        Discharge Physician: Kush Jha MD        Discharge Date: 3/21/2023 10:03 AM    Procedure(s) (LRB):  Bilateral L5/S1 transforaminal epidural steroid injection (Bilateral)    Final Diagnosis: Lumbar radiculopathy [M54.16]    Disposition: Home or self care    Patient Instructions:   Current Discharge Medication List        CONTINUE these medications which have NOT CHANGED    Details   acetaminophen (TYLENOL) 500 MG tablet Take 500 mg by mouth daily as needed for Pain.      lisinopriL-hydrochlorothiazide (PRINZIDE,ZESTORETIC) 20-25 mg Tab Take 1 tablet by mouth once daily  Qty: 90 tablet, Refills: 0    Associated Diagnoses: Essential hypertension      cetirizine (ZYRTEC) 10 MG tablet Take 1 tablet (10 mg total) by mouth once daily.  Qty: 14 tablet, Refills: 0      ergocalciferol (ERGOCALCIFEROL) 50,000 unit Cap Take 1 capsule by mouth once a week  Qty: 4 capsule, Refills: 1      fluticasone propionate (FLONASE) 50 mcg/actuation nasal spray 2 sprays (100 mcg total) by Each Nostril route once daily.  Qty: 16 g, Refills: 0                 Discharge Diagnosis: Lumbar radiculopathy [M54.16]  Condition on Discharge: Stable with no complications to procedure   Diet on Discharge: Same as before.  Activity: as per instruction sheet.  Discharge to: Home with a responsible adult.  Follow up: 2-4 weeks       Please call the office at [phone] if you experience any weakness or loss of sensation, fever > 101.5, pain uncontrolled with oral medications, persistent nausea/vomiting/or diarrhea, redness or drainage from the incisions, or any other worrisome concerns. If physician on call was not reached or could not communicate with our office for any reason please go to the nearest emergency department

## 2023-03-21 NOTE — OP NOTE
Beth Souza  72 y.o. female      Vitals:    03/21/23 0957   BP: (!) 178/99   Pulse: (!) 51   Resp: 17   Temp:      Procedure Date: 3/21/23      INFORMED CONSENT: The procedure, risks, benefits and options were discussed with patient. There are no contraindications to the procedure. The patient expressed understanding and agreed to proceed. The personnel performing the procedure was discussed. I verify that I personally obtained consent prior to the start of the procedure and the signed consent can be found on the patient's chart.       Anesthesia:   Conscious sedation provided by M.D    The patient was monitored with continuous pulse oximetry, EKG, and intermittent blood pressure monitors.  The patient was hemodynamically stable throughout the entire process was responsive to voice, and breathing spontaneously.  Supplemental O2 was provided at 2L/min via nasal cannula.  Patient was comfortable for the duration of the procedure. (See nurse documentation and case log for sedation time)    There was a total of 1mg IV Midazolam and 50mcg Fentanyl titrated for the procedure    Pre Procedure diagnosis: Lumbar radiculopathy [M54.16]  Post-Procedure diagnosis: SAME     Complications: None    Specimens: None      DESCRIPTION OF PROCEDURE: The patient was brought to the procedure room. IV access was obtained prior to the procedure. The patient was positioned prone on the fluoroscopy table. Continuous hemodynamic monitoring was initiated including blood pressure, EKG, and pulse oximetry. . The skin was prepped with chlorhexidine and draped in a sterile fashion. Skin anesthesia was achieved using a total of 10mL of lidocaine, 5mL over each respective injection site.     The  L5/S1 transforaminal spaces were identified with fluoroscopy in the  AP, oblique, and lateral views.  A 22 gauge spinal quinke needle was then advanced into the area of the trans foraminal spaces bilateral with confirmation of proper needle position  using AP, oblique, and lateral fluoroscopic views. Once the needle tip was in the area of the transforaminal space, and there was no blood, CSF or paraesthesias,  1.5 mL of Omnipaque 300mg/ml was injected on bilateral for a total of 3mL.  Fluoroscopic imaging in the AP and lateral views revealed a clear outline of the spinal nerve with proximal spread of agent through the neural foramen into the epidural space. A total combination of 2 mL of Bupivicaine 0.25% and 10 mg dexamethasone was injected on each side for a total of 6 mL of injected medications with displacement of the contrast dye confirming that the medication went into the area of the transforaminal spaces bilateral. A sterile dressing was applied.   Patient tolerated the procedure well.    Patient was taken back to the recovery room for further observation.     The patient was discharged to home in stable condition

## 2023-03-21 NOTE — DISCHARGE INSTRUCTIONS

## 2023-03-22 ENCOUNTER — TELEPHONE (OUTPATIENT)
Dept: INTERNAL MEDICINE | Facility: CLINIC | Age: 73
End: 2023-03-22
Payer: MEDICARE

## 2023-03-22 NOTE — TELEPHONE ENCOUNTER
----- Message from Kamila Zimmerman sent at 3/22/2023 10:35 AM CDT -----  Contact: pt  Pt is calling in regard to her heart rate which was low on yesterday with PM and was advise to let her primary know.  Pt states she has an appt on tomorrow and would like to come in early to check her heart rate. Please call her back [phone] thanks/mpd

## 2023-03-23 ENCOUNTER — OFFICE VISIT (OUTPATIENT)
Dept: OPHTHALMOLOGY | Facility: CLINIC | Age: 73
End: 2023-03-23
Payer: MEDICARE

## 2023-03-23 ENCOUNTER — HOSPITAL ENCOUNTER (OUTPATIENT)
Dept: CARDIOLOGY | Facility: HOSPITAL | Age: 73
Discharge: HOME | End: 2023-03-23
Attending: EMERGENCY MEDICINE
Payer: MEDICARE

## 2023-03-23 ENCOUNTER — OFFICE VISIT (OUTPATIENT)
Dept: INTERNAL MEDICINE | Facility: CLINIC | Age: 73
End: 2023-03-23
Payer: MEDICARE

## 2023-03-23 VITALS
SYSTOLIC BLOOD PRESSURE: 120 MMHG | WEIGHT: 220.69 LBS | BODY MASS INDEX: 37.68 KG/M2 | HEIGHT: 64 IN | DIASTOLIC BLOOD PRESSURE: 70 MMHG | TEMPERATURE: 97 F | HEART RATE: 69 BPM | OXYGEN SATURATION: 96 %

## 2023-03-23 DIAGNOSIS — I10 PRIMARY HYPERTENSION: ICD-10-CM

## 2023-03-23 DIAGNOSIS — R00.1 BRADYCARDIA: Primary | ICD-10-CM

## 2023-03-23 DIAGNOSIS — D32.0 MENINGIOMA OF CEREBELLUM: ICD-10-CM

## 2023-03-23 DIAGNOSIS — G47.33 OSA ON CPAP: ICD-10-CM

## 2023-03-23 DIAGNOSIS — H25.13 NUCLEAR SCLEROSIS, BILATERAL: Primary | ICD-10-CM

## 2023-03-23 DIAGNOSIS — H52.222 REGULAR ASTIGMATISM OF LEFT EYE: ICD-10-CM

## 2023-03-23 DIAGNOSIS — H40.013 OPEN ANGLE WITH BORDERLINE FINDINGS OF BOTH EYES: ICD-10-CM

## 2023-03-23 DIAGNOSIS — E78.2 MIXED HYPERLIPIDEMIA: ICD-10-CM

## 2023-03-23 DIAGNOSIS — R00.1 BRADYCARDIA: ICD-10-CM

## 2023-03-23 DIAGNOSIS — H52.4 PRESBYOPIA: ICD-10-CM

## 2023-03-23 PROCEDURE — 1159F PR MEDICATION LIST DOCUMENTED IN MEDICAL RECORD: ICD-10-PCS | Mod: CPTII,S$GLB,, | Performed by: EMERGENCY MEDICINE

## 2023-03-23 PROCEDURE — 92133 CPTRZD OPH DX IMG PST SGM ON: CPT | Mod: S$GLB,,, | Performed by: OPTOMETRIST

## 2023-03-23 PROCEDURE — 1126F PR PAIN SEVERITY QUANTIFIED, NO PAIN PRESENT: ICD-10-PCS | Mod: CPTII,S$GLB,, | Performed by: OPTOMETRIST

## 2023-03-23 PROCEDURE — 93010 EKG 12-LEAD: ICD-10-PCS | Mod: ,,, | Performed by: INTERNAL MEDICINE

## 2023-03-23 PROCEDURE — 92083 HUMPHREY VISUAL FIELD - OU - BOTH EYES: ICD-10-PCS | Mod: S$GLB,,, | Performed by: OPTOMETRIST

## 2023-03-23 PROCEDURE — 1101F PR PT FALLS ASSESS DOC 0-1 FALLS W/OUT INJ PAST YR: ICD-10-PCS | Mod: CPTII,S$GLB,, | Performed by: EMERGENCY MEDICINE

## 2023-03-23 PROCEDURE — 92133 POSTERIOR SEGMENT OCT OPTIC NERVE(OCULAR COHERENCE TOMOGRAPHY) - OU - BOTH EYES: ICD-10-PCS | Mod: S$GLB,,, | Performed by: OPTOMETRIST

## 2023-03-23 PROCEDURE — 93010 ELECTROCARDIOGRAM REPORT: CPT | Mod: ,,, | Performed by: INTERNAL MEDICINE

## 2023-03-23 PROCEDURE — 3008F PR BODY MASS INDEX (BMI) DOCUMENTED: ICD-10-PCS | Mod: CPTII,S$GLB,, | Performed by: EMERGENCY MEDICINE

## 2023-03-23 PROCEDURE — 1101F PT FALLS ASSESS-DOCD LE1/YR: CPT | Mod: CPTII,S$GLB,, | Performed by: EMERGENCY MEDICINE

## 2023-03-23 PROCEDURE — 99213 PR OFFICE/OUTPT VISIT, EST, LEVL III, 20-29 MIN: ICD-10-PCS | Mod: S$GLB,,, | Performed by: EMERGENCY MEDICINE

## 2023-03-23 PROCEDURE — 3288F FALL RISK ASSESSMENT DOCD: CPT | Mod: CPTII,S$GLB,, | Performed by: EMERGENCY MEDICINE

## 2023-03-23 PROCEDURE — 1126F PR PAIN SEVERITY QUANTIFIED, NO PAIN PRESENT: ICD-10-PCS | Mod: CPTII,S$GLB,, | Performed by: EMERGENCY MEDICINE

## 2023-03-23 PROCEDURE — 1159F MED LIST DOCD IN RCRD: CPT | Mod: CPTII,S$GLB,, | Performed by: EMERGENCY MEDICINE

## 2023-03-23 PROCEDURE — 3288F PR FALLS RISK ASSESSMENT DOCUMENTED: ICD-10-PCS | Mod: CPTII,S$GLB,, | Performed by: EMERGENCY MEDICINE

## 2023-03-23 PROCEDURE — 1160F PR REVIEW ALL MEDS BY PRESCRIBER/CLIN PHARMACIST DOCUMENTED: ICD-10-PCS | Mod: CPTII,S$GLB,, | Performed by: EMERGENCY MEDICINE

## 2023-03-23 PROCEDURE — 3008F BODY MASS INDEX DOCD: CPT | Mod: CPTII,S$GLB,, | Performed by: EMERGENCY MEDICINE

## 2023-03-23 PROCEDURE — 93005 ELECTROCARDIOGRAM TRACING: CPT

## 2023-03-23 PROCEDURE — 1126F AMNT PAIN NOTED NONE PRSNT: CPT | Mod: CPTII,S$GLB,, | Performed by: OPTOMETRIST

## 2023-03-23 PROCEDURE — 3074F PR MOST RECENT SYSTOLIC BLOOD PRESSURE < 130 MM HG: ICD-10-PCS | Mod: CPTII,S$GLB,, | Performed by: EMERGENCY MEDICINE

## 2023-03-23 PROCEDURE — 1160F RVW MEDS BY RX/DR IN RCRD: CPT | Mod: CPTII,S$GLB,, | Performed by: EMERGENCY MEDICINE

## 2023-03-23 PROCEDURE — 92015 DETERMINE REFRACTIVE STATE: CPT | Mod: S$GLB,,, | Performed by: OPTOMETRIST

## 2023-03-23 PROCEDURE — 3074F SYST BP LT 130 MM HG: CPT | Mod: CPTII,S$GLB,, | Performed by: EMERGENCY MEDICINE

## 2023-03-23 PROCEDURE — 92083 EXTENDED VISUAL FIELD XM: CPT | Mod: S$GLB,,, | Performed by: OPTOMETRIST

## 2023-03-23 PROCEDURE — 92014 PR EYE EXAM, EST PATIENT,COMPREHESV: ICD-10-PCS | Mod: S$GLB,,, | Performed by: OPTOMETRIST

## 2023-03-23 PROCEDURE — 99213 OFFICE O/P EST LOW 20 MIN: CPT | Mod: S$GLB,,, | Performed by: EMERGENCY MEDICINE

## 2023-03-23 PROCEDURE — 3078F DIAST BP <80 MM HG: CPT | Mod: CPTII,S$GLB,, | Performed by: EMERGENCY MEDICINE

## 2023-03-23 PROCEDURE — 92014 COMPRE OPH EXAM EST PT 1/>: CPT | Mod: S$GLB,,, | Performed by: OPTOMETRIST

## 2023-03-23 PROCEDURE — 92015 PR REFRACTION: ICD-10-PCS | Mod: S$GLB,,, | Performed by: OPTOMETRIST

## 2023-03-23 PROCEDURE — 1126F AMNT PAIN NOTED NONE PRSNT: CPT | Mod: CPTII,S$GLB,, | Performed by: EMERGENCY MEDICINE

## 2023-03-23 PROCEDURE — 99999 PR PBB SHADOW E&M-EST. PATIENT-LVL III: ICD-10-PCS | Mod: PBBFAC,,, | Performed by: EMERGENCY MEDICINE

## 2023-03-23 PROCEDURE — 99999 PR PBB SHADOW E&M-EST. PATIENT-LVL II: CPT | Mod: PBBFAC,,, | Performed by: OPTOMETRIST

## 2023-03-23 PROCEDURE — 99999 PR PBB SHADOW E&M-EST. PATIENT-LVL II: ICD-10-PCS | Mod: PBBFAC,,, | Performed by: OPTOMETRIST

## 2023-03-23 PROCEDURE — 3078F PR MOST RECENT DIASTOLIC BLOOD PRESSURE < 80 MM HG: ICD-10-PCS | Mod: CPTII,S$GLB,, | Performed by: EMERGENCY MEDICINE

## 2023-03-23 PROCEDURE — 99999 PR PBB SHADOW E&M-EST. PATIENT-LVL III: CPT | Mod: PBBFAC,,, | Performed by: EMERGENCY MEDICINE

## 2023-03-23 NOTE — PROGRESS NOTES
HPI     Nuclear sclerosis, bilateral            Comments: Rev HVF/ Goct/  Dilated exam  2 days ago her left eye became red and very sore, it is not as sore today,   not as red.         Last edited by Lindy Lorenzo on 3/23/2023  1:53 PM.            Assessment /Plan     For exam results, see Encounter Report.    Nuclear sclerosis, bilateral  Cataracts not significantly affecting activities of daily living and therefore surgery is not indicated at this time.   Will continue to monitor over the next 12 months. Pt to call or RTC with any significant change in vision prior to next visit.     Open angle with borderline findings of both eyes  -     Rivera Visual Field - OU - Extended - Both Eyes  -     Posterior Segment OCT Optic Nerve- Both eyes  Borderline IOP today OD>OS  Stable gOCT today OU  Normal VF today OU  No treatment required at this time  Monitor 6 months    Meningioma of cerebellum  Normal VF today, no defects OD, OS  Monitor 12 months    Regular astigmatism of left eye  Presbyopia  Eyeglass Final Rx       Eyeglass Final Rx         Sphere Cylinder Axis Add    Right Cascilla   +2.50    Left +0.50 +0.50 165 +2.50      Expiration Date: 3/23/2024                  Spec Rx is optional, ok to continue with OTC readers      RTC 6 months for IOP check or PRN if any problems.   Discussed above and answered questions.

## 2023-03-23 NOTE — PROGRESS NOTES
Subjective:       Patient ID: Beth Souza is a 72 y.o. female.    Chief Complaint: Bradycardia    HPI: 72 yr AAF, hx of HTN, HLP, Hypothyroidism, OA Spine, severe Obesity with LEN on CPAP, Vit D Def had her Lumbar LAWSON by Dr. Jha 2 days ago when she was noted to have low HR- 49- 51/ min. Pt did not feel dizzy or weak. She was able to under go the LAWSON but was advised to see her PCP for the bradycardia. Takes Zestoretic only, no BB. No previous hx of bradycardia. She was able to go home afterwards and has had no problems. 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     Past Medical History:   Diagnosis Date    Cataract     Hyperlipidemia     Hypertension     Lumbar spondylosis     Meningioma 2021    Obesity     Sleep apnea     Vitamin D deficiency      Past Surgical History:   Procedure Laterality Date    HYSTERECTOMY      PARTIAL HYSTERECTOMY      SELECTIVE INJECTION OF ANESTHETIC AGENT AROUND LUMBAR SPINAL NERVE ROOT BY TRANSFORAMINAL APPROACH Bilateral 3/21/2023    Procedure: Bilateral L5/S1 transforaminal epidural steroid injection;  Surgeon: Kush Jha MD;  Location: [facility];  Service: Pain Management;  Laterality: Bilateral;     Past Surgical History:   Procedure Laterality Date    HYSTERECTOMY      PARTIAL HYSTERECTOMY      SELECTIVE INJECTION OF ANESTHETIC AGENT AROUND LUMBAR SPINAL NERVE ROOT BY TRANSFORAMINAL APPROACH Bilateral 3/21/2023    Procedure: Bilateral L5/S1 transforaminal epidural steroid injection;  Surgeon: Kush Jha MD;  Location: [facility];  Service: Pain Management;  Laterality: Bilateral;     Social History     Socioeconomic History    Marital status:     Number of children: 3   Occupational History    Occupation: Cleaning houses     Employer: not employed   Tobacco Use    Smoking status: Former     Types: Cigarettes     Quit date: 2004     Years since quittin.2    Smokeless tobacco: Never    Substance and Sexual Activity    Alcohol use: Yes     Comment: occasionally    Drug use: No    Sexual activity: Not Currently     Partners: Male   Other Topics Concern    Are you pregnant or think you may be? No    Breast-feeding No   Social History Narrative    Live w/ alone      Social Determinants of Health     Financial Resource Strain: Low Risk     Difficulty of Paying Living Expenses: Not hard at all   Food Insecurity: No Food Insecurity    Worried About Running Out of Food in the Last Year: Never true    Ran Out of Food in the Last Year: Never true   Transportation Needs: No Transportation Needs    Lack of Transportation (Medical): No    Lack of Transportation (Non-Medical): No   Physical Activity: Inactive    Days of Exercise per Week: 0 days    Minutes of Exercise per Session: 0 min   Stress: No Stress Concern Present    Feeling of Stress : Not at all   Social Connections: Moderately Isolated    Frequency of Communication with Friends and Family: More than three times a week    Frequency of Social Gatherings with Friends and Family: More than three times a week    Attends Gnosticism Services: More than 4 times per year    Active Member of Clubs or Organizations: No    Attends Club or Organization Meetings: Never    Marital Status:    Housing Stability: Low Risk     Unable to Pay for Housing in the Last Year: No    Number of Places Lived in the Last Year: 1    Unstable Housing in the Last Year: No     Review of patient's allergies indicates:   Allergen Reactions    Aspirin Other (See Comments)     Bruising     Current Outpatient Medications   Medication Sig    acetaminophen (TYLENOL) 500 MG tablet Take 500 mg by mouth daily as needed for Pain.    cetirizine (ZYRTEC) 10 MG tablet Take 1 tablet (10 mg total) by mouth once daily.    ergocalciferol (ERGOCALCIFEROL) 50,000 unit Cap Take 1 capsule by mouth once a week    fluticasone propionate (FLONASE) 50 mcg/actuation nasal spray 2 sprays (100 mcg total)  by Each Nostril route once daily.    lisinopriL-hydrochlorothiazide (PRINZIDE,ZESTORETIC) 20-25 mg Tab Take 1 tablet by mouth once daily     No current facility-administered medications for this visit.           Review of Systems   Constitutional: Negative.    HENT: Negative.     Eyes: Negative.  Negative for visual disturbance.   Respiratory: Negative.  Negative for cough, shortness of breath and wheezing.    Cardiovascular: Negative.    Gastrointestinal: Negative.    Endocrine: Negative.    Genitourinary: Negative.    Musculoskeletal:  Negative for arthralgias and gait problem.   Skin: Negative.    Allergic/Immunologic: Negative.    Neurological: Negative.    Hematological: Negative.    Psychiatric/Behavioral: Negative.       Objective:      Physical Exam  Vitals and nursing note reviewed.   Constitutional:       Appearance: Normal appearance. She is obese. She is not ill-appearing or toxic-appearing.   HENT:      Head: Normocephalic and atraumatic.      Right Ear: External ear normal.      Left Ear: External ear normal.      Nose: Nose normal.      Mouth/Throat:      Mouth: Mucous membranes are moist.      Pharynx: Oropharynx is clear.   Eyes:      General: No scleral icterus.     Extraocular Movements: Extraocular movements intact.      Conjunctiva/sclera: Conjunctivae normal.      Pupils: Pupils are equal, round, and reactive to light.   Cardiovascular:      Rate and Rhythm: Normal rate and regular rhythm.      Pulses: Normal pulses.      Heart sounds: Normal heart sounds. No murmur heard.    No friction rub.   Pulmonary:      Effort: Pulmonary effort is normal. No respiratory distress.      Breath sounds: Normal breath sounds. No wheezing.   Abdominal:      General: Abdomen is flat. Bowel sounds are normal. There is no distension.      Palpations: Abdomen is soft.   Musculoskeletal:         General: No swelling, tenderness, deformity or signs of injury. Normal range of motion.      Cervical back: Normal range  of motion and neck supple.   Skin:     General: Skin is warm and dry.      Capillary Refill: Capillary refill takes less than 2 seconds.      Coloration: Skin is not pale.      Findings: No erythema or rash.   Neurological:      General: No focal deficit present.      Mental Status: She is alert and oriented to person, place, and time.   Psychiatric:         Mood and Affect: Mood normal.         Behavior: Behavior normal.         Thought Content: Thought content normal.         Judgment: Judgment normal.       Assessment:     Vitals:    03/23/23 1512   BP: 120/70   Pulse: 69   Temp: 96.6 °F (35.9 °C)     1. Bradycardia: etiology unclear, no evidence of any BB, may be vagal effect, check EKG  -     SCHEDULED EKG 12-LEAD (to Muse); Future    2. Primary hypertension: under control with Zestoretic    3. Mixed hyperlipidemia: Pt non compliant with Atorvastatin- will resume now

## 2023-03-27 ENCOUNTER — OFFICE VISIT (OUTPATIENT)
Dept: INTERNAL MEDICINE | Facility: CLINIC | Age: 73
End: 2023-03-27
Payer: MEDICARE

## 2023-03-27 ENCOUNTER — LAB VISIT (OUTPATIENT)
Dept: LAB | Facility: HOSPITAL | Age: 73
End: 2023-03-27
Attending: PSYCHIATRY & NEUROLOGY
Payer: MEDICARE

## 2023-03-27 VITALS
DIASTOLIC BLOOD PRESSURE: 80 MMHG | BODY MASS INDEX: 37.75 KG/M2 | SYSTOLIC BLOOD PRESSURE: 130 MMHG | TEMPERATURE: 98 F | WEIGHT: 221.13 LBS | HEART RATE: 70 BPM | OXYGEN SATURATION: 95 % | HEIGHT: 64 IN

## 2023-03-27 DIAGNOSIS — I70.0 CALCIFICATION OF AORTA: ICD-10-CM

## 2023-03-27 DIAGNOSIS — I10 ESSENTIAL HYPERTENSION: ICD-10-CM

## 2023-03-27 DIAGNOSIS — F02.80 MAJOR NEUROCOGNITIVE DISORDER DUE TO ANOTHER MEDICAL CONDITION: ICD-10-CM

## 2023-03-27 DIAGNOSIS — E04.2 MULTIPLE THYROID NODULES: ICD-10-CM

## 2023-03-27 DIAGNOSIS — R53.82 CHRONIC FATIGUE: Primary | ICD-10-CM

## 2023-03-27 DIAGNOSIS — E66.01 SEVERE OBESITY WITH BODY MASS INDEX (BMI) OF 35.0 TO 39.9 WITH SERIOUS COMORBIDITY: ICD-10-CM

## 2023-03-27 DIAGNOSIS — R73.03 PREDIABETES: ICD-10-CM

## 2023-03-27 DIAGNOSIS — Z86.39 HISTORY OF HYPERPARATHYROIDISM: ICD-10-CM

## 2023-03-27 DIAGNOSIS — R53.82 CHRONIC FATIGUE: ICD-10-CM

## 2023-03-27 DIAGNOSIS — G47.33 OSA ON CPAP: ICD-10-CM

## 2023-03-27 DIAGNOSIS — R00.1 BRADYCARDIA: Primary | ICD-10-CM

## 2023-03-27 LAB
BASOPHILS # BLD AUTO: 0.03 K/UL (ref 0–0.2)
BASOPHILS NFR BLD: 0.5 % (ref 0–1.9)
DIFFERENTIAL METHOD: ABNORMAL
EOSINOPHIL # BLD AUTO: 0.1 K/UL (ref 0–0.5)
EOSINOPHIL NFR BLD: 1.7 % (ref 0–8)
ERYTHROCYTE [DISTWIDTH] IN BLOOD BY AUTOMATED COUNT: 15 % (ref 11.5–14.5)
ESTIMATED AVG GLUCOSE: 126 MG/DL (ref 68–131)
HBA1C MFR BLD: 6 % (ref 4–5.6)
HCT VFR BLD AUTO: 43.3 % (ref 37–48.5)
HGB BLD-MCNC: 13.2 G/DL (ref 12–16)
IMM GRANULOCYTES # BLD AUTO: 0.02 K/UL (ref 0–0.04)
IMM GRANULOCYTES NFR BLD AUTO: 0.3 % (ref 0–0.5)
LYMPHOCYTES # BLD AUTO: 2.2 K/UL (ref 1–4.8)
LYMPHOCYTES NFR BLD: 34 % (ref 18–48)
MCH RBC QN AUTO: 28.6 PG (ref 27–31)
MCHC RBC AUTO-ENTMCNC: 30.5 G/DL (ref 32–36)
MCV RBC AUTO: 94 FL (ref 82–98)
MONOCYTES # BLD AUTO: 0.6 K/UL (ref 0.3–1)
MONOCYTES NFR BLD: 9.6 % (ref 4–15)
NEUTROPHILS # BLD AUTO: 3.5 K/UL (ref 1.8–7.7)
NEUTROPHILS NFR BLD: 53.9 % (ref 38–73)
NRBC BLD-RTO: 0 /100 WBC
PLATELET # BLD AUTO: 311 K/UL (ref 150–450)
PMV BLD AUTO: 10.5 FL (ref 9.2–12.9)
PTH-INTACT SERPL-MCNC: 94.7 PG/ML (ref 9–77)
RBC # BLD AUTO: 4.61 M/UL (ref 4–5.4)
WBC # BLD AUTO: 6.55 K/UL (ref 3.9–12.7)

## 2023-03-27 PROCEDURE — 3288F PR FALLS RISK ASSESSMENT DOCUMENTED: ICD-10-PCS | Mod: CPTII,S$GLB,, | Performed by: FAMILY MEDICINE

## 2023-03-27 PROCEDURE — 3075F SYST BP GE 130 - 139MM HG: CPT | Mod: CPTII,S$GLB,, | Performed by: FAMILY MEDICINE

## 2023-03-27 PROCEDURE — 99999 PR PBB SHADOW E&M-EST. PATIENT-LVL IV: CPT | Mod: PBBFAC,,, | Performed by: FAMILY MEDICINE

## 2023-03-27 PROCEDURE — 1159F PR MEDICATION LIST DOCUMENTED IN MEDICAL RECORD: ICD-10-PCS | Mod: CPTII,S$GLB,, | Performed by: FAMILY MEDICINE

## 2023-03-27 PROCEDURE — 1125F AMNT PAIN NOTED PAIN PRSNT: CPT | Mod: CPTII,S$GLB,, | Performed by: FAMILY MEDICINE

## 2023-03-27 PROCEDURE — 85025 COMPLETE CBC W/AUTO DIFF WBC: CPT | Performed by: FAMILY MEDICINE

## 2023-03-27 PROCEDURE — 1101F PR PT FALLS ASSESS DOC 0-1 FALLS W/OUT INJ PAST YR: ICD-10-PCS | Mod: CPTII,S$GLB,, | Performed by: FAMILY MEDICINE

## 2023-03-27 PROCEDURE — 1159F MED LIST DOCD IN RCRD: CPT | Mod: CPTII,S$GLB,, | Performed by: FAMILY MEDICINE

## 2023-03-27 PROCEDURE — 1101F PT FALLS ASSESS-DOCD LE1/YR: CPT | Mod: CPTII,S$GLB,, | Performed by: FAMILY MEDICINE

## 2023-03-27 PROCEDURE — 36415 COLL VENOUS BLD VENIPUNCTURE: CPT | Performed by: FAMILY MEDICINE

## 2023-03-27 PROCEDURE — 3079F DIAST BP 80-89 MM HG: CPT | Mod: CPTII,S$GLB,, | Performed by: FAMILY MEDICINE

## 2023-03-27 PROCEDURE — 99214 OFFICE O/P EST MOD 30 MIN: CPT | Mod: S$GLB,,, | Performed by: FAMILY MEDICINE

## 2023-03-27 PROCEDURE — 83036 HEMOGLOBIN GLYCOSYLATED A1C: CPT | Performed by: FAMILY MEDICINE

## 2023-03-27 PROCEDURE — 83970 ASSAY OF PARATHORMONE: CPT | Performed by: FAMILY MEDICINE

## 2023-03-27 PROCEDURE — 84443 ASSAY THYROID STIM HORMONE: CPT | Performed by: FAMILY MEDICINE

## 2023-03-27 PROCEDURE — 3008F PR BODY MASS INDEX (BMI) DOCUMENTED: ICD-10-PCS | Mod: CPTII,S$GLB,, | Performed by: FAMILY MEDICINE

## 2023-03-27 PROCEDURE — 3075F PR MOST RECENT SYSTOLIC BLOOD PRESS GE 130-139MM HG: ICD-10-PCS | Mod: CPTII,S$GLB,, | Performed by: FAMILY MEDICINE

## 2023-03-27 PROCEDURE — 1160F PR REVIEW ALL MEDS BY PRESCRIBER/CLIN PHARMACIST DOCUMENTED: ICD-10-PCS | Mod: CPTII,S$GLB,, | Performed by: FAMILY MEDICINE

## 2023-03-27 PROCEDURE — 3288F FALL RISK ASSESSMENT DOCD: CPT | Mod: CPTII,S$GLB,, | Performed by: FAMILY MEDICINE

## 2023-03-27 PROCEDURE — 99999 PR PBB SHADOW E&M-EST. PATIENT-LVL IV: ICD-10-PCS | Mod: PBBFAC,,, | Performed by: FAMILY MEDICINE

## 2023-03-27 PROCEDURE — 4010F PR ACE/ARB THEARPY RXD/TAKEN: ICD-10-PCS | Mod: CPTII,S$GLB,, | Performed by: FAMILY MEDICINE

## 2023-03-27 PROCEDURE — 99214 PR OFFICE/OUTPT VISIT, EST, LEVL IV, 30-39 MIN: ICD-10-PCS | Mod: S$GLB,,, | Performed by: FAMILY MEDICINE

## 2023-03-27 PROCEDURE — 80053 COMPREHEN METABOLIC PANEL: CPT | Performed by: FAMILY MEDICINE

## 2023-03-27 PROCEDURE — 1125F PR PAIN SEVERITY QUANTIFIED, PAIN PRESENT: ICD-10-PCS | Mod: CPTII,S$GLB,, | Performed by: FAMILY MEDICINE

## 2023-03-27 PROCEDURE — 3079F PR MOST RECENT DIASTOLIC BLOOD PRESSURE 80-89 MM HG: ICD-10-PCS | Mod: CPTII,S$GLB,, | Performed by: FAMILY MEDICINE

## 2023-03-27 PROCEDURE — 3008F BODY MASS INDEX DOCD: CPT | Mod: CPTII,S$GLB,, | Performed by: FAMILY MEDICINE

## 2023-03-27 PROCEDURE — 4010F ACE/ARB THERAPY RXD/TAKEN: CPT | Mod: CPTII,S$GLB,, | Performed by: FAMILY MEDICINE

## 2023-03-27 PROCEDURE — 1160F RVW MEDS BY RX/DR IN RCRD: CPT | Mod: CPTII,S$GLB,, | Performed by: FAMILY MEDICINE

## 2023-03-27 RX ORDER — LISINOPRIL AND HYDROCHLOROTHIAZIDE 20; 25 MG/1; MG/1
1 TABLET ORAL DAILY
Qty: 90 TABLET | Refills: 1 | Status: SHIPPED | OUTPATIENT
Start: 2023-03-27 | End: 2023-10-10

## 2023-03-27 NOTE — PROGRESS NOTES
Subjective:       Patient ID: Beth Souza is a 72 y.o. female.    Chief Complaint: Other Misc (Patient stated not feeling well)    72-year-old  female patient with Patient Active Problem List:     Essential hypertension     Vitamin D deficiency     Mixed hyperlipidemia     Cataract     Osteoarthritis of spine with radiculopathy, lumbar region     Severe obesity with body mass index (BMI) of 35.0 to 39.9 with serious comorbidity     LEN on CPAP     Mild cognitive impairment with memory loss     Meningioma of cerebellum     Multiple thyroid nodules     History of COVID-19     Primary osteoarthritis of both hips     Calcification of aorta     History of hyperparathyroidism     Prediabetes     Lumbar radiculopathy     Major neurocognitive disorder due to another medical condition  Here reports that patient has been not feeling well and feeling tired and occasionally off balance  Denies any worsening anxiety depression  Would like to see pulmonologist for sleep apnea supplies  Denies any worsening memory deficits  Denies any chest pain or difficulty breathing with palpitations  Secondary to multiple thyroid nodules denies any discomfort with swallowing    Review of Systems   Constitutional:  Positive for fatigue.   HENT:  Negative for trouble swallowing and voice change.    Eyes:  Negative for visual disturbance.   Respiratory:  Negative for shortness of breath.    Cardiovascular:  Negative for chest pain and leg swelling.   Gastrointestinal:  Negative for abdominal pain, nausea and vomiting.   Musculoskeletal:  Positive for gait problem. Negative for myalgias.   Skin:  Negative for rash.   Neurological:  Negative for light-headedness and headaches.   Psychiatric/Behavioral:  Negative for dysphoric mood and sleep disturbance. The patient is not nervous/anxious.        /80 (BP Location: Left arm, Patient Position: Sitting, BP Method: X-Large (Manual))   Pulse 70   Temp 98.1 °F (36.7 °C)  "(Oral)   Ht 5' 3.5" (1.613 m)   Wt 100.3 kg (221 lb 1.9 oz)   SpO2 95%   BMI 38.56 kg/m²   Objective:      Physical Exam  Constitutional:       Appearance: She is well-developed.   HENT:      Head: Normocephalic and atraumatic.   Cardiovascular:      Rate and Rhythm: Normal rate and regular rhythm.      Heart sounds: Normal heart sounds. No murmur heard.  Pulmonary:      Effort: Pulmonary effort is normal.      Breath sounds: Normal breath sounds. No wheezing.   Abdominal:      General: Bowel sounds are normal.      Palpations: Abdomen is soft.      Tenderness: There is no abdominal tenderness.   Musculoskeletal:      Cervical back: Neck supple.   Skin:     General: Skin is warm and dry.      Findings: No rash.   Neurological:      Mental Status: She is alert and oriented to person, place, and time.   Psychiatric:         Mood and Affect: Mood normal.         Assessment/Plan:   1. Chronic fatigue  -     CBC Auto Differential; Future; Expected date: 03/27/2023  -     Comprehensive Metabolic Panel; Future; Expected date: 03/27/2023  -     TSH; Future; Expected date: 03/27/2023  Will check further labs but encouraged to eat protein rich diet    2. LEN on CPAP  Overview:  Home Sleep Studies 3 night 08/20/2018, 08/22/2018, 08/23/2018 Mild obstructive sleep apnea-hypopnea syndrome. Apnea-hypopnea index: AHI: 11.7 events per hour. Total events 80.   Autopap 4-16cm H2O with ODME.   Dreamwear nasal mask  HME: [facility]   Patient was advised to discuss further with pulmonologist regarding CPAP supplies    3. Severe obesity with body mass index (BMI) of 35.0 to 39.9 with serious comorbidity  Lifestyle modifications recommended to lose weight with BMI 38    4. Essential hypertension  -     lisinopriL-hydrochlorothiazide (PRINZIDE,ZESTORETIC) 20-25 mg Tab; Take 1 tablet by mouth once daily.  Dispense: 90 tablet; Refill: 1  Blood pressure is stable currently on lisinopril hydrochlorothiazide 20/25 mg daily    5. Prediabetes  -  "    Hemoglobin A1C; Future; Expected date: 03/27/2023  Diet controlled    6. Multiple thyroid nodules  -     US Thyroid; Future; Expected date: 03/27/2023  Will recheck thyroid ultrasound for follow-up    7. History of hyperparathyroidism  -     PTH, Intact; Future; Expected date: 03/27/2023  Stable and asymptomatic    8. Calcification of aorta  Overview:  Xray 11/21  Patient unable to take aspirin secondary to allergies and cholesterol has been well controlled with diet      9. Major neurocognitive disorder due to another medical condition  Clinically doing well

## 2023-03-28 ENCOUNTER — HOSPITAL ENCOUNTER (OUTPATIENT)
Dept: RADIOLOGY | Facility: HOSPITAL | Age: 73
Discharge: HOME | End: 2023-03-28
Attending: NEUROLOGICAL SURGERY
Payer: MEDICARE

## 2023-03-28 DIAGNOSIS — M51.36 DEGENERATIVE DISC DISEASE, LUMBAR: ICD-10-CM

## 2023-03-28 DIAGNOSIS — D32.0 MENINGIOMA, CEREBRAL: ICD-10-CM

## 2023-03-28 DIAGNOSIS — M54.9 DORSALGIA, UNSPECIFIED: ICD-10-CM

## 2023-03-28 LAB
ALBUMIN SERPL BCP-MCNC: 3.7 G/DL (ref 3.5–5.2)
ALP SERPL-CCNC: 89 U/L (ref 55–135)
ALT SERPL W/O P-5'-P-CCNC: 11 U/L (ref 10–44)
ANION GAP SERPL CALC-SCNC: 7 MMOL/L (ref 8–16)
AST SERPL-CCNC: 6 U/L (ref 10–40)
BILIRUB SERPL-MCNC: 0.3 MG/DL (ref 0.1–1)
BUN SERPL-MCNC: 16 MG/DL (ref 8–23)
CALCIUM SERPL-MCNC: 9.1 MG/DL (ref 8.7–10.5)
CHLORIDE SERPL-SCNC: 106 MMOL/L (ref 95–110)
CO2 SERPL-SCNC: 31 MMOL/L (ref 23–29)
CREAT SERPL-MCNC: 0.8 MG/DL (ref 0.5–1.4)
EST. GFR  (NO RACE VARIABLE): >60 ML/MIN/1.73 M^2
GLUCOSE SERPL-MCNC: 97 MG/DL (ref 70–110)
POTASSIUM SERPL-SCNC: 4.3 MMOL/L (ref 3.5–5.1)
PROT SERPL-MCNC: 6.4 G/DL (ref 6–8.4)
SODIUM SERPL-SCNC: 144 MMOL/L (ref 136–145)
TSH SERPL DL<=0.005 MIU/L-ACNC: 0.71 UIU/ML (ref 0.4–4)

## 2023-03-28 PROCEDURE — 70553 MRI BRAIN W WO CONTRAST: ICD-10-PCS | Mod: 26,,, | Performed by: RADIOLOGY

## 2023-03-28 PROCEDURE — 70553 MRI BRAIN STEM W/O & W/DYE: CPT | Mod: TC

## 2023-03-28 PROCEDURE — 72148 MRI LUMBAR SPINE WITHOUT CONTRAST: ICD-10-PCS | Mod: 26,,, | Performed by: RADIOLOGY

## 2023-03-28 PROCEDURE — 72148 MRI LUMBAR SPINE W/O DYE: CPT | Mod: 26,,, | Performed by: RADIOLOGY

## 2023-03-28 PROCEDURE — 70553 MRI BRAIN STEM W/O & W/DYE: CPT | Mod: 26,,, | Performed by: RADIOLOGY

## 2023-03-28 PROCEDURE — A9585 GADOBUTROL INJECTION: HCPCS | Performed by: NEUROLOGICAL SURGERY

## 2023-03-28 PROCEDURE — 72148 MRI LUMBAR SPINE W/O DYE: CPT | Mod: TC

## 2023-03-28 PROCEDURE — 25500020 PHARM REV CODE 255: Performed by: NEUROLOGICAL SURGERY

## 2023-03-28 RX ORDER — GADOBUTROL 604.72 MG/ML
10 INJECTION INTRAVENOUS
Status: COMPLETED | OUTPATIENT
Start: 2023-03-28 | End: 2023-03-28

## 2023-03-28 RX ADMIN — GADOBUTROL 10 ML: 604.72 INJECTION INTRAVENOUS at 01:03

## 2023-03-29 ENCOUNTER — HOSPITAL ENCOUNTER (OUTPATIENT)
Dept: RADIOLOGY | Facility: HOSPITAL | Age: 73
Discharge: HOME | End: 2023-03-29
Attending: FAMILY MEDICINE
Payer: MEDICARE

## 2023-03-29 DIAGNOSIS — E04.2 MULTIPLE THYROID NODULES: ICD-10-CM

## 2023-03-29 PROCEDURE — 76536 US EXAM OF HEAD AND NECK: CPT | Mod: TC

## 2023-03-29 PROCEDURE — 76536 US EXAM OF HEAD AND NECK: CPT | Mod: 26,,, | Performed by: RADIOLOGY

## 2023-03-29 PROCEDURE — 76536 US THYROID: ICD-10-PCS | Mod: 26,,, | Performed by: RADIOLOGY

## 2023-04-05 ENCOUNTER — OFFICE VISIT (OUTPATIENT)
Dept: CARDIOLOGY | Facility: CLINIC | Age: 73
End: 2023-04-05
Payer: MEDICARE

## 2023-04-05 VITALS
OXYGEN SATURATION: 95 % | SYSTOLIC BLOOD PRESSURE: 120 MMHG | WEIGHT: 220 LBS | BODY MASS INDEX: 38.98 KG/M2 | HEIGHT: 63 IN | DIASTOLIC BLOOD PRESSURE: 70 MMHG | HEART RATE: 68 BPM

## 2023-04-05 DIAGNOSIS — G47.33 OSA ON CPAP: ICD-10-CM

## 2023-04-05 DIAGNOSIS — R00.2 PALPITATIONS: Primary | ICD-10-CM

## 2023-04-05 DIAGNOSIS — I10 ESSENTIAL HYPERTENSION: ICD-10-CM

## 2023-04-05 DIAGNOSIS — I70.0 CALCIFICATION OF AORTA: ICD-10-CM

## 2023-04-05 DIAGNOSIS — E78.2 MIXED HYPERLIPIDEMIA: ICD-10-CM

## 2023-04-05 DIAGNOSIS — R00.1 BRADYCARDIA: ICD-10-CM

## 2023-04-05 DIAGNOSIS — I20.89 STABLE ANGINA PECTORIS: ICD-10-CM

## 2023-04-05 PROCEDURE — 99999 PR PBB SHADOW E&M-EST. PATIENT-LVL III: ICD-10-PCS | Mod: PBBFAC,,, | Performed by: INTERNAL MEDICINE

## 2023-04-05 PROCEDURE — 1159F PR MEDICATION LIST DOCUMENTED IN MEDICAL RECORD: ICD-10-PCS | Mod: CPTII,S$GLB,, | Performed by: INTERNAL MEDICINE

## 2023-04-05 PROCEDURE — 99999 PR PBB SHADOW E&M-EST. PATIENT-LVL III: CPT | Mod: PBBFAC,,, | Performed by: INTERNAL MEDICINE

## 2023-04-05 PROCEDURE — 1159F MED LIST DOCD IN RCRD: CPT | Mod: CPTII,S$GLB,, | Performed by: INTERNAL MEDICINE

## 2023-04-05 PROCEDURE — 3288F FALL RISK ASSESSMENT DOCD: CPT | Mod: CPTII,S$GLB,, | Performed by: INTERNAL MEDICINE

## 2023-04-05 PROCEDURE — 1101F PT FALLS ASSESS-DOCD LE1/YR: CPT | Mod: CPTII,S$GLB,, | Performed by: INTERNAL MEDICINE

## 2023-04-05 PROCEDURE — 4010F ACE/ARB THERAPY RXD/TAKEN: CPT | Mod: CPTII,S$GLB,, | Performed by: INTERNAL MEDICINE

## 2023-04-05 PROCEDURE — 99204 OFFICE O/P NEW MOD 45 MIN: CPT | Mod: S$GLB,,, | Performed by: INTERNAL MEDICINE

## 2023-04-05 PROCEDURE — 3078F PR MOST RECENT DIASTOLIC BLOOD PRESSURE < 80 MM HG: ICD-10-PCS | Mod: CPTII,S$GLB,, | Performed by: INTERNAL MEDICINE

## 2023-04-05 PROCEDURE — 3008F PR BODY MASS INDEX (BMI) DOCUMENTED: ICD-10-PCS | Mod: CPTII,S$GLB,, | Performed by: INTERNAL MEDICINE

## 2023-04-05 PROCEDURE — 3008F BODY MASS INDEX DOCD: CPT | Mod: CPTII,S$GLB,, | Performed by: INTERNAL MEDICINE

## 2023-04-05 PROCEDURE — 1126F PR PAIN SEVERITY QUANTIFIED, NO PAIN PRESENT: ICD-10-PCS | Mod: CPTII,S$GLB,, | Performed by: INTERNAL MEDICINE

## 2023-04-05 PROCEDURE — 1126F AMNT PAIN NOTED NONE PRSNT: CPT | Mod: CPTII,S$GLB,, | Performed by: INTERNAL MEDICINE

## 2023-04-05 PROCEDURE — 3044F PR MOST RECENT HEMOGLOBIN A1C LEVEL <7.0%: ICD-10-PCS | Mod: CPTII,S$GLB,, | Performed by: INTERNAL MEDICINE

## 2023-04-05 PROCEDURE — 3074F SYST BP LT 130 MM HG: CPT | Mod: CPTII,S$GLB,, | Performed by: INTERNAL MEDICINE

## 2023-04-05 PROCEDURE — 3074F PR MOST RECENT SYSTOLIC BLOOD PRESSURE < 130 MM HG: ICD-10-PCS | Mod: CPTII,S$GLB,, | Performed by: INTERNAL MEDICINE

## 2023-04-05 PROCEDURE — 3044F HG A1C LEVEL LT 7.0%: CPT | Mod: CPTII,S$GLB,, | Performed by: INTERNAL MEDICINE

## 2023-04-05 PROCEDURE — 1160F PR REVIEW ALL MEDS BY PRESCRIBER/CLIN PHARMACIST DOCUMENTED: ICD-10-PCS | Mod: CPTII,S$GLB,, | Performed by: INTERNAL MEDICINE

## 2023-04-05 PROCEDURE — 1160F RVW MEDS BY RX/DR IN RCRD: CPT | Mod: CPTII,S$GLB,, | Performed by: INTERNAL MEDICINE

## 2023-04-05 PROCEDURE — 1101F PR PT FALLS ASSESS DOC 0-1 FALLS W/OUT INJ PAST YR: ICD-10-PCS | Mod: CPTII,S$GLB,, | Performed by: INTERNAL MEDICINE

## 2023-04-05 PROCEDURE — 4010F PR ACE/ARB THEARPY RXD/TAKEN: ICD-10-PCS | Mod: CPTII,S$GLB,, | Performed by: INTERNAL MEDICINE

## 2023-04-05 PROCEDURE — 3078F DIAST BP <80 MM HG: CPT | Mod: CPTII,S$GLB,, | Performed by: INTERNAL MEDICINE

## 2023-04-05 PROCEDURE — 3288F PR FALLS RISK ASSESSMENT DOCUMENTED: ICD-10-PCS | Mod: CPTII,S$GLB,, | Performed by: INTERNAL MEDICINE

## 2023-04-05 PROCEDURE — 99204 PR OFFICE/OUTPT VISIT, NEW, LEVL IV, 45-59 MIN: ICD-10-PCS | Mod: S$GLB,,, | Performed by: INTERNAL MEDICINE

## 2023-04-05 NOTE — PROGRESS NOTES
Subjective:   Patient ID:  Beth Souza is a 72 y.o. female who presents for evaluation of No chief complaint on file.    Pt, 70 yo, came in for one yr f/u.  PMH HTN, HLD and Vit D.   No chest pain, fainting except sinus, PND, orthopnea, syncope and claudication.   Palpitation and fluttering feeling at night. No dizziness, and faint  Does not exercise due to back pain.  C/o diffuse body sore and pain  Pt has bee not taking Lipitor for a long time.  No smoking/drinking.  EKG on 10-: NSR. nonspecifc ST T change.    echo normal EF, mild LAE and Holter normal         2023:   Overall patient doing well intermittent bradycardia will do a Holter monitor.  Intermittent chest pain with the stress test with stress echo as well.  Risk factors coronary disease including arterial calcification as well as prior smoking history hypertension prediabetes.  Patient intermittent cardiac risk cholesterol profiles been relatively good well controlled but she should have a little lower cholesterol and we discussed possibility of starting statin medications.  She is allergies to aspirin and can not take that    Family History   Problem Relation Age of Onset    Hypertension Mother     Diabetes Daughter     Melanoma Neg Hx     Psoriasis Neg Hx     Lupus Neg Hx     Eczema Neg Hx     Breast cancer Neg Hx     Colon cancer Neg Hx     Ovarian cancer Neg Hx     Thrombosis Neg Hx      Past Medical History:   Diagnosis Date    Cataract     Hyperlipidemia     Hypertension     Lumbar spondylosis     Meningioma 2021    Obesity     Sleep apnea     Vitamin D deficiency      Social History     Socioeconomic History    Marital status:     Number of children: 3   Occupational History    Occupation: Cleaning [employer]     Employer: not employed   Tobacco Use    Smoking status: Former     Types: Cigarettes     Quit date: 2004     Years since quittin.2    Smokeless tobacco: Never   Substance and Sexual Activity     Alcohol use: Yes     Comment: occasionally    Drug use: No    Sexual activity: Not Currently     Partners: Male   Other Topics Concern    Are you pregnant or think you may be? No    Breast-feeding No   Social History Narrative    Live w/ alone      Social Determinants of Health     Financial Resource Strain: Low Risk     Difficulty of Paying Living Expenses: Not hard at all   Food Insecurity: No Food Insecurity    Worried About Running Out of Food in the Last Year: Never true    Ran Out of Food in the Last Year: Never true   Transportation Needs: No Transportation Needs    Lack of Transportation (Medical): No    Lack of Transportation (Non-Medical): No   Physical Activity: Inactive    Days of Exercise per Week: 0 days    Minutes of Exercise per Session: 0 min   Stress: No Stress Concern Present    Feeling of Stress : Not at all   Social Connections: Moderately Isolated    Frequency of Communication with Friends and Family: More than three times a week    Frequency of Social Gatherings with Friends and Family: More than three times a week    Attends Pentecostalism Services: More than 4 times per year    Active Member of Clubs or Organizations: No    Attends Club or Organization Meetings: Never    Marital Status:    Housing Stability: Low Risk     Unable to Pay for Housing in the Last Year: No    Number of Places Lived in the Last Year: 1    Unstable Housing in the Last Year: No     Current Outpatient Medications on File Prior to Visit   Medication Sig Dispense Refill    acetaminophen (TYLENOL) 500 MG tablet Take 500 mg by mouth daily as needed for Pain.      cetirizine (ZYRTEC) 10 MG tablet Take 1 tablet (10 mg total) by mouth once daily. 14 tablet 0    ergocalciferol (ERGOCALCIFEROL) 50,000 unit Cap Take 1 capsule by mouth once a week 4 capsule 1    fluticasone propionate (FLONASE) 50 mcg/actuation nasal spray 2 sprays (100 mcg total) by Each Nostril route once daily. 16 g 0    lisinopriL-hydrochlorothiazide  (PRINZIDE,ZESTORETIC) 20-25 mg Tab Take 1 tablet by mouth once daily. 90 tablet 1     No current facility-administered medications on file prior to visit.     Review of patient's allergies indicates:   Allergen Reactions    Aspirin Other (See Comments)     Bruising       Review of Systems   Constitutional: Negative for chills, diaphoresis, night sweats, weight gain and weight loss.   HENT:  Negative for congestion, hoarse voice, sore throat and stridor.    Eyes:  Negative for double vision and pain.   Cardiovascular:  Negative for chest pain, claudication, cyanosis, dyspnea on exertion, irregular heartbeat, leg swelling, near-syncope, orthopnea, palpitations, paroxysmal nocturnal dyspnea and syncope.   Respiratory:  Negative for cough, hemoptysis, shortness of breath, sleep disturbances due to breathing, snoring, sputum production and wheezing.    Endocrine: Negative for cold intolerance, heat intolerance and polydipsia.   Hematologic/Lymphatic: Negative for bleeding problem. Does not bruise/bleed easily.   Skin:  Negative for color change, dry skin and rash.   Musculoskeletal:  Negative for joint swelling and muscle cramps.   Gastrointestinal:  Negative for bloating, abdominal pain, constipation, diarrhea, dysphagia, melena, nausea and vomiting.   Genitourinary:  Negative for flank pain and urgency.   Neurological:  Negative for dizziness, focal weakness, headaches, light-headedness, loss of balance, seizures and weakness.   Psychiatric/Behavioral:  Negative for altered mental status and memory loss. The patient is not nervous/anxious.        Objective:     Physical Exam  Eyes:      Pupils: Pupils are equal, round, and reactive to light.   Neck:      Trachea: No tracheal deviation.   Cardiovascular:      Rate and Rhythm: Normal rate and regular rhythm.      Pulses: Intact distal pulses.           Carotid pulses are 2+ on the right side and 2+ on the left side.       Radial pulses are 2+ on the right side and 2+ on  the left side.        Femoral pulses are 2+ on the right side and 2+ on the left side.       Popliteal pulses are 2+ on the right side and 2+ on the left side.        Dorsalis pedis pulses are 2+ on the right side and 2+ on the left side.        Posterior tibial pulses are 2+ on the right side and 2+ on the left side.      Heart sounds: Normal heart sounds. No murmur heard.    No friction rub. No gallop.   Pulmonary:      Effort: Pulmonary effort is normal. No respiratory distress.      Breath sounds: Normal breath sounds. No stridor. No wheezing or rales.   Chest:      Chest wall: No tenderness.   Abdominal:      General: There is no distension.      Tenderness: There is no abdominal tenderness. There is no rebound.   Musculoskeletal:         General: No tenderness.      Cervical back: Normal range of motion.   Skin:     General: Skin is warm and dry.   Neurological:      Mental Status: She is alert and oriented to person, place, and time.     EKG shows sinus bradycardia.  No acute ST T wave changes.     Assessment:     1. Essential hypertension    2. Mixed hyperlipidemia    3. Calcification of aorta    4. LEN on CPAP    5.      Bradycardia  6.      Chest discomfort    Plan:     Impression essential hypertension treated well with lisinopril HCTZ.    2. Mixed hyperlipidemia stable consideration of statin medications and patient will think about this.  Recommended given her intermediate risk factors.    3. Calcification aorta again recommend statin medications and aspirin although can not take aspirin due to allergy  4. Bradycardia consider Holter monitor and I will be done next week.    5. Chest discomfort will go ahead with a stress echo to rule out significant cardiac disease given multiple risk factors.  All questions answered today follow-up evaluation again in month.  Sooner if acute recurrence symptoms.

## 2023-04-20 ENCOUNTER — HOSPITAL ENCOUNTER (OUTPATIENT)
Dept: CARDIOLOGY | Facility: HOSPITAL | Age: 73
Discharge: HOME | End: 2023-04-20
Attending: INTERNAL MEDICINE
Payer: MEDICARE

## 2023-04-20 VITALS
BODY MASS INDEX: 38.98 KG/M2 | SYSTOLIC BLOOD PRESSURE: 126 MMHG | WEIGHT: 220 LBS | HEART RATE: 60 BPM | DIASTOLIC BLOOD PRESSURE: 97 MMHG | HEIGHT: 63 IN

## 2023-04-20 DIAGNOSIS — I20.89 STABLE ANGINA PECTORIS: ICD-10-CM

## 2023-04-20 DIAGNOSIS — R00.2 PALPITATIONS: ICD-10-CM

## 2023-04-20 DIAGNOSIS — E78.2 MIXED HYPERLIPIDEMIA: ICD-10-CM

## 2023-04-20 DIAGNOSIS — I70.0 CALCIFICATION OF AORTA: ICD-10-CM

## 2023-04-20 DIAGNOSIS — G47.33 OSA ON CPAP: ICD-10-CM

## 2023-04-20 DIAGNOSIS — R00.1 BRADYCARDIA: ICD-10-CM

## 2023-04-20 DIAGNOSIS — I10 ESSENTIAL HYPERTENSION: ICD-10-CM

## 2023-04-20 LAB
AORTIC ROOT ANNULUS: 3.33 CM
ASCENDING AORTA: 3.66 CM
AV INDEX (PROSTH): 0.57
AV MEAN GRADIENT: 6 MMHG
AV PEAK GRADIENT: 10 MMHG
AV REGURGITATION PRESSURE HALF TIME: 709.13 MS
AV VALVE AREA: 1.82 CM2
AV VELOCITY RATIO: 0.59
BSA FOR ECHO PROCEDURE: 2.11 M2
CV ECHO LV RWT: 0.39 CM
CV STRESS BASE HR: 60 BPM
DIASTOLIC BLOOD PRESSURE: 97 MMHG
DOP CALC AO PEAK VEL: 1.55 M/S
DOP CALC AO VTI: 36.9 CM
DOP CALC LVOT AREA: 3.2 CM2
DOP CALC LVOT DIAMETER: 2.01 CM
DOP CALC LVOT PEAK VEL: 0.91 M/S
DOP CALC LVOT STROKE VOLUME: 67.24 CM3
DOP CALCLVOT PEAK VEL VTI: 21.2 CM
E WAVE DECELERATION TIME: 263.54 MSEC
E/A RATIO: 0.69
E/E' RATIO: 7.47 M/S
ECHO LV POSTERIOR WALL: 0.92 CM (ref 0.6–1.1)
EJECTION FRACTION: 60 %
FRACTIONAL SHORTENING: 36 % (ref 28–44)
INTERVENTRICULAR SEPTUM: 1.09 CM (ref 0.6–1.1)
IVRT: 99.9 MSEC
LA MAJOR: 4.66 CM
LA MINOR: 4.16 CM
LA WIDTH: 3.5 CM
LEFT ATRIUM SIZE: 3.34 CM
LEFT ATRIUM VOLUME INDEX MOD: 18.2 ML/M2
LEFT ATRIUM VOLUME INDEX: 21.7 ML/M2
LEFT ATRIUM VOLUME MOD: 36.56 CM3
LEFT ATRIUM VOLUME: 43.68 CM3
LEFT INTERNAL DIMENSION IN SYSTOLE: 3 CM (ref 2.1–4)
LEFT VENTRICLE DIASTOLIC VOLUME INDEX: 50.06 ML/M2
LEFT VENTRICLE DIASTOLIC VOLUME: 100.62 ML
LEFT VENTRICLE MASS INDEX: 82 G/M2
LEFT VENTRICLE SYSTOLIC VOLUME INDEX: 17.4 ML/M2
LEFT VENTRICLE SYSTOLIC VOLUME: 34.9 ML
LEFT VENTRICULAR INTERNAL DIMENSION IN DIASTOLE: 4.67 CM (ref 3.5–6)
LEFT VENTRICULAR MASS: 163.86 G
LV LATERAL E/E' RATIO: 7 M/S
LV SEPTAL E/E' RATIO: 8 M/S
LVOT MG: 1.92 MMHG
LVOT MV: 0.65 CM/S
MV PEAK A VEL: 0.81 M/S
MV PEAK E VEL: 0.56 M/S
MV STENOSIS PRESSURE HALF TIME: 76.43 MS
MV VALVE AREA P 1/2 METHOD: 2.88 CM2
OHS CV CPX 1 MINUTE RECOVERY HEART RATE: 103 BPM
OHS CV CPX 85 PERCENT MAX PREDICTED HEART RATE MALE: 122
OHS CV CPX ESTIMATED METS: 5
OHS CV CPX MAX PREDICTED HEART RATE: 144
OHS CV CPX PATIENT IS FEMALE: 1
OHS CV CPX PATIENT IS MALE: 0
OHS CV CPX PEAK DIASTOLIC BLOOD PRESSURE: 86 MMHG
OHS CV CPX PEAK HEAR RATE: 120 BPM
OHS CV CPX PEAK RATE PRESSURE PRODUCT: NORMAL
OHS CV CPX PEAK SYSTOLIC BLOOD PRESSURE: 147 MMHG
OHS CV CPX PERCENT MAX PREDICTED HEART RATE ACHIEVED: 84
OHS CV CPX RATE PRESSURE PRODUCT PRESENTING: 7560
PISA AR MAX VEL: 4.77 M/S
PISA TR MAX VEL: 3 M/S
PULM VEIN S/D RATIO: 1.44
PV PEAK D VEL: 0.39 M/S
PV PEAK S VEL: 0.56 M/S
PV PEAK VELOCITY: 0.95 CM/S
RA MAJOR: 4.1 CM
RA PRESSURE: 3 MMHG
RA WIDTH: 3.53 CM
RIGHT VENTRICULAR END-DIASTOLIC DIMENSION: 3.4 CM
SINUS: 2.72 CM
STJ: 2.65 CM
STRESS ECHO POST EXERCISE DUR MIN: 2 MINUTES
STRESS ECHO POST EXERCISE DUR SEC: 3 SECONDS
SYSTOLIC BLOOD PRESSURE: 126 MMHG
TDI LATERAL: 0.08 M/S
TDI SEPTAL: 0.07 M/S
TDI: 0.08 M/S
TR MAX PG: 36 MMHG
TRICUSPID ANNULAR PLANE SYSTOLIC EXCURSION: 1.56 CM
TV REST PULMONARY ARTERY PRESSURE: 39 MMHG

## 2023-04-20 PROCEDURE — 93351 STRESS TTE COMPLETE: CPT | Mod: 26,,, | Performed by: INTERNAL MEDICINE

## 2023-04-20 PROCEDURE — 93227 HOLTER MONITOR - 48 HOUR (CUPID ONLY): ICD-10-PCS | Mod: ,,, | Performed by: INTERNAL MEDICINE

## 2023-04-20 PROCEDURE — 93351 STRESS TTE COMPLETE: CPT

## 2023-04-20 PROCEDURE — 93225 XTRNL ECG REC<48 HRS REC: CPT

## 2023-04-20 PROCEDURE — 93227 XTRNL ECG REC<48 HR R&I: CPT | Mod: ,,, | Performed by: INTERNAL MEDICINE

## 2023-04-20 PROCEDURE — 93351 STRESS ECHO (CUPID ONLY): ICD-10-PCS | Mod: 26,,, | Performed by: INTERNAL MEDICINE

## 2023-04-21 ENCOUNTER — TELEPHONE (OUTPATIENT)
Dept: CARDIOLOGY | Facility: CLINIC | Age: 73
End: 2023-04-21
Payer: MEDICARE

## 2023-04-21 NOTE — TELEPHONE ENCOUNTER
The patient has been notified of this information and all questions answered.      ----- Message from Sanchez Tineo MD sent at 4/20/2023  9:06 PM CDT -----  Limited stress test  ----- Message -----  From: Yin Louis MD  Sent: 4/20/2023   5:18 PM CDT  To: Sanchez Tineo MD

## 2023-04-26 LAB
OHS CV EVENT MONITOR DAY: 0
OHS CV HOLTER LENGTH DECIMAL HOURS: 48
OHS CV HOLTER LENGTH HOURS: 48
OHS CV HOLTER LENGTH MINUTES: 0
OHS CV HOLTER SINUS AVERAGE HR: 63
OHS CV HOLTER SINUS MAX HR: 107
OHS CV HOLTER SINUS MIN HR: 42

## 2023-05-09 ENCOUNTER — OFFICE VISIT (OUTPATIENT)
Dept: SPORTS MEDICINE | Facility: CLINIC | Age: 73
End: 2023-05-09
Payer: MEDICARE

## 2023-05-09 DIAGNOSIS — R29.898 WEAKNESS OF BOTH LOWER EXTREMITIES: ICD-10-CM

## 2023-05-09 DIAGNOSIS — M25.561 CHRONIC PAIN OF RIGHT KNEE: ICD-10-CM

## 2023-05-09 DIAGNOSIS — M47.26 OSTEOARTHRITIS OF SPINE WITH RADICULOPATHY, LUMBAR REGION: ICD-10-CM

## 2023-05-09 DIAGNOSIS — G89.29 CHRONIC PAIN OF RIGHT KNEE: ICD-10-CM

## 2023-05-09 DIAGNOSIS — M17.11 PRIMARY OSTEOARTHRITIS OF RIGHT KNEE: Primary | ICD-10-CM

## 2023-05-09 PROCEDURE — 3044F PR MOST RECENT HEMOGLOBIN A1C LEVEL <7.0%: ICD-10-PCS | Mod: CPTII,S$GLB,, | Performed by: STUDENT IN AN ORGANIZED HEALTH CARE EDUCATION/TRAINING PROGRAM

## 2023-05-09 PROCEDURE — 3288F FALL RISK ASSESSMENT DOCD: CPT | Mod: CPTII,S$GLB,, | Performed by: STUDENT IN AN ORGANIZED HEALTH CARE EDUCATION/TRAINING PROGRAM

## 2023-05-09 PROCEDURE — 99214 PR OFFICE/OUTPT VISIT, EST, LEVL IV, 30-39 MIN: ICD-10-PCS | Mod: S$GLB,,, | Performed by: STUDENT IN AN ORGANIZED HEALTH CARE EDUCATION/TRAINING PROGRAM

## 2023-05-09 PROCEDURE — 4010F PR ACE/ARB THEARPY RXD/TAKEN: ICD-10-PCS | Mod: CPTII,S$GLB,, | Performed by: STUDENT IN AN ORGANIZED HEALTH CARE EDUCATION/TRAINING PROGRAM

## 2023-05-09 PROCEDURE — 1160F RVW MEDS BY RX/DR IN RCRD: CPT | Mod: CPTII,S$GLB,, | Performed by: STUDENT IN AN ORGANIZED HEALTH CARE EDUCATION/TRAINING PROGRAM

## 2023-05-09 PROCEDURE — 1159F MED LIST DOCD IN RCRD: CPT | Mod: CPTII,S$GLB,, | Performed by: STUDENT IN AN ORGANIZED HEALTH CARE EDUCATION/TRAINING PROGRAM

## 2023-05-09 PROCEDURE — 99214 OFFICE O/P EST MOD 30 MIN: CPT | Mod: S$GLB,,, | Performed by: STUDENT IN AN ORGANIZED HEALTH CARE EDUCATION/TRAINING PROGRAM

## 2023-05-09 PROCEDURE — 1160F PR REVIEW ALL MEDS BY PRESCRIBER/CLIN PHARMACIST DOCUMENTED: ICD-10-PCS | Mod: CPTII,S$GLB,, | Performed by: STUDENT IN AN ORGANIZED HEALTH CARE EDUCATION/TRAINING PROGRAM

## 2023-05-09 PROCEDURE — 1125F PR PAIN SEVERITY QUANTIFIED, PAIN PRESENT: ICD-10-PCS | Mod: CPTII,S$GLB,, | Performed by: STUDENT IN AN ORGANIZED HEALTH CARE EDUCATION/TRAINING PROGRAM

## 2023-05-09 PROCEDURE — 1101F PT FALLS ASSESS-DOCD LE1/YR: CPT | Mod: CPTII,S$GLB,, | Performed by: STUDENT IN AN ORGANIZED HEALTH CARE EDUCATION/TRAINING PROGRAM

## 2023-05-09 PROCEDURE — 1101F PR PT FALLS ASSESS DOC 0-1 FALLS W/OUT INJ PAST YR: ICD-10-PCS | Mod: CPTII,S$GLB,, | Performed by: STUDENT IN AN ORGANIZED HEALTH CARE EDUCATION/TRAINING PROGRAM

## 2023-05-09 PROCEDURE — 1125F AMNT PAIN NOTED PAIN PRSNT: CPT | Mod: CPTII,S$GLB,, | Performed by: STUDENT IN AN ORGANIZED HEALTH CARE EDUCATION/TRAINING PROGRAM

## 2023-05-09 PROCEDURE — 99999 PR PBB SHADOW E&M-EST. PATIENT-LVL III: CPT | Mod: PBBFAC,,, | Performed by: STUDENT IN AN ORGANIZED HEALTH CARE EDUCATION/TRAINING PROGRAM

## 2023-05-09 PROCEDURE — 3288F PR FALLS RISK ASSESSMENT DOCUMENTED: ICD-10-PCS | Mod: CPTII,S$GLB,, | Performed by: STUDENT IN AN ORGANIZED HEALTH CARE EDUCATION/TRAINING PROGRAM

## 2023-05-09 PROCEDURE — 1159F PR MEDICATION LIST DOCUMENTED IN MEDICAL RECORD: ICD-10-PCS | Mod: CPTII,S$GLB,, | Performed by: STUDENT IN AN ORGANIZED HEALTH CARE EDUCATION/TRAINING PROGRAM

## 2023-05-09 PROCEDURE — 3044F HG A1C LEVEL LT 7.0%: CPT | Mod: CPTII,S$GLB,, | Performed by: STUDENT IN AN ORGANIZED HEALTH CARE EDUCATION/TRAINING PROGRAM

## 2023-05-09 PROCEDURE — 4010F ACE/ARB THERAPY RXD/TAKEN: CPT | Mod: CPTII,S$GLB,, | Performed by: STUDENT IN AN ORGANIZED HEALTH CARE EDUCATION/TRAINING PROGRAM

## 2023-05-09 PROCEDURE — 99999 PR PBB SHADOW E&M-EST. PATIENT-LVL III: ICD-10-PCS | Mod: PBBFAC,,, | Performed by: STUDENT IN AN ORGANIZED HEALTH CARE EDUCATION/TRAINING PROGRAM

## 2023-05-09 RX ORDER — MELOXICAM 15 MG/1
15 TABLET ORAL DAILY PRN
Qty: 30 TABLET | Refills: 0 | Status: SHIPPED | OUTPATIENT
Start: 2023-05-09 | End: 2023-05-09

## 2023-05-09 RX ORDER — MELOXICAM 15 MG/1
15 TABLET ORAL DAILY PRN
Qty: 30 TABLET | Refills: 2 | Status: SHIPPED | OUTPATIENT
Start: 2023-05-09 | End: 2023-06-14

## 2023-05-09 NOTE — PATIENT INSTRUCTIONS
Assessment:  Beth Souza is a 72 y.o. female with a chief complaint of Pain of the Right Knee    Encounter Diagnoses   Name Primary?    Primary osteoarthritis of right knee Yes    Chronic pain of right knee     Weakness of both lower extremities     Osteoarthritis of spine with radiculopathy, lumbar region       Plan:  Patient has had modest improvement in pain since last visit, although pain has started to recur a bit, and she is also having some weakness in both of her legs.  We will restart PT, referral placed today for [facility], and I recommend land-based PT if she can tolerate it  Meloxicam 15 mg prescription sent today, use daily as needed for pain, and take with food  Can also use over-the-counter adjunct such as omega-3 fatty acids, turmeric/curcumin, ginger, glucosamine/chondroitin  No indication for repeat injection at this time, can reconsider if pain worsens  May also be a candidate for viscosupplementation injections, but we will wait upon re-evaluation if pain recurs    Follow-up:  As needed or sooner if there are any problems between now and then.    Thank you for choosing [facility] and Dr. Rudy Ayala for your orthopedic & sports medicine care. It is our goal to provide you with exceptional care that will help keep you healthy, active, and get you back in the game.    Please do not hesitate to reach out to us via email, phone, or MyChart with any questions, concerns, or feedback.    If you are experiencing pain/discomfort ,or have questions after 5pm and would like to be connected to the [facility] on-call team, please call this number and specify which Sports Medicine provider is treating you: [phone]

## 2023-05-09 NOTE — PROGRESS NOTES
Patient ID: Beth Souza  YOB: 1950  MRN: [id number]    Chief Complaint: Pain of the Right Knee    Referred By: Self    Occupation: Part time sitter    History of Present Illness: Beth Souza is a 72 y.o. female who presents today with Pain of the Right Knee    She was initially evaluated in our office on 2/7/23.   She was diagnosed with right knee osteoarthritis and treated with right knee aspiration and cortisone injection, Mobic 15mg, and referral to PT at [facility].     Today, she reports pain is a bit better from prior. She had up to 2 months with really good pain relief following injection at last visit, however pain is starting to recur. She went to 1 PT appt, but felt really bad/painful afterwards so she did not go back. She is interested in returning to regular exercise, gym, pool-based exercises. She has been taking omega-3 FA supplements to aid in anti-inflammatory efforts.    HPI 2/7/23:  She complains of 2 weeks of progressively worsening right knee pain with no injury.  The pain is all over the knee.  She has a lot of swelling.  The pain is constant and aching, worsening with prolonged sitting and weightbearing.  She has treated her pain with tylenol and prescription strength ibuprofen without relief.  Hot baths give her temporary relief for about 15 minutes.    Past Medical History:   Past Medical History:   Diagnosis Date    Cataract     Hyperlipidemia     Hypertension     Lumbar spondylosis     Meningioma 2/18/2021    Obesity     Sleep apnea     Vitamin D deficiency      Past Surgical History:   Procedure Laterality Date    HYSTERECTOMY      PARTIAL HYSTERECTOMY      SELECTIVE INJECTION OF ANESTHETIC AGENT AROUND LUMBAR SPINAL NERVE ROOT BY TRANSFORAMINAL APPROACH Bilateral 3/21/2023    Procedure: Bilateral L5/S1 transforaminal epidural steroid injection;  Surgeon: Kush Jha MD;  Location: [facility];  Service: Pain Management;  Laterality: Bilateral;      Family History   Problem Relation Age of Onset    Hypertension Mother     Diabetes Daughter     Melanoma Neg Hx     Psoriasis Neg Hx     Lupus Neg Hx     Eczema Neg Hx     Breast cancer Neg Hx     Colon cancer Neg Hx     Ovarian cancer Neg Hx     Thrombosis Neg Hx      Social History     Socioeconomic History    Marital status:     Number of children: 3   Occupational History    Occupation: Cleaning houses     Employer: not employed   Tobacco Use    Smoking status: Former     Types: Cigarettes     Quit date: 2004     Years since quittin.3    Smokeless tobacco: Never   Substance and Sexual Activity    Alcohol use: Yes     Comment: occasionally    Drug use: No    Sexual activity: Not Currently     Partners: Male   Other Topics Concern    Are you pregnant or think you may be? No    Breast-feeding No   Social History Narrative    Live w/ alone      Social Determinants of Health     Financial Resource Strain: Low Risk     Difficulty of Paying Living Expenses: Not hard at all   Food Insecurity: No Food Insecurity    Worried About Running Out of Food in the Last Year: Never true    Ran Out of Food in the Last Year: Never true   Transportation Needs: No Transportation Needs    Lack of Transportation (Medical): No    Lack of Transportation (Non-Medical): No   Physical Activity: Inactive    Days of Exercise per Week: 0 days    Minutes of Exercise per Session: 0 min   Stress: No Stress Concern Present    Feeling of Stress : Not at all   Social Connections: Moderately Isolated    Frequency of Communication with Friends and Family: More than three times a week    Frequency of Social Gatherings with Friends and Family: More than three times a week    Attends Temple Services: More than 4 times per year    Active Member of Clubs or Organizations: No    Attends Club or Organization Meetings: Never    Marital Status:    Housing Stability: Low Risk     Unable to Pay for Housing in the Last Year: No     Number of Places Lived in the Last Year: 1    Unstable Housing in the Last Year: No     Medication List with Changes/Refills   Current Medications    ACETAMINOPHEN (TYLENOL) 500 MG TABLET    Take 500 mg by mouth daily as needed for Pain.    CETIRIZINE (ZYRTEC) 10 MG TABLET    Take 1 tablet (10 mg total) by mouth once daily.    ERGOCALCIFEROL (ERGOCALCIFEROL) 50,000 UNIT CAP    Take 1 capsule by mouth once a week    FLUTICASONE PROPIONATE (FLONASE) 50 MCG/ACTUATION NASAL SPRAY    2 sprays (100 mcg total) by Each Nostril route once daily.    LISINOPRIL-HYDROCHLOROTHIAZIDE (PRINZIDE,ZESTORETIC) 20-25 MG TAB    Take 1 tablet by mouth once daily.   Changed and/or Refilled Medications    Modified Medication Previous Medication    MELOXICAM (MOBIC) 15 MG TABLET meloxicam (MOBIC) 15 MG tablet       Take 1 tablet (15 mg total) by mouth daily as needed for Pain.    Take 1 tablet (15 mg total) by mouth daily as needed for Pain.     Review of patient's allergies indicates:   Allergen Reactions    Aspirin Other (See Comments)     Bruising       Physical Exam:   There is no height or weight on file to calculate BMI.    Physical Exam  Constitutional:       General: She is not in acute distress.     Appearance: Normal appearance.   HENT:      Head: Normocephalic and atraumatic.   Eyes:      Extraocular Movements: Extraocular movements intact.      Conjunctiva/sclera: Conjunctivae normal.   Pulmonary:      Effort: Pulmonary effort is normal. No respiratory distress.   Skin:     General: Skin is warm and dry.   Neurological:      General: No focal deficit present.      Mental Status: She is alert and oriented to person, place, and time.   Psychiatric:         Mood and Affect: Mood normal.     Detailed MSK exam:     Right Knee:  Inspection: Moderate effusion  Palpation tenderness: Medial, lateral, patellofemoral joint line  Range of motion: 0 deg extension - 90 deg flexion  Strength:  5/5 Extension    5/5 Flexion  Stability: Stable  ACL/Lachman      Stable Posterior Drawer      Stable Valgus Stress      Stable Varus Stress  Patella Exam: Negative J-sign   Negative Patellar apprehension   Positive Patellar crepitus   N/V Exam:  Tibial:    Normal sensory (plantar foot)  Normal motor (FHL)    Sup Peroneal:   Normal sensory (dorsal foot)  Normal motor (Peroneals)            Deep Peroneal:   Normal sensory (1st web space)  Normal motor (EHL)    Sural:   Normal sensory (lateral foot)   Saphenous:   Normal sensory (medial lower leg)   Normal pedal pulses, warm and well perfused with capillary refill < 2 sec        Imaging:     No new imaging today    Patient Instructions   Assessment:  Beht Souza is a 72 y.o. female with a chief complaint of Pain of the Right Knee    Encounter Diagnoses   Name Primary?    Primary osteoarthritis of right knee Yes    Chronic pain of right knee     Weakness of both lower extremities     Osteoarthritis of spine with radiculopathy, lumbar region       Plan:  Patient has had modest improvement in pain since last visit, although pain has started to recur a bit, and she is also having some weakness in both of her legs.  We will restart PT, referral placed today for [facility], and I recommend land-based PT if she can tolerate it  Meloxicam 15 mg prescription sent today, use daily as needed for pain, and take with food  Can also use over-the-counter adjunct such as omega-3 fatty acids, turmeric/curcumin, ginger, glucosamine/chondroitin  No indication for repeat injection at this time, can reconsider if pain worsens  May also be a candidate for viscosupplementation injections, but we will wait upon re-evaluation if pain recurs    Follow-up:  As needed or sooner if there are any problems between now and then.    Thank you for choosing [facility] and Dr. Rudy Ayala for your orthopedic & sports medicine care. It is our goal to provide you with exceptional care that will help keep you healthy,  active, and get you back in the game.    Please do not hesitate to reach out to us via email, phone, or MyChart with any questions, concerns, or feedback.    If you are experiencing pain/discomfort ,or have questions after 5pm and would like to be connected to the [facility] on-call team, please call this number and specify which Sports Medicine provider is treating you: [phone]     A copy of today's visit note has been sent to the referring provider.         Rudy Ayala MD  Primary Care Sports Medicine    Disclaimer: This note was prepared using a voice recognition system and is likely to have sound alike errors within the text.

## 2023-05-10 ENCOUNTER — OFFICE VISIT (OUTPATIENT)
Dept: CARDIOLOGY | Facility: CLINIC | Age: 73
End: 2023-05-10
Payer: MEDICARE

## 2023-05-10 VITALS
DIASTOLIC BLOOD PRESSURE: 80 MMHG | HEIGHT: 63 IN | OXYGEN SATURATION: 65 % | WEIGHT: 220.88 LBS | BODY MASS INDEX: 39.14 KG/M2 | SYSTOLIC BLOOD PRESSURE: 116 MMHG | HEART RATE: 97 BPM

## 2023-05-10 DIAGNOSIS — R00.1 BRADYCARDIA: ICD-10-CM

## 2023-05-10 DIAGNOSIS — R00.2 PALPITATIONS: ICD-10-CM

## 2023-05-10 DIAGNOSIS — I20.89 STABLE ANGINA PECTORIS: ICD-10-CM

## 2023-05-10 DIAGNOSIS — R73.03 PREDIABETES: ICD-10-CM

## 2023-05-10 DIAGNOSIS — E66.01 SEVERE OBESITY WITH BODY MASS INDEX (BMI) OF 35.0 TO 39.9 WITH SERIOUS COMORBIDITY: ICD-10-CM

## 2023-05-10 DIAGNOSIS — I70.0 CALCIFICATION OF AORTA: ICD-10-CM

## 2023-05-10 DIAGNOSIS — E78.2 MIXED HYPERLIPIDEMIA: ICD-10-CM

## 2023-05-10 DIAGNOSIS — I10 ESSENTIAL HYPERTENSION: Primary | ICD-10-CM

## 2023-05-10 DIAGNOSIS — G47.33 OSA ON CPAP: ICD-10-CM

## 2023-05-10 PROCEDURE — 1101F PR PT FALLS ASSESS DOC 0-1 FALLS W/OUT INJ PAST YR: ICD-10-PCS | Mod: CPTII,S$GLB,, | Performed by: INTERNAL MEDICINE

## 2023-05-10 PROCEDURE — 3079F PR MOST RECENT DIASTOLIC BLOOD PRESSURE 80-89 MM HG: ICD-10-PCS | Mod: CPTII,S$GLB,, | Performed by: INTERNAL MEDICINE

## 2023-05-10 PROCEDURE — 99214 PR OFFICE/OUTPT VISIT, EST, LEVL IV, 30-39 MIN: ICD-10-PCS | Mod: S$GLB,,, | Performed by: INTERNAL MEDICINE

## 2023-05-10 PROCEDURE — 99214 OFFICE O/P EST MOD 30 MIN: CPT | Mod: S$GLB,,, | Performed by: INTERNAL MEDICINE

## 2023-05-10 PROCEDURE — 99999 PR PBB SHADOW E&M-EST. PATIENT-LVL III: ICD-10-PCS | Mod: PBBFAC,,, | Performed by: INTERNAL MEDICINE

## 2023-05-10 PROCEDURE — 1126F PR PAIN SEVERITY QUANTIFIED, NO PAIN PRESENT: ICD-10-PCS | Mod: CPTII,S$GLB,, | Performed by: INTERNAL MEDICINE

## 2023-05-10 PROCEDURE — 3044F PR MOST RECENT HEMOGLOBIN A1C LEVEL <7.0%: ICD-10-PCS | Mod: CPTII,S$GLB,, | Performed by: INTERNAL MEDICINE

## 2023-05-10 PROCEDURE — 3074F SYST BP LT 130 MM HG: CPT | Mod: CPTII,S$GLB,, | Performed by: INTERNAL MEDICINE

## 2023-05-10 PROCEDURE — 1159F MED LIST DOCD IN RCRD: CPT | Mod: CPTII,S$GLB,, | Performed by: INTERNAL MEDICINE

## 2023-05-10 PROCEDURE — 1101F PT FALLS ASSESS-DOCD LE1/YR: CPT | Mod: CPTII,S$GLB,, | Performed by: INTERNAL MEDICINE

## 2023-05-10 PROCEDURE — 3044F HG A1C LEVEL LT 7.0%: CPT | Mod: CPTII,S$GLB,, | Performed by: INTERNAL MEDICINE

## 2023-05-10 PROCEDURE — 3008F BODY MASS INDEX DOCD: CPT | Mod: CPTII,S$GLB,, | Performed by: INTERNAL MEDICINE

## 2023-05-10 PROCEDURE — 3288F PR FALLS RISK ASSESSMENT DOCUMENTED: ICD-10-PCS | Mod: CPTII,S$GLB,, | Performed by: INTERNAL MEDICINE

## 2023-05-10 PROCEDURE — 1160F PR REVIEW ALL MEDS BY PRESCRIBER/CLIN PHARMACIST DOCUMENTED: ICD-10-PCS | Mod: CPTII,S$GLB,, | Performed by: INTERNAL MEDICINE

## 2023-05-10 PROCEDURE — 1159F PR MEDICATION LIST DOCUMENTED IN MEDICAL RECORD: ICD-10-PCS | Mod: CPTII,S$GLB,, | Performed by: INTERNAL MEDICINE

## 2023-05-10 PROCEDURE — 1126F AMNT PAIN NOTED NONE PRSNT: CPT | Mod: CPTII,S$GLB,, | Performed by: INTERNAL MEDICINE

## 2023-05-10 PROCEDURE — 99999 PR PBB SHADOW E&M-EST. PATIENT-LVL III: CPT | Mod: PBBFAC,,, | Performed by: INTERNAL MEDICINE

## 2023-05-10 PROCEDURE — 3288F FALL RISK ASSESSMENT DOCD: CPT | Mod: CPTII,S$GLB,, | Performed by: INTERNAL MEDICINE

## 2023-05-10 PROCEDURE — 4010F PR ACE/ARB THEARPY RXD/TAKEN: ICD-10-PCS | Mod: CPTII,S$GLB,, | Performed by: INTERNAL MEDICINE

## 2023-05-10 PROCEDURE — 3008F PR BODY MASS INDEX (BMI) DOCUMENTED: ICD-10-PCS | Mod: CPTII,S$GLB,, | Performed by: INTERNAL MEDICINE

## 2023-05-10 PROCEDURE — 3074F PR MOST RECENT SYSTOLIC BLOOD PRESSURE < 130 MM HG: ICD-10-PCS | Mod: CPTII,S$GLB,, | Performed by: INTERNAL MEDICINE

## 2023-05-10 PROCEDURE — 1160F RVW MEDS BY RX/DR IN RCRD: CPT | Mod: CPTII,S$GLB,, | Performed by: INTERNAL MEDICINE

## 2023-05-10 PROCEDURE — 4010F ACE/ARB THERAPY RXD/TAKEN: CPT | Mod: CPTII,S$GLB,, | Performed by: INTERNAL MEDICINE

## 2023-05-10 PROCEDURE — 3079F DIAST BP 80-89 MM HG: CPT | Mod: CPTII,S$GLB,, | Performed by: INTERNAL MEDICINE

## 2023-05-10 NOTE — PROGRESS NOTES
Subjective:   Patient ID:  Beth Souza is a 72 y.o. female who presents for follow-up of No chief complaint on file.    Pt, 68 yo, came in for one yr f/u.  PMH HTN, HLD and Vit D.   No chest pain, fainting except sinus, PND, orthopnea, syncope and claudication.   Palpitation and fluttering feeling at night. No dizziness, and faint  Does not exercise due to back pain.  C/o diffuse body sore and pain  Pt has bee not taking Lipitor for a long time.  No smoking/drinking.  EKG on 10-: NSR. nonspecifc ST T change.    echo normal EF, mild LAE and Holter normal         04/05/2023:   Overall patient doing well intermittent bradycardia will do a Holter monitor.  Intermittent chest pain with the stress test with stress echo as well.  Risk factors coronary disease including arterial calcification as well as prior smoking history hypertension prediabetes.  Patient intermittent cardiac risk cholesterol profiles been relatively good well controlled but she should have a little lower cholesterol and we discussed possibility of starting statin medications.  She is allergies to aspirin and can not take that       05/10/2023:   Overall patient doing well she is not using sleep apnea machine quite his normal issues should.  This may have something to do with low heart rates at night bradycardia to 42 heart rate upper 107 by monitor cardiac stress test showed no evidence cardiac ischemia the limited exercise overall doing well this time follow-up evaluation 4-5 months.      Review of Systems   Constitutional: Negative for chills, diaphoresis, night sweats, weight gain and weight loss.   HENT:  Negative for congestion, hoarse voice, sore throat and stridor.    Eyes:  Negative for double vision and pain.   Cardiovascular:  Negative for chest pain, claudication, cyanosis, dyspnea on exertion, irregular heartbeat, leg swelling, near-syncope, orthopnea, palpitations, paroxysmal nocturnal dyspnea and syncope.    Respiratory:  Negative for cough, hemoptysis, shortness of breath, sleep disturbances due to breathing, snoring, sputum production and wheezing.    Endocrine: Negative for cold intolerance, heat intolerance and polydipsia.   Hematologic/Lymphatic: Negative for bleeding problem. Does not bruise/bleed easily.   Skin:  Negative for color change, dry skin and rash.   Musculoskeletal:  Negative for joint swelling and muscle cramps.   Gastrointestinal:  Negative for bloating, abdominal pain, constipation, diarrhea, dysphagia, melena, nausea and vomiting.   Genitourinary:  Negative for flank pain and urgency.   Neurological:  Negative for dizziness, focal weakness, headaches, light-headedness, loss of balance, seizures and weakness.   Psychiatric/Behavioral:  Negative for altered mental status and memory loss. The patient is not nervous/anxious.    Family History   Problem Relation Age of Onset    Hypertension Mother     Diabetes Daughter     Melanoma Neg Hx     Psoriasis Neg Hx     Lupus Neg Hx     Eczema Neg Hx     Breast cancer Neg Hx     Colon cancer Neg Hx     Ovarian cancer Neg Hx     Thrombosis Neg Hx      Past Medical History:   Diagnosis Date    Cataract     Hyperlipidemia     Hypertension     Lumbar spondylosis     Meningioma 2021    Obesity     Sleep apnea     Vitamin D deficiency      Social History     Socioeconomic History    Marital status:     Number of children: 3   Occupational History    Occupation: Cleaning houses     Employer: not employed   Tobacco Use    Smoking status: Former     Types: Cigarettes     Quit date: 2004     Years since quittin.3    Smokeless tobacco: Never   Substance and Sexual Activity    Alcohol use: Yes     Comment: occasionally    Drug use: No    Sexual activity: Not Currently     Partners: Male   Other Topics Concern    Are you pregnant or think you may be? No    Breast-feeding No   Social History Narrative    Live w/ alone      Social Determinants of  Health     Financial Resource Strain: Low Risk     Difficulty of Paying Living Expenses: Not hard at all   Food Insecurity: No Food Insecurity    Worried About Running Out of Food in the Last Year: Never true    Ran Out of Food in the Last Year: Never true   Transportation Needs: No Transportation Needs    Lack of Transportation (Medical): No    Lack of Transportation (Non-Medical): No   Physical Activity: Inactive    Days of Exercise per Week: 0 days    Minutes of Exercise per Session: 0 min   Stress: No Stress Concern Present    Feeling of Stress : Not at all   Social Connections: Moderately Isolated    Frequency of Communication with Friends and Family: More than three times a week    Frequency of Social Gatherings with Friends and Family: More than three times a week    Attends Evangelical Services: More than 4 times per year    Active Member of Clubs or Organizations: No    Attends Club or Organization Meetings: Never    Marital Status:    Housing Stability: Low Risk     Unable to Pay for Housing in the Last Year: No    Number of Places Lived in the Last Year: 1    Unstable Housing in the Last Year: No     Current Outpatient Medications on File Prior to Visit   Medication Sig Dispense Refill    acetaminophen (TYLENOL) 500 MG tablet Take 500 mg by mouth daily as needed for Pain.      ergocalciferol (ERGOCALCIFEROL) 50,000 unit Cap Take 1 capsule by mouth once a week 4 capsule 3    lisinopriL-hydrochlorothiazide (PRINZIDE,ZESTORETIC) 20-25 mg Tab Take 1 tablet by mouth once daily. 90 tablet 1    meloxicam (MOBIC) 15 MG tablet Take 1 tablet (15 mg total) by mouth daily as needed for Pain. 30 tablet 2    [DISCONTINUED] cetirizine (ZYRTEC) 10 MG tablet Take 1 tablet (10 mg total) by mouth once daily. (Patient not taking: Reported on 5/9/2023) 14 tablet 0    [DISCONTINUED] fluticasone propionate (FLONASE) 50 mcg/actuation nasal spray 2 sprays (100 mcg total) by Each Nostril route once daily. (Patient not taking:  Reported on 5/9/2023) 16 g 0    [DISCONTINUED] meloxicam (MOBIC) 15 MG tablet Take 1 tablet (15 mg total) by mouth daily as needed for Pain. 30 tablet 0    [DISCONTINUED] meloxicam (MOBIC) 15 MG tablet Take 1 tablet (15 mg total) by mouth daily as needed for Pain. 30 tablet 0     No current facility-administered medications on file prior to visit.     Review of patient's allergies indicates:   Allergen Reactions    Aspirin Other (See Comments)     Bruising       Objective:     Physical Exam  Eyes:      Pupils: Pupils are equal, round, and reactive to light.   Neck:      Trachea: No tracheal deviation.   Cardiovascular:      Rate and Rhythm: Normal rate and regular rhythm.      Pulses: Intact distal pulses.           Carotid pulses are 2+ on the right side and 2+ on the left side.       Radial pulses are 2+ on the right side and 2+ on the left side.        Femoral pulses are 2+ on the right side and 2+ on the left side.       Popliteal pulses are 2+ on the right side and 2+ on the left side.        Dorsalis pedis pulses are 2+ on the right side and 2+ on the left side.        Posterior tibial pulses are 2+ on the right side and 2+ on the left side.      Heart sounds: Normal heart sounds. No murmur heard.    No friction rub. No gallop.   Pulmonary:      Effort: Pulmonary effort is normal. No respiratory distress.      Breath sounds: Normal breath sounds. No stridor. No wheezing or rales.   Chest:      Chest wall: No tenderness.   Abdominal:      General: There is no distension.      Tenderness: There is no abdominal tenderness. There is no rebound.   Musculoskeletal:         General: No tenderness.      Cervical back: Normal range of motion.   Skin:     General: Skin is warm and dry.   Neurological:      Mental Status: She is alert and oriented to person, place, and time.     4/20/23  The test was stopped because the patient experienced shortness of breath. The patient requested the test to be stopped.  There were no  arrhythmias during stress.  The left ventricle is normal in size with normal systolic function.  The estimated ejection fraction is 60%.  Indeterminate left ventricular diastolic function.  Normal right ventricular size with normal right ventricular systolic function.  Normal central venous pressure (3 mmHg).  The estimated PA systolic pressure is 39 mmHg.  The stress echo portion of this study is negative for myocardial ischemia.  The patient's exercise capacity was moderately impaired.  Sensitivity is impaired due to the patient's failure to achieve target heart rate.  The ECG portion of this study is negative for myocardial ischemia  Assessment:     1. Essential hypertension    2. Mixed hyperlipidemia    3. Calcification of aorta    4. Severe obesity with body mass index (BMI) of 35.0 to 39.9 with serious comorbidity    5. Bradycardia    6. Palpitations    7. Stable angina pectoris    8. LEN on CPAP    9. Prediabetes        Plan:     Essential hypertension    Mixed hyperlipidemia    Calcification of aorta    Severe obesity with body mass index (BMI) of 35.0 to 39.9 with serious comorbidity    Bradycardia    Palpitations    Stable angina pectoris    LEN on CPAP    Prediabetes        Impression 1 sinus bradycardia stable no need for pacemaker doing well this time.    2. Palpitations limited  3. Sleep apnea needs use sleep apnea device more consistently   All questions answered follow-up evaluation for 5 months otherwise stable this time.

## 2023-05-12 ENCOUNTER — TELEPHONE (OUTPATIENT)
Dept: PAIN MEDICINE | Facility: CLINIC | Age: 73
End: 2023-05-12
Payer: MEDICARE

## 2023-05-16 ENCOUNTER — TELEPHONE (OUTPATIENT)
Dept: INTERNAL MEDICINE | Facility: CLINIC | Age: 73
End: 2023-05-16
Payer: MEDICARE

## 2023-05-16 NOTE — TELEPHONE ENCOUNTER
----- Message from Viji Carrillo sent at 5/16/2023  1:07 PM CDT -----  Contact: Beth  Pt is calling to speak with the nurse regarding sleep machine. Reports needing another one. Please give pt a call back at .[phone]. Thanks SVETLANA

## 2023-05-17 ENCOUNTER — PATIENT MESSAGE (OUTPATIENT)
Dept: OBSTETRICS AND GYNECOLOGY | Facility: CLINIC | Age: 73
End: 2023-05-17
Payer: MEDICARE

## 2023-05-17 ENCOUNTER — TELEPHONE (OUTPATIENT)
Dept: OBSTETRICS AND GYNECOLOGY | Facility: CLINIC | Age: 73
End: 2023-05-17

## 2023-05-17 DIAGNOSIS — N95.1 VAGINAL DRYNESS, MENOPAUSAL: Primary | ICD-10-CM

## 2023-05-17 RX ORDER — ESTRADIOL 10 UG/1
INSERT VAGINAL
Qty: 30 TABLET | Refills: 1 | Status: SHIPPED | OUTPATIENT
Start: 2023-05-17 | End: 2024-02-22

## 2023-05-17 NOTE — TELEPHONE ENCOUNTER
S/W pt and informed her of the following:    Please advise patient to discuss further with pulmonology regarding sleep apnea machine     Dr. Oliva     Informed her that Zohreh Best NP is who she sees and phone number given to contact that dept.

## 2023-05-17 NOTE — TELEPHONE ENCOUNTER
----- Message from Raquel Reyes sent at 5/17/2023  2:47 PM CDT -----  Contact: Beth  Type:  RX Refill Request    Who Called: Beth  Refill or New Rx:refill  RX Name and Strength:unknown  How is the patient currently taking it? (ex. 1XDay):as prescribed  Is this a 30 day or 90 day RX:as prescribed  Preferred Pharmacy with phone number:  [facility] [address], [place] - [address]  [address] [facility] [address]  Phone: [phone] Fax: [phone]  Local or Mail Order:local  Ordering Provider:Miroslava De Leon  Would the patient rather a call back or a response via [facility]? call  Best Call Back Number:[phone]  Additional Information: Patient reports unaware of prescription name and request assistance. Please give patient a call back to assist.   Thank you,  GH

## 2023-06-06 ENCOUNTER — OFFICE VISIT (OUTPATIENT)
Dept: OBSTETRICS AND GYNECOLOGY | Facility: CLINIC | Age: 73
End: 2023-06-06
Payer: MEDICARE

## 2023-06-06 VITALS
SYSTOLIC BLOOD PRESSURE: 112 MMHG | BODY MASS INDEX: 39.1 KG/M2 | HEIGHT: 63 IN | WEIGHT: 220.69 LBS | DIASTOLIC BLOOD PRESSURE: 78 MMHG

## 2023-06-06 DIAGNOSIS — Z01.419 ROUTINE GYNECOLOGICAL EXAMINATION: Primary | ICD-10-CM

## 2023-06-06 PROCEDURE — 1101F PR PT FALLS ASSESS DOC 0-1 FALLS W/OUT INJ PAST YR: ICD-10-PCS | Mod: CPTII,S$GLB,, | Performed by: NURSE PRACTITIONER

## 2023-06-06 PROCEDURE — 3074F SYST BP LT 130 MM HG: CPT | Mod: CPTII,S$GLB,, | Performed by: NURSE PRACTITIONER

## 2023-06-06 PROCEDURE — 3044F HG A1C LEVEL LT 7.0%: CPT | Mod: CPTII,S$GLB,, | Performed by: NURSE PRACTITIONER

## 2023-06-06 PROCEDURE — 3044F PR MOST RECENT HEMOGLOBIN A1C LEVEL <7.0%: ICD-10-PCS | Mod: CPTII,S$GLB,, | Performed by: NURSE PRACTITIONER

## 2023-06-06 PROCEDURE — 3288F PR FALLS RISK ASSESSMENT DOCUMENTED: ICD-10-PCS | Mod: CPTII,S$GLB,, | Performed by: NURSE PRACTITIONER

## 2023-06-06 PROCEDURE — G0101 CA SCREEN;PELVIC/BREAST EXAM: HCPCS | Mod: S$GLB,,, | Performed by: NURSE PRACTITIONER

## 2023-06-06 PROCEDURE — 1160F PR REVIEW ALL MEDS BY PRESCRIBER/CLIN PHARMACIST DOCUMENTED: ICD-10-PCS | Mod: CPTII,S$GLB,, | Performed by: NURSE PRACTITIONER

## 2023-06-06 PROCEDURE — 99999 PR PBB SHADOW E&M-EST. PATIENT-LVL III: ICD-10-PCS | Mod: PBBFAC,,, | Performed by: NURSE PRACTITIONER

## 2023-06-06 PROCEDURE — 99999 PR PBB SHADOW E&M-EST. PATIENT-LVL III: CPT | Mod: PBBFAC,,, | Performed by: NURSE PRACTITIONER

## 2023-06-06 PROCEDURE — 3078F DIAST BP <80 MM HG: CPT | Mod: CPTII,S$GLB,, | Performed by: NURSE PRACTITIONER

## 2023-06-06 PROCEDURE — 1126F AMNT PAIN NOTED NONE PRSNT: CPT | Mod: CPTII,S$GLB,, | Performed by: NURSE PRACTITIONER

## 2023-06-06 PROCEDURE — 1160F RVW MEDS BY RX/DR IN RCRD: CPT | Mod: CPTII,S$GLB,, | Performed by: NURSE PRACTITIONER

## 2023-06-06 PROCEDURE — 1126F PR PAIN SEVERITY QUANTIFIED, NO PAIN PRESENT: ICD-10-PCS | Mod: CPTII,S$GLB,, | Performed by: NURSE PRACTITIONER

## 2023-06-06 PROCEDURE — 3078F PR MOST RECENT DIASTOLIC BLOOD PRESSURE < 80 MM HG: ICD-10-PCS | Mod: CPTII,S$GLB,, | Performed by: NURSE PRACTITIONER

## 2023-06-06 PROCEDURE — 1159F PR MEDICATION LIST DOCUMENTED IN MEDICAL RECORD: ICD-10-PCS | Mod: CPTII,S$GLB,, | Performed by: NURSE PRACTITIONER

## 2023-06-06 PROCEDURE — 4010F ACE/ARB THERAPY RXD/TAKEN: CPT | Mod: CPTII,S$GLB,, | Performed by: NURSE PRACTITIONER

## 2023-06-06 PROCEDURE — 3008F BODY MASS INDEX DOCD: CPT | Mod: CPTII,S$GLB,, | Performed by: NURSE PRACTITIONER

## 2023-06-06 PROCEDURE — 4010F PR ACE/ARB THEARPY RXD/TAKEN: ICD-10-PCS | Mod: CPTII,S$GLB,, | Performed by: NURSE PRACTITIONER

## 2023-06-06 PROCEDURE — 1101F PT FALLS ASSESS-DOCD LE1/YR: CPT | Mod: CPTII,S$GLB,, | Performed by: NURSE PRACTITIONER

## 2023-06-06 PROCEDURE — 1159F MED LIST DOCD IN RCRD: CPT | Mod: CPTII,S$GLB,, | Performed by: NURSE PRACTITIONER

## 2023-06-06 PROCEDURE — 3008F PR BODY MASS INDEX (BMI) DOCUMENTED: ICD-10-PCS | Mod: CPTII,S$GLB,, | Performed by: NURSE PRACTITIONER

## 2023-06-06 PROCEDURE — 3074F PR MOST RECENT SYSTOLIC BLOOD PRESSURE < 130 MM HG: ICD-10-PCS | Mod: CPTII,S$GLB,, | Performed by: NURSE PRACTITIONER

## 2023-06-06 PROCEDURE — G0101 PR CA SCREEN;PELVIC/BREAST EXAM: ICD-10-PCS | Mod: S$GLB,,, | Performed by: NURSE PRACTITIONER

## 2023-06-06 PROCEDURE — 3288F FALL RISK ASSESSMENT DOCD: CPT | Mod: CPTII,S$GLB,, | Performed by: NURSE PRACTITIONER

## 2023-06-06 NOTE — PROGRESS NOTES
"  Subjective:       Patient ID: Beth Souza is a 73 y.o. female.    Chief Complaint:  Well Woman      History of Present Illness  HPI  Presents today for E   Health Maintenance   Topic Date Due    Aspirin/Antiplatelet Therapy  Never done    High Dose Statin  Never done    Mammogram  2023    DEXA Scan  2025    TETANUS VACCINE  02/10/2027    Lipid Panel  2027    Hepatitis C Screening  Completed     GYN & OB History  No LMP recorded. Patient has had a hysterectomy.   Date of Last Pap: Hysterectomy re:  non cancerous     OB History    Para Term  AB Living   3 3 3     3   SAB IAB Ectopic Multiple Live Births           3      # Outcome Date GA Lbr Zachary/2nd Weight Sex Delivery Anes PTL Lv   3 Term      Vag-Spont   HUGO   2 Term      Vag-Spont   HUGO   1 Term      Vag-Spont   HUGO       Review of Systems  Review of Systems        Objective:   /78   Ht 5' 3" (1.6 m)   Wt 100.1 kg (220 lb 10.9 oz)   BMI 39.09 kg/m²    Physical Exam:   Constitutional: She is oriented to person, place, and time. She appears well-developed and well-nourished.        Pulmonary/Chest: Right breast exhibits no inverted nipple, no mass, no nipple discharge, no skin change, no tenderness and no swelling. Left breast exhibits no inverted nipple, no mass, no nipple discharge, no skin change, no tenderness and no swelling.        Abdominal: Soft.     Genitourinary:    Inguinal canal, vagina, right adnexa and left adnexa normal.      Pelvic exam was performed with patient supine.   The external female genitalia was normal.   Genitalia hair distrobution normal .   Labial bartholins normal.No no adexnal prolapse. Right adnexum displays no mass, no tenderness and no fullness. Left adnexum displays no mass, no tenderness and no fullness. Vaginal cuff normal.  No erythema,  no vaginal discharge, bleeding, rectocele, cystocele or unspecified prolapse of vaginal walls in the vagina.    No foreign body in the " vagina.      No signs of injury in the vagina.   Cervix is absent.Uterus is absent.               Neurological: She is alert and oriented to person, place, and time.    Skin: Skin is warm and dry.    Psychiatric: She has a normal mood and affect. Her behavior is normal. Judgment and thought content normal.      Assessment:        1. Routine gynecological examination               Plan:           Beth was seen today for well woman.    Diagnoses and all orders for this visit:    Routine gynecological examination      Return to clinic in one year for WWE

## 2023-06-12 ENCOUNTER — OFFICE VISIT (OUTPATIENT)
Dept: CARDIOLOGY | Facility: CLINIC | Age: 73
End: 2023-06-12
Payer: MEDICARE

## 2023-06-12 VITALS
BODY MASS INDEX: 38.79 KG/M2 | HEIGHT: 63 IN | DIASTOLIC BLOOD PRESSURE: 84 MMHG | SYSTOLIC BLOOD PRESSURE: 138 MMHG | HEART RATE: 61 BPM | WEIGHT: 218.94 LBS

## 2023-06-12 DIAGNOSIS — R00.2 PALPITATIONS: ICD-10-CM

## 2023-06-12 DIAGNOSIS — E78.2 MIXED HYPERLIPIDEMIA: ICD-10-CM

## 2023-06-12 DIAGNOSIS — G47.33 OSA ON CPAP: ICD-10-CM

## 2023-06-12 DIAGNOSIS — R00.1 BRADYCARDIA: ICD-10-CM

## 2023-06-12 DIAGNOSIS — R73.03 PREDIABETES: ICD-10-CM

## 2023-06-12 DIAGNOSIS — I70.0 CALCIFICATION OF AORTA: ICD-10-CM

## 2023-06-12 DIAGNOSIS — I20.89 STABLE ANGINA PECTORIS: ICD-10-CM

## 2023-06-12 DIAGNOSIS — I10 ESSENTIAL HYPERTENSION: Primary | ICD-10-CM

## 2023-06-12 PROCEDURE — 3075F PR MOST RECENT SYSTOLIC BLOOD PRESS GE 130-139MM HG: ICD-10-PCS | Mod: CPTII,S$GLB,, | Performed by: INTERNAL MEDICINE

## 2023-06-12 PROCEDURE — 1126F AMNT PAIN NOTED NONE PRSNT: CPT | Mod: CPTII,S$GLB,, | Performed by: INTERNAL MEDICINE

## 2023-06-12 PROCEDURE — 99214 PR OFFICE/OUTPT VISIT, EST, LEVL IV, 30-39 MIN: ICD-10-PCS | Mod: S$GLB,,, | Performed by: INTERNAL MEDICINE

## 2023-06-12 PROCEDURE — 99999 PR PBB SHADOW E&M-EST. PATIENT-LVL III: CPT | Mod: PBBFAC,,, | Performed by: INTERNAL MEDICINE

## 2023-06-12 PROCEDURE — 4010F ACE/ARB THERAPY RXD/TAKEN: CPT | Mod: CPTII,S$GLB,, | Performed by: INTERNAL MEDICINE

## 2023-06-12 PROCEDURE — 3044F HG A1C LEVEL LT 7.0%: CPT | Mod: CPTII,S$GLB,, | Performed by: INTERNAL MEDICINE

## 2023-06-12 PROCEDURE — 1159F PR MEDICATION LIST DOCUMENTED IN MEDICAL RECORD: ICD-10-PCS | Mod: CPTII,S$GLB,, | Performed by: INTERNAL MEDICINE

## 2023-06-12 PROCEDURE — 1101F PR PT FALLS ASSESS DOC 0-1 FALLS W/OUT INJ PAST YR: ICD-10-PCS | Mod: CPTII,S$GLB,, | Performed by: INTERNAL MEDICINE

## 2023-06-12 PROCEDURE — 4010F PR ACE/ARB THEARPY RXD/TAKEN: ICD-10-PCS | Mod: CPTII,S$GLB,, | Performed by: INTERNAL MEDICINE

## 2023-06-12 PROCEDURE — 1159F MED LIST DOCD IN RCRD: CPT | Mod: CPTII,S$GLB,, | Performed by: INTERNAL MEDICINE

## 2023-06-12 PROCEDURE — 1160F RVW MEDS BY RX/DR IN RCRD: CPT | Mod: CPTII,S$GLB,, | Performed by: INTERNAL MEDICINE

## 2023-06-12 PROCEDURE — 1101F PT FALLS ASSESS-DOCD LE1/YR: CPT | Mod: CPTII,S$GLB,, | Performed by: INTERNAL MEDICINE

## 2023-06-12 PROCEDURE — 1160F PR REVIEW ALL MEDS BY PRESCRIBER/CLIN PHARMACIST DOCUMENTED: ICD-10-PCS | Mod: CPTII,S$GLB,, | Performed by: INTERNAL MEDICINE

## 2023-06-12 PROCEDURE — 3008F BODY MASS INDEX DOCD: CPT | Mod: CPTII,S$GLB,, | Performed by: INTERNAL MEDICINE

## 2023-06-12 PROCEDURE — 3075F SYST BP GE 130 - 139MM HG: CPT | Mod: CPTII,S$GLB,, | Performed by: INTERNAL MEDICINE

## 2023-06-12 PROCEDURE — 3044F PR MOST RECENT HEMOGLOBIN A1C LEVEL <7.0%: ICD-10-PCS | Mod: CPTII,S$GLB,, | Performed by: INTERNAL MEDICINE

## 2023-06-12 PROCEDURE — 3008F PR BODY MASS INDEX (BMI) DOCUMENTED: ICD-10-PCS | Mod: CPTII,S$GLB,, | Performed by: INTERNAL MEDICINE

## 2023-06-12 PROCEDURE — 3288F FALL RISK ASSESSMENT DOCD: CPT | Mod: CPTII,S$GLB,, | Performed by: INTERNAL MEDICINE

## 2023-06-12 PROCEDURE — 1126F PR PAIN SEVERITY QUANTIFIED, NO PAIN PRESENT: ICD-10-PCS | Mod: CPTII,S$GLB,, | Performed by: INTERNAL MEDICINE

## 2023-06-12 PROCEDURE — 3288F PR FALLS RISK ASSESSMENT DOCUMENTED: ICD-10-PCS | Mod: CPTII,S$GLB,, | Performed by: INTERNAL MEDICINE

## 2023-06-12 PROCEDURE — 3079F DIAST BP 80-89 MM HG: CPT | Mod: CPTII,S$GLB,, | Performed by: INTERNAL MEDICINE

## 2023-06-12 PROCEDURE — 99214 OFFICE O/P EST MOD 30 MIN: CPT | Mod: S$GLB,,, | Performed by: INTERNAL MEDICINE

## 2023-06-12 PROCEDURE — 99999 PR PBB SHADOW E&M-EST. PATIENT-LVL III: ICD-10-PCS | Mod: PBBFAC,,, | Performed by: INTERNAL MEDICINE

## 2023-06-12 PROCEDURE — 3079F PR MOST RECENT DIASTOLIC BLOOD PRESSURE 80-89 MM HG: ICD-10-PCS | Mod: CPTII,S$GLB,, | Performed by: INTERNAL MEDICINE

## 2023-06-12 NOTE — PROGRESS NOTES
Subjective:   Patient ID:  Beth Souza is a 73 y.o. female who presents for follow-up of No chief complaint on file.  Pt, 68 yo, came in for one yr f/u.  PMH HTN, HLD and Vit D.   No chest pain, fainting except sinus, PND, orthopnea, syncope and claudication.   Palpitation and fluttering feeling at night. No dizziness, and faint  Does not exercise due to back pain.  C/o diffuse body sore and pain  Pt has bee not taking Lipitor for a long time.  No smoking/drinking.  EKG on 10-: NSR. nonspecifc ST T change.    echo normal EF, mild LAE and Holter normal         04/05/2023:   Overall patient doing well intermittent bradycardia will do a Holter monitor.  Intermittent chest pain with the stress test with stress echo as well.  Risk factors coronary disease including arterial calcification as well as prior smoking history hypertension prediabetes.  Patient intermittent cardiac risk cholesterol profiles been relatively good well controlled but she should have a little lower cholesterol and we discussed possibility of starting statin medications.  She is allergies to aspirin and can not take that        05/10/2023:   Overall patient doing well she is not using sleep apnea machine quite his normal issues should.  This may have something to do with low heart rates at night bradycardia to 42 heart rate upper 107 by monitor cardiac stress test showed no evidence cardiac ischemia the limited exercise overall doing well this time follow-up evaluation 4-5 months.    06/12/2023 patient doing well no recurrence symptoms stable she does not need to use her sleep apnea machine she has fatigued otherwise stable all testing is normal no acute changes.  Follow-up evaluation again in 6 months sooner if acute recurrence symptoms.  Patient states that she has very poor sleep studies and very poor sleep pattern.      Review of Systems   Constitutional: Negative for chills, diaphoresis, night sweats, weight gain and  weight loss.   HENT:  Negative for congestion, hoarse voice, sore throat and stridor.    Eyes:  Negative for double vision and pain.   Cardiovascular:  Negative for chest pain, claudication, cyanosis, dyspnea on exertion, irregular heartbeat, leg swelling, near-syncope, orthopnea, palpitations, paroxysmal nocturnal dyspnea and syncope.   Respiratory:  Negative for cough, hemoptysis, shortness of breath, sleep disturbances due to breathing, snoring, sputum production and wheezing.    Endocrine: Negative for cold intolerance, heat intolerance and polydipsia.   Hematologic/Lymphatic: Negative for bleeding problem. Does not bruise/bleed easily.   Skin:  Negative for color change, dry skin and rash.   Musculoskeletal:  Negative for joint swelling and muscle cramps.   Gastrointestinal:  Negative for bloating, abdominal pain, constipation, diarrhea, dysphagia, melena, nausea and vomiting.   Genitourinary:  Negative for flank pain and urgency.   Neurological:  Negative for dizziness, focal weakness, headaches, light-headedness, loss of balance, seizures and weakness.   Psychiatric/Behavioral:  Negative for altered mental status and memory loss. The patient is not nervous/anxious.    Family History   Problem Relation Age of Onset    Hypertension Mother     Diabetes Daughter     Melanoma Neg Hx     Psoriasis Neg Hx     Lupus Neg Hx     Eczema Neg Hx     Breast cancer Neg Hx     Colon cancer Neg Hx     Ovarian cancer Neg Hx     Thrombosis Neg Hx      Past Medical History:   Diagnosis Date    Cataract     Hyperlipidemia     Hypertension     Lumbar spondylosis     Meningioma 2021    Obesity     Sleep apnea     Vitamin D deficiency      Social History     Socioeconomic History    Marital status:     Number of children: 3   Occupational History    Occupation: Cleaning houses     Employer: not employed   Tobacco Use    Smoking status: Former     Types: Cigarettes     Quit date: 2004     Years since quittin.4     Smokeless tobacco: Never   Substance and Sexual Activity    Alcohol use: Yes     Comment: occasionally    Drug use: No    Sexual activity: Not Currently     Partners: Male   Other Topics Concern    Are you pregnant or think you may be? No    Breast-feeding No   Social History Narrative    Live w/ alone      Social Determinants of Health     Financial Resource Strain: Low Risk     Difficulty of Paying Living Expenses: Not hard at all   Food Insecurity: No Food Insecurity    Worried About Running Out of Food in the Last Year: Never true    Ran Out of Food in the Last Year: Never true   Transportation Needs: No Transportation Needs    Lack of Transportation (Medical): No    Lack of Transportation (Non-Medical): No   Physical Activity: Inactive    Days of Exercise per Week: 0 days    Minutes of Exercise per Session: 0 min   Stress: No Stress Concern Present    Feeling of Stress : Not at all   Social Connections: Moderately Isolated    Frequency of Communication with Friends and Family: More than three times a week    Frequency of Social Gatherings with Friends and Family: More than three times a week    Attends Congregational Services: More than 4 times per year    Active Member of Clubs or Organizations: No    Attends Club or Organization Meetings: Never    Marital Status:    Housing Stability: Low Risk     Unable to Pay for Housing in the Last Year: No    Number of Places Lived in the Last Year: 1    Unstable Housing in the Last Year: No     Current Outpatient Medications on File Prior to Visit   Medication Sig Dispense Refill    acetaminophen (TYLENOL) 500 MG tablet Take 500 mg by mouth daily as needed for Pain.      ergocalciferol (ERGOCALCIFEROL) 50,000 unit Cap Take 1 capsule by mouth once a week 4 capsule 3    estradioL (VAGIFEM) 10 mcg Tab Insert 1 tablet vaginally twice a week. Insert before bed. No intercourse on the night of insertion 30 tablet 1    lisinopriL-hydrochlorothiazide (PRINZIDE,ZESTORETIC) 20-25  mg Tab Take 1 tablet by mouth once daily. 90 tablet 1    meloxicam (MOBIC) 15 MG tablet Take 1 tablet (15 mg total) by mouth daily as needed for Pain. 30 tablet 2     No current facility-administered medications on file prior to visit.     Review of patient's allergies indicates:   Allergen Reactions    Aspirin Other (See Comments)     Bruising       Objective:     Physical Exam  Eyes:      Pupils: Pupils are equal, round, and reactive to light.   Neck:      Trachea: No tracheal deviation.   Cardiovascular:      Rate and Rhythm: Normal rate and regular rhythm.      Pulses: Intact distal pulses.           Carotid pulses are 2+ on the right side and 2+ on the left side.       Radial pulses are 2+ on the right side and 2+ on the left side.        Femoral pulses are 2+ on the right side and 2+ on the left side.       Popliteal pulses are 2+ on the right side and 2+ on the left side.        Dorsalis pedis pulses are 2+ on the right side and 2+ on the left side.        Posterior tibial pulses are 2+ on the right side and 2+ on the left side.      Heart sounds: Normal heart sounds. No murmur heard.    No friction rub. No gallop.   Pulmonary:      Effort: Pulmonary effort is normal. No respiratory distress.      Breath sounds: Normal breath sounds. No stridor. No wheezing or rales.   Chest:      Chest wall: No tenderness.   Abdominal:      General: There is no distension.      Tenderness: There is no abdominal tenderness. There is no rebound.   Musculoskeletal:         General: No tenderness.      Cervical back: Normal range of motion.   Skin:     General: Skin is warm and dry.   Neurological:      Mental Status: She is alert and oriented to person, place, and time.   Stress test 04/20/2023:   The test was stopped because the patient experienced shortness of breath. The patient requested the test to be stopped.  There were no arrhythmias during stress.  The left ventricle is normal in size with normal systolic  function.  The estimated ejection fraction is 60%.  Indeterminate left ventricular diastolic function.  Normal right ventricular size with normal right ventricular systolic function.  Normal central venous pressure (3 mmHg).  The estimated PA systolic pressure is 39 mmHg.  The stress echo portion of this study is negative for myocardial ischemia.  The patient's exercise capacity was moderately impaired.  Sensitivity is impaired due to the patient's failure to achieve target heart rate.  The ECG portion of this study is negative for myocardial ischemia.       Assessment:     1. Essential hypertension    2. Stable angina pectoris    3. Mixed hyperlipidemia    4. Bradycardia    5. LEN on CPAP    6. Calcification of aorta    7. Palpitations    8. Prediabetes        Plan:     Essential hypertension    Stable angina pectoris    Mixed hyperlipidemia    Bradycardia    LEN on CPAP    Calcification of aorta    Palpitations    Prediabetes      Impression 1 hypertension under good control today current medications   2. Mixed hyperlipidemia stable   3 sleep apnea needs to use her machine and re-evaluate the calibration otherwise stable follow-up evaluation 6 months sooner if acute recurrence symptoms patient needs to work on sleep study and sleep evaluation.

## 2023-06-14 ENCOUNTER — LAB VISIT (OUTPATIENT)
Dept: LAB | Facility: HOSPITAL | Age: 73
End: 2023-06-14
Attending: FAMILY MEDICINE
Payer: MEDICARE

## 2023-06-14 ENCOUNTER — OFFICE VISIT (OUTPATIENT)
Dept: INTERNAL MEDICINE | Facility: CLINIC | Age: 73
End: 2023-06-14
Payer: MEDICARE

## 2023-06-14 ENCOUNTER — PATIENT MESSAGE (OUTPATIENT)
Dept: PULMONOLOGY | Facility: CLINIC | Age: 73
End: 2023-06-14
Payer: MEDICARE

## 2023-06-14 VITALS
WEIGHT: 219.38 LBS | BODY MASS INDEX: 38.87 KG/M2 | SYSTOLIC BLOOD PRESSURE: 130 MMHG | DIASTOLIC BLOOD PRESSURE: 80 MMHG | RESPIRATION RATE: 18 BRPM | OXYGEN SATURATION: 98 % | HEIGHT: 63 IN | HEART RATE: 54 BPM

## 2023-06-14 DIAGNOSIS — I10 ESSENTIAL HYPERTENSION: ICD-10-CM

## 2023-06-14 DIAGNOSIS — Z79.899 OTHER LONG TERM (CURRENT) DRUG THERAPY: ICD-10-CM

## 2023-06-14 DIAGNOSIS — E66.01 SEVERE OBESITY WITH BODY MASS INDEX (BMI) OF 35.0 TO 39.9 WITH SERIOUS COMORBIDITY: ICD-10-CM

## 2023-06-14 DIAGNOSIS — Z86.39 HISTORY OF HYPERPARATHYROIDISM: ICD-10-CM

## 2023-06-14 DIAGNOSIS — G47.33 OSA ON CPAP: ICD-10-CM

## 2023-06-14 DIAGNOSIS — R53.82 CHRONIC FATIGUE: Primary | ICD-10-CM

## 2023-06-14 DIAGNOSIS — R53.82 CHRONIC FATIGUE: ICD-10-CM

## 2023-06-14 DIAGNOSIS — M47.26 OSTEOARTHRITIS OF SPINE WITH RADICULOPATHY, LUMBAR REGION: ICD-10-CM

## 2023-06-14 PROBLEM — E21.3 HYPERPARATHYROIDISM: Status: RESOLVED | Noted: 2023-06-14 | Resolved: 2023-06-14

## 2023-06-14 PROBLEM — E21.3 HYPERPARATHYROIDISM: Status: ACTIVE | Noted: 2023-06-14

## 2023-06-14 PROCEDURE — 3288F FALL RISK ASSESSMENT DOCD: CPT | Mod: CPTII,S$GLB,, | Performed by: FAMILY MEDICINE

## 2023-06-14 PROCEDURE — 3079F DIAST BP 80-89 MM HG: CPT | Mod: CPTII,S$GLB,, | Performed by: FAMILY MEDICINE

## 2023-06-14 PROCEDURE — 84443 ASSAY THYROID STIM HORMONE: CPT | Performed by: FAMILY MEDICINE

## 2023-06-14 PROCEDURE — 80061 LIPID PANEL: CPT | Performed by: FAMILY MEDICINE

## 2023-06-14 PROCEDURE — 99999 PR PBB SHADOW E&M-EST. PATIENT-LVL IV: CPT | Mod: PBBFAC,,, | Performed by: FAMILY MEDICINE

## 2023-06-14 PROCEDURE — 3008F BODY MASS INDEX DOCD: CPT | Mod: CPTII,S$GLB,, | Performed by: FAMILY MEDICINE

## 2023-06-14 PROCEDURE — 1101F PT FALLS ASSESS-DOCD LE1/YR: CPT | Mod: CPTII,S$GLB,, | Performed by: FAMILY MEDICINE

## 2023-06-14 PROCEDURE — 1160F RVW MEDS BY RX/DR IN RCRD: CPT | Mod: CPTII,S$GLB,, | Performed by: FAMILY MEDICINE

## 2023-06-14 PROCEDURE — 3075F SYST BP GE 130 - 139MM HG: CPT | Mod: CPTII,S$GLB,, | Performed by: FAMILY MEDICINE

## 2023-06-14 PROCEDURE — 3288F PR FALLS RISK ASSESSMENT DOCUMENTED: ICD-10-PCS | Mod: CPTII,S$GLB,, | Performed by: FAMILY MEDICINE

## 2023-06-14 PROCEDURE — 80053 COMPREHEN METABOLIC PANEL: CPT | Performed by: FAMILY MEDICINE

## 2023-06-14 PROCEDURE — 3044F HG A1C LEVEL LT 7.0%: CPT | Mod: CPTII,S$GLB,, | Performed by: FAMILY MEDICINE

## 2023-06-14 PROCEDURE — 83036 HEMOGLOBIN GLYCOSYLATED A1C: CPT | Performed by: FAMILY MEDICINE

## 2023-06-14 PROCEDURE — 99214 PR OFFICE/OUTPT VISIT, EST, LEVL IV, 30-39 MIN: ICD-10-PCS | Mod: S$GLB,,, | Performed by: FAMILY MEDICINE

## 2023-06-14 PROCEDURE — 36415 COLL VENOUS BLD VENIPUNCTURE: CPT | Performed by: FAMILY MEDICINE

## 2023-06-14 PROCEDURE — 1101F PR PT FALLS ASSESS DOC 0-1 FALLS W/OUT INJ PAST YR: ICD-10-PCS | Mod: CPTII,S$GLB,, | Performed by: FAMILY MEDICINE

## 2023-06-14 PROCEDURE — 99214 OFFICE O/P EST MOD 30 MIN: CPT | Mod: S$GLB,,, | Performed by: FAMILY MEDICINE

## 2023-06-14 PROCEDURE — 1159F PR MEDICATION LIST DOCUMENTED IN MEDICAL RECORD: ICD-10-PCS | Mod: CPTII,S$GLB,, | Performed by: FAMILY MEDICINE

## 2023-06-14 PROCEDURE — 3079F PR MOST RECENT DIASTOLIC BLOOD PRESSURE 80-89 MM HG: ICD-10-PCS | Mod: CPTII,S$GLB,, | Performed by: FAMILY MEDICINE

## 2023-06-14 PROCEDURE — 1160F PR REVIEW ALL MEDS BY PRESCRIBER/CLIN PHARMACIST DOCUMENTED: ICD-10-PCS | Mod: CPTII,S$GLB,, | Performed by: FAMILY MEDICINE

## 2023-06-14 PROCEDURE — 99999 PR PBB SHADOW E&M-EST. PATIENT-LVL IV: ICD-10-PCS | Mod: PBBFAC,,, | Performed by: FAMILY MEDICINE

## 2023-06-14 PROCEDURE — 3044F PR MOST RECENT HEMOGLOBIN A1C LEVEL <7.0%: ICD-10-PCS | Mod: CPTII,S$GLB,, | Performed by: FAMILY MEDICINE

## 2023-06-14 PROCEDURE — 3008F PR BODY MASS INDEX (BMI) DOCUMENTED: ICD-10-PCS | Mod: CPTII,S$GLB,, | Performed by: FAMILY MEDICINE

## 2023-06-14 PROCEDURE — 4010F PR ACE/ARB THEARPY RXD/TAKEN: ICD-10-PCS | Mod: CPTII,S$GLB,, | Performed by: FAMILY MEDICINE

## 2023-06-14 PROCEDURE — 4010F ACE/ARB THERAPY RXD/TAKEN: CPT | Mod: CPTII,S$GLB,, | Performed by: FAMILY MEDICINE

## 2023-06-14 PROCEDURE — 1159F MED LIST DOCD IN RCRD: CPT | Mod: CPTII,S$GLB,, | Performed by: FAMILY MEDICINE

## 2023-06-14 PROCEDURE — 3075F PR MOST RECENT SYSTOLIC BLOOD PRESS GE 130-139MM HG: ICD-10-PCS | Mod: CPTII,S$GLB,, | Performed by: FAMILY MEDICINE

## 2023-06-14 NOTE — PROGRESS NOTES
"Subjective:       Patient ID: Beth Souza is a 73 y.o. female.    Chief Complaint: Insomnia and Throat Issue    73-year-old  female patient with Patient Active Problem List:     Essential hypertension     Vitamin D deficiency     Mixed hyperlipidemia     Cataract     Osteoarthritis of spine with radiculopathy, lumbar region     Severe obesity with body mass index (BMI) of 35.0 to 39.9 with serious comorbidity     LEN on CPAP     Mild cognitive impairment with memory loss     Meningioma of cerebellum     Multiple thyroid nodules     History of COVID-19     Primary osteoarthritis of both hips     Calcification of aorta     History of hyperparathyroidism     Prediabetes     Lumbar radiculopathy     Major neurocognitive disorder due to another medical condition  Here for chronic fatigue-patient has been sleeping more than usual has not been using CPAP machine regularly  Reports minimal discomfort in the throat      Review of Systems   Constitutional:  Positive for fatigue.   HENT:  Positive for sore throat.    Eyes:  Negative for visual disturbance.   Respiratory:  Negative for shortness of breath.    Cardiovascular:  Negative for chest pain and leg swelling.   Gastrointestinal:  Negative for abdominal pain, nausea and vomiting.   Musculoskeletal:  Negative for myalgias.   Skin:  Negative for rash.   Neurological:  Negative for light-headedness and headaches.   Psychiatric/Behavioral:  Positive for sleep disturbance.        /80 (BP Location: Right arm, Patient Position: Sitting, BP Method: Large (Manual))   Pulse (!) 54   Resp 18   Ht 5' 3" (1.6 m)   Wt 99.5 kg (219 lb 5.7 oz)   SpO2 98%   BMI 38.86 kg/m²   Objective:      Physical Exam  Constitutional:       Appearance: She is well-developed.   HENT:      Head: Normocephalic and atraumatic.      Mouth/Throat:      Mouth: Mucous membranes are moist.      Pharynx: No oropharyngeal exudate or posterior oropharyngeal erythema. "   Cardiovascular:      Rate and Rhythm: Normal rate and regular rhythm.      Heart sounds: Normal heart sounds. No murmur heard.  Pulmonary:      Effort: Pulmonary effort is normal.      Breath sounds: Normal breath sounds. No wheezing.   Abdominal:      General: Bowel sounds are normal.      Palpations: Abdomen is soft.      Tenderness: There is no abdominal tenderness.   Lymphadenopathy:      Cervical: No cervical adenopathy.   Skin:     General: Skin is warm and dry.      Findings: No rash.   Neurological:      Mental Status: She is alert and oriented to person, place, and time.   Psychiatric:         Mood and Affect: Mood normal.         Assessment/Plan:   1. Chronic fatigue  -     Comprehensive Metabolic Panel; Future; Expected date: 06/14/2023  -     Lipid Panel; Future; Expected date: 06/14/2023  -     TSH; Future; Expected date: 06/14/2023  -     Hemoglobin A1C; Future; Expected date: 06/14/2023  Will check further labs  Encouraged to eat protein rich diet    2. LEN on CPAP  Overview:  Home Sleep Studies 3 night 08/20/2018, 08/22/2018, 08/23/2018 Mild obstructive sleep apnea-hypopnea syndrome. Apnea-hypopnea index: AHI: 11.7 events per hour. Total events 80.   Autopap 4-16cm H2O with ODME.   Dreamwear nasal mask  HME: [facility]     Orders:  -     Ambulatory referral/consult to Pulmonology; Future; Expected date: 06/21/2023  Patient to see pulmonologist for sleep apnea evaluation    3. Essential hypertension  -     Comprehensive Metabolic Panel; Future; Expected date: 06/14/2023  -     Lipid Panel; Future; Expected date: 06/14/2023  -     TSH; Future; Expected date: 06/14/2023  Blood pressure is stable currently on lisinopril hydrochlorothiazide 20/25 mg daily    4. Osteoarthritis of spine with radiculopathy, lumbar region  Graded exercise regimen recommended    5. Severe obesity with body mass index (BMI) of 35.0 to 39.9 with serious comorbidity  Lifestyle modifications recommended to lose weight with BMI  38    6. Other long term (current) drug therapy  -     Hemoglobin A1C; Future; Expected date: 06/14/2023    7. History of hyperparathyroidism

## 2023-06-15 LAB
ALBUMIN SERPL BCP-MCNC: 3.5 G/DL (ref 3.5–5.2)
ALP SERPL-CCNC: 86 U/L (ref 55–135)
ALT SERPL W/O P-5'-P-CCNC: 15 U/L (ref 10–44)
ANION GAP SERPL CALC-SCNC: 9 MMOL/L (ref 8–16)
AST SERPL-CCNC: 8 U/L (ref 10–40)
BILIRUB SERPL-MCNC: 0.4 MG/DL (ref 0.1–1)
BUN SERPL-MCNC: 16 MG/DL (ref 8–23)
CALCIUM SERPL-MCNC: 9.3 MG/DL (ref 8.7–10.5)
CHLORIDE SERPL-SCNC: 104 MMOL/L (ref 95–110)
CHOLEST SERPL-MCNC: 198 MG/DL (ref 120–199)
CHOLEST/HDLC SERPL: 3.7 {RATIO} (ref 2–5)
CO2 SERPL-SCNC: 30 MMOL/L (ref 23–29)
CREAT SERPL-MCNC: 0.8 MG/DL (ref 0.5–1.4)
EST. GFR  (NO RACE VARIABLE): >60 ML/MIN/1.73 M^2
ESTIMATED AVG GLUCOSE: 117 MG/DL (ref 68–131)
GLUCOSE SERPL-MCNC: 92 MG/DL (ref 70–110)
HBA1C MFR BLD: 5.7 % (ref 4–5.6)
HDLC SERPL-MCNC: 54 MG/DL (ref 40–75)
HDLC SERPL: 27.3 % (ref 20–50)
LDLC SERPL CALC-MCNC: 126 MG/DL (ref 63–159)
NONHDLC SERPL-MCNC: 144 MG/DL
POTASSIUM SERPL-SCNC: 4.3 MMOL/L (ref 3.5–5.1)
PROT SERPL-MCNC: 6.5 G/DL (ref 6–8.4)
SODIUM SERPL-SCNC: 143 MMOL/L (ref 136–145)
TRIGL SERPL-MCNC: 90 MG/DL (ref 30–150)
TSH SERPL DL<=0.005 MIU/L-ACNC: 0.79 UIU/ML (ref 0.4–4)

## 2023-06-15 NOTE — PROGRESS NOTES
Interventional Pain Established Patient Clinic Visit    Chief Pain Complaint:  Leg Pain (LEFT )      Interval history 06/20/2023  Patient presents s/p bilateral L5/S1 transforaminal epidural steroid injection 03/21/2023.  Patient reports 100% sustained relief in right lower extremity radicular pain and 70% improvement in left-sided radicular pain following her epidural steroid injection.  She reports insidious exacerbation of left-sided radicular pain.  Pain is intermittent and today is rated a 6/10.  Patient describes pain as soreness in nature along the lateral and posterior aspect of the left lower extremity to the calf in L4-5 distribution.  Patient also reports swelling, tenderness and aching in bilateral ankles.  Patient reports she did not hear from physical therapy for scheduling land-based physical therapy.  Today she denies significant lower extremity weakness, bowel or bladder incontinence or saddle anesthesia.    Today she also reports aching in the neck and bilateral shoulders.  Patient reports pain in the occiput which radiates into bilateral trapezius distribution she 4-6 dermatomal distribution.  She denies more distal radiculopathy into the upper extremities or hands.  She does report exhaustion or weakness in the upper extremities associated with use.  She denies compromise in hand  strength or dexterity.    Interval Hx: 3/6/23  Ms. Souza is a 72-year-old lady with past medical history significant for cataracts, hypertension, hyperlipidemia, history of COVID-19, morbid obesity, prediabetes, history of cerebellar meningioma, obstructive sleep apnea on CPAP who presents to establish care, previous Dr. Swanson patient.    Today patient presents for lower back and leg pain.  Patient reports pain is intermittent and today is rated a 5/10.  Patient reports pain in the lower back which radiates into the buttock and down the posterior aspects of bilateral lower extremities in L5-S2 distribution to the  "feet.  Pain is exacerbated with standing or walking, even exceeding 5-10 minutes.  Patient reports when she has her Rollator, she is able to walk further distances.  Patient does endorse associated weakness in the lower extremities associated with her pain.  Pain is more severe on the right than on the left.  Patient reports completing 1 session of physical therapy 1 week prior with pain extremely severe in the lower extremities which was debilitating.  She reports secondary to this she can not continue physical therapy at this time.    Of note patient saw Dr. Gautam 02/28/2023 with new MRI ordered to evaluate severe stenosis and spondylolisthesis L4-5 and L5-S1 disc herniation and short prescription of Percocet provided.      Patient reports significant motor weakness and loss of sensations.  Patient denies night fever/night sweats, urinary incontinence, bowel incontinence, and significant weight loss.      Pain Disability Index Review:     Last 3 PDI Scores 6/20/2023 10/4/2018 6/19/2017   Pain Disability Index (PDI) 36 5 14       Non-Pharmacologic Treatments:  Physical Therapy/Home Exercise: yes  Ice/Heat:yes  TENS: no  Acupuncture: no  Massage: no  Chiropractic: no    Other: no      Pain Medications:  - Opioids: Percocet (Oxycodone/Acetaminophen)  - Adjuvant Medications: Neurontin (Gabapentin) and Tylenol (Acetaminophen)    Pain injections:  -03/21/2023: Bilateral L5-S1 transforaminal epidural steroid injection    Interval History (11/8/2022):  Beth Souza presents today for follow-up visit.  Patient was last seen on 4/29/21 (1.5 years ago). At that visit, the plan was to start topamax for sciatica. Patient was referred back to our clinic by Alyssia Ventura PA (Orthopedics). Patient reports pain as "0/10 today. She reports pain has been bad x about a month. She went to the ER because the pain was so severe. She wants to know when it will return and what to do when it returns. She got no relief with " medrol dose misa or gabapentin.      Orthopedics note: Beth Souza presents to me today with chief complaint of left low back/hip pain radiating left knee pain.  I do believe she is presently in seeing exacerbation of sciatica.  She has not had any relief with dexamethasone injection, Medrol Dosepak, gabapentin.  She is not a candidate for NSAIDs secondary to aspirin allergy.  She will continue Tylenol up to 650 mg 3 times a day as needed and voltaren gel.  Additionally I would like the patient to try a short course of Lyrica to see if this would provide any additional nerve relief.  I would also like to get a referral and for her to see her back in spine clinic as she states has been quite awhile since she has seen anyone for her back. I also want to obtain bilateral hip xrays today for further evaluation.              Interval History (4/29/2021): Patient was last seen on 2/8/2021. She is here today for low back pain with bilateral leg pain (R>L).  Patient reports pain as 10/10 today.  She recently saw PCP who started her on Topamax with titration schedule. She reports pain worse with walking and in the morning.     History of Present Illness (2019):   Beth Souza is a 68 y.o. female  who is presenting with a chief complaint of Back Pain (radiating down bilateral legs). The patient began experiencing this problem insidiously, and the pain has been gradually worsening. The pain is described as throbbing, shooting, cramping, burning, aching and electrical and is located in the bilateral lumbar spine . Pain is intermittent and lasts hours. The pain radiates to bilateral lower extremities, L>R. Pain is 40% axial and 60% radicular. The patient rates her pain a 8 out of ten and interferes with activities of daily living a 8 out of ten. Pain is exacerbated by ambulation, and is improved by rest. Patient reports no prior trauma, no prior spinal surgery     - pertinent negatives: No fever, No chills, No  weight loss, No bladder dysfunction, No bowel dysfunction, No saddle anesthesia  - pertinent positives: none    - medications, other therapies tried (physical therapy, injections):     >> NSAIDs, Tylenol, gabapentin, zanaflex and flexeril    >> Has previously undergone Physical Therapy    >> Has previously undergone spinal injection/s   - Lumbar LAWSON with relief for less than 1 week - at external facility      Imaging / Labs / Studies (reviewed on 6/20/2023):    10/31/22 X-Ray Hips Bilateral 2 View Incl AP Pelvis  COMPARISON:  10/19/2020  FINDINGS:  Mild joint space narrowing is in both hips with femoral head neck junction osteophyte formation, similar to the prior exam.  There is sclerosis about both sacroiliac joints and at the pubic symphysis as before.  Numerous calcific densities overlying the lower abdomen and pelvis likely reflect phleboliths.  No acute fracture, dislocation or osteonecrosis.    MRI lumbar spine 12/02/2021  FINDINGS:  There is a couple mm retrolisthesis of L5 on S1.  There is grade 1 spondylolisthesis of L4 on L5. The vertebral body heights are well maintained, with no fracture.  No marrow signal abnormality suspicious for an infiltrative process.  Modic type 1 discogenic endplate changes seen at the L5-S1 level.     The conus medullaris terminates at approximately the inferior endplate of L2.  The adjacent soft tissue structures show no significant abnormalities.  There is disc desiccation seen throughout the lumbar spine with at least mild disc space narrowing seen at L4-5 and more mild-to-moderate disc space narrowing seen at L5-S1.     L1-L2: No significant central canal or neural foraminal narrowing.     L2-L3: No significant central canal or neural foraminal narrowing.Mild-to-moderate bilateral facet arthropathy noted.     L3-L4: Moderate to severe bilateral facet arthropathy resulting in mild narrowing of the central canal.  The bilateral neural foraminal canals are mildly narrowed.    "  L4-L5:  Severe bilateral facet arthropathy along with grade 1 spondylolisthesis and ligamentum flavum hypertrophy resulting in severe narrowing of the central canal.  The bilateral neural foraminal canals are moderately to severely narrowed.     L5-S1:  Mild diffuse disc bulge with the superimposed right paracentral disc extrusion which appears to efface the intracanalicular portion of the right S1 nerve root.  No significant central canal narrowing otherwise noted.  The bilateral neural foraminal canals are severely narrowed.       Review of Systems:  CONSTITUTIONAL: patient denies any fever, chills, or weight loss  SKIN: patient denies any rash or itching  RESPIRATORY: patient denies having any shortness of breath  GASTROINTESTINAL: patient denies having any diarrhea, constipation, or bowel incontinence  GENITOURINARY: patient denies having any abnormal bladder function    MUSCULOSKELETAL:  - patient complains of the above noted pain/s (see chief pain complaint)    NEUROLOGICAL:   - pain as above  - strength in Lower extremities is intact, BILATERALLY  - sensation in Lower extremities is intact, BILATERALLY  - patient denies any loss of bowel or bladder control      PSYCHIATRIC: patient denies any change in mood    Other:  All other systems reviewed and are negative      Physical Exam:  Vitals:    06/20/23 1250   Resp: 17   Weight: 99 kg (218 lb 4.1 oz)   Height: 5' 3" (1.6 m)   PainSc:   6        Body mass index is 38.66 kg/m².   (reviewed on 6/20/2023)    General: alert and oriented, in no apparent distress. Obese.   Gait: antalgic gait.  Skin: no rashes, no discoloration, no obvious lesions  HEENT: normocephalic, atraumatic. Pupils equal and round.  Respiratory: without use of accessory muscles of respiration.    Musculoskeletal - Lumbar Spine:  - ROM fairly preserved   - Pain on flexion of lumbar spine: Present  - Pain on extension of lumbar spine: Present   - Lumbar facet loading: Present   - TTP over the " lumbar facet joints: Present Bilaterally, worse at L5-S1    - TTP over the lumbar paraspinals: Present   - TTP over the SI joints: Absent  - Straight Leg Raise: Positive, R>L    - YURI/ Tung's: Negative    Neuro - Lower Extremities:  - RLE Strength:     >> 5/5 strength with right hip flexion/ extension    >> 5/5 strength with right knee flexion/ extension    >> 5/5 strength in right ankle with dorsiflexion    >> 5/5 strength in right ankle with plantar flexion  - LLE Strength:     >> 5/5 strength with left hip flexion/ extension    >> 5/5 strength with knee flexion extension on the left     >> 5/5 strength in left ankle with dorsiflexion    >> 5/5 strength in left ankle with plantar flexion  - Extremity Reflexes: Brisk and symmetric throughout  - Sensory: Sensation to light touch intact bilaterally      Psych:  Mood and affect is appropriate      Assessment:  Beth Souza is a 73 y.o. year old female who is presenting with       ICD-10-CM ICD-9-CM    1. Lumbar radiculopathy  M54.16 724.4       2. DDD (degenerative disc disease), lumbar  M51.36 722.52       3. Neck pain, chronic  M54.2 723.1 Ambulatory referral/consult to Physical/Occupational Therapy    G89.29 338.29 X-Ray Cervical Spine 5 View W Flex Extxt            Plan:  1. Interventional:  Patient has 100% resolution of right lower extremity radicular symptoms and interval worsening of left lower extremity radicular symptoms following bilateral L5-S1 transforaminal epidural steroid injection, 3 months prior.  We discussed repeating this procedure, however patient would like to conservatively start with physical therapy.  We discussed the procedure, benefits, potential risk and alternative options in detail.     Anticoagulation:  None, no anticoagulation    2. Pharmacologic:         -  We have discussed continuing judicious use of Tylenol, not to exceed 3000 mg daily to avoid acute hepatotoxicity.      3. Rehabilitative:  -We discussed RE-initiating  physical therapy after interventional treatment to help manage the patient/s painful condition. The patient was counseled that muscle strengthening will improve the long term prognosis in regards to pain and may also help increase range of motion and mobility. They were told that one of the goals of physical therapy is that they learn how to do the exercises so that they can do them independently at home daily upon completion. Referral placedrr for physical therapy.    4. Diagnostic:  Relevant imaging reviewed in patient's questions answered.  Cervical x-ray to better evaluate pain    5. Consult:Neurosurgery PRN    6. Follow-up:   3 months      The above plan and management options were discussed at length with patient. Patient is in agreement with the above and verbalized understanding.    - I discussed the goals of interventional chronic pain management with the patient on today's visit. We discussed a multimodal and systematic approach to pain.  This includes diagnostic and therapeutic injections, adjuvant pharmacologic treatment, physical therapy, and at times psychiatry.  I emphasized the importance of regular exercise, core strengthening and stretching, diet and weight loss as a cornerstone of long-term pain management.    - This condition does not require this patient to take time off of work, and the primary goal of our Pain Management services is to improve the patient's functional capacity.  - Patient Questions: Answered all of the patient's questions regarding diagnoses, therapy, treatment and next steps        Kush Jha MD    Disclaimer:  This note was prepared using voice recognition system and is likely to have sound alike errors that may have been overlooked even after proof reading.  Please call me with any questions.

## 2023-06-20 ENCOUNTER — OFFICE VISIT (OUTPATIENT)
Dept: PAIN MEDICINE | Facility: CLINIC | Age: 73
End: 2023-06-20
Attending: FAMILY MEDICINE
Payer: MEDICARE

## 2023-06-20 ENCOUNTER — HOSPITAL ENCOUNTER (OUTPATIENT)
Dept: RADIOLOGY | Facility: HOSPITAL | Age: 73
Discharge: HOME | End: 2023-06-20
Attending: ANESTHESIOLOGY
Payer: MEDICARE

## 2023-06-20 VITALS — BODY MASS INDEX: 38.67 KG/M2 | WEIGHT: 218.25 LBS | RESPIRATION RATE: 17 BRPM | HEIGHT: 63 IN

## 2023-06-20 DIAGNOSIS — G89.29 NECK PAIN, CHRONIC: ICD-10-CM

## 2023-06-20 DIAGNOSIS — M54.16 LUMBAR RADICULOPATHY: Primary | ICD-10-CM

## 2023-06-20 DIAGNOSIS — M54.2 NECK PAIN, CHRONIC: ICD-10-CM

## 2023-06-20 DIAGNOSIS — M51.36 DDD (DEGENERATIVE DISC DISEASE), LUMBAR: ICD-10-CM

## 2023-06-20 PROCEDURE — 3288F FALL RISK ASSESSMENT DOCD: CPT | Mod: CPTII,S$GLB,, | Performed by: ANESTHESIOLOGY

## 2023-06-20 PROCEDURE — 3008F PR BODY MASS INDEX (BMI) DOCUMENTED: ICD-10-PCS | Mod: CPTII,S$GLB,, | Performed by: ANESTHESIOLOGY

## 2023-06-20 PROCEDURE — 1101F PR PT FALLS ASSESS DOC 0-1 FALLS W/OUT INJ PAST YR: ICD-10-PCS | Mod: CPTII,S$GLB,, | Performed by: ANESTHESIOLOGY

## 2023-06-20 PROCEDURE — 99999 PR PBB SHADOW E&M-EST. PATIENT-LVL III: CPT | Mod: PBBFAC,,, | Performed by: ANESTHESIOLOGY

## 2023-06-20 PROCEDURE — 99214 PR OFFICE/OUTPT VISIT, EST, LEVL IV, 30-39 MIN: ICD-10-PCS | Mod: S$GLB,,, | Performed by: ANESTHESIOLOGY

## 2023-06-20 PROCEDURE — 3008F BODY MASS INDEX DOCD: CPT | Mod: CPTII,S$GLB,, | Performed by: ANESTHESIOLOGY

## 2023-06-20 PROCEDURE — 3044F PR MOST RECENT HEMOGLOBIN A1C LEVEL <7.0%: ICD-10-PCS | Mod: CPTII,S$GLB,, | Performed by: ANESTHESIOLOGY

## 2023-06-20 PROCEDURE — 1159F MED LIST DOCD IN RCRD: CPT | Mod: CPTII,S$GLB,, | Performed by: ANESTHESIOLOGY

## 2023-06-20 PROCEDURE — 3044F HG A1C LEVEL LT 7.0%: CPT | Mod: CPTII,S$GLB,, | Performed by: ANESTHESIOLOGY

## 2023-06-20 PROCEDURE — 99214 OFFICE O/P EST MOD 30 MIN: CPT | Mod: S$GLB,,, | Performed by: ANESTHESIOLOGY

## 2023-06-20 PROCEDURE — 4010F ACE/ARB THERAPY RXD/TAKEN: CPT | Mod: CPTII,S$GLB,, | Performed by: ANESTHESIOLOGY

## 2023-06-20 PROCEDURE — 72052 X-RAY EXAM NECK SPINE 6/>VWS: CPT | Mod: TC

## 2023-06-20 PROCEDURE — 1101F PT FALLS ASSESS-DOCD LE1/YR: CPT | Mod: CPTII,S$GLB,, | Performed by: ANESTHESIOLOGY

## 2023-06-20 PROCEDURE — 4010F PR ACE/ARB THEARPY RXD/TAKEN: ICD-10-PCS | Mod: CPTII,S$GLB,, | Performed by: ANESTHESIOLOGY

## 2023-06-20 PROCEDURE — 3288F PR FALLS RISK ASSESSMENT DOCUMENTED: ICD-10-PCS | Mod: CPTII,S$GLB,, | Performed by: ANESTHESIOLOGY

## 2023-06-20 PROCEDURE — 72052 X-RAY EXAM NECK SPINE 6/>VWS: CPT | Mod: 26,,, | Performed by: RADIOLOGY

## 2023-06-20 PROCEDURE — 1125F AMNT PAIN NOTED PAIN PRSNT: CPT | Mod: CPTII,S$GLB,, | Performed by: ANESTHESIOLOGY

## 2023-06-20 PROCEDURE — 1159F PR MEDICATION LIST DOCUMENTED IN MEDICAL RECORD: ICD-10-PCS | Mod: CPTII,S$GLB,, | Performed by: ANESTHESIOLOGY

## 2023-06-20 PROCEDURE — 1125F PR PAIN SEVERITY QUANTIFIED, PAIN PRESENT: ICD-10-PCS | Mod: CPTII,S$GLB,, | Performed by: ANESTHESIOLOGY

## 2023-06-20 PROCEDURE — 99999 PR PBB SHADOW E&M-EST. PATIENT-LVL III: ICD-10-PCS | Mod: PBBFAC,,, | Performed by: ANESTHESIOLOGY

## 2023-06-20 PROCEDURE — 72052 XR CERVICAL SPINE 5 VIEW WITH FLEX AND EXT: ICD-10-PCS | Mod: 26,,, | Performed by: RADIOLOGY

## 2023-06-22 ENCOUNTER — TELEPHONE (OUTPATIENT)
Dept: PULMONOLOGY | Facility: CLINIC | Age: 73
End: 2023-06-22
Payer: MEDICARE

## 2023-06-22 ENCOUNTER — TELEPHONE (OUTPATIENT)
Dept: INTERNAL MEDICINE | Facility: CLINIC | Age: 73
End: 2023-06-22
Payer: MEDICARE

## 2023-06-22 ENCOUNTER — TELEPHONE (OUTPATIENT)
Dept: PAIN MEDICINE | Facility: CLINIC | Age: 73
End: 2023-06-22
Payer: MEDICARE

## 2023-06-22 ENCOUNTER — CLINICAL SUPPORT (OUTPATIENT)
Dept: REHABILITATION | Facility: HOSPITAL | Age: 73
End: 2023-06-22
Attending: ANESTHESIOLOGY
Payer: MEDICARE

## 2023-06-22 DIAGNOSIS — G89.29 NECK PAIN, CHRONIC: ICD-10-CM

## 2023-06-22 DIAGNOSIS — R53.1 WEAKNESS: ICD-10-CM

## 2023-06-22 DIAGNOSIS — M54.2 NECK PAIN, CHRONIC: ICD-10-CM

## 2023-06-22 PROCEDURE — 97140 MANUAL THERAPY 1/> REGIONS: CPT | Mod: PN

## 2023-06-22 PROCEDURE — 97161 PT EVAL LOW COMPLEX 20 MIN: CPT | Mod: PN

## 2023-06-22 PROCEDURE — 97530 THERAPEUTIC ACTIVITIES: CPT | Mod: PN

## 2023-06-22 PROCEDURE — 97110 THERAPEUTIC EXERCISES: CPT | Mod: PN

## 2023-06-22 NOTE — TELEPHONE ENCOUNTER
Left voicemail for pt to call [facility] left phone number with her ticket number that they will need to trouble shoot cpap machine so they can document that it needs to be replaced.

## 2023-06-22 NOTE — TELEPHONE ENCOUNTER
----- Message from Talon Dhillon sent at 6/22/2023  8:56 AM CDT -----  Contact: Beth Franco is requesting a call back from the nurse regarding yesterdays visit, reports forgetting to mention some things and would like to further discuss at [phone]

## 2023-06-22 NOTE — TELEPHONE ENCOUNTER
----- Message from Floridalma Maldonado sent at 6/22/2023  9:23 AM CDT -----  Contact: self [phone]  Patient called in this morning to give the name of the medication she is taking ATORVASTATIAN 20MG. She states she forgot to call earlier. Please call back [phone]. Thanks tierra

## 2023-06-22 NOTE — TELEPHONE ENCOUNTER
Reach out to pt to inform her that  would like to review her Cervical x-ray first and they will discuss her right side pain at the upcoming visit.       Kelechi Cisneros   Medical Assistant

## 2023-06-22 NOTE — TELEPHONE ENCOUNTER
Reach out to pt from the call backs.   Pt report that she is having pain on the right side of her back under her arm. Pt is requesting for an x-ray.

## 2023-06-22 NOTE — PLAN OF CARE
"[facility]   Physical Therapy Initial Evaluation     Date: 6/22/2023   Name: Beth Souza  Clinic Number: [id number]    Therapy Diagnosis:   Encounter Diagnoses   Name Primary?    Neck pain, chronic     Weakness      Physician: Kush Jha MD    Physician Orders: PT Eval and Treat  Medical Diagnosis from Referral: M54.2,G89.29 (ICD-10-CM) - Neck pain, chronic  Evaluation Date: 6/22/2023  Authorization Period Expiration: 8/17/23  Plan of Care Expiration: 8/3/23  Progress Note Due: Every 6th visit  Visit # / Visits authorized: 1/1   FOTO: 1/3    Precautions: Standard     Time In: 1:45 PM  Time Out: 2:37 PM  Total Appointment Time (timed & untimed codes): 52 minutes      SUBJECTIVE     Date of onset: Earlier this year     History of current condition - Beth reports: Pt reports neck soreness on bilateral sides. Pt reports symptoms worse with twisting. Pt reports difficulty with turning to drive. Laying down at night worsens symptoms. Pt reports relief of symptoms with a hot bath. Pt reports difficulty falling asleep at night. Pt utilizes a sleep apnea machine and has had difficulty using it this year. Pt reports symptoms started earlier this year. Pt denies DENNYS.     Falls: 0    Imaging: X-ray 6/20/23, "The vertebral bodies demonstrate a normal height.  There is a couple mm of retrolisthesis of C3 on C4 and C4 on C5.  Mild disc space narrowing and spondylosis seen the C3-4 through C6-7 levels.  No subluxation or change in the degree of listhesis seen on the flexion or extension views.  Mild osseous encroachment seen on the C3-4 neural foraminal canal on the left secondary to uncovertebral joint hypertrophy."    Prior Therapy: None for this incidence  Social History: Lives alone  Occupation: Sitter  Prior Level of Function: Independent without difficulty  Current Level of Function: Modified ADLs    Pain:  Current 9/10, worst 10/10, best 5/10   Location: B shoulder and " neck  Description: Dull aching  Aggravating Factors: Head turning, movement  Easing Factors: Rest    Patients goals: Decrease shoulder and neck pain/soreness.      Medical History:   Past Medical History:   Diagnosis Date    Cataract     Hyperlipidemia     Hypertension     Lumbar spondylosis     Meningioma 2/18/2021    Obesity     Sleep apnea     Vitamin D deficiency        Surgical History:   Beth Souza  has a past surgical history that includes Partial hysterectomy; Hysterectomy; and Selective injection of anesthetic agent around lumbar spinal nerve root by transforaminal approach (Bilateral, 3/21/2023).    Medications:   Beth has a current medication list which includes the following prescription(s): acetaminophen, ergocalciferol, estradiol, and lisinopril-hydrochlorothiazide.    Allergies:   Review of patient's allergies indicates:   Allergen Reactions    Aspirin Other (See Comments)     Bruising      OBJECTIVE     (NT = not tested due to pain and/or inability to obtain test position)    RANGE OF MOTION:    Cervical  Range of Motion Right   (spine) Left    Function & Pain Goal   Cervical Flexion (60º) 50  []Functional  [x]Dysfunctional  [x]Painful  []Non-Painful Nonpainful   Cervical Extension (90º) 40  []Functional  [x]Dysfunctional  [x]Painful  []Non-Painful 45º  Functional   Nonpainful   Cervical Side Bending (45º) 20 25 []Functional  [x]Dysfunctional  [x]Painful  []Non-Painful 45º  Functional   Nonpainful   Cervical Rotation (75º) WNL WNL []Functional  [x]Dysfunctional  [x]Painful  []Non-Painful Functional   Nonpainful     Shoulder   Range of Motion Right  Active     Passive Left  Active     Passive Goal   Forward Flexion (180º) WNL* - WNL* - 170º   Abduction (180) WNL* - WNL* - 170º   (*) pain and/or dysfunction    STRENGTH:    Upper Extremity  Strength RIGHT   Goal   Shoulder Flexion []1  []2  []3  [x]4  []5                []+ []- 5/5 B   Shoulder Abduction []1  []2  []3  [x]4  []5                 []+ []- 5/5 B   Shoulder Internal Rotation  []1  []2  []3  [x]4  []5                []+ []- 5/5 B   Shoulder External Rotation []1  []2  []3  [x]4  []5                []+ []- 5/5 B   Elbow Flexion  []1  []2  []3  [x]4  []5                []+ []- 5/5 B   Elbow Extension []1  []2  []3  [x]4  []5                []+ []- 5/5 B     Upper Extremity  Strength LEFT   Goal   Shoulder Flexion []1  []2  []3  [x]4  []5                []+ []- 5/5 B   Shoulder Abduction []1  []2  []3  [x]4  []5                []+ []- 5/5 B   Shoulder Internal Rotation  []1  []2  []3  [x]4  []5                []+ []- 5/5 B   Shoulder External Rotation []1  []2  []3  [x]4  []5                []+ []- 5/5 B   Elbow Flexion  []1  []2  []3  [x]4  []5                []+ []- 5/5 B   Elbow Extension []1  []2  []3  [x]4  []5                []+ []- 5/5 B     JOINT MOBILITY:     Joint Motion Tested Right  (spine) Left    Goal   Cervical  Hypomobile Hypomobile Normal B   Upper Thoracic  Hypomobile Hypomobile Normal B     Palpation: Increased tone and tenderness noted with palpation to: cervical paraspinals and B UT    Posture:  Pt presents with postural abnormalities which include: forward head and rounded shoulders    FUNCTION:     Intake Outcome Measure for FOTO Neck Survey    Therapist reviewed FOTO scores for Beth Souza on 6/22/2023.   FOTO documents entered into EPIC - see Media section.    Intake Score: 65%           TREATMENT     Total Treatment time (time-based codes) separate from Evaluation: 20 minutes      Beth received the treatments listed below:      therapeutic exercises to develop strength, endurance, ROM, and flexibility for 10 minutes including:  Shoulder circles (30 x)  Seated scapular retraction (30 x)  Open books (15 x)    manual therapy techniques: Joint mobilizations, Manual traction, and Soft tissue Mobilization were applied to the: cervical spine for 10 minutes, including:  STM to cervical paraspinals and B  UT  GII/III B UPAs T-Spine    neuromuscular re-education activities to improve: Balance, Coordination, Kinesthetic, Sense, Proprioception, and Posture for - minutes. The following activities were included:  -    therapeutic activities to improve functional performance for 10  minutes, including:   Patient education (see below)    gait training to improve functional mobility and safety for -  minutes, including:  -      PATIENT EDUCATION AND HOME EXERCISES     Patient Education:  Patient educated on the impairments noted above and the effects of physical therapy intervention to improve overall condition and QOL.   Patient was educated on all the above exercise prior/during/after for proper posture, positioning, and execution for safe performance with home exercise program.   Patient educated on postural awareness and improved ergonomics to decrease symptoms with ADL performance.  Patient educated on progressive POC to return to PLOF.       Written Home Exercises Provided: yes. Exercises were reviewed and Beth was able to demonstrate them prior to the end of the session.  Beth demonstrated good  understanding of the education provided. See EMR under Patient Instructions for exercises provided during therapy sessions.    ASSESSMENT     Beth is a 73 y.o. female referred to outpatient Physical Therapy with a medical diagnosis of chronic neck pain. Patient presents with limitations to and pain with cervical AROM, decreased cervical and thoracic segmental mobility, and decreased UE strength. Pt's current symptom limit their ability to efficiently and independently complete ADLs. Pt to benefit from skilled PT to address aforementioned deficits and promote return to PLOF.     Patient prognosis is Good.   Patient will benefit from skilled outpatient Physical Therapy to address the deficits stated above and in the chart below, provide patient /family education, and to maximize patientt's level of independence.      Plan of care discussed with patient: Yes  Patient's spiritual, cultural and educational needs considered and patient is agreeable to the plan of care and goals as stated below:     Anticipated Barriers for therapy: None    Medical Necessity is demonstrated by the following  History  Co-morbidities and personal factors that may impact the plan of care [] LOW: no personal factors / co-morbidities  [x] MODERATE: 1-2 personal factors / co-morbidities  [] HIGH: 3+ personal factors / co-morbidities    Moderate / High Support Documentation:   Co-morbidities affecting plan of care: -    Personal Factors:   coping style     Examination  Body Structures and Functions, activity limitations and participation restrictions that may impact the plan of care [x] LOW: addressing 1-2 elements  [] MODERATE: 3+ elements  [] HIGH: 4+ elements (please support below)    Moderate / High Support Documentation: -     Clinical Presentation [x] LOW: stable  [] MODERATE: Evolving  [] HIGH: Unstable     Decision Making/ Complexity Score: low       GOALS:  SHORT TERM GOALS: 3 weeks, (7/13/23) Progress   Recent signs and systems trend is improving in order to progress towards Long term goals's.    Patient will be independent with Home Exercise Program  in order to further progress and return to maximal function.    Pain rating at Worst: 5/10 in order to progress towards increased independence with activity.    Patient will be able to correct postural deviations in sitting and standing, to decrease pain and promote postural awareness for injury prevention.     Patient will partially meet predicted functional outcome (FOTO) score: 67% to improve towards increasing self-worth & perceived functional ability.      LONG TERM GOALS: 6 weeks, (8/3/23) Progress   Patient will return to normal Activities of daily living, recreational, and work related activities with less pain and limitation.     Patient will improve Range of Motion to stated goals in  order to return to maximal functional potential.     Patient will improve Strength to stated goals of appropriate musculature in order to improve functional independence.     Pain Rating at Best: 1/10 to improve Quality of Life.     Patient will meet predicted functional outcome (FOTO) score: 70% to increase self-worth & perceived functional ability.    Patient will have met/partially met personal goal of:  Decrease shoulder and neck pain/soreness.          PLAN   Plan of care Certification: 6/22/2023 to 8/23/23.    Outpatient Physical Therapy 2 times weekly for 6 weeks to include the following interventions: Cervical/Lumbar Traction, Electrical Stimulation , Gait Training, Manual Therapy, Moist Heat/ Ice, Neuromuscular Re-ed, Orthotic Management and Training, Patient Education, Self Care, Therapeutic Activities, Therapeutic Exercise, and FDN     Mara Domingo, PT      I CERTIFY THE NEED FOR THESE SERVICES FURNISHED UNDER THIS PLAN OF TREATMENT AND WHILE UNDER MY CARE   Physician's comments:     Physician's Signature: ___________________________________________________

## 2023-06-23 RX ORDER — ATORVASTATIN CALCIUM 20 MG/1
20 TABLET, FILM COATED ORAL DAILY
COMMUNITY
End: 2023-10-11

## 2023-06-26 ENCOUNTER — OFFICE VISIT (OUTPATIENT)
Dept: INTERNAL MEDICINE | Facility: CLINIC | Age: 73
End: 2023-06-26
Payer: MEDICARE

## 2023-06-26 VITALS
WEIGHT: 220.25 LBS | SYSTOLIC BLOOD PRESSURE: 130 MMHG | DIASTOLIC BLOOD PRESSURE: 82 MMHG | TEMPERATURE: 96 F | HEIGHT: 63 IN | HEART RATE: 81 BPM | OXYGEN SATURATION: 99 % | BODY MASS INDEX: 39.02 KG/M2

## 2023-06-26 DIAGNOSIS — R22.9 SKIN NODULE: Primary | ICD-10-CM

## 2023-06-26 PROCEDURE — 4010F PR ACE/ARB THEARPY RXD/TAKEN: ICD-10-PCS | Mod: CPTII,S$GLB,, | Performed by: NURSE PRACTITIONER

## 2023-06-26 PROCEDURE — 3079F PR MOST RECENT DIASTOLIC BLOOD PRESSURE 80-89 MM HG: ICD-10-PCS | Mod: CPTII,S$GLB,, | Performed by: NURSE PRACTITIONER

## 2023-06-26 PROCEDURE — 99999 PR PBB SHADOW E&M-EST. PATIENT-LVL III: ICD-10-PCS | Mod: PBBFAC,,, | Performed by: NURSE PRACTITIONER

## 2023-06-26 PROCEDURE — 3044F PR MOST RECENT HEMOGLOBIN A1C LEVEL <7.0%: ICD-10-PCS | Mod: CPTII,S$GLB,, | Performed by: NURSE PRACTITIONER

## 2023-06-26 PROCEDURE — 3075F SYST BP GE 130 - 139MM HG: CPT | Mod: CPTII,S$GLB,, | Performed by: NURSE PRACTITIONER

## 2023-06-26 PROCEDURE — 1125F PR PAIN SEVERITY QUANTIFIED, PAIN PRESENT: ICD-10-PCS | Mod: CPTII,S$GLB,, | Performed by: NURSE PRACTITIONER

## 2023-06-26 PROCEDURE — 99213 PR OFFICE/OUTPT VISIT, EST, LEVL III, 20-29 MIN: ICD-10-PCS | Mod: S$GLB,,, | Performed by: NURSE PRACTITIONER

## 2023-06-26 PROCEDURE — 3008F BODY MASS INDEX DOCD: CPT | Mod: CPTII,S$GLB,, | Performed by: NURSE PRACTITIONER

## 2023-06-26 PROCEDURE — 4010F ACE/ARB THERAPY RXD/TAKEN: CPT | Mod: CPTII,S$GLB,, | Performed by: NURSE PRACTITIONER

## 2023-06-26 PROCEDURE — 99213 OFFICE O/P EST LOW 20 MIN: CPT | Mod: S$GLB,,, | Performed by: NURSE PRACTITIONER

## 2023-06-26 PROCEDURE — 1125F AMNT PAIN NOTED PAIN PRSNT: CPT | Mod: CPTII,S$GLB,, | Performed by: NURSE PRACTITIONER

## 2023-06-26 PROCEDURE — 3075F PR MOST RECENT SYSTOLIC BLOOD PRESS GE 130-139MM HG: ICD-10-PCS | Mod: CPTII,S$GLB,, | Performed by: NURSE PRACTITIONER

## 2023-06-26 PROCEDURE — 3079F DIAST BP 80-89 MM HG: CPT | Mod: CPTII,S$GLB,, | Performed by: NURSE PRACTITIONER

## 2023-06-26 PROCEDURE — 99999 PR PBB SHADOW E&M-EST. PATIENT-LVL III: CPT | Mod: PBBFAC,,, | Performed by: NURSE PRACTITIONER

## 2023-06-26 PROCEDURE — 3008F PR BODY MASS INDEX (BMI) DOCUMENTED: ICD-10-PCS | Mod: CPTII,S$GLB,, | Performed by: NURSE PRACTITIONER

## 2023-06-26 PROCEDURE — 3044F HG A1C LEVEL LT 7.0%: CPT | Mod: CPTII,S$GLB,, | Performed by: NURSE PRACTITIONER

## 2023-06-26 NOTE — PROGRESS NOTES
Subjective:       Patient ID: Beth Souza is a 73 y.o. female.    Chief Complaint: painful nodules  HPI    Pt reports 2 painful nodule to back /R flank present x 2 years. Now painful. Getting bigger.    Past Medical History:   Diagnosis Date    Cataract     Hyperlipidemia     Hypertension     Lumbar spondylosis     Meningioma 2021    Obesity     Sleep apnea     Vitamin D deficiency      Past Surgical History:   Procedure Laterality Date    HYSTERECTOMY      PARTIAL HYSTERECTOMY      SELECTIVE INJECTION OF ANESTHETIC AGENT AROUND LUMBAR SPINAL NERVE ROOT BY TRANSFORAMINAL APPROACH Bilateral 3/21/2023    Procedure: Bilateral L5/S1 transforaminal epidural steroid injection;  Surgeon: Kush Jha MD;  Location: [facility];  Service: Pain Management;  Laterality: Bilateral;     Social History     Socioeconomic History    Marital status:     Number of children: 3   Occupational History    Occupation: Cleaning houses     Employer: not employed   Tobacco Use    Smoking status: Former     Types: Cigarettes     Quit date: 2004     Years since quittin.4    Smokeless tobacco: Never   Substance and Sexual Activity    Alcohol use: Yes     Comment: occasionally    Drug use: No    Sexual activity: Not Currently     Partners: Male   Other Topics Concern    Are you pregnant or think you may be? No    Breast-feeding No   Social History Narrative    Live w/ alone      Social Determinants of Health     Financial Resource Strain: Low Risk     Difficulty of Paying Living Expenses: Not hard at all   Food Insecurity: No Food Insecurity    Worried About Running Out of Food in the Last Year: Never true    Ran Out of Food in the Last Year: Never true   Transportation Needs: No Transportation Needs    Lack of Transportation (Medical): No    Lack of Transportation (Non-Medical): No   Physical Activity: Inactive    Days of Exercise per Week: 0 days    Minutes of Exercise per Session: 0 min   Stress: No Stress  Concern Present    Feeling of Stress : Not at all   Social Connections: Moderately Isolated    Frequency of Communication with Friends and Family: More than three times a week    Frequency of Social Gatherings with Friends and Family: More than three times a week    Attends [employer] Services: More than 4 times per year    Active Member of Clubs or Organizations: No    Attends Club or Organization Meetings: Never    Marital Status:    Housing Stability: Low Risk     Unable to Pay for Housing in the Last Year: No    Number of Places Lived in the Last Year: 1    Unstable Housing in the Last Year: No     Review of patient's allergies indicates:   Allergen Reactions    Aspirin Other (See Comments)     Bruising     Current Outpatient Medications   Medication Sig    acetaminophen (TYLENOL) 500 MG tablet Take 500 mg by mouth daily as needed for Pain.    atorvastatin (LIPITOR) 20 MG tablet Take 20 mg by mouth once daily.    ergocalciferol (ERGOCALCIFEROL) 50,000 unit Cap Take 1 capsule by mouth once a week    estradioL (VAGIFEM) 10 mcg Tab Insert 1 tablet vaginally twice a week. Insert before bed. No intercourse on the night of insertion (Patient not taking: Reported on 6/26/2023)    lisinopriL-hydrochlorothiazide (PRINZIDE,ZESTORETIC) 20-25 mg Tab Take 1 tablet by mouth once daily.     No current facility-administered medications for this visit.           Review of Systems   Constitutional:  Negative for activity change, appetite change, chills, diaphoresis, fatigue, fever and unexpected weight change.   HENT:  Negative for congestion, ear pain, postnasal drip, rhinorrhea, sinus pressure, sinus pain, sneezing, sore throat, tinnitus, trouble swallowing and voice change.    Eyes:  Negative for photophobia, pain and visual disturbance.   Respiratory:  Negative for cough, chest tightness, shortness of breath and wheezing.    Cardiovascular:  Negative for chest pain, palpitations and leg swelling.   Gastrointestinal:   Negative for abdominal distention, abdominal pain, constipation, diarrhea, nausea and vomiting.   Genitourinary:  Negative for decreased urine volume, difficulty urinating, dysuria, flank pain, frequency, hematuria and urgency.   Musculoskeletal:  Negative for arthralgias, back pain, joint swelling, neck pain and neck stiffness.   Skin:         See HPI   Allergic/Immunologic: Negative for immunocompromised state.   Neurological:  Negative for dizziness, tremors, seizures, syncope, facial asymmetry, speech difficulty, weakness, light-headedness, numbness and headaches.   Hematological:  Negative for adenopathy. Does not bruise/bleed easily.   Psychiatric/Behavioral:  Negative for confusion and sleep disturbance.      Objective:      Physical Exam  Vitals reviewed.   Skin:     General: Skin is warm and dry.             Comments: Tender nodules noted to marked areas.   Neurological:      Mental Status: She is alert and oriented to person, place, and time.       Assessment:     Vitals:    06/26/23 1318   BP: 130/82   Pulse: 81   Temp: 96.4 °F (35.8 °C)         1. Skin nodule x 2         Plan:   Skin nodule x 2   -     US Soft Tissue Chest_Upper Back; Future; Expected date: 06/26/2023

## 2023-07-06 NOTE — PROGRESS NOTES
"  [facility]   Physical Therapy Treatment Note      Name: Beth Souza  Clinic Number: [id number]    Therapy Diagnosis:   Encounter Diagnoses   Name Primary?    Neck pain, chronic Yes    Weakness      Physician: Kush Jha MD    Visit Date: 7/7/2023    Physician Orders: PT Eval and Treat  Medical Diagnosis from Referral: M54.2,G89.29 (ICD-10-CM) - Neck pain, chronic  Evaluation Date: 6/22/2023  Authorization Period Expiration: 8/17/23  Plan of Care Expiration: 8/3/23  Progress Note Due: Every 6th visit  Visit # / Visits authorized: 1/23  FOTO: 1/3     Precautions: Standard      Time In: 10:15 AM  Time Out: 11:00 AM  Total Appointment Time (timed & untimed codes): 45 minutes    PTA Visit #: 0/5       Subjective     Pt reports: Has had some soreness in her neck. Has been doing the HEP some days.  She was compliant with home exercise program.  Response to previous treatment: -  Functional change: -    Pain: 9/10  Location: B shoulder and neck    Objective      Objective Measures updated at progress report unless specified.     Treatment     Beth received the treatments listed below:      therapeutic exercises to develop strength, endurance, ROM, and flexibility for 15 minutes including:  UBE (3/3)  Shoulder circles (20 x)  Seated scapular retraction (20 x)  Open books (2 x 10)     manual therapy techniques: Joint mobilizations, Manual traction, and Soft tissue Mobilization were applied to the: cervical spine for 15 minutes, including:  STM to cervical paraspinals and B UT  GII/III B UPAs T-Spine     neuromuscular re-education activities to improve: Balance, Coordination, Kinesthetic, Sense, Proprioception, and Posture for 15 minutes. The following activities were included:   Prone scapular retractions (3 x 10, 5")   Standing row (3 x 10)     therapeutic activities to improve functional performance for - minutes, including:              Patient education (see below)     gait " training to improve functional mobility and safety for -  minutes, including:  -    Patient Education and Home Exercises       Education provided:   - Cont'd HEP    Written Home Exercises Provided: Patient instructed to cont prior HEP. Exercises were reviewed and Beth was able to demonstrate them prior to the end of the session.  Beth demonstrated good  understanding of the education provided. See EMR under Patient Instructions for exercises provided during therapy sessions    Assessment     Pt demonstrates independence c HEP c minimal cuing req'd for desired form. Pt tolerated additional exercise today and is able to perform them without reported pain. Pt instructed to continue HEP and try to perform daily.    Beth Is progressing well towards her goals.   Pt prognosis is Good.     Pt will continue to benefit from skilled outpatient physical therapy to address the deficits listed in the problem list box on initial evaluation, provide pt/family education and to maximize pt's level of independence in the home and community environment.     Pt's spiritual, cultural and educational needs considered and pt agreeable to plan of care and goals.     Anticipated barriers to physical therapy: None    GOALS:  SHORT TERM GOALS: 3 weeks, (7/13/23) Progress   Recent signs and systems trend is improving in order to progress towards Long term goals's. Progressing    Patient will be independent with Home Exercise Program  in order to further progress and return to maximal function. Progressing   Pain rating at Worst: 5/10 in order to progress towards increased independence with activity. Progressing   Patient will be able to correct postural deviations in sitting and standing, to decrease pain and promote postural awareness for injury prevention.  Progressing   Patient will partially meet predicted functional outcome (FOTO) score: 67% to improve towards increasing self-worth & perceived functional ability. Progressing       LONG TERM GOALS: 6 weeks, (8/3/23) Progress   Patient will return to normal Activities of daily living, recreational, and work related activities with less pain and limitation.  Progressing    Patient will improve Range of Motion to stated goals in order to return to maximal functional potential.  Progressing   Patient will improve Strength to stated goals of appropriate musculature in order to improve functional independence.  Progressing   Pain Rating at Best: 1/10 to improve Quality of Life.  Progressing   Patient will meet predicted functional outcome (FOTO) score: 70% to increase self-worth & perceived functional ability. Progressing   Patient will have met/partially met personal goal of:  Decrease shoulder and neck pain/soreness.  Progressing       Plan     Plan of care Certification: 6/22/2023 to 8/23/23.     Outpatient Physical Therapy 2 times weekly for 6 weeks to include the following interventions: Cervical/Lumbar Traction, Electrical Stimulation , Gait Training, Manual Therapy, Moist Heat/ Ice, Neuromuscular Re-ed, Orthotic Management and Training, Patient Education, Self Care, Therapeutic Activities, Therapeutic Exercise, and FDN     Mara Domingo, PT

## 2023-07-07 ENCOUNTER — CLINICAL SUPPORT (OUTPATIENT)
Dept: REHABILITATION | Facility: HOSPITAL | Age: 73
End: 2023-07-07
Attending: ANESTHESIOLOGY
Payer: MEDICARE

## 2023-07-07 DIAGNOSIS — G89.29 NECK PAIN, CHRONIC: Primary | ICD-10-CM

## 2023-07-07 DIAGNOSIS — R53.1 WEAKNESS: ICD-10-CM

## 2023-07-07 DIAGNOSIS — M54.2 NECK PAIN, CHRONIC: Primary | ICD-10-CM

## 2023-07-07 PROCEDURE — 97140 MANUAL THERAPY 1/> REGIONS: CPT | Mod: PN

## 2023-07-07 PROCEDURE — 97110 THERAPEUTIC EXERCISES: CPT | Mod: PN

## 2023-07-07 PROCEDURE — 97112 NEUROMUSCULAR REEDUCATION: CPT | Mod: PN

## 2023-07-10 ENCOUNTER — TELEPHONE (OUTPATIENT)
Dept: REHABILITATION | Facility: HOSPITAL | Age: 73
End: 2023-07-10

## 2023-07-10 NOTE — TELEPHONE ENCOUNTER
PT LVM about today's missed appointment with reminder of Friday's appointment. PT provided clinic phone number should patient be able to reschedule.

## 2023-07-11 ENCOUNTER — HOSPITAL ENCOUNTER (OUTPATIENT)
Dept: RADIOLOGY | Facility: HOSPITAL | Age: 73
Discharge: HOME | End: 2023-07-11
Attending: NURSE PRACTITIONER
Payer: MEDICARE

## 2023-07-11 DIAGNOSIS — R22.9 SKIN NODULE: ICD-10-CM

## 2023-07-11 PROCEDURE — 76604 US SOFT TISSUE CHEST_UPPER BACK: ICD-10-PCS | Mod: 26,,, | Performed by: RADIOLOGY

## 2023-07-11 PROCEDURE — 76604 US EXAM CHEST: CPT | Mod: 26,,, | Performed by: RADIOLOGY

## 2023-07-11 PROCEDURE — 76604 US EXAM CHEST: CPT | Mod: TC

## 2023-07-14 ENCOUNTER — CLINICAL SUPPORT (OUTPATIENT)
Dept: REHABILITATION | Facility: HOSPITAL | Age: 73
End: 2023-07-14
Attending: ANESTHESIOLOGY
Payer: MEDICARE

## 2023-07-14 DIAGNOSIS — M54.2 NECK PAIN, CHRONIC: Primary | ICD-10-CM

## 2023-07-14 DIAGNOSIS — R53.1 WEAKNESS: ICD-10-CM

## 2023-07-14 DIAGNOSIS — G89.29 NECK PAIN, CHRONIC: Primary | ICD-10-CM

## 2023-07-14 PROCEDURE — 97112 NEUROMUSCULAR REEDUCATION: CPT | Mod: PN

## 2023-07-14 PROCEDURE — 97110 THERAPEUTIC EXERCISES: CPT | Mod: PN

## 2023-07-14 PROCEDURE — 97140 MANUAL THERAPY 1/> REGIONS: CPT | Mod: PN

## 2023-07-17 NOTE — PROGRESS NOTES
"  [facility]   Physical Therapy Treatment Note      Name: Beth Souza  Clinic Number: [id number]    Therapy Diagnosis:   Encounter Diagnoses   Name Primary?    Neck pain, chronic Yes    Weakness      Physician: Kush Jha MD    Visit Date: 7/18/2023    Physician Orders: PT Eval and Treat  Medical Diagnosis from Referral: M54.2,G89.29 (ICD-10-CM) - Neck pain, chronic  Evaluation Date: 6/22/2023  Authorization Period Expiration: 8/17/23  Plan of Care Expiration: 8/3/23  Progress Note Due: Every 6th visit  Visit # / Visits authorized: 3/23  FOTO: 1/3     Precautions: Standard      Time In: 1:00 PM  Time Out: 2:05 PM  Total Appointment Time (timed & untimed codes): 65 minutes    PTA Visit #: 0/5       Subjective     Pt reports: Feeling pretty good today.   She was compliant with home exercise program.  Response to previous treatment: -  Functional change: -    Pain: 9/10  Location: B shoulder and neck    Objective      Objective Measures updated at progress report unless specified.     Treatment     Beth received the treatments listed below:      therapeutic exercises to develop strength, endurance, ROM, and flexibility for 15 minutes including:  UBE (3/3)  Shoulder circles (30 x)  Open books (15 x)     manual therapy techniques: Joint mobilizations, Manual traction, and Soft tissue Mobilization were applied to the: cervical spine for 15 minutes, including:  STM to cervical paraspinals and B UT  GII/III B UPAs T-Spine     neuromuscular re-education activities to improve: Balance, Coordination, Kinesthetic, Sense, Proprioception, and Posture for 30 minutes. The following activities were included:   Prone chin tucks (20 x 10")   Prone scapular retractions c arm lift (3 x 10)  Standing row (3 x 12, 30 lbs)   Straight arm pull downs (3 x 12, 20 lbs)   Cervical isometrics - SB, flex, and retraction (10 x 10")     therapeutic activities to improve functional performance for - " minutes, including:              Patient education (see below)     gait training to improve functional mobility and safety for -  minutes, including:  -    Patient Education and Home Exercises       Education provided:   - Cont'd HEP    Written Home Exercises Provided: Patient instructed to cont prior HEP. Exercises were reviewed and Beth was able to demonstrate them prior to the end of the session.  Beth demonstrated good  understanding of the education provided. See EMR under Patient Instructions for exercises provided during therapy sessions    Assessment     Pt tolerated treatment progressions well today c minimal c/o symptom reproduction. Pt requires cuing during standing row to decrease forward shoulder posture at end range of the row. Pt also requires verbal and tactile cuing with side bending isometrics to improve posture with performance.     Beth Is progressing well towards her goals.   Pt prognosis is Good.     Pt will continue to benefit from skilled outpatient physical therapy to address the deficits listed in the problem list box on initial evaluation, provide pt/family education and to maximize pt's level of independence in the home and community environment.     Pt's spiritual, cultural and educational needs considered and pt agreeable to plan of care and goals.     Anticipated barriers to physical therapy: None    GOALS:  SHORT TERM GOALS: 3 weeks, (7/13/23) Progress   Recent signs and systems trend is improving in order to progress towards Long term goals's. Progressing    Patient will be independent with Home Exercise Program  in order to further progress and return to maximal function. Progressing   Pain rating at Worst: 5/10 in order to progress towards increased independence with activity. Progressing   Patient will be able to correct postural deviations in sitting and standing, to decrease pain and promote postural awareness for injury prevention.  Progressing   Patient will  partially meet predicted functional outcome (FOTO) score: 67% to improve towards increasing self-worth & perceived functional ability. Progressing      LONG TERM GOALS: 6 weeks, (8/3/23) Progress   Patient will return to normal Activities of daily living, recreational, and work related activities with less pain and limitation.  Progressing    Patient will improve Range of Motion to stated goals in order to return to maximal functional potential.  Progressing   Patient will improve Strength to stated goals of appropriate musculature in order to improve functional independence.  Progressing   Pain Rating at Best: 1/10 to improve Quality of Life.  Progressing   Patient will meet predicted functional outcome (FOTO) score: 70% to increase self-worth & perceived functional ability. Progressing   Patient will have met/partially met personal goal of:  Decrease shoulder and neck pain/soreness.  Progressing       Plan     Plan of care Certification: 6/22/2023 to 8/23/23.     Outpatient Physical Therapy 2 times weekly for 6 weeks to include the following interventions: Cervical/Lumbar Traction, Electrical Stimulation , Gait Training, Manual Therapy, Moist Heat/ Ice, Neuromuscular Re-ed, Orthotic Management and Training, Patient Education, Self Care, Therapeutic Activities, Therapeutic Exercise, and FDN     Mara Domingo, PT

## 2023-07-18 ENCOUNTER — CLINICAL SUPPORT (OUTPATIENT)
Dept: REHABILITATION | Facility: HOSPITAL | Age: 73
End: 2023-07-18
Payer: MEDICARE

## 2023-07-18 DIAGNOSIS — G89.29 NECK PAIN, CHRONIC: Primary | ICD-10-CM

## 2023-07-18 DIAGNOSIS — R53.1 WEAKNESS: ICD-10-CM

## 2023-07-18 DIAGNOSIS — M54.2 NECK PAIN, CHRONIC: Primary | ICD-10-CM

## 2023-07-18 PROCEDURE — 97140 MANUAL THERAPY 1/> REGIONS: CPT | Mod: PN

## 2023-07-18 PROCEDURE — 97112 NEUROMUSCULAR REEDUCATION: CPT | Mod: PN

## 2023-07-18 PROCEDURE — 97110 THERAPEUTIC EXERCISES: CPT | Mod: PN

## 2023-07-27 NOTE — PROGRESS NOTES
"  [facility]   Physical Therapy Treatment Note      Name: Beth Souza  Clinic Number: [id number]    Therapy Diagnosis:   No diagnosis found.    Physician: Kush Jha MD    Visit Date: 7/28/2023    Physician Orders: PT Eval and Treat  Medical Diagnosis from Referral: M54.2,G89.29 (ICD-10-CM) - Neck pain, chronic  Evaluation Date: 6/22/2023  Authorization Period Expiration: 8/17/23  Plan of Care Expiration: 8/3/23  Progress Note Due: Every 6th visit  Visit # / Visits authorized: 4/23  FOTO: 1/3     Precautions: Standard      Time In: 12:30 PM  Time Out: 1:20 PM  Total Appointment Time (timed & untimed codes): 50 minutes    PTA Visit #: 0/5       Subjective     Pt reports: Denies soreness after previous visit.   She was compliant with home exercise program.  Response to previous treatment: -  Functional change: -    Pain: 9/10  Location: B shoulder and neck    Objective      Objective Measures updated at progress report unless specified.     Treatment     Beth received the treatments listed below:      therapeutic exercises to develop strength, endurance, ROM, and flexibility for 15 minutes including:  UBE (3/3)  Shoulder circles (30 x)  Open books (2 x 10)     manual therapy techniques: Joint mobilizations, Manual traction, and Soft tissue Mobilization were applied to the: cervical spine for - minutes, including:     neuromuscular re-education activities to improve: Balance, Coordination, Kinesthetic, Sense, Proprioception, and Posture for 35 minutes. The following activities were included:   Prone chin tucks (20 x 10")   Prone scapular retractions c arm lift (3 x 10)  Standing row (3 x 12, 30 lbs)  Standing T (2 x 10, 10 lbs)   Straight arm pull downs (3 x 12, 20 lbs)   Chin tuck c B ER against 1/2 foam (3 x 10, RTB)      therapeutic activities to improve functional performance for - minutes, including:              Patient education (see below)     gait training to improve " functional mobility and safety for -  minutes, including:  -    Patient Education and Home Exercises       Education provided:   - Cont'd HEP    Written Home Exercises Provided: Patient instructed to cont prior HEP. Exercises were reviewed and Beth was able to demonstrate them prior to the end of the session.  Beth demonstrated good  understanding of the education provided. See EMR under Patient Instructions for exercises provided during therapy sessions    Assessment     PT progressed postural stability training today and was able to perform with desired mechanics without pain following verbal and tactile cuing. Pt requires cuing throughout treatment to decrease compensatory shrugging on the R.     Beth Is progressing well towards her goals.   Pt prognosis is Good.     Pt will continue to benefit from skilled outpatient physical therapy to address the deficits listed in the problem list box on initial evaluation, provide pt/family education and to maximize pt's level of independence in the home and community environment.     Pt's spiritual, cultural and educational needs considered and pt agreeable to plan of care and goals.     Anticipated barriers to physical therapy: None    GOALS:  SHORT TERM GOALS: 3 weeks, (7/13/23) Progress   Recent signs and systems trend is improving in order to progress towards Long term goals's. Progressing    Patient will be independent with Home Exercise Program  in order to further progress and return to maximal function. Progressing   Pain rating at Worst: 5/10 in order to progress towards increased independence with activity. Progressing   Patient will be able to correct postural deviations in sitting and standing, to decrease pain and promote postural awareness for injury prevention.  Progressing   Patient will partially meet predicted functional outcome (FOTO) score: 67% to improve towards increasing self-worth & perceived functional ability. Progressing      LONG TERM  GOALS: 6 weeks, (8/3/23) Progress   Patient will return to normal Activities of daily living, recreational, and work related activities with less pain and limitation.  Progressing    Patient will improve Range of Motion to stated goals in order to return to maximal functional potential.  Progressing   Patient will improve Strength to stated goals of appropriate musculature in order to improve functional independence.  Progressing   Pain Rating at Best: 1/10 to improve Quality of Life.  Progressing   Patient will meet predicted functional outcome (FOTO) score: 70% to increase self-worth & perceived functional ability. Progressing   Patient will have met/partially met personal goal of:  Decrease shoulder and neck pain/soreness.  Progressing       Plan     Plan of care Certification: 6/22/2023 to 8/23/23.     Outpatient Physical Therapy 2 times weekly for 6 weeks to include the following interventions: Cervical/Lumbar Traction, Electrical Stimulation , Gait Training, Manual Therapy, Moist Heat/ Ice, Neuromuscular Re-ed, Orthotic Management and Training, Patient Education, Self Care, Therapeutic Activities, Therapeutic Exercise, and FDN     Mara Domingo, PT

## 2023-07-28 ENCOUNTER — CLINICAL SUPPORT (OUTPATIENT)
Dept: REHABILITATION | Facility: HOSPITAL | Age: 73
End: 2023-07-28
Payer: MEDICARE

## 2023-07-28 DIAGNOSIS — M54.2 NECK PAIN, CHRONIC: Primary | ICD-10-CM

## 2023-07-28 DIAGNOSIS — G89.29 NECK PAIN, CHRONIC: Primary | ICD-10-CM

## 2023-07-28 DIAGNOSIS — R53.1 WEAKNESS: ICD-10-CM

## 2023-07-28 PROCEDURE — 97110 THERAPEUTIC EXERCISES: CPT | Mod: PN

## 2023-07-28 PROCEDURE — 97112 NEUROMUSCULAR REEDUCATION: CPT | Mod: PN

## 2023-07-31 ENCOUNTER — TELEPHONE (OUTPATIENT)
Dept: PULMONOLOGY | Facility: CLINIC | Age: 73
End: 2023-07-31
Payer: MEDICARE

## 2023-07-31 ENCOUNTER — PATIENT MESSAGE (OUTPATIENT)
Dept: PULMONOLOGY | Facility: CLINIC | Age: 73
End: 2023-07-31
Payer: MEDICARE

## 2023-07-31 NOTE — TELEPHONE ENCOUNTER
----- Message from Raquelarnaud Reyes sent at 7/31/2023  9:54 AM CDT -----  Contact: Beth  Type:  Sooner Apoointment Request    Caller is requesting a sooner appointment.  Caller declined first available appointment listed below.  Caller will not accept being placed on the waitlist and is requesting a message be sent to doctor.  Name of Caller:Beth  When is the first available appointment?8/3/23  Symptoms:follow-up visit/sleep  Would the patient rather a call back or a response via [facility]? call  Best Call Back Number:[phone]  Additional Information: Patient request to reschedule visit for an appointment after 1 pm.  Thank you,  GH

## 2023-08-03 NOTE — PROGRESS NOTES
Interventional Pain Established Patient Clinic Visit    Chief Pain Complaint:  Neck Pain      Interval history 08/07/2023  Patient presents for 1-1/2 month follow-up.  She continues to report well controlled lower extremity radicular pain following prior L5-S1 transforaminal epidural steroid injection.  She reports mild axial neck pain which can radiate into bilateral trapezius distribution in C4-6 dermatomal distribution, which can be exacerbated with cervical flexion, extension.  Patient is interested in restarting physical therapy to help with range of motion and strength.  Today she denies significant upper or lower extremity weakness, bowel or bladder incontinence or saddle anesthesia.    Interval history 06/20/2023  Patient presents s/p bilateral L5/S1 transforaminal epidural steroid injection 03/21/2023.  Patient reports 100% sustained relief in right lower extremity radicular pain and 70% improvement in left-sided radicular pain following her epidural steroid injection.  She reports insidious exacerbation of left-sided radicular pain.  Pain is intermittent and today is rated a 6/10.  Patient describes pain as soreness in nature along the lateral and posterior aspect of the left lower extremity to the calf in L4-5 distribution.  Patient also reports swelling, tenderness and aching in bilateral ankles.  Patient reports she did not hear from physical therapy for scheduling land-based physical therapy.  Today she denies significant lower extremity weakness, bowel or bladder incontinence or saddle anesthesia.    Today she also reports aching in the neck and bilateral shoulders.  Patient reports pain in the occiput which radiates into bilateral trapezius distribution C4-6 dermatomal distribution.  She denies more distal radiculopathy into the upper extremities or hands.  She does report exhaustion or weakness in the upper extremities associated with use.  She denies compromise in hand  strength or  dexterity.    Interval Hx: 3/6/23  Ms. Souza is a 72-year-old lady with past medical history significant for cataracts, hypertension, hyperlipidemia, history of COVID-19, morbid obesity, prediabetes, history of cerebellar meningioma, obstructive sleep apnea on CPAP who presents to [facility] care, previous Dr. Swanson patient.    Today patient presents for lower back and leg pain.  Patient reports pain is intermittent and today is rated a 5/10.  Patient reports pain in the lower back which radiates into the buttock and down the posterior aspects of bilateral lower extremities in L5-S2 distribution to the feet.  Pain is exacerbated with standing or walking, even exceeding 5-10 minutes.  Patient reports when she has her Rollator, she is able to walk further distances.  Patient does endorse associated weakness in the lower extremities associated with her pain.  Pain is more severe on the right than on the left.  Patient reports completing 1 session of physical therapy 1 week prior with pain extremely severe in the lower extremities which was debilitating.  She reports secondary to this she can not continue physical therapy at this time.    Of note patient saw Dr. Gautam 02/28/2023 with new MRI ordered to evaluate severe stenosis and spondylolisthesis L4-5 and L5-S1 disc herniation and short prescription of Percocet provided.      Patient reports significant motor weakness and loss of sensations.  Patient denies night fever/night sweats, urinary incontinence, bowel incontinence, and significant weight loss.      Pain Disability Index Review:     Last 3 PDI Scores 8/7/2023 6/20/2023 10/4/2018   Pain Disability Index (PDI) 9 36 5       Non-Pharmacologic Treatments:  Physical Therapy/Home Exercise: yes  Ice/Heat:yes  TENS: no  Acupuncture: no  Massage: no  Chiropractic: no    Other: no      Pain Medications:  - Opioids: Percocet (Oxycodone/Acetaminophen)  - Adjuvant Medications: Neurontin (Gabapentin) and Tylenol  "(Acetaminophen)    Pain injections:  -03/21/2023: Bilateral L5-S1 transforaminal epidural steroid injection    Interval History (11/8/2022):  Beth Souza presents today for follow-up visit.  Patient was last seen on 4/29/21 (1.5 years ago). At that visit, the plan was to start topamax for sciatica. Patient was referred back to our clinic by Alyssia Ventura PA (Orthopedics). Patient reports pain as "0/10 today. She reports pain has been bad x about a month. She went to the ER because the pain was so severe. She wants to know when it will return and what to do when it returns. She got no relief with medrol dose misa or gabapentin.      Orthopedics note: Beth Souza presents to me today with chief complaint of left low back/hip pain radiating left knee pain.  I do believe she is presently in seeing exacerbation of sciatica.  She has not had any relief with dexamethasone injection, Medrol Dosepak, gabapentin.  She is not a candidate for NSAIDs secondary to aspirin allergy.  She will continue Tylenol up to 650 mg 3 times a day as needed and voltaren gel.  Additionally I would like the patient to try a short course of Lyrica to see if this would provide any additional nerve relief.  I would also like to get a referral and for her to see her back in spine clinic as she states has been quite awhile since she has seen anyone for her back. I also want to obtain bilateral hip xrays today for further evaluation.              Interval History (4/29/2021): Patient was last seen on 2/8/2021. She is here today for low back pain with bilateral leg pain (R>L).  Patient reports pain as 10/10 today.  She recently saw PCP who started her on Topamax with titration schedule. She reports pain worse with walking and in the morning.     History of Present Illness (2019):   Beth Souza is a 68 y.o. female  who is presenting with a chief complaint of Back Pain (radiating down bilateral legs). The patient began " experiencing this problem insidiously, and the pain has been gradually worsening. The pain is described as throbbing, shooting, cramping, burning, aching and electrical and is located in the bilateral lumbar spine . Pain is intermittent and lasts hours. The pain radiates to bilateral lower extremities, L>R. Pain is 40% axial and 60% radicular. The patient rates her pain a 8 out of ten and interferes with activities of daily living a 8 out of ten. Pain is exacerbated by ambulation, and is improved by rest. Patient reports no prior trauma, no prior spinal surgery     - pertinent negatives: No fever, No chills, No weight loss, No bladder dysfunction, No bowel dysfunction, No saddle anesthesia  - pertinent positives: none    - medications, other therapies tried (physical therapy, injections):     >> NSAIDs, Tylenol, gabapentin, zanaflex and flexeril    >> Has previously undergone Physical Therapy    >> Has previously undergone spinal injection/s   - Lumbar LAWSON with relief for less than 1 week - at external facility      Imaging / Labs / Studies (reviewed on 8/8/2023):    10/31/22 X-Ray Hips Bilateral 2 View Incl AP Pelvis  COMPARISON:  10/19/2020  FINDINGS:  Mild joint space narrowing is in both hips with femoral head neck junction osteophyte formation, similar to the prior exam.  There is sclerosis about both sacroiliac joints and at the pubic symphysis as before.  Numerous calcific densities overlying the lower abdomen and pelvis likely reflect phleboliths.  No acute fracture, dislocation or osteonecrosis.    MRI lumbar spine 12/02/2021  FINDINGS:  There is a couple mm retrolisthesis of L5 on S1.  There is grade 1 spondylolisthesis of L4 on L5. The vertebral body heights are well maintained, with no fracture.  No marrow signal abnormality suspicious for an infiltrative process.  Modic type 1 discogenic endplate changes seen at the L5-S1 level.     The conus medullaris terminates at approximately the inferior endplate  of L2.  The adjacent soft tissue structures show no significant abnormalities.  There is disc desiccation seen throughout the lumbar spine with at least mild disc space narrowing seen at L4-5 and more mild-to-moderate disc space narrowing seen at L5-S1.     L1-L2: No significant central canal or neural foraminal narrowing.     L2-L3: No significant central canal or neural foraminal narrowing.Mild-to-moderate bilateral facet arthropathy noted.     L3-L4: Moderate to severe bilateral facet arthropathy resulting in mild narrowing of the central canal.  The bilateral neural foraminal canals are mildly narrowed.     L4-L5:  Severe bilateral facet arthropathy along with grade 1 spondylolisthesis and ligamentum flavum hypertrophy resulting in severe narrowing of the central canal.  The bilateral neural foraminal canals are moderately to severely narrowed.     L5-S1:  Mild diffuse disc bulge with the superimposed right paracentral disc extrusion which appears to efface the intracanalicular portion of the right S1 nerve root.  No significant central canal narrowing otherwise noted.  The bilateral neural foraminal canals are severely narrowed.     X-ray cervical spine 06/20/2023     FINDINGS:  The vertebral bodies demonstrate a normal height.  There is a couple mm of retrolisthesis of C3 on C4 and C4 on C5.  Mild disc space narrowing and spondylosis seen the C3-4 through C6-7 levels.  No subluxation or change in the degree of listhesis seen on the flexion or extension views.  Mild osseous encroachment seen on the C3-4 neural foraminal canal on the left secondary to uncovertebral joint hypertrophy.    Review of Systems:  CONSTITUTIONAL: patient denies any fever, chills, or weight loss  SKIN: patient denies any rash or itching  RESPIRATORY: patient denies having any shortness of breath  GASTROINTESTINAL: patient denies having any diarrhea, constipation, or bowel incontinence  GENITOURINARY: patient denies having any abnormal  "bladder function    MUSCULOSKELETAL:  - patient complains of the above noted pain/s (see chief pain complaint)    NEUROLOGICAL:   - pain as above  - strength in Lower extremities is intact, BILATERALLY  - sensation in Lower extremities is intact, BILATERALLY  - patient denies any loss of bowel or bladder control      PSYCHIATRIC: patient denies any change in mood    Other:  All other systems reviewed and are negative      Physical Exam:  Vitals:    08/07/23 1355   BP: 130/76   Pulse: 61   Resp: 17   Weight: 99 kg (218 lb 4.1 oz)   Height: 5' 3" (1.6 m)   PainSc:   1          Body mass index is 38.66 kg/m².   (reviewed on 8/8/2023)    General: alert and oriented, in no apparent distress. Obese.   Gait: antalgic gait.  Skin: no rashes, no discoloration, no obvious lesions  HEENT: normocephalic, atraumatic. Pupils equal and round.  Respiratory: without use of accessory muscles of respiration.    Musculoskeletal - Lumbar Spine:  - ROM fairly preserved   - Pain on flexion of lumbar spine: Present  - Pain on extension of lumbar spine: Present   - Lumbar facet loading: Present   - TTP over the lumbar facet joints: Present Bilaterally, worse at L5-S1    - TTP over the lumbar paraspinals: Present   - TTP over the SI joints: Absent  - Straight Leg Raise: Positive, R>L    - YURI/ Tung's: Negative    Neuro - Lower Extremities:  - RLE Strength:     >> 5/5 strength with right hip flexion/ extension    >> 5/5 strength with right knee flexion/ extension    >> 5/5 strength in right ankle with dorsiflexion    >> 5/5 strength in right ankle with plantar flexion  - LLE Strength:     >> 5/5 strength with left hip flexion/ extension    >> 5/5 strength with knee flexion extension on the left     >> 5/5 strength in left ankle with dorsiflexion    >> 5/5 strength in left ankle with plantar flexion  - Extremity Reflexes: Brisk and symmetric throughout  - Sensory: Sensation to light touch intact bilaterally      Psych:  Mood and affect is " appropriate      Assessment:  Beth Souza is a 73 y.o. year old female who is presenting with       ICD-10-CM ICD-9-CM    1. DDD (degenerative disc disease), lumbar  M51.36 722.52 Ambulatory referral/consult to Physical/Occupational Therapy      2. Cervical spondylosis  M47.812 721.0 Ambulatory referral/consult to Physical/Occupational Therapy      3. Lumbar radiculopathy  M54.16 724.4 Ambulatory referral/consult to Physical/Occupational Therapy              Plan:  1. Interventional:  Today patient reports well controlled lower extremity radicular pain following prior bilateral L5-S1 transforaminal epidural steroid injection.  We have discussed repeating this injection in the future should symptoms exacerbate.    -We have also discussed considering a C4-6 bilateral cervical medial branch block to see if this would help with axial neck pain.  Patient would like to conservatively start with physical therapy.  We discussed the procedure, benefits, potential risk and alternative options in detail.     Anticoagulation:  None, no anticoagulation    2. Pharmacologic:         -  We have discussed continuing judicious use of Tylenol, not to exceed 3000 mg daily to avoid acute hepatotoxicity.      3. Rehabilitative:  -We discussed RE-initiating physical therapy after interventional treatment to help manage the patient/s painful condition. The patient was counseled that muscle strengthening will improve the long term prognosis in regards to pain and may also help increase range of motion and mobility. They were told that one of the goals of physical therapy is that they learn how to do the exercises so that they can do them independently at home daily upon completion. Referral placed or physical therapy.    4. Diagnostic:  Relevant imaging (cervical XR) reviewed in patient's questions answered.      5. Consult:Neurosurgery PRN    6. Follow-up:  3 months      The above plan and management options were discussed at  length with patient. Patient is in agreement with the above and verbalized understanding.    - I discussed the goals of interventional chronic pain management with the patient on today's visit. We discussed a multimodal and systematic approach to pain.  This includes diagnostic and therapeutic injections, adjuvant pharmacologic treatment, physical therapy, and at times psychiatry.  I emphasized the importance of regular exercise, core strengthening and stretching, diet and weight loss as a cornerstone of long-term pain management.    - This condition does not require this patient to take time off of work, and the primary goal of our Pain Management services is to improve the patient's functional capacity.  - Patient Questions: Answered all of the patient's questions regarding diagnoses, therapy, treatment and next steps        Kush Jha MD    Disclaimer:  This note was prepared using voice recognition system and is likely to have sound alike errors that may have been overlooked even after proof reading.  Please call me with any questions.

## 2023-08-04 ENCOUNTER — TELEPHONE (OUTPATIENT)
Dept: PAIN MEDICINE | Facility: CLINIC | Age: 73
End: 2023-08-04
Payer: MEDICARE

## 2023-08-07 ENCOUNTER — OFFICE VISIT (OUTPATIENT)
Dept: PAIN MEDICINE | Facility: CLINIC | Age: 73
End: 2023-08-07
Payer: MEDICARE

## 2023-08-07 VITALS
RESPIRATION RATE: 17 BRPM | HEIGHT: 63 IN | WEIGHT: 218.25 LBS | SYSTOLIC BLOOD PRESSURE: 130 MMHG | BODY MASS INDEX: 38.67 KG/M2 | DIASTOLIC BLOOD PRESSURE: 76 MMHG | HEART RATE: 61 BPM

## 2023-08-07 DIAGNOSIS — M54.16 LUMBAR RADICULOPATHY: ICD-10-CM

## 2023-08-07 DIAGNOSIS — M51.36 DDD (DEGENERATIVE DISC DISEASE), LUMBAR: Primary | ICD-10-CM

## 2023-08-07 DIAGNOSIS — M47.812 CERVICAL SPONDYLOSIS: ICD-10-CM

## 2023-08-07 PROCEDURE — 1101F PR PT FALLS ASSESS DOC 0-1 FALLS W/OUT INJ PAST YR: ICD-10-PCS | Mod: CPTII,S$GLB,, | Performed by: ANESTHESIOLOGY

## 2023-08-07 PROCEDURE — 3288F PR FALLS RISK ASSESSMENT DOCUMENTED: ICD-10-PCS | Mod: CPTII,S$GLB,, | Performed by: ANESTHESIOLOGY

## 2023-08-07 PROCEDURE — 4010F ACE/ARB THERAPY RXD/TAKEN: CPT | Mod: CPTII,S$GLB,, | Performed by: ANESTHESIOLOGY

## 2023-08-07 PROCEDURE — 3075F SYST BP GE 130 - 139MM HG: CPT | Mod: CPTII,S$GLB,, | Performed by: ANESTHESIOLOGY

## 2023-08-07 PROCEDURE — 3044F PR MOST RECENT HEMOGLOBIN A1C LEVEL <7.0%: ICD-10-PCS | Mod: CPTII,S$GLB,, | Performed by: ANESTHESIOLOGY

## 2023-08-07 PROCEDURE — 1159F PR MEDICATION LIST DOCUMENTED IN MEDICAL RECORD: ICD-10-PCS | Mod: CPTII,S$GLB,, | Performed by: ANESTHESIOLOGY

## 2023-08-07 PROCEDURE — 99999 PR PBB SHADOW E&M-EST. PATIENT-LVL IV: CPT | Mod: PBBFAC,,, | Performed by: ANESTHESIOLOGY

## 2023-08-07 PROCEDURE — 3078F DIAST BP <80 MM HG: CPT | Mod: CPTII,S$GLB,, | Performed by: ANESTHESIOLOGY

## 2023-08-07 PROCEDURE — 3008F PR BODY MASS INDEX (BMI) DOCUMENTED: ICD-10-PCS | Mod: CPTII,S$GLB,, | Performed by: ANESTHESIOLOGY

## 2023-08-07 PROCEDURE — 3078F PR MOST RECENT DIASTOLIC BLOOD PRESSURE < 80 MM HG: ICD-10-PCS | Mod: CPTII,S$GLB,, | Performed by: ANESTHESIOLOGY

## 2023-08-07 PROCEDURE — 3008F BODY MASS INDEX DOCD: CPT | Mod: CPTII,S$GLB,, | Performed by: ANESTHESIOLOGY

## 2023-08-07 PROCEDURE — 3044F HG A1C LEVEL LT 7.0%: CPT | Mod: CPTII,S$GLB,, | Performed by: ANESTHESIOLOGY

## 2023-08-07 PROCEDURE — 3075F PR MOST RECENT SYSTOLIC BLOOD PRESS GE 130-139MM HG: ICD-10-PCS | Mod: CPTII,S$GLB,, | Performed by: ANESTHESIOLOGY

## 2023-08-07 PROCEDURE — 3288F FALL RISK ASSESSMENT DOCD: CPT | Mod: CPTII,S$GLB,, | Performed by: ANESTHESIOLOGY

## 2023-08-07 PROCEDURE — 99214 PR OFFICE/OUTPT VISIT, EST, LEVL IV, 30-39 MIN: ICD-10-PCS | Mod: S$GLB,,, | Performed by: ANESTHESIOLOGY

## 2023-08-07 PROCEDURE — 99214 OFFICE O/P EST MOD 30 MIN: CPT | Mod: S$GLB,,, | Performed by: ANESTHESIOLOGY

## 2023-08-07 PROCEDURE — 1101F PT FALLS ASSESS-DOCD LE1/YR: CPT | Mod: CPTII,S$GLB,, | Performed by: ANESTHESIOLOGY

## 2023-08-07 PROCEDURE — 1125F PR PAIN SEVERITY QUANTIFIED, PAIN PRESENT: ICD-10-PCS | Mod: CPTII,S$GLB,, | Performed by: ANESTHESIOLOGY

## 2023-08-07 PROCEDURE — 99999 PR PBB SHADOW E&M-EST. PATIENT-LVL IV: ICD-10-PCS | Mod: PBBFAC,,, | Performed by: ANESTHESIOLOGY

## 2023-08-07 PROCEDURE — 1125F AMNT PAIN NOTED PAIN PRSNT: CPT | Mod: CPTII,S$GLB,, | Performed by: ANESTHESIOLOGY

## 2023-08-07 PROCEDURE — 4010F PR ACE/ARB THEARPY RXD/TAKEN: ICD-10-PCS | Mod: CPTII,S$GLB,, | Performed by: ANESTHESIOLOGY

## 2023-08-07 PROCEDURE — 1159F MED LIST DOCD IN RCRD: CPT | Mod: CPTII,S$GLB,, | Performed by: ANESTHESIOLOGY

## 2023-08-09 NOTE — PROGRESS NOTES
"  [facility]   Physical Therapy Treatment Note      Name: Beth Souza  Clinic Number: [id number]    Therapy Diagnosis:   Encounter Diagnoses   Name Primary?    DDD (degenerative disc disease), lumbar     Cervical spondylosis     Lumbar radiculopathy     Neck pain, chronic Yes    Weakness        Physician: Kush Jha MD    Visit Date: 8/10/2023    Physician Orders: PT Eval and Treat  Medical Diagnosis from Referral: M54.2,G89.29 (ICD-10-CM) - Neck pain, chronic  Evaluation Date: 6/22/2023  Authorization Period Expiration: 8/17/23  Plan of Care Expiration: 8/3/23  Progress Note Due: Every 6th visit  Visit # / Visits authorized: 5/23  FOTO: 1/3     Precautions: Standard      Time In: 12:55 PM  Time Out: 1:50 PM  Total Appointment Time (timed & untimed codes): 55 minutes    PTA Visit #: 0/5       Subjective     Pt reports: Feels a lot better overall. MD sent a referral in for lumbar spine and would like to be evaluated for this next visit.    She was compliant with home exercise program.  Response to previous treatment: -  Functional change: -    Pain: 9/10  Location: B shoulder and neck    Objective      Objective Measures updated at progress report unless specified.     Treatment     Beth received the treatments listed below:      therapeutic exercises to develop strength, endurance, ROM, and flexibility for 15 minutes including:  UBE (3/3)  Shoulder circles (30 x)  Open books (15 x)     manual therapy techniques: Joint mobilizations, Manual traction, and Soft tissue Mobilization were applied to the: cervical spine for 15 minutes, including:  GII/III B UPAs T-Spine     neuromuscular re-education activities to improve: Balance, Coordination, Kinesthetic, Sense, Proprioception, and Posture for 30 minutes. The following activities were included:   Prone chin tucks (30 x 10")   Prone scapular retractions c arm lift (3 x 10)  Standing row (3 x 12, blue theraband)  Standing " Horizontal abduction (3 x 10, YTB)   Straight arm pull downs (3 x 10, GTB)      therapeutic activities to improve functional performance for - minutes, including:              Patient education (see below)     gait training to improve functional mobility and safety for -  minutes, including:  -    Patient Education and Home Exercises       Education provided:   - Cont'd HEP    Written Home Exercises Provided: Patient instructed to cont prior HEP. Exercises were reviewed and Beth was able to demonstrate them prior to the end of the session.  Beth demonstrated good  understanding of the education provided. See EMR under Patient Instructions for exercises provided during therapy sessions    Assessment     Pt demonstrates significant subjective improvements in upper body function and pain. Pt and PT discussed addition of lower back evaluation in coming visits. PT recommended an HEP to continue to manage upper body symptoms.     Beth Is progressing well towards her goals.   Pt prognosis is Good.     Pt will continue to benefit from skilled outpatient physical therapy to address the deficits listed in the problem list box on initial evaluation, provide pt/family education and to maximize pt's level of independence in the home and community environment.     Pt's spiritual, cultural and educational needs considered and pt agreeable to plan of care and goals.     Anticipated barriers to physical therapy: None    GOALS:  SHORT TERM GOALS: 3 weeks, (7/13/23) Progress   Recent signs and systems trend is improving in order to progress towards Long term goals's. Progressing    Patient will be independent with Home Exercise Program  in order to further progress and return to maximal function. Progressing   Pain rating at Worst: 5/10 in order to progress towards increased independence with activity. Progressing   Patient will be able to correct postural deviations in sitting and standing, to decrease pain and promote  postural awareness for injury prevention.  Progressing   Patient will partially meet predicted functional outcome (FOTO) score: 67% to improve towards increasing self-worth & perceived functional ability. Progressing      LONG TERM GOALS: 6 weeks, (8/3/23) Progress   Patient will return to normal Activities of daily living, recreational, and work related activities with less pain and limitation.  Progressing    Patient will improve Range of Motion to stated goals in order to return to maximal functional potential.  Progressing   Patient will improve Strength to stated goals of appropriate musculature in order to improve functional independence.  Progressing   Pain Rating at Best: 1/10 to improve Quality of Life.  Progressing   Patient will meet predicted functional outcome (FOTO) score: 70% to increase self-worth & perceived functional ability. Progressing   Patient will have met/partially met personal goal of:  Decrease shoulder and neck pain/soreness.  Progressing       Plan     Plan of care Certification: 6/22/2023 to 8/23/23.     Outpatient Physical Therapy 2 times weekly for 6 weeks to include the following interventions: Cervical/Lumbar Traction, Electrical Stimulation , Gait Training, Manual Therapy, Moist Heat/ Ice, Neuromuscular Re-ed, Orthotic Management and Training, Patient Education, Self Care, Therapeutic Activities, Therapeutic Exercise, and FDN     Mara Domingo, PT

## 2023-08-10 ENCOUNTER — CLINICAL SUPPORT (OUTPATIENT)
Dept: REHABILITATION | Facility: HOSPITAL | Age: 73
End: 2023-08-10
Attending: ANESTHESIOLOGY
Payer: MEDICARE

## 2023-08-10 DIAGNOSIS — R53.1 WEAKNESS: ICD-10-CM

## 2023-08-10 DIAGNOSIS — M54.2 NECK PAIN, CHRONIC: Primary | ICD-10-CM

## 2023-08-10 DIAGNOSIS — M54.16 LUMBAR RADICULOPATHY: ICD-10-CM

## 2023-08-10 DIAGNOSIS — G89.29 NECK PAIN, CHRONIC: Primary | ICD-10-CM

## 2023-08-10 DIAGNOSIS — M51.36 DDD (DEGENERATIVE DISC DISEASE), LUMBAR: ICD-10-CM

## 2023-08-10 DIAGNOSIS — M47.812 CERVICAL SPONDYLOSIS: ICD-10-CM

## 2023-08-10 PROCEDURE — 97110 THERAPEUTIC EXERCISES: CPT | Mod: PN

## 2023-08-10 PROCEDURE — 97140 MANUAL THERAPY 1/> REGIONS: CPT | Mod: PN

## 2023-08-10 PROCEDURE — 97112 NEUROMUSCULAR REEDUCATION: CPT | Mod: PN

## 2023-08-22 ENCOUNTER — HOSPITAL ENCOUNTER (EMERGENCY)
Facility: HOSPITAL | Age: 73
Discharge: HOME | End: 2023-08-22
Attending: EMERGENCY MEDICINE
Payer: MEDICARE

## 2023-08-22 VITALS
DIASTOLIC BLOOD PRESSURE: 77 MMHG | HEART RATE: 51 BPM | TEMPERATURE: 98 F | WEIGHT: 217.63 LBS | OXYGEN SATURATION: 100 % | RESPIRATION RATE: 16 BRPM | SYSTOLIC BLOOD PRESSURE: 121 MMHG | BODY MASS INDEX: 38.55 KG/M2

## 2023-08-22 DIAGNOSIS — S91.311A LACERATION OF RIGHT FOOT, INITIAL ENCOUNTER: Primary | ICD-10-CM

## 2023-08-22 PROCEDURE — 99283 EMERGENCY DEPT VISIT LOW MDM: CPT

## 2023-08-22 RX ORDER — SULFAMETHOXAZOLE AND TRIMETHOPRIM 800; 160 MG/1; MG/1
1 TABLET ORAL 2 TIMES DAILY
Qty: 14 TABLET | Refills: 0 | Status: SHIPPED | OUTPATIENT
Start: 2023-08-22 | End: 2023-08-29

## 2023-08-22 NOTE — ED PROVIDER NOTES
Encounter Date: 2023       History     Chief Complaint   Patient presents with    Laceration     Pt stepped on a razor blade yesterday morning.  Pt has a laceration to the heel of her right foot.  Bandage is in place in triage, and bleeding is controlled.       73-year-old female with complaint laceration to right ankle since yesterday morning.  Patient reports that she stepped on a razor blade and cut inside part of her right heel.  Patient reports that occurred yesterday morning.  Patient reports tetanus shot up-to-date.  No other complaints.        Review of patient's allergies indicates:   Allergen Reactions    Aspirin Other (See Comments)     Bruising     Past Medical History:   Diagnosis Date    Cataract     Hyperlipidemia     Hypertension     Lumbar spondylosis     Meningioma 2021    Obesity     Sleep apnea     Vitamin D deficiency      Past Surgical History:   Procedure Laterality Date    HYSTERECTOMY      PARTIAL HYSTERECTOMY      SELECTIVE INJECTION OF ANESTHETIC AGENT AROUND LUMBAR SPINAL NERVE ROOT BY TRANSFORAMINAL APPROACH Bilateral 3/21/2023    Procedure: Bilateral L5/S1 transforaminal epidural steroid injection;  Surgeon: Kush Jha MD;  Location: [facility];  Service: Pain Management;  Laterality: Bilateral;     Family History   Problem Relation Age of Onset    Hypertension Mother     Diabetes Daughter     Melanoma Neg Hx     Psoriasis Neg Hx     Lupus Neg Hx     Eczema Neg Hx     Breast cancer Neg Hx     Colon cancer Neg Hx     Ovarian cancer Neg Hx     Thrombosis Neg Hx      Social History     Tobacco Use    Smoking status: Former     Current packs/day: 0.00     Types: Cigarettes     Quit date: 2004     Years since quittin.6    Smokeless tobacco: Never   Substance Use Topics    Alcohol use: Yes     Comment: occasionally    Drug use: No     Review of Systems   Constitutional:  Negative for fever.   HENT:  Negative for sore throat.    Respiratory:  Negative for shortness of  breath.    Cardiovascular:  Negative for chest pain.   Gastrointestinal:  Negative for nausea.   Genitourinary:  Negative for dysuria.   Musculoskeletal:  Negative for back pain.   Skin:  Positive for wound. Negative for rash.   Neurological:  Negative for weakness.   Hematological:  Does not bruise/bleed easily.       Physical Exam     Initial Vitals [08/22/23 1229]   BP Pulse Resp Temp SpO2   121/77 (!) 51 16 97.9 °F (36.6 °C) 100 %      MAP       --         Physical Exam    Nursing note and vitals reviewed.  Constitutional: She appears well-developed and well-nourished.   HENT:   Head: Normocephalic and atraumatic.   Eyes: Conjunctivae and EOM are normal. Pupils are equal, round, and reactive to light.   Neck: Neck supple.   Normal range of motion.  Cardiovascular:  Normal rate, regular rhythm, normal heart sounds and intact distal pulses.           Pulmonary/Chest: Breath sounds normal.   Abdominal: Abdomen is soft. There is no abdominal tenderness. There is no rebound and no guarding.   Musculoskeletal:         General: Normal range of motion.      Cervical back: Normal range of motion and neck supple.     Neurological: She is alert and oriented to person, place, and time. She has normal strength and normal reflexes.   Skin: Skin is warm and dry.   1 cm well approximated laceration right medial heel   Psychiatric: She has a normal mood and affect. Her behavior is normal. Thought content normal.         ED Course   Procedures  Labs Reviewed   HIV 1 / 2 ANTIBODY   HEPATITIS C ANTIBODY   HEP C VIRUS HOLD SPECIMEN          Imaging Results    None          Medications - No data to display  Medical Decision Making     12:41 PM  Wound cleansed with normal saline and dressed with nonadherent dressing and Ace bandage                        Clinical Impression:   Final diagnoses:  [S91.311A] Laceration of right foot, initial encounter (Primary)        ED Disposition Condition    Discharge Stable          ED Prescriptions     None       Follow-up Information       Follow up With Specialties Details Why Contact Info    dEna Oliva MD Family Medicine Schedule an appointment as soon as possible for a visit  As needed [id number] [facility] [place] [address]  [phone]               Gene Smith, NP  08/22/23 1248

## 2023-08-28 ENCOUNTER — OFFICE VISIT (OUTPATIENT)
Dept: INTERNAL MEDICINE | Facility: CLINIC | Age: 73
End: 2023-08-28
Payer: MEDICARE

## 2023-08-28 VITALS
HEIGHT: 63 IN | HEART RATE: 61 BPM | BODY MASS INDEX: 38.75 KG/M2 | WEIGHT: 218.69 LBS | DIASTOLIC BLOOD PRESSURE: 84 MMHG | OXYGEN SATURATION: 96 % | SYSTOLIC BLOOD PRESSURE: 126 MMHG | RESPIRATION RATE: 20 BRPM

## 2023-08-28 DIAGNOSIS — M47.22 OSTEOARTHRITIS OF SPINE WITH RADICULOPATHY, CERVICAL REGION: ICD-10-CM

## 2023-08-28 DIAGNOSIS — I10 ESSENTIAL HYPERTENSION: ICD-10-CM

## 2023-08-28 DIAGNOSIS — E55.9 VITAMIN D DEFICIENCY: ICD-10-CM

## 2023-08-28 DIAGNOSIS — M47.26 OSTEOARTHRITIS OF SPINE WITH RADICULOPATHY, LUMBAR REGION: ICD-10-CM

## 2023-08-28 DIAGNOSIS — S91.311A LACERATION OF SKIN OF RIGHT FOOT, INITIAL ENCOUNTER: Primary | ICD-10-CM

## 2023-08-28 DIAGNOSIS — E66.01 SEVERE OBESITY WITH BODY MASS INDEX (BMI) OF 35.0 TO 39.9 WITH SERIOUS COMORBIDITY: ICD-10-CM

## 2023-08-28 PROCEDURE — 3044F PR MOST RECENT HEMOGLOBIN A1C LEVEL <7.0%: ICD-10-PCS | Mod: CPTII,S$GLB,, | Performed by: FAMILY MEDICINE

## 2023-08-28 PROCEDURE — 1101F PR PT FALLS ASSESS DOC 0-1 FALLS W/OUT INJ PAST YR: ICD-10-PCS | Mod: CPTII,S$GLB,, | Performed by: FAMILY MEDICINE

## 2023-08-28 PROCEDURE — 99999 PR PBB SHADOW E&M-EST. PATIENT-LVL III: CPT | Mod: PBBFAC,,, | Performed by: FAMILY MEDICINE

## 2023-08-28 PROCEDURE — 3288F FALL RISK ASSESSMENT DOCD: CPT | Mod: CPTII,S$GLB,, | Performed by: FAMILY MEDICINE

## 2023-08-28 PROCEDURE — 3044F HG A1C LEVEL LT 7.0%: CPT | Mod: CPTII,S$GLB,, | Performed by: FAMILY MEDICINE

## 2023-08-28 PROCEDURE — 99213 PR OFFICE/OUTPT VISIT, EST, LEVL III, 20-29 MIN: ICD-10-PCS | Mod: S$GLB,,, | Performed by: FAMILY MEDICINE

## 2023-08-28 PROCEDURE — 4010F PR ACE/ARB THEARPY RXD/TAKEN: ICD-10-PCS | Mod: CPTII,S$GLB,, | Performed by: FAMILY MEDICINE

## 2023-08-28 PROCEDURE — 1126F AMNT PAIN NOTED NONE PRSNT: CPT | Mod: CPTII,S$GLB,, | Performed by: FAMILY MEDICINE

## 2023-08-28 PROCEDURE — 3079F DIAST BP 80-89 MM HG: CPT | Mod: CPTII,S$GLB,, | Performed by: FAMILY MEDICINE

## 2023-08-28 PROCEDURE — 1160F PR REVIEW ALL MEDS BY PRESCRIBER/CLIN PHARMACIST DOCUMENTED: ICD-10-PCS | Mod: CPTII,S$GLB,, | Performed by: FAMILY MEDICINE

## 2023-08-28 PROCEDURE — 4010F ACE/ARB THERAPY RXD/TAKEN: CPT | Mod: CPTII,S$GLB,, | Performed by: FAMILY MEDICINE

## 2023-08-28 PROCEDURE — 3074F SYST BP LT 130 MM HG: CPT | Mod: CPTII,S$GLB,, | Performed by: FAMILY MEDICINE

## 2023-08-28 PROCEDURE — 3079F PR MOST RECENT DIASTOLIC BLOOD PRESSURE 80-89 MM HG: ICD-10-PCS | Mod: CPTII,S$GLB,, | Performed by: FAMILY MEDICINE

## 2023-08-28 PROCEDURE — 1126F PR PAIN SEVERITY QUANTIFIED, NO PAIN PRESENT: ICD-10-PCS | Mod: CPTII,S$GLB,, | Performed by: FAMILY MEDICINE

## 2023-08-28 PROCEDURE — 99999 PR PBB SHADOW E&M-EST. PATIENT-LVL III: ICD-10-PCS | Mod: PBBFAC,,, | Performed by: FAMILY MEDICINE

## 2023-08-28 PROCEDURE — 1159F MED LIST DOCD IN RCRD: CPT | Mod: CPTII,S$GLB,, | Performed by: FAMILY MEDICINE

## 2023-08-28 PROCEDURE — 3288F PR FALLS RISK ASSESSMENT DOCUMENTED: ICD-10-PCS | Mod: CPTII,S$GLB,, | Performed by: FAMILY MEDICINE

## 2023-08-28 PROCEDURE — 3008F BODY MASS INDEX DOCD: CPT | Mod: CPTII,S$GLB,, | Performed by: FAMILY MEDICINE

## 2023-08-28 PROCEDURE — 99213 OFFICE O/P EST LOW 20 MIN: CPT | Mod: S$GLB,,, | Performed by: FAMILY MEDICINE

## 2023-08-28 PROCEDURE — 1159F PR MEDICATION LIST DOCUMENTED IN MEDICAL RECORD: ICD-10-PCS | Mod: CPTII,S$GLB,, | Performed by: FAMILY MEDICINE

## 2023-08-28 PROCEDURE — 1101F PT FALLS ASSESS-DOCD LE1/YR: CPT | Mod: CPTII,S$GLB,, | Performed by: FAMILY MEDICINE

## 2023-08-28 PROCEDURE — 1160F RVW MEDS BY RX/DR IN RCRD: CPT | Mod: CPTII,S$GLB,, | Performed by: FAMILY MEDICINE

## 2023-08-28 PROCEDURE — 3074F PR MOST RECENT SYSTOLIC BLOOD PRESSURE < 130 MM HG: ICD-10-PCS | Mod: CPTII,S$GLB,, | Performed by: FAMILY MEDICINE

## 2023-08-28 PROCEDURE — 3008F PR BODY MASS INDEX (BMI) DOCUMENTED: ICD-10-PCS | Mod: CPTII,S$GLB,, | Performed by: FAMILY MEDICINE

## 2023-08-28 NOTE — PROGRESS NOTES
"Subjective:       Patient ID: Beth Souza is a 73 y.o. female.    Chief Complaint: Follow-up    73-year-old  female patient with Patient Active Problem List:     Essential hypertension     Vitamin D deficiency     Mixed hyperlipidemia     Cataract     Osteoarthritis of spine with radiculopathy, lumbar region     Severe obesity with body mass index (BMI) of 35.0 to 39.9 with serious comorbidity     LEN on CPAP     Mild cognitive impairment with memory loss     Meningioma of cerebellum     Multiple thyroid nodules     History of COVID-19     Primary osteoarthritis of both hips     Calcification of aorta     History of hyperparathyroidism     Prediabetes     Lumbar radiculopathy     Major neurocognitive disorder due to another medical condition     Neck pain, chronic     Weakness  Here reports that patient accidentally stepped on the razor blade which she dropped accidentally and had laceration to the right heel, patient does not recall her last tetanus booster, has been sent home on Bactrim which she is currently finishing it.   Patient went to ER on 08/22/2023  Denies any significant pain and has been doing well and applying Neosporin  Here for follow-up      Review of Systems   Constitutional:  Negative for fever.   Skin:  Positive for wound.         /84 (BP Location: Right arm, Patient Position: Sitting, BP Method: Large (Manual))   Pulse 61   Resp 20   Ht 5' 3" (1.6 m)   Wt 99.2 kg (218 lb 11.1 oz)   SpO2 96%   BMI 38.74 kg/m²   Objective:      Physical Exam  Cardiovascular:      Rate and Rhythm: Normal rate and regular rhythm.   Pulmonary:      Effort: Pulmonary effort is normal.      Breath sounds: Normal breath sounds.   Musculoskeletal:         General: Tenderness present.   Skin:     Findings: Lesion present. No erythema.      Comments: Noted minor laceration to the right heel, healing well.  No erythema or drainage noted   Neurological:      Mental Status: She is alert. "   Psychiatric:         Mood and Affect: Mood normal.           Assessment/Plan:   1. Laceration of skin of right foot, initial encounter  Patient is currently finishing up antibiotic Bactrim  Wound looks gradually healing  Patient was encouraged to get tetanus booster at the pharmacy downstairs    2. Essential hypertension  Blood pressure is stable currently taking lisinopril hydrochlorothiazide 20/25 mg    3. Severe obesity with body mass index (BMI) of 35.0 to 39.9 with serious comorbidity  Lifestyle modifications discussed to lose weight with BMI 38    4. Osteoarthritis of spine with radiculopathy, cervical region  5. Osteoarthritis of spine with radiculopathy, lumbar region  Stable    6. Vitamin D deficiency

## 2023-09-18 ENCOUNTER — OFFICE VISIT (OUTPATIENT)
Dept: OPHTHALMOLOGY | Facility: CLINIC | Age: 73
End: 2023-09-18
Payer: MEDICARE

## 2023-09-18 DIAGNOSIS — H04.123 BILATERAL DRY EYES: ICD-10-CM

## 2023-09-18 DIAGNOSIS — H40.013 OPEN ANGLE WITH BORDERLINE FINDINGS OF BOTH EYES: Primary | ICD-10-CM

## 2023-09-18 PROCEDURE — 92012 INTRM OPH EXAM EST PATIENT: CPT | Mod: S$GLB,,, | Performed by: OPTOMETRIST

## 2023-09-18 PROCEDURE — 1159F MED LIST DOCD IN RCRD: CPT | Mod: CPTII,S$GLB,, | Performed by: OPTOMETRIST

## 2023-09-18 PROCEDURE — 1159F PR MEDICATION LIST DOCUMENTED IN MEDICAL RECORD: ICD-10-PCS | Mod: CPTII,S$GLB,, | Performed by: OPTOMETRIST

## 2023-09-18 PROCEDURE — 3044F HG A1C LEVEL LT 7.0%: CPT | Mod: CPTII,S$GLB,, | Performed by: OPTOMETRIST

## 2023-09-18 PROCEDURE — 4010F PR ACE/ARB THEARPY RXD/TAKEN: ICD-10-PCS | Mod: CPTII,S$GLB,, | Performed by: OPTOMETRIST

## 2023-09-18 PROCEDURE — 1160F PR REVIEW ALL MEDS BY PRESCRIBER/CLIN PHARMACIST DOCUMENTED: ICD-10-PCS | Mod: CPTII,S$GLB,, | Performed by: OPTOMETRIST

## 2023-09-18 PROCEDURE — 99999 PR PBB SHADOW E&M-EST. PATIENT-LVL II: CPT | Mod: PBBFAC,,, | Performed by: OPTOMETRIST

## 2023-09-18 PROCEDURE — 92012 PR EYE EXAM, EST PATIENT,INTERMED: ICD-10-PCS | Mod: S$GLB,,, | Performed by: OPTOMETRIST

## 2023-09-18 PROCEDURE — 3044F PR MOST RECENT HEMOGLOBIN A1C LEVEL <7.0%: ICD-10-PCS | Mod: CPTII,S$GLB,, | Performed by: OPTOMETRIST

## 2023-09-18 PROCEDURE — 4010F ACE/ARB THERAPY RXD/TAKEN: CPT | Mod: CPTII,S$GLB,, | Performed by: OPTOMETRIST

## 2023-09-18 PROCEDURE — 99999 PR PBB SHADOW E&M-EST. PATIENT-LVL II: ICD-10-PCS | Mod: PBBFAC,,, | Performed by: OPTOMETRIST

## 2023-09-18 PROCEDURE — 1160F RVW MEDS BY RX/DR IN RCRD: CPT | Mod: CPTII,S$GLB,, | Performed by: OPTOMETRIST

## 2023-09-18 NOTE — PROGRESS NOTES
HPI     Glaucoma Suspect            Comments: Medication eye drops: none  Last HVF: 3/22/23  Last gOCT: 3/22/23  Last SDP: none         Last edited by Melissa Bonilla MA on 9/18/2023  2:00 PM.            Assessment /Plan     For exam results, see Encounter Report.    Open angle with borderline findings of both eyes  IOP normal today OD, OS  No evidence of disease at this time  Monitor 6 months    Bilateral dry eyes  Recommended iVizia bid OU      RTC 6 months for 24-2VF, gOCT and dilated exam or PRN  Discussed above and all questions were answered.

## 2023-09-25 ENCOUNTER — OFFICE VISIT (OUTPATIENT)
Dept: PODIATRY | Facility: CLINIC | Age: 73
End: 2023-09-25
Payer: MEDICARE

## 2023-09-25 VITALS — BODY MASS INDEX: 38.62 KG/M2 | WEIGHT: 218 LBS | HEIGHT: 63 IN

## 2023-09-25 DIAGNOSIS — M20.30 ACQUIRED VARUS DEFORMITY OF TOE: ICD-10-CM

## 2023-09-25 DIAGNOSIS — L84 CORN OR CALLUS: ICD-10-CM

## 2023-09-25 DIAGNOSIS — M20.30 ACQUIRED VARUS DEFORMITY OF TOE: Primary | ICD-10-CM

## 2023-09-25 DIAGNOSIS — E66.01 SEVERE OBESITY: ICD-10-CM

## 2023-09-25 PROCEDURE — 1160F RVW MEDS BY RX/DR IN RCRD: CPT | Mod: CPTII,S$GLB,, | Performed by: PODIATRIST

## 2023-09-25 PROCEDURE — 3008F BODY MASS INDEX DOCD: CPT | Mod: CPTII,S$GLB,, | Performed by: PODIATRIST

## 2023-09-25 PROCEDURE — 4010F ACE/ARB THERAPY RXD/TAKEN: CPT | Mod: CPTII,S$GLB,, | Performed by: PODIATRIST

## 2023-09-25 PROCEDURE — 1159F MED LIST DOCD IN RCRD: CPT | Mod: CPTII,S$GLB,, | Performed by: PODIATRIST

## 2023-09-25 PROCEDURE — 99999 PR PBB SHADOW E&M-EST. PATIENT-LVL II: ICD-10-PCS | Mod: PBBFAC,,, | Performed by: PODIATRIST

## 2023-09-25 PROCEDURE — 3044F PR MOST RECENT HEMOGLOBIN A1C LEVEL <7.0%: ICD-10-PCS | Mod: CPTII,S$GLB,, | Performed by: PODIATRIST

## 2023-09-25 PROCEDURE — 99213 PR OFFICE/OUTPT VISIT, EST, LEVL III, 20-29 MIN: ICD-10-PCS | Mod: S$GLB,,, | Performed by: PODIATRIST

## 2023-09-25 PROCEDURE — 3044F HG A1C LEVEL LT 7.0%: CPT | Mod: CPTII,S$GLB,, | Performed by: PODIATRIST

## 2023-09-25 PROCEDURE — 1160F PR REVIEW ALL MEDS BY PRESCRIBER/CLIN PHARMACIST DOCUMENTED: ICD-10-PCS | Mod: CPTII,S$GLB,, | Performed by: PODIATRIST

## 2023-09-25 PROCEDURE — 3008F PR BODY MASS INDEX (BMI) DOCUMENTED: ICD-10-PCS | Mod: CPTII,S$GLB,, | Performed by: PODIATRIST

## 2023-09-25 PROCEDURE — 99999 PR PBB SHADOW E&M-EST. PATIENT-LVL II: CPT | Mod: PBBFAC,,, | Performed by: PODIATRIST

## 2023-09-25 PROCEDURE — 1159F PR MEDICATION LIST DOCUMENTED IN MEDICAL RECORD: ICD-10-PCS | Mod: CPTII,S$GLB,, | Performed by: PODIATRIST

## 2023-09-25 PROCEDURE — 99213 OFFICE O/P EST LOW 20 MIN: CPT | Mod: S$GLB,,, | Performed by: PODIATRIST

## 2023-09-25 PROCEDURE — 4010F PR ACE/ARB THEARPY RXD/TAKEN: ICD-10-PCS | Mod: CPTII,S$GLB,, | Performed by: PODIATRIST

## 2023-09-25 NOTE — PROGRESS NOTES
Subjective:       Patient ID: Beth Souza is a 73 y.o. female.    Chief Complaint: Ingrown Toenail (Pt c/o [place] ingrown toenail, pain 9/10, Nondiabetic wearing socks and sneakers, Last saw PCP Edna Oliva MD at 2023.)      HPI: Beth Souza presents to the office today, with complaints of pains to the left and right foot at the 4th digit.  She states increased pain with ambulation.  Pain is 9/10.  Denies any recent trauma or injury.  Pain is worsened with in closed shoe gear.  States alternation of shoe gear has not been helpful. Patient's Primary Care Provider is Edna Oliva MD.     Review of patient's allergies indicates:   Allergen Reactions    Aspirin Other (See Comments)     Bruising       Past Medical History:   Diagnosis Date    Cataract     Hyperlipidemia     Hypertension     Lumbar spondylosis     Meningioma 2021    Obesity     Sleep apnea     Vitamin D deficiency        Family History   Problem Relation Age of Onset    Hypertension Mother     Diabetes Daughter     Melanoma Neg Hx     Psoriasis Neg Hx     Lupus Neg Hx     Eczema Neg Hx     Breast cancer Neg Hx     Colon cancer Neg Hx     Ovarian cancer Neg Hx     Thrombosis Neg Hx        Social History     Socioeconomic History    Marital status:     Number of children: 3   Occupational History    Occupation: Cleaning [employer]     Employer: not employed   Tobacco Use    Smoking status: Former     Current packs/day: 0.00     Types: Cigarettes     Quit date: 2004     Years since quittin.7    Smokeless tobacco: Never   Substance and Sexual Activity    Alcohol use: Yes     Comment: occasionally    Drug use: No    Sexual activity: Not Currently     Partners: Male   Other Topics Concern    Are you pregnant or think you may be? No    Breast-feeding No   Social History Narrative    Live w/ alone      Social Determinants of Health     Financial Resource Strain: Low Risk  (2022)    Overall Financial Resource Strain  "(CARDIA)     Difficulty of Paying Living Expenses: Not hard at all   Food Insecurity: No Food Insecurity (7/12/2022)    Hunger Vital Sign     Worried About Running Out of Food in the Last Year: Never true     Ran Out of Food in the Last Year: Never true   Transportation Needs: No Transportation Needs (7/12/2022)    PRAPARE - Transportation     Lack of Transportation (Medical): No     Lack of Transportation (Non-Medical): No   Physical Activity: Inactive (7/12/2022)    Exercise Vital Sign     Days of Exercise per Week: 0 days     Minutes of Exercise per Session: 0 min   Stress: No Stress Concern Present (7/12/2022)    [place] [employer] - Occupational Stress Questionnaire     Feeling of Stress : Not at all   Social Connections: Moderately Isolated (7/12/2022)    Social Connection and Isolation Panel [NHANES]     Frequency of Communication with Friends and Family: More than three times a week     Frequency of Social Gatherings with Friends and Family: More than three times a week     Attends Judaism Services: More than 4 times per year     Active Member of Clubs or Organizations: No     Attends Club or Organization Meetings: Never     Marital Status:    Housing Stability: Low Risk  (7/12/2022)    Housing Stability Vital Sign     Unable to Pay for Housing in the Last Year: No     Number of Places Lived in the Last Year: 1     Unstable Housing in the Last Year: No       Past Surgical History:   Procedure Laterality Date    HYSTERECTOMY      PARTIAL HYSTERECTOMY      SELECTIVE INJECTION OF ANESTHETIC AGENT AROUND LUMBAR SPINAL NERVE ROOT BY TRANSFORAMINAL APPROACH Bilateral 3/21/2023    Procedure: Bilateral L5/S1 transforaminal epidural steroid injection;  Surgeon: Kush Jha MD;  Location: [facility];  Service: Pain Management;  Laterality: Bilateral;       Review of Systems      Objective:   Ht 5' 3" (1.6 m)   Wt 98.9 kg (218 lb)   BMI 38.62 kg/m²     US Soft Tissue Chest_Upper " Back  Narrative: EXAM: US SOFT TISSUE CHEST_UPPER BACK    CLINICAL HISTORY: Right flank palpable abnormality.    FINDINGS:  Multiple real-time grayscale and color Doppler sonographic images were obtained of the subcutaneous tissues of the right upper back.  Within the area of concern are multiple well-defined, hyperechoic lesions most consistent with lipomas.  The largest lies toward the right and measures 1.7 x 1.2 x 1.5 cm.  It lies about 5 mm deep to the skin surface.  There are additional lipomas in the area of concern with the next largest toward the left measuring 1.3 x 0.9 x 1.6 cm.  2 smaller lipomas are subcentimeter in size.  No focal fluid collections or abnormal vascular signal.  Impression: Multiple lipomas within the area of concern as described above.    Finalized on: 7/11/2023 3:31 PM By:  Perico Oleary MD  [facility]# [id number]      2023-07-11 15:33:28.222    [facility]        Physical Exam    LOWER EXTREMITY PHYSICAL EXAMINATION    DERMATOLOGY: Skin is supple, dry and intact. No ecchymosis is noted.  Distal callus formation present to the 4th digits bilaterally.  No signs of underlying ulceration No erythema or cellulitis is noted.     ORTHOPEDIC:  Varus rotation of the 4th digit to the right foot and varus rotation of the 4th digit to left foot.  No history of previous amputation.    VASCULAR: The right dorsalis pedis pulse 2/4 and the right posterior tibial pulse 2/4.  The left dorsalis pedis pulse 2/4 and posterior tibial pulse on the left is 2/4.  Capillary refill is intact.  Pedal hair growth intact  NEUROLOGY: Protective sensation is intact via 5.07 [place] Yuniel monofilament. Proprioception is intact. Sensation to light touch is intact.     Assessment:     1. Acquired varus deformity of toe - Right Foot    2. Acquired varus deformity of toe - Left Foot    3. Corn or callus    4. Severe obesity          Plan:     Acquired varus deformity of toe - Right Foot    Acquired varus deformity of toe - Left  Foot    Corn or callus    Severe obesity      Thorough discussion is had with the patient today, concerning the diagnosis, its etiology, and the treatment algorithm at present.    This patient does have hammertoe (digital) contractures. I did advise the patient to ambulate with shoe gear that is high in the tox box to allow for extra room and depth in the sagittal plane, in order to alleviate and lessen the potential for dorsal digital break down at the IPJs. I do also recommended shoe gear that is soft and supple in the foot bed as to lessen the potential for plantar distal digital break down at the contracted digits. If the patient does not feel the aforementioned is necessary, he or she may also purchase OTC padding devices to be worn across the MTPJ, at the distal aspects of the digits, and/or at the dorsal aspects of the IPJs. The patient does acknowledge understanding and is said to be amenable to compliance.     Discussed potential need for corrective surgical procedure to de rotate the 4th digit bilaterally.  Recommend patient to continue with conservative therapy until all conservative options fail.    Hypertrophic skin formation, as outlined within the examination portion of this note, is surgically debrided with sharp #10/#15 blade, to alleviate discomfort with weight bearing and ambulation, and to lessen the possibility of skin complications, e.g., ulceration due to pressure. No ulceration(s) is are noted with/post debridement. The lesion is completed healed and resolved. No evidence of infection.       Future Appointments   Date Time Provider Department Center   10/25/2023  3:40 PM [facility]   11/7/2023 12:45 PM Kush Jha MD [facility] INT CHRIS [facility]   12/11/2023  1:20 PM Sanchez Tineo MD [facility] CARDIO [facility]   12/18/2023  9:00 AM Edna Oliva MD VC IM [facility]   3/18/2024 10:00 AM FIELDS, VISUAL-ONE [facility] OPHTHAL [facility]   3/18/2024 10:30 AM  [facility] Aranda[facility]

## 2023-10-10 ENCOUNTER — TELEPHONE (OUTPATIENT)
Dept: PULMONOLOGY | Facility: CLINIC | Age: 73
End: 2023-10-10
Payer: MEDICARE

## 2023-10-10 DIAGNOSIS — I10 ESSENTIAL HYPERTENSION: ICD-10-CM

## 2023-10-10 RX ORDER — LISINOPRIL AND HYDROCHLOROTHIAZIDE 20; 25 MG/1; MG/1
1 TABLET ORAL DAILY
Qty: 90 TABLET | Refills: 2 | Status: SHIPPED | OUTPATIENT
Start: 2023-10-10

## 2023-10-10 NOTE — TELEPHONE ENCOUNTER
Refill Routing Note   Medication(s) are not appropriate for processing by [facility] for the following reason(s):      No active prescription written by provider    [facility] action(s):  Defer  Approve Care Due:  None identified            Appointments  past 12m or future 3m with PCP    Date Provider   Last Visit   8/28/2023 Edna Oliva MD   Next Visit   12/18/2023 Edna Oliva MD   ED visits in past 90 days: 1        Note composed:6:05 PM 10/10/2023

## 2023-10-10 NOTE — TELEPHONE ENCOUNTER
No care due was identified.  [facility] Embedded Care Due Messages. Reference number: [id number].   10/10/2023 3:26:48 PM CDT

## 2023-10-10 NOTE — TELEPHONE ENCOUNTER
S/w pt and was informed that she will see pulmonology regarding her sleep apnea which may be causing her to feel tired. Pt will give a call back to scx appt if needed./Mono

## 2023-10-10 NOTE — TELEPHONE ENCOUNTER
----- Message from Lizbeth Barriga sent at 10/10/2023  2:24 PM CDT -----  Contact: Beth [phone]  Would like to receive medical advice.    Would they like a call back or a response via [facility]:  call back    Additional information:  pt is calling to see about getting an appt a little sooner then 10/25/23 with the provider. Pt states she also needs a new sleep daniel machine, pt states she has not been to sleep since. Please call Beth back for advice.

## 2023-10-10 NOTE — TELEPHONE ENCOUNTER
----- Message from Lizbeth Barriga sent at 10/10/2023  2:27 PM CDT -----  Contact: Beth [phone]  Would like to receive medical advice.    Would they like a call back or a response via [facility]:  call back    Additional information:  pt is calling to schedule an appt. Pt states she is very tired and would like to see what the provider would like to recommend or do. Please call Beth back for advice.

## 2023-10-11 ENCOUNTER — OFFICE VISIT (OUTPATIENT)
Dept: PULMONOLOGY | Facility: CLINIC | Age: 73
End: 2023-10-11
Payer: MEDICARE

## 2023-10-11 VITALS
SYSTOLIC BLOOD PRESSURE: 128 MMHG | DIASTOLIC BLOOD PRESSURE: 78 MMHG | BODY MASS INDEX: 38.87 KG/M2 | OXYGEN SATURATION: 99 % | HEIGHT: 63 IN | WEIGHT: 219.38 LBS | HEART RATE: 53 BPM | RESPIRATION RATE: 18 BRPM

## 2023-10-11 DIAGNOSIS — I10 ESSENTIAL HYPERTENSION: ICD-10-CM

## 2023-10-11 DIAGNOSIS — E66.01 SEVERE OBESITY WITH BODY MASS INDEX (BMI) OF 35.0 TO 39.9 WITH SERIOUS COMORBIDITY: ICD-10-CM

## 2023-10-11 DIAGNOSIS — G47.33 OSA ON CPAP: Primary | ICD-10-CM

## 2023-10-11 PROCEDURE — 3008F PR BODY MASS INDEX (BMI) DOCUMENTED: ICD-10-PCS | Mod: CPTII,S$GLB,, | Performed by: PHYSICIAN ASSISTANT

## 2023-10-11 PROCEDURE — 1160F RVW MEDS BY RX/DR IN RCRD: CPT | Mod: CPTII,S$GLB,, | Performed by: PHYSICIAN ASSISTANT

## 2023-10-11 PROCEDURE — 3044F HG A1C LEVEL LT 7.0%: CPT | Mod: CPTII,S$GLB,, | Performed by: PHYSICIAN ASSISTANT

## 2023-10-11 PROCEDURE — 3074F PR MOST RECENT SYSTOLIC BLOOD PRESSURE < 130 MM HG: ICD-10-PCS | Mod: CPTII,S$GLB,, | Performed by: PHYSICIAN ASSISTANT

## 2023-10-11 PROCEDURE — 3008F BODY MASS INDEX DOCD: CPT | Mod: CPTII,S$GLB,, | Performed by: PHYSICIAN ASSISTANT

## 2023-10-11 PROCEDURE — 3288F FALL RISK ASSESSMENT DOCD: CPT | Mod: CPTII,S$GLB,, | Performed by: PHYSICIAN ASSISTANT

## 2023-10-11 PROCEDURE — 99999 PR PBB SHADOW E&M-EST. PATIENT-LVL III: CPT | Mod: PBBFAC,,, | Performed by: PHYSICIAN ASSISTANT

## 2023-10-11 PROCEDURE — 4010F PR ACE/ARB THEARPY RXD/TAKEN: ICD-10-PCS | Mod: CPTII,S$GLB,, | Performed by: PHYSICIAN ASSISTANT

## 2023-10-11 PROCEDURE — 3078F DIAST BP <80 MM HG: CPT | Mod: CPTII,S$GLB,, | Performed by: PHYSICIAN ASSISTANT

## 2023-10-11 PROCEDURE — 99999 PR PBB SHADOW E&M-EST. PATIENT-LVL III: ICD-10-PCS | Mod: PBBFAC,,, | Performed by: PHYSICIAN ASSISTANT

## 2023-10-11 PROCEDURE — 3044F PR MOST RECENT HEMOGLOBIN A1C LEVEL <7.0%: ICD-10-PCS | Mod: CPTII,S$GLB,, | Performed by: PHYSICIAN ASSISTANT

## 2023-10-11 PROCEDURE — 3078F PR MOST RECENT DIASTOLIC BLOOD PRESSURE < 80 MM HG: ICD-10-PCS | Mod: CPTII,S$GLB,, | Performed by: PHYSICIAN ASSISTANT

## 2023-10-11 PROCEDURE — 3074F SYST BP LT 130 MM HG: CPT | Mod: CPTII,S$GLB,, | Performed by: PHYSICIAN ASSISTANT

## 2023-10-11 PROCEDURE — 1159F PR MEDICATION LIST DOCUMENTED IN MEDICAL RECORD: ICD-10-PCS | Mod: CPTII,S$GLB,, | Performed by: PHYSICIAN ASSISTANT

## 2023-10-11 PROCEDURE — 1159F MED LIST DOCD IN RCRD: CPT | Mod: CPTII,S$GLB,, | Performed by: PHYSICIAN ASSISTANT

## 2023-10-11 PROCEDURE — 1101F PT FALLS ASSESS-DOCD LE1/YR: CPT | Mod: CPTII,S$GLB,, | Performed by: PHYSICIAN ASSISTANT

## 2023-10-11 PROCEDURE — 1160F PR REVIEW ALL MEDS BY PRESCRIBER/CLIN PHARMACIST DOCUMENTED: ICD-10-PCS | Mod: CPTII,S$GLB,, | Performed by: PHYSICIAN ASSISTANT

## 2023-10-11 PROCEDURE — 99214 OFFICE O/P EST MOD 30 MIN: CPT | Mod: S$GLB,,, | Performed by: PHYSICIAN ASSISTANT

## 2023-10-11 PROCEDURE — 3288F PR FALLS RISK ASSESSMENT DOCUMENTED: ICD-10-PCS | Mod: CPTII,S$GLB,, | Performed by: PHYSICIAN ASSISTANT

## 2023-10-11 PROCEDURE — 99214 PR OFFICE/OUTPT VISIT, EST, LEVL IV, 30-39 MIN: ICD-10-PCS | Mod: S$GLB,,, | Performed by: PHYSICIAN ASSISTANT

## 2023-10-11 PROCEDURE — 1101F PR PT FALLS ASSESS DOC 0-1 FALLS W/OUT INJ PAST YR: ICD-10-PCS | Mod: CPTII,S$GLB,, | Performed by: PHYSICIAN ASSISTANT

## 2023-10-11 PROCEDURE — 4010F ACE/ARB THERAPY RXD/TAKEN: CPT | Mod: CPTII,S$GLB,, | Performed by: PHYSICIAN ASSISTANT

## 2023-10-11 RX ORDER — ATORVASTATIN CALCIUM 20 MG/1
20 TABLET, FILM COATED ORAL
Qty: 90 TABLET | Refills: 0 | Status: SHIPPED | OUTPATIENT
Start: 2023-10-11 | End: 2024-01-02 | Stop reason: SDUPTHER

## 2023-10-11 NOTE — PROGRESS NOTES
Subjective:       Patient ID: Beth Souza is a 73 y.o. female.    Chief Complaint: Sleep Apnea      10/11/2023  Here for LEN on CPAP follow up  Benefits from CPAP but complains of insomnia  She has stopped using CPAP due to insomnia  She denies any specific issues with mask or pressure  Gets in bed 8:30pm, watches tv, falls asleep different times every night  Took melatonin which helped but says she did not want to depend on this  Last CPAP usage was March this year, CPAP optimally controlled her sleep apnea with AHI 2, no leak or other issues with machine        10/11/2023     8:49 AM   EPWORTH SLEEPINESS SCALE   Sitting and reading 0   Watching TV 0   Sitting, inactive in a public place (e.g. a theatre or a meeting) 0   As a passenger in a car for an hour without a break 0   Lying down to rest in the afternoon when circumstances permit 0   Sitting and talking to someone 0   Sitting quietly after a lunch without alcohol 0   In a car, while stopped for a few minutes in traffic 0   Total score 0       Immunization History   Administered Date(s) Administered    COVID-19, MRNA, LN-S, PF (MODERNA FULL 0.5 ML DOSE) 02/27/2021, 03/27/2021, 11/16/2021    Influenza (FLUAD) - Quadrivalent - Adjuvanted - PF *Preferred* (65+) 10/12/2020, 12/06/2022    Influenza - High Dose - PF (65 years and older) 10/16/2017, 10/30/2018, 09/16/2019    Influenza - Quadrivalent - PF *Preferred* (6 months and older) 10/28/2015    PPD Test 06/18/2018    Pneumococcal Conjugate - 13 Valent 05/01/2017    Pneumococcal Polysaccharide - 23 Valent 05/14/2018    Tdap 08/28/2023      Tobacco Use: Medium Risk (10/11/2023)    Patient History     Smoking Tobacco Use: Former     Smokeless Tobacco Use: Never     Passive Exposure: Not on file      Past Medical History:   Diagnosis Date    Cataract     Hyperlipidemia     Hypertension     Lumbar spondylosis     Meningioma 2/18/2021    Obesity     Sleep apnea     Vitamin D deficiency       Current  "Outpatient Medications on File Prior to Visit   Medication Sig Dispense Refill    acetaminophen (TYLENOL) 500 MG tablet Take 500 mg by mouth daily as needed for Pain.      atorvastatin (LIPITOR) 20 MG tablet Take 1 tablet by mouth once daily 90 tablet 0    ergocalciferol (ERGOCALCIFEROL) 50,000 unit Cap Take 1 capsule by mouth once a week 4 capsule 3    lisinopriL-hydrochlorothiazide (PRINZIDE,ZESTORETIC) 20-25 mg Tab Take 1 tablet by mouth once daily 90 tablet 2    estradioL (VAGIFEM) 10 mcg Tab Insert 1 tablet vaginally twice a week. Insert before bed. No intercourse on the night of insertion (Patient not taking: Reported on 10/11/2023) 30 tablet 1    [DISCONTINUED] atorvastatin (LIPITOR) 20 MG tablet Take 20 mg by mouth once daily.       No current facility-administered medications on file prior to visit.        Review of Systems   Constitutional:  Positive for fatigue.   HENT:  Negative for congestion.    Respiratory:  Positive for somnolence. Negative for sputum production, shortness of breath, wheezing and dyspnea on extertion.    Gastrointestinal:  Negative for vomiting.   Neurological:  Negative for syncope and headaches.   Psychiatric/Behavioral:  Positive for sleep disturbance.    All other systems reviewed and are negative.      Objective:       Vitals:    10/11/23 0847   BP: 128/78   Pulse: (!) 53   Resp: 18   SpO2: 99%   Weight: 99.5 kg (219 lb 5.7 oz)   Height: 5' 3" (1.6 m)       Physical Exam   Constitutional: She is oriented to person, place, and time. She appears well-developed and well-nourished. No distress.   HENT:   Head: Normocephalic.   Mouth/Throat: Oropharynx is clear and moist.   Cardiovascular: Normal rate and regular rhythm.   Pulmonary/Chest: Effort normal. No respiratory distress. She has no wheezes. She has no rhonchi. She has no rales.   Musculoskeletal:         General: No edema.      Cervical back: Normal range of motion and neck supple.   Neurological: She is alert and oriented to " person, place, and time. Gait normal.   Skin: Skin is warm and dry.   Psychiatric: She has a normal mood and affect.   Vitals reviewed.    Personal Diagnostic Review    US Soft Tissue Chest_Upper Back  Narrative: EXAM: US SOFT TISSUE CHEST_UPPER BACK    CLINICAL HISTORY: Right flank palpable abnormality.    FINDINGS:  Multiple real-time grayscale and color Doppler sonographic images were obtained of the subcutaneous tissues of the right upper back.  Within the area of concern are multiple well-defined, hyperechoic lesions most consistent with lipomas.  The largest lies toward the right and measures 1.7 x 1.2 x 1.5 cm.  It lies about 5 mm deep to the skin surface.  There are additional lipomas in the area of concern with the next largest toward the left measuring 1.3 x 0.9 x 1.6 cm.  2 smaller lipomas are subcentimeter in size.  No focal fluid collections or abnormal vascular signal.  Impression: Multiple lipomas within the area of concern as described above.    Finalized on: 7/11/2023 3:31 PM By:  Perico Oleary MD  [facility]# [id number]      2023-07-11 15:33:28.222    [facility]            Assessment/Plan:       Problem List Items Addressed This Visit          Cardiac/Vascular    Essential hypertension     Stable, continue current medication management             Endocrine    Severe obesity with body mass index (BMI) of 35.0 to 39.9 with serious comorbidity     Weight loss and exercise to improve overall health.              Other    LEN on CPAP - Primary     Benefits from CPAP but complains of insomnia  She has stopped using CPAP due to insomnia, I recommend working to improve nightly CPAP usage as insomnia could worsen without CPAP  She denies any specific issues with mask or pressure  We discussed ways to improve sleep hygiene, go to sleep later, avoid screens, keep consistent sleep schedule, natural light immediately upon wake time in morning  Discussed therapeutic goals for CPAP: Ideal usage 100% of nights for 6-8 hours  per night. Minimum usage is 70% of night for at least 4 hours per night.           Relevant Orders    CPAP/BIPAP SUPPLIES       Follow up 3 months    Discussed diagnosis, its evaluation, treatment and usual course. All questions answered.    Patient verbalized understanding of plan and left in no acute distress    Thank you for the courtesy of participating in the care of this patient    Beth Cook PA-C  [facility]    Total time spent in the care of this patient     New  3  [] 30-44 min  4  [] 45-59 min  5  [] 60-74 min     Established  3  [] 20-29 min  4  [x] 30-39 min  5  [] 40-54 min        [x] preparing to see the patient (eg, review of tests)  [x] obtaining and/or reviewing separately obtained history  [x] performing a medically appropriate examination and/or evaluation  [x] counseling and educating the patient/family/caregiver  [x] ordering medications, tests, or procedures  [] referring and communicating with other health care professionals (when not separately reported)  [x] documenting clinical information in the electronic or other health record  [x] independently interpreting results (not separately reported) and communicating results to the  patient/ family/caregiver  [] care coordination (not separately reported)

## 2023-10-11 NOTE — PATIENT INSTRUCTIONS
Why quality sleep is important:  Lack of quality sleep affects your health. After just one night of only four or five hours' sleep, your natural killer cells - the ones that attack the cancer cells that appear in your body every day - drop by 70%, and that a lack of sleep is linked to cancer of the bowel, prostate and breast.  People who are chronically sleep deprived are more likely to be overweight, have strokes and cardiovascular disease, infections, and certain types of cancer than those who get enough sleep.    An adult sleeping only 6.75 hours a night would be predicted to live only to their early 60s without medical intervention.  If we sleep too little, we become unable to process what we've learned during the day and we have more trouble remembering it in the future. As mentioned, researchers also believe that sleep may promote the removal of waste products from brain cells.  Sleep is vital to the rest of the body too. When people don't get enough sleep, their health risks rise. Symptoms of depression, seizures, high blood pressure and migraines worsen. Immunity is compromised, increasing the likelihood of illness and infection.   Sleep also plays a role in metabolism: Even one night of missed sleep can create a prediabetic state in an otherwise healthy person.  Sleep helps us thrive by contributing to a healthy immune system, and can also balance our appetites by helping to regulate levels of the hormones ghrelin and leptin, which play a role in our feelings of hunger and fullness. So when we're sleep deprived, we may feel the need to eat more, which can lead to weight gain.     A 2013 study reported that men who slept too little had a sperm count 29% lower than those who regularly get a full and restful night's sleep.   The time taken to reach physical exhaustion by athletes who obtain anything less than eight hours of sleep, and especially less than six hours, drops by 10-30%.     Best way to obtain  "adequate sleep:  If you have sleep apnea, used prescribed CPAP any time you are sleeping.  Avoid caffeine in the afternoon/evening time.  Avoid alcohol. Alcohol is a sedative that blocks your REM sleep     Stimulus control -- Stimulus control therapy is based on the idea that some people with insomnia have learned to associate the bedroom with staying awake rather than sleeping.  ?You should spend no more than 20 minutes lying in bed trying to fall asleep.  ?If you cannot fall asleep within 20 minutes, get up, go to another room and read or find another relaxing activity until you feel sleepy again. Activities such as eating, balancing your checkbook, doing housework, watching TV, or studying for a test, which "reward" you for staying awake, should be avoided.  ?When you start to feel sleepy, you can return to bed. If you cannot fall asleep in another 20 minutes, repeat the process.  ?Set an alarm clock and get up at the same time every day, including weekends.  ?Do not take a nap during the day.  You may not sleep much on the first night. However, sleep is more likely on succeeding nights because sleepiness is increased and naps are not allowed.     Restrict time in bedroom to 8 hours. Some people with insomnia have long awakenings during the night and some try to deal with their poor sleep by staying in bed longer in the morning to "make up" some of their lost sleep. This additional sleep later in the morning may make it more difficult to fall asleep that night, resulting in the need to stay in bed even longer the following morning. This restriction should consolidate sleep and breaks this cycle.  Do not attempt to go to bed until you are awake for 16 hours.   Do not go to be if you are not sleepy.     One main reason for insomnia today is electronics. No TV or electronics in the bedroom. Associate the bedroom to only sleep and sex.   All electronic use outside the bedroom. Stop using electronics 1-2 hours before " "bedtime. The electronics have blue lights which have a profound suppressive effect on your natural melatonin which assist with sleep. If your phone or pad has a "night shift" option, change to that 2 hours before bedtime. This makes the light on the phone a yellow softer light. Check your settings-display and brightness-night shift. When we use electronics, we also tend to delay sleep time by getting involved in a game or social media. Set a timer when it is ready to go to bed and stick with it.   Dim lights an hour before bedtime.  Meditation and warm bath helps with preparing for sleep.  Make bedroom cool and dark. White noise helps some people sleep more soundly.  Sleep should be nonnegotiable . Give yourself at least 8 hours of uninterrupted sleep nightly for your health and well-being.       Why we don't promote sleeping Pills:  Unfortunately, they don't work as well as we wish they did. Sleep medications don't deliver the same restorative benefits as natural sleep. The quality of sleep that you have when you're on these drugs is not the same as normal, naturalistic sleep. They're classified as sedative hypnotics, so the drugs actually just sedate you -- and sedation is not sleep. Drugs such as ambien simply switch off the top of your cortex, the top of your brain, and put you into a state of unconsciousness. Sleep is important to a number of brain functions, including how nerve cells (neurons) communicate with each other.  In fact, your brain and body stay remarkably active while you sleep.  Recent findings suggest that sleep plays a housekeeping role that removes toxins in your brain that build up while you are awake. At this time, there is no good pharmacological approach to replicate such a complex set of biological changes  Research is showing a strong association between sleep aids and a higher risk of death and higher risk of cancer. Sleep aids are also habit forming and become less effective over " time. When discontinuing the sleeping aid, there is a withdrawal period with worsening insomnia for a few weeks.

## 2023-10-11 NOTE — ASSESSMENT & PLAN NOTE
Benefits from CPAP but complains of insomnia  She has stopped using CPAP due to insomnia, I recommend working to improve nightly CPAP usage as insomnia could worsen without CPAP  She denies any specific issues with mask or pressure  We discussed ways to improve sleep hygiene, go to sleep later, avoid screens, keep consistent sleep schedule, natural light immediately upon wake time in morning  Discussed therapeutic goals for CPAP: Ideal usage 100% of nights for 6-8 hours per night. Minimum usage is 70% of night for at least 4 hours per night.

## 2023-10-25 ENCOUNTER — TELEPHONE (OUTPATIENT)
Dept: PULMONOLOGY | Facility: CLINIC | Age: 73
End: 2023-10-25
Payer: MEDICARE

## 2023-10-25 NOTE — TELEPHONE ENCOUNTER
Returned patients call back regarding cpap machine and appointment. Rescheduled patients appt as requested----- Message from Sylvie Barnard sent at 10/25/2023  3:19 PM CDT -----  States she needs to order some supplies for her Sleep Apnea machine. States she has an appt tomorrow and she would like to bring her machine to have it adjusted. She doesn't know how to do it. Please call pt  [phone]. Thank you

## 2023-10-26 ENCOUNTER — OFFICE VISIT (OUTPATIENT)
Dept: PODIATRY | Facility: CLINIC | Age: 73
End: 2023-10-26
Payer: MEDICARE

## 2023-10-26 VITALS — WEIGHT: 219.38 LBS | BODY MASS INDEX: 38.87 KG/M2 | HEIGHT: 63 IN

## 2023-10-26 DIAGNOSIS — M20.30 ACQUIRED VARUS DEFORMITY OF TOE: Primary | ICD-10-CM

## 2023-10-26 DIAGNOSIS — G89.29 CHRONIC PAIN OF TOES OF BOTH FEET: ICD-10-CM

## 2023-10-26 DIAGNOSIS — L84 CORN OR CALLUS: ICD-10-CM

## 2023-10-26 DIAGNOSIS — M79.674 CHRONIC PAIN OF TOES OF BOTH FEET: ICD-10-CM

## 2023-10-26 DIAGNOSIS — M79.675 CHRONIC PAIN OF TOES OF BOTH FEET: ICD-10-CM

## 2023-10-26 PROCEDURE — 3008F PR BODY MASS INDEX (BMI) DOCUMENTED: ICD-10-PCS | Mod: CPTII,S$GLB,, | Performed by: PODIATRIST

## 2023-10-26 PROCEDURE — 3288F PR FALLS RISK ASSESSMENT DOCUMENTED: ICD-10-PCS | Mod: CPTII,S$GLB,, | Performed by: PODIATRIST

## 2023-10-26 PROCEDURE — 1160F RVW MEDS BY RX/DR IN RCRD: CPT | Mod: CPTII,S$GLB,, | Performed by: PODIATRIST

## 2023-10-26 PROCEDURE — 3044F HG A1C LEVEL LT 7.0%: CPT | Mod: CPTII,S$GLB,, | Performed by: PODIATRIST

## 2023-10-26 PROCEDURE — 1101F PT FALLS ASSESS-DOCD LE1/YR: CPT | Mod: CPTII,S$GLB,, | Performed by: PODIATRIST

## 2023-10-26 PROCEDURE — 99999 PR PBB SHADOW E&M-EST. PATIENT-LVL III: CPT | Mod: PBBFAC,,, | Performed by: PODIATRIST

## 2023-10-26 PROCEDURE — 3044F PR MOST RECENT HEMOGLOBIN A1C LEVEL <7.0%: ICD-10-PCS | Mod: CPTII,S$GLB,, | Performed by: PODIATRIST

## 2023-10-26 PROCEDURE — 4010F ACE/ARB THERAPY RXD/TAKEN: CPT | Mod: CPTII,S$GLB,, | Performed by: PODIATRIST

## 2023-10-26 PROCEDURE — 4010F PR ACE/ARB THEARPY RXD/TAKEN: ICD-10-PCS | Mod: CPTII,S$GLB,, | Performed by: PODIATRIST

## 2023-10-26 PROCEDURE — 1125F PR PAIN SEVERITY QUANTIFIED, PAIN PRESENT: ICD-10-PCS | Mod: CPTII,S$GLB,, | Performed by: PODIATRIST

## 2023-10-26 PROCEDURE — 1101F PR PT FALLS ASSESS DOC 0-1 FALLS W/OUT INJ PAST YR: ICD-10-PCS | Mod: CPTII,S$GLB,, | Performed by: PODIATRIST

## 2023-10-26 PROCEDURE — 1160F PR REVIEW ALL MEDS BY PRESCRIBER/CLIN PHARMACIST DOCUMENTED: ICD-10-PCS | Mod: CPTII,S$GLB,, | Performed by: PODIATRIST

## 2023-10-26 PROCEDURE — 3008F BODY MASS INDEX DOCD: CPT | Mod: CPTII,S$GLB,, | Performed by: PODIATRIST

## 2023-10-26 PROCEDURE — 99213 OFFICE O/P EST LOW 20 MIN: CPT | Mod: S$GLB,,, | Performed by: PODIATRIST

## 2023-10-26 PROCEDURE — 1125F AMNT PAIN NOTED PAIN PRSNT: CPT | Mod: CPTII,S$GLB,, | Performed by: PODIATRIST

## 2023-10-26 PROCEDURE — 3288F FALL RISK ASSESSMENT DOCD: CPT | Mod: CPTII,S$GLB,, | Performed by: PODIATRIST

## 2023-10-26 PROCEDURE — 99999 PR PBB SHADOW E&M-EST. PATIENT-LVL III: ICD-10-PCS | Mod: PBBFAC,,, | Performed by: PODIATRIST

## 2023-10-26 PROCEDURE — 1159F MED LIST DOCD IN RCRD: CPT | Mod: CPTII,S$GLB,, | Performed by: PODIATRIST

## 2023-10-26 PROCEDURE — 99213 PR OFFICE/OUTPT VISIT, EST, LEVL III, 20-29 MIN: ICD-10-PCS | Mod: S$GLB,,, | Performed by: PODIATRIST

## 2023-10-26 PROCEDURE — 1159F PR MEDICATION LIST DOCUMENTED IN MEDICAL RECORD: ICD-10-PCS | Mod: CPTII,S$GLB,, | Performed by: PODIATRIST

## 2023-10-26 NOTE — PROGRESS NOTES
Subjective:       Patient ID: Beth Souza is a 73 y.o. female.    Chief Complaint: LEN on CPAP      10/27/2023  Here for LEN on CPAP follow up  Complains of uncomfortable settings  CPAP making her feel congested, pressure too high      10/10/23  Here for LEN on CPAP follow up  Benefits from CPAP but complains of insomnia  She has stopped using CPAP due to insomnia  She denies any specific issues with mask or pressure  Gets in bed 8:30pm, watches tv, falls asleep different times every night  Took melatonin which helped but says she did not want to depend on this  Last CPAP usage was March this year, CPAP optimally controlled her sleep apnea with AHI 2, no leak or other issues with machine        10/11/2023     8:49 AM   EPWORTH SLEEPINESS SCALE   Sitting and reading 0   Watching TV 0   Sitting, inactive in a public place (e.g. a theatre or a meeting) 0   As a passenger in a car for an hour without a break 0   Lying down to rest in the afternoon when circumstances permit 0   Sitting and talking to someone 0   Sitting quietly after a lunch without alcohol 0   In a car, while stopped for a few minutes in traffic 0   Total score 0       Immunization History   Administered Date(s) Administered    COVID-19, MRNA, LN-S, PF (MODERNA FULL 0.5 ML DOSE) 02/27/2021, 03/27/2021, 11/16/2021    Influenza (FLUAD) - Quadrivalent - Adjuvanted - PF *Preferred* (65+) 10/12/2020, 12/06/2022    Influenza - High Dose - PF (65 years and older) 10/16/2017, 10/30/2018, 09/16/2019    Influenza - Quadrivalent - PF *Preferred* (6 months and older) 10/28/2015    PPD Test 06/18/2018    Pneumococcal Conjugate - 13 Valent 05/01/2017    Pneumococcal Polysaccharide - 23 Valent 05/14/2018    Tdap 08/28/2023      Tobacco Use: Medium Risk (10/27/2023)    Patient History     Smoking Tobacco Use: Former     Smokeless Tobacco Use: Never     Passive Exposure: Not on file      Past Medical History:   Diagnosis Date    Cataract     Hyperlipidemia      "Hypertension     Lumbar spondylosis     Meningioma 2/18/2021    Obesity     Sleep apnea     Vitamin D deficiency       Current Outpatient Medications on File Prior to Visit   Medication Sig Dispense Refill    acetaminophen (TYLENOL) 500 MG tablet Take 500 mg by mouth daily as needed for Pain.      atorvastatin (LIPITOR) 20 MG tablet Take 1 tablet by mouth once daily 90 tablet 0    ergocalciferol (ERGOCALCIFEROL) 50,000 unit Cap Take 1 capsule by mouth once a week 4 capsule 3    lisinopriL-hydrochlorothiazide (PRINZIDE,ZESTORETIC) 20-25 mg Tab Take 1 tablet by mouth once daily 90 tablet 2    estradioL (VAGIFEM) 10 mcg Tab Insert 1 tablet vaginally twice a week. Insert before bed. No intercourse on the night of insertion (Patient not taking: Reported on 10/11/2023) 30 tablet 1     No current facility-administered medications on file prior to visit.        Review of Systems   Constitutional:  Positive for fatigue.   HENT:  Negative for congestion.    Respiratory:  Positive for somnolence. Negative for sputum production, shortness of breath, wheezing and dyspnea on extertion.    Gastrointestinal:  Negative for vomiting.   Neurological:  Negative for syncope and headaches.   Psychiatric/Behavioral:  Positive for sleep disturbance.    All other systems reviewed and are negative.      Objective:       Vitals:    10/27/23 0949   BP: 130/82   Pulse: (!) 54   Resp: 18   SpO2: 99%   Weight: 98.6 kg (217 lb 6 oz)   Height: 5' 3" (1.6 m)         Physical Exam   Constitutional: She is oriented to person, place, and time. She appears well-developed and well-nourished. No distress.   HENT:   Head: Normocephalic.   Mouth/Throat: Oropharynx is clear and moist.   Cardiovascular: Normal rate and regular rhythm.   Pulmonary/Chest: Effort normal. No respiratory distress. She has no wheezes. She has no rhonchi. She has no rales.   Musculoskeletal:         General: No edema.      Cervical back: Normal range of motion and neck supple. "   Neurological: She is alert and oriented to person, place, and time. Gait normal.   Skin: Skin is warm and dry.   Psychiatric: She has a normal mood and affect.   Vitals reviewed.    Personal Diagnostic Review    US Soft Tissue Chest_Upper Back  Narrative: EXAM: US SOFT TISSUE CHEST_UPPER BACK    CLINICAL HISTORY: Right flank palpable abnormality.    FINDINGS:  Multiple real-time grayscale and color Doppler sonographic images were obtained of the subcutaneous tissues of the right upper back.  Within the area of concern are multiple well-defined, hyperechoic lesions most consistent with lipomas.  The largest lies toward the right and measures 1.7 x 1.2 x 1.5 cm.  It lies about 5 mm deep to the skin surface.  There are additional lipomas in the area of concern with the next largest toward the left measuring 1.3 x 0.9 x 1.6 cm.  2 smaller lipomas are subcentimeter in size.  No focal fluid collections or abnormal vascular signal.  Impression: Multiple lipomas within the area of concern as described above.    Finalized on: 7/11/2023 3:31 PM By:  Perico Oleary MD  [facility]# [id number]      2023-07-11 15:33:28.222    [facility]            Assessment/Plan:       Problem List Items Addressed This Visit          Other    LEN on CPAP - Primary     CPAP checked: humidity was on 1 - this was changed to 3  Flex 1 - this was changed to 2  Autopap 5-16 - this was changed to Autopap 4-10    Discussed therapeutic goals for CPAP: Ideal usage 100% of nights for 6-8 hours per night. Minimum usage is 70% of night for at least 4 hours per night.              Follow up 3 months    Discussed diagnosis, its evaluation, treatment and usual course. All questions answered.    Patient verbalized understanding of plan and left in no acute distress    Thank you for the courtesy of participating in the care of this patient    Beth Cook PA-C  [facility]

## 2023-10-26 NOTE — PROGRESS NOTES
Subjective:     Patient ID: Beth Souza is a 73 y.o. female.    Chief Complaint: Toe Pain (Pt c/o BL 4th digit toe pain 10/10, non-diabetic pt wears tennis shoes, PCP Dr. Oliva last seen 6-26-23)    Beth is a 73 y.o. female who presents to the podiatry clinic  with complaint of  bilateral 4th toe pain. Onset of the symptoms was several months ago. Precipitating event: none known. Current symptoms include: ability to bear weight, but with some pain. Aggravating factors: walking. Symptoms have gradually worsened. Patient has had prior foot problems. Evaluation to date:  Dr. Smith(podiatry) . Treatment to date:  file area and wide toe box shoes . Patients rates pain 10/10 on pain scale. Patient points to the distal tips of bilateral 4th toes. Patient has no other pedal complaints at this time.     Patient Active Problem List   Diagnosis    Essential hypertension    Vitamin D deficiency    Mixed hyperlipidemia    Cataract    Osteoarthritis of spine with radiculopathy, lumbar region    Severe obesity with body mass index (BMI) of 35.0 to 39.9 with serious comorbidity    LEN on CPAP    Mild cognitive impairment with memory loss    Meningioma of cerebellum    Multiple thyroid nodules    History of COVID-19    Primary osteoarthritis of both hips    Calcification of aorta    History of hyperparathyroidism    Prediabetes    Lumbar radiculopathy    Major neurocognitive disorder due to another medical condition    Neck pain, chronic    Weakness       Medication List with Changes/Refills   Current Medications    ACETAMINOPHEN (TYLENOL) 500 MG TABLET    Take 500 mg by mouth daily as needed for Pain.    ATORVASTATIN (LIPITOR) 20 MG TABLET    Take 1 tablet by mouth once daily    ERGOCALCIFEROL (ERGOCALCIFEROL) 50,000 UNIT CAP    Take 1 capsule by mouth once a week    ESTRADIOL (VAGIFEM) 10 MCG TAB    Insert 1 tablet vaginally twice a week. Insert before bed. No intercourse on the night of insertion     LISINOPRIL-HYDROCHLOROTHIAZIDE (PRINZIDE,ZESTORETIC) 20-25 MG TAB    Take 1 tablet by mouth once daily       Review of patient's allergies indicates:   Allergen Reactions    Aspirin Other (See Comments)     Bruising       Past Surgical History:   Procedure Laterality Date    HYSTERECTOMY      PARTIAL HYSTERECTOMY      SELECTIVE INJECTION OF ANESTHETIC AGENT AROUND LUMBAR SPINAL NERVE ROOT BY TRANSFORAMINAL APPROACH Bilateral 3/21/2023    Procedure: Bilateral L5/S1 transforaminal epidural steroid injection;  Surgeon: Kush Jha MD;  Location: [facility];  Service: Pain Management;  Laterality: Bilateral;       Family History   Problem Relation Age of Onset    Hypertension Mother     Diabetes Daughter     Melanoma Neg Hx     Psoriasis Neg Hx     Lupus Neg Hx     Eczema Neg Hx     Breast cancer Neg Hx     Colon cancer Neg Hx     Ovarian cancer Neg Hx     Thrombosis Neg Hx        Social History     Socioeconomic History    Marital status:     Number of children: 3   Occupational History    Occupation: Cleaning houses     Employer: not employed   Tobacco Use    Smoking status: Former     Current packs/day: 0.00     Types: Cigarettes     Quit date: 2004     Years since quittin.8    Smokeless tobacco: Never   Substance and Sexual Activity    Alcohol use: Yes     Comment: occasionally    Drug use: No    Sexual activity: Not Currently     Partners: Male   Other Topics Concern    Are you pregnant or think you may be? No    Breast-feeding No   Social History Narrative    Live w/ alone      Social Determinants of Health     Financial Resource Strain: Low Risk  (2022)    Overall Financial Resource Strain (CARDIA)     Difficulty of Paying Living Expenses: Not hard at all   Food Insecurity: No Food Insecurity (2022)    Hunger Vital Sign     Worried About Running Out of Food in the Last Year: Never true     Ran Out of Food in the Last Year: Never true   Transportation Needs: No Transportation Needs  "(7/12/2022)    PRAPARE - Transportation     Lack of Transportation (Medical): No     Lack of Transportation (Non-Medical): No   Physical Activity: Inactive (7/12/2022)    Exercise Vital Sign     Days of Exercise per Week: 0 days     Minutes of Exercise per Session: 0 min   Stress: No Stress Concern Present (7/12/2022)    [employer] - Occupational Stress Questionnaire     Feeling of Stress : Not at all   Social Connections: Moderately Isolated (7/12/2022)    Social Connection and Isolation Panel [NHANES]     Frequency of Communication with Friends and Family: More than three times a week     Frequency of Social Gatherings with Friends and Family: More than three times a week     Attends Christian Services: More than 4 times per year     Active Member of Clubs or Organizations: No     Attends Club or Organization Meetings: Never     Marital Status:    Housing Stability: Low Risk  (7/12/2022)    Housing Stability Vital Sign     Unable to Pay for Housing in the Last Year: No     Number of Places Lived in the Last Year: 1     Unstable Housing in the Last Year: No       Vitals:    10/26/23 1412   Weight: 99.5 kg (219 lb 5.7 oz)   Height: 5' 3" (1.6 m)   PainSc: 10-Worst pain ever       Hemoglobin A1C   Date Value Ref Range Status   06/15/2023 5.7 (H) 4.0 - 5.6 % Final     Comment:     ADA Screening Guidelines:  5.7-6.4%  Consistent with prediabetes  >or=6.5%  Consistent with diabetes    High levels of fetal hemoglobin interfere with the HbA1C  assay. Heterozygous hemoglobin variants (HbS, HgC, etc)do  not significantly interfere with this assay.   However, presence of multiple variants may affect accuracy.     03/27/2023 6.0 (H) 4.0 - 5.6 % Final     Comment:     ADA Screening Guidelines:  5.7-6.4%  Consistent with prediabetes  >or=6.5%  Consistent with diabetes    High levels of fetal hemoglobin interfere with the HbA1C  assay. Heterozygous hemoglobin variants (HbS, HgC, etc)do  not " significantly interfere with this assay.   However, presence of multiple variants may affect accuracy.     06/22/2022 5.7 (H) 4.0 - 5.6 % Final     Comment:     ADA Screening Guidelines:  5.7-6.4%  Consistent with prediabetes  >or=6.5%  Consistent with diabetes    High levels of fetal hemoglobin interfere with the HbA1C  assay. Heterozygous hemoglobin variants (HbS, HgC, etc)do  not significantly interfere with this assay.   However, presence of multiple variants may affect accuracy.         Review of Systems   Constitutional:  Negative for chills and fever.   Respiratory:  Negative for shortness of breath.    Cardiovascular:  Negative for chest pain, palpitations, orthopnea, claudication and leg swelling.   Gastrointestinal:  Negative for diarrhea, nausea and vomiting.   Musculoskeletal:  Negative for joint pain.   Skin:  Negative for rash.   Neurological:  Negative for dizziness, tingling, sensory change, focal weakness and weakness.   Psychiatric/Behavioral: Negative.               Objective:      PHYSICAL EXAM: Apperance: Alert and orient in no distress,well developed, and with good attention to grooming and body habits  Lower Extremity Physical Exam:  VASCULAR: Dorsalis pedis pulses 2/4 bilateral and Posterior Tibial pulses 2/4 bilateral.   DERMATOLOGICAL: No skin rashes, subcutaneous nodules, or ulcers observed bilateral.  Minimal  lesions noted to distal lateral bilateral 4th toes without thickened center core.   NEUROLOGICAL: Light touch, sharp-dull, proprioception all present and equal bilaterally.   MUSCULOSKELETAL: Muscle strength is 5/5 for foot inverters, everters, plantarflexors, and dorsiflexors. Muscle tone is normal. Positive pain on palpation of hyperkeratotic lesion bilateral 4th toes. 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         Assessment:       ICD-10-CM ICD-9-CM   1. Acquired varus deformity of toe  M20.30 754.59   2. Corn or callus  L84 700   3. Chronic pain of toes of both feet  M79.674  729.5    M79.675 338.29    G89.29        Plan:   Acquired varus deformity of toe    Corn or callus    Chronic pain of toes of both feet    I counseled the patient on her conditions, regarding findings of my examination, my impressions, and usual treatment plan.   Discussed surgical and conservative management of adductovarus toe deformity. Conservatively we did discuss padding, and shoe modifications such as softer shoes with wide toe boxes. Surgically we briefly discussed pre and post operative expectations. The patient elects for conservative management at this time   The patient and I reviewed the types of shoes she should be wearing, my recommendation includes generally the best time of the day for a shoe fitting is the afternoon, shoes with a wide toe box, very good cushion, and tennis shoes with removable inner soles.The patient and I reviewed my recommendations for over-the-counter orthotic inserts.   Disppened toe gels to be worn iwhen ambulating.   Patient to return as needed.            Charlene Long DPM  [facility]

## 2023-10-27 ENCOUNTER — OFFICE VISIT (OUTPATIENT)
Dept: PULMONOLOGY | Facility: CLINIC | Age: 73
End: 2023-10-27
Payer: MEDICARE

## 2023-10-27 VITALS
HEIGHT: 63 IN | DIASTOLIC BLOOD PRESSURE: 82 MMHG | OXYGEN SATURATION: 99 % | SYSTOLIC BLOOD PRESSURE: 130 MMHG | RESPIRATION RATE: 18 BRPM | HEART RATE: 54 BPM | BODY MASS INDEX: 38.52 KG/M2 | WEIGHT: 217.38 LBS

## 2023-10-27 DIAGNOSIS — G47.33 OSA ON CPAP: Primary | ICD-10-CM

## 2023-10-27 PROCEDURE — 1160F PR REVIEW ALL MEDS BY PRESCRIBER/CLIN PHARMACIST DOCUMENTED: ICD-10-PCS | Mod: CPTII,S$GLB,, | Performed by: PHYSICIAN ASSISTANT

## 2023-10-27 PROCEDURE — 3075F SYST BP GE 130 - 139MM HG: CPT | Mod: CPTII,S$GLB,, | Performed by: PHYSICIAN ASSISTANT

## 2023-10-27 PROCEDURE — 3288F PR FALLS RISK ASSESSMENT DOCUMENTED: ICD-10-PCS | Mod: CPTII,S$GLB,, | Performed by: PHYSICIAN ASSISTANT

## 2023-10-27 PROCEDURE — 3008F BODY MASS INDEX DOCD: CPT | Mod: CPTII,S$GLB,, | Performed by: PHYSICIAN ASSISTANT

## 2023-10-27 PROCEDURE — 99213 OFFICE O/P EST LOW 20 MIN: CPT | Mod: S$GLB,,, | Performed by: PHYSICIAN ASSISTANT

## 2023-10-27 PROCEDURE — 3288F FALL RISK ASSESSMENT DOCD: CPT | Mod: CPTII,S$GLB,, | Performed by: PHYSICIAN ASSISTANT

## 2023-10-27 PROCEDURE — 99999 PR PBB SHADOW E&M-EST. PATIENT-LVL III: ICD-10-PCS | Mod: PBBFAC,,, | Performed by: PHYSICIAN ASSISTANT

## 2023-10-27 PROCEDURE — 99213 PR OFFICE/OUTPT VISIT, EST, LEVL III, 20-29 MIN: ICD-10-PCS | Mod: S$GLB,,, | Performed by: PHYSICIAN ASSISTANT

## 2023-10-27 PROCEDURE — 3075F PR MOST RECENT SYSTOLIC BLOOD PRESS GE 130-139MM HG: ICD-10-PCS | Mod: CPTII,S$GLB,, | Performed by: PHYSICIAN ASSISTANT

## 2023-10-27 PROCEDURE — 1160F RVW MEDS BY RX/DR IN RCRD: CPT | Mod: CPTII,S$GLB,, | Performed by: PHYSICIAN ASSISTANT

## 2023-10-27 PROCEDURE — 3044F HG A1C LEVEL LT 7.0%: CPT | Mod: CPTII,S$GLB,, | Performed by: PHYSICIAN ASSISTANT

## 2023-10-27 PROCEDURE — 1159F MED LIST DOCD IN RCRD: CPT | Mod: CPTII,S$GLB,, | Performed by: PHYSICIAN ASSISTANT

## 2023-10-27 PROCEDURE — 4010F PR ACE/ARB THEARPY RXD/TAKEN: ICD-10-PCS | Mod: CPTII,S$GLB,, | Performed by: PHYSICIAN ASSISTANT

## 2023-10-27 PROCEDURE — 1159F PR MEDICATION LIST DOCUMENTED IN MEDICAL RECORD: ICD-10-PCS | Mod: CPTII,S$GLB,, | Performed by: PHYSICIAN ASSISTANT

## 2023-10-27 PROCEDURE — 3079F PR MOST RECENT DIASTOLIC BLOOD PRESSURE 80-89 MM HG: ICD-10-PCS | Mod: CPTII,S$GLB,, | Performed by: PHYSICIAN ASSISTANT

## 2023-10-27 PROCEDURE — 4010F ACE/ARB THERAPY RXD/TAKEN: CPT | Mod: CPTII,S$GLB,, | Performed by: PHYSICIAN ASSISTANT

## 2023-10-27 PROCEDURE — 3008F PR BODY MASS INDEX (BMI) DOCUMENTED: ICD-10-PCS | Mod: CPTII,S$GLB,, | Performed by: PHYSICIAN ASSISTANT

## 2023-10-27 PROCEDURE — 3079F DIAST BP 80-89 MM HG: CPT | Mod: CPTII,S$GLB,, | Performed by: PHYSICIAN ASSISTANT

## 2023-10-27 PROCEDURE — 1101F PR PT FALLS ASSESS DOC 0-1 FALLS W/OUT INJ PAST YR: ICD-10-PCS | Mod: CPTII,S$GLB,, | Performed by: PHYSICIAN ASSISTANT

## 2023-10-27 PROCEDURE — 1101F PT FALLS ASSESS-DOCD LE1/YR: CPT | Mod: CPTII,S$GLB,, | Performed by: PHYSICIAN ASSISTANT

## 2023-10-27 PROCEDURE — 99999 PR PBB SHADOW E&M-EST. PATIENT-LVL III: CPT | Mod: PBBFAC,,, | Performed by: PHYSICIAN ASSISTANT

## 2023-10-27 PROCEDURE — 3044F PR MOST RECENT HEMOGLOBIN A1C LEVEL <7.0%: ICD-10-PCS | Mod: CPTII,S$GLB,, | Performed by: PHYSICIAN ASSISTANT

## 2023-10-27 NOTE — ASSESSMENT & PLAN NOTE
CPAP checked: humidity was on 1 - this was changed to 3  Flex 1 - this was changed to 2  Autopap 5-16 - this was changed to Autopap 4-10    Discussed therapeutic goals for CPAP: Ideal usage 100% of nights for 6-8 hours per night. Minimum usage is 70% of night for at least 4 hours per night.

## 2023-10-30 RX ORDER — ERGOCALCIFEROL 1.25 MG/1
CAPSULE ORAL
Qty: 12 CAPSULE | Refills: 0 | Status: SHIPPED | OUTPATIENT
Start: 2023-10-30 | End: 2024-01-02 | Stop reason: SDUPTHER

## 2023-11-06 NOTE — PROGRESS NOTES
Interventional Pain Established Patient Clinic Visit    Chief Pain Complaint:  Low-back Pain, Leg Pain, and Foot Pain    Interval history 11/7/2023  Patient presents for three-month follow-up.  Today she reports insidious worsening of leg pain.  Today she reports pain is rated a 4/10 but at its worst is a 8/10.  Patient reports cramping and tingling down the anterior lateral aspects of bilateral lower extremities in L4-S1 distribution to the feet.  Pain is more severe on the left than on the right.  Pain is exacerbated with prolonged standing and with ambulation as well as with stooping.  Patient has continued physician directed physical therapy exercises over the last 8 weeks with marginal improvement in her symptoms.  She continues judicious use of Tylenol with marginal improvement in her symptoms.  Of note patient reported at least 80% relief exceeding 6 months in duration with her prior epidural steroid injection.      Interval history 08/07/2023  Patient presents for 1-1/2 month follow-up.  She continues to report well controlled lower extremity radicular pain following prior L5-S1 transforaminal epidural steroid injection.  She reports mild axial neck pain which can radiate into bilateral trapezius distribution in C4-6 dermatomal distribution, which can be exacerbated with cervical flexion, extension.  Patient is interested in restarting physical therapy to help with range of motion and strength.  Today she denies significant upper or lower extremity weakness, bowel or bladder incontinence or saddle anesthesia.    Interval history 06/20/2023  Patient presents s/p bilateral L5/S1 transforaminal epidural steroid injection 03/21/2023.  Patient reports 100% sustained relief in right lower extremity radicular pain and 70% improvement in left-sided radicular pain following her epidural steroid injection.  She reports insidious exacerbation of left-sided radicular pain.  Pain is intermittent and today is rated a  6/10.  Patient describes pain as soreness in nature along the lateral and posterior aspect of the left lower extremity to the calf in L4-5 distribution.  Patient also reports swelling, tenderness and aching in bilateral ankles.  Patient reports she did not hear from physical therapy for scheduling land-based physical therapy.  Today she denies significant lower extremity weakness, bowel or bladder incontinence or saddle anesthesia.    Today she also reports aching in the neck and bilateral shoulders.  Patient reports pain in the occiput which radiates into bilateral trapezius distribution C4-6 dermatomal distribution.  She denies more distal radiculopathy into the upper extremities or hands.  She does report exhaustion or weakness in the upper extremities associated with use.  She denies compromise in hand  strength or dexterity.    Interval Hx: 3/6/23  Ms. Souza is a 72-year-old lady with past medical history significant for cataracts, hypertension, hyperlipidemia, history of COVID-19, morbid obesity, prediabetes, history of cerebellar meningioma, obstructive sleep apnea on CPAP who presents to [facility] care, previous Dr. Swanson patient.    Today patient presents for lower back and leg pain.  Patient reports pain is intermittent and today is rated a 5/10.  Patient reports pain in the lower back which radiates into the buttock and down the posterior aspects of bilateral lower extremities in L5-S2 distribution to the feet.  Pain is exacerbated with standing or walking, even exceeding 5-10 minutes.  Patient reports when she has her Rollator, she is able to walk further distances.  Patient does endorse associated weakness in the lower extremities associated with her pain.  Pain is more severe on the right than on the left.  Patient reports completing 1 session of physical therapy 1 week prior with pain extremely severe in the lower extremities which was debilitating.  She reports secondary to this she can not  "continue physical therapy at this time.    Of note patient saw Dr. Gautam 02/28/2023 with new MRI ordered to evaluate severe stenosis and spondylolisthesis L4-5 and L5-S1 disc herniation and short prescription of Percocet provided.      Patient reports significant motor weakness and loss of sensations.  Patient denies night fever/night sweats, urinary incontinence, bowel incontinence, and significant weight loss.      Pain Disability Index Review:         11/7/2023    12:49 PM 8/7/2023     1:56 PM 6/20/2023    12:52 PM   Last 3 PDI Scores   Pain Disability Index (PDI) 24 9 36       Non-Pharmacologic Treatments:  Physical Therapy/Home Exercise: yes  Ice/Heat:yes  TENS: no  Acupuncture: no  Massage: no  Chiropractic: no    Other: no      Pain Medications:  - Opioids: Percocet (Oxycodone/Acetaminophen)  - Adjuvant Medications: Neurontin (Gabapentin) and Tylenol (Acetaminophen)    Pain injections:  -03/21/2023: Bilateral L5-S1 transforaminal epidural steroid injection    Interval History (11/8/2022):  Beth Souza presents today for follow-up visit.  Patient was last seen on 4/29/21 (1.5 years ago). At that visit, the plan was to start topamax for sciatica. Patient was referred back to our clinic by Alyssia Ventura PA (Orthopedics). Patient reports pain as "0/10 today. She reports pain has been bad x about a month. She went to the ER because the pain was so severe. She wants to know when it will return and what to do when it returns. She got no relief with medrol dose misa or gabapentin.      Orthopedics note: Beth Souza presents to me today with chief complaint of left low back/hip pain radiating left knee pain.  I do believe she is presently in seeing exacerbation of sciatica.  She has not had any relief with dexamethasone injection, Medrol Dosepak, gabapentin.  She is not a candidate for NSAIDs secondary to aspirin allergy.  She will continue Tylenol up to 650 mg 3 times a day as needed and " voltaren gel.  Additionally I would like the patient to try a short course of Lyrica to see if this would provide any additional nerve relief.  I would also like to get a referral and for her to see her back in spine clinic as she states has been quite awhile since she has seen anyone for her back. I also want to obtain bilateral hip xrays today for further evaluation.              Interval History (4/29/2021): Patient was last seen on 2/8/2021. She is here today for low back pain with bilateral leg pain (R>L).  Patient reports pain as 10/10 today.  She recently saw PCP who started her on Topamax with titration schedule. She reports pain worse with walking and in the morning.     History of Present Illness (2019):   Beth Souza is a 68 y.o. female  who is presenting with a chief complaint of Back Pain (radiating down bilateral legs). The patient began experiencing this problem insidiously, and the pain has been gradually worsening. The pain is described as throbbing, shooting, cramping, burning, aching and electrical and is located in the bilateral lumbar spine . Pain is intermittent and lasts hours. The pain radiates to bilateral lower extremities, L>R. Pain is 40% axial and 60% radicular. The patient rates her pain a 8 out of ten and interferes with activities of daily living a 8 out of ten. Pain is exacerbated by ambulation, and is improved by rest. Patient reports no prior trauma, no prior spinal surgery     - pertinent negatives: No fever, No chills, No weight loss, No bladder dysfunction, No bowel dysfunction, No saddle anesthesia  - pertinent positives: none    - medications, other therapies tried (physical therapy, injections):     >> NSAIDs, Tylenol, gabapentin, zanaflex and flexeril    >> Has previously undergone Physical Therapy    >> Has previously undergone spinal injection/s   - Lumbar LAWSON with relief for less than 1 week - at external facility      Imaging / Labs / Studies (reviewed on  11/7/2023):    10/31/22 X-Ray Hips Bilateral 2 View Incl AP Pelvis  COMPARISON:  10/19/2020  FINDINGS:  Mild joint space narrowing is in both hips with femoral head neck junction osteophyte formation, similar to the prior exam.  There is sclerosis about both sacroiliac joints and at the pubic symphysis as before.  Numerous calcific densities overlying the lower abdomen and pelvis likely reflect phleboliths.  No acute fracture, dislocation or osteonecrosis.    MRI lumbar spine 12/02/2021  FINDINGS:  There is a couple mm retrolisthesis of L5 on S1.  There is grade 1 spondylolisthesis of L4 on L5. The vertebral body heights are well maintained, with no fracture.  No marrow signal abnormality suspicious for an infiltrative process.  Modic type 1 discogenic endplate changes seen at the L5-S1 level.     The conus medullaris terminates at approximately the inferior endplate of L2.  The adjacent soft tissue structures show no significant abnormalities.  There is disc desiccation seen throughout the lumbar spine with at least mild disc space narrowing seen at L4-5 and more mild-to-moderate disc space narrowing seen at L5-S1.     L1-L2: No significant central canal or neural foraminal narrowing.     L2-L3: No significant central canal or neural foraminal narrowing.Mild-to-moderate bilateral facet arthropathy noted.     L3-L4: Moderate to severe bilateral facet arthropathy resulting in mild narrowing of the central canal.  The bilateral neural foraminal canals are mildly narrowed.     L4-L5:  Severe bilateral facet arthropathy along with grade 1 spondylolisthesis and ligamentum flavum hypertrophy resulting in severe narrowing of the central canal.  The bilateral neural foraminal canals are moderately to severely narrowed.     L5-S1:  Mild diffuse disc bulge with the superimposed right paracentral disc extrusion which appears to efface the intracanalicular portion of the right S1 nerve root.  No significant central canal  "narrowing otherwise noted.  The bilateral neural foraminal canals are severely narrowed.     X-ray cervical spine 06/20/2023     FINDINGS:  The vertebral bodies demonstrate a normal height.  There is a couple mm of retrolisthesis of C3 on C4 and C4 on C5.  Mild disc space narrowing and spondylosis seen the C3-4 through C6-7 levels.  No subluxation or change in the degree of listhesis seen on the flexion or extension views.  Mild osseous encroachment seen on the C3-4 neural foraminal canal on the left secondary to uncovertebral joint hypertrophy.    Review of Systems:  CONSTITUTIONAL: patient denies any fever, chills, or weight loss  SKIN: patient denies any rash or itching  RESPIRATORY: patient denies having any shortness of breath  GASTROINTESTINAL: patient denies having any diarrhea, constipation, or bowel incontinence  GENITOURINARY: patient denies having any abnormal bladder function    MUSCULOSKELETAL:  - patient complains of the above noted pain/s (see chief pain complaint)    NEUROLOGICAL:   - pain as above  - strength in Lower extremities is intact, BILATERALLY  - sensation in Lower extremities is intact, BILATERALLY  - patient denies any loss of bowel or bladder control      PSYCHIATRIC: patient denies any change in mood    Other:  All other systems reviewed and are negative      Physical Exam:  Vitals:    11/07/23 1249   BP: 137/87   Pulse: (!) 58   Resp: 17   Weight: 98 kg (216 lb 0.8 oz)   Height: 5' 3" (1.6 m)   PainSc:   4            Body mass index is 38.27 kg/m².   (reviewed on 11/7/2023)    General: alert and oriented, in no apparent distress. Obese.   Gait: antalgic gait.  Skin: no rashes, no discoloration, no obvious lesions  HEENT: normocephalic, atraumatic. Pupils equal and round.  Respiratory: without use of accessory muscles of respiration.    Musculoskeletal - Lumbar Spine:  - ROM fairly preserved   - Pain on flexion of lumbar spine: Present  - Pain on extension of lumbar spine: Present   - " Lumbar facet loading: Present   - TTP over the lumbar facet joints: Present Bilaterally, worse at L5-S1    - TTP over the lumbar paraspinals: Present   - TTP over the SI joints: Absent  - Straight Leg Raise: Positive, R>L    - YURI/ Tung's: Negative    Neuro - Lower Extremities:  - RLE Strength:     >> 5/5 strength with right hip flexion/ extension    >> 5/5 strength with right knee flexion/ extension    >> 5/5 strength in right ankle with dorsiflexion    >> 5/5 strength in right ankle with plantar flexion  - LLE Strength:     >> 5/5 strength with left hip flexion/ extension    >> 5/5 strength with knee flexion extension on the left     >> 5/5 strength in left ankle with dorsiflexion    >> 5/5 strength in left ankle with plantar flexion  - Extremity Reflexes: Brisk and symmetric throughout  - Sensory: Sensation to light touch intact bilaterally      Psych:  Mood and affect is appropriate      Assessment:  Beth Souza is a 73 y.o. year old female who is presenting with       ICD-10-CM ICD-9-CM    1. DDD (degenerative disc disease), lumbar  M51.36 722.52       2. Lumbar radiculopathy  M54.16 724.4 IR Epidural Transfor 1st Vert Lumbar El      Case Request-RAD/Other Procedure Area: Bilateral L4/5 and L5-S1 TF LAWSON      3. Cervical spondylosis  M47.812 721.0           Plan:  1. Interventional:  Schedule for bilateral L4-5 and L5-S1 transforaminal epidural steroid injection for lumbar radiculopathy.  Of note patient received 80% relief lasting 6 months in duration with her prior  transforaminal epidural steroid injection.  We have discussed the procedure, benefits, potential risk and alternative options in detail.  Patient has elected to pursue this procedure.    Anticoagulation:  None, no anticoagulation    -We have also discussed considering a C4-6 bilateral cervical medial branch block in the future to see if this would help with axial neck pain.     2. Pharmacologic:         -  -We have discussed  continuing judicious use of Tylenol, not to exceed 3000 mg daily to avoid acute hepatotoxicity.      3. Rehabilitative:  -We discussed initiating physical therapy after interventional treatment to help manage the patient/s painful condition. The patient was counseled that muscle strengthening will improve the long term prognosis in regards to pain and may also help increase range of motion and mobility. They were told that one of the goals of physical therapy is that they learn how to do the exercises so that they can do them independently at home daily upon completion.    4. Diagnostic:  Relevant imaging (cervical XR, lumbar MRI) reviewed in patient's questions answered.      5. Consult:Neurosurgery PRN    6. Follow-up:  4-6 weeks post injection      The above plan and management options were discussed at length with patient. Patient is in agreement with the above and verbalized understanding.    - I discussed the goals of interventional chronic pain management with the patient on today's visit. We discussed a multimodal and systematic approach to pain.  This includes diagnostic and therapeutic injections, adjuvant pharmacologic treatment, physical therapy, and at times psychiatry.  I emphasized the importance of regular exercise, core strengthening and stretching, diet and weight loss as a cornerstone of long-term pain management.    - This condition does not require this patient to take time off of work, and the primary goal of our Pain Management services is to improve the patient's functional capacity.  - Patient Questions: Answered all of the patient's questions regarding diagnoses, therapy, treatment and next steps        Kush Jha MD    Disclaimer:  This note was prepared using voice recognition system and is likely to have sound alike errors that may have been overlooked even after proof reading.  Please call me with any questions.

## 2023-11-07 ENCOUNTER — OFFICE VISIT (OUTPATIENT)
Dept: PAIN MEDICINE | Facility: CLINIC | Age: 73
End: 2023-11-07
Payer: MEDICARE

## 2023-11-07 VITALS
HEIGHT: 63 IN | RESPIRATION RATE: 17 BRPM | HEART RATE: 58 BPM | WEIGHT: 216.06 LBS | DIASTOLIC BLOOD PRESSURE: 87 MMHG | SYSTOLIC BLOOD PRESSURE: 137 MMHG | BODY MASS INDEX: 38.28 KG/M2

## 2023-11-07 DIAGNOSIS — M47.812 CERVICAL SPONDYLOSIS: ICD-10-CM

## 2023-11-07 DIAGNOSIS — M54.16 LUMBAR RADICULOPATHY: ICD-10-CM

## 2023-11-07 DIAGNOSIS — M51.36 DDD (DEGENERATIVE DISC DISEASE), LUMBAR: Primary | ICD-10-CM

## 2023-11-07 PROCEDURE — 3075F PR MOST RECENT SYSTOLIC BLOOD PRESS GE 130-139MM HG: ICD-10-PCS | Mod: CPTII,S$GLB,, | Performed by: ANESTHESIOLOGY

## 2023-11-07 PROCEDURE — 1125F PR PAIN SEVERITY QUANTIFIED, PAIN PRESENT: ICD-10-PCS | Mod: CPTII,S$GLB,, | Performed by: ANESTHESIOLOGY

## 2023-11-07 PROCEDURE — 3079F PR MOST RECENT DIASTOLIC BLOOD PRESSURE 80-89 MM HG: ICD-10-PCS | Mod: CPTII,S$GLB,, | Performed by: ANESTHESIOLOGY

## 2023-11-07 PROCEDURE — 4010F ACE/ARB THERAPY RXD/TAKEN: CPT | Mod: CPTII,S$GLB,, | Performed by: ANESTHESIOLOGY

## 2023-11-07 PROCEDURE — 99214 OFFICE O/P EST MOD 30 MIN: CPT | Mod: S$GLB,,, | Performed by: ANESTHESIOLOGY

## 2023-11-07 PROCEDURE — 99999 PR PBB SHADOW E&M-EST. PATIENT-LVL III: ICD-10-PCS | Mod: PBBFAC,,, | Performed by: ANESTHESIOLOGY

## 2023-11-07 PROCEDURE — 1101F PT FALLS ASSESS-DOCD LE1/YR: CPT | Mod: CPTII,S$GLB,, | Performed by: ANESTHESIOLOGY

## 2023-11-07 PROCEDURE — 3008F PR BODY MASS INDEX (BMI) DOCUMENTED: ICD-10-PCS | Mod: CPTII,S$GLB,, | Performed by: ANESTHESIOLOGY

## 2023-11-07 PROCEDURE — 1125F AMNT PAIN NOTED PAIN PRSNT: CPT | Mod: CPTII,S$GLB,, | Performed by: ANESTHESIOLOGY

## 2023-11-07 PROCEDURE — 3079F DIAST BP 80-89 MM HG: CPT | Mod: CPTII,S$GLB,, | Performed by: ANESTHESIOLOGY

## 2023-11-07 PROCEDURE — 3075F SYST BP GE 130 - 139MM HG: CPT | Mod: CPTII,S$GLB,, | Performed by: ANESTHESIOLOGY

## 2023-11-07 PROCEDURE — 3008F BODY MASS INDEX DOCD: CPT | Mod: CPTII,S$GLB,, | Performed by: ANESTHESIOLOGY

## 2023-11-07 PROCEDURE — 1160F PR REVIEW ALL MEDS BY PRESCRIBER/CLIN PHARMACIST DOCUMENTED: ICD-10-PCS | Mod: CPTII,S$GLB,, | Performed by: ANESTHESIOLOGY

## 2023-11-07 PROCEDURE — 1101F PR PT FALLS ASSESS DOC 0-1 FALLS W/OUT INJ PAST YR: ICD-10-PCS | Mod: CPTII,S$GLB,, | Performed by: ANESTHESIOLOGY

## 2023-11-07 PROCEDURE — 3288F FALL RISK ASSESSMENT DOCD: CPT | Mod: CPTII,S$GLB,, | Performed by: ANESTHESIOLOGY

## 2023-11-07 PROCEDURE — 4010F PR ACE/ARB THEARPY RXD/TAKEN: ICD-10-PCS | Mod: CPTII,S$GLB,, | Performed by: ANESTHESIOLOGY

## 2023-11-07 PROCEDURE — 99999 PR PBB SHADOW E&M-EST. PATIENT-LVL III: CPT | Mod: PBBFAC,,, | Performed by: ANESTHESIOLOGY

## 2023-11-07 PROCEDURE — 1159F PR MEDICATION LIST DOCUMENTED IN MEDICAL RECORD: ICD-10-PCS | Mod: CPTII,S$GLB,, | Performed by: ANESTHESIOLOGY

## 2023-11-07 PROCEDURE — 3044F HG A1C LEVEL LT 7.0%: CPT | Mod: CPTII,S$GLB,, | Performed by: ANESTHESIOLOGY

## 2023-11-07 PROCEDURE — 99214 PR OFFICE/OUTPT VISIT, EST, LEVL IV, 30-39 MIN: ICD-10-PCS | Mod: S$GLB,,, | Performed by: ANESTHESIOLOGY

## 2023-11-07 PROCEDURE — 1159F MED LIST DOCD IN RCRD: CPT | Mod: CPTII,S$GLB,, | Performed by: ANESTHESIOLOGY

## 2023-11-07 PROCEDURE — 3044F PR MOST RECENT HEMOGLOBIN A1C LEVEL <7.0%: ICD-10-PCS | Mod: CPTII,S$GLB,, | Performed by: ANESTHESIOLOGY

## 2023-11-07 PROCEDURE — 3288F PR FALLS RISK ASSESSMENT DOCUMENTED: ICD-10-PCS | Mod: CPTII,S$GLB,, | Performed by: ANESTHESIOLOGY

## 2023-11-07 PROCEDURE — 1160F RVW MEDS BY RX/DR IN RCRD: CPT | Mod: CPTII,S$GLB,, | Performed by: ANESTHESIOLOGY

## 2023-11-17 ENCOUNTER — TELEPHONE (OUTPATIENT)
Dept: PULMONOLOGY | Facility: CLINIC | Age: 73
End: 2023-11-17
Payer: MEDICARE

## 2023-11-17 NOTE — TELEPHONE ENCOUNTER
left vm for pt to call back regarding cpap supplies ----- Message from Vandana Castaneda LPN sent at 11/17/2023  9:09 AM CST -----  Regarding: FW: Lena Soto  Contact: Beth    ----- Message -----  From: Nedra Smith  Sent: 11/17/2023   9:03 AM CST  To: [facility] Clinical Staff  Subject: Lena Soto                                    .Type:  Needs Medical Advice    Who Called: Beth  Symptoms (please be specific):    How long has patient had these symptoms:    Pharmacy name and phone #:    Would the patient rather a call back or a response via [facility]? call  Best Call Back Number: [phone] (home)   Additional Information:  Beth would like to speak to you about her cpap equipment

## 2023-11-20 ENCOUNTER — TELEPHONE (OUTPATIENT)
Dept: INTERNAL MEDICINE | Facility: CLINIC | Age: 73
End: 2023-11-20
Payer: MEDICARE

## 2023-11-20 NOTE — TELEPHONE ENCOUNTER
Spoke with pt. Request appt for sore throat and cough. Declined first available appt with ROSIBEL Hernandez NP on 11/21/2023 at 1140. Per pt she will be evaluated at Urgent Care.//anni

## 2023-11-20 NOTE — TELEPHONE ENCOUNTER
----- Message from Araceli Villafana sent at 11/20/2023  2:12 PM CST -----  Type:  Needs Medical Advice/Symptom-based Call    Who Called: pt     Symptoms (please be specific): pt requesting something to be called in for a cold. Call pt     How long has patient had these symptoms:  sore throat, coughing    Would the patient rather a call back or a response via [facility]? call    Best Call Back Number: [phone] (home) [phone] (work)      Additional Information:

## 2023-11-28 ENCOUNTER — TELEPHONE (OUTPATIENT)
Dept: INTERNAL MEDICINE | Facility: CLINIC | Age: 73
End: 2023-11-28
Payer: MEDICARE

## 2023-11-28 ENCOUNTER — OFFICE VISIT (OUTPATIENT)
Dept: SLEEP MEDICINE | Facility: CLINIC | Age: 73
End: 2023-11-28
Payer: MEDICARE

## 2023-11-28 ENCOUNTER — HOSPITAL ENCOUNTER (OUTPATIENT)
Dept: RADIOLOGY | Facility: HOSPITAL | Age: 73
Discharge: HOME | End: 2023-11-28
Attending: NURSE PRACTITIONER
Payer: MEDICARE

## 2023-11-28 VITALS
BODY MASS INDEX: 38.83 KG/M2 | HEART RATE: 62 BPM | OXYGEN SATURATION: 97 % | RESPIRATION RATE: 17 BRPM | WEIGHT: 219.13 LBS | DIASTOLIC BLOOD PRESSURE: 84 MMHG | HEIGHT: 63 IN | SYSTOLIC BLOOD PRESSURE: 128 MMHG

## 2023-11-28 DIAGNOSIS — G47.33 OSA ON CPAP: Primary | ICD-10-CM

## 2023-11-28 DIAGNOSIS — J20.9 ACUTE BRONCHITIS, UNSPECIFIED ORGANISM: ICD-10-CM

## 2023-11-28 PROCEDURE — 1160F RVW MEDS BY RX/DR IN RCRD: CPT | Mod: CPTII,S$GLB,, | Performed by: NURSE PRACTITIONER

## 2023-11-28 PROCEDURE — 71046 XR CHEST PA AND LATERAL: ICD-10-PCS | Mod: 26,,, | Performed by: RADIOLOGY

## 2023-11-28 PROCEDURE — 71046 X-RAY EXAM CHEST 2 VIEWS: CPT | Mod: 26,,, | Performed by: RADIOLOGY

## 2023-11-28 PROCEDURE — 99999 PR PBB SHADOW E&M-EST. PATIENT-LVL IV: ICD-10-PCS | Mod: PBBFAC,,, | Performed by: NURSE PRACTITIONER

## 2023-11-28 PROCEDURE — 1126F AMNT PAIN NOTED NONE PRSNT: CPT | Mod: CPTII,S$GLB,, | Performed by: NURSE PRACTITIONER

## 2023-11-28 PROCEDURE — 3288F FALL RISK ASSESSMENT DOCD: CPT | Mod: CPTII,S$GLB,, | Performed by: NURSE PRACTITIONER

## 2023-11-28 PROCEDURE — 99999 PR PBB SHADOW E&M-EST. PATIENT-LVL IV: CPT | Mod: PBBFAC,,, | Performed by: NURSE PRACTITIONER

## 2023-11-28 PROCEDURE — 3044F HG A1C LEVEL LT 7.0%: CPT | Mod: CPTII,S$GLB,, | Performed by: NURSE PRACTITIONER

## 2023-11-28 PROCEDURE — 4010F ACE/ARB THERAPY RXD/TAKEN: CPT | Mod: CPTII,S$GLB,, | Performed by: NURSE PRACTITIONER

## 2023-11-28 PROCEDURE — 1159F MED LIST DOCD IN RCRD: CPT | Mod: CPTII,S$GLB,, | Performed by: NURSE PRACTITIONER

## 2023-11-28 PROCEDURE — 1159F PR MEDICATION LIST DOCUMENTED IN MEDICAL RECORD: ICD-10-PCS | Mod: CPTII,S$GLB,, | Performed by: NURSE PRACTITIONER

## 2023-11-28 PROCEDURE — 3079F DIAST BP 80-89 MM HG: CPT | Mod: CPTII,S$GLB,, | Performed by: NURSE PRACTITIONER

## 2023-11-28 PROCEDURE — 96372 THER/PROPH/DIAG INJ SC/IM: CPT | Mod: S$GLB,,, | Performed by: NURSE PRACTITIONER

## 2023-11-28 PROCEDURE — 4010F PR ACE/ARB THEARPY RXD/TAKEN: ICD-10-PCS | Mod: CPTII,S$GLB,, | Performed by: NURSE PRACTITIONER

## 2023-11-28 PROCEDURE — 1126F PR PAIN SEVERITY QUANTIFIED, NO PAIN PRESENT: ICD-10-PCS | Mod: CPTII,S$GLB,, | Performed by: NURSE PRACTITIONER

## 2023-11-28 PROCEDURE — 71046 X-RAY EXAM CHEST 2 VIEWS: CPT | Mod: TC

## 2023-11-28 PROCEDURE — 1101F PR PT FALLS ASSESS DOC 0-1 FALLS W/OUT INJ PAST YR: ICD-10-PCS | Mod: CPTII,S$GLB,, | Performed by: NURSE PRACTITIONER

## 2023-11-28 PROCEDURE — 3074F PR MOST RECENT SYSTOLIC BLOOD PRESSURE < 130 MM HG: ICD-10-PCS | Mod: CPTII,S$GLB,, | Performed by: NURSE PRACTITIONER

## 2023-11-28 PROCEDURE — 3288F PR FALLS RISK ASSESSMENT DOCUMENTED: ICD-10-PCS | Mod: CPTII,S$GLB,, | Performed by: NURSE PRACTITIONER

## 2023-11-28 PROCEDURE — 1101F PT FALLS ASSESS-DOCD LE1/YR: CPT | Mod: CPTII,S$GLB,, | Performed by: NURSE PRACTITIONER

## 2023-11-28 PROCEDURE — 99214 OFFICE O/P EST MOD 30 MIN: CPT | Mod: 25,S$GLB,, | Performed by: NURSE PRACTITIONER

## 2023-11-28 PROCEDURE — 96372 PR INJECTION,THERAP/PROPH/DIAG2ST, IM OR SUBCUT: ICD-10-PCS | Mod: S$GLB,,, | Performed by: NURSE PRACTITIONER

## 2023-11-28 PROCEDURE — 1160F PR REVIEW ALL MEDS BY PRESCRIBER/CLIN PHARMACIST DOCUMENTED: ICD-10-PCS | Mod: CPTII,S$GLB,, | Performed by: NURSE PRACTITIONER

## 2023-11-28 PROCEDURE — 3074F SYST BP LT 130 MM HG: CPT | Mod: CPTII,S$GLB,, | Performed by: NURSE PRACTITIONER

## 2023-11-28 PROCEDURE — 3079F PR MOST RECENT DIASTOLIC BLOOD PRESSURE 80-89 MM HG: ICD-10-PCS | Mod: CPTII,S$GLB,, | Performed by: NURSE PRACTITIONER

## 2023-11-28 PROCEDURE — 3044F PR MOST RECENT HEMOGLOBIN A1C LEVEL <7.0%: ICD-10-PCS | Mod: CPTII,S$GLB,, | Performed by: NURSE PRACTITIONER

## 2023-11-28 PROCEDURE — 3008F PR BODY MASS INDEX (BMI) DOCUMENTED: ICD-10-PCS | Mod: CPTII,S$GLB,, | Performed by: NURSE PRACTITIONER

## 2023-11-28 PROCEDURE — 3008F BODY MASS INDEX DOCD: CPT | Mod: CPTII,S$GLB,, | Performed by: NURSE PRACTITIONER

## 2023-11-28 PROCEDURE — 99214 PR OFFICE/OUTPT VISIT, EST, LEVL IV, 30-39 MIN: ICD-10-PCS | Mod: 25,S$GLB,, | Performed by: NURSE PRACTITIONER

## 2023-11-28 RX ORDER — TRIAMCINOLONE ACETONIDE 40 MG/ML
40 INJECTION, SUSPENSION INTRA-ARTICULAR; INTRAMUSCULAR
Status: COMPLETED | OUTPATIENT
Start: 2023-11-28 | End: 2023-11-28

## 2023-11-28 RX ADMIN — TRIAMCINOLONE ACETONIDE 40 MG: 40 INJECTION, SUSPENSION INTRA-ARTICULAR; INTRAMUSCULAR at 08:11

## 2023-11-28 NOTE — TELEPHONE ENCOUNTER
----- Message from Gaby Ac sent at 11/28/2023  3:21 PM CST -----  Contact: Beth  Augie states she had a steroid shot today, so does she need to attend the 11/29 appt? Please call her back at [phone].    Thanks  TS

## 2023-11-28 NOTE — PROGRESS NOTES
"Subjective:      Patient ID: Beth Souza is a 73 y.o. female.    Chief Complaint: Sleep Apnea    HPI  Presents to office for review of AutoPAP therapy. Patient states improved symptoms with use of AutoPAP.   She states recently she woke up in the middle of the night with a cough, therefore feels as though it was related to the CPAP machine.  She also had a cough during the day and presented to urgent care when she was in [facility].  11/23/2023 she was given an antibiotic, Tessalon Perles.  She tested negative for COVID.  She is a few more days left of unknown antibiotic.  Afebrile.    Patient Active Problem List   Diagnosis    Essential hypertension    Vitamin D deficiency    Mixed hyperlipidemia    Cataract    Osteoarthritis of spine with radiculopathy, lumbar region    Severe obesity with body mass index (BMI) of 35.0 to 39.9 with serious comorbidity    LEN on CPAP    Mild cognitive impairment with memory loss    Meningioma of cerebellum    Multiple thyroid nodules    History of COVID-19    Primary osteoarthritis of both hips    Calcification of aorta    History of hyperparathyroidism    Prediabetes    Lumbar radiculopathy    Major neurocognitive disorder due to another medical condition    Neck pain, chronic    Weakness     /84   Pulse 62   Resp 17   Ht 5' 3" (1.6 m)   Wt 99.4 kg (219 lb 2.2 oz)   SpO2 97%   BMI 38.82 kg/m²   Body mass index is 38.82 kg/m².    Review of Systems   HENT:  Positive for congestion.    Respiratory:  Positive for cough and sputum production.    All other systems reviewed and are negative.    Objective:      Physical Exam  Constitutional:       Appearance: She is well-developed. She is obese.   HENT:      Head: Normocephalic and atraumatic.      Right Ear: Tympanic membrane normal.      Left Ear: Tympanic membrane normal.      Nose: Nose normal.      Mouth/Throat:      Pharynx: Oropharynx is clear.   Cardiovascular:      Rate and Rhythm: Normal rate and regular " rhythm.      Heart sounds: No murmur heard.     No gallop.   Pulmonary:      Effort: Pulmonary effort is normal.      Breath sounds: Normal breath sounds.   Abdominal:      Palpations: Abdomen is soft.      Tenderness: There is no abdominal tenderness.   Musculoskeletal:         General: Normal range of motion.      Cervical back: Normal range of motion and neck supple.   Skin:     General: Skin is warm and dry.   Neurological:      Mental Status: She is alert and oriented to person, place, and time.   Psychiatric:         Mood and Affect: Mood normal.         Behavior: Behavior normal.       Personal Diagnostic Review  Compliance Summary  10/26/2023 - 11/24/2023 (30 days)  Days with Device Usage 27 days  Days without Device Usage 3 days  Percent Days with Device Usage 90.0%  Cumulative Usage 6 days 8 hrs. 23 mins. 42 secs.  Maximum Usage (1 Day) 8 hrs. 43 mins. 50 secs.  Average Usage (All Days) 5 hrs. 4 mins. 47 secs.  Average Usage (Days Used) 5 hrs. 38 mins. 39 secs.  Minimum Usage (1 Day) 8 secs.  Percent of Days with Usage >= 4 Hours 73.3%  Percent of Days with Usage < 4 Hours 26.7%  Date Range  Total Blower Time 7 days 21 hrs. 7 mins. 37 secs.  Average AHI 2.2  Auto-CPAP Summary  Auto-CPAP Mean Pressure 5.0 cmH2O  Auto-CPAP Peak Average Pressure 5.7 cmH2O  Device Pressure <= 90% of Time 6.6 cmH2O  Average Time in Large Leak Per Day 3 mins. 38 secs.      Assessment:       1. LEN on CPAP    2. Acute bronchitis, unspecified organism        Outpatient Encounter Medications as of 11/28/2023   Medication Sig Dispense Refill    acetaminophen (TYLENOL) 500 MG tablet Take 500 mg by mouth daily as needed for Pain.      atorvastatin (LIPITOR) 20 MG tablet Take 1 tablet by mouth once daily 90 tablet 0    ergocalciferol (ERGOCALCIFEROL) 50,000 unit Cap Take 1 capsule by mouth once a week 12 capsule 0    estradioL (VAGIFEM) 10 mcg Tab Insert 1 tablet vaginally twice a week. Insert before bed. No intercourse on the night of  insertion 30 tablet 1    lisinopriL-hydrochlorothiazide (PRINZIDE,ZESTORETIC) 20-25 mg Tab Take 1 tablet by mouth once daily 90 tablet 2     Facility-Administered Encounter Medications as of 11/28/2023   Medication Dose Route Frequency Provider Last Rate Last Admin    [COMPLETED] triamcinolone acetonide injection 40 mg  40 mg Intramuscular 1 time in Clinic/HOD Lejeune, Elizabeth B, NP   40 mg at 11/28/23 0854     Orders Placed This Encounter   Procedures    CPAP/BIPAP SUPPLIES     Order Specific Question:   Length of need (1-99 months):     Answer:   99     Order Specific Question:   Choose ONE mask type and its corresponding cushions and/or pillows:     Answer:    Nasal Mask, 1 per 90 days:  Nasal Cushions, (6 per 90 days):  Nasal Pillows, (6 per 90 days)     Order Specific Question:   Choose EITHER Heated or Non-Heated Tubjing     Answer:    Non-Heated Tubing, 1 per 90 days     Order Specific Question:   All other supplies as needed as listed below:     Answer:    Headgear, 1 per 180 days     Order Specific Question:   All other supplies as needed as listed below:     Answer:    Disposable Filter, 6 per 90 days     Order Specific Question:   All other supplies as needed as listed below:     Answer:    Non-Disposable Filter, 1 per 180 days     Order Specific Question:   All other supplies as needed as listed below:     Answer:    Humidifier Chamber, 1 per 180 days     Order Specific Question:   All other supplies as needed as listed below:     Answer:    Chin Strap, 1 per 180 days    X-Ray Chest PA And Lateral     Standing Status:   Future     Number of Occurrences:   1     Standing Expiration Date:   11/28/2024     Order Specific Question:   May the Radiologist modify the order per protocol to meet the clinical needs of the patient?     Answer:   Yes     Order Specific Question:   Release to patient     Answer:   Immediate     Plan:     Problem List Items Addressed This  Visit          Other    LEN on CPAP - Primary    Overview     Home Sleep Studies 3 night 08/20/2018, 08/22/2018, 08/23/2018 Mild obstructive sleep apnea-hypopnea syndrome. Apnea-hypopnea index: AHI: 11.7 events per hour. Total events 80.   Autopap 4-16cm H2O with ODME.   Dreamwear nasal mask  HME: [facility]          Relevant Orders    CPAP/BIPAP SUPPLIES     Other Visit Diagnoses       Acute bronchitis, unspecified organism        Relevant Medications    triamcinolone acetonide injection 40 mg (Completed)    Other Relevant Orders    X-Ray Chest PA And Lateral          Compliant with PAP and benefits from use. Follow up annually in the sleep clinic.  Cough does not seem to be related to CPAP machine.   Continue antibiotics  Cxr  Kenalog  Tessalon.               Elizabeth LeJeune, ACNP, ANP

## 2023-11-29 NOTE — TELEPHONE ENCOUNTER
----- Message from Sabrina Caruso sent at 11/29/2023 10:15 AM CST -----  Contact: self  ..Type:  Patient Requesting Referral    Who Called:.Beth Souza  Does the patient already have the specialty appointment scheduled?:no   If yes, what is the date of that appointment?:  Referral to What Specialty:cardiologist   Reason for Referral:chest pain   Does the patient want the referral with a specific physician?:no  [facility]   Patient Requesting a Response?:yes   Would the patient rather a call back or a response via [facility]? Call back   Best Call Back Number:.[phone]  Additional Information:

## 2023-11-29 NOTE — TELEPHONE ENCOUNTER
Spoke with pt. Inquired about rescheduling appt for cardio that was canceled in 10/2023. Can you assist with scheduling? Does pt need a new referral?

## 2023-12-04 NOTE — PRE-PROCEDURE INSTRUCTIONS
Spoke with patient regarding procedure scheduled on 12.12     Arrival time 0730     Has patient been sick with fever or on antibiotics within the last 7 days? No     Does the patient have any open wounds, sores or rashes? No     Does the patient have any recent fractures? no     Has patient received a vaccination within the last 7 days? No     Received the COVID vaccination? yes     Has the patient stopped all medications as directed? na     Does patient have a pacemaker, defibrillator, or implantable stimulator? No     Does the patient have a ride to and from procedure and someone reliable to remain with patient?  daughter     Is the patient diabetic? no     Does the patient have sleep apnea? Or use O2 at home? queta on cpap     Is the patient receiving sedation? yes     Is the patient instructed to remain NPO beginning at midnight the night before their procedure? yes     Procedure location confirmed with patient? Yes     Covid- Denies signs/symptoms. Instructed to notify PAT/MD if any changes.

## 2023-12-08 DIAGNOSIS — R00.2 PALPITATIONS: Primary | ICD-10-CM

## 2023-12-11 ENCOUNTER — OFFICE VISIT (OUTPATIENT)
Dept: CARDIOLOGY | Facility: CLINIC | Age: 73
End: 2023-12-11
Payer: MEDICARE

## 2023-12-11 ENCOUNTER — HOSPITAL ENCOUNTER (OUTPATIENT)
Dept: CARDIOLOGY | Facility: HOSPITAL | Age: 73
Discharge: HOME | End: 2023-12-11
Attending: INTERNAL MEDICINE
Payer: MEDICARE

## 2023-12-11 VITALS
SYSTOLIC BLOOD PRESSURE: 118 MMHG | BODY MASS INDEX: 38.55 KG/M2 | HEART RATE: 53 BPM | WEIGHT: 217.63 LBS | WEIGHT: 217.63 LBS | DIASTOLIC BLOOD PRESSURE: 72 MMHG | BODY MASS INDEX: 38.56 KG/M2 | HEIGHT: 63 IN | OXYGEN SATURATION: 100 %

## 2023-12-11 DIAGNOSIS — I70.0 CALCIFICATION OF AORTA: ICD-10-CM

## 2023-12-11 DIAGNOSIS — E78.2 MIXED HYPERLIPIDEMIA: ICD-10-CM

## 2023-12-11 DIAGNOSIS — I20.89 STABLE ANGINA PECTORIS: Primary | ICD-10-CM

## 2023-12-11 DIAGNOSIS — R00.1 BRADYCARDIA: ICD-10-CM

## 2023-12-11 DIAGNOSIS — I10 ESSENTIAL HYPERTENSION: ICD-10-CM

## 2023-12-11 DIAGNOSIS — R00.2 PALPITATIONS: ICD-10-CM

## 2023-12-11 DIAGNOSIS — R73.03 PREDIABETES: ICD-10-CM

## 2023-12-11 DIAGNOSIS — E66.01 SEVERE OBESITY WITH BODY MASS INDEX (BMI) OF 35.0 TO 39.9 WITH SERIOUS COMORBIDITY: ICD-10-CM

## 2023-12-11 PROCEDURE — 3074F SYST BP LT 130 MM HG: CPT | Mod: CPTII,S$GLB,, | Performed by: INTERNAL MEDICINE

## 2023-12-11 PROCEDURE — 99999 PR PBB SHADOW E&M-EST. PATIENT-LVL III: CPT | Mod: PBBFAC,,, | Performed by: INTERNAL MEDICINE

## 2023-12-11 PROCEDURE — 93010 ELECTROCARDIOGRAM REPORT: CPT | Mod: ,,, | Performed by: INTERNAL MEDICINE

## 2023-12-11 PROCEDURE — 1159F MED LIST DOCD IN RCRD: CPT | Mod: CPTII,S$GLB,, | Performed by: INTERNAL MEDICINE

## 2023-12-11 PROCEDURE — 4010F ACE/ARB THERAPY RXD/TAKEN: CPT | Mod: CPTII,S$GLB,, | Performed by: INTERNAL MEDICINE

## 2023-12-11 PROCEDURE — 3008F BODY MASS INDEX DOCD: CPT | Mod: CPTII,S$GLB,, | Performed by: INTERNAL MEDICINE

## 2023-12-11 PROCEDURE — 4010F PR ACE/ARB THEARPY RXD/TAKEN: ICD-10-PCS | Mod: CPTII,S$GLB,, | Performed by: INTERNAL MEDICINE

## 2023-12-11 PROCEDURE — 3288F FALL RISK ASSESSMENT DOCD: CPT | Mod: CPTII,S$GLB,, | Performed by: INTERNAL MEDICINE

## 2023-12-11 PROCEDURE — 1159F PR MEDICATION LIST DOCUMENTED IN MEDICAL RECORD: ICD-10-PCS | Mod: CPTII,S$GLB,, | Performed by: INTERNAL MEDICINE

## 2023-12-11 PROCEDURE — 1101F PT FALLS ASSESS-DOCD LE1/YR: CPT | Mod: CPTII,S$GLB,, | Performed by: INTERNAL MEDICINE

## 2023-12-11 PROCEDURE — 3074F PR MOST RECENT SYSTOLIC BLOOD PRESSURE < 130 MM HG: ICD-10-PCS | Mod: CPTII,S$GLB,, | Performed by: INTERNAL MEDICINE

## 2023-12-11 PROCEDURE — 3078F DIAST BP <80 MM HG: CPT | Mod: CPTII,S$GLB,, | Performed by: INTERNAL MEDICINE

## 2023-12-11 PROCEDURE — 93005 ELECTROCARDIOGRAM TRACING: CPT

## 2023-12-11 PROCEDURE — 99999 PR PBB SHADOW E&M-EST. PATIENT-LVL III: ICD-10-PCS | Mod: PBBFAC,,, | Performed by: INTERNAL MEDICINE

## 2023-12-11 PROCEDURE — 99214 PR OFFICE/OUTPT VISIT, EST, LEVL IV, 30-39 MIN: ICD-10-PCS | Mod: S$GLB,,, | Performed by: INTERNAL MEDICINE

## 2023-12-11 PROCEDURE — 3288F PR FALLS RISK ASSESSMENT DOCUMENTED: ICD-10-PCS | Mod: CPTII,S$GLB,, | Performed by: INTERNAL MEDICINE

## 2023-12-11 PROCEDURE — 3078F PR MOST RECENT DIASTOLIC BLOOD PRESSURE < 80 MM HG: ICD-10-PCS | Mod: CPTII,S$GLB,, | Performed by: INTERNAL MEDICINE

## 2023-12-11 PROCEDURE — 99214 OFFICE O/P EST MOD 30 MIN: CPT | Mod: S$GLB,,, | Performed by: INTERNAL MEDICINE

## 2023-12-11 PROCEDURE — 1126F AMNT PAIN NOTED NONE PRSNT: CPT | Mod: CPTII,S$GLB,, | Performed by: INTERNAL MEDICINE

## 2023-12-11 PROCEDURE — 3008F PR BODY MASS INDEX (BMI) DOCUMENTED: ICD-10-PCS | Mod: CPTII,S$GLB,, | Performed by: INTERNAL MEDICINE

## 2023-12-11 PROCEDURE — 93010 EKG 12-LEAD: ICD-10-PCS | Mod: ,,, | Performed by: INTERNAL MEDICINE

## 2023-12-11 PROCEDURE — 3044F HG A1C LEVEL LT 7.0%: CPT | Mod: CPTII,S$GLB,, | Performed by: INTERNAL MEDICINE

## 2023-12-11 PROCEDURE — 1101F PR PT FALLS ASSESS DOC 0-1 FALLS W/OUT INJ PAST YR: ICD-10-PCS | Mod: CPTII,S$GLB,, | Performed by: INTERNAL MEDICINE

## 2023-12-11 PROCEDURE — 1126F PR PAIN SEVERITY QUANTIFIED, NO PAIN PRESENT: ICD-10-PCS | Mod: CPTII,S$GLB,, | Performed by: INTERNAL MEDICINE

## 2023-12-11 PROCEDURE — 3044F PR MOST RECENT HEMOGLOBIN A1C LEVEL <7.0%: ICD-10-PCS | Mod: CPTII,S$GLB,, | Performed by: INTERNAL MEDICINE

## 2023-12-11 NOTE — PROGRESS NOTES
Subjective:   Patient ID:  Beth Souza is a 73 y.o. female who presents for follow-up of No chief complaint on file.    Pt, 70 yo, came in for one yr f/u.  PMH HTN, HLD and Vit D.   No chest pain, fainting except sinus, PND, orthopnea, syncope and claudication.   Palpitation and fluttering feeling at night. No dizziness, and faint  Does not exercise due to back pain.  C/o diffuse body sore and pain  Pt has bee not taking Lipitor for a long time.  No smoking/drinking.  EKG on 10-: NSR. nonspecifc ST T change.    echo normal EF, mild LAE and Holter normal         04/05/2023:   Overall patient doing well intermittent bradycardia will do a Holter monitor.  Intermittent chest pain with the stress test with stress echo as well.  Risk factors coronary disease including arterial calcification as well as prior smoking history hypertension prediabetes.  Patient intermittent cardiac risk cholesterol profiles been relatively good well controlled but she should have a little lower cholesterol and we discussed possibility of starting statin medications.  She is allergies to aspirin and can not take that        05/10/2023:   Overall patient doing well she is not using sleep apnea machine quite his normal issues should.  This may have something to do with low heart rates at night bradycardia to 42 heart rate upper 107 by monitor cardiac stress test showed no evidence cardiac ischemia the limited exercise overall doing well this time follow-up evaluation 4-5 months.     06/12/2023 patient doing well no recurrence symptoms stable she does not need to use her sleep apnea machine she has fatigued otherwise stable all testing is normal no acute changes.  Follow-up evaluation again in 6 months sooner if acute recurrence symptoms.  Patient states that she has very poor sleep studies and very poor sleep pattern.     12/11/2023 overall stable doing well the patient has had recurrence symptoms chest pain will stress  test rule out cardiac ischemia.        Review of Systems   Constitutional: Negative for chills, diaphoresis, night sweats, weight gain and weight loss.   HENT:  Negative for congestion, hoarse voice, sore throat and stridor.    Eyes:  Negative for double vision and pain.   Cardiovascular:  Negative for chest pain, claudication, cyanosis, dyspnea on exertion, irregular heartbeat, leg swelling, near-syncope, orthopnea, palpitations, paroxysmal nocturnal dyspnea and syncope.   Respiratory:  Negative for cough, hemoptysis, shortness of breath, sleep disturbances due to breathing, snoring, sputum production and wheezing.    Endocrine: Negative for cold intolerance, heat intolerance and polydipsia.   Hematologic/Lymphatic: Negative for bleeding problem. Does not bruise/bleed easily.   Skin:  Negative for color change, dry skin and rash.   Musculoskeletal:  Negative for joint swelling and muscle cramps.   Gastrointestinal:  Negative for bloating, abdominal pain, constipation, diarrhea, dysphagia, melena, nausea and vomiting.   Genitourinary:  Negative for flank pain and urgency.   Neurological:  Negative for dizziness, focal weakness, headaches, light-headedness, loss of balance, seizures and weakness.   Psychiatric/Behavioral:  Negative for altered mental status and memory loss. The patient is not nervous/anxious.    Family History   Problem Relation Age of Onset    Hypertension Mother     Diabetes Daughter     Melanoma Neg Hx     Psoriasis Neg Hx     Lupus Neg Hx     Eczema Neg Hx     Breast cancer Neg Hx     Colon cancer Neg Hx     Ovarian cancer Neg Hx     Thrombosis Neg Hx      Past Medical History:   Diagnosis Date    Cataract     Hyperlipidemia     Hypertension     Lumbar spondylosis     Meningioma 2/18/2021    Obesity     Sleep apnea     Vitamin D deficiency      Social History     Socioeconomic History    Marital status:     Number of children: 3   Occupational History    Occupation: Cleaning houses      Employer: not employed   Tobacco Use    Smoking status: Former     Current packs/day: 0.00     Types: Cigarettes     Quit date: 2004     Years since quittin.9    Smokeless tobacco: Never   Substance and Sexual Activity    Alcohol use: Yes     Comment: occasionally    Drug use: No    Sexual activity: Not Currently     Partners: Male   Other Topics Concern    Are you pregnant or think you may be? No    Breast-feeding No   Social History Narrative    Live w/ alone      Social Determinants of Health     Financial Resource Strain: Low Risk  (2022)    Overall Financial Resource Strain (CARDIA)     Difficulty of Paying Living Expenses: Not hard at all   Food Insecurity: No Food Insecurity (2022)    Hunger Vital Sign     Worried About Running Out of Food in the Last Year: Never true     Ran Out of Food in the Last Year: Never true   Transportation Needs: No Transportation Needs (2022)    PRAPARE - Transportation     Lack of Transportation (Medical): No     Lack of Transportation (Non-Medical): No   Physical Activity: Inactive (2022)    Exercise Vital Sign     Days of Exercise per Week: 0 days     Minutes of Exercise per Session: 0 min   Stress: No Stress Concern Present (2022)    [employer] - Occupational Stress Questionnaire     Feeling of Stress : Not at all   Social Connections: Moderately Isolated (2022)    Social Connection and Isolation Panel [NHANES]     Frequency of Communication with Friends and Family: More than three times a week     Frequency of Social Gatherings with Friends and Family: More than three times a week     Attends [employer] Services: More than 4 times per year     Active Member of Clubs or Organizations: No     Attends Club or Organization Meetings: Never     Marital Status:    Housing Stability: Low Risk  (2022)    Housing Stability Vital Sign     Unable to Pay for Housing in the Last Year: No     Number of Places  Lived in the Last Year: 1     Unstable Housing in the Last Year: No     Current Outpatient Medications on File Prior to Visit   Medication Sig Dispense Refill    acetaminophen (TYLENOL) 500 MG tablet Take 500 mg by mouth daily as needed for Pain.      atorvastatin (LIPITOR) 20 MG tablet Take 1 tablet by mouth once daily 90 tablet 0    ergocalciferol (ERGOCALCIFEROL) 50,000 unit Cap Take 1 capsule by mouth once a week 12 capsule 0    estradioL (VAGIFEM) 10 mcg Tab Insert 1 tablet vaginally twice a week. Insert before bed. No intercourse on the night of insertion 30 tablet 1    lisinopriL-hydrochlorothiazide (PRINZIDE,ZESTORETIC) 20-25 mg Tab Take 1 tablet by mouth once daily 90 tablet 2     No current facility-administered medications on file prior to visit.     Review of patient's allergies indicates:   Allergen Reactions    Aspirin Other (See Comments)     Bruising       Objective:     Physical Exam  Eyes:      Pupils: Pupils are equal, round, and reactive to light.   Neck:      Trachea: No tracheal deviation.   Cardiovascular:      Rate and Rhythm: Normal rate and regular rhythm.      Pulses: Intact distal pulses.           Carotid pulses are 2+ on the right side and 2+ on the left side.       Radial pulses are 2+ on the right side and 2+ on the left side.        Femoral pulses are 2+ on the right side and 2+ on the left side.       Popliteal pulses are 2+ on the right side and 2+ on the left side.        Dorsalis pedis pulses are 2+ on the right side and 2+ on the left side.        Posterior tibial pulses are 2+ on the right side and 2+ on the left side.      Heart sounds: Normal heart sounds. No murmur heard.     No friction rub. No gallop.   Pulmonary:      Effort: Pulmonary effort is normal. No respiratory distress.      Breath sounds: Normal breath sounds. No stridor. No wheezing or rales.   Chest:      Chest wall: No tenderness.   Abdominal:      General: There is no distension.      Tenderness: There is no  abdominal tenderness. There is no rebound.   Musculoskeletal:         General: No tenderness.      Cervical back: Normal range of motion.   Skin:     General: Skin is warm and dry.   Neurological:      Mental Status: She is alert and oriented to person, place, and time.     Assessment:     1. Palpitations    2. Severe obesity with body mass index (BMI) of 35.0 to 39.9 with serious comorbidity    3. Bradycardia    4. Essential hypertension    5. Stable angina pectoris    6. Calcification of aorta    7. Mixed hyperlipidemia    8. Prediabetes        Plan:     Palpitations    Severe obesity with body mass index (BMI) of 35.0 to 39.9 with serious comorbidity    Bradycardia    Essential hypertension    Stable angina pectoris    Calcification of aorta    Mixed hyperlipidemia    Prediabetes    Impression 1 chest pain recurrent will go ahead with a nuclear stress test to rule out cardiac ischemia stress test in the year was inadequate   2. Hypertension stable medications patient continues on lisinopril hydrochlorothiazide  3 hyperlipidemia stable current statins all the symptoms are stable follow-up evaluation testing is performed.

## 2023-12-12 ENCOUNTER — HOSPITAL ENCOUNTER (OUTPATIENT)
Facility: HOSPITAL | Age: 73
Discharge: HOME | End: 2023-12-12
Attending: ANESTHESIOLOGY | Admitting: ANESTHESIOLOGY
Payer: MEDICARE

## 2023-12-12 VITALS
SYSTOLIC BLOOD PRESSURE: 181 MMHG | TEMPERATURE: 97 F | DIASTOLIC BLOOD PRESSURE: 88 MMHG | WEIGHT: 217.38 LBS | OXYGEN SATURATION: 95 % | BODY MASS INDEX: 38.52 KG/M2 | RESPIRATION RATE: 17 BRPM | HEIGHT: 63 IN | HEART RATE: 58 BPM

## 2023-12-12 DIAGNOSIS — M54.16 LUMBAR RADICULOPATHY: ICD-10-CM

## 2023-12-12 PROCEDURE — 64483 PR EPIDURAL INJ, ANES/STEROID, TRANSFORAMINAL, LUMB/SACR, SNGL LEVL: ICD-10-PCS | Mod: 50,,, | Performed by: ANESTHESIOLOGY

## 2023-12-12 PROCEDURE — 25500020 PHARM REV CODE 255: Performed by: ANESTHESIOLOGY

## 2023-12-12 PROCEDURE — 64484 PRA INJECT ANES/STEROID FORAMEN LUMBAR/SACRAL W IMG GUIDE ,EA ADD LEVEL: ICD-10-PCS | Mod: 50,,, | Performed by: ANESTHESIOLOGY

## 2023-12-12 PROCEDURE — 25000003 PHARM REV CODE 250: Performed by: ANESTHESIOLOGY

## 2023-12-12 PROCEDURE — 64484 NJX AA&/STRD TFRM EPI L/S EA: CPT | Mod: 50 | Performed by: ANESTHESIOLOGY

## 2023-12-12 PROCEDURE — 64483 NJX AA&/STRD TFRM EPI L/S 1: CPT | Mod: 50,,, | Performed by: ANESTHESIOLOGY

## 2023-12-12 PROCEDURE — 64484 NJX AA&/STRD TFRM EPI L/S EA: CPT | Mod: 50,,, | Performed by: ANESTHESIOLOGY

## 2023-12-12 PROCEDURE — 64483 NJX AA&/STRD TFRM EPI L/S 1: CPT | Mod: 50 | Performed by: ANESTHESIOLOGY

## 2023-12-12 PROCEDURE — 99152 MOD SED SAME PHYS/QHP 5/>YRS: CPT | Performed by: ANESTHESIOLOGY

## 2023-12-12 PROCEDURE — 63600175 PHARM REV CODE 636 W HCPCS: Performed by: ANESTHESIOLOGY

## 2023-12-12 RX ORDER — BUPIVACAINE HYDROCHLORIDE 2.5 MG/ML
INJECTION, SOLUTION EPIDURAL; INFILTRATION; INTRACAUDAL
Status: DISCONTINUED | OUTPATIENT
Start: 2023-12-12 | End: 2023-12-12 | Stop reason: HOSPADM

## 2023-12-12 RX ORDER — INDOMETHACIN 25 MG/1
CAPSULE ORAL
Status: DISCONTINUED | OUTPATIENT
Start: 2023-12-12 | End: 2023-12-12 | Stop reason: HOSPADM

## 2023-12-12 RX ORDER — MIDAZOLAM HYDROCHLORIDE 1 MG/ML
INJECTION, SOLUTION INTRAMUSCULAR; INTRAVENOUS
Status: DISCONTINUED | OUTPATIENT
Start: 2023-12-12 | End: 2023-12-12 | Stop reason: HOSPADM

## 2023-12-12 RX ORDER — ONDANSETRON 2 MG/ML
4 INJECTION INTRAMUSCULAR; INTRAVENOUS ONCE
Status: COMPLETED | OUTPATIENT
Start: 2023-12-12 | End: 2023-12-12

## 2023-12-12 RX ORDER — DEXAMETHASONE SODIUM PHOSPHATE 10 MG/ML
INJECTION INTRAMUSCULAR; INTRAVENOUS
Status: DISCONTINUED | OUTPATIENT
Start: 2023-12-12 | End: 2023-12-12 | Stop reason: HOSPADM

## 2023-12-12 RX ORDER — FENTANYL CITRATE 50 UG/ML
INJECTION, SOLUTION INTRAMUSCULAR; INTRAVENOUS
Status: DISCONTINUED | OUTPATIENT
Start: 2023-12-12 | End: 2023-12-12 | Stop reason: HOSPADM

## 2023-12-12 RX ADMIN — ONDANSETRON 4 MG: 2 INJECTION INTRAMUSCULAR; INTRAVENOUS at 10:12

## 2023-12-12 NOTE — DISCHARGE SUMMARY
Discharge Note  Short Stay      SUMMARY     Admit Date: 12/12/2023    Attending Physician: Kush Jha MD        Discharge Physician: Kush Jha MD        Discharge Date: 12/12/2023 9:13 AM    Procedure(s) (LRB):  Bilateral L4/5 and L5-S1 TF LAWSON (Bilateral)    Final Diagnosis: Lumbar radiculopathy [M54.16]    Disposition: Home or self care    Patient Instructions:   Current Discharge Medication List        CONTINUE these medications which have NOT CHANGED    Details   lisinopriL-hydrochlorothiazide (PRINZIDE,ZESTORETIC) 20-25 mg Tab Take 1 tablet by mouth once daily  Qty: 90 tablet, Refills: 2    Comments: .  Associated Diagnoses: Essential hypertension      acetaminophen (TYLENOL) 500 MG tablet Take 500 mg by mouth daily as needed for Pain.      atorvastatin (LIPITOR) 20 MG tablet Take 1 tablet by mouth once daily  Qty: 90 tablet, Refills: 0      ergocalciferol (ERGOCALCIFEROL) 50,000 unit Cap Take 1 capsule by mouth once a week  Qty: 12 capsule, Refills: 0      estradioL (VAGIFEM) 10 mcg Tab Insert 1 tablet vaginally twice a week. Insert before bed. No intercourse on the night of insertion  Qty: 30 tablet, Refills: 1    Associated Diagnoses: Vaginal dryness, menopausal                 Discharge Diagnosis: Lumbar radiculopathy [M54.16]  Condition on Discharge: Stable with no complications to procedure   Diet on Discharge: Same as before.  Activity: as per instruction sheet.  Discharge to: Home with a responsible adult.  Follow up: 2-4 weeks       Please call the office at [phone] if you experience any weakness or loss of sensation, fever > 101.5, pain uncontrolled with oral medications, persistent nausea/vomiting/or diarrhea, redness or drainage from the incisions, or any other worrisome concerns. If physician on call was not reached or could not communicate with our office for any reason please go to the nearest emergency department

## 2023-12-12 NOTE — H&P
HPI  Patient presenting for Procedure(s) (LRB):  Bilateral L4/5 and L5-S1 TF LAWSON (Bilateral)     Patient on Anti-coagulation No    No health changes since previous encounter    Past Medical History:   Diagnosis Date    Cataract     Hyperlipidemia     Hypertension     Lumbar spondylosis     Meningioma 2/18/2021    Obesity     Sleep apnea     Vitamin D deficiency      Past Surgical History:   Procedure Laterality Date    HYSTERECTOMY      PARTIAL HYSTERECTOMY      SELECTIVE INJECTION OF ANESTHETIC AGENT AROUND LUMBAR SPINAL NERVE ROOT BY TRANSFORAMINAL APPROACH Bilateral 3/21/2023    Procedure: Bilateral L5/S1 transforaminal epidural steroid injection;  Surgeon: Kush Jha MD;  Location: [facility];  Service: Pain Management;  Laterality: Bilateral;     Review of patient's allergies indicates:   Allergen Reactions    Aspirin Other (See Comments)     Bruising        No current facility-administered medications on file prior to encounter.     Current Outpatient Medications on File Prior to Encounter   Medication Sig Dispense Refill    acetaminophen (TYLENOL) 500 MG tablet Take 500 mg by mouth daily as needed for Pain.      atorvastatin (LIPITOR) 20 MG tablet Take 1 tablet by mouth once daily 90 tablet 0    ergocalciferol (ERGOCALCIFEROL) 50,000 unit Cap Take 1 capsule by mouth once a week 12 capsule 0    estradioL (VAGIFEM) 10 mcg Tab Insert 1 tablet vaginally twice a week. Insert before bed. No intercourse on the night of insertion 30 tablet 1    lisinopriL-hydrochlorothiazide (PRINZIDE,ZESTORETIC) 20-25 mg Tab Take 1 tablet by mouth once daily 90 tablet 2        PMHx, PSHx, Allergies, Medications reviewed in epic    ROS negative except pain complaints in HPI    OBJECTIVE:    There were no vitals taken for this visit.    PHYSICAL EXAMINATION:    GENERAL: Well appearing, in no acute distress, alert and oriented x3.  PSYCH:  Mood and affect appropriate.  SKIN: Skin color, texture, turgor normal, no rashes or  lesions which will impact the procedure.  CV: RRR with palpation of the radial artery.  PULM: No evidence of respiratory difficulty, symmetric chest rise. Clear to auscultation.  NEURO: Cranial nerves grossly intact.    Plan:    Proceed with procedure as planned Procedure(s) (LRB):  Bilateral L4/5 and L5-S1 TF LAWSON (Bilateral)    Kush Jha MD  12/12/2023

## 2023-12-12 NOTE — DISCHARGE INSTRUCTIONS

## 2023-12-12 NOTE — OP NOTE
Beth Souza  73 y.o. female      Vitals:    12/12/23 0907   BP: (!) 187/87   Pulse: (!) 51   Resp: 16   Temp:      Procedure Date: 12/12/2023        INFORMED CONSENT: The procedure, risks, benefits and options were discussed with patient. There are no contraindications to the procedure. The patient expressed understanding and agreed to proceed. The personnel performing the procedure was discussed. I verify that I personally obtained consent prior to the start of the procedure and the signed consent can be found on the patient's chart.       Anesthesia:   Conscious sedation provided by M.D    The patient was monitored with continuous pulse oximetry, EKG, and intermittent blood pressure monitors.  The patient was hemodynamically stable throughout the entire process was responsive to voice, and breathing spontaneously.  Supplemental O2 was provided at 2L/min via nasal cannula.  Patient was comfortable for the duration of the procedure. (See nurse documentation and case log for sedation time)    There was a total of 2mg IV Midazolam and  150 mcg Fentanyl titrated for the procedure    Pre Procedure diagnosis: Lumbar radiculopathy [M54.16]  Post-Procedure diagnosis: SAME     Complications: None    Specimens: None      DESCRIPTION OF PROCEDURE: The patient was brought to the procedure room. IV access was obtained prior to the procedure. The patient was positioned prone on the fluoroscopy table. Continuous hemodynamic monitoring was initiated including blood pressure, EKG, and pulse oximetry. . The skin was prepped with chlorhexidine and draped in a sterile fashion.      The  L4/5 and  L5/S1 transforaminal spaces were identified with fluoroscopy in the  AP, oblique, and lateral views.  A 22 gauge spinal quinke needle was then advanced into the area of the trans foraminal spaces bilateral with confirmation of proper needle position using AP, oblique, and lateral fluoroscopic views. Once the needle tip was in the area  of the transforaminal space, and there was no blood, CSF or paraesthesias,  1.5 mL of Omnipaque 300mg/ml was injected on bilateral for a total of 3mL.  Fluoroscopic imaging in the AP and lateral views revealed a clear outline of the spinal nerve with proximal spread of agent through the neural foramen into the epidural space. A total combination of 2mL of Bupivacaine and 10 mg dexamethasone was injected on each side and at each level with displacement of the contrast dye confirming that the medication went into the area of the transforaminal spaces bilateral. A sterile bandaid was applied.   Patient tolerated the procedure well.    Patient was taken back to the recovery room for further observation.     The patient was discharged to home in stable condition

## 2023-12-14 ENCOUNTER — TELEPHONE (OUTPATIENT)
Dept: PAIN MEDICINE | Facility: CLINIC | Age: 73
End: 2023-12-14
Payer: MEDICARE

## 2023-12-14 NOTE — TELEPHONE ENCOUNTER
----- Message from Nadya Woodson sent at 12/14/2023  7:58 AM CST -----  Contact: Beth  Beth needs a call back at [phone], Regards to she is not feeling well after injection and wants to speak with the office.    Thanks  Td

## 2023-12-14 NOTE — TELEPHONE ENCOUNTER
Reach out to pt to pt to inform her that , unfortunately  it is common to have increase pain after receiving an injection. This can be cause due to the numbing medication wearing off. It may take up to 2-3 weeks for the injection to take full affect.   For her   pain, she can continue taking your medication as prescribed, you can alternate between Tylenol and Ibuprofen every 8 hours do not exceed 3G (3'000mg) a day, if you are able to take it. Ice the pain site 20min on 20 min off and rest, if possible.  Inform the pt to give it some time to subside and check back in with us if pain worsens. Pt understood.

## 2023-12-26 ENCOUNTER — HOSPITAL ENCOUNTER (OUTPATIENT)
Dept: RADIOLOGY | Facility: HOSPITAL | Age: 73
Discharge: HOME | End: 2023-12-26
Attending: INTERNAL MEDICINE
Payer: MEDICARE

## 2023-12-26 ENCOUNTER — HOSPITAL ENCOUNTER (OUTPATIENT)
Dept: CARDIOLOGY | Facility: HOSPITAL | Age: 73
Discharge: HOME | End: 2023-12-26
Attending: INTERNAL MEDICINE
Payer: MEDICARE

## 2023-12-26 DIAGNOSIS — E66.01 SEVERE OBESITY WITH BODY MASS INDEX (BMI) OF 35.0 TO 39.9 WITH SERIOUS COMORBIDITY: ICD-10-CM

## 2023-12-26 DIAGNOSIS — R00.2 PALPITATIONS: ICD-10-CM

## 2023-12-26 DIAGNOSIS — I70.0 CALCIFICATION OF AORTA: ICD-10-CM

## 2023-12-26 DIAGNOSIS — R00.1 BRADYCARDIA: ICD-10-CM

## 2023-12-26 DIAGNOSIS — R73.03 PREDIABETES: ICD-10-CM

## 2023-12-26 DIAGNOSIS — E78.2 MIXED HYPERLIPIDEMIA: ICD-10-CM

## 2023-12-26 DIAGNOSIS — I10 ESSENTIAL HYPERTENSION: ICD-10-CM

## 2023-12-26 DIAGNOSIS — I20.89 STABLE ANGINA PECTORIS: ICD-10-CM

## 2023-12-26 LAB
CV STRESS BASE HR: 51 BPM
DIASTOLIC BLOOD PRESSURE: 91 MMHG
NUC REST EJECTION FRACTION: 70
NUC STRESS EJECTION FRACTION: 74 %
OHS CV CPX 85 PERCENT MAX PREDICTED HEART RATE MALE: 126
OHS CV CPX ESTIMATED METS: 1
OHS CV CPX MAX PREDICTED HEART RATE: 148
OHS CV CPX PATIENT IS FEMALE: 1
OHS CV CPX PATIENT IS MALE: 0
OHS CV CPX PEAK DIASTOLIC BLOOD PRESSURE: 87 MMHG
OHS CV CPX PEAK HEAR RATE: 86 BPM
OHS CV CPX PEAK RATE PRESSURE PRODUCT: NORMAL
OHS CV CPX PEAK SYSTOLIC BLOOD PRESSURE: 136 MMHG
OHS CV CPX PERCENT MAX PREDICTED HEART RATE ACHIEVED: 60
OHS CV CPX RATE PRESSURE PRODUCT PRESENTING: 7038
SYSTOLIC BLOOD PRESSURE: 138 MMHG

## 2023-12-26 PROCEDURE — 93018 CV STRESS TEST I&R ONLY: CPT | Mod: ,,, | Performed by: INTERNAL MEDICINE

## 2023-12-26 PROCEDURE — 93016 NUCLEAR STRESS - CARDIOLOGY INTERPRETED (CUPID ONLY): ICD-10-PCS | Mod: ,,, | Performed by: INTERNAL MEDICINE

## 2023-12-26 PROCEDURE — 93018 NUCLEAR STRESS - CARDIOLOGY INTERPRETED (CUPID ONLY): ICD-10-PCS | Mod: ,,, | Performed by: INTERNAL MEDICINE

## 2023-12-26 PROCEDURE — 93017 CV STRESS TEST TRACING ONLY: CPT

## 2023-12-26 PROCEDURE — 93016 CV STRESS TEST SUPVJ ONLY: CPT | Mod: ,,, | Performed by: INTERNAL MEDICINE

## 2023-12-26 PROCEDURE — 63600175 PHARM REV CODE 636 W HCPCS: Performed by: INTERNAL MEDICINE

## 2023-12-26 PROCEDURE — 78452 NUCLEAR STRESS - CARDIOLOGY INTERPRETED (CUPID ONLY): ICD-10-PCS | Mod: 26,,, | Performed by: INTERNAL MEDICINE

## 2023-12-26 PROCEDURE — 78452 HT MUSCLE IMAGE SPECT MULT: CPT | Mod: 26,,, | Performed by: INTERNAL MEDICINE

## 2023-12-26 PROCEDURE — 78452 HT MUSCLE IMAGE SPECT MULT: CPT

## 2023-12-26 RX ORDER — REGADENOSON 0.08 MG/ML
0.4 INJECTION, SOLUTION INTRAVENOUS
Status: COMPLETED | OUTPATIENT
Start: 2023-12-26 | End: 2023-12-26

## 2023-12-26 RX ADMIN — REGADENOSON 0.4 MG: 0.08 INJECTION, SOLUTION INTRAVENOUS at 01:12

## 2023-12-27 ENCOUNTER — PATIENT MESSAGE (OUTPATIENT)
Dept: CARDIOLOGY | Facility: CLINIC | Age: 73
End: 2023-12-27
Payer: MEDICARE

## 2024-01-02 ENCOUNTER — OFFICE VISIT (OUTPATIENT)
Dept: INTERNAL MEDICINE | Facility: CLINIC | Age: 74
End: 2024-01-02
Payer: MEDICARE

## 2024-01-02 VITALS
WEIGHT: 219.13 LBS | HEART RATE: 70 BPM | TEMPERATURE: 97 F | SYSTOLIC BLOOD PRESSURE: 128 MMHG | BODY MASS INDEX: 38.83 KG/M2 | OXYGEN SATURATION: 99 % | HEIGHT: 63 IN | DIASTOLIC BLOOD PRESSURE: 84 MMHG

## 2024-01-02 DIAGNOSIS — E78.2 MIXED HYPERLIPIDEMIA: ICD-10-CM

## 2024-01-02 DIAGNOSIS — M25.473 ANKLE SWELLING, UNSPECIFIED LATERALITY: ICD-10-CM

## 2024-01-02 DIAGNOSIS — I10 ESSENTIAL HYPERTENSION: Primary | ICD-10-CM

## 2024-01-02 DIAGNOSIS — I70.0 CALCIFICATION OF AORTA: ICD-10-CM

## 2024-01-02 DIAGNOSIS — G31.84 MILD COGNITIVE IMPAIRMENT WITH MEMORY LOSS: ICD-10-CM

## 2024-01-02 DIAGNOSIS — Z12.31 ENCOUNTER FOR SCREENING MAMMOGRAM FOR MALIGNANT NEOPLASM OF BREAST: ICD-10-CM

## 2024-01-02 DIAGNOSIS — R73.03 PREDIABETES: ICD-10-CM

## 2024-01-02 DIAGNOSIS — M47.26 OSTEOARTHRITIS OF SPINE WITH RADICULOPATHY, LUMBAR REGION: ICD-10-CM

## 2024-01-02 DIAGNOSIS — E55.9 VITAMIN D DEFICIENCY: ICD-10-CM

## 2024-01-02 DIAGNOSIS — G47.33 OSA ON CPAP: ICD-10-CM

## 2024-01-02 DIAGNOSIS — E66.01 SEVERE OBESITY WITH BODY MASS INDEX (BMI) OF 35.0 TO 39.9 WITH SERIOUS COMORBIDITY: ICD-10-CM

## 2024-01-02 PROCEDURE — 99214 OFFICE O/P EST MOD 30 MIN: CPT | Mod: S$GLB,,, | Performed by: NURSE PRACTITIONER

## 2024-01-02 PROCEDURE — 99999 PR PBB SHADOW E&M-EST. PATIENT-LVL IV: CPT | Mod: PBBFAC,,, | Performed by: NURSE PRACTITIONER

## 2024-01-02 RX ORDER — ATORVASTATIN CALCIUM 20 MG/1
20 TABLET, FILM COATED ORAL DAILY
Qty: 90 TABLET | Refills: 1 | Status: SHIPPED | OUTPATIENT
Start: 2024-01-02

## 2024-01-02 RX ORDER — GABAPENTIN 100 MG/1
100 CAPSULE ORAL NIGHTLY
Qty: 30 CAPSULE | Refills: 0 | Status: SHIPPED | OUTPATIENT
Start: 2024-01-02 | End: 2024-02-22 | Stop reason: SDUPTHER

## 2024-01-02 RX ORDER — ERGOCALCIFEROL 1.25 MG/1
50000 CAPSULE ORAL
Qty: 12 CAPSULE | Refills: 0 | Status: SHIPPED | OUTPATIENT
Start: 2024-01-02

## 2024-01-02 NOTE — PROGRESS NOTES
Subjective:       Patient ID: Beth Suoza is a 73 y.o. female.    Chief Complaint: Follow-up    HPI    HTN- stable and controlled. Skips med at times. No routine checks at home. Reports intermittent swelling of ankles at the end of the day  HLD-not currently taking statin  Vit D def- on supp  Obesity- sedentary  Lumbar spondylosis/radiculopathy - recently had LAWSON, did not help much. Would like to restart gabapentin and restart PT as it was helpful in the past    Past Medical History:   Diagnosis Date    Cataract     Hyperlipidemia     Hypertension     Lumbar spondylosis     Meningioma 2021    Obesity     Sleep apnea     Vitamin D deficiency      Past Surgical History:   Procedure Laterality Date    HYSTERECTOMY      PARTIAL HYSTERECTOMY      SELECTIVE INJECTION OF ANESTHETIC AGENT AROUND LUMBAR SPINAL NERVE ROOT BY TRANSFORAMINAL APPROACH Bilateral 3/21/2023    Procedure: Bilateral L5/S1 transforaminal epidural steroid injection;  Surgeon: Kush Jha MD;  Location: [facility];  Service: Pain Management;  Laterality: Bilateral;    SELECTIVE INJECTION OF ANESTHETIC AGENT AROUND LUMBAR SPINAL NERVE ROOT BY TRANSFORAMINAL APPROACH Bilateral 2023    Procedure: Bilateral L4/5 and L5-S1 TF LAWSON;  Surgeon: Kush Jha MD;  Location: [facility];  Service: Pain Management;  Laterality: Bilateral;     Social History     Socioeconomic History    Marital status:     Number of children: 3   Occupational History    Occupation: Cleaning houses     Employer: not employed   Tobacco Use    Smoking status: Former     Current packs/day: 0.00     Types: Cigarettes     Quit date: 2004     Years since quittin.0    Smokeless tobacco: Never   Substance and Sexual Activity    Alcohol use: Yes     Comment: occasionally    Drug use: No    Sexual activity: Not Currently     Partners: Male   Other Topics Concern    Are you pregnant or think you may be? No    Breast-feeding No   Social History  Narrative    Live w/ alone      Social Determinants of Health     Financial Resource Strain: Low Risk  (7/12/2022)    Overall Financial Resource Strain (CARDIA)     Difficulty of Paying Living Expenses: Not hard at all   Food Insecurity: No Food Insecurity (7/12/2022)    Hunger Vital Sign     Worried About Running Out of Food in the Last Year: Never true     Ran Out of Food in the Last Year: Never true   Transportation Needs: No Transportation Needs (7/12/2022)    PRAPARE - Transportation     Lack of Transportation (Medical): No     Lack of Transportation (Non-Medical): No   Physical Activity: Inactive (7/12/2022)    Exercise Vital Sign     Days of Exercise per Week: 0 days     Minutes of Exercise per Session: 0 min   Stress: No Stress Concern Present (7/12/2022)    [place] [place] [employer] - Occupational Stress Questionnaire     Feeling of Stress : Not at all   Social Connections: Moderately Isolated (7/12/2022)    Social Connection and Isolation Panel [NHANES]     Frequency of Communication with Friends and Family: More than three times a week     Frequency of Social Gatherings with Friends and Family: More than three times a week     Attends Scientologist Services: More than 4 times per year     Active Member of Clubs or Organizations: No     Attends Club or Organization Meetings: Never     Marital Status:    Housing Stability: Low Risk  (7/12/2022)    Housing Stability Vital Sign     Unable to Pay for Housing in the Last Year: No     Number of Places Lived in the Last Year: 1     Unstable Housing in the Last Year: No     Review of patient's allergies indicates:   Allergen Reactions    Aspirin Other (See Comments)     Bruising     Current Outpatient Medications   Medication Sig    acetaminophen (TYLENOL) 500 MG tablet Take 500 mg by mouth daily as needed for Pain.    estradioL (VAGIFEM) 10 mcg Tab Insert 1 tablet vaginally twice a week. Insert before bed. No intercourse on the night of  insertion    lisinopriL-hydrochlorothiazide (PRINZIDE,ZESTORETIC) 20-25 mg Tab Take 1 tablet by mouth once daily    atorvastatin (LIPITOR) 20 MG tablet Take 1 tablet (20 mg total) by mouth once daily.    ergocalciferol (ERGOCALCIFEROL) 50,000 unit Cap Take 1 capsule (50,000 Units total) by mouth every 7 days.    gabapentin (NEURONTIN) 100 MG capsule Take 1 capsule (100 mg total) by mouth every evening.     No current facility-administered medications for this visit.           Review of Systems   Constitutional:  Negative for activity change, appetite change, chills, diaphoresis, fatigue, fever and unexpected weight change.   HENT:  Negative for congestion, ear pain, postnasal drip, rhinorrhea, sinus pressure, sinus pain, sneezing, sore throat, tinnitus, trouble swallowing and voice change.    Eyes:  Negative for photophobia, pain and visual disturbance.   Respiratory:  Negative for cough, chest tightness, shortness of breath and wheezing.    Cardiovascular:  Positive for leg swelling. Negative for chest pain and palpitations.   Gastrointestinal:  Negative for abdominal distention, abdominal pain, constipation, diarrhea, nausea and vomiting.   Genitourinary:  Negative for decreased urine volume, difficulty urinating, dysuria, flank pain, frequency, hematuria and urgency.   Musculoskeletal:  Positive for arthralgias, back pain and myalgias. Negative for joint swelling, neck pain and neck stiffness.   Allergic/Immunologic: Negative for immunocompromised state.   Neurological:  Negative for dizziness, tremors, seizures, syncope, facial asymmetry, speech difficulty, weakness, light-headedness, numbness and headaches.   Hematological:  Negative for adenopathy. Does not bruise/bleed easily.   Psychiatric/Behavioral:  Negative for confusion and sleep disturbance.        Objective:      Physical Exam  Vitals reviewed.   Cardiovascular:      Rate and Rhythm: Normal rate and regular rhythm.      Heart sounds: Normal heart  sounds.   Pulmonary:      Effort: Pulmonary effort is normal.      Breath sounds: Normal breath sounds.   Abdominal:      General: Bowel sounds are normal.      Palpations: Abdomen is soft.   Musculoskeletal:      Lumbar back: Tenderness present. Decreased range of motion.   Skin:     General: Skin is warm and dry.   Neurological:      Mental Status: She is alert and oriented to person, place, and time.   Psychiatric:         Mood and Affect: Mood normal.         Assessment:     Vitals:    01/02/24 0857   BP: 128/84   Pulse: 70   Temp: 97.3 °F (36.3 °C)         1. Essential hypertension    2. Mixed hyperlipidemia    3. Calcification of aorta    4. Prediabetes    5. Severe obesity with body mass index (BMI) of 35.0 to 39.9 with serious comorbidity    6. Vitamin D deficiency    7. LEN on CPAP    8. Mild cognitive impairment with memory loss    9. Osteoarthritis of spine with radiculopathy, lumbar region    10. Encounter for screening mammogram for malignant neoplasm of breast    11. Ankle swelling, unspecified laterality        Plan:   Essential hypertension  -     Lipid Panel; Future; Expected date: 06/30/2024  -     Comprehensive Metabolic Panel; Future; Expected date: 06/30/2024  -     TSH; Future; Expected date: 06/30/2024    Mixed hyperlipidemia  -     Lipid Panel; Future; Expected date: 06/30/2024  -     Comprehensive Metabolic Panel; Future; Expected date: 06/30/2024  -     TSH; Future; Expected date: 06/30/2024    Calcification of aorta    Prediabetes  -     Hemoglobin A1C; Future; Expected date: 01/02/2024    Severe obesity with body mass index (BMI) of 35.0 to 39.9 with serious comorbidity    Vitamin D deficiency  -     Vitamin D; Future; Expected date: 01/02/2024    LEN on CPAP    Mild cognitive impairment with memory loss    Osteoarthritis of spine with radiculopathy, lumbar region  -     Ambulatory referral/consult to Physical/Occupational Therapy; Future; Expected date: 01/09/2024    Encounter for  screening mammogram for malignant neoplasm of breast  -     Mammo Digital Screening Bilat w/ Coleman; Future; Expected date: 01/02/2024    Ankle swelling, unspecified laterality  -     COMPRESSION STOCKINGS    Other orders  -     atorvastatin (LIPITOR) 20 MG tablet; Take 1 tablet (20 mg total) by mouth once daily.  Dispense: 90 tablet; Refill: 1  -     ergocalciferol (ERGOCALCIFEROL) 50,000 unit Cap; Take 1 capsule (50,000 Units total) by mouth every 7 days.  Dispense: 12 capsule; Refill: 0  -     gabapentin (NEURONTIN) 100 MG capsule; Take 1 capsule (100 mg total) by mouth every evening.  Dispense: 30 capsule; Refill: 0      Mammo  As above  Labs in am  Pcp in 6 mo

## 2024-01-03 ENCOUNTER — LAB VISIT (OUTPATIENT)
Dept: LAB | Facility: HOSPITAL | Age: 74
End: 2024-01-03
Attending: NURSE PRACTITIONER
Payer: MEDICARE

## 2024-01-03 DIAGNOSIS — E78.2 MIXED HYPERLIPIDEMIA: ICD-10-CM

## 2024-01-03 DIAGNOSIS — R73.03 PREDIABETES: ICD-10-CM

## 2024-01-03 DIAGNOSIS — I10 ESSENTIAL HYPERTENSION: ICD-10-CM

## 2024-01-03 DIAGNOSIS — E55.9 VITAMIN D DEFICIENCY: ICD-10-CM

## 2024-01-03 LAB
25(OH)D3+25(OH)D2 SERPL-MCNC: 21 NG/ML (ref 30–96)
ALBUMIN SERPL BCP-MCNC: 3.5 G/DL (ref 3.5–5.2)
ALP SERPL-CCNC: 83 U/L (ref 55–135)
ALT SERPL W/O P-5'-P-CCNC: 13 U/L (ref 10–44)
ANION GAP SERPL CALC-SCNC: 10 MMOL/L (ref 8–16)
AST SERPL-CCNC: 10 U/L (ref 10–40)
BILIRUB SERPL-MCNC: 0.5 MG/DL (ref 0.1–1)
BUN SERPL-MCNC: 14 MG/DL (ref 8–23)
CALCIUM SERPL-MCNC: 9.1 MG/DL (ref 8.7–10.5)
CHLORIDE SERPL-SCNC: 104 MMOL/L (ref 95–110)
CHOLEST SERPL-MCNC: 273 MG/DL (ref 120–199)
CHOLEST/HDLC SERPL: 4.5 {RATIO} (ref 2–5)
CO2 SERPL-SCNC: 28 MMOL/L (ref 23–29)
CREAT SERPL-MCNC: 0.8 MG/DL (ref 0.5–1.4)
EST. GFR  (NO RACE VARIABLE): >60 ML/MIN/1.73 M^2
ESTIMATED AVG GLUCOSE: 114 MG/DL (ref 68–131)
GLUCOSE SERPL-MCNC: 81 MG/DL (ref 70–110)
HBA1C MFR BLD: 5.6 % (ref 4–5.6)
HDLC SERPL-MCNC: 61 MG/DL (ref 40–75)
HDLC SERPL: 22.3 % (ref 20–50)
LDLC SERPL CALC-MCNC: 198 MG/DL (ref 63–159)
NONHDLC SERPL-MCNC: 212 MG/DL
POTASSIUM SERPL-SCNC: 4.1 MMOL/L (ref 3.5–5.1)
PROT SERPL-MCNC: 7.1 G/DL (ref 6–8.4)
SODIUM SERPL-SCNC: 142 MMOL/L (ref 136–145)
TRIGL SERPL-MCNC: 70 MG/DL (ref 30–150)
TSH SERPL DL<=0.005 MIU/L-ACNC: 0.95 UIU/ML (ref 0.4–4)

## 2024-01-03 PROCEDURE — 82306 VITAMIN D 25 HYDROXY: CPT | Performed by: NURSE PRACTITIONER

## 2024-01-03 PROCEDURE — 80053 COMPREHEN METABOLIC PANEL: CPT | Performed by: NURSE PRACTITIONER

## 2024-01-03 PROCEDURE — 80061 LIPID PANEL: CPT | Performed by: NURSE PRACTITIONER

## 2024-01-03 PROCEDURE — 84443 ASSAY THYROID STIM HORMONE: CPT | Performed by: NURSE PRACTITIONER

## 2024-01-03 PROCEDURE — 83036 HEMOGLOBIN GLYCOSYLATED A1C: CPT | Performed by: NURSE PRACTITIONER

## 2024-01-03 PROCEDURE — 36415 COLL VENOUS BLD VENIPUNCTURE: CPT | Performed by: NURSE PRACTITIONER

## 2024-01-04 ENCOUNTER — TELEPHONE (OUTPATIENT)
Dept: INTERNAL MEDICINE | Facility: CLINIC | Age: 74
End: 2024-01-04
Payer: MEDICARE

## 2024-01-04 ENCOUNTER — TELEPHONE (OUTPATIENT)
Dept: CARDIOLOGY | Facility: CLINIC | Age: 74
End: 2024-01-04
Payer: MEDICARE

## 2024-01-04 NOTE — TELEPHONE ENCOUNTER
----- Message from Emily Arboleda sent at 1/4/2024  3:24 PM CST -----  Contact: Beth Ghosh is calling in regards to results. Pt can be reached at [phone].    Thanks  DREW

## 2024-01-04 NOTE — TELEPHONE ENCOUNTER
Left a message for the patient to call back .   ----- Message from Dillon Jarrell sent at 1/4/2024  3:41 PM CST -----  Contact: [phone]  Pt is calling requesting test results . Please call back at [phone] . Thanksdj

## 2024-01-09 NOTE — PROGRESS NOTES
Interventional Pain Established Patient Clinic Visit    Chief Pain Complaint:  No chief complaint on file.    Interval history 01/10/2024  Patient presents status post bilateral L4-5 and L5-S1 transforaminal epidural steroid injection 12/12/2023.  Patient reports 80% relief initially which has quickly dissipated.  Today she again reports severe pain in the lower back which predominantly radiates down the lateral and posterior aspect of the left lower extremity to the knee.  She does report pain in the right lower extremity however pain is significantly more severe on the left.  Pain is constant and today is rated an 8/10.  Pain is exacerbated with standing and with ambulation and she reports her pain is exacerbated with forward lumbar flexion or stooping.  Patient was recently prescribed gabapentin 100 mg, however has not started this medication as of yet as she wanted to discuss it today.  She is continued judicious use of Tylenol with marginal improvement in her symptoms.  Patient also reports significant sensitivity to light touch in the lower back and across the thighs of both legs.  She does endorse associated weakness in the lower extremities associated with her pain.  She is continued physician directed physical therapy exercises over the last 8 weeks from 11/10/2023 through 01/10/2024 with marginal improvement in pain, range of motion and functionality.  She is scheduled to start conventional land-based physical therapy the following week.      Interval history 11/7/2023  Patient presents for three-month follow-up.  Today she reports insidious worsening of leg pain.  Today she reports pain is rated a 4/10 but at its worst is a 8/10.  Patient reports cramping and tingling down the anterior lateral aspects of bilateral lower extremities in L4-S1 distribution to the feet.  Pain is more severe on the left than on the right.  Pain is exacerbated with prolonged standing and with ambulation as well as with  stooping.  Patient has continued physician directed physical therapy exercises over the last 8 weeks with marginal improvement in her symptoms.  She continues judicious use of Tylenol with marginal improvement in her symptoms.  Of note patient reported at least 80% relief exceeding 6 months in duration with her prior epidural steroid injection.      Interval history 08/07/2023  Patient presents for 1-1/2 month follow-up.  She continues to report well controlled lower extremity radicular pain following prior L5-S1 transforaminal epidural steroid injection.  She reports mild axial neck pain which can radiate into bilateral trapezius distribution in C4-6 dermatomal distribution, which can be exacerbated with cervical flexion, extension.  Patient is interested in restarting physical therapy to help with range of motion and strength.  Today she denies significant upper or lower extremity weakness, bowel or bladder incontinence or saddle anesthesia.    Interval history 06/20/2023  Patient presents s/p bilateral L5/S1 transforaminal epidural steroid injection 03/21/2023.  Patient reports 100% sustained relief in right lower extremity radicular pain and 70% improvement in left-sided radicular pain following her epidural steroid injection.  She reports insidious exacerbation of left-sided radicular pain.  Pain is intermittent and today is rated a 6/10.  Patient describes pain as soreness in nature along the lateral and posterior aspect of the left lower extremity to the calf in L4-5 distribution.  Patient also reports swelling, tenderness and aching in bilateral ankles.  Patient reports she did not hear from physical therapy for scheduling land-based physical therapy.  Today she denies significant lower extremity weakness, bowel or bladder incontinence or saddle anesthesia.    Today she also reports aching in the neck and bilateral shoulders.  Patient reports pain in the occiput which radiates into bilateral trapezius  distribution C4-6 dermatomal distribution.  She denies more distal radiculopathy into the upper extremities or hands.  She does report exhaustion or weakness in the upper extremities associated with use.  She denies compromise in hand  strength or dexterity.    Interval Hx: 3/6/23  Ms. Souza is a 72-year-old lady with past medical history significant for cataracts, hypertension, hyperlipidemia, history of COVID-19, morbid obesity, prediabetes, history of cerebellar meningioma, obstructive sleep apnea on CPAP who presents to [facility] care, previous Dr. Swanson patient.    Today patient presents for lower back and leg pain.  Patient reports pain is intermittent and today is rated a 5/10.  Patient reports pain in the lower back which radiates into the buttock and down the posterior aspects of bilateral lower extremities in L5-S2 distribution to the feet.  Pain is exacerbated with standing or walking, even exceeding 5-10 minutes.  Patient reports when she has her Rollator, she is able to walk further distances.  Patient does endorse associated weakness in the lower extremities associated with her pain.  Pain is more severe on the right than on the left.  Patient reports completing 1 session of physical therapy 1 week prior with pain extremely severe in the lower extremities which was debilitating.  She reports secondary to this she can not continue physical therapy at this time.    Of note patient saw Dr. Gautam 02/28/2023 with new MRI ordered to evaluate severe stenosis and spondylolisthesis L4-5 and L5-S1 disc herniation and short prescription of Percocet provided.      Patient reports significant motor weakness and loss of sensations.  Patient denies night fever/night sweats, urinary incontinence, bowel incontinence, and significant weight loss.      Pain Disability Index Review:         11/7/2023    12:49 PM 8/7/2023     1:56 PM 6/20/2023    12:52 PM   Last 3 PDI Scores   Pain Disability Index (PDI) 24 9 36  "      Non-Pharmacologic Treatments:  Physical Therapy/Home Exercise: yes  Ice/Heat:yes  TENS: no  Acupuncture: no  Massage: no  Chiropractic: no    Other: no      Pain Medications:  - Opioids: Percocet (Oxycodone/Acetaminophen)  - Adjuvant Medications: Neurontin (Gabapentin) and Tylenol (Acetaminophen)    Pain injections:  -12/12/2023: Bilateral L4-5 and L5-S1 transforaminal epidural steroid injection  -03/21/2023: Bilateral L5-S1 transforaminal epidural steroid injection    Interval History (11/8/2022):  Beth Souza presents today for follow-up visit.  Patient was last seen on 4/29/21 (1.5 years ago). At that visit, the plan was to start topamax for sciatica. Patient was referred back to our clinic by Alyssia Ventura PA (Orthopedics). Patient reports pain as "0/10 today. She reports pain has been bad x about a month. She went to the ER because the pain was so severe. She wants to know when it will return and what to do when it returns. She got no relief with medrol dose misa or gabapentin.      Orthopedics note: Beth Souza presents to me today with chief complaint of left low back/hip pain radiating left knee pain.  I do believe she is presently in seeing exacerbation of sciatica.  She has not had any relief with dexamethasone injection, Medrol Dosepak, gabapentin.  She is not a candidate for NSAIDs secondary to aspirin allergy.  She will continue Tylenol up to 650 mg 3 times a day as needed and voltaren gel.  Additionally I would like the patient to try a short course of Lyrica to see if this would provide any additional nerve relief.  I would also like to get a referral and for her to see her back in spine clinic as she states has been quite awhile since she has seen anyone for her back. I also want to obtain bilateral hip xrays today for further evaluation.              Interval History (4/29/2021): Patient was last seen on 2/8/2021. She is here today for low back pain with bilateral leg pain " (R>L).  Patient reports pain as 10/10 today.  She recently saw PCP who started her on Topamax with titration schedule. She reports pain worse with walking and in the morning.     History of Present Illness (2019):   Beth Souza is a 68 y.o. female  who is presenting with a chief complaint of Back Pain (radiating down bilateral legs). The patient began experiencing this problem insidiously, and the pain has been gradually worsening. The pain is described as throbbing, shooting, cramping, burning, aching and electrical and is located in the bilateral lumbar spine . Pain is intermittent and lasts hours. The pain radiates to bilateral lower extremities, L>R. Pain is 40% axial and 60% radicular. The patient rates her pain a 8 out of ten and interferes with activities of daily living a 8 out of ten. Pain is exacerbated by ambulation, and is improved by rest. Patient reports no prior trauma, no prior spinal surgery     - pertinent negatives: No fever, No chills, No weight loss, No bladder dysfunction, No bowel dysfunction, No saddle anesthesia  - pertinent positives: none    - medications, other therapies tried (physical therapy, injections):     >> NSAIDs, Tylenol, gabapentin, zanaflex and flexeril    >> Has previously undergone Physical Therapy    >> Has previously undergone spinal injection/s   - Lumbar LAWSON with relief for less than 1 week - at external facility      Imaging / Labs / Studies (reviewed on 1/10/2024):    10/31/22 X-Ray Hips Bilateral 2 View Incl AP Pelvis  COMPARISON:  10/19/2020  FINDINGS:  Mild joint space narrowing is in both hips with femoral head neck junction osteophyte formation, similar to the prior exam.  There is sclerosis about both sacroiliac joints and at the pubic symphysis as before.  Numerous calcific densities overlying the lower abdomen and pelvis likely reflect phleboliths.  No acute fracture, dislocation or osteonecrosis.    MRI lumbar spine 12/02/2021  FINDINGS:  There is a  couple mm retrolisthesis of L5 on S1.  There is grade 1 spondylolisthesis of L4 on L5. The vertebral body heights are well maintained, with no fracture.  No marrow signal abnormality suspicious for an infiltrative process.  Modic type 1 discogenic endplate changes seen at the L5-S1 level.     The conus medullaris terminates at approximately the inferior endplate of L2.  The adjacent soft tissue structures show no significant abnormalities.  There is disc desiccation seen throughout the lumbar spine with at least mild disc space narrowing seen at L4-5 and more mild-to-moderate disc space narrowing seen at L5-S1.     L1-L2: No significant central canal or neural foraminal narrowing.     L2-L3: No significant central canal or neural foraminal narrowing.Mild-to-moderate bilateral facet arthropathy noted.     L3-L4: Moderate to severe bilateral facet arthropathy resulting in mild narrowing of the central canal.  The bilateral neural foraminal canals are mildly narrowed.     L4-L5:  Severe bilateral facet arthropathy along with grade 1 spondylolisthesis and ligamentum flavum hypertrophy resulting in severe narrowing of the central canal.  The bilateral neural foraminal canals are moderately to severely narrowed.     L5-S1:  Mild diffuse disc bulge with the superimposed right paracentral disc extrusion which appears to efface the intracanalicular portion of the right S1 nerve root.  No significant central canal narrowing otherwise noted.  The bilateral neural foraminal canals are severely narrowed.     X-ray cervical spine 06/20/2023     FINDINGS:  The vertebral bodies demonstrate a normal height.  There is a couple mm of retrolisthesis of C3 on C4 and C4 on C5.  Mild disc space narrowing and spondylosis seen the C3-4 through C6-7 levels.  No subluxation or change in the degree of listhesis seen on the flexion or extension views.  Mild osseous encroachment seen on the C3-4 neural foraminal canal on the left secondary to  uncovertebral joint hypertrophy.    Review of Systems:  CONSTITUTIONAL: patient denies any fever, chills, or weight loss  SKIN: patient denies any rash or itching  RESPIRATORY: patient denies having any shortness of breath  GASTROINTESTINAL: patient denies having any diarrhea, constipation, or bowel incontinence  GENITOURINARY: patient denies having any abnormal bladder function    MUSCULOSKELETAL:  - patient complains of the above noted pain/s (see chief pain complaint)    NEUROLOGICAL:   - pain as above  - strength in Lower extremities is intact, BILATERALLY  - sensation in Lower extremities is intact, BILATERALLY  - patient denies any loss of bowel or bladder control      PSYCHIATRIC: patient denies any change in mood    Other:  All other systems reviewed and are negative      Physical Exam:  There were no vitals filed for this visit.           There is no height or weight on file to calculate BMI.   (reviewed on 1/10/2024)    General: alert and oriented, in no apparent distress. Obese.   Gait: antalgic gait.  Skin: no rashes, no discoloration, no obvious lesions  HEENT: normocephalic, atraumatic. Pupils equal and round.  Respiratory: without use of accessory muscles of respiration.    Musculoskeletal - Lumbar Spine:  - ROM fairly preserved   - Pain on flexion of lumbar spine: Present  - Pain on extension of lumbar spine: Present   - Lumbar facet loading: Present   - TTP over the lumbar facet joints: Present Bilaterally, worse at L5-S1    - TTP over the lumbar paraspinals: Present   - TTP over the SI joints: Absent  - Straight Leg Raise: Positive, R>L    - YURI/ Tung's: Negative    Neuro - Lower Extremities:  - RLE Strength:     >> 5/5 strength with right hip flexion/ extension    >> 5/5 strength with right knee flexion/ extension    >> 5/5 strength in right ankle with dorsiflexion    >> 5/5 strength in right ankle with plantar flexion  - LLE Strength:     >> 5/5 strength with left hip flexion/ extension     >> 5/5 strength with knee flexion extension on the left     >> 5/5 strength in left ankle with dorsiflexion    >> 5/5 strength in left ankle with plantar flexion  - Extremity Reflexes: Brisk and symmetric throughout  - Sensory: Sensation to light touch intact bilaterally      Psych:  Mood and affect is appropriate      Assessment:  Beth Souza is a 73 y.o. year old female who is presenting with       ICD-10-CM ICD-9-CM    1. Lumbar radiculopathy  M54.16 724.4 Case Request-RAD/Other Procedure Area: Lumbar L4/51 [place] LAWSON      2. DDD (degenerative disc disease), lumbar  M51.36 722.52       3. Lumbar facet arthropathy  M47.816 721.3             Plan:  1. Interventional:  Schedule for L4-5 interlaminar epidural steroid injection to see if this helps with lumbar radiculopathy.  We have discussed the procedure, benefits, potential risk and alternative options in detail.  Patient has elected to pursue this procedure.    Anticoagulation:  None, no anticoagulation    -We have also discussed considering a C4-6 bilateral cervical medial branch block in the future to see if this would help with axial neck pain.     2. Pharmacologic:           -  -We have discussed continuing judicious use of Tylenol, not to exceed 3000 mg daily to avoid acute hepatotoxicity.    -We have discussed starting gabapentin gabapentin to a more therapeutic goal.  Patient will increase  medication according to the following algorithm.  We have reviewed potential side effects of this medication including daytime somnolence, weight gain and peripheral edema  Gabapentin Titration:  Day 1: 300mg qHS  Day 8: 300mg AM, 300mg qHS        3. Rehabilitative:  -We discussed initiating physical therapy after interventional treatment to help manage the patient/s painful condition. The patient was counseled that muscle strengthening will improve the long term prognosis in regards to pain and may also help increase range of motion and mobility. They were told  that one of the goals of physical therapy is that they learn how to do the exercises so that they can do them independently at home daily upon completion.    4. Diagnostic:  Relevant imaging (cervical XR, lumbar MRI) reviewed in patient's questions answered.      5. Consult:Neurosurgery PRN    6. Follow-up:  4-6 weeks post injection      The above plan and management options were discussed at length with patient. Patient is in agreement with the above and verbalized understanding.    - I discussed the goals of interventional chronic pain management with the patient on today's visit. We discussed a multimodal and systematic approach to pain.  This includes diagnostic and therapeutic injections, adjuvant pharmacologic treatment, physical therapy, and at times psychiatry.  I emphasized the importance of regular exercise, core strengthening and stretching, diet and weight loss as a cornerstone of long-term pain management.    - This condition does not require this patient to take time off of work, and the primary goal of our Pain Management services is to improve the patient's functional capacity.  - Patient Questions: Answered all of the patient's questions regarding diagnoses, therapy, treatment and next steps        Kush Jha MD    Disclaimer:  This note was prepared using voice recognition system and is likely to have sound alike errors that may have been overlooked even after proof reading.  Please call me with any questions.

## 2024-01-10 ENCOUNTER — OFFICE VISIT (OUTPATIENT)
Dept: PAIN MEDICINE | Facility: CLINIC | Age: 74
End: 2024-01-10
Payer: MEDICARE

## 2024-01-10 DIAGNOSIS — M47.816 LUMBAR FACET ARTHROPATHY: ICD-10-CM

## 2024-01-10 DIAGNOSIS — M51.36 DDD (DEGENERATIVE DISC DISEASE), LUMBAR: ICD-10-CM

## 2024-01-10 DIAGNOSIS — M54.16 LUMBAR RADICULOPATHY: Primary | ICD-10-CM

## 2024-01-10 PROCEDURE — 99214 OFFICE O/P EST MOD 30 MIN: CPT | Mod: S$GLB,,, | Performed by: ANESTHESIOLOGY

## 2024-01-10 PROCEDURE — 99999 PR PBB SHADOW E&M-EST. PATIENT-LVL II: CPT | Mod: PBBFAC,,, | Performed by: ANESTHESIOLOGY

## 2024-01-11 ENCOUNTER — HOSPITAL ENCOUNTER (OUTPATIENT)
Dept: RADIOLOGY | Facility: HOSPITAL | Age: 74
Discharge: HOME | End: 2024-01-11
Attending: FAMILY MEDICINE
Payer: MEDICARE

## 2024-01-11 ENCOUNTER — OFFICE VISIT (OUTPATIENT)
Dept: INTERNAL MEDICINE | Facility: CLINIC | Age: 74
End: 2024-01-11
Payer: MEDICARE

## 2024-01-11 VITALS
TEMPERATURE: 101 F | SYSTOLIC BLOOD PRESSURE: 128 MMHG | DIASTOLIC BLOOD PRESSURE: 80 MMHG | WEIGHT: 204.81 LBS | RESPIRATION RATE: 18 BRPM | HEIGHT: 63 IN | HEART RATE: 75 BPM | OXYGEN SATURATION: 92 % | BODY MASS INDEX: 36.29 KG/M2

## 2024-01-11 DIAGNOSIS — R50.9 FEVER, UNSPECIFIED FEVER CAUSE: ICD-10-CM

## 2024-01-11 DIAGNOSIS — J06.9 VIRAL URI WITH COUGH: ICD-10-CM

## 2024-01-11 DIAGNOSIS — J06.9 VIRAL URI WITH COUGH: Primary | ICD-10-CM

## 2024-01-11 LAB
CTP QC/QA: YES
POC MOLECULAR INFLUENZA A AGN: NEGATIVE
POC MOLECULAR INFLUENZA B AGN: NEGATIVE

## 2024-01-11 PROCEDURE — 87502 INFLUENZA DNA AMP PROBE: CPT | Mod: QW,S$GLB,, | Performed by: FAMILY MEDICINE

## 2024-01-11 PROCEDURE — 99999 PR PBB SHADOW E&M-EST. PATIENT-LVL V: CPT | Mod: PBBFAC,,, | Performed by: FAMILY MEDICINE

## 2024-01-11 PROCEDURE — 99214 OFFICE O/P EST MOD 30 MIN: CPT | Mod: S$GLB,,, | Performed by: FAMILY MEDICINE

## 2024-01-11 PROCEDURE — 71046 X-RAY EXAM CHEST 2 VIEWS: CPT | Mod: TC

## 2024-01-11 PROCEDURE — 71046 X-RAY EXAM CHEST 2 VIEWS: CPT | Mod: 26,,, | Performed by: RADIOLOGY

## 2024-01-11 RX ORDER — INHALER, ASSIST DEVICES
SPACER (EA) MISCELLANEOUS
Qty: 1 EACH | Refills: 0 | Status: SHIPPED | OUTPATIENT
Start: 2024-01-11 | End: 2024-02-22

## 2024-01-11 RX ORDER — ALBUTEROL SULFATE 90 UG/1
2 AEROSOL, METERED RESPIRATORY (INHALATION) EVERY 6 HOURS PRN
Qty: 18 G | Refills: 0 | Status: SHIPPED | OUTPATIENT
Start: 2024-01-11 | End: 2024-02-22

## 2024-01-11 RX ORDER — BENZONATATE 100 MG/1
100-200 CAPSULE ORAL 3 TIMES DAILY PRN
Qty: 30 CAPSULE | Refills: 1 | Status: SHIPPED | OUTPATIENT
Start: 2024-01-11 | End: 2024-01-21

## 2024-01-11 NOTE — PATIENT INSTRUCTIONS
Perform home covid test and send result via mychart    I recommend you obtain pulse oximeter and monitor your oxygen saturation, if persistently <92% and/or you experience shortness of breath I recommend you proceed to ER for further evaluation

## 2024-01-11 NOTE — PROGRESS NOTES
Subjective:       Patient ID: Beth Souza is a 73 y.o. female.    Chief Complaint: Fever    PCP: Dr. Oliva    Patient is new to me. Patient presents to clinic today reporting 2 day history of URI symptoms - throat pain, cough, HA, bodyaches, congestion and fever. Denies CP, SOB. Patient is otherwise without concerns today.        Review of Systems   Constitutional:  Positive for fever. Negative for chills, fatigue and unexpected weight change.   HENT:  Positive for congestion and sore throat.    Eyes:  Negative for visual disturbance.   Respiratory:  Positive for cough. Negative for shortness of breath.    Cardiovascular:  Negative for chest pain.   Musculoskeletal:  Positive for myalgias.   Neurological:  Positive for headaches.         Objective:      Physical Exam  Vitals reviewed.   Constitutional:       General: She is not in acute distress.     Appearance: She is well-developed.   HENT:      Head: Normocephalic and atraumatic.   Eyes:      General: Lids are normal. No scleral icterus.     Extraocular Movements: Extraocular movements intact.      Conjunctiva/sclera: Conjunctivae normal.      Pupils: Pupils are equal, round, and reactive to light.   Cardiovascular:      Rate and Rhythm: Normal rate and regular rhythm.      Heart sounds: No murmur heard.     No friction rub. No gallop.   Pulmonary:      Effort: Pulmonary effort is normal.      Breath sounds: Examination of the left-lower field reveals decreased breath sounds. Decreased breath sounds present. No wheezing, rhonchi or rales.   Neurological:      Mental Status: She is alert and oriented to person, place, and time.      Cranial Nerves: No cranial nerve deficit.      Gait: Gait normal.   Psychiatric:         Mood and Affect: Mood and affect normal.         Assessment:       1. Viral URI with cough    2. Fever, unspecified fever cause        Plan:   1. Viral URI with cough  -     benzonatate (TESSALON) 100 MG capsule; Take 1-2 capsules (100-200  mg total) by mouth 3 (three) times daily as needed for Cough.  Dispense: 30 capsule; Refill: 1  -     albuterol (VENTOLIN HFA) 90 mcg/actuation inhaler; Inhale 2 puffs into the lungs every 6 (six) hours as needed for Wheezing. Rescue  Dispense: 18 g; Refill: 0  -     inhalation spacing device (BREATHERITE MDI SPACER); Use as directed for inhalation.  Dispense: 1 each; Refill: 0  -     X-Ray Chest PA And Lateral; Future; Expected date: 01/11/2024    2. Fever, unspecified fever cause  -     POCT Influenza A/B Molecular      Advised rest and fluids.  Advised tylenol prn fever and bodyaches.  CXR to rule out developing pneumonia.  Advised to perform home covid test and send result, if positive advised to isolate per CDC guidelines and I will send in antiviral medication.  Cough meds as above.  Advised to obtain pulse oximeter to monitor oxygen sat, advised if persistently < 92% and/or she is having respiratory distress/SOB she needs to present to ER.  Advised follow up if worse/persistent.  Patient expressed understanding and agreement with plan.

## 2024-01-17 ENCOUNTER — TELEPHONE (OUTPATIENT)
Dept: PAIN MEDICINE | Facility: CLINIC | Age: 74
End: 2024-01-17
Payer: MEDICARE

## 2024-01-17 NOTE — TELEPHONE ENCOUNTER
Reach out to pt to reschedule her procedure per her request. Pt said she will give us a call back when she is feeling better.

## 2024-01-17 NOTE — TELEPHONE ENCOUNTER
----- Message from Zakiya Baird sent at 1/17/2024 10:27 AM CST -----  Pt ask to reschedule,Please call  [phone]

## 2024-01-19 ENCOUNTER — HOSPITAL ENCOUNTER (OUTPATIENT)
Dept: RADIOLOGY | Facility: HOSPITAL | Age: 74
Discharge: HOME | End: 2024-01-19
Attending: FAMILY MEDICINE
Payer: MEDICARE

## 2024-01-19 ENCOUNTER — OFFICE VISIT (OUTPATIENT)
Dept: INTERNAL MEDICINE | Facility: CLINIC | Age: 74
End: 2024-01-19
Payer: MEDICARE

## 2024-01-19 VITALS
RESPIRATION RATE: 20 BRPM | TEMPERATURE: 97 F | DIASTOLIC BLOOD PRESSURE: 80 MMHG | OXYGEN SATURATION: 93 % | HEART RATE: 60 BPM | WEIGHT: 214.06 LBS | SYSTOLIC BLOOD PRESSURE: 120 MMHG | BODY MASS INDEX: 37.93 KG/M2 | HEIGHT: 63 IN

## 2024-01-19 DIAGNOSIS — R10.13 EPIGASTRIC ABDOMINAL PAIN: ICD-10-CM

## 2024-01-19 DIAGNOSIS — D32.0 MENINGIOMA OF CEREBELLUM: ICD-10-CM

## 2024-01-19 DIAGNOSIS — E55.9 VITAMIN D DEFICIENCY: ICD-10-CM

## 2024-01-19 DIAGNOSIS — E66.01 SEVERE OBESITY WITH BODY MASS INDEX (BMI) OF 35.0 TO 39.9 WITH SERIOUS COMORBIDITY: ICD-10-CM

## 2024-01-19 DIAGNOSIS — R10.13 EPIGASTRIC ABDOMINAL PAIN: Primary | ICD-10-CM

## 2024-01-19 DIAGNOSIS — R73.03 PREDIABETES: ICD-10-CM

## 2024-01-19 DIAGNOSIS — F02.80 MAJOR NEUROCOGNITIVE DISORDER DUE TO ANOTHER MEDICAL CONDITION: ICD-10-CM

## 2024-01-19 DIAGNOSIS — K58.0 IRRITABLE BOWEL SYNDROME WITH DIARRHEA: ICD-10-CM

## 2024-01-19 DIAGNOSIS — I10 ESSENTIAL HYPERTENSION: ICD-10-CM

## 2024-01-19 DIAGNOSIS — E21.0 PRIMARY HYPERPARATHYROIDISM: ICD-10-CM

## 2024-01-19 PROBLEM — R53.1 WEAKNESS: Status: RESOLVED | Noted: 2023-06-22 | Resolved: 2024-01-19

## 2024-01-19 PROCEDURE — 74019 RADEX ABDOMEN 2 VIEWS: CPT | Mod: TC

## 2024-01-19 PROCEDURE — 74019 RADEX ABDOMEN 2 VIEWS: CPT | Mod: 26,,, | Performed by: RADIOLOGY

## 2024-01-19 PROCEDURE — 99214 OFFICE O/P EST MOD 30 MIN: CPT | Mod: S$GLB,,, | Performed by: FAMILY MEDICINE

## 2024-01-19 PROCEDURE — 99999 PR PBB SHADOW E&M-EST. PATIENT-LVL IV: CPT | Mod: PBBFAC,,, | Performed by: FAMILY MEDICINE

## 2024-01-19 RX ORDER — OMEPRAZOLE 20 MG/1
20 CAPSULE, DELAYED RELEASE ORAL DAILY
Qty: 30 CAPSULE | Refills: 1 | Status: SHIPPED | OUTPATIENT
Start: 2024-01-19 | End: 2024-02-22 | Stop reason: SDUPTHER

## 2024-01-19 NOTE — PROGRESS NOTES
Subjective:       Patient ID: Beth Souza is a 73 y.o. female.    Chief Complaint: GI Problem (Pt states she is here for stomach issue /Pt states that her BM is not normal /)    73-year-old  female patient with Patient Active Problem List:     Essential hypertension     Vitamin D deficiency     Mixed hyperlipidemia     Primary hyperparathyroidism     Cataract     Osteoarthritis of spine with radiculopathy, lumbar region     Severe obesity with body mass index (BMI) of 35.0 to 39.9 with serious comorbidity     LEN on CPAP     Mild cognitive impairment with memory loss     Meningioma of cerebellum     Multiple thyroid nodules     History of COVID-19     Primary osteoarthritis of both hips     Calcification of aorta     History of hyperparathyroidism     Prediabetes     Lumbar radiculopathy     Major neurocognitive disorder due to another medical condition     Neck pain, chronic  Here with complaint of epigastric abdominal pain off and on lately but denies any heartburn-like symptoms and reports that she has been having soft stools lately but denies any significant diarrhea but has been having regular bowel movement daily.   Denies of any chest pain or difficulty breathing with palpitations  Has been taking vitamin-D by prescription weekly and patient was not taking cholesterol medication as prescribed      Review of Systems   Constitutional:  Negative for chills, fatigue, fever and unexpected weight change.   Eyes:  Negative for visual disturbance.   Respiratory:  Negative for shortness of breath.    Cardiovascular:  Negative for chest pain and leg swelling.   Gastrointestinal:  Positive for abdominal pain. Negative for blood in stool, constipation, diarrhea, nausea and vomiting.   Genitourinary:  Negative for difficulty urinating and dysuria.   Musculoskeletal:  Negative for myalgias.   Skin:  Negative for rash.   Neurological:  Negative for light-headedness and headaches.  "  Psychiatric/Behavioral:  Negative for sleep disturbance.          /80 (BP Location: Left arm, Patient Position: Sitting, BP Method: Large (Manual))   Pulse 60   Temp 97.4 °F (36.3 °C) (Tympanic)   Resp 20   Ht 5' 3" (1.6 m)   Wt 97.1 kg (214 lb 1.1 oz)   SpO2 (!) 93%   BMI 37.92 kg/m²   Objective:      Physical Exam  Constitutional:       Appearance: She is well-developed.   HENT:      Head: Normocephalic and atraumatic.   Cardiovascular:      Rate and Rhythm: Normal rate and regular rhythm.      Heart sounds: Normal heart sounds. No murmur heard.  Pulmonary:      Effort: Pulmonary effort is normal.      Breath sounds: Normal breath sounds. No wheezing.   Abdominal:      General: Bowel sounds are normal.      Palpations: Abdomen is soft.      Tenderness: There is abdominal tenderness.      Comments: Minimal tenderness noted in the epigastric area on palpation   Skin:     General: Skin is warm and dry.      Findings: No rash.   Neurological:      Mental Status: She is alert and oriented to person, place, and time.   Psychiatric:         Mood and Affect: Mood normal.           Assessment/Plan:   1. Epigastric abdominal pain  -     X-Ray Abdomen Flat And Erect; Future; Expected date: 01/19/2024  -     omeprazole (PRILOSEC) 20 MG capsule; Take 1 capsule (20 mg total) by mouth once daily.  Dispense: 30 capsule; Refill: 1  Reviewed recent labs which looks stable  Will get started on omeprazole 20 mg daily to rule out any underlying acid reflux symptoms and will get x-ray of the abdomen to look into further etiology  Patient was advised to eat small frequent meals and drink adequate fluids    2. Irritable bowel syndrome with diarrhea  Encouraged to avoid dietary triggers    3. Essential hypertension  Blood pressure is stable currently on lisinopril hydrochlorothiazide 20/25 mg    4. Prediabetes  Diet controlled    5. Severe obesity with body mass index (BMI) of 35.0 to 39.9 with serious " comorbidity  Lifestyle modifications recommended to lose weight with BMI 37    6. Vitamin D deficiency  Taking vitamin-D by prescription weekly    7. Meningioma of cerebellum  8. Major neurocognitive disorder due to another medical condition  To discuss further with Neurology    9. Primary hyperparathyroidism  Overview:  Overview:   bmd 3/2012- ok    Last Assessment & Plan:   Relevant Hx:  Course:  Daily Update:  Today's Plan:    Stable

## 2024-01-22 ENCOUNTER — TELEPHONE (OUTPATIENT)
Dept: CARDIOLOGY | Facility: CLINIC | Age: 74
End: 2024-01-22
Payer: MEDICARE

## 2024-01-30 ENCOUNTER — OFFICE VISIT (OUTPATIENT)
Dept: CARDIOLOGY | Facility: CLINIC | Age: 74
End: 2024-01-30
Payer: MEDICARE

## 2024-01-30 VITALS
WEIGHT: 214.31 LBS | BODY MASS INDEX: 37.97 KG/M2 | HEIGHT: 63 IN | HEART RATE: 72 BPM | DIASTOLIC BLOOD PRESSURE: 84 MMHG | RESPIRATION RATE: 16 BRPM | SYSTOLIC BLOOD PRESSURE: 129 MMHG | OXYGEN SATURATION: 95 %

## 2024-01-30 DIAGNOSIS — E78.2 MIXED HYPERLIPIDEMIA: ICD-10-CM

## 2024-01-30 DIAGNOSIS — G47.33 OSA ON CPAP: Primary | ICD-10-CM

## 2024-01-30 DIAGNOSIS — R73.03 PREDIABETES: ICD-10-CM

## 2024-01-30 DIAGNOSIS — E66.01 SEVERE OBESITY WITH BODY MASS INDEX (BMI) OF 35.0 TO 39.9 WITH SERIOUS COMORBIDITY: ICD-10-CM

## 2024-01-30 DIAGNOSIS — I10 ESSENTIAL HYPERTENSION: ICD-10-CM

## 2024-01-30 PROCEDURE — 99214 OFFICE O/P EST MOD 30 MIN: CPT | Mod: S$GLB,,, | Performed by: INTERNAL MEDICINE

## 2024-01-30 PROCEDURE — 99999 PR PBB SHADOW E&M-EST. PATIENT-LVL III: CPT | Mod: PBBFAC,,, | Performed by: INTERNAL MEDICINE

## 2024-01-30 NOTE — PROGRESS NOTES
Subjective:   Patient ID:  Beth Souza is a 73 y.o. female who presents for evaluation of No chief complaint on file.      HPI  This is a 73-year-old female.  Had seen my colleague in the past now she is here to establish care with new doctor.    She had a normal stress echo and nuclear stress test in .    She has bilateral lower extremity swelling.  However she is taking chlorthalidone.    She does not complain of dyspnea on exertion.  No orthopnea.      Past Medical History:   Diagnosis Date    Cataract     Hyperlipidemia     Hypertension     Lumbar spondylosis     Meningioma 2021    Obesity     Sleep apnea     Vitamin D deficiency        Past Surgical History:   Procedure Laterality Date    HYSTERECTOMY      PARTIAL HYSTERECTOMY      SELECTIVE INJECTION OF ANESTHETIC AGENT AROUND LUMBAR SPINAL NERVE ROOT BY TRANSFORAMINAL APPROACH Bilateral 3/21/2023    Procedure: Bilateral L5/S1 transforaminal epidural steroid injection;  Surgeon: Kush Jha MD;  Location: [facility] PAIN T;  Service: Pain Management;  Laterality: Bilateral;    SELECTIVE INJECTION OF ANESTHETIC AGENT AROUND LUMBAR SPINAL NERVE ROOT BY TRANSFORAMINAL APPROACH Bilateral 2023    Procedure: Bilateral L4/5 and L5-S1 TF LAWSON;  Surgeon: Kush Jha MD;  Location: [facility] PAIN MGT;  Service: Pain Management;  Laterality: Bilateral;       Social History     Tobacco Use    Smoking status: Former     Current packs/day: 0.00     Types: Cigarettes     Quit date: 2004     Years since quittin.0    Smokeless tobacco: Never   Substance Use Topics    Alcohol use: Yes     Comment: occasionally    Drug use: No       Family History   Problem Relation Age of Onset    Hypertension Mother     Diabetes Daughter     Melanoma Neg Hx     Psoriasis Neg Hx     Lupus Neg Hx     Eczema Neg Hx     Breast cancer Neg Hx     Colon cancer Neg Hx     Ovarian cancer Neg Hx     Thrombosis Neg Hx        Review of Systems   Cardiovascular:  Negative for  chest pain, dyspnea on exertion, palpitations and syncope.   Genitourinary: Negative.    Neurological: Negative.        Current Outpatient Medications on File Prior to Visit   Medication Sig    acetaminophen (TYLENOL) 500 MG tablet Take 500 mg by mouth daily as needed for Pain.    atorvastatin (LIPITOR) 20 MG tablet Take 1 tablet (20 mg total) by mouth once daily.    ergocalciferol (ERGOCALCIFEROL) 50,000 unit Cap Take 1 capsule (50,000 Units total) by mouth every 7 days.    lisinopriL-hydrochlorothiazide (PRINZIDE,ZESTORETIC) 20-25 mg Tab Take 1 tablet by mouth once daily    omeprazole (PRILOSEC) 20 MG capsule Take 1 capsule (20 mg total) by mouth once daily.    albuterol (VENTOLIN HFA) 90 mcg/actuation inhaler Inhale 2 puffs into the lungs every 6 (six) hours as needed for Wheezing. Rescue (Patient not taking: Reported on 1/30/2024)    estradioL (VAGIFEM) 10 mcg Tab Insert 1 tablet vaginally twice a week. Insert before bed. No intercourse on the night of insertion (Patient not taking: Reported on 1/30/2024)    gabapentin (NEURONTIN) 100 MG capsule Take 1 capsule (100 mg total) by mouth every evening. (Patient not taking: Reported on 1/30/2024)    inhalation spacing device (BREATHERITE MDI SPACER) Use as directed for inhalation. (Patient not taking: Reported on 1/30/2024)     No current facility-administered medications on file prior to visit.       Objective:   Objective:  Wt Readings from Last 3 Encounters:   01/30/24 97.2 kg (214 lb 4.6 oz)   01/19/24 97.1 kg (214 lb 1.1 oz)   01/11/24 92.9 kg (204 lb 12.9 oz)     Temp Readings from Last 3 Encounters:   01/19/24 97.4 °F (36.3 °C) (Tympanic)   01/11/24 (!) 101.2 °F (38.4 °C) (Tympanic)   01/02/24 97.3 °F (36.3 °C) (Tympanic)     BP Readings from Last 3 Encounters:   01/30/24 129/84   01/19/24 120/80   01/11/24 128/80     Pulse Readings from Last 3 Encounters:   01/30/24 72   01/19/24 60   01/11/24 75       Physical Exam  Vitals reviewed.   Constitutional:        "Appearance: She is well-developed.   Neck:      Vascular: No carotid bruit.   Cardiovascular:      Rate and Rhythm: Normal rate and regular rhythm.      Pulses: Intact distal pulses.      Heart sounds: Normal heart sounds. No murmur heard.  Pulmonary:      Breath sounds: Normal breath sounds.   Neurological:      Mental Status: She is oriented to person, place, and time.         Lab Results   Component Value Date    CHOL 273 (H) 01/03/2024    CHOL 198 06/15/2023    CHOL 187 04/04/2022     Lab Results   Component Value Date    HDL 61 01/03/2024    HDL 54 06/15/2023    HDL 54 04/04/2022     Lab Results   Component Value Date    LDLCALC 198.0 (H) 01/03/2024    LDLCALC 126.0 06/15/2023    LDLCALC 114.2 04/04/2022     Lab Results   Component Value Date    TRIG 70 01/03/2024    TRIG 90 06/15/2023    TRIG 94 04/04/2022     Lab Results   Component Value Date    CHOLHDL 22.3 01/03/2024    CHOLHDL 27.3 06/15/2023    CHOLHDL 28.9 04/04/2022       Chemistry        Component Value Date/Time     01/03/2024 0756    K 4.1 01/03/2024 0756     01/03/2024 0756    CO2 28 01/03/2024 0756    BUN 14 01/03/2024 0756    CREATININE 0.8 01/03/2024 0756    GLU 81 01/03/2024 0756        Component Value Date/Time    CALCIUM 9.1 01/03/2024 0756    ALKPHOS 83 01/03/2024 0756    AST 10 01/03/2024 0756    ALT 13 01/03/2024 0756    BILITOT 0.5 01/03/2024 0756    ESTGFRAFRICA >60.0 04/04/2022 1414    EGFRNONAA >60.0 04/04/2022 1414          Lab Results   Component Value Date    TSH 0.955 01/03/2024     No results found for: "INR", "PROTIME"  Lab Results   Component Value Date    WBC 6.55 03/27/2023    HGB 13.2 03/27/2023    HCT 43.3 03/27/2023    MCV 94 03/27/2023     03/27/2023     BNP  @LABRCNTIP(BNP,BNPTRIAGEBLO)@  CrCl cannot be calculated (Patient's most recent lab result is older than the maximum 7 days allowed.).     Imaging:  ======    No results found for this or any previous visit.    Results for orders placed during the " hospital encounter of 02/13/17    US Carotid Bilateral    Narrative  Technique: Bilateral duplex carotid ultrasound performed using gray scale, color Doppler, and pulse Doppler analysis.    Comparison: 7/1/2015    FINDINGS:    Right common carotid:  Peak systolic velocity 81 cm/sec.    Right internal carotid artery:  Peak systolic velocity: 32 cm/sec.  Peak diastolic velocity 11 cm/sec  IC/CC ratio:  0.4.    Left common carotid:  Peak systolic velocity 54 cm/sec.    Left internal carotid artery:  Peak systolic velocity 33  cm/sec.  Peak diastolic velocity 14 cm/sec  IC/CC ratio 0.6.    Both vertebral arteries demonstrate antegrade flow.  IMPRESSION:      1. No evidence of flow-limiting carotid stenosis bilaterally.    As determined by Society of Radiologist in Ultrasound consensus.      Electronically signed by: NIVIA UGARTE MD  Date:     02/13/17  Time:    15:15    Results for orders placed during the hospital encounter of 01/11/24    X-Ray Chest PA And Lateral    Narrative  EXAMINATION:  XR CHEST PA AND LATERAL    CLINICAL HISTORY:  Acute upper respiratory infection, unspecified    TECHNIQUE:  PA and lateral views of the chest were performed.    COMPARISON:  11/28/2023    FINDINGS:  The lungs are clear and free of infiltrate.  No pleural effusion or pneumothorax. The heart is borderline enlarged.  There is significant tortuosity of the descending thoracic aorta.  The appearance is stable when compared to the prior exam.    Impression  1.  No acute cardiopulmonary process.      Electronically signed by: Alex Oliva DO  Date:    01/11/2024  Time:    11:38    No results found for this or any previous visit.    No valid procedures specified.    No results found for this or any previous visit.      Results for orders placed during the hospital encounter of 12/26/23    Nuclear Stress - Cardiology Interpreted    Interpretation Summary    Normal myocardial perfusion scan. There is no evidence of myocardial ischemia  or infarction.    The gated perfusion images showed an ejection fraction of 70% at rest. The gated perfusion images showed an ejection fraction of 74% post stress.    The ECG portion of the study is negative for ischemia.    The patient reported no chest pain during the stress test.    During stress, rare PVCs are noted.      No results found for this or any previous visit.      Diagnostic Results:  ECG: Reviewed    The 10-year ASCVD risk score (Gil JANG, et al., 2019) is: 17.9%    Values used to calculate the score:      Age: 73 years      Sex: Female      Is Non- : Yes      Diabetic: No      Tobacco smoker: No      Systolic Blood Pressure: 129 mmHg      Is BP treated: Yes      HDL Cholesterol: 61 mg/dL      Total Cholesterol: 273 mg/dL        Assessment and Plan:   LEN on CPAP    Severe obesity with body mass index (BMI) of 35.0 to 39.9 with serious comorbidity    Essential hypertension    Mixed hyperlipidemia    Prediabetes      Angina free, htn controlled, euvolemic  Recommended compression stockings for her lower extremity swelling.    Reviewed her Holter monitor, stress echo and nuclear stress test in 2023 all normal.  Reviewed all tests and above medical conditions with patient in detail and formulated treatment plan.  Risk factor modification discussed.   Cardiac low salt diet discussed.  Maintaining healthy weight and weight loss goals were discussed in clinic.    Follow up in  6 months

## 2024-02-08 ENCOUNTER — TELEPHONE (OUTPATIENT)
Dept: PULMONOLOGY | Facility: CLINIC | Age: 74
End: 2024-02-08
Payer: MEDICARE

## 2024-02-09 ENCOUNTER — OFFICE VISIT (OUTPATIENT)
Dept: PULMONOLOGY | Facility: CLINIC | Age: 74
End: 2024-02-09
Payer: MEDICARE

## 2024-02-09 VITALS
HEART RATE: 54 BPM | BODY MASS INDEX: 37.75 KG/M2 | OXYGEN SATURATION: 99 % | RESPIRATION RATE: 18 BRPM | DIASTOLIC BLOOD PRESSURE: 68 MMHG | HEIGHT: 63 IN | WEIGHT: 213.06 LBS | SYSTOLIC BLOOD PRESSURE: 130 MMHG

## 2024-02-09 DIAGNOSIS — E66.01 SEVERE OBESITY WITH BODY MASS INDEX (BMI) OF 35.0 TO 39.9 WITH SERIOUS COMORBIDITY: ICD-10-CM

## 2024-02-09 DIAGNOSIS — G47.33 OSA ON CPAP: Primary | ICD-10-CM

## 2024-02-09 PROCEDURE — 99999 PR PBB SHADOW E&M-EST. PATIENT-LVL III: CPT | Mod: PBBFAC,,, | Performed by: PHYSICIAN ASSISTANT

## 2024-02-09 PROCEDURE — 99213 OFFICE O/P EST LOW 20 MIN: CPT | Mod: S$GLB,,, | Performed by: PHYSICIAN ASSISTANT

## 2024-02-09 NOTE — ASSESSMENT & PLAN NOTE
Home Sleep Studies 3 night 08/20/2018, 08/22/2018, 08/23/2018 Mild obstructive sleep apnea-hypopnea syndrome. Apnea-hypopnea index: AHI: 11.7 events per hour. Total events 80.   Autopap 4-10cm H2O with ODME.   Dreamwear nasal mask  HME: [facility]     Resume CPAP, call with any issues

## 2024-02-09 NOTE — PROGRESS NOTES
Subjective:       Patient ID: Beth Suoza is a 73 y.o. female.    Chief Complaint: LEN on CPAP      2/9/2024  Here for LEN on CPAP follow up  Has not used CPAP since November  She says she woke up one morning feeling sick and now is scared to use the CPAP  She cannot give specific symptoms but just says she was sick  She says mask fits comfortably, cannot find any issues with the CPAP machine  Just scared to use it  She was compliant in the past and benefited from use, unsure what changed    30 day Compliance download reviewed from November, days with usage > 4 hours is 90%, average AHI is 2.0        2/9/2024     8:49 AM   EPWORTH SLEEPINESS SCALE   Sitting and reading 0   Watching TV 0   Sitting, inactive in a public place (e.g. a theatre or a meeting) 0   As a passenger in a car for an hour without a break 0   Lying down to rest in the afternoon when circumstances permit 0   Sitting and talking to someone 0   Sitting quietly after a lunch without alcohol 0   In a car, while stopped for a few minutes in traffic 0   Total score 0         10/27/23  Here for LEN on CPAP follow up  Complains of uncomfortable settings  CPAP making her feel congested, pressure too high      10/10/23  Here for LEN on CPAP follow up  Benefits from CPAP but complains of insomnia  She has stopped using CPAP due to insomnia  She denies any specific issues with mask or pressure  Gets in bed 8:30pm, watches tv, falls asleep different times every night  Took melatonin which helped but says she did not want to depend on this  Last CPAP usage was March this year, CPAP optimally controlled her sleep apnea with AHI 2, no leak or other issues with machine        10/11/2023     8:49 AM   EPWORTH SLEEPINESS SCALE   Sitting and reading 0   Watching TV 0   Sitting, inactive in a public place (e.g. a theatre or a meeting) 0   As a passenger in a car for an hour without a break 0   Lying down to rest in the afternoon when circumstances permit 0    Sitting and talking to someone 0   Sitting quietly after a lunch without alcohol 0   In a car, while stopped for a few minutes in traffic 0   Total score 0       Immunization History   Administered Date(s) Administered    COVID-19, MRNA, LN-S, PF (MODERNA FULL 0.5 ML DOSE) 02/27/2021, 03/27/2021, 11/16/2021    Influenza (FLUAD) - Quadrivalent - Adjuvanted - PF *Preferred* (65+) 10/12/2020, 12/06/2022    Influenza - High Dose - PF (65 years and older) 10/16/2017, 10/30/2018, 09/16/2019    Influenza - Quadrivalent - PF *Preferred* (6 months and older) 10/28/2015    PPD Test 06/18/2018    Pneumococcal Conjugate - 13 Valent 05/01/2017    Pneumococcal Polysaccharide - 23 Valent 05/14/2018    Tdap 08/28/2023      Tobacco Use: Medium Risk (2/9/2024)    Patient History     Smoking Tobacco Use: Former     Smokeless Tobacco Use: Never     Passive Exposure: Not on file      Past Medical History:   Diagnosis Date    Cataract     Hyperlipidemia     Hypertension     Lumbar spondylosis     Meningioma 2/18/2021    Obesity     Sleep apnea     Vitamin D deficiency       Current Outpatient Medications on File Prior to Visit   Medication Sig Dispense Refill    acetaminophen (TYLENOL) 500 MG tablet Take 500 mg by mouth daily as needed for Pain.      atorvastatin (LIPITOR) 20 MG tablet Take 1 tablet (20 mg total) by mouth once daily. 90 tablet 1    ergocalciferol (ERGOCALCIFEROL) 50,000 unit Cap Take 1 capsule (50,000 Units total) by mouth every 7 days. 12 capsule 0    lisinopriL-hydrochlorothiazide (PRINZIDE,ZESTORETIC) 20-25 mg Tab Take 1 tablet by mouth once daily 90 tablet 2    albuterol (VENTOLIN HFA) 90 mcg/actuation inhaler Inhale 2 puffs into the lungs every 6 (six) hours as needed for Wheezing. Rescue (Patient not taking: Reported on 1/30/2024) 18 g 0    estradioL (VAGIFEM) 10 mcg Tab Insert 1 tablet vaginally twice a week. Insert before bed. No intercourse on the night of insertion (Patient not taking: Reported on 2/9/2024)  "30 tablet 1    gabapentin (NEURONTIN) 100 MG capsule Take 1 capsule (100 mg total) by mouth every evening. (Patient not taking: Reported on 1/30/2024) 30 capsule 0    inhalation spacing device (BREATHERITE MDI SPACER) Use as directed for inhalation. (Patient not taking: Reported on 1/30/2024) 1 each 0    omeprazole (PRILOSEC) 20 MG capsule Take 1 capsule (20 mg total) by mouth once daily. (Patient not taking: Reported on 2/9/2024) 30 capsule 1     No current facility-administered medications on file prior to visit.        Review of Systems   Constitutional:  Positive for fatigue.   HENT:  Negative for congestion.    Respiratory:  Positive for somnolence. Negative for sputum production, shortness of breath, wheezing and dyspnea on extertion.    Gastrointestinal:  Negative for vomiting.   Neurological:  Negative for syncope and headaches.   Psychiatric/Behavioral:  Positive for sleep disturbance.    All other systems reviewed and are negative.      Objective:       Vitals:    02/09/24 0847   BP: 130/68   Pulse: (!) 54   Resp: 18   SpO2: 99%   Weight: 96.6 kg (213 lb 1.2 oz)   Height: 5' 3" (1.6 m)         Physical Exam   Constitutional: She is oriented to person, place, and time. She appears well-developed and well-nourished. No distress.   HENT:   Head: Normocephalic.   Mouth/Throat: Oropharynx is clear and moist.   Cardiovascular: Normal rate and regular rhythm.   Pulmonary/Chest: Effort normal. No respiratory distress. She has no wheezes. She has no rhonchi. She has no rales.   Musculoskeletal:         General: No edema.      Cervical back: Normal range of motion and neck supple.   Neurological: She is alert and oriented to person, place, and time. Gait normal.   Skin: Skin is warm and dry.   Psychiatric: She has a normal mood and affect.   Vitals reviewed.    Personal Diagnostic Review    Compliance Summary  10/26/2023 - 11/24/2023 (30 days)  Days with Device Usage 27 days  Days without Device Usage 3 " days  Percent Days with Device Usage 90.0%  Cumulative Usage 6 days 8 hrs. 23 mins. 42 secs.  Maximum Usage (1 Day) 8 hrs. 43 mins. 50 secs.  Average Usage (All Days) 5 hrs. 4 mins. 47 secs.  Average Usage (Days Used) 5 hrs. 38 mins. 39 secs.  Minimum Usage (1 Day) 8 secs.  Percent of Days with Usage >= 4 Hours 73.3%  Percent of Days with Usage < 4 Hours 26.7%  Date Range  Total Blower Time 7 days 21 hrs. 7 mins. 37 secs.  Average AHI 2.2  Auto-CPAP Summary  Auto-CPAP Mean Pressure 5.0 cmH2O  Auto-CPAP Peak Average Pressure 5.7 cmH2O  Device Pressure <= 90% of Time 6.6 cmH2O  Average Time in Large Leak Per Day 3 mins. 38 secs.        Assessment/Plan:       Problem List Items Addressed This Visit          Endocrine    Severe obesity with body mass index (BMI) of 35.0 to 39.9 with serious comorbidity     Weight loss and exercise to improve overall health.              Other    LEN on CPAP - Primary     Home Sleep Studies 3 night 08/20/2018, 08/22/2018, 08/23/2018 Mild obstructive sleep apnea-hypopnea syndrome. Apnea-hypopnea index: AHI: 11.7 events per hour. Total events 80.   Autopap 4-10cm H2O with ODME.   Dreamwear nasal mask  HME: [facility]     Resume CPAP, call with any issues              Follow up 6 months    Discussed diagnosis, its evaluation, treatment and usual course. All questions answered.    Patient verbalized understanding of plan and left in no acute distress    Thank you for the courtesy of participating in the care of this patient    Elizabeth G Blough, PA-C [facility]

## 2024-02-19 ENCOUNTER — TELEPHONE (OUTPATIENT)
Dept: INTERNAL MEDICINE | Facility: CLINIC | Age: 74
End: 2024-02-19
Payer: MEDICARE

## 2024-02-19 NOTE — TELEPHONE ENCOUNTER
----- Message from Edna Oliva MD sent at 2/19/2024  3:22 PM CST -----  Contact: Pt  I do not, patient can make an appointment for herself with her chiropractor  We can recommend or place a referral to physical therapy    Dr. Oliva  ----- Message -----  From: Mandy Palomo MA  Sent: 2/19/2024   3:01 PM CST  To: Edna Oliva MD    Do you place referrals to Chiropractors?  ----- Message -----  From: Alexandra Haddad  Sent: 2/19/2024  11:47 AM CST  To: [facility] Staff    Type:  Patient Requesting Referral    Who Called pt   Does the patient already have the specialty appointment scheduled?:   If yes, what is the date of that appointment?:   Referral to What Specialty: Chiropractor   Reason for Referral: body is sore ( whole)  Does the patient want the referral with a specific physician?:  Is the specialist an [facility]?:   Patient Requesting a Response?: yes   Would the patient rather a call back or a response via [facility]?  phone  Best Call Back Number:  [phone]  Additional Information:

## 2024-02-19 NOTE — TELEPHONE ENCOUNTER
S/w pt and informed that the provider stated that she does not place referrals to a Chiropractor and that she would have to find one on her own. Pt voiced understanding./Mono

## 2024-02-22 ENCOUNTER — HOSPITAL ENCOUNTER (OUTPATIENT)
Dept: RADIOLOGY | Facility: HOSPITAL | Age: 74
Discharge: HOME | End: 2024-02-22
Attending: FAMILY MEDICINE
Payer: MEDICARE

## 2024-02-22 ENCOUNTER — OFFICE VISIT (OUTPATIENT)
Dept: INTERNAL MEDICINE | Facility: CLINIC | Age: 74
End: 2024-02-22
Payer: MEDICARE

## 2024-02-22 VITALS
WEIGHT: 215.81 LBS | HEIGHT: 63 IN | DIASTOLIC BLOOD PRESSURE: 72 MMHG | OXYGEN SATURATION: 95 % | SYSTOLIC BLOOD PRESSURE: 120 MMHG | RESPIRATION RATE: 18 BRPM | HEART RATE: 51 BPM | BODY MASS INDEX: 38.24 KG/M2

## 2024-02-22 DIAGNOSIS — E66.01 SEVERE OBESITY WITH BODY MASS INDEX (BMI) OF 35.0 TO 39.9 WITH SERIOUS COMORBIDITY: ICD-10-CM

## 2024-02-22 DIAGNOSIS — E55.9 VITAMIN D DEFICIENCY: ICD-10-CM

## 2024-02-22 DIAGNOSIS — M79.10 MYALGIA: ICD-10-CM

## 2024-02-22 DIAGNOSIS — I10 ESSENTIAL HYPERTENSION: Primary | ICD-10-CM

## 2024-02-22 DIAGNOSIS — M16.0 PRIMARY OSTEOARTHRITIS OF BOTH HIPS: ICD-10-CM

## 2024-02-22 DIAGNOSIS — N20.0 CALCULUS OF KIDNEY: ICD-10-CM

## 2024-02-22 DIAGNOSIS — R10.13 EPIGASTRIC ABDOMINAL PAIN: ICD-10-CM

## 2024-02-22 DIAGNOSIS — M47.26 OSTEOARTHRITIS OF SPINE WITH RADICULOPATHY, LUMBAR REGION: ICD-10-CM

## 2024-02-22 PROCEDURE — 74018 RADEX ABDOMEN 1 VIEW: CPT | Mod: 26,,, | Performed by: RADIOLOGY

## 2024-02-22 PROCEDURE — 74018 RADEX ABDOMEN 1 VIEW: CPT | Mod: TC

## 2024-02-22 PROCEDURE — 99999 PR PBB SHADOW E&M-EST. PATIENT-LVL IV: CPT | Mod: PBBFAC,,, | Performed by: FAMILY MEDICINE

## 2024-02-22 PROCEDURE — 99214 OFFICE O/P EST MOD 30 MIN: CPT | Mod: S$GLB,,, | Performed by: FAMILY MEDICINE

## 2024-02-22 RX ORDER — OMEPRAZOLE 20 MG/1
20 CAPSULE, DELAYED RELEASE ORAL DAILY
Qty: 30 CAPSULE | Refills: 1 | Status: SHIPPED | OUTPATIENT
Start: 2024-02-22 | End: 2025-02-21

## 2024-02-22 RX ORDER — GABAPENTIN 100 MG/1
100 CAPSULE ORAL NIGHTLY
Qty: 30 CAPSULE | Refills: 1 | Status: SHIPPED | OUTPATIENT
Start: 2024-02-22 | End: 2024-03-26

## 2024-02-22 NOTE — PROGRESS NOTES
Subjective:       Patient ID: Beth Souza is a 73 y.o. female.    Chief Complaint: GI Problem    73-year-old  female patient with Patient Active Problem List:     Essential hypertension     Vitamin D deficiency     Mixed hyperlipidemia     Primary hyperparathyroidism     Cataract     Osteoarthritis of spine with radiculopathy, lumbar region     Severe obesity with body mass index (BMI) of 35.0 to 39.9 with serious comorbidity     LEN on CPAP     Mild cognitive impairment with memory loss     Meningioma of cerebellum     Multiple thyroid nodules     History of COVID-19     Primary osteoarthritis of both hips     Calcification of aorta     History of hyperparathyroidism     Prediabetes     Lumbar radiculopathy     Major neurocognitive disorder due to another medical condition     Neck pain, chronic  Reports epigastric abdominal discomfort off and on lately and was not able to  omeprazole as prescribed, had x-ray done in the past which was showing possibility of kidney stone, patient denies any discomfort with the urine or blood in the urine or flank pain but has been having abdominal discomfort and muscle soreness all over the body, denies any tingling or numbness sensation to extremities and has not been taking any medication for relief        Review of Systems   Constitutional:  Negative for fatigue and fever.   Eyes:  Negative for visual disturbance.   Respiratory:  Negative for shortness of breath.    Cardiovascular:  Negative for chest pain and leg swelling.   Gastrointestinal:  Positive for abdominal pain. Negative for nausea and vomiting.   Genitourinary:  Negative for dysuria, flank pain, frequency, hematuria and urgency.   Musculoskeletal:  Positive for back pain and myalgias.   Skin:  Negative for rash.   Neurological:  Negative for weakness, light-headedness, numbness and headaches.   Psychiatric/Behavioral:  Negative for sleep disturbance.          /72 (BP Location:  "Right arm, Patient Position: Sitting, BP Method: Large (Manual))   Pulse (!) 51   Resp 18   Ht 5' 3" (1.6 m)   Wt 97.9 kg (215 lb 13.3 oz)   SpO2 95%   BMI 38.23 kg/m²   Objective:      Physical Exam  Constitutional:       Appearance: She is well-developed.   HENT:      Head: Normocephalic and atraumatic.   Cardiovascular:      Rate and Rhythm: Normal rate and regular rhythm.      Heart sounds: Normal heart sounds. No murmur heard.  Pulmonary:      Effort: Pulmonary effort is normal.      Breath sounds: Normal breath sounds. No wheezing.   Abdominal:      General: Bowel sounds are normal.      Palpations: Abdomen is soft. There is no mass.      Tenderness: There is abdominal tenderness. There is no right CVA tenderness, left CVA tenderness or guarding.      Comments: Minimal tenderness noted to the epigastric area   Musculoskeletal:         General: No tenderness.   Skin:     General: Skin is warm and dry.      Findings: No rash.   Neurological:      Mental Status: She is alert and oriented to person, place, and time.   Psychiatric:         Mood and Affect: Mood normal.           Assessment/Plan:   1. Essential hypertension  Blood pressure is stable currently on lisinopril hydrochlorothiazide 20/25 mg    2. Epigastric abdominal pain  -     omeprazole (PRILOSEC) 20 MG capsule; Take 1 capsule (20 mg total) by mouth once daily.  Dispense: 30 capsule; Refill: 1  -     X-Ray KUB; Future; Expected date: 02/22/2024  Will get x-ray of the stomach to rule out kidney stone again but encouraged to drink adequate fluids and start taking omeprazole as prescribed  Eat small frequent meals and avoid spicy diet    3. Vitamin D deficiency  Noted low vitamin-D levels for which vitamin-D has been prescribed weekly    4. Severe obesity with body mass index (BMI) of 35.0 to 39.9 with serious comorbidity  Lifestyle modifications recommended to lose weight with BMI 38    5. Osteoarthritis of spine with radiculopathy, lumbar " region  -     gabapentin (NEURONTIN) 100 MG capsule; Take 1 capsule (100 mg total) by mouth every evening.  Dispense: 30 capsule; Refill: 1  6. Primary osteoarthritis of both hips  -     gabapentin (NEURONTIN) 100 MG capsule; Take 1 capsule (100 mg total) by mouth every evening.  Dispense: 30 capsule; Refill: 1  Graded exercise regimen recommended  Encouraged to take gabapentin if having any neuropathy like symptoms    7. Calculus of kidney  -     Urinalysis, Reflex to Urine Culture Urine, Clean Catch; Future; Expected date: 02/22/2024  Will plan to repeat x-ray of the abdomen and urinalysis for further evaluation  Drink adequate fluids    8. Myalgia  -     CK; Future; Expected date: 02/22/2024  -     Sedimentation rate; Future; Expected date: 02/22/2024  Secondary to muscle soreness all over Will check further labs but encouraged to try taking over-the-counter coenzyme Q supplements to see if it is due to statins but patient reports that she has been taking statins for long time and did not have any similar symptoms but has been having muscle soreness for awhile

## 2024-03-19 ENCOUNTER — OFFICE VISIT (OUTPATIENT)
Dept: OPHTHALMOLOGY | Facility: CLINIC | Age: 74
End: 2024-03-19
Payer: MEDICARE

## 2024-03-19 ENCOUNTER — HOSPITAL ENCOUNTER (OUTPATIENT)
Dept: RADIOLOGY | Facility: HOSPITAL | Age: 74
Discharge: HOME | End: 2024-03-19
Attending: NURSE PRACTITIONER
Payer: MEDICARE

## 2024-03-19 DIAGNOSIS — H52.4 HYPEROPIA WITH PRESBYOPIA, BILATERAL: ICD-10-CM

## 2024-03-19 DIAGNOSIS — H52.222 REGULAR ASTIGMATISM OF LEFT EYE: ICD-10-CM

## 2024-03-19 DIAGNOSIS — D32.0 MENINGIOMA OF CEREBELLUM: ICD-10-CM

## 2024-03-19 DIAGNOSIS — Z12.31 ENCOUNTER FOR SCREENING MAMMOGRAM FOR MALIGNANT NEOPLASM OF BREAST: ICD-10-CM

## 2024-03-19 DIAGNOSIS — H25.13 NUCLEAR SCLEROSIS, BILATERAL: Primary | ICD-10-CM

## 2024-03-19 DIAGNOSIS — H52.03 HYPEROPIA WITH PRESBYOPIA, BILATERAL: ICD-10-CM

## 2024-03-19 DIAGNOSIS — H40.013 OPEN ANGLE WITH BORDERLINE FINDINGS OF BOTH EYES: ICD-10-CM

## 2024-03-19 PROCEDURE — 92014 COMPRE OPH EXAM EST PT 1/>: CPT | Mod: S$GLB,,, | Performed by: OPTOMETRIST

## 2024-03-19 PROCEDURE — 92133 CPTRZD OPH DX IMG PST SGM ON: CPT | Mod: S$GLB,,, | Performed by: OPTOMETRIST

## 2024-03-19 PROCEDURE — 77063 BREAST TOMOSYNTHESIS BI: CPT | Mod: TC

## 2024-03-19 PROCEDURE — 92015 DETERMINE REFRACTIVE STATE: CPT | Mod: S$GLB,,, | Performed by: OPTOMETRIST

## 2024-03-19 PROCEDURE — 77067 SCR MAMMO BI INCL CAD: CPT | Mod: 26,,, | Performed by: RADIOLOGY

## 2024-03-19 PROCEDURE — 77063 BREAST TOMOSYNTHESIS BI: CPT | Mod: 26,,, | Performed by: RADIOLOGY

## 2024-03-19 PROCEDURE — 99999 PR PBB SHADOW E&M-EST. PATIENT-LVL II: CPT | Mod: PBBFAC,,, | Performed by: OPTOMETRIST

## 2024-03-19 NOTE — PROGRESS NOTES
HPI     Follow-up            Comments: Pt will have to reschedule for 24-2VF   RTC 6 months for gOCT and dilated exam  Pt states night vision is worse. Distance seems ok in daylight. Current   glasses are not working as well, would like a new RX  Pt is seeing floaters           Last edited by Lexie Galvan on 3/19/2024 10:15 AM.            Assessment /Plan     For exam results, see Encounter Report.    Nuclear sclerosis, bilateral  Cataracts not significantly affecting activities of daily living and therefore surgery is not indicated at this time.   Will continue to monitor over the next 12 months. Pt to call or RTC with any significant change in vision prior to next visit.     Open angle with borderline findings of both eyes  -     Posterior Segment OCT Optic Nerve- Both eyes  Normal IOP today OU  Normal/stable gOCT today OD, OS  No evidence of glaucoma at this time but based on risk factors recommend to continue monitoring.     Meningioma of cerebellum  RTC for 24-2VF    Hyperopia with presbyopia, bilateral  Regular astigmatism of left eye  Eyeglass Final Rx       Eyeglass Final Rx         Sphere Cylinder Axis Add    Right +0.25   +2.50    Left +0.50 +0.50 020 +2.50      Expiration Date: 3/19/2025                    RTC next available for 24-2VF and review or PRN  Discussed above and all questions were answered.

## 2024-03-26 ENCOUNTER — TELEPHONE (OUTPATIENT)
Dept: INTERNAL MEDICINE | Facility: CLINIC | Age: 74
End: 2024-03-26

## 2024-03-26 ENCOUNTER — HOSPITAL ENCOUNTER (OUTPATIENT)
Dept: RADIOLOGY | Facility: HOSPITAL | Age: 74
Discharge: HOME | End: 2024-03-26
Attending: FAMILY MEDICINE
Payer: MEDICARE

## 2024-03-26 ENCOUNTER — OFFICE VISIT (OUTPATIENT)
Dept: INTERNAL MEDICINE | Facility: CLINIC | Age: 74
End: 2024-03-26
Payer: MEDICARE

## 2024-03-26 VITALS
WEIGHT: 215.38 LBS | HEIGHT: 63 IN | OXYGEN SATURATION: 96 % | SYSTOLIC BLOOD PRESSURE: 132 MMHG | BODY MASS INDEX: 38.16 KG/M2 | DIASTOLIC BLOOD PRESSURE: 76 MMHG | RESPIRATION RATE: 18 BRPM | HEART RATE: 50 BPM

## 2024-03-26 DIAGNOSIS — M54.12 RIGHT CERVICAL RADICULOPATHY: ICD-10-CM

## 2024-03-26 DIAGNOSIS — M54.12 RIGHT CERVICAL RADICULOPATHY: Primary | ICD-10-CM

## 2024-03-26 DIAGNOSIS — E66.01 SEVERE OBESITY WITH BODY MASS INDEX (BMI) OF 35.0 TO 39.9 WITH SERIOUS COMORBIDITY: ICD-10-CM

## 2024-03-26 DIAGNOSIS — I10 ESSENTIAL HYPERTENSION: ICD-10-CM

## 2024-03-26 DIAGNOSIS — M47.22 OSTEOARTHRITIS OF SPINE WITH RADICULOPATHY, CERVICAL REGION: Primary | ICD-10-CM

## 2024-03-26 PROCEDURE — 99999 PR PBB SHADOW E&M-EST. PATIENT-LVL IV: CPT | Mod: PBBFAC,,, | Performed by: FAMILY MEDICINE

## 2024-03-26 PROCEDURE — 72050 X-RAY EXAM NECK SPINE 4/5VWS: CPT | Mod: 26,,, | Performed by: RADIOLOGY

## 2024-03-26 PROCEDURE — 73030 X-RAY EXAM OF SHOULDER: CPT | Mod: 26,RT,, | Performed by: RADIOLOGY

## 2024-03-26 PROCEDURE — 73030 X-RAY EXAM OF SHOULDER: CPT | Mod: TC,RT

## 2024-03-26 PROCEDURE — 72050 X-RAY EXAM NECK SPINE 4/5VWS: CPT | Mod: TC

## 2024-03-26 PROCEDURE — 99214 OFFICE O/P EST MOD 30 MIN: CPT | Mod: S$GLB,,, | Performed by: FAMILY MEDICINE

## 2024-03-26 RX ORDER — METHOCARBAMOL 750 MG/1
750 TABLET, FILM COATED ORAL 3 TIMES DAILY PRN
Qty: 30 TABLET | Refills: 0 | Status: SHIPPED | OUTPATIENT
Start: 2024-03-26 | End: 2024-04-05
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
## 2024-03-26 NOTE — TELEPHONE ENCOUNTER
X-ray of the neck showing arthritis changes with nerve pinching, to the neck   Also noted arthritis to the shoulder, will refer to physical therapy and patient to follow-up with pain management

## 2024-04-03 ENCOUNTER — PATIENT MESSAGE (OUTPATIENT)
Dept: PULMONOLOGY | Facility: CLINIC | Age: 74
End: 2024-04-03
Payer: MEDICARE

## 2024-04-08 ENCOUNTER — TELEPHONE (OUTPATIENT)
Dept: INTERNAL MEDICINE | Facility: CLINIC | Age: 74
End: 2024-04-08
Payer: MEDICARE

## 2024-04-08 ENCOUNTER — CLINICAL SUPPORT (OUTPATIENT)
Dept: REHABILITATION | Facility: HOSPITAL | Age: 74
End: 2024-04-08
Payer: MEDICARE

## 2024-04-08 DIAGNOSIS — M47.22 OSTEOARTHRITIS OF SPINE WITH RADICULOPATHY, CERVICAL REGION: ICD-10-CM

## 2024-04-08 DIAGNOSIS — M25.511 ACUTE PAIN OF RIGHT SHOULDER: Primary | ICD-10-CM

## 2024-04-08 DIAGNOSIS — R53.1 WEAKNESS: ICD-10-CM

## 2024-04-08 DIAGNOSIS — M19.011 PRIMARY OSTEOARTHRITIS OF RIGHT SHOULDER: ICD-10-CM

## 2024-04-08 DIAGNOSIS — M54.12 RIGHT CERVICAL RADICULOPATHY: Primary | ICD-10-CM

## 2024-04-08 PROCEDURE — 97161 PT EVAL LOW COMPLEX 20 MIN: CPT | Mod: PN

## 2024-04-08 PROCEDURE — 97140 MANUAL THERAPY 1/> REGIONS: CPT | Mod: PN

## 2024-04-08 PROCEDURE — 97110 THERAPEUTIC EXERCISES: CPT | Mod: PN
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
## 2024-04-08 NOTE — TELEPHONE ENCOUNTER
----- Message from Mara Domingo PT sent at 4/8/2024 11:42 AM CDT -----  Good morning Dr. Oliva,    I evaluated Ms. Souza this morning for her neck/R shoulder pain. After performing her physical exam I hypothesize this may be coming more from her R shoulder than her neck. Her neck was clear in terms of pain and symptom reproduction. However, we were able to reproduce symptoms with R UE motion instead. She was also positive for a drop arm test on the R but this could be a false positive due to pain. What are your thoughts on continuing with physical therapy and potentially getting her set up with an orthopedic physician as well? Feel free to call me or respond to this message to discuss this. [phone].     Mara Domingo

## 2024-04-08 NOTE — TELEPHONE ENCOUNTER
Will place a referral to Orthopedic for further evaluation to look at the right shoulder but secondary to ongoing changes in the neck as well, patient to continue physical therapy for now

## 2024-04-12 NOTE — PROGRESS NOTES
"[facility]   Physical Therapy Treatment Note      Name: Beth Souza  Clinic Number: [id number]    Therapy Diagnosis:   Encounter Diagnosis   Name Primary?    Acute pain of right shoulder Yes     Physician: Edna Oliva MD    Visit Date: 4/15/2024    Physician Orders: PT Eval and Treat  Medical Diagnosis from Referral: M47.22 (ICD-10-CM) - Osteoarthritis of spine with radiculopathy, cervical region   Evaluation Date: 4/8/2024  Authorization Period Expiration: 12/31/24  Plan of Care Expiration: 5/12/24  Progress Note Due: Every 6th visit  Visit # / Visits authorized: 1/20 + eval  FOTO: 1/3     Precautions: Standard      Time In: 1:00 PM  Time Out: 1:57 PM  Total Appointment Time (timed & untimed codes): 57 minutes    PTA Visit #: 0/5       Subjective     Patient reports: Shoulder is starting to feel a lot better. Had a hard time getting around to doing HEP every day.  She was compliant with home exercise program.  Response to previous treatment: -  Functional change: -    Pain: 7/10  Location: R shoulder    Objective      Objective Measures updated at progress report unless specified.     Treatment     Beth received the treatments listed below:      therapeutic exercises to develop strength, endurance, ROM, and flexibility for 17 minutes including:  UBE (2/2)  Supine wand press to flexion (3 x 6)  Table slides (2 x 10)     manual therapy techniques: Joint mobilizations, Manual traction, and Soft tissue Mobilization were applied to the: R shoulder for 15 minutes, including:  PROM into elevation and ER  GII/III Inf/AP GH mobilizations     neuromuscular re-education activities to improve: Balance, Coordination, Kinesthetic, Sense, Proprioception, and Posture for 25 minutes. The following activities were included:  Shoulder isometrics (2 x 10 x 10")                          Flexion              Abduction              Extension  Supine serratus punches with wand (3 x 6)   "   therapeutic activities to improve functional performance for -  minutes, including:  -    Patient Education and Home Exercises       Education provided:   - Cont'd HEP    Written Home Exercises Provided: yes. Exercises were reviewed and Beth was able to demonstrate them prior to the end of the session.  Beth demonstrated good  understanding of the education provided. See Electronic Medical Record under Patient Instructions for exercises provided during therapy sessions    Assessment     Pt demonstrates improved tolerance to overall exercise today. Pt requires cuing to decrease compensatory shoulder cuing during isometric exercises. Pt and PT discussed upcoming orthopedic appointment.     Beth Is progressing well towards her goals.   Patient prognosis is Good.     Patient will continue to benefit from skilled outpatient physical therapy to address the deficits listed in the problem list box on initial evaluation, provide pt/family education and to maximize pt's level of independence in the home and community environment.     Patient's spiritual, cultural and educational needs considered and pt agreeable to plan of care and goals.     Anticipated barriers to physical therapy: -    GOALS:  SHORT TERM GOALS: 3 weeks Progress   Recent signs and systems trend is improving in order to progress towards Long term goals's. Progressing   Patient will be independent with Home Exercise Program  in order to further progress and return to maximal function. Progressing   Pain rating at Worst: 5/10 in order to progress towards increased independence with activity. Progressing   Patient will be able to correct postural deviations in sitting and standing, to decrease pain and promote postural awareness for injury prevention.  Progressing   Patient will partially meet predicted functional outcome (FOTO) score: 43% to improve towards increasing self-worth & perceived functional ability. Progressing      LONG TERM GOALS: 6  "weeks, (5/20/24) Progress   Patient will return to normal Activities of daily living, recreational, and work related activities with less pain and limitation.  Progressing   Patient will improve Range of Motion to stated goals in order to return to maximal functional potential.  Progressing   Patient will improve Strength to stated goals of appropriate musculature in order to improve functional independence.  Progressing   Pain Rating at Best: 1/10 to improve Quality of Life.  Progressing   Patient will meet predicted functional outcome (FOTO) score: 61% to increase self-worth & perceived functional ability. Progressing   Patient will have met/partially met personal goal of: "Be able to reach overhead without pain." Progressing       Plan     Plan of care Certification: 4/8/2024 to 5/20/24.     Outpatient Physical Therapy 2 times weekly for 6 weeks to include the following interventions: Cervical/Lumbar Traction, Electrical Stimulation , Gait Training, Manual Therapy, Moist Heat/ Ice, Neuromuscular Re-ed, Orthotic Management and Training, Patient Education, Self Care, Therapeutic Activities, Therapeutic Exercise, and FDN    Mara Domingo, PT    "

## 2024-04-15 ENCOUNTER — CLINICAL SUPPORT (OUTPATIENT)
Dept: REHABILITATION | Facility: HOSPITAL | Age: 74
End: 2024-04-15
Payer: MEDICARE

## 2024-04-15 DIAGNOSIS — M25.511 ACUTE PAIN OF RIGHT SHOULDER: Primary | ICD-10-CM

## 2024-04-15 PROCEDURE — 97110 THERAPEUTIC EXERCISES: CPT | Mod: PN

## 2024-04-15 PROCEDURE — 97112 NEUROMUSCULAR REEDUCATION: CPT | Mod: PN

## 2024-04-15 PROCEDURE — 97140 MANUAL THERAPY 1/> REGIONS: CPT | Mod: PN

## 2024-04-17 ENCOUNTER — OFFICE VISIT (OUTPATIENT)
Dept: SPORTS MEDICINE | Facility: CLINIC | Age: 74
End: 2024-04-17
Payer: MEDICARE

## 2024-04-17 VITALS — HEIGHT: 63 IN | RESPIRATION RATE: 17 BRPM | BODY MASS INDEX: 38.16 KG/M2 | WEIGHT: 215.38 LBS

## 2024-04-17 DIAGNOSIS — G89.29 CHRONIC RIGHT SHOULDER PAIN: ICD-10-CM

## 2024-04-17 DIAGNOSIS — M25.511 CHRONIC RIGHT SHOULDER PAIN: ICD-10-CM

## 2024-04-17 DIAGNOSIS — M54.12 RIGHT CERVICAL RADICULOPATHY: ICD-10-CM

## 2024-04-17 DIAGNOSIS — S46.011A TRAUMATIC TEAR OF RIGHT ROTATOR CUFF, UNSPECIFIED TEAR EXTENT, INITIAL ENCOUNTER: Primary | ICD-10-CM

## 2024-04-17 DIAGNOSIS — M19.011 PRIMARY OSTEOARTHRITIS OF RIGHT SHOULDER: ICD-10-CM

## 2024-04-17 PROCEDURE — 4010F ACE/ARB THERAPY RXD/TAKEN: CPT | Mod: CPTII,S$GLB,, | Performed by: STUDENT IN AN ORGANIZED HEALTH CARE EDUCATION/TRAINING PROGRAM

## 2024-04-17 PROCEDURE — 1159F MED LIST DOCD IN RCRD: CPT | Mod: CPTII,S$GLB,, | Performed by: STUDENT IN AN ORGANIZED HEALTH CARE EDUCATION/TRAINING PROGRAM

## 2024-04-17 PROCEDURE — 99999 PR PBB SHADOW E&M-EST. PATIENT-LVL III: CPT | Mod: PBBFAC,,, | Performed by: STUDENT IN AN ORGANIZED HEALTH CARE EDUCATION/TRAINING PROGRAM

## 2024-04-17 PROCEDURE — 99213 OFFICE O/P EST LOW 20 MIN: CPT | Mod: S$GLB,,, | Performed by: STUDENT IN AN ORGANIZED HEALTH CARE EDUCATION/TRAINING PROGRAM

## 2024-04-17 PROCEDURE — 3288F FALL RISK ASSESSMENT DOCD: CPT | Mod: CPTII,S$GLB,, | Performed by: STUDENT IN AN ORGANIZED HEALTH CARE EDUCATION/TRAINING PROGRAM

## 2024-04-17 PROCEDURE — 1101F PT FALLS ASSESS-DOCD LE1/YR: CPT | Mod: CPTII,S$GLB,, | Performed by: STUDENT IN AN ORGANIZED HEALTH CARE EDUCATION/TRAINING PROGRAM

## 2024-04-17 PROCEDURE — 3044F HG A1C LEVEL LT 7.0%: CPT | Mod: CPTII,S$GLB,, | Performed by: STUDENT IN AN ORGANIZED HEALTH CARE EDUCATION/TRAINING PROGRAM

## 2024-04-17 PROCEDURE — 1125F AMNT PAIN NOTED PAIN PRSNT: CPT | Mod: CPTII,S$GLB,, | Performed by: STUDENT IN AN ORGANIZED HEALTH CARE EDUCATION/TRAINING PROGRAM

## 2024-04-17 PROCEDURE — 3008F BODY MASS INDEX DOCD: CPT | Mod: CPTII,S$GLB,, | Performed by: STUDENT IN AN ORGANIZED HEALTH CARE EDUCATION/TRAINING PROGRAM

## 2024-04-17 PROCEDURE — 1160F RVW MEDS BY RX/DR IN RCRD: CPT | Mod: CPTII,S$GLB,, | Performed by: STUDENT IN AN ORGANIZED HEALTH CARE EDUCATION/TRAINING PROGRAM
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
## 2024-04-18 ENCOUNTER — OFFICE VISIT (OUTPATIENT)
Dept: PAIN MEDICINE | Facility: CLINIC | Age: 74
End: 2024-04-18
Payer: MEDICARE

## 2024-04-18 ENCOUNTER — HOSPITAL ENCOUNTER (OUTPATIENT)
Dept: RADIOLOGY | Facility: HOSPITAL | Age: 74
Discharge: HOME | End: 2024-04-18
Attending: STUDENT IN AN ORGANIZED HEALTH CARE EDUCATION/TRAINING PROGRAM
Payer: MEDICARE

## 2024-04-18 VITALS
SYSTOLIC BLOOD PRESSURE: 125 MMHG | DIASTOLIC BLOOD PRESSURE: 82 MMHG | HEIGHT: 63 IN | BODY MASS INDEX: 38.32 KG/M2 | WEIGHT: 216.25 LBS | HEART RATE: 61 BPM | RESPIRATION RATE: 17 BRPM

## 2024-04-18 DIAGNOSIS — M54.16 LUMBAR RADICULOPATHY: ICD-10-CM

## 2024-04-18 DIAGNOSIS — M51.36 DDD (DEGENERATIVE DISC DISEASE), LUMBAR: Primary | ICD-10-CM

## 2024-04-18 DIAGNOSIS — M47.22 OSTEOARTHRITIS OF SPINE WITH RADICULOPATHY, CERVICAL REGION: ICD-10-CM

## 2024-04-18 DIAGNOSIS — M25.511 CHRONIC RIGHT SHOULDER PAIN: ICD-10-CM

## 2024-04-18 DIAGNOSIS — G89.29 CHRONIC RIGHT SHOULDER PAIN: ICD-10-CM

## 2024-04-18 PROCEDURE — 1159F MED LIST DOCD IN RCRD: CPT | Mod: CPTII,S$GLB,, | Performed by: NURSE PRACTITIONER

## 2024-04-18 PROCEDURE — 73221 MRI JOINT UPR EXTREM W/O DYE: CPT | Mod: 26,RT,, | Performed by: RADIOLOGY

## 2024-04-18 PROCEDURE — 3079F DIAST BP 80-89 MM HG: CPT | Mod: CPTII,S$GLB,, | Performed by: NURSE PRACTITIONER

## 2024-04-18 PROCEDURE — 99213 OFFICE O/P EST LOW 20 MIN: CPT | Mod: S$GLB,,, | Performed by: NURSE PRACTITIONER

## 2024-04-18 PROCEDURE — 3008F BODY MASS INDEX DOCD: CPT | Mod: CPTII,S$GLB,, | Performed by: NURSE PRACTITIONER

## 2024-04-18 PROCEDURE — 3288F FALL RISK ASSESSMENT DOCD: CPT | Mod: CPTII,S$GLB,, | Performed by: NURSE PRACTITIONER

## 2024-04-18 PROCEDURE — 3044F HG A1C LEVEL LT 7.0%: CPT | Mod: CPTII,S$GLB,, | Performed by: NURSE PRACTITIONER

## 2024-04-18 PROCEDURE — 4010F ACE/ARB THERAPY RXD/TAKEN: CPT | Mod: CPTII,S$GLB,, | Performed by: NURSE PRACTITIONER

## 2024-04-18 PROCEDURE — 73221 MRI JOINT UPR EXTREM W/O DYE: CPT | Mod: TC,RT

## 2024-04-18 PROCEDURE — 1125F AMNT PAIN NOTED PAIN PRSNT: CPT | Mod: CPTII,S$GLB,, | Performed by: NURSE PRACTITIONER

## 2024-04-18 PROCEDURE — 3074F SYST BP LT 130 MM HG: CPT | Mod: CPTII,S$GLB,, | Performed by: NURSE PRACTITIONER

## 2024-04-18 PROCEDURE — 99999 PR PBB SHADOW E&M-EST. PATIENT-LVL III: CPT | Mod: PBBFAC,,, | Performed by: NURSE PRACTITIONER

## 2024-04-18 PROCEDURE — 1101F PT FALLS ASSESS-DOCD LE1/YR: CPT | Mod: CPTII,S$GLB,, | Performed by: NURSE PRACTITIONER

## 2024-04-18 NOTE — PROGRESS NOTES
"[facility]   Physical Therapy Treatment Note      Name: Beth Souza  Clinic Number: [id number]    Therapy Diagnosis:   Encounter Diagnosis   Name Primary?    Acute pain of right shoulder Yes       Physician: Edna Oliva MD    Visit Date: 4/19/2024    Physician Orders: PT Eval and Treat  Medical Diagnosis from Referral: M47.22 (ICD-10-CM) - Osteoarthritis of spine with radiculopathy, cervical region   Evaluation Date: 4/8/2024  Authorization Period Expiration: 12/31/24  Plan of Care Expiration: 5/12/24  Progress Note Due: Every 6th visit  Visit # / Visits authorized: 2/20 + eval  FOTO: 1/3     Precautions: Standard      Time In: 8:33 AM  Time Out: 9:30 AM  Total Appointment Time (timed & untimed codes): 57 minutes    PTA Visit #: 0/5       Subjective     Patient reports: Pt met with MD and has follow up next week to discuss MRI.   She was compliant with home exercise program.  Response to previous treatment: -  Functional change: -    Pain: 7/10  Location: R shoulder    Objective      Objective Measures updated at progress report unless specified.     Treatment     Beth received the treatments listed below:      therapeutic exercises to develop strength, endurance, ROM, and flexibility for 25 minutes including:  UBE (2/2)  Supine wand press to flexion (3 x 6)  Pulleys (2' flexion and 2' scaption)  Table slides (2 x 10)     manual therapy techniques: Joint mobilizations, Manual traction, and Soft tissue Mobilization were applied to the: R shoulder for 15 minutes, including:  PROM into elevation and ER  GII/III Inf/AP GH mobilizations     neuromuscular re-education activities to improve: Balance, Coordination, Kinesthetic, Sense, Proprioception, and Posture for 17 minutes. The following activities were included:  Shoulder isometrics (10 x 10 x 10")                          Flexion              Abduction              Extension  Serratus punches at wall (2 x 5, 10")   "   therapeutic activities to improve functional performance for -  minutes, including:  -    Patient Education and Home Exercises       Education provided:   - Cont'd HEP    Written Home Exercises Provided: yes. Exercises were reviewed and Beth was able to demonstrate them prior to the end of the session.  Beth demonstrated good  understanding of the education provided. See Electronic Medical Record under Patient Instructions for exercises provided during therapy sessions    Assessment     Pt requires assistance during serratus punches at wall to maintain contact with the R UE against the wall. Pt and PT discussed alteration of POC based off MRI findings and follow up with MD next week.     Beth Is progressing well towards her goals.   Patient prognosis is Good.     Patient will continue to benefit from skilled outpatient physical therapy to address the deficits listed in the problem list box on initial evaluation, provide pt/family education and to maximize pt's level of independence in the home and community environment.     Patient's spiritual, cultural and educational needs considered and pt agreeable to plan of care and goals.     Anticipated barriers to physical therapy: -    GOALS:  SHORT TERM GOALS: 3 weeks Progress   Recent signs and systems trend is improving in order to progress towards Long term goals's. Progressing   Patient will be independent with Home Exercise Program  in order to further progress and return to maximal function. Progressing   Pain rating at Worst: 5/10 in order to progress towards increased independence with activity. Progressing   Patient will be able to correct postural deviations in sitting and standing, to decrease pain and promote postural awareness for injury prevention.  Progressing   Patient will partially meet predicted functional outcome (FOTO) score: 43% to improve towards increasing self-worth & perceived functional ability. Progressing      LONG TERM GOALS: 6  "weeks, (5/20/24) Progress   Patient will return to normal Activities of daily living, recreational, and work related activities with less pain and limitation.  Progressing   Patient will improve Range of Motion to stated goals in order to return to maximal functional potential.  Progressing   Patient will improve Strength to stated goals of appropriate musculature in order to improve functional independence.  Progressing   Pain Rating at Best: 1/10 to improve Quality of Life.  Progressing   Patient will meet predicted functional outcome (FOTO) score: 61% to increase self-worth & perceived functional ability. Progressing   Patient will have met/partially met personal goal of: "Be able to reach overhead without pain." Progressing       Plan     Plan of care Certification: 4/8/2024 to 5/20/24.     Outpatient Physical Therapy 2 times weekly for 6 weeks to include the following interventions: Cervical/Lumbar Traction, Electrical Stimulation , Gait Training, Manual Therapy, Moist Heat/ Ice, Neuromuscular Re-ed, Orthotic Management and Training, Patient Education, Self Care, Therapeutic Activities, Therapeutic Exercise, and FDN    Mara Domingo, PT      "

## 2024-04-19 ENCOUNTER — CLINICAL SUPPORT (OUTPATIENT)
Dept: REHABILITATION | Facility: HOSPITAL | Age: 74
End: 2024-04-19
Payer: MEDICARE

## 2024-04-19 DIAGNOSIS — M25.511 ACUTE PAIN OF RIGHT SHOULDER: Primary | ICD-10-CM

## 2024-04-19 PROCEDURE — 97140 MANUAL THERAPY 1/> REGIONS: CPT | Mod: PN

## 2024-04-19 PROCEDURE — 97112 NEUROMUSCULAR REEDUCATION: CPT | Mod: PN

## 2024-04-19 PROCEDURE — 97110 THERAPEUTIC EXERCISES: CPT | Mod: PN

## 2024-04-19 NOTE — PROGRESS NOTES
"[facility]   Physical Therapy Treatment Note      Name: Beth Souza  Clinic Number: [id number]    Therapy Diagnosis:   Encounter Diagnosis   Name Primary?    Acute pain of right shoulder Yes     Physician: Edna Oliva MD    Visit Date: 4/22/2024    Physician Orders: PT Eval and Treat  Medical Diagnosis from Referral: M47.22 (ICD-10-CM) - Osteoarthritis of spine with radiculopathy, cervical region   Evaluation Date: 4/8/2024  Authorization Period Expiration: 12/31/24  Plan of Care Expiration: 5/12/24  Progress Note Due: Every 6th visit  Visit # / Visits authorized: 3/20 + eval  FOTO: 1/3     Precautions: Standard      Time In: 8:30 AM  Time Out: 9:45 AM  Total Appointment Time (timed & untimed codes): 75 minutes    PTA Visit #: 0/5       Subjective     Patient reports: Pt continues  She was compliant with home exercise program.  Response to previous treatment: -  Functional change: -    Pain: 7/10  Location: R shoulder    Objective      Objective Measures updated at progress report unless specified.     Treatment     Beth received the treatments listed below:      therapeutic exercises to develop strength, endurance, ROM, and flexibility for 15 minutes including:  UBE (3/3)  Supine wand press to flexion (3 x 6)  Pulleys (2' flexion and 2' scaption)     manual therapy techniques: Joint mobilizations, Manual traction, and Soft tissue Mobilization were applied to the: R shoulder for 10 minutes, including:  PROM into elevation and ER  GII/III Inf/AP GH mobilizations     neuromuscular re-education activities to improve: Balance, Coordination, Kinesthetic, Sense, Proprioception, and Posture for 40 minutes. The following activities were included:  Shoulder ABC's (2x)  Prone shoulder extension (2 x 10)  Prone T (thumb down, 2 x 10)  Prone Y (3 x 10)  Shoulder isometrics (2 x 10 x 10")                          Flexion              Abduction              Extension     therapeutic " activities to improve functional performance for -  minutes, including:  -    Pt participated in ice and electrical stimulation after being cleared     Patient Education and Home Exercises       Education provided:   - Cont'd HEP    Written Home Exercises Provided: yes. Exercises were reviewed and Beth was able to demonstrate them prior to the end of the session.  Beth demonstrated good  understanding of the education provided. See Electronic Medical Record under Patient Instructions for exercises provided during therapy sessions    Assessment     Pt demonstrates improved tolerance to passive range of motion and active assisted motion today but continues to be limited due to pain and dysfunction. Pt and PT discussed potential options for treatment with MD tomorrow.     Beth Is progressing well towards her goals.   Patient prognosis is Good.     Patient will continue to benefit from skilled outpatient physical therapy to address the deficits listed in the problem list box on initial evaluation, provide pt/family education and to maximize pt's level of independence in the home and community environment.     Patient's spiritual, cultural and educational needs considered and pt agreeable to plan of care and goals.     Anticipated barriers to physical therapy: -    GOALS:  SHORT TERM GOALS: 3 weeks Progress   Recent signs and systems trend is improving in order to progress towards Long term goals's. Progressing   Patient will be independent with Home Exercise Program  in order to further progress and return to maximal function. Progressing   Pain rating at Worst: 5/10 in order to progress towards increased independence with activity. Progressing   Patient will be able to correct postural deviations in sitting and standing, to decrease pain and promote postural awareness for injury prevention.  Progressing   Patient will partially meet predicted functional outcome (FOTO) score: 43% to improve towards  "increasing self-worth & perceived functional ability. Progressing      LONG TERM GOALS: 6 weeks, (5/20/24) Progress   Patient will return to normal Activities of daily living, recreational, and work related activities with less pain and limitation.  Progressing   Patient will improve Range of Motion to stated goals in order to return to maximal functional potential.  Progressing   Patient will improve Strength to stated goals of appropriate musculature in order to improve functional independence.  Progressing   Pain Rating at Best: 1/10 to improve Quality of Life.  Progressing   Patient will meet predicted functional outcome (FOTO) score: 61% to increase self-worth & perceived functional ability. Progressing   Patient will have met/partially met personal goal of: "Be able to reach overhead without pain." Progressing       Plan     Plan of care Certification: 4/8/2024 to 5/20/24.     Outpatient Physical Therapy 2 times weekly for 6 weeks to include the following interventions: Cervical/Lumbar Traction, Electrical Stimulation , Gait Training, Manual Therapy, Moist Heat/ Ice, Neuromuscular Re-ed, Orthotic Management and Training, Patient Education, Self Care, Therapeutic Activities, Therapeutic Exercise, and FDN    Mara Domingo, PT        "
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
## 2024-04-22 ENCOUNTER — CLINICAL SUPPORT (OUTPATIENT)
Dept: REHABILITATION | Facility: HOSPITAL | Age: 74
End: 2024-04-22
Payer: MEDICARE

## 2024-04-22 DIAGNOSIS — M25.511 ACUTE PAIN OF RIGHT SHOULDER: Primary | ICD-10-CM

## 2024-04-22 PROCEDURE — 97140 MANUAL THERAPY 1/> REGIONS: CPT | Mod: PN

## 2024-04-22 PROCEDURE — 97014 ELECTRIC STIMULATION THERAPY: CPT | Mod: PN

## 2024-04-22 PROCEDURE — 97110 THERAPEUTIC EXERCISES: CPT | Mod: PN

## 2024-04-22 PROCEDURE — 97112 NEUROMUSCULAR REEDUCATION: CPT | Mod: PN

## 2024-04-24 ENCOUNTER — OFFICE VISIT (OUTPATIENT)
Dept: SPORTS MEDICINE | Facility: CLINIC | Age: 74
End: 2024-04-24
Payer: MEDICARE

## 2024-04-24 VITALS — RESPIRATION RATE: 17 BRPM | BODY MASS INDEX: 38.44 KG/M2 | WEIGHT: 216.94 LBS | HEIGHT: 63 IN

## 2024-04-24 DIAGNOSIS — M67.813 BICEPS TENDINOSIS OF RIGHT SHOULDER: ICD-10-CM

## 2024-04-24 DIAGNOSIS — S46.011D TRAUMATIC COMPLETE TEAR OF RIGHT ROTATOR CUFF, SUBSEQUENT ENCOUNTER: Primary | ICD-10-CM

## 2024-04-24 DIAGNOSIS — M75.41 SUBACROMIAL IMPINGEMENT OF RIGHT SHOULDER: ICD-10-CM

## 2024-04-24 PROCEDURE — 1160F RVW MEDS BY RX/DR IN RCRD: CPT | Mod: CPTII,S$GLB,, | Performed by: STUDENT IN AN ORGANIZED HEALTH CARE EDUCATION/TRAINING PROGRAM

## 2024-04-24 PROCEDURE — 99999 PR PBB SHADOW E&M-EST. PATIENT-LVL III: CPT | Mod: PBBFAC,,, | Performed by: STUDENT IN AN ORGANIZED HEALTH CARE EDUCATION/TRAINING PROGRAM

## 2024-04-24 PROCEDURE — 1159F MED LIST DOCD IN RCRD: CPT | Mod: CPTII,S$GLB,, | Performed by: STUDENT IN AN ORGANIZED HEALTH CARE EDUCATION/TRAINING PROGRAM

## 2024-04-24 PROCEDURE — 3288F FALL RISK ASSESSMENT DOCD: CPT | Mod: CPTII,S$GLB,, | Performed by: STUDENT IN AN ORGANIZED HEALTH CARE EDUCATION/TRAINING PROGRAM

## 2024-04-24 PROCEDURE — 4010F ACE/ARB THERAPY RXD/TAKEN: CPT | Mod: CPTII,S$GLB,, | Performed by: STUDENT IN AN ORGANIZED HEALTH CARE EDUCATION/TRAINING PROGRAM

## 2024-04-24 PROCEDURE — 3044F HG A1C LEVEL LT 7.0%: CPT | Mod: CPTII,S$GLB,, | Performed by: STUDENT IN AN ORGANIZED HEALTH CARE EDUCATION/TRAINING PROGRAM

## 2024-04-24 PROCEDURE — 1125F AMNT PAIN NOTED PAIN PRSNT: CPT | Mod: CPTII,S$GLB,, | Performed by: STUDENT IN AN ORGANIZED HEALTH CARE EDUCATION/TRAINING PROGRAM

## 2024-04-24 PROCEDURE — 1101F PT FALLS ASSESS-DOCD LE1/YR: CPT | Mod: CPTII,S$GLB,, | Performed by: STUDENT IN AN ORGANIZED HEALTH CARE EDUCATION/TRAINING PROGRAM

## 2024-04-24 PROCEDURE — 99214 OFFICE O/P EST MOD 30 MIN: CPT | Mod: S$GLB,,, | Performed by: STUDENT IN AN ORGANIZED HEALTH CARE EDUCATION/TRAINING PROGRAM

## 2024-04-24 PROCEDURE — 3008F BODY MASS INDEX DOCD: CPT | Mod: CPTII,S$GLB,, | Performed by: STUDENT IN AN ORGANIZED HEALTH CARE EDUCATION/TRAINING PROGRAM

## 2024-04-24 RX ORDER — MELOXICAM 15 MG/1
15 TABLET ORAL DAILY
Qty: 30 TABLET | Refills: 1 | Status: SHIPPED | OUTPATIENT
Start: 2024-04-24

## 2024-04-26 ENCOUNTER — TELEPHONE (OUTPATIENT)
Dept: INTERNAL MEDICINE | Facility: CLINIC | Age: 74
End: 2024-04-26
Payer: MEDICARE

## 2024-04-26 DIAGNOSIS — I10 ESSENTIAL HYPERTENSION: ICD-10-CM

## 2024-04-26 RX ORDER — LISINOPRIL AND HYDROCHLOROTHIAZIDE 20; 25 MG/1; MG/1
1 TABLET ORAL DAILY
Qty: 5 TABLET | Refills: 0 | Status: SHIPPED | OUTPATIENT
Start: 2024-04-26

## 2024-04-26 NOTE — TELEPHONE ENCOUNTER
----- Message from Edna Oliva MD sent at 4/26/2024  9:52 AM CDT -----  Sent 5 tablets to the pharmacy     Dr Oliva  ----- Message -----  From: Mandy Palomo MA  Sent: 4/26/2024   9:27 AM CDT  To: Edna Oliva MD    Pt requesting short term supply of medication, 3 pills, sent in to pharmacy in [place] listed below. Please Advise.  ----- Message -----  From: Sylvie Barnard  Sent: 4/26/2024   8:29 AM CDT  To: Pj Bishop Staff    States she is own way out of town. States she forgot her blood pressure medicine w/ a fluid pill in it, she can't think of the name of her medicine. States she would like to get Three called in. She will be back on Monday.  [facility] [address] [place] [id number], [phone]. Please call pt [phone]. Thank you

## 2024-04-26 NOTE — TELEPHONE ENCOUNTER
----- Message from Mandy Palomo MA sent at 4/26/2024  8:45 AM CDT -----  Pt requesting short term supply of medication, 3 pills, sent in to pharmacy in [place] listed below. Please Advise.  ----- Message -----  From: Sylvie Barnard  Sent: 4/26/2024   8:29 AM CDT  To: Pj Bishop Staff    States she is own way out of town. States she forgot her blood pressure medicine w/ a fluid pill in it, she can't think of the name of her medicine. States she would like to get Three called in. She will be back on Monday.  [facility] [address] [facility] [address], [phone]. Please call pt [phone]. Thank you

## 2024-04-26 NOTE — TELEPHONE ENCOUNTER
S/w pt and informed that provider sent in short supply of medication as requested. Pt voiced understanding./Kmw

## 2024-04-26 NOTE — PROGRESS NOTES
"[facility]   Physical Therapy Treatment Note      Name: Beth Souza  Clinic Number: [id number]    Therapy Diagnosis:   Encounter Diagnosis   Name Primary?    Acute pain of right shoulder Yes       Physician: Edna Oliva MD    Visit Date: 4/29/2024    Physician Orders: PT Eval and Treat  Medical Diagnosis from Referral: M47.22 (ICD-10-CM) - Osteoarthritis of spine with radiculopathy, cervical region   Evaluation Date: 4/8/2024  Authorization Period Expiration: 12/31/24  Plan of Care Expiration: 5/12/24  Progress Note Due: Every 6th visit  Visit # / Visits authorized: 4/20 + eval  FOTO: 1/3     Precautions: Standard      Time In: 10:35 AM  Time Out: 11:35 AM  Total Appointment Time (timed & untimed codes): 60 minutes    PTA Visit #: 0/5       Subjective     Patient reports: Notes cont'd shoulder pain. Thinks missing last week made pain worse. Plans to  new medication after visit.   She was compliant with home exercise program.  Response to previous treatment: -  Functional change: -    Pain: 7/10  Location: R shoulder    Objective      Objective Measures updated at progress report unless specified.     Treatment     Beth received the treatments listed below:      therapeutic exercises to develop strength, endurance, ROM, and flexibility for 18 minutes including:  UBE (3/3)  Supine wand press (3 x 5)  Pulleys (2' flexion and 2' scaption)       manual therapy techniques: Joint mobilizations, Manual traction, and Soft tissue Mobilization were applied to the: R shoulder for 18 minutes, including:  PROM into elevation and ER  GII/III Inf/AP GH mobilizations     neuromuscular re-education activities to improve: Balance, Coordination, Kinesthetic, Sense, Proprioception, and Posture for 24 minutes. The following activities were included:  Weight shift M/L (2')  Shoulder isometrics (2 x 10 x 10")                          Flexion              Abduction              Extension     "   therapeutic activities to improve functional performance for -  minutes, including:  -    Pt participated in ice and electrical stimulation after being cleared     Patient Education and Home Exercises       Education provided:   - Cont'd HEP    Written Home Exercises Provided: yes. Exercises were reviewed and Beth was able to demonstrate them prior to the end of the session.  Beth demonstrated good  understanding of the education provided. See Electronic Medical Record under Patient Instructions for exercises provided during therapy sessions    Assessment     Pt and PT discussed HEP with inclusion of pulleys if she is able to purchase them. Pt and PT discussed potential benefits from anti-inflammatory medication.     Beth Is progressing well towards her goals.   Patient prognosis is Good.     Patient will continue to benefit from skilled outpatient physical therapy to address the deficits listed in the problem list box on initial evaluation, provide pt/family education and to maximize pt's level of independence in the home and community environment.     Patient's spiritual, cultural and educational needs considered and pt agreeable to plan of care and goals.     Anticipated barriers to physical therapy: -    GOALS:  SHORT TERM GOALS: 3 weeks Progress   Recent signs and systems trend is improving in order to progress towards Long term goals's. Progressing   Patient will be independent with Home Exercise Program  in order to further progress and return to maximal function. Progressing   Pain rating at Worst: 5/10 in order to progress towards increased independence with activity. Progressing   Patient will be able to correct postural deviations in sitting and standing, to decrease pain and promote postural awareness for injury prevention.  Progressing   Patient will partially meet predicted functional outcome (FOTO) score: 43% to improve towards increasing self-worth & perceived functional ability.  "Progressing      LONG TERM GOALS: 6 weeks, (5/20/24) Progress   Patient will return to normal Activities of daily living, recreational, and work related activities with less pain and limitation.  Progressing   Patient will improve Range of Motion to stated goals in order to return to maximal functional potential.  Progressing   Patient will improve Strength to stated goals of appropriate musculature in order to improve functional independence.  Progressing   Pain Rating at Best: 1/10 to improve Quality of Life.  Progressing   Patient will meet predicted functional outcome (FOTO) score: 61% to increase self-worth & perceived functional ability. Progressing   Patient will have met/partially met personal goal of: "Be able to reach overhead without pain." Progressing       Plan     Plan of care Certification: 4/8/2024 to 5/20/24.     Outpatient Physical Therapy 2 times weekly for 6 weeks to include the following interventions: Cervical/Lumbar Traction, Electrical Stimulation , Gait Training, Manual Therapy, Moist Heat/ Ice, Neuromuscular Re-ed, Orthotic Management and Training, Patient Education, Self Care, Therapeutic Activities, Therapeutic Exercise, and FDN    Mara Domingo, PT            "

## 2024-04-29 ENCOUNTER — CLINICAL SUPPORT (OUTPATIENT)
Dept: REHABILITATION | Facility: HOSPITAL | Age: 74
End: 2024-04-29
Payer: MEDICARE

## 2024-04-29 DIAGNOSIS — M25.511 ACUTE PAIN OF RIGHT SHOULDER: Primary | ICD-10-CM

## 2024-04-29 PROCEDURE — 97112 NEUROMUSCULAR REEDUCATION: CPT | Mod: PN

## 2024-04-29 PROCEDURE — 97140 MANUAL THERAPY 1/> REGIONS: CPT | Mod: PN

## 2024-04-29 PROCEDURE — 97110 THERAPEUTIC EXERCISES: CPT | Mod: PN

## 2024-04-29 NOTE — PROGRESS NOTES
"[facility]   Physical Therapy Treatment Note      Name: Beth Souza  Clinic Number: [id number]    Therapy Diagnosis:   No diagnosis found.      Physician: Edna Oliva MD    Visit Date: 4/30/2024    Physician Orders: PT Eval and Treat  Medical Diagnosis from Referral: M47.22 (ICD-10-CM) - Osteoarthritis of spine with radiculopathy, cervical region   Evaluation Date: 4/8/2024  Authorization Period Expiration: 12/31/24  Plan of Care Expiration: 5/12/24  Progress Note Due: Every 6th visit  Visit # / Visits authorized: 5/20 + eval  FOTO: 1/3     Precautions: Standard      Time In: 1:55 PM  Time Out: 3:06 PM  Total Appointment Time (timed & untimed codes): 71 minutes    PTA Visit #: 0/5       Subjective     Patient reports: Shoulder is feeling okay today. Has a headache pretty bad today.   She was compliant with home exercise program.  Response to previous treatment: -  Functional change: -    Pain: 7/10  Location: R shoulder    Objective      Objective Measures updated at progress report unless specified.     Treatment     Beth received the treatments listed below:      therapeutic exercises to develop strength, endurance, ROM, and flexibility for 18 minutes including:  UBE (3/3)  Supine wand press (2 x 10)  Pulleys (2' flexion and 2' scaption)       manual therapy techniques: Joint mobilizations, Manual traction, and Soft tissue Mobilization were applied to the: R shoulder for 15 minutes, including:  PROM into elevation and ER  GII/III Inf/AP GH mobilizations     neuromuscular re-education activities to improve: Balance, Coordination, Kinesthetic, Sense, Proprioception, and Posture for 28 minutes. The following activities were included:  Supine resisted ER in 90/90 (3 x 10, YTB)  Supine resisted IR in 90/90 (3 x 10, YTB)  Modified weight bearing with alt UE ext (2')  Weight shift M/L (2')  Rhythmic stabilization (4 x 30")     therapeutic activities to improve functional " performance for -  minutes, including:  -    Pt participated in ice and electrical stimulation for 10' after being cleared for contraindications and precuations.     Patient Education and Home Exercises       Education provided:   - Cont'd HEP    Written Home Exercises Provided: yes. Exercises were reviewed and Beth was able to demonstrate them prior to the end of the session.  Beth demonstrated good  understanding of the education provided. See Electronic Medical Record under Patient Instructions for exercises provided during therapy sessions    Assessment     Pt tolerated treatment well today with less catching reported in the shoulder. Pt was able to perform rhythmic stabilization with minimal pain reported.     Beth Is progressing well towards her goals.   Patient prognosis is Good.     Patient will continue to benefit from skilled outpatient physical therapy to address the deficits listed in the problem list box on initial evaluation, provide pt/family education and to maximize pt's level of independence in the home and community environment.     Patient's spiritual, cultural and educational needs considered and pt agreeable to plan of care and goals.     Anticipated barriers to physical therapy: -    GOALS:  SHORT TERM GOALS: 3 weeks Progress   Recent signs and systems trend is improving in order to progress towards Long term goals's. Progressing   Patient will be independent with Home Exercise Program  in order to further progress and return to maximal function. Progressing   Pain rating at Worst: 5/10 in order to progress towards increased independence with activity. Progressing   Patient will be able to correct postural deviations in sitting and standing, to decrease pain and promote postural awareness for injury prevention.  Progressing   Patient will partially meet predicted functional outcome (FOTO) score: 43% to improve towards increasing self-worth & perceived functional ability.  "Progressing      LONG TERM GOALS: 6 weeks, (5/20/24) Progress   Patient will return to normal Activities of daily living, recreational, and work related activities with less pain and limitation.  Progressing   Patient will improve Range of Motion to stated goals in order to return to maximal functional potential.  Progressing   Patient will improve Strength to stated goals of appropriate musculature in order to improve functional independence.  Progressing   Pain Rating at Best: 1/10 to improve Quality of Life.  Progressing   Patient will meet predicted functional outcome (FOTO) score: 61% to increase self-worth & perceived functional ability. Progressing   Patient will have met/partially met personal goal of: "Be able to reach overhead without pain." Progressing       Plan     Plan of care Certification: 4/8/2024 to 5/20/24.     Outpatient Physical Therapy 2 times weekly for 6 weeks to include the following interventions: Cervical/Lumbar Traction, Electrical Stimulation , Gait Training, Manual Therapy, Moist Heat/ Ice, Neuromuscular Re-ed, Orthotic Management and Training, Patient Education, Self Care, Therapeutic Activities, Therapeutic Exercise, and FDN    Mara Domingo, PT              "

## 2024-04-30 ENCOUNTER — CLINICAL SUPPORT (OUTPATIENT)
Dept: REHABILITATION | Facility: HOSPITAL | Age: 74
End: 2024-04-30
Payer: MEDICARE

## 2024-04-30 DIAGNOSIS — M25.511 ACUTE PAIN OF RIGHT SHOULDER: Primary | ICD-10-CM

## 2024-04-30 PROCEDURE — 97110 THERAPEUTIC EXERCISES: CPT | Mod: PN

## 2024-04-30 PROCEDURE — 97112 NEUROMUSCULAR REEDUCATION: CPT | Mod: PN

## 2024-04-30 PROCEDURE — 97140 MANUAL THERAPY 1/> REGIONS: CPT | Mod: PN

## 2024-05-08 NOTE — PROGRESS NOTES
[facility]   Physical Therapy Treatment Note      Name: Beth Souza  Clinic Number: [id number]    Therapy Diagnosis:   Encounter Diagnosis   Name Primary?    Acute pain of right shoulder Yes     Physician: Edna Oliva MD    Visit Date: 5/9/2024    Physician Orders: PT Eval and Treat  Medical Diagnosis from Referral: M47.22 (ICD-10-CM) - Osteoarthritis of spine with radiculopathy, cervical region   Evaluation Date: 4/8/2024  Authorization Period Expiration: 12/31/24  Plan of Care Expiration: 5/12/24; 7/20/24  Progress Note Due: Every 6th visit  Visit # / Visits authorized: 6/20 + eval  FOTO: 1/3     Precautions: Standard      Time In: 8:31 AM  Time Out: 9:30 AM  Total Appointment Time (timed & untimed codes): 59 minutes    PTA Visit #: 0/5       Subjective     Patient reports: Notes improvements in shoulder pain. Still hasn't used the shoulder much.   She was compliant with home exercise program.  Response to previous treatment: -  Functional change: -    Pain: 7/10  Location: R shoulder    Objective      Objective Measures updated at progress report unless specified.     (NT = not tested due to pain and/or inability to obtain test position)    RANGE OF MOTION:     Cervical AROM WNL with no pain reported.              Shoulder   Range of Motion Right  Active     Passive Left  Active     Passive Goal   Forward Flexion (180º) 90* WNL WNL - 170º   External Rotation at 90º (90º) - WNL WNL - 80º   Abduction (180) 90* WNL WNL - 170   Internal Rotation at 90º (90º) NT   NT   70º   Functional External Rotation (C7) Base of skull - C7 - C7   Functional Internal Rotation (T10) L5 - T10 - T10   (*) pain and/or dysfunction     STRENGTH:                 Unable to accurately assess R UE strength due to pain and guarding present in test position.   Upper Extremity  Strength RIGHT    Goal   Shoulder Flexion []1  []2  []3  [x]4  []5                []+ [x]- 5/5 B   Shoulder Abduction []1  []2  [x]3   []4  []5                [x]+ []- 5/5 B   Shoulder Internal Rotation  []1  []2  []3  [x]4  []5                []+ [x]- 5/5 B   Shoulder External Rotation []1  []2  []3  [x]4  []5                []+ [x]- 5/5 B   Elbow Flexion  []1  []2  []3  [x]4  []5                [x]+ []- 5/5 B   Elbow Extension []1  []2  []3  [x]4  []5                [x]+ []- 5/5 B    strength Average of 35 lbs        Upper Extremity  Strength LEFT    Goal   Shoulder Flexion []1  []2  []3  [x]4  []5                [x]+ []- 5/5 B   Shoulder Abduction []1  []2  []3  [x]4  []5                [x]+ []- 5/5 B   Shoulder Internal Rotation  []1  []2  []3  [x]4  []5                [x]+ []- 5/5 B   Shoulder External Rotation []1  []2  []3  [x]4  []5                [x]+ []- 5/5 B   Elbow Flexion  []1  []2  []3  [x]4  []5                [x]+ []- 5/5 B   Elbow Extension []1  []2  []3  [x]4  []5                [x]+ []- 5/5 B    strength Average of 45 lbs        JOINT MOBILITY:      Joint Motion Tested Right  (spine) Left     Goal   GHJ Normal Normal Normal B      SPECIAL TESTS:     TEST Right  (spine)    Left     Goal   Drop Arm Positive for ER  Negative for abduction Negative Negative B       FUNCTION:      Intake Outcome Measure for FOTO Shoulder Survey     Therapist reviewed FOTO scores for Beth Souza on 4/8/2024.   FOTO documents entered into [employer] - see Media section.     Intake Score: 34%          Treatment     Beth received the treatments listed below:      therapeutic exercises to develop strength, endurance, ROM, and flexibility for 30 minutes including:  UBE (3/3)  Wall ladder (10 x)  Pulleys (2' flexion and 2' scaption)  Patient education on re-assessment testing     manual therapy techniques: Joint mobilizations, Manual traction, and Soft tissue Mobilization were applied to the: R shoulder for 15 minutes, including:  PROM into elevation and ER  GII/III Inf/AP GH mobilizations     neuromuscular re-education activities to improve:  "Balance, Coordination, Kinesthetic, Sense, Proprioception, and Posture for 10 minutes. The following activities were included:  Shoulder isometrics (2 x 10 x 10")  Flexion   Abduction       therapeutic activities to improve functional performance for -  minutes, including:  -    Pt participated in ice and electrical stimulation for 10' after being cleared for contraindications and precuations.     Patient Education and Home Exercises       Education provided:   - Cont'd HEP    Written Home Exercises Provided: yes. Exercises were reviewed and Beth was able to demonstrate them prior to the end of the session.  Beth demonstrated good  understanding of the education provided. See Electronic Medical Record under Patient Instructions for exercises provided during therapy sessions    Assessment     Pt demonstrates improved shoulder strength, tolerance to PROM, and quality of AROM available since initiating physical therapy. Pt continues to have limited shoulder AROM and strength, inhibiting her ADL performance. Pt and PT discussed cont'd HEP.     Beth Is progressing well towards her goals.   Patient prognosis is Good.     Patient will continue to benefit from skilled outpatient physical therapy to address the deficits listed in the problem list box on initial evaluation, provide pt/family education and to maximize pt's level of independence in the home and community environment.     Patient's spiritual, cultural and educational needs considered and pt agreeable to plan of care and goals.     Anticipated barriers to physical therapy: -    GOALS:  SHORT TERM GOALS: 3 weeks Progress   Recent signs and systems trend is improving in order to progress towards Long term goals's. Met   Patient will be independent with Home Exercise Program  in order to further progress and return to maximal function. Partially met   Pain rating at Worst: 5/10 in order to progress towards increased independence with activity. " "Progressing   Patient will be able to correct postural deviations in sitting and standing, to decrease pain and promote postural awareness for injury prevention.  Progressing   Patient will partially meet predicted functional outcome (FOTO) score: 43% to improve towards increasing self-worth & perceived functional ability. Progressing      LONG TERM GOALS: 6 weeks, (7/20/24) Progress   Patient will return to normal Activities of daily living, recreational, and work related activities with less pain and limitation.  Progressing   Patient will improve Range of Motion to stated goals in order to return to maximal functional potential.  Progressing   Patient will improve Strength to stated goals of appropriate musculature in order to improve functional independence.  Progressing   Pain Rating at Best: 1/10 to improve Quality of Life.  Progressing   Patient will meet predicted functional outcome (FOTO) score: 61% to increase self-worth & perceived functional ability. Progressing   Patient will have met/partially met personal goal of: "Be able to reach overhead without pain." Progressing       Plan     Plan of care Certification: 4/8/2024 to 5/20/24.   Updated to 7/20/24.     Outpatient Physical Therapy 2 times weekly for 6 weeks to include the following interventions: Cervical/Lumbar Traction, Electrical Stimulation , Gait Training, Manual Therapy, Moist Heat/ Ice, Neuromuscular Re-ed, Orthotic Management and Training, Patient Education, Self Care, Therapeutic Activities, Therapeutic Exercise, and FDN    Mara Domingo, PT                "

## 2024-05-09 ENCOUNTER — CLINICAL SUPPORT (OUTPATIENT)
Dept: REHABILITATION | Facility: HOSPITAL | Age: 74
End: 2024-05-09
Payer: MEDICARE

## 2024-05-09 DIAGNOSIS — M25.511 ACUTE PAIN OF RIGHT SHOULDER: Primary | ICD-10-CM

## 2024-05-09 PROCEDURE — 97140 MANUAL THERAPY 1/> REGIONS: CPT | Mod: PN

## 2024-05-09 PROCEDURE — 97112 NEUROMUSCULAR REEDUCATION: CPT | Mod: PN

## 2024-05-09 PROCEDURE — 97110 THERAPEUTIC EXERCISES: CPT | Mod: PN

## 2024-05-09 NOTE — PLAN OF CARE
[facility]   Physical Therapy Treatment Note      Name: Beth Souza  Clinic Number: [id number]    Therapy Diagnosis:   Encounter Diagnosis   Name Primary?    Acute pain of right shoulder Yes     Physician: Edna Oliva MD    Visit Date: 5/9/2024    Physician Orders: PT Eval and Treat  Medical Diagnosis from Referral: M47.22 (ICD-10-CM) - Osteoarthritis of spine with radiculopathy, cervical region   Evaluation Date: 4/8/2024  Authorization Period Expiration: 12/31/24  Plan of Care Expiration: 5/12/24; 7/20/24  Progress Note Due: Every 6th visit  Visit # / Visits authorized: 6/20 + eval  FOTO: 1/3     Precautions: Standard      Time In: 8:31 AM  Time Out: 9:30 AM  Total Appointment Time (timed & untimed codes): 59 minutes    PTA Visit #: 0/5       Subjective     Patient reports: Notes improvements in shoulder pain. Still hasn't used the shoulder much.   She was compliant with home exercise program.  Response to previous treatment: -  Functional change: -    Pain: 7/10  Location: R shoulder    Objective      Objective Measures updated at progress report unless specified.     (NT = not tested due to pain and/or inability to obtain test position)    RANGE OF MOTION:     Cervical AROM WNL with no pain reported.              Shoulder   Range of Motion Right  Active     Passive Left  Active     Passive Goal   Forward Flexion (180º) 90* WNL WNL - 170º   External Rotation at 90º (90º) - WNL WNL - 80º   Abduction (180) 90* WNL WNL - 170   Internal Rotation at 90º (90º) NT   NT   70º   Functional External Rotation (C7) Base of skull - C7 - C7   Functional Internal Rotation (T10) L5 - T10 - T10   (*) pain and/or dysfunction     STRENGTH:                 Unable to accurately assess R UE strength due to pain and guarding present in test position.   Upper Extremity  Strength RIGHT    Goal   Shoulder Flexion []1  []2  []3  [x]4  []5                []+ [x]- 5/5 B   Shoulder Abduction []1  []2  [x]3   []4  []5                [x]+ []- 5/5 B   Shoulder Internal Rotation  []1  []2  []3  [x]4  []5                []+ [x]- 5/5 B   Shoulder External Rotation []1  []2  []3  [x]4  []5                []+ [x]- 5/5 B   Elbow Flexion  []1  []2  []3  [x]4  []5                [x]+ []- 5/5 B   Elbow Extension []1  []2  []3  [x]4  []5                [x]+ []- 5/5 B    strength Average of 35 lbs        Upper Extremity  Strength LEFT    Goal   Shoulder Flexion []1  []2  []3  [x]4  []5                [x]+ []- 5/5 B   Shoulder Abduction []1  []2  []3  [x]4  []5                [x]+ []- 5/5 B   Shoulder Internal Rotation  []1  []2  []3  [x]4  []5                [x]+ []- 5/5 B   Shoulder External Rotation []1  []2  []3  [x]4  []5                [x]+ []- 5/5 B   Elbow Flexion  []1  []2  []3  [x]4  []5                [x]+ []- 5/5 B   Elbow Extension []1  []2  []3  [x]4  []5                [x]+ []- 5/5 B    strength Average of 45 lbs        JOINT MOBILITY:      Joint Motion Tested Right  (spine) Left     Goal   GHJ Normal Normal Normal B      SPECIAL TESTS:     TEST Right  (spine)    Left     Goal   Drop Arm Positive for ER  Negative for abduction Negative Negative B       FUNCTION:      Intake Outcome Measure for FOTO Shoulder Survey     Therapist reviewed FOTO scores for Beth Souza on 4/8/2024.   FOTO documents entered into [employer] - see Media section.     Intake Score: 34%          Treatment     Beth received the treatments listed below:      therapeutic exercises to develop strength, endurance, ROM, and flexibility for 30 minutes including:  UBE (3/3)  Wall ladder (10 x)  Pulleys (2' flexion and 2' scaption)  Patient education on re-assessment testing     manual therapy techniques: Joint mobilizations, Manual traction, and Soft tissue Mobilization were applied to the: R shoulder for 15 minutes, including:  PROM into elevation and ER  GII/III Inf/AP GH mobilizations     neuromuscular re-education activities to improve:  "Balance, Coordination, Kinesthetic, Sense, Proprioception, and Posture for 10 minutes. The following activities were included:  Shoulder isometrics (2 x 10 x 10")  Flexion   Abduction       therapeutic activities to improve functional performance for -  minutes, including:  -    Pt participated in ice and electrical stimulation for 10' after being cleared for contraindications and precuations.     Patient Education and Home Exercises       Education provided:   - Cont'd HEP    Written Home Exercises Provided: yes. Exercises were reviewed and Beth was able to demonstrate them prior to the end of the session.  Beth demonstrated good  understanding of the education provided. See Electronic Medical Record under Patient Instructions for exercises provided during therapy sessions    Assessment     Pt demonstrates improved shoulder strength, tolerance to PROM, and quality of AROM available since initiating physical therapy. Pt continues to have limited shoulder AROM and strength, inhibiting her ADL performance. Pt and PT discussed cont'd HEP.     Beth Is progressing well towards her goals.   Patient prognosis is Good.     Patient will continue to benefit from skilled outpatient physical therapy to address the deficits listed in the problem list box on initial evaluation, provide pt/family education and to maximize pt's level of independence in the home and community environment.     Patient's spiritual, cultural and educational needs considered and pt agreeable to plan of care and goals.     Anticipated barriers to physical therapy: -    GOALS:  SHORT TERM GOALS: 3 weeks Progress   Recent signs and systems trend is improving in order to progress towards Long term goals's. Met   Patient will be independent with Home Exercise Program  in order to further progress and return to maximal function. Partially met   Pain rating at Worst: 5/10 in order to progress towards increased independence with activity. " "Progressing   Patient will be able to correct postural deviations in sitting and standing, to decrease pain and promote postural awareness for injury prevention.  Progressing   Patient will partially meet predicted functional outcome (FOTO) score: 43% to improve towards increasing self-worth & perceived functional ability. Progressing      LONG TERM GOALS: 6 weeks, (7/20/24) Progress   Patient will return to normal Activities of daily living, recreational, and work related activities with less pain and limitation.  Progressing   Patient will improve Range of Motion to stated goals in order to return to maximal functional potential.  Progressing   Patient will improve Strength to stated goals of appropriate musculature in order to improve functional independence.  Progressing   Pain Rating at Best: 1/10 to improve Quality of Life.  Progressing   Patient will meet predicted functional outcome (FOTO) score: 61% to increase self-worth & perceived functional ability. Progressing   Patient will have met/partially met personal goal of: "Be able to reach overhead without pain." Progressing       Plan     Plan of care Certification: 4/8/2024 to 5/20/24.   Updated to 7/20/24.     Outpatient Physical Therapy 2 times weekly for 6 weeks to include the following interventions: Cervical/Lumbar Traction, Electrical Stimulation , Gait Training, Manual Therapy, Moist Heat/ Ice, Neuromuscular Re-ed, Orthotic Management and Training, Patient Education, Self Care, Therapeutic Activities, Therapeutic Exercise, and FDN    Mara Domingo, PT                "

## 2024-05-13 ENCOUNTER — TELEPHONE (OUTPATIENT)
Dept: REHABILITATION | Facility: HOSPITAL | Age: 74
End: 2024-05-13
Payer: MEDICARE

## 2024-05-13 NOTE — TELEPHONE ENCOUNTER
Pt was not feeling well this morning. Does not want to r/s for another day. Provided option for r/s in the future.

## 2024-05-15 NOTE — PROGRESS NOTES
"[facility]   Physical Therapy Treatment Note      Name: Beth Souza  Clinic Number: [id number]    Therapy Diagnosis:   Encounter Diagnosis   Name Primary?    Acute pain of right shoulder Yes     Physician: Edna Oliva MD    Visit Date: 5/16/2024    Physician Orders: PT Eval and Treat  Medical Diagnosis from Referral: M47.22 (ICD-10-CM) - Osteoarthritis of spine with radiculopathy, cervical region   Evaluation Date: 4/8/2024  Authorization Period Expiration: 12/31/24  Plan of Care Expiration: 5/12/24; 7/20/24  Progress Note Due: Every 6th visit  Visit # / Visits authorized: 7/20 + eval  FOTO: 1/3     Precautions: Standard      Time In: 9:33 AM  Time Out: 10:40 AM  Total Appointment Time (timed & untimed codes): 67 minutes   Partially not billed.     PTA Visit #: 0/5       Subjective     Patient reports: Shoulder continues to feel a little better.  She was compliant with home exercise program.  Response to previous treatment: -  Functional change: -    Pain: 7/10  Location: R shoulder    Objective      Objective Measures updated at progress report unless specified.     Treatment     Beth received the treatments listed below:      therapeutic exercises to develop strength, endurance, ROM, and flexibility for 10 minutes including:  Supine wand press (3 x 10)  Supine wand flexion (12 x)       manual therapy techniques: Joint mobilizations, Manual traction, and Soft tissue Mobilization were applied to the: R shoulder for 15 minutes, including:  PROM into elevation and ER  GII/III Inf/AP GH mobilizations     neuromuscular re-education activities to improve: Balance, Coordination, Kinesthetic, Sense, Proprioception, and Posture for 32 minutes. The following activities were included:  Prone shoulder extension (3 x 10)  Shoulder isometrics (2 x 10 x 10") with ball  Flexion  Abduction  Extension  Prone T (thumb down, 2 x 10)  Prone Y - UNABLE TO PERFORM  Serratus punches at table (2 x " "5, 10")       therapeutic activities to improve functional performance for -  minutes, including:  -    Pt participated in ice and electrical stimulation for 10' after being cleared for contraindications and precuations.     Patient Education and Home Exercises       Education provided:   - Cont'd HEP    Written Home Exercises Provided: yes. Exercises were reviewed and Beth was able to demonstrate them prior to the end of the session.  Beth demonstrated good  understanding of the education provided. See Electronic Medical Record under Patient Instructions for exercises provided during therapy sessions    Assessment     Pt continues to tolerate PROM well and demonstrates improved tolerance to AAROM today. Pt requires modification during serratus punches due to significant pain reproduction today.     Beth Is progressing well towards her goals.   Patient prognosis is Good.     Patient will continue to benefit from skilled outpatient physical therapy to address the deficits listed in the problem list box on initial evaluation, provide pt/family education and to maximize pt's level of independence in the home and community environment.     Patient's spiritual, cultural and educational needs considered and pt agreeable to plan of care and goals.     Anticipated barriers to physical therapy: -    GOALS:  SHORT TERM GOALS: 3 weeks Progress   Recent signs and systems trend is improving in order to progress towards Long term goals's. Met   Patient will be independent with Home Exercise Program  in order to further progress and return to maximal function. Partially met   Pain rating at Worst: 5/10 in order to progress towards increased independence with activity. Progressing   Patient will be able to correct postural deviations in sitting and standing, to decrease pain and promote postural awareness for injury prevention.  Progressing   Patient will partially meet predicted functional outcome (FOTO) score: 43% to " "improve towards increasing self-worth & perceived functional ability. Progressing      LONG TERM GOALS: 6 weeks, (7/20/24) Progress   Patient will return to normal Activities of daily living, recreational, and work related activities with less pain and limitation.  Progressing   Patient will improve Range of Motion to stated goals in order to return to maximal functional potential.  Progressing   Patient will improve Strength to stated goals of appropriate musculature in order to improve functional independence.  Progressing   Pain Rating at Best: 1/10 to improve Quality of Life.  Progressing   Patient will meet predicted functional outcome (FOTO) score: 61% to increase self-worth & perceived functional ability. Progressing   Patient will have met/partially met personal goal of: "Be able to reach overhead without pain." Progressing       Plan     Plan of care Certification: 4/8/2024 to 5/20/24.   Updated to 7/20/24.     Outpatient Physical Therapy 2 times weekly for 6 weeks to include the following interventions: Cervical/Lumbar Traction, Electrical Stimulation , Gait Training, Manual Therapy, Moist Heat/ Ice, Neuromuscular Re-ed, Orthotic Management and Training, Patient Education, Self Care, Therapeutic Activities, Therapeutic Exercise, and FDN    Mara Domingo, PT                  "

## 2024-05-16 ENCOUNTER — CLINICAL SUPPORT (OUTPATIENT)
Dept: REHABILITATION | Facility: HOSPITAL | Age: 74
End: 2024-05-16
Payer: MEDICARE

## 2024-05-16 DIAGNOSIS — M25.511 ACUTE PAIN OF RIGHT SHOULDER: Primary | ICD-10-CM

## 2024-05-16 PROCEDURE — 97112 NEUROMUSCULAR REEDUCATION: CPT | Mod: PN

## 2024-05-16 PROCEDURE — 97014 ELECTRIC STIMULATION THERAPY: CPT | Mod: PN

## 2024-05-16 PROCEDURE — 97140 MANUAL THERAPY 1/> REGIONS: CPT | Mod: PN
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
## 2024-05-17 NOTE — PROGRESS NOTES
"[facility]   Physical Therapy Treatment Note      Name: Beth Souza  Clinic Number: [id number]    Therapy Diagnosis:   Encounter Diagnosis   Name Primary?    Acute pain of right shoulder Yes       Physician: Edna Oliva MD    Visit Date: 5/20/2024    Physician Orders: PT Eval and Treat  Medical Diagnosis from Referral: M47.22 (ICD-10-CM) - Osteoarthritis of spine with radiculopathy, cervical region   Evaluation Date: 4/8/2024  Authorization Period Expiration: 12/31/24  Plan of Care Expiration: 5/12/24; 7/20/24  Progress Note Due: Every 6th visit  Visit # / Visits authorized: 8/20 + eval  FOTO: 1/3     Precautions: Standard      Time In: 9:40 AM  Time Out: 10:40 AM  Total Appointment Time (timed & untimed codes): 60 minutes   Partially not billed.     PTA Visit #: 0/5       Subjective     Patient reports: Shoulder has been hurting more since last PT visit. Has had difficulty sleeping and headaches more recently.   She was compliant with home exercise program.  Response to previous treatment: -  Functional change: -    Pain: 7/10  Location: R shoulder    Objective      Objective Measures updated at progress report unless specified.     Treatment     Beth received the treatments listed below:      therapeutic exercises to develop strength, endurance, ROM, and flexibility for 15 minutes including:  Wall ladder (10 x)  Pulleys (2' flexion and 2' scaption)  UBE (3/3)     manual therapy techniques: Joint mobilizations, Manual traction, and Soft tissue Mobilization were applied to the: R shoulder for 25 minutes, including:  PROM into elevation and ER  GII/III Inf/AP GH mobilizations     neuromuscular re-education activities to improve: Balance, Coordination, Kinesthetic, Sense, Proprioception, and Posture for 5 minutes. The following activities were included:  Rhythmic stabilization (3 x 30")   IR/ER with arm at side in supine   IR/ER in 90/90 in supine       therapeutic activities " to improve functional performance for -  minutes, including:  -    Pt participated in ice and electrical stimulation for 10' after being cleared for contraindications and precuations.     Patient Education and Home Exercises       Education provided:   - Cont'd HEP    Written Home Exercises Provided: yes. Exercises were reviewed and Beth was able to demonstrate them prior to the end of the session.  Beth demonstrated good  understanding of the education provided. See Electronic Medical Record under Patient Instructions for exercises provided during therapy sessions    Assessment     Pt demonstrates poor tolerance to treatment today due to reports of increased pain in the shoulder at rest and with various movements. Pt and PT discussed factors playing into this and potential long term outlook with more conservative treatment approach. Pt and PT to further discuss treatment plan following follow up with MD.     Beth Is progressing well towards her goals.   Patient prognosis is Good.     Patient will continue to benefit from skilled outpatient physical therapy to address the deficits listed in the problem list box on initial evaluation, provide pt/family education and to maximize pt's level of independence in the home and community environment.     Patient's spiritual, cultural and educational needs considered and pt agreeable to plan of care and goals.     Anticipated barriers to physical therapy: -    GOALS:  SHORT TERM GOALS: 3 weeks Progress   Recent signs and systems trend is improving in order to progress towards Long term goals's. Met   Patient will be independent with Home Exercise Program  in order to further progress and return to maximal function. Partially met   Pain rating at Worst: 5/10 in order to progress towards increased independence with activity. Progressing   Patient will be able to correct postural deviations in sitting and standing, to decrease pain and promote postural awareness for  "injury prevention.  Progressing   Patient will partially meet predicted functional outcome (FOTO) score: 43% to improve towards increasing self-worth & perceived functional ability. Progressing      LONG TERM GOALS: 6 weeks, (7/20/24) Progress   Patient will return to normal Activities of daily living, recreational, and work related activities with less pain and limitation.  Progressing   Patient will improve Range of Motion to stated goals in order to return to maximal functional potential.  Progressing   Patient will improve Strength to stated goals of appropriate musculature in order to improve functional independence.  Progressing   Pain Rating at Best: 1/10 to improve Quality of Life.  Progressing   Patient will meet predicted functional outcome (FOTO) score: 61% to increase self-worth & perceived functional ability. Progressing   Patient will have met/partially met personal goal of: "Be able to reach overhead without pain." Progressing       Plan     Plan of care Certification: 4/8/2024 to 5/20/24.   Updated to 7/20/24.     Outpatient Physical Therapy 2 times weekly for 6 weeks to include the following interventions: Cervical/Lumbar Traction, Electrical Stimulation , Gait Training, Manual Therapy, Moist Heat/ Ice, Neuromuscular Re-ed, Orthotic Management and Training, Patient Education, Self Care, Therapeutic Activities, Therapeutic Exercise, and FDN    Mara Domingo, PT                    "

## 2024-05-20 ENCOUNTER — CLINICAL SUPPORT (OUTPATIENT)
Dept: REHABILITATION | Facility: HOSPITAL | Age: 74
End: 2024-05-20
Payer: MEDICARE

## 2024-05-20 ENCOUNTER — TELEPHONE (OUTPATIENT)
Dept: NEUROSURGERY | Facility: CLINIC | Age: 74
End: 2024-05-20
Payer: MEDICARE

## 2024-05-20 DIAGNOSIS — M25.511 ACUTE PAIN OF RIGHT SHOULDER: Primary | ICD-10-CM

## 2024-05-20 PROCEDURE — 97140 MANUAL THERAPY 1/> REGIONS: CPT | Mod: PN

## 2024-05-20 PROCEDURE — 97014 ELECTRIC STIMULATION THERAPY: CPT | Mod: PN

## 2024-05-20 NOTE — TELEPHONE ENCOUNTER
----- Message from Loraine Jarrell sent at 5/20/2024  1:56 PM CDT -----  Contact: Beth  Augie is calling in regards to the left side of her tumor is hearting and could not sleep.please call back at .[phone]             Thanks  ARTIS

## 2024-05-22 ENCOUNTER — OFFICE VISIT (OUTPATIENT)
Dept: SPORTS MEDICINE | Facility: CLINIC | Age: 74
End: 2024-05-22
Payer: MEDICARE

## 2024-05-22 VITALS — BODY MASS INDEX: 38.44 KG/M2 | HEIGHT: 63 IN | RESPIRATION RATE: 17 BRPM | WEIGHT: 216.94 LBS

## 2024-05-22 DIAGNOSIS — M67.813 BICEPS TENDINOSIS OF RIGHT SHOULDER: ICD-10-CM

## 2024-05-22 DIAGNOSIS — S46.011D TRAUMATIC COMPLETE TEAR OF RIGHT ROTATOR CUFF, SUBSEQUENT ENCOUNTER: Primary | ICD-10-CM

## 2024-05-22 DIAGNOSIS — M75.41 SUBACROMIAL IMPINGEMENT OF RIGHT SHOULDER: ICD-10-CM

## 2024-05-22 PROCEDURE — 1101F PT FALLS ASSESS-DOCD LE1/YR: CPT | Mod: CPTII,S$GLB,, | Performed by: STUDENT IN AN ORGANIZED HEALTH CARE EDUCATION/TRAINING PROGRAM

## 2024-05-22 PROCEDURE — 4010F ACE/ARB THERAPY RXD/TAKEN: CPT | Mod: CPTII,S$GLB,, | Performed by: STUDENT IN AN ORGANIZED HEALTH CARE EDUCATION/TRAINING PROGRAM

## 2024-05-22 PROCEDURE — 99999 PR PBB SHADOW E&M-EST. PATIENT-LVL III: CPT | Mod: PBBFAC,,, | Performed by: STUDENT IN AN ORGANIZED HEALTH CARE EDUCATION/TRAINING PROGRAM

## 2024-05-22 PROCEDURE — 3044F HG A1C LEVEL LT 7.0%: CPT | Mod: CPTII,S$GLB,, | Performed by: STUDENT IN AN ORGANIZED HEALTH CARE EDUCATION/TRAINING PROGRAM

## 2024-05-22 PROCEDURE — 99214 OFFICE O/P EST MOD 30 MIN: CPT | Mod: S$GLB,,, | Performed by: STUDENT IN AN ORGANIZED HEALTH CARE EDUCATION/TRAINING PROGRAM

## 2024-05-22 PROCEDURE — 1159F MED LIST DOCD IN RCRD: CPT | Mod: CPTII,S$GLB,, | Performed by: STUDENT IN AN ORGANIZED HEALTH CARE EDUCATION/TRAINING PROGRAM

## 2024-05-22 PROCEDURE — 3288F FALL RISK ASSESSMENT DOCD: CPT | Mod: CPTII,S$GLB,, | Performed by: STUDENT IN AN ORGANIZED HEALTH CARE EDUCATION/TRAINING PROGRAM

## 2024-05-22 PROCEDURE — 1160F RVW MEDS BY RX/DR IN RCRD: CPT | Mod: CPTII,S$GLB,, | Performed by: STUDENT IN AN ORGANIZED HEALTH CARE EDUCATION/TRAINING PROGRAM

## 2024-05-22 PROCEDURE — 3008F BODY MASS INDEX DOCD: CPT | Mod: CPTII,S$GLB,, | Performed by: STUDENT IN AN ORGANIZED HEALTH CARE EDUCATION/TRAINING PROGRAM

## 2024-05-22 PROCEDURE — 1125F AMNT PAIN NOTED PAIN PRSNT: CPT | Mod: CPTII,S$GLB,, | Performed by: STUDENT IN AN ORGANIZED HEALTH CARE EDUCATION/TRAINING PROGRAM

## 2024-05-23 ENCOUNTER — TELEPHONE (OUTPATIENT)
Dept: NEUROSURGERY | Facility: CLINIC | Age: 74
End: 2024-05-23
Payer: MEDICARE

## 2024-05-23 DIAGNOSIS — D32.0 MENINGIOMA, CEREBRAL: Primary | ICD-10-CM

## 2024-05-23 NOTE — TELEPHONE ENCOUNTER
----- Message from Gerald Steele sent at 5/22/2024 10:30 AM CDT -----  Regarding: Appointment  Good Morning,    Dr. Yusuf saw this patient for her shoulder today and during her visit she brought up wanting to be re-evaluated for a Meningioma she had. Looks like when Dr. Gautam saw her last over a year ago he had ordered an MRI for routine surveillance of this. Can you reach out to get her a follow-up scheduled again for this?    Thank you,    Gerald Steele, MS, LAT, ATC, OTC  Sports Medicine Assistant to Dr. Tanvir Yusuf  [facility]

## 2024-05-24 ENCOUNTER — TELEPHONE (OUTPATIENT)
Dept: NEUROSURGERY | Facility: CLINIC | Age: 74
End: 2024-05-24
Payer: MEDICARE

## 2024-05-24 DIAGNOSIS — D32.0 MENINGIOMA, CEREBRAL: Primary | ICD-10-CM

## 2024-05-24 NOTE — TELEPHONE ENCOUNTER
I spoke with the patient about CT Head w/o contrast that has been ordered and a referral that has been placed to Angel Colorado MD per Dr. Gautam. The patient stated that she will call to schedule her MCT due to currently driving. The patient verbalized understanding

## 2024-05-24 NOTE — TELEPHONE ENCOUNTER
S/W pt informed her that a CT head w/o contrast has been placed and a ref for Angel Colorado MD for eval/ treat. The pt verbalized understanding.

## 2024-05-24 NOTE — PROGRESS NOTES
"[facility]   Physical Therapy Treatment Note      Name: Beth Souza  Clinic Number: [id number]    Therapy Diagnosis:   Encounter Diagnosis   Name Primary?    Acute pain of right shoulder Yes     Physician: Edna Oliva MD    Visit Date: 5/27/2024    Physician Orders: PT Eval and Treat  Medical Diagnosis from Referral: M47.22 (ICD-10-CM) - Osteoarthritis of spine with radiculopathy, cervical region   Evaluation Date: 4/8/2024  Authorization Period Expiration: 12/31/24  Plan of Care Expiration: 5/12/24; 7/20/24  Progress Note Due: Every 6th visit  Visit # / Visits authorized: 9/20 + eval  FOTO: 1/3     Precautions: Standard      Time In: 10:34 AM  Time Out: 11:33 AM  Total Appointment Time (timed & untimed codes): 59 minutes    PTA Visit #: 0/5       Subjective     Patient reports: Shoulder has been feeling a little better.   She was compliant with home exercise program.  Response to previous treatment: -  Functional change: -    Pain: 7/10  Location: R shoulder    Objective      Objective Measures updated at progress report unless specified.     Treatment     Beth received the treatments listed below:      therapeutic exercises to develop strength, endurance, ROM, and flexibility for 18 minutes including:  Pulleys (2' flexion and 2' scaption)  UBE (3/3)  Supine wand press (3 x 10)  Shrugs (3 x 10)       manual therapy techniques: Joint mobilizations, Manual traction, and Soft tissue Mobilization were applied to the: R shoulder for - minutes, including:     neuromuscular re-education activities to improve: Balance, Coordination, Kinesthetic, Sense, Proprioception, and Posture for 41 minutes. The following activities were included:  Shoulder Flexion ISO with ball (2 x 10, 10")  Shoulder Abduction ISO with ball (2 x 10, 10")  SL ER (3 x 10)  Prone shoulder extension (2 x 8)  Scapular push ups at wall (2 x 5)  Standing row (3 x 10, 20 lbs)       therapeutic activities to improve " functional performance for -  minutes, including:  -    Pt participated in ice and electrical stimulation for 10' after being cleared for contraindications and precuations.     Patient Education and Home Exercises       Education provided:   - Education on role of and importance on compliance with HEP    Written Home Exercises Provided: yes. Exercises were reviewed and Beth was able to demonstrate them prior to the end of the session.  Beth demonstrated good  understanding of the education provided. See Electronic Medical Record under Patient Instructions for exercises provided during therapy sessions    Assessment     Pt and PT discussed importance of cont'd HEP at least twice a day to work towards goals set. Pt and PT discussed previous MD visit and potential future treatment options including PT and surgery offered.     Bteh Is progressing well towards her goals.   Patient prognosis is Good.     Patient will continue to benefit from skilled outpatient physical therapy to address the deficits listed in the problem list box on initial evaluation, provide pt/family education and to maximize pt's level of independence in the home and community environment.     Patient's spiritual, cultural and educational needs considered and pt agreeable to plan of care and goals.     Anticipated barriers to physical therapy: -    GOALS:  SHORT TERM GOALS: 3 weeks Progress   Recent signs and systems trend is improving in order to progress towards Long term goals's. Met   Patient will be independent with Home Exercise Program  in order to further progress and return to maximal function. Partially met   Pain rating at Worst: 5/10 in order to progress towards increased independence with activity. Progressing   Patient will be able to correct postural deviations in sitting and standing, to decrease pain and promote postural awareness for injury prevention.  Progressing   Patient will partially meet predicted functional  "outcome (FOTO) score: 43% to improve towards increasing self-worth & perceived functional ability. Progressing      LONG TERM GOALS: 6 weeks, (7/20/24) Progress   Patient will return to normal Activities of daily living, recreational, and work related activities with less pain and limitation.  Progressing   Patient will improve Range of Motion to stated goals in order to return to maximal functional potential.  Progressing   Patient will improve Strength to stated goals of appropriate musculature in order to improve functional independence.  Progressing   Pain Rating at Best: 1/10 to improve Quality of Life.  Progressing   Patient will meet predicted functional outcome (FOTO) score: 61% to increase self-worth & perceived functional ability. Progressing   Patient will have met/partially met personal goal of: "Be able to reach overhead without pain." Progressing       Plan     Plan of care Certification: 4/8/2024 to 5/20/24.   Updated to 7/20/24.     Outpatient Physical Therapy 2 times weekly for 6 weeks to include the following interventions: Cervical/Lumbar Traction, Electrical Stimulation , Gait Training, Manual Therapy, Moist Heat/ Ice, Neuromuscular Re-ed, Orthotic Management and Training, Patient Education, Self Care, Therapeutic Activities, Therapeutic Exercise, and FDN    Mara Castillo, PT                        "

## 2024-05-27 ENCOUNTER — CLINICAL SUPPORT (OUTPATIENT)
Dept: REHABILITATION | Facility: HOSPITAL | Age: 74
End: 2024-05-27
Payer: MEDICARE

## 2024-05-27 DIAGNOSIS — M25.511 ACUTE PAIN OF RIGHT SHOULDER: Primary | ICD-10-CM

## 2024-05-27 PROCEDURE — 97112 NEUROMUSCULAR REEDUCATION: CPT | Mod: PN

## 2024-05-27 PROCEDURE — 97110 THERAPEUTIC EXERCISES: CPT | Mod: PN

## 2024-05-31 NOTE — PROGRESS NOTES
"[facility]   Physical Therapy Treatment Note      Name: Beth Souza  Clinic Number: [id number]    Therapy Diagnosis:   Encounter Diagnosis   Name Primary?    Acute pain of right shoulder Yes       Physician: Edna Oliva MD    Visit Date: 6/3/2024    Physician Orders: PT Eval and Treat  Medical Diagnosis from Referral: M47.22 (ICD-10-CM) - Osteoarthritis of spine with radiculopathy, cervical region   Evaluation Date: 4/8/2024  Authorization Period Expiration: 12/31/24  Plan of Care Expiration: 5/12/24; 7/20/24  Progress Note Due: Every 6th visit  Visit # / Visits authorized: 10/20 + eval  FOTO: 1/3     Precautions: Standard      Time In: 8:35 AM  Time Out: 9:35 AM  Total Appointment Time (timed & untimed codes): 60 minutes    PTA Visit #: 0/5       Subjective     Patient reports: Hasn't been able to do exercises as much this past week with cooking more.   She was compliant with home exercise program.  Response to previous treatment: -  Functional change: -    Pain: 7/10  Location: R shoulder    Objective      Objective Measures updated at progress report unless specified.     Treatment     Beth received the treatments listed below:      therapeutic exercises to develop strength, endurance, ROM, and flexibility for 30 minutes including:  Pulleys (2' flexion and 2' scaption)  UBE (3/3)  Wall slide (2 x 10)  AAROM into abduction standing (3 x 10)  SL Abduction (2 x 10)     manual therapy techniques: Joint mobilizations, Manual traction, and Soft tissue Mobilization were applied to the: R shoulder for - minutes, including:     neuromuscular re-education activities to improve: Balance, Coordination, Kinesthetic, Sense, Proprioception, and Posture for 30 minutes. The following activities were included:  Shoulder Flexion ISO with ball (2 x 10, 10")  Shoulder Abduction ISO with ball (2 x 10, 10")  Shoulder ER ISO (2 x 10, 10")  Prone row (2 x 10, 5", 3 lbs)  SL ER (3 x 10)   "   therapeutic activities to improve functional performance for -  minutes, including:  -    Pt participated in ice and electrical stimulation for 10' after being cleared for contraindications and precuations.     Patient Education and Home Exercises       Education provided:   - Education on role of and importance on compliance with HEP    Written Home Exercises Provided: yes. Exercises were reviewed and Beth was able to demonstrate them prior to the end of the session.  Beth demonstrated good  understanding of the education provided. See Electronic Medical Record under Patient Instructions for exercises provided during therapy sessions    Assessment     Pt demonstrates improved tolerance to elevation both against gravity and in gravity eliminated position. Pt and PT discussed importance of compliance with HEP to maintain improvements seen in PT.     Beth Is progressing well towards her goals.   Patient prognosis is Good.     Patient will continue to benefit from skilled outpatient physical therapy to address the deficits listed in the problem list box on initial evaluation, provide pt/family education and to maximize pt's level of independence in the home and community environment.     Patient's spiritual, cultural and educational needs considered and pt agreeable to plan of care and goals.     Anticipated barriers to physical therapy: -    GOALS:  SHORT TERM GOALS: 3 weeks Progress   Recent signs and systems trend is improving in order to progress towards Long term goals's. Met   Patient will be independent with Home Exercise Program  in order to further progress and return to maximal function. Partially met   Pain rating at Worst: 5/10 in order to progress towards increased independence with activity. Progressing   Patient will be able to correct postural deviations in sitting and standing, to decrease pain and promote postural awareness for injury prevention.  Progressing   Patient will partially  "meet predicted functional outcome (FOTO) score: 43% to improve towards increasing self-worth & perceived functional ability. Progressing      LONG TERM GOALS: 6 weeks, (7/20/24) Progress   Patient will return to normal Activities of daily living, recreational, and work related activities with less pain and limitation.  Progressing   Patient will improve Range of Motion to stated goals in order to return to maximal functional potential.  Progressing   Patient will improve Strength to stated goals of appropriate musculature in order to improve functional independence.  Progressing   Pain Rating at Best: 1/10 to improve Quality of Life.  Progressing   Patient will meet predicted functional outcome (FOTO) score: 61% to increase self-worth & perceived functional ability. Progressing   Patient will have met/partially met personal goal of: "Be able to reach overhead without pain." Progressing       Plan     Plan of care Certification: 4/8/2024 to 5/20/24.   Updated to 7/20/24.     Outpatient Physical Therapy 2 times weekly for 6 weeks to include the following interventions: Cervical/Lumbar Traction, Electrical Stimulation , Gait Training, Manual Therapy, Moist Heat/ Ice, Neuromuscular Re-ed, Orthotic Management and Training, Patient Education, Self Care, Therapeutic Activities, Therapeutic Exercise, and FDN    Mara Castillo, PT                          "

## 2024-06-03 ENCOUNTER — CLINICAL SUPPORT (OUTPATIENT)
Dept: REHABILITATION | Facility: HOSPITAL | Age: 74
End: 2024-06-03
Payer: MEDICARE

## 2024-06-03 ENCOUNTER — TELEPHONE (OUTPATIENT)
Dept: INTERNAL MEDICINE | Facility: CLINIC | Age: 74
End: 2024-06-03
Payer: MEDICARE

## 2024-06-03 DIAGNOSIS — M25.511 ACUTE PAIN OF RIGHT SHOULDER: Primary | ICD-10-CM

## 2024-06-03 PROCEDURE — 97112 NEUROMUSCULAR REEDUCATION: CPT | Mod: PN

## 2024-06-03 PROCEDURE — 97110 THERAPEUTIC EXERCISES: CPT | Mod: PN

## 2024-06-03 NOTE — TELEPHONE ENCOUNTER
----- Message from Joseline Barrientos sent at 6/3/2024  3:42 PM CDT -----  Contact: lea Campos is calling regarding scheduling for leg pain, and not feeling to well.  Please give her a call back at [phone]

## 2024-06-06 NOTE — PROGRESS NOTES
"[facility]   Physical Therapy Treatment Note      Name: Beth Souza  Clinic Number: [id number]    Therapy Diagnosis:   Encounter Diagnosis   Name Primary?    Acute pain of right shoulder Yes     Physician: Edna Oliva MD    Visit Date: 6/7/2024    Physician Orders: PT Eval and Treat  Medical Diagnosis from Referral: M47.22 (ICD-10-CM) - Osteoarthritis of spine with radiculopathy, cervical region   Evaluation Date: 4/8/2024  Authorization Period Expiration: 12/31/24  Plan of Care Expiration: 5/12/24; 7/20/24  Progress Note Due: Every 6th visit  Visit # / Visits authorized: 11/20 + eval  FOTO: 1/3     Precautions: Standard      Time In: 9:00 AM  Time Out: 9:55 AM  Total Appointment Time (timed & untimed codes): 55 minutes   Partially not billed.     PTA Visit #: 0/5       Subjective     Patient reports: L side of her neck has been bothering her for a couple days now. Says it feels different than a normal "crick" in her neck. Plans to follow up with   She was compliant with home exercise program.  Response to previous treatment: -  Functional change: -    Pain: 7/10  Location: R shoulder    Objective      Objective Measures updated at progress report unless specified.     Treatment     Beth received the treatments listed below:      Therapeutic exercises to develop strength, endurance, ROM, and flexibility for 17 minutes including:  Pulleys (2' flexion and 2' scaption)  UBE (3/3)  Supine wand press (3 x 10)       manual therapy techniques: Joint mobilizations, Manual traction, and Soft tissue Mobilization were applied to the: R shoulder for 18 minutes, including:  PROM into elevation and ER  GII/III Inf/AP GH mobilizations     neuromuscular re-education activities to improve: Balance, Coordination, Kinesthetic, Sense, Proprioception, and Posture for 10 minutes. The following activities were included:  Shoulder Flexion ISO with ball (2 x 10, 10")  Shoulder Abduction ISO with ball " "(2 x 10, 10") - attempted  Shoulder ER ISO (2 x 10, 10") - attempted but unable to perform     therapeutic activities to improve functional performance for -  minutes, including:  -       Participated in MH to cervical spine for 10' prior to treatment.     Patient Education and Home Exercises       Education provided:   - Education on role of and importance on compliance with HEP    Written Home Exercises Provided: yes. Exercises were reviewed and Beth was able to demonstrate them prior to the end of the session.  Beth demonstrated good  understanding of the education provided. See Electronic Medical Record under Patient Instructions for exercises provided during therapy sessions    Assessment     Pt and PT discussed potential further workup with MD if L neck pain continues. Pt has difficulty with exercise performance today due to new pain in neck.     Beth Is progressing well towards her goals.   Patient prognosis is Good.     Patient will continue to benefit from skilled outpatient physical therapy to address the deficits listed in the problem list box on initial evaluation, provide pt/family education and to maximize pt's level of independence in the home and community environment.     Patient's spiritual, cultural and educational needs considered and pt agreeable to plan of care and goals.     Anticipated barriers to physical therapy: -    GOALS:  SHORT TERM GOALS: 3 weeks Progress   Recent signs and systems trend is improving in order to progress towards Long term goals's. Met   Patient will be independent with Home Exercise Program  in order to further progress and return to maximal function. Partially met   Pain rating at Worst: 5/10 in order to progress towards increased independence with activity. Progressing   Patient will be able to correct postural deviations in sitting and standing, to decrease pain and promote postural awareness for injury prevention.  Progressing   Patient will partially " "meet predicted functional outcome (FOTO) score: 43% to improve towards increasing self-worth & perceived functional ability. Progressing      LONG TERM GOALS: 6 weeks, (7/20/24) Progress   Patient will return to normal Activities of daily living, recreational, and work related activities with less pain and limitation.  Progressing   Patient will improve Range of Motion to stated goals in order to return to maximal functional potential.  Progressing   Patient will improve Strength to stated goals of appropriate musculature in order to improve functional independence.  Progressing   Pain Rating at Best: 1/10 to improve Quality of Life.  Progressing   Patient will meet predicted functional outcome (FOTO) score: 61% to increase self-worth & perceived functional ability. Progressing   Patient will have met/partially met personal goal of: "Be able to reach overhead without pain." Progressing       Plan     Plan of care Certification: 4/8/2024 to 5/20/24.   Updated to 7/20/24.     Outpatient Physical Therapy 2 times weekly for 6 weeks to include the following interventions: Cervical/Lumbar Traction, Electrical Stimulation , Gait Training, Manual Therapy, Moist Heat/ Ice, Neuromuscular Re-ed, Orthotic Management and Training, Patient Education, Self Care, Therapeutic Activities, Therapeutic Exercise, and FDN    Mara Castillo, PT                            "

## 2024-06-07 ENCOUNTER — CLINICAL SUPPORT (OUTPATIENT)
Dept: REHABILITATION | Facility: HOSPITAL | Age: 74
End: 2024-06-07
Payer: MEDICARE

## 2024-06-07 DIAGNOSIS — M25.511 ACUTE PAIN OF RIGHT SHOULDER: Primary | ICD-10-CM

## 2024-06-07 PROCEDURE — 97140 MANUAL THERAPY 1/> REGIONS: CPT | Mod: PN

## 2024-06-07 PROCEDURE — 97110 THERAPEUTIC EXERCISES: CPT | Mod: PN

## 2024-06-11 ENCOUNTER — TELEPHONE (OUTPATIENT)
Dept: SPORTS MEDICINE | Facility: CLINIC | Age: 74
End: 2024-06-11
Payer: MEDICARE

## 2024-06-11 NOTE — TELEPHONE ENCOUNTER
Reached out to pt about her message regarding neck/shoulder pain, pt stated that she is already at the ER and would check in later       ----- Message from Sylvie Barnard sent at 6/11/2024  6:50 AM CDT -----  Type:  Same Day Appointment Request    Caller is requesting a same day appointment.  Caller declined first available appointment listed below.    Name of Caller:pt  When is the first available appointment?6/25  Symptoms:shoulder/neck pain  Best Call Back Number:[phone]  Additional Information: .    Thank you

## 2024-06-13 ENCOUNTER — HOSPITAL ENCOUNTER (OUTPATIENT)
Dept: RADIOLOGY | Facility: HOSPITAL | Age: 74
Discharge: HOME | End: 2024-06-13
Attending: NEUROLOGICAL SURGERY
Payer: MEDICARE

## 2024-06-13 DIAGNOSIS — D32.0 MENINGIOMA, CEREBRAL: ICD-10-CM

## 2024-06-13 PROCEDURE — 70450 CT HEAD/BRAIN W/O DYE: CPT | Mod: TC

## 2024-06-13 PROCEDURE — 70450 CT HEAD/BRAIN W/O DYE: CPT | Mod: 26,,, | Performed by: RADIOLOGY

## 2024-06-17 ENCOUNTER — OFFICE VISIT (OUTPATIENT)
Dept: INTERNAL MEDICINE | Facility: CLINIC | Age: 74
End: 2024-06-17
Payer: MEDICARE

## 2024-06-17 VITALS
WEIGHT: 215.63 LBS | DIASTOLIC BLOOD PRESSURE: 89 MMHG | TEMPERATURE: 97 F | HEART RATE: 50 BPM | OXYGEN SATURATION: 94 % | HEIGHT: 63 IN | SYSTOLIC BLOOD PRESSURE: 130 MMHG | BODY MASS INDEX: 38.21 KG/M2

## 2024-06-17 DIAGNOSIS — I10 ESSENTIAL HYPERTENSION: ICD-10-CM

## 2024-06-17 DIAGNOSIS — I71.22 ANEURYSM OF AORTIC ARCH WITHOUT RUPTURE: ICD-10-CM

## 2024-06-17 DIAGNOSIS — E66.01 SEVERE OBESITY WITH BODY MASS INDEX (BMI) OF 35.0 TO 39.9 WITH SERIOUS COMORBIDITY: ICD-10-CM

## 2024-06-17 DIAGNOSIS — M47.22 OSTEOARTHRITIS OF SPINE WITH RADICULOPATHY, CERVICAL REGION: Primary | ICD-10-CM

## 2024-06-17 DIAGNOSIS — D32.0 MENINGIOMA OF CEREBELLUM: ICD-10-CM

## 2024-06-17 DIAGNOSIS — M47.26 OSTEOARTHRITIS OF SPINE WITH RADICULOPATHY, LUMBAR REGION: ICD-10-CM

## 2024-06-17 PROCEDURE — 4010F ACE/ARB THERAPY RXD/TAKEN: CPT | Mod: CPTII,S$GLB,, | Performed by: FAMILY MEDICINE

## 2024-06-17 PROCEDURE — 99999 PR PBB SHADOW E&M-EST. PATIENT-LVL IV: CPT | Mod: PBBFAC,,, | Performed by: FAMILY MEDICINE

## 2024-06-17 PROCEDURE — 3079F DIAST BP 80-89 MM HG: CPT | Mod: CPTII,S$GLB,, | Performed by: FAMILY MEDICINE

## 2024-06-17 PROCEDURE — 3075F SYST BP GE 130 - 139MM HG: CPT | Mod: CPTII,S$GLB,, | Performed by: FAMILY MEDICINE

## 2024-06-17 PROCEDURE — 1159F MED LIST DOCD IN RCRD: CPT | Mod: CPTII,S$GLB,, | Performed by: FAMILY MEDICINE

## 2024-06-17 PROCEDURE — 1160F RVW MEDS BY RX/DR IN RCRD: CPT | Mod: CPTII,S$GLB,, | Performed by: FAMILY MEDICINE

## 2024-06-17 PROCEDURE — 3044F HG A1C LEVEL LT 7.0%: CPT | Mod: CPTII,S$GLB,, | Performed by: FAMILY MEDICINE

## 2024-06-17 PROCEDURE — 99214 OFFICE O/P EST MOD 30 MIN: CPT | Mod: S$GLB,,, | Performed by: FAMILY MEDICINE

## 2024-06-17 PROCEDURE — 1125F AMNT PAIN NOTED PAIN PRSNT: CPT | Mod: CPTII,S$GLB,, | Performed by: FAMILY MEDICINE

## 2024-06-17 PROCEDURE — 1101F PT FALLS ASSESS-DOCD LE1/YR: CPT | Mod: CPTII,S$GLB,, | Performed by: FAMILY MEDICINE

## 2024-06-17 PROCEDURE — 3288F FALL RISK ASSESSMENT DOCD: CPT | Mod: CPTII,S$GLB,, | Performed by: FAMILY MEDICINE

## 2024-06-17 PROCEDURE — 3008F BODY MASS INDEX DOCD: CPT | Mod: CPTII,S$GLB,, | Performed by: FAMILY MEDICINE

## 2024-06-17 RX ORDER — METHOCARBAMOL 750 MG/1
750 TABLET, FILM COATED ORAL 4 TIMES DAILY PRN
COMMUNITY
Start: 2024-06-11 | End: 2024-06-18

## 2024-06-17 NOTE — PROGRESS NOTES
Subjective:       Patient ID: Beth Souza is a 74 y.o. female.    Chief Complaint: Neck Pain (Pt present today with c/o neck pain radiating down shoulders X 2 wks )    74-year-old  female patient with Patient Active Problem List:     Essential hypertension     Vitamin D deficiency     Mixed hyperlipidemia     Primary hyperparathyroidism     Cataract     Osteoarthritis of spine with radiculopathy, lumbar region     Severe obesity with body mass index (BMI) of 35.0 to 39.9 with serious comorbidity     LEN on CPAP     Mild cognitive impairment with memory loss     Meningioma of cerebellum     Multiple thyroid nodules     History of COVID-19     Primary osteoarthritis of both hips     Calcification of aorta     History of hyperparathyroidism     Prediabetes     Lumbar radiculopathy     Major neurocognitive disorder due to another medical condition     Neck pain, chronic     Acute pain of right shoulder     Weakness     Aneurysm of aortic arch without rupture    Here reports that patient started having severe neck pain more on the left side for the past 2 weeks for which she went to ER recently and had x-rays and CT scans showing significant arthritis, also was noted that she has thoracic aortic aneurysm to the aortic arch 3.9 cm and stable meningioma.   Patient has been working with physical therapy for her right shoulder which has been helping her, was sent home on methocarbamol, patient does have meloxicam but did not know whether she can take methocarbamol and meloxicam  Denies of any chest tightness or difficulty breathing with palpitations  Reports neck pain up to 7 to 8/10 which caught triggered after she woke up from sleep but does not recall having any injury or trauma  Patient able to handle meloxicam with no side effects        Review of Systems   Constitutional:  Negative for fatigue.   Eyes:  Negative for visual disturbance.   Respiratory:  Negative for shortness of breath.   "  Cardiovascular:  Negative for chest pain and leg swelling.   Gastrointestinal:  Negative for abdominal pain, nausea and vomiting.   Musculoskeletal:  Positive for arthralgias, myalgias and neck pain.   Skin:  Negative for rash.   Neurological:  Negative for weakness, light-headedness, numbness and headaches.   Psychiatric/Behavioral:  Negative for sleep disturbance.          BP (!) 130/92 (BP Location: Right arm, Patient Position: Sitting, BP Method: Large (Manual))   Pulse (!) 50   Temp 97.2 °F (36.2 °C) (Tympanic)   Ht 5' 3" (1.6 m)   Wt 97.8 kg (215 lb 9.8 oz)   SpO2 (!) 94%   BMI 38.19 kg/m²   Objective:      Physical Exam  Constitutional:       Appearance: She is well-developed.   HENT:      Head: Normocephalic and atraumatic.   Cardiovascular:      Rate and Rhythm: Normal rate and regular rhythm.      Heart sounds: Normal heart sounds. No murmur heard.  Pulmonary:      Effort: Pulmonary effort is normal.      Breath sounds: Normal breath sounds. No wheezing.   Abdominal:      General: Bowel sounds are normal.      Palpations: Abdomen is soft.      Tenderness: There is no abdominal tenderness.   Musculoskeletal:         General: Tenderness present.      Comments: Positive for paraspinal cervical muscle tenderness on the left side  Hand  2+ bilaterally   Skin:     General: Skin is warm and dry.      Findings: No rash.   Neurological:      Mental Status: She is alert and oriented to person, place, and time.   Psychiatric:         Mood and Affect: Mood normal.       CT Cervical Spine Without Contrast  Order: [id number]  Impression    No acute cervical fracture.  Narrative    EXAM:  CT CERVICAL SPINE WO CONTRAST. Automated exposure control was used for dose reduction.    INDICATION: left sided neck pain    FINDINGS:  Exam is limited by artifact.    Degenerative changes are present in the cervical spine including multilevel mild vertebral body height loss, endplate osteophytosis, small posterior disc " osteophyte complexes, facet arthropathy and neuroforaminal narrowing. Mild multilevel spondylolisthesis is also present in the cervical spine.    The visualized thoracic aorta is aneurysmal with the aortic arch measuring approximately 3.9 cm.  Exam End: 06/11/24 15:55 Last Resulted: 06/11/24 15:59   [facility] From: [facility]  Result Received: 06/13/24 08:53       Assessment/Plan:   1. Osteoarthritis of spine with radiculopathy, cervical region  -     Ambulatory referral/consult to Physical/Occupational Therapy; Future; Expected date: 06/24/2024  Reviewed CT scans done through [facility] showing significant arthritis  Patient to take meloxicam and methocarbamol as prescribed  Will refer to physical therapy to help her with the neck and patient to follow-up with pain management to consider nerve block/LAWSON    2. Osteoarthritis of spine with radiculopathy, lumbar region  Stable and asymptomatic to be followed by pain management    3. Meningioma of cerebellum  Stable and asymptomatic    4. Essential hypertension  Blood pressure is stable currently on lisinopril hydrochlorothiazide 20/25 mg    5. Severe obesity with body mass index (BMI) of 35.0 to 39.9 with serious comorbidity  Strict lifestyle changes recommended with diet and exercise to lose weight with BMI 38    6. Aneurysm of aortic arch without rupture  Stable and asymptomatic

## 2024-06-19 ENCOUNTER — TELEPHONE (OUTPATIENT)
Dept: NEUROSURGERY | Facility: CLINIC | Age: 74
End: 2024-06-19
Payer: MEDICARE

## 2024-06-19 NOTE — TELEPHONE ENCOUNTER
----- Message from Hannah Trent sent at 6/19/2024  9:47 AM CDT -----  Who Called: Pt    What is the request in detail: Requesting call back to discuss referral to urgent surgeon. Please advise    Can the clinic reply by [facility]? No    Best Call Back Number: [phone]      Additional Information:

## 2024-06-20 ENCOUNTER — TELEPHONE (OUTPATIENT)
Dept: PULMONOLOGY | Facility: CLINIC | Age: 74
End: 2024-06-20
Payer: MEDICARE

## 2024-06-20 NOTE — TELEPHONE ENCOUNTER
Returned patients call back. Patient stated she needed a new order for a new cpap machine. Patient stated her machine is not working and everytime she gets it fixed its not working. Staff informed patient insurance companies cover cpap machine every 5 years. Staff informed patient staff would send message to provider. Patient expressed understanding----- Message from Sherin Washington sent at 6/19/2024  3:05 PM CDT -----  Contact: Beth  .Type: Orders Request    What orders/ testing are being requested? PT needing new orders for cpap machine     Is there a future appointment scheduled for the patient with PCP?  Yes  When? 6/26/24    Would you prefer a response via [employer]? Please return call to pt at .[phone].     Comments:

## 2024-06-24 ENCOUNTER — OFFICE VISIT (OUTPATIENT)
Dept: CARDIOLOGY | Facility: CLINIC | Age: 74
End: 2024-06-24
Payer: MEDICARE

## 2024-06-24 ENCOUNTER — TELEPHONE (OUTPATIENT)
Dept: PULMONOLOGY | Facility: CLINIC | Age: 74
End: 2024-06-24
Payer: MEDICARE

## 2024-06-24 VITALS
HEART RATE: 56 BPM | OXYGEN SATURATION: 95 % | DIASTOLIC BLOOD PRESSURE: 81 MMHG | WEIGHT: 218.69 LBS | BODY MASS INDEX: 38.75 KG/M2 | SYSTOLIC BLOOD PRESSURE: 138 MMHG | HEIGHT: 63 IN

## 2024-06-24 DIAGNOSIS — I10 ESSENTIAL HYPERTENSION: ICD-10-CM

## 2024-06-24 DIAGNOSIS — E78.2 MIXED HYPERLIPIDEMIA: ICD-10-CM

## 2024-06-24 DIAGNOSIS — G47.33 OSA ON CPAP: ICD-10-CM

## 2024-06-24 DIAGNOSIS — R73.03 PREDIABETES: ICD-10-CM

## 2024-06-24 DIAGNOSIS — M54.2 NECK PAIN: Primary | ICD-10-CM

## 2024-06-24 DIAGNOSIS — E66.01 SEVERE OBESITY WITH BODY MASS INDEX (BMI) OF 35.0 TO 39.9 WITH SERIOUS COMORBIDITY: ICD-10-CM

## 2024-06-24 DIAGNOSIS — G47.33 OSA (OBSTRUCTIVE SLEEP APNEA): Primary | ICD-10-CM

## 2024-06-24 DIAGNOSIS — I70.0 CALCIFICATION OF AORTA: ICD-10-CM

## 2024-06-24 PROCEDURE — 99214 OFFICE O/P EST MOD 30 MIN: CPT | Mod: S$GLB,,, | Performed by: INTERNAL MEDICINE

## 2024-06-24 PROCEDURE — 3288F FALL RISK ASSESSMENT DOCD: CPT | Mod: CPTII,S$GLB,, | Performed by: INTERNAL MEDICINE

## 2024-06-24 PROCEDURE — 3008F BODY MASS INDEX DOCD: CPT | Mod: CPTII,S$GLB,, | Performed by: INTERNAL MEDICINE

## 2024-06-24 PROCEDURE — 3044F HG A1C LEVEL LT 7.0%: CPT | Mod: CPTII,S$GLB,, | Performed by: INTERNAL MEDICINE

## 2024-06-24 PROCEDURE — 1125F AMNT PAIN NOTED PAIN PRSNT: CPT | Mod: CPTII,S$GLB,, | Performed by: INTERNAL MEDICINE

## 2024-06-24 PROCEDURE — 3079F DIAST BP 80-89 MM HG: CPT | Mod: CPTII,S$GLB,, | Performed by: INTERNAL MEDICINE

## 2024-06-24 PROCEDURE — 99999 PR PBB SHADOW E&M-EST. PATIENT-LVL III: CPT | Mod: PBBFAC,,, | Performed by: INTERNAL MEDICINE

## 2024-06-24 PROCEDURE — 4010F ACE/ARB THERAPY RXD/TAKEN: CPT | Mod: CPTII,S$GLB,, | Performed by: INTERNAL MEDICINE

## 2024-06-24 PROCEDURE — 1101F PT FALLS ASSESS-DOCD LE1/YR: CPT | Mod: CPTII,S$GLB,, | Performed by: INTERNAL MEDICINE

## 2024-06-24 PROCEDURE — 1159F MED LIST DOCD IN RCRD: CPT | Mod: CPTII,S$GLB,, | Performed by: INTERNAL MEDICINE

## 2024-06-24 PROCEDURE — 3075F SYST BP GE 130 - 139MM HG: CPT | Mod: CPTII,S$GLB,, | Performed by: INTERNAL MEDICINE

## 2024-06-24 NOTE — TELEPHONE ENCOUNTER
Returned patients call back. Informed patient provided stated she would put a order in for new cpap machine. Patient expressed understanding----- Message from Amita Hernandez MA sent at 6/24/2024 11:36 AM CDT -----  Contact: Beth Ghosh saw Joey last .  ----- Message -----  From: Raquel Reyes  Sent: 6/24/2024  11:15 AM CDT  To: Lejeune Elizabeth Staff    Patient is calling to speak with someone regarding machine. Patient request a prescription is sent for a new CPAP machine. Please give patient a call back at [phone] when possible.  Thank you,  GH

## 2024-06-24 NOTE — PROGRESS NOTES
Subjective:   Patient ID:  Beth Souza is a 74 y.o. female who presents for evaluation of No chief complaint on file.      HPI  6.24.2024  Comes in for us follow-up.  She was recently in the emergency room for neck pain.  She was given Robaxin.  She states that she did not have any relief of that.    She also took Mobic which caused her bruising she is allergic to aspirin.    She has very typical cervical neck pain.  With the mostly in the movement of her head to her left side.    She has no weakness of her arms or her legs.    No angina or chest pain.    1.2024  This is a 73-year-old female.  Had seen my colleague in the past now she is here to establish care with new doctor.    She had a normal stress echo and nuclear stress test in 2023.    She has bilateral lower extremity swelling.  However she is taking chlorthalidone.    She does not complain of dyspnea on exertion.  No orthopnea.      Past Medical History:   Diagnosis Date    Cataract     Hyperlipidemia     Hypertension     Lumbar spondylosis     Meningioma 2/18/2021    Obesity     Sleep apnea     Vitamin D deficiency        Past Surgical History:   Procedure Laterality Date    HYSTERECTOMY      PARTIAL HYSTERECTOMY      SELECTIVE INJECTION OF ANESTHETIC AGENT AROUND LUMBAR SPINAL NERVE ROOT BY TRANSFORAMINAL APPROACH Bilateral 3/21/2023    Procedure: Bilateral L5/S1 transforaminal epidural steroid injection;  Surgeon: Kush Jha MD;  Location: [facility];  Service: Pain Management;  Laterality: Bilateral;    SELECTIVE INJECTION OF ANESTHETIC AGENT AROUND LUMBAR SPINAL NERVE ROOT BY TRANSFORAMINAL APPROACH Bilateral 12/12/2023    Procedure: Bilateral L4/5 and L5-S1 TF LAWSON;  Surgeon: Kush Jha MD;  Location: [facility];  Service: Pain Management;  Laterality: Bilateral;       Social History     Tobacco Use    Smoking status: Former     Current packs/day: 0.00     Types: Cigarettes     Quit date: 1/1/2004     Years since quitting:  20.4    Smokeless tobacco: Never   Substance Use Topics    Alcohol use: Yes     Comment: occasionally    Drug use: No       Family History   Problem Relation Name Age of Onset    Hypertension Mother      Diabetes Daughter      Melanoma Neg Hx      Psoriasis Neg Hx      Lupus Neg Hx      Eczema Neg Hx      Breast cancer Neg Hx      Colon cancer Neg Hx      Ovarian cancer Neg Hx      Thrombosis Neg Hx         Review of Systems   Cardiovascular:  Negative for chest pain, dyspnea on exertion, palpitations and syncope.   Genitourinary: Negative.    Neurological: Negative.        Current Outpatient Medications on File Prior to Visit   Medication Sig    acetaminophen (TYLENOL) 500 MG tablet Take 500 mg by mouth daily as needed for Pain.    atorvastatin (LIPITOR) 20 MG tablet Take 1 tablet (20 mg total) by mouth once daily.    ergocalciferol (ERGOCALCIFEROL) 50,000 unit Cap Take 1 capsule (50,000 Units total) by mouth every 7 days.    lisinopriL-hydrochlorothiazide (PRINZIDE,ZESTORETIC) 20-25 mg Tab Take 1 tablet by mouth once daily.    meloxicam (MOBIC) 15 MG tablet Take 1 tablet (15 mg total) by mouth once daily. (Patient not taking: Reported on 6/24/2024)    omeprazole (PRILOSEC) 20 MG capsule Take 1 capsule (20 mg total) by mouth once daily. (Patient not taking: Reported on 6/24/2024)     No current facility-administered medications on file prior to visit.       Objective:   Objective:  Wt Readings from Last 3 Encounters:   06/24/24 99.2 kg (218 lb 11.1 oz)   06/17/24 97.8 kg (215 lb 9.8 oz)   05/22/24 98.4 kg (216 lb 14.9 oz)     Temp Readings from Last 3 Encounters:   06/17/24 97.2 °F (36.2 °C) (Tympanic)   01/19/24 97.4 °F (36.3 °C) (Tympanic)   01/11/24 (!) 101.2 °F (38.4 °C) (Tympanic)     BP Readings from Last 3 Encounters:   06/24/24 138/81   06/17/24 130/89   04/18/24 125/82     Pulse Readings from Last 3 Encounters:   06/24/24 (!) 56   06/17/24 (!) 50   04/18/24 61       Physical Exam  Vitals reviewed.  "  Constitutional:       Appearance: She is well-developed.   Neck:      Vascular: No carotid bruit.   Cardiovascular:      Rate and Rhythm: Normal rate and regular rhythm.      Pulses: Intact distal pulses.      Heart sounds: Normal heart sounds. No murmur heard.  Pulmonary:      Breath sounds: Normal breath sounds.   Neurological:      Mental Status: She is oriented to person, place, and time.         Lab Results   Component Value Date    CHOL 273 (H) 01/03/2024    CHOL 198 06/15/2023    CHOL 187 04/04/2022     Lab Results   Component Value Date    HDL 61 01/03/2024    HDL 54 06/15/2023    HDL 54 04/04/2022     Lab Results   Component Value Date    LDLCALC 198.0 (H) 01/03/2024    LDLCALC 126.0 06/15/2023    LDLCALC 114.2 04/04/2022     Lab Results   Component Value Date    TRIG 70 01/03/2024    TRIG 90 06/15/2023    TRIG 94 04/04/2022     Lab Results   Component Value Date    CHOLHDL 22.3 01/03/2024    CHOLHDL 27.3 06/15/2023    CHOLHDL 28.9 04/04/2022       Chemistry        Component Value Date/Time     01/03/2024 0756    K 4.1 01/03/2024 0756     01/03/2024 0756    CO2 28 01/03/2024 0756    BUN 14 01/03/2024 0756    CREATININE 0.8 01/03/2024 0756    GLU 81 01/03/2024 0756        Component Value Date/Time    CALCIUM 9.1 01/03/2024 0756    ALKPHOS 83 01/03/2024 0756    AST 10 01/03/2024 0756    ALT 13 01/03/2024 0756    BILITOT 0.5 01/03/2024 0756    ESTGFRAFRICA >60.0 04/04/2022 1414    EGFRNONAA >60.0 04/04/2022 1414          Lab Results   Component Value Date    TSH 0.955 01/03/2024     No results found for: "INR", "PROTIME"  Lab Results   Component Value Date    WBC 6.55 03/27/2023    HGB 13.2 03/27/2023    HCT 43.3 03/27/2023    MCV 94 03/27/2023     03/27/2023     BNP  @LABRCNTIP(BNP,BNPTRIAGEBLO)@  CrCl cannot be calculated (Patient's most recent lab result is older than the maximum 7 days allowed.).     Imaging:  ======    No results found for this or any previous visit.    Results for " orders placed during the hospital encounter of 02/13/17    US Carotid Bilateral    Narrative  Technique: Bilateral duplex carotid ultrasound performed using gray scale, color Doppler, and pulse Doppler analysis.    Comparison: 7/1/2015    FINDINGS:    Right common carotid:  Peak systolic velocity 81 cm/sec.    Right internal carotid artery:  Peak systolic velocity: 32 cm/sec.  Peak diastolic velocity 11 cm/sec  IC/CC ratio:  0.4.    Left common carotid:  Peak systolic velocity 54 cm/sec.    Left internal carotid artery:  Peak systolic velocity 33  cm/sec.  Peak diastolic velocity 14 cm/sec  IC/CC ratio 0.6.    Both vertebral arteries demonstrate antegrade flow.  IMPRESSION:      1. No evidence of flow-limiting carotid stenosis bilaterally.    As determined by Society of Radiologist in Ultrasound consensus.      Electronically signed by: NIVIA UGARTE MD  Date:     02/13/17  Time:    15:15    Results for orders placed during the hospital encounter of 01/11/24    X-Ray Chest PA And Lateral    Narrative  EXAMINATION:  XR CHEST PA AND LATERAL    CLINICAL HISTORY:  Acute upper respiratory infection, unspecified    TECHNIQUE:  PA and lateral views of the chest were performed.    COMPARISON:  11/28/2023    FINDINGS:  The lungs are clear and free of infiltrate.  No pleural effusion or pneumothorax. The heart is borderline enlarged.  There is significant tortuosity of the descending thoracic aorta.  The appearance is stable when compared to the prior exam.    Impression  1.  No acute cardiopulmonary process.      Electronically signed by: Alex Oliva DO  Date:    01/11/2024  Time:    11:38    No results found for this or any previous visit.    No valid procedures specified.    No results found for this or any previous visit.      Results for orders placed during the hospital encounter of 12/26/23    Nuclear Stress - Cardiology Interpreted    Interpretation Summary    Normal myocardial perfusion scan. There is no  evidence of myocardial ischemia or infarction.    The gated perfusion images showed an ejection fraction of 70% at rest. The gated perfusion images showed an ejection fraction of 74% post stress.    The ECG portion of the study is negative for ischemia.    The patient reported no chest pain during the stress test.    During stress, rare PVCs are noted.      No results found for this or any previous visit.      Diagnostic Results:  ECG: Reviewed    The 10-year ASCVD risk score (Gil JANG, et al., 2019) is: 20.9%    Values used to calculate the score:      Age: 74 years      Sex: Female      Is Non- : Yes      Diabetic: No      Tobacco smoker: No      Systolic Blood Pressure: 138 mmHg      Is BP treated: Yes      HDL Cholesterol: 61 mg/dL      Total Cholesterol: 273 mg/dL        Assessment and Plan:   Neck pain  -     Ambulatory referral/consult to Pain Clinic; Future; Expected date: 07/01/2024    LEN on CPAP    Severe obesity with body mass index (BMI) of 35.0 to 39.9 with serious comorbidity    Essential hypertension    Prediabetes    Calcification of aorta    Mixed hyperlipidemia      Angina free, htn controlled, euvolemic  Follow-up with pain management for neck pain.  Cervical spine disc disease and osteoarthritis  Recommended compression stockings for her lower extremity swelling.    Reviewed her Holter monitor, stress echo and nuclear stress test in 2023 all normal.  Reviewed all tests and above medical conditions with patient in detail and formulated treatment plan.  Risk factor modification discussed.   Cardiac low salt diet discussed.  Maintaining healthy weight and weight loss goals were discussed in clinic.    Follow up in  6 months

## 2024-06-25 ENCOUNTER — TELEPHONE (OUTPATIENT)
Dept: REHABILITATION | Facility: HOSPITAL | Age: 74
End: 2024-06-25
Payer: MEDICARE

## 2024-06-25 ENCOUNTER — OFFICE VISIT (OUTPATIENT)
Dept: SPORTS MEDICINE | Facility: CLINIC | Age: 74
End: 2024-06-25
Payer: MEDICARE

## 2024-06-25 VITALS — BODY MASS INDEX: 38.75 KG/M2 | RESPIRATION RATE: 17 BRPM | WEIGHT: 218.69 LBS | HEIGHT: 63 IN

## 2024-06-25 DIAGNOSIS — M54.12 CERVICAL RADICULOPATHY: Primary | ICD-10-CM

## 2024-06-25 DIAGNOSIS — S46.011D TRAUMATIC COMPLETE TEAR OF RIGHT ROTATOR CUFF, SUBSEQUENT ENCOUNTER: ICD-10-CM

## 2024-06-25 PROCEDURE — 1101F PT FALLS ASSESS-DOCD LE1/YR: CPT | Mod: CPTII,S$GLB,, | Performed by: STUDENT IN AN ORGANIZED HEALTH CARE EDUCATION/TRAINING PROGRAM

## 2024-06-25 PROCEDURE — 3288F FALL RISK ASSESSMENT DOCD: CPT | Mod: CPTII,S$GLB,, | Performed by: STUDENT IN AN ORGANIZED HEALTH CARE EDUCATION/TRAINING PROGRAM

## 2024-06-25 PROCEDURE — 99999 PR PBB SHADOW E&M-EST. PATIENT-LVL III: CPT | Mod: PBBFAC,,, | Performed by: STUDENT IN AN ORGANIZED HEALTH CARE EDUCATION/TRAINING PROGRAM

## 2024-06-25 PROCEDURE — 1125F AMNT PAIN NOTED PAIN PRSNT: CPT | Mod: CPTII,S$GLB,, | Performed by: STUDENT IN AN ORGANIZED HEALTH CARE EDUCATION/TRAINING PROGRAM

## 2024-06-25 PROCEDURE — 3008F BODY MASS INDEX DOCD: CPT | Mod: CPTII,S$GLB,, | Performed by: STUDENT IN AN ORGANIZED HEALTH CARE EDUCATION/TRAINING PROGRAM

## 2024-06-25 PROCEDURE — 1160F RVW MEDS BY RX/DR IN RCRD: CPT | Mod: CPTII,S$GLB,, | Performed by: STUDENT IN AN ORGANIZED HEALTH CARE EDUCATION/TRAINING PROGRAM

## 2024-06-25 PROCEDURE — 3044F HG A1C LEVEL LT 7.0%: CPT | Mod: CPTII,S$GLB,, | Performed by: STUDENT IN AN ORGANIZED HEALTH CARE EDUCATION/TRAINING PROGRAM

## 2024-06-25 PROCEDURE — 4010F ACE/ARB THERAPY RXD/TAKEN: CPT | Mod: CPTII,S$GLB,, | Performed by: STUDENT IN AN ORGANIZED HEALTH CARE EDUCATION/TRAINING PROGRAM

## 2024-06-25 PROCEDURE — 99214 OFFICE O/P EST MOD 30 MIN: CPT | Mod: S$GLB,,, | Performed by: STUDENT IN AN ORGANIZED HEALTH CARE EDUCATION/TRAINING PROGRAM

## 2024-06-25 PROCEDURE — 1159F MED LIST DOCD IN RCRD: CPT | Mod: CPTII,S$GLB,, | Performed by: STUDENT IN AN ORGANIZED HEALTH CARE EDUCATION/TRAINING PROGRAM

## 2024-06-25 RX ORDER — CYCLOBENZAPRINE HCL 5 MG
5 TABLET ORAL 3 TIMES DAILY PRN
Qty: 30 TABLET | Refills: 0 | Status: SHIPPED | OUTPATIENT
Start: 2024-06-25 | End: 2024-07-05

## 2024-06-25 RX ORDER — METHYLPREDNISOLONE 4 MG/1
TABLET ORAL
Qty: 21 EACH | Refills: 0 | Status: SHIPPED | OUTPATIENT
Start: 2024-06-25 | End: 2024-07-16
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
## 2024-06-26 ENCOUNTER — OFFICE VISIT (OUTPATIENT)
Dept: INTERNAL MEDICINE | Facility: CLINIC | Age: 74
End: 2024-06-26
Payer: MEDICARE

## 2024-06-26 VITALS
OXYGEN SATURATION: 98 % | HEART RATE: 58 BPM | TEMPERATURE: 98 F | DIASTOLIC BLOOD PRESSURE: 80 MMHG | HEIGHT: 63 IN | SYSTOLIC BLOOD PRESSURE: 130 MMHG | RESPIRATION RATE: 18 BRPM | WEIGHT: 216.94 LBS | BODY MASS INDEX: 38.44 KG/M2

## 2024-06-26 DIAGNOSIS — M47.22 OSTEOARTHRITIS OF SPINE WITH RADICULOPATHY, CERVICAL REGION: Primary | ICD-10-CM

## 2024-06-26 DIAGNOSIS — G47.33 OSA ON CPAP: ICD-10-CM

## 2024-06-26 DIAGNOSIS — I71.20 THORACIC AORTIC ANEURYSM WITHOUT RUPTURE, UNSPECIFIED PART: ICD-10-CM

## 2024-06-26 PROCEDURE — 3079F DIAST BP 80-89 MM HG: CPT | Mod: CPTII,S$GLB,, | Performed by: FAMILY MEDICINE

## 2024-06-26 PROCEDURE — 1159F MED LIST DOCD IN RCRD: CPT | Mod: CPTII,S$GLB,, | Performed by: FAMILY MEDICINE

## 2024-06-26 PROCEDURE — 3075F SYST BP GE 130 - 139MM HG: CPT | Mod: CPTII,S$GLB,, | Performed by: FAMILY MEDICINE

## 2024-06-26 PROCEDURE — 99213 OFFICE O/P EST LOW 20 MIN: CPT | Mod: S$GLB,,, | Performed by: FAMILY MEDICINE

## 2024-06-26 PROCEDURE — 1125F AMNT PAIN NOTED PAIN PRSNT: CPT | Mod: CPTII,S$GLB,, | Performed by: FAMILY MEDICINE

## 2024-06-26 PROCEDURE — 4010F ACE/ARB THERAPY RXD/TAKEN: CPT | Mod: CPTII,S$GLB,, | Performed by: FAMILY MEDICINE

## 2024-06-26 PROCEDURE — 99999 PR PBB SHADOW E&M-EST. PATIENT-LVL IV: CPT | Mod: PBBFAC,,, | Performed by: FAMILY MEDICINE

## 2024-06-26 PROCEDURE — G2211 COMPLEX E/M VISIT ADD ON: HCPCS | Mod: S$GLB,,, | Performed by: FAMILY MEDICINE

## 2024-06-26 PROCEDURE — 3288F FALL RISK ASSESSMENT DOCD: CPT | Mod: CPTII,S$GLB,, | Performed by: FAMILY MEDICINE

## 2024-06-26 PROCEDURE — 3008F BODY MASS INDEX DOCD: CPT | Mod: CPTII,S$GLB,, | Performed by: FAMILY MEDICINE

## 2024-06-26 PROCEDURE — 1160F RVW MEDS BY RX/DR IN RCRD: CPT | Mod: CPTII,S$GLB,, | Performed by: FAMILY MEDICINE

## 2024-06-26 PROCEDURE — 3044F HG A1C LEVEL LT 7.0%: CPT | Mod: CPTII,S$GLB,, | Performed by: FAMILY MEDICINE

## 2024-06-26 PROCEDURE — 1101F PT FALLS ASSESS-DOCD LE1/YR: CPT | Mod: CPTII,S$GLB,, | Performed by: FAMILY MEDICINE

## 2024-06-26 NOTE — PROGRESS NOTES
Subjective:       Patient ID: Beth Souza is a 74 y.o. female.    Chief Complaint: Neck Pain, Shoulder Pain (Pt states that she is here for a f/u appt from ER two weeks ago/Pt states pain in both shoulders and neck pain is a 9/10/Pt states that nothing makes it better.), and Follow-up    74-year-old  female patient with Patient Active Problem List:     Essential hypertension     Vitamin D deficiency     Mixed hyperlipidemia     Primary hyperparathyroidism     Cataract     Osteoarthritis of spine with radiculopathy, lumbar region     Severe obesity with body mass index (BMI) of 35.0 to 39.9 with serious comorbidity     LEN on CPAP     Mild cognitive impairment with memory loss     Meningioma of cerebellum     Multiple thyroid nodules     History of COVID-19     Primary osteoarthritis of both hips     Calcification of aorta     History of hyperparathyroidism     Prediabetes     Lumbar radiculopathy     Major neurocognitive disorder due to another medical condition     Neck pain, chronic     Acute pain of right shoulder     Weakness     Aneurysm of aortic arch without rupture     Thoracic aortic aneurysm without rupture  Here for ER visit secondary to chronic neck pain radiating to the left shoulder, had x-rays CT scan and CTA chest showing DJD of the cervical spine, with arthritis to the left shoulder and thoracic aortic aneurysm.   Patient clinically asymptomatic with no chest tightness or difficulty breathing secondary to aneurysm  Was sent home on Robaxin but reports that it was not helpful and patient had seen sports medicine physician yesterday and was given Medrol Dosepak and Flexeril, will be picking up today  Patient with sleep apnea reports that she has not been able to use CPAP machine and has not seen pulmonologist for a long time  Reports neck pain up to 8/10 especially late in the evening      Review of Systems   Musculoskeletal:  Positive for myalgias and neck pain.  "  Neurological:  Positive for numbness. Negative for weakness.   Psychiatric/Behavioral:  Positive for sleep disturbance.          /80 (BP Location: Right arm, Patient Position: Sitting, BP Method: Large (Manual))   Pulse (!) 58   Temp 97.9 °F (36.6 °C) (Tympanic)   Resp 18   Ht 5' 3" (1.6 m)   Wt 98.4 kg (216 lb 14.9 oz)   SpO2 98%   BMI 38.43 kg/m²   Objective:      Physical Exam  Constitutional:       Appearance: She is well-developed.   HENT:      Head: Normocephalic and atraumatic.   Cardiovascular:      Rate and Rhythm: Normal rate and regular rhythm.      Heart sounds: Normal heart sounds. No murmur heard.  Pulmonary:      Effort: Pulmonary effort is normal.      Breath sounds: Normal breath sounds. No wheezing.   Abdominal:      General: Bowel sounds are normal.      Palpations: Abdomen is soft.      Tenderness: There is no abdominal tenderness.   Musculoskeletal:         General: Tenderness present.      Comments: Positive for paraspinal cervical muscle tenderness on the left side   Skin:     General: Skin is warm and dry.      Findings: No rash.   Neurological:      Mental Status: She is alert and oriented to person, place, and time.   Psychiatric:         Mood and Affect: Mood normal.       CT ANGIOGRAM CHEST, CT ANGIOGRAM ABDOMEN PELVIS    CLINICAL INDICATION: thoracic aortic aneurysm seen on CT cervical spine,  left neck pain    COMPARISON: None.    TECHNIQUE: A CT angiogram of the chest, abdomen, and pelvis was performed before and after IV contrast administration. 3-D volume rendered reconstructions were performed on an independent workstation. Automated exposure control was used for dose reduction.    FINDINGS:    Vascular: The ascending thoracic aorta measures up to 3.9 cm in diameter, at the upper limits of normal. The descending aorta measures up to 3.2 cm. Abdominal aorta measures up to 2 cm. No evidence of thoracic or abdominal aortic dissection. Great vessels arising from aortic " arch are normal. The celiac, SMA, and DEANNA are patent. Bilateral renal arteries are patent. Portal veins are patent.    Nonvascular:    CHEST: No focal consolidation. No pleural or pericardial effusion. No evidence of hilar or mediastinal adenopathy.    Abdomen/pelvis: The liver, spleen, adrenals, kidneys, and pancreas are within normal limits. No ascites. No evidence of adenopathy.     C-spine -- Computed Tomography     Impression    No acute cervical fracture.  Narrative    EXAM:  CT CERVICAL SPINE WO CONTRAST. Automated exposure control was used for dose reduction.    INDICATION: left sided neck pain    FINDINGS:  Exam is limited by artifact.    Degenerative changes are present in the cervical spine including multilevel mild vertebral body height loss, endplate osteophytosis, small posterior disc osteophyte complexes, facet arthropathy and neuroforaminal narrowing. Mild multilevel spondylolisthesis is also present in the cervical spine.    The visualized thoracic aorta is aneurysmal with the aortic arch measuring approximately 3.9 cm.  Assessment/Plan:   1. Osteoarthritis of spine with radiculopathy, cervical region  -     Ambulatory referral/consult to Physical/Occupational Therapy; Future; Expected date: 07/03/2024  Reviewed CT scan of the cervical spine and CTA of the chest  Patient was advised to take Flexeril and Medrol Dosepak as prescribed by sports medicine physician hold off on Robaxin if it has not been helpful but can not take both Robaxin and Flexeril  After finishing Medrol Dosepak okay to get started on meloxicam  Will refer to physical therapy and patient has been referred already to Pain Management for further evaluation    2. Thoracic aortic aneurysm without rupture, unspecified part  Stable and asymptomatic     Will refer to pulmonology for sleep apnea    Visit today included increased complexity associated with the care of the episodic problem  addressed and managing the longitudinal care of the  patient due to the serious and/or complex managed problem(s) .

## 2024-07-01 ENCOUNTER — TELEPHONE (OUTPATIENT)
Dept: PULMONOLOGY | Facility: CLINIC | Age: 74
End: 2024-07-01
Payer: MEDICARE

## 2024-07-01 NOTE — TELEPHONE ENCOUNTER
----- Message from Sosa Neff sent at 7/1/2024 11:32 AM CDT -----  Contact: [phone]  Type:  Sooner Apoointment Request    Caller is requesting a sooner appointment.  Caller declined first available appointment listed below.  Caller will not accept being placed on the waitlist and is requesting a message be sent to doctor.  Name of Caller:YUDITH HELM [[id number]]  When is the first available appointment?nothing populated in the system  Symptoms:SLEEP apnea machine/ supplies/ est care  Would the patient rather a call back or a response via [facility]? Call back  Best Call Back Number: [phone]  Additional Information: [id number]

## 2024-07-02 ENCOUNTER — OFFICE VISIT (OUTPATIENT)
Dept: PULMONOLOGY | Facility: CLINIC | Age: 74
End: 2024-07-02
Payer: MEDICARE

## 2024-07-02 VITALS
DIASTOLIC BLOOD PRESSURE: 80 MMHG | BODY MASS INDEX: 38.56 KG/M2 | WEIGHT: 217.63 LBS | SYSTOLIC BLOOD PRESSURE: 132 MMHG | RESPIRATION RATE: 20 BRPM | HEART RATE: 80 BPM | OXYGEN SATURATION: 97 % | HEIGHT: 63 IN

## 2024-07-02 DIAGNOSIS — G47.33 OSA ON CPAP: Primary | ICD-10-CM

## 2024-07-02 PROCEDURE — 3075F SYST BP GE 130 - 139MM HG: CPT | Mod: CPTII,S$GLB,, | Performed by: HOSPITALIST

## 2024-07-02 PROCEDURE — 3008F BODY MASS INDEX DOCD: CPT | Mod: CPTII,S$GLB,, | Performed by: HOSPITALIST

## 2024-07-02 PROCEDURE — 3079F DIAST BP 80-89 MM HG: CPT | Mod: CPTII,S$GLB,, | Performed by: HOSPITALIST

## 2024-07-02 PROCEDURE — 1159F MED LIST DOCD IN RCRD: CPT | Mod: CPTII,S$GLB,, | Performed by: HOSPITALIST

## 2024-07-02 PROCEDURE — 99213 OFFICE O/P EST LOW 20 MIN: CPT | Mod: S$GLB,,, | Performed by: HOSPITALIST

## 2024-07-02 PROCEDURE — 3044F HG A1C LEVEL LT 7.0%: CPT | Mod: CPTII,S$GLB,, | Performed by: HOSPITALIST

## 2024-07-02 PROCEDURE — 3288F FALL RISK ASSESSMENT DOCD: CPT | Mod: CPTII,S$GLB,, | Performed by: HOSPITALIST

## 2024-07-02 PROCEDURE — 1101F PT FALLS ASSESS-DOCD LE1/YR: CPT | Mod: CPTII,S$GLB,, | Performed by: HOSPITALIST

## 2024-07-02 PROCEDURE — 99999 PR PBB SHADOW E&M-EST. PATIENT-LVL III: CPT | Mod: PBBFAC,,, | Performed by: HOSPITALIST

## 2024-07-02 PROCEDURE — 4010F ACE/ARB THERAPY RXD/TAKEN: CPT | Mod: CPTII,S$GLB,, | Performed by: HOSPITALIST

## 2024-07-02 PROCEDURE — 1160F RVW MEDS BY RX/DR IN RCRD: CPT | Mod: CPTII,S$GLB,, | Performed by: HOSPITALIST

## 2024-07-02 NOTE — PROGRESS NOTES
"Subjective:      Patient ID: Beth Souza is a 74 y.o. female.    Chief Complaint: LEN    HPI 7/2/24:    74 year old female with history of HTN, HLD, cerebellar meningioma, hyperparathyroidism, obesity, LEN who presents to Pulmonary clinic for follow up LEN. She was last seen 2/2024 by GILLIAN Paris- pt was encouraged to resume using CPAP nightly.     Mrs. Souza reports that she has not been using machine because it doesn't work the way it should. She is not sure in what way it is not working as it has been so long since she tried. Has been tired during the day and is looking forward to getting started back on CPAP.    Pertinent Work Up:  [place] 3  Outside CTA Chest 6/2024- "CHEST: No focal consolidation. No pleural or pericardial effusion. No evidence of hilar or mediastinal adenopathy. "  3 night HST 2018- AHI 11.7    Review of Systems   Respiratory:  Positive for snoring and somnolence.      Objective:     Physical Exam   Constitutional: She is oriented to person, place, and time. She appears well-developed and well-nourished. She is obese.   Cardiovascular: Normal rate and regular rhythm.   Pulmonary/Chest: Normal expansion, effort normal and breath sounds normal.   Neurological: She is alert and oriented to person, place, and time.   Skin: Skin is warm and dry.     Personal Diagnostic Review  As Above      6/26/2024     2:50 PM 6/25/2024     2:37 PM 6/24/2024     1:29 PM 6/17/2024     7:53 AM 5/22/2024     9:49 AM 4/24/2024     9:18 AM 4/18/2024     8:49 AM   Pulmonary Function Tests   SpO2 98 %  95 % 94 %      Height 5' 3" (1.6 m) 5' 3" (1.6 m) 5' 3" (1.6 m) 5' 3" (1.6 m) 5' 3" (1.6 m) 5' 3" (1.6 m) 5' 3" (1.6 m)   Weight 98.4 kg (216 lb 14.9 oz) 99.2 kg (218 lb 11.1 oz) 99.2 kg (218 lb 11.1 oz) 97.8 kg (215 lb 9.8 oz) 98.4 kg (216 lb 14.9 oz) 98.4 kg (216 lb 14.9 oz) 98.1 kg (216 lb 4.3 oz)   BMI (Calculated) 38.4 38.8 38.8 38.2 38.4 38.4 38.3        Assessment:     No diagnosis found.   "   Outpatient Encounter Medications as of 7/2/2024   Medication Sig Dispense Refill    acetaminophen (TYLENOL) 500 MG tablet Take 500 mg by mouth daily as needed for Pain.      atorvastatin (LIPITOR) 20 MG tablet Take 1 tablet (20 mg total) by mouth once daily. 90 tablet 1    cyclobenzaprine (FLEXERIL) 5 MG tablet Take 1 tablet (5 mg total) by mouth 3 (three) times daily as needed for Muscle spasms. 30 tablet 0    ergocalciferol (ERGOCALCIFEROL) 50,000 unit Cap Take 1 capsule (50,000 Units total) by mouth every 7 days. 12 capsule 0    lisinopriL-hydrochlorothiazide (PRINZIDE,ZESTORETIC) 20-25 mg Tab Take 1 tablet by mouth once daily. 5 tablet 0    meloxicam (MOBIC) 15 MG tablet Take 1 tablet (15 mg total) by mouth once daily. 30 tablet 1    methylPREDNISolone (MEDROL DOSEPACK) 4 mg tablet use as directed 21 each 0     No facility-administered encounter medications on file as of 7/2/2024.     No orders of the defined types were placed in this encounter.        Plan:     Problem List Items Addressed This Visit          Other    LEN on CPAP - Primary     - has recall replacement machine, not sure what year she received it  - plugged machine in at visit and it seemed to work as intended  - pt to replace supplies and try to use tonight, she doesn't not remember what was wrong with it before to cause her to stop using  - provided with Brittany's contact info  - close follow up  - would like to get started back using consistently, can tell she is more tired           Close follow up to see how CPAP adherence is going.

## 2024-07-02 NOTE — ASSESSMENT & PLAN NOTE
- has recall replacement machine, not sure what year she received it  - plugged machine in at visit and it seemed to work as intended  - pt to replace supplies and try to use tonight, she doesn't not remember what was wrong with it before to cause her to stop using  - provided with Brittany's contact info  - close follow up  - would like to get started back using consistently, can tell she is more tired

## 2024-07-09 RX ORDER — ERGOCALCIFEROL 1.25 MG/1
50000 CAPSULE ORAL
Qty: 12 CAPSULE | Refills: 0 | Status: SHIPPED | OUTPATIENT
Start: 2024-07-09

## 2024-07-12 ENCOUNTER — CLINICAL SUPPORT (OUTPATIENT)
Dept: REHABILITATION | Facility: HOSPITAL | Age: 74
End: 2024-07-12
Payer: MEDICARE

## 2024-07-12 DIAGNOSIS — R53.1 WEAKNESS: ICD-10-CM

## 2024-07-12 DIAGNOSIS — M47.22 OSTEOARTHRITIS OF SPINE WITH RADICULOPATHY, CERVICAL REGION: ICD-10-CM

## 2024-07-12 DIAGNOSIS — M54.2 NECK PAIN, CHRONIC: Primary | ICD-10-CM

## 2024-07-12 DIAGNOSIS — G89.29 NECK PAIN, CHRONIC: Primary | ICD-10-CM

## 2024-07-12 PROCEDURE — 97140 MANUAL THERAPY 1/> REGIONS: CPT | Mod: PN

## 2024-07-12 PROCEDURE — 97110 THERAPEUTIC EXERCISES: CPT | Mod: PN

## 2024-07-12 PROCEDURE — 97161 PT EVAL LOW COMPLEX 20 MIN: CPT | Mod: PN

## 2024-07-12 NOTE — PLAN OF CARE
"[facility]   Physical Therapy Initial Evaluation     Date: 7/12/2024   Name: Beth Souza  Clinic Number: [id number]    Therapy Diagnosis:   Encounter Diagnoses   Name Primary?    Osteoarthritis of spine with radiculopathy, cervical region     Neck pain, chronic Yes    Weakness      Physician: Edna Oliva MD    Physician Orders: PT Eval and Treat  Medical Diagnosis from Referral: M47.22 (ICD-10-CM) - Osteoarthritis of spine with radiculopathy, cervical region   Evaluation Date: 7/12/2024  Authorization Period Expiration: 12/31/24  Plan of Care Expiration: 8/23/24  Progress Note Due: Every 6th visit  Visit # / Visits authorized: 1/1   FOTO: 1/3    Precautions: Standard     Time In: 1:00 PM  Time Out: 1:55 PM  Total Appointment Time (timed & untimed codes): 55 minutes      SUBJECTIVE     Date of onset: About a month ago    History of current condition - Beth reports: Pt has history of R shoulder pain and weakness due to torn RTC. Pt started having L shoulder pain about a month ago that gradually worsened. Pt went to ER and followed up with MD. Pt was given medication that helped alleviate some symptoms. Pt denies N/T. Pt reports pain extends from neck to L shoulder and into the chest.     Falls: 0    Imaging:    Shoulder MRI 4/18/24, "1. Prominent supraspinatus tendinosis with high-grade partial and full-thickness anterior insertional tearing.  Mild tendon retraction.  Subscapularis greater than infraspinatus tendinosis.  2. Biceps tendon intra-articular segment tendinosis and possible partial tearing.  3. Glenohumeral/labral degenerative findings with SLAP tear possible.  4. Joint effusion and bursal fluid as above."   Cervical X-ray 3/26/24, "Degenerative changes with bony neural foraminal narrowing, as above. "    Prior Therapy: Some PT  Social History: Lives alone  Occupation: Not working  Prior Level of Function: Independent without difficulty  Current Level of Function: " "Modified independence    Pain:  Current 4/10, worst 10/10, best 2/10   Location: L neck/ R shoulder/arm  Description: Nagging pain  Aggravating Factors: Head movement, lifting  Easing Factors: Rest, heat, medication    Patients goals: "Decrease pain with head turning with driving."     Medical History:   Past Medical History:   Diagnosis Date    Cataract     Hyperlipidemia     Hypertension     Lumbar spondylosis     Meningioma 2/18/2021    Obesity     Sleep apnea     Vitamin D deficiency        Surgical History:   Beth Souza  has a past surgical history that includes Partial hysterectomy; Hysterectomy; Selective injection of anesthetic agent around lumbar spinal nerve root by transforaminal approach (Bilateral, 3/21/2023); and Selective injection of anesthetic agent around lumbar spinal nerve root by transforaminal approach (Bilateral, 12/12/2023).    Medications:   Beth has a current medication list which includes the following prescription(s): acetaminophen, atorvastatin, ergocalciferol, lisinopril-hydrochlorothiazide, meloxicam, and methylprednisolone.    Allergies:   Review of patient's allergies indicates:   Allergen Reactions    Aspirin Other (See Comments)     Bruising          OBJECTIVE     (NT = not tested due to pain and/or inability to obtain test position)    RANGE OF MOTION:    Cervical  Range of Motion Right   (spine) Left    Function & Pain Goal   Cervical Flexion (60º) 40 degrees  P! On the L side of the neck 45º  Functional   Nonpainful   Cervical Extension (90º) 60 degrees  P! On the L side of the neck 45º  Functional   Nonpainful   Cervical Rotation (75º) 75% 75% P! On the L side of the neck 60º  Functional   Nonpainful     Shoulder   Range of Motion Right  Active     Passive Left  Active     Passive Goal   Forward Flexion (180º) 90 WNL WNL - 170º   (*) pain and/or dysfunction    STRENGTH:    Upper Extremity  Strength RIGHT   Goal   Shoulder Flexion []1  []2  [x]3  []4  []5           " "     [x]+ []- 5/5 B   Shoulder Abduction []1  []2  [x]3  []4  []5                [x]+ []- 5/5 B   Shoulder Internal Rotation  []1  []2  []3  [x]4  []5                []+ [x]- 5/5 B   Shoulder External Rotation []1  []2  []3  [x]4  []5                []+ [x]- 5/5 B   Elbow Flexion  []1  []2  []3  [x]4  []5                []+ [x]- 5/5 B   Elbow Extension []1  []2  []3  [x]4  []5                []+ [x]- 5/5 B     Upper Extremity  Strength LEFT   Goal   Shoulder Flexion []1  []2  []3  [x]4  []5                [x]+ []- 5/5 B   Shoulder Abduction []1  []2  []3  [x]4  []5                [x]+ []- 5/5 B   Shoulder Internal Rotation  []1  []2  []3  [x]4  []5                [x]+ []- 5/5 B   Shoulder External Rotation []1  []2  []3  [x]4  []5                [x]+ []- 5/5 B   Elbow Flexion  []1  []2  []3  [x]4  []5                [x]+ []- 5/5 B   Elbow Extension []1  []2  []3  [x]4  []5                [x]+ []- 5/5 B     JOINT MOBILITY:     Joint Motion Tested Right  (spine) Left    Goal   Cervical  Muscle guarding present Muscle guarding present Normal B     Palpation: Increased tone and tenderness noted with palpation to: L UT and LS primarily    Posture:  Pt presents with postural abnormalities which include: forward head and rounded shoulders    FUNCTION:     Intake Outcome Measure for FOTO Neck Survey    Therapist reviewed FOTO scores for Beth Souza on 7/12/2024.   FOTO documents entered into embraase - see Media section.    Intake Score: 44%       TREATMENT     Total Treatment time (time-based codes) separate from Evaluation: 25 minutes      Beth received the treatments listed below:      therapeutic exercises to develop strength, endurance, ROM, and flexibility for 10 minutes including:  Supine chin tuck (2 x 10, 5")  Shrugs (2 x 10)  Seated scapular retraction (2 x 10)    manual therapy techniques: Joint mobilizations, Manual traction, and Soft tissue Mobilization were applied to the: cervical spine for 15 " minutes, including:  STM to R UT, LS    neuromuscular re-education activities to improve: Balance, Coordination, Kinesthetic, Sense, Proprioception, and Posture for - minutes. The following activities were included:  -    therapeutic activities to improve functional performance for -  minutes, including:  -    PATIENT EDUCATION AND HOME EXERCISES     Patient Education:  Patient educated on the impairments noted above and the effects of physical therapy intervention to improve overall condition and QOL.   Patient was educated on all the above exercise prior/during/after for proper posture, positioning, and execution for safe performance with home exercise program.   Patient educated on postural awareness and improved ergonomics to decrease symptoms with ADL performance.  Patient educated on progressive POC to return to PLOF.       Written Home Exercises Provided: yes. Exercises were reviewed and Beth was able to demonstrate them prior to the end of the session.  Beth demonstrated good  understanding of the education provided. See EMR under Patient Instructions for exercises provided during therapy sessions.    ASSESSMENT     Beth is a 74 y.o. female referred to outpatient Physical Therapy with a medical diagnosis of M47.22 (ICD-10-CM) - Osteoarthritis of spine with radiculopathy, cervical region. Patient presents with limitations to cervical spine motion, decreased UE strength, R UE mobility deficits, and significant muscle guarding. Pt's current symptom limit their ability to efficiently and independently complete ADLs. Pt to benefit from skilled PT to address aforementioned deficits and promote optimal function.    Patient prognosis is Good.   Patient will benefit from skilled outpatient Physical Therapy to address the deficits stated above and in the chart below, provide patient /family education, and to maximize patientt's level of independence.     Plan of care discussed with patient: Yes  Patient's  spiritual, cultural and educational needs considered and patient is agreeable to the plan of care and goals as stated below:     Anticipated Barriers for therapy: -    Medical Necessity is demonstrated by the following  History  Co-morbidities and personal factors that may impact the plan of care [] LOW: no personal factors / co-morbidities  [x] MODERATE: 1-2 personal factors / co-morbidities  [] HIGH: 3+ personal factors / co-morbidities    Moderate / High Support Documentation:   Co-morbidities affecting plan of care:   Past Medical History:   Diagnosis Date    Cataract     Hyperlipidemia     Hypertension     Lumbar spondylosis     Meningioma 2/18/2021    Obesity     Sleep apnea     Vitamin D deficiency        Personal Factors:   coping style     Examination  Body Structures and Functions, activity limitations and participation restrictions that may impact the plan of care [x] LOW: addressing 1-2 elements  [] MODERATE: 3+ elements  [] HIGH: 4+ elements (please support below)    Moderate / High Support Documentation: -     Clinical Presentation [x] LOW: stable  [] MODERATE: Evolving  [] HIGH: Unstable     Decision Making/ Complexity Score: low       GOALS:  SHORT TERM GOALS: 3 weeks Progress   Recent signs and systems trend is improving in order to progress towards Long term goals's.    Patient will be independent with Home Exercise Program  in order to further progress and return to maximal function.    Pain rating at Worst: 5/10 in order to progress towards increased independence with activity.    Patient will be able to correct postural deviations in sitting and standing, to decrease pain and promote postural awareness for injury prevention.     Patient will partially meet predicted functional outcome (FOTO) score: 50% to improve towards increasing self-worth & perceived functional ability.      LONG TERM GOALS: 6 weeks, (8/23/24) Progress   Patient will return to normal Activities of daily living, recreational,  "and work related activities with less pain and limitation.     Patient will improve Range of Motion to stated goals in order to return to maximal functional potential.     Patient will improve Strength to stated goals of appropriate musculature in order to improve functional independence.     Pain Rating at Best: 1/10 to improve Quality of Life.     Patient will meet predicted functional outcome (FOTO) score: 58% to increase self-worth & perceived functional ability.    Patient will have met/partially met personal goal of: "Decrease pain with head turning with driving."         PLAN   Plan of care Certification: 7/12/2024 to 8/23/24.    Outpatient Physical Therapy 2 times weekly for 6 weeks to include the following interventions: Cervical/Lumbar Traction, Electrical Stimulation , Gait Training, Manual Therapy, Moist Heat/ Ice, Neuromuscular Re-ed, Orthotic Management and Training, Patient Education, Self Care, Therapeutic Activities, Therapeutic Exercise, and FDN    Mara Castillo, PT      I CERTIFY THE NEED FOR THESE SERVICES FURNISHED UNDER THIS PLAN OF TREATMENT AND WHILE UNDER MY CARE   Physician's comments:     Physician's Signature: ___________________________________________________    "

## 2024-07-19 ENCOUNTER — OFFICE VISIT (OUTPATIENT)
Dept: INTERNAL MEDICINE | Facility: CLINIC | Age: 74
End: 2024-07-19
Payer: MEDICARE

## 2024-07-19 ENCOUNTER — LAB VISIT (OUTPATIENT)
Dept: LAB | Facility: HOSPITAL | Age: 74
End: 2024-07-19
Attending: FAMILY MEDICINE
Payer: MEDICARE

## 2024-07-19 ENCOUNTER — TELEPHONE (OUTPATIENT)
Dept: PAIN MEDICINE | Facility: CLINIC | Age: 74
End: 2024-07-19
Payer: MEDICARE

## 2024-07-19 VITALS
TEMPERATURE: 97 F | DIASTOLIC BLOOD PRESSURE: 92 MMHG | HEART RATE: 78 BPM | BODY MASS INDEX: 38.75 KG/M2 | WEIGHT: 218.69 LBS | SYSTOLIC BLOOD PRESSURE: 136 MMHG | HEIGHT: 63 IN

## 2024-07-19 DIAGNOSIS — E55.9 VITAMIN D DEFICIENCY: ICD-10-CM

## 2024-07-19 DIAGNOSIS — E78.2 MIXED HYPERLIPIDEMIA: ICD-10-CM

## 2024-07-19 DIAGNOSIS — Z79.899 OTHER LONG TERM (CURRENT) DRUG THERAPY: ICD-10-CM

## 2024-07-19 DIAGNOSIS — E04.2 MULTIPLE THYROID NODULES: ICD-10-CM

## 2024-07-19 DIAGNOSIS — M47.22 OSTEOARTHRITIS OF SPINE WITH RADICULOPATHY, CERVICAL REGION: ICD-10-CM

## 2024-07-19 DIAGNOSIS — E66.01 SEVERE OBESITY WITH BODY MASS INDEX (BMI) OF 35.0 TO 39.9 WITH SERIOUS COMORBIDITY: ICD-10-CM

## 2024-07-19 DIAGNOSIS — G47.33 OSA ON CPAP: ICD-10-CM

## 2024-07-19 DIAGNOSIS — E21.0 PRIMARY HYPERPARATHYROIDISM: ICD-10-CM

## 2024-07-19 DIAGNOSIS — M47.26 OSTEOARTHRITIS OF SPINE WITH RADICULOPATHY, LUMBAR REGION: ICD-10-CM

## 2024-07-19 DIAGNOSIS — I10 ESSENTIAL HYPERTENSION: ICD-10-CM

## 2024-07-19 DIAGNOSIS — M16.0 PRIMARY OSTEOARTHRITIS OF BOTH HIPS: ICD-10-CM

## 2024-07-19 DIAGNOSIS — I10 ESSENTIAL HYPERTENSION: Primary | ICD-10-CM

## 2024-07-19 PROCEDURE — 3075F SYST BP GE 130 - 139MM HG: CPT | Mod: CPTII,S$GLB,, | Performed by: FAMILY MEDICINE

## 2024-07-19 PROCEDURE — 36415 COLL VENOUS BLD VENIPUNCTURE: CPT | Performed by: FAMILY MEDICINE

## 2024-07-19 PROCEDURE — 99214 OFFICE O/P EST MOD 30 MIN: CPT | Mod: S$GLB,,, | Performed by: FAMILY MEDICINE

## 2024-07-19 PROCEDURE — 80053 COMPREHEN METABOLIC PANEL: CPT | Performed by: FAMILY MEDICINE

## 2024-07-19 PROCEDURE — 3080F DIAST BP >= 90 MM HG: CPT | Mod: CPTII,S$GLB,, | Performed by: FAMILY MEDICINE

## 2024-07-19 PROCEDURE — 1159F MED LIST DOCD IN RCRD: CPT | Mod: CPTII,S$GLB,, | Performed by: FAMILY MEDICINE

## 2024-07-19 PROCEDURE — 1126F AMNT PAIN NOTED NONE PRSNT: CPT | Mod: CPTII,S$GLB,, | Performed by: FAMILY MEDICINE

## 2024-07-19 PROCEDURE — 1160F RVW MEDS BY RX/DR IN RCRD: CPT | Mod: CPTII,S$GLB,, | Performed by: FAMILY MEDICINE

## 2024-07-19 PROCEDURE — 84443 ASSAY THYROID STIM HORMONE: CPT | Performed by: FAMILY MEDICINE

## 2024-07-19 PROCEDURE — 3044F HG A1C LEVEL LT 7.0%: CPT | Mod: CPTII,S$GLB,, | Performed by: FAMILY MEDICINE

## 2024-07-19 PROCEDURE — 82306 VITAMIN D 25 HYDROXY: CPT | Performed by: FAMILY MEDICINE

## 2024-07-19 PROCEDURE — 4010F ACE/ARB THERAPY RXD/TAKEN: CPT | Mod: CPTII,S$GLB,, | Performed by: FAMILY MEDICINE

## 2024-07-19 PROCEDURE — 3008F BODY MASS INDEX DOCD: CPT | Mod: CPTII,S$GLB,, | Performed by: FAMILY MEDICINE

## 2024-07-19 PROCEDURE — 99999 PR PBB SHADOW E&M-EST. PATIENT-LVL III: CPT | Mod: PBBFAC,,, | Performed by: FAMILY MEDICINE

## 2024-07-19 PROCEDURE — 83036 HEMOGLOBIN GLYCOSYLATED A1C: CPT | Performed by: FAMILY MEDICINE

## 2024-07-19 PROCEDURE — G2211 COMPLEX E/M VISIT ADD ON: HCPCS | Mod: S$GLB,,, | Performed by: FAMILY MEDICINE

## 2024-07-19 PROCEDURE — 83970 ASSAY OF PARATHORMONE: CPT | Performed by: FAMILY MEDICINE

## 2024-07-19 NOTE — TELEPHONE ENCOUNTER
Reached out to the patient to confirm  appointment.. The patient answered and will call back to possible reschedule.

## 2024-07-19 NOTE — PROGRESS NOTES
Subjective:       Patient ID: Beth Souza is a 74 y.o. female.    Chief Complaint: Follow-up (Pt present today for 6 month f/u and mediaction refills )    74-year-old  female patient with Patient Active Problem List:     Essential hypertension     Vitamin D deficiency     Mixed hyperlipidemia     Primary hyperparathyroidism     Cataract     Osteoarthritis of spine with radiculopathy, lumbar region     Severe obesity with body mass index (BMI) of 35.0 to 39.9 with serious comorbidity     LEN on CPAP     Mild cognitive impairment with memory loss     Meningioma of cerebellum     Multiple thyroid nodules     History of COVID-19     Primary osteoarthritis of both hips     Calcification of aorta     History of hyperparathyroidism     Prediabetes     Lumbar radiculopathy     Major neurocognitive disorder due to another medical condition     Neck pain, chronic     Acute pain of right shoulder     Weakness     Aneurysm of aortic arch without rupture     Thoracic aortic aneurysm without rupture  Here for follow-up on chronic medical conditions  Patient has been taking her medications regularly and denies any chest pain or difficulty breathing with palpitations and has been taking vitamin-D weekly  Has been having occasionally trouble sleeping in spite of using CPAP machine      Review of Systems   Constitutional:  Negative for fatigue.   Eyes:  Negative for visual disturbance.   Respiratory:  Negative for shortness of breath.    Cardiovascular:  Negative for chest pain and leg swelling.   Gastrointestinal:  Negative for abdominal pain, nausea and vomiting.   Musculoskeletal:  Negative for myalgias.   Skin:  Negative for rash.   Neurological:  Negative for light-headedness and headaches.   Psychiatric/Behavioral:  Positive for sleep disturbance.          BP (!) 136/92 (BP Location: Right arm, Patient Position: Sitting, BP Method: Large (Manual))   Pulse 78   Temp 97.2 °F (36.2 °C) (Tympanic)   Ht 5'  "3" (1.6 m)   Wt 99.2 kg (218 lb 11.1 oz)   BMI 38.74 kg/m²   Objective:      Physical Exam  Constitutional:       Appearance: She is well-developed.   HENT:      Head: Normocephalic and atraumatic.   Cardiovascular:      Rate and Rhythm: Normal rate and regular rhythm.      Heart sounds: Normal heart sounds. No murmur heard.  Pulmonary:      Effort: Pulmonary effort is normal.      Breath sounds: Normal breath sounds. No wheezing.   Abdominal:      General: Bowel sounds are normal.      Palpations: Abdomen is soft.      Tenderness: There is no abdominal tenderness.   Skin:     General: Skin is warm and dry.      Findings: No rash.   Neurological:      Mental Status: She is alert and oriented to person, place, and time.   Psychiatric:         Mood and Affect: Mood normal.           Assessment/Plan:   1. Essential hypertension  -     Comprehensive Metabolic Panel; Future; Expected date: 07/19/2024  -     TSH; Future; Expected date: 07/19/2024  Blood pressure is stable currently on lisinopril hydrochlorothiazide 20/25 mg    2. Mixed hyperlipidemia  Currently taking atorvastatin 20 mg daily    3. Primary hyperparathyroidism  Overview:  Overview:   bmd 3/2012- ok    Last Assessment & Plan:   Relevant Hx:  Course:  Daily Update:  Today's Plan:    Orders:  -     PTH, Intact; Future; Expected date: 07/19/2024  4. Vitamin D deficiency  -     Vitamin D; Future; Expected date: 07/19/2024  Will recheck parathyroid levels and patient currently on vitamin-D by prescription weekly    5. Multiple thyroid nodules  Stable and asymptomatic    6. Osteoarthritis of spine with radiculopathy, lumbar region  7. Osteoarthritis of spine with radiculopathy, cervical region  10. Primary osteoarthritis of both hips  Graded exercise regimen recommended    8. LEN on CPAP  Overview:  Home Sleep Studies 3 night 08/20/2018, 08/22/2018, 08/23/2018 Mild obstructive sleep apnea-hypopnea syndrome. Apnea-hypopnea index: AHI: 11.7 events per hour. Total " events 80.   Autopap 4-16cm H2O with ODME.   Dreamwear nasal mask  HME: [facility]     Resume CPAP, call with any issues  Stable    9. Severe obesity with body mass index (BMI) of 35.0 to 39.9 with serious comorbidity  -     Hemoglobin A1C; Future; Expected date: 07/19/2024  Lifestyle modifications recommended to lose weight with BMI 38      11. Other long term (current) drug therapy  -     Hemoglobin A1C; Future; Expected date: 07/19/2024       Visit today included increased complexity associated with the care of the episodic problem  addressed and managing the longitudinal care of the patient due to the serious and/or complex managed problem(s) .

## 2024-07-20 LAB
25(OH)D3+25(OH)D2 SERPL-MCNC: 25 NG/ML (ref 30–96)
ALBUMIN SERPL BCP-MCNC: 3.4 G/DL (ref 3.5–5.2)
ALP SERPL-CCNC: 91 U/L (ref 55–135)
ALT SERPL W/O P-5'-P-CCNC: 13 U/L (ref 10–44)
ANION GAP SERPL CALC-SCNC: 11 MMOL/L (ref 8–16)
AST SERPL-CCNC: 9 U/L (ref 10–40)
BILIRUB SERPL-MCNC: 0.3 MG/DL (ref 0.1–1)
BUN SERPL-MCNC: 19 MG/DL (ref 8–23)
CALCIUM SERPL-MCNC: 9.1 MG/DL (ref 8.7–10.5)
CHLORIDE SERPL-SCNC: 103 MMOL/L (ref 95–110)
CO2 SERPL-SCNC: 28 MMOL/L (ref 23–29)
CREAT SERPL-MCNC: 0.8 MG/DL (ref 0.5–1.4)
EST. GFR  (NO RACE VARIABLE): >60 ML/MIN/1.73 M^2
ESTIMATED AVG GLUCOSE: 126 MG/DL (ref 68–131)
GLUCOSE SERPL-MCNC: 91 MG/DL (ref 70–110)
HBA1C MFR BLD: 6 % (ref 4–5.6)
POTASSIUM SERPL-SCNC: 4 MMOL/L (ref 3.5–5.1)
PROT SERPL-MCNC: 6.9 G/DL (ref 6–8.4)
PTH-INTACT SERPL-MCNC: 64.2 PG/ML (ref 9–77)
SODIUM SERPL-SCNC: 142 MMOL/L (ref 136–145)
TSH SERPL DL<=0.005 MIU/L-ACNC: 0.84 UIU/ML (ref 0.4–4)

## 2024-07-22 ENCOUNTER — TELEPHONE (OUTPATIENT)
Dept: PAIN MEDICINE | Facility: CLINIC | Age: 74
End: 2024-07-22

## 2024-07-22 NOTE — TELEPHONE ENCOUNTER
Called patient to confirm appointment. The pt is not coming in today. The patient answered and will call back to set up an appointment, no time or date given.

## 2024-07-24 ENCOUNTER — CLINICAL SUPPORT (OUTPATIENT)
Dept: REHABILITATION | Facility: HOSPITAL | Age: 74
End: 2024-07-24
Payer: MEDICARE

## 2024-07-24 DIAGNOSIS — M54.2 NECK PAIN, CHRONIC: Primary | ICD-10-CM

## 2024-07-24 DIAGNOSIS — G89.29 NECK PAIN, CHRONIC: Primary | ICD-10-CM

## 2024-07-24 DIAGNOSIS — R53.1 WEAKNESS: ICD-10-CM

## 2024-07-24 PROCEDURE — 97112 NEUROMUSCULAR REEDUCATION: CPT | Mod: KX,PN

## 2024-07-24 NOTE — PROGRESS NOTES
"[facility]   Physical Therapy Treatment Note      Name: Beth Souza  Clinic Number: [id number]    Therapy Diagnosis:   Encounter Diagnoses   Name Primary?    Neck pain, chronic Yes    Weakness      Physician: Edna Oliva MD    Visit Date: 7/24/2024    Physician Orders: PT Eval and Treat  Medical Diagnosis from Referral: M47.22 (ICD-10-CM) - Osteoarthritis of spine with radiculopathy, cervical region   Evaluation Date: 7/12/2024  Authorization Period Expiration: 12/31/24  Plan of Care Expiration: 8/23/24  Progress Note Due: Every 6th visit  Visit # / Visits authorized: 1/20 + Eval  FOTO: 1/3     Precautions: Standard      Time In: 1:05 PM  Time Out: 1:55 PM  Total Appointment Time (timed & untimed codes): 50 minutes    PTA Visit #: 0/5       Subjective     Patient reports: Neck is feeling better after previous visit.  She was compliant with home exercise program.  Response to previous treatment: -  Functional change: -    Pain: 4/10  Location: L neck/R shoulder/arm    Objective      Objective Measures updated at progress report unless specified.     Treatment     Beth received the treatments listed below:      therapeutic exercises to develop strength, endurance, ROM, and flexibility for 25 minutes including:  UBE (3/3)  Wand flexion (2 x 10)  B weighted carry (2 laps, 10 lbs)  Forward raise (2 x 10)  Lateral raise (2 x 10)     manual therapy techniques: Joint mobilizations, Manual traction, and Soft tissue Mobilization were applied to the: cervical spine for - minutes, including:     neuromuscular re-education activities to improve: Balance, Coordination, Kinesthetic, Sense, Proprioception, and Posture for 25 minutes. The following activities were included:  Supine chin tuck (3 x 10, 10")  Shrugs (2 x 10)  B ER (3 x 10, YTB)  Standing row (3 x 10, 20 lbs)    therapeutic activities to improve functional performance for -  minutes, including:  -    Patient Education and Home " Exercises       Education provided:   - Cont'd HEP    Written Home Exercises Provided: Patient instructed to cont prior HEP. Exercises were reviewed and Beth was able to demonstrate them prior to the end of the session.  Beth demonstrated good  understanding of the education provided. See Electronic Medical Record under Patient Instructions for exercises provided during therapy sessions    Assessment     Pt tolerated treatment well today. Pt requires cuing during HEP performance for desired mechanics with exercise. Pt and PT discussed importance of HEP compliance. Pt tolerates UE movement well today though she does report mild pain and fatigue.     Beth Is progressing well towards her goals.   Patient prognosis is Good.     Patient will continue to benefit from skilled outpatient physical therapy to address the deficits listed in the problem list box on initial evaluation, provide pt/family education and to maximize pt's level of independence in the home and community environment.     Patient's spiritual, cultural and educational needs considered and pt agreeable to plan of care and goals.     Anticipated barriers to physical therapy: -    GOALS:  SHORT TERM GOALS: 3 weeks Progress   Recent signs and systems trend is improving in order to progress towards Long term goals's. Progressing   Patient will be independent with Home Exercise Program  in order to further progress and return to maximal function. Progressing   Pain rating at Worst: 5/10 in order to progress towards increased independence with activity. Progressing   Patient will be able to correct postural deviations in sitting and standing, to decrease pain and promote postural awareness for injury prevention.  Progressing   Patient will partially meet predicted functional outcome (FOTO) score: 50% to improve towards increasing self-worth & perceived functional ability. Progressing      LONG TERM GOALS: 6 weeks, (8/23/24) Progress   Patient will  "return to normal Activities of daily living, recreational, and work related activities with less pain and limitation.  Progressing   Patient will improve Range of Motion to stated goals in order to return to maximal functional potential.  Progressing   Patient will improve Strength to stated goals of appropriate musculature in order to improve functional independence.  Progressing   Pain Rating at Best: 1/10 to improve Quality of Life.  Progressing   Patient will meet predicted functional outcome (FOTO) score: 58% to increase self-worth & perceived functional ability. Progressing   Patient will have met/partially met personal goal of: "Decrease pain with head turning with driving." Progressing       Plan     Plan of care Certification: 7/12/2024 to 8/23/24.     Outpatient Physical Therapy 2 times weekly for 6 weeks to include the following interventions: Cervical/Lumbar Traction, Electrical Stimulation , Gait Training, Manual Therapy, Moist Heat/ Ice, Neuromuscular Re-ed, Orthotic Management and Training, Patient Education, Self Care, Therapeutic Activities, Therapeutic Exercise, and FDN    Mara Castillo, PT      "

## 2024-07-25 NOTE — PROGRESS NOTES
"[facility]   Physical Therapy Treatment Note      Name: Beth Souza  Clinic Number: [id number]    Therapy Diagnosis:   Encounter Diagnoses   Name Primary?    Neck pain, chronic Yes    Weakness        Physician: Edna Oliva MD    Visit Date: 7/29/2024    Physician Orders: PT Eval and Treat  Medical Diagnosis from Referral: M47.22 (ICD-10-CM) - Osteoarthritis of spine with radiculopathy, cervical region   Evaluation Date: 7/12/2024  Authorization Period Expiration: 12/31/24  Plan of Care Expiration: 8/23/24  Progress Note Due: Every 6th visit  Visit # / Visits authorized: 2/20 + Eval  FOTO: 1/3     Precautions: Standard      Time In: 1:05 PM  Time Out: 2:00 PM  Total Appointment Time (timed & untimed codes): 55 minutes    PTA Visit #: 0/5       Subjective     Patient reports: Feeling pretty good overall.   She was compliant with home exercise program.  Response to previous treatment: -  Functional change: -    Pain: 4/10  Location: L neck/R shoulder/arm    Objective      Objective Measures updated at progress report unless specified.     Treatment     Beth received the treatments listed below:      therapeutic exercises to develop strength, endurance, ROM, and flexibility for 25 minutes including:  UBE (3/3)  Wand flexion (10 x)  Supine shoulder flexion (10 x)  SL ER (3 x 10) - performed bilaterally  UT stretch (10 x 10")     manual therapy techniques: Joint mobilizations, Manual traction, and Soft tissue Mobilization were applied to the: cervical spine for - minutes, including:     neuromuscular re-education activities to improve: Balance, Coordination, Kinesthetic, Sense, Proprioception, and Posture for 30 minutes. The following activities were included:  Supine chin tuck (3 x 10, 10")  Supine serratus punches (2 x 10)  B ER (3 x 10, YTB)  Standing row (3 x 10, 20 lbs)  SL Abduction (3 x 10) - R only    therapeutic activities to improve functional performance for -  minutes, " including:  -    Patient Education and Home Exercises       Education provided:   - Cont'd HEP    Written Home Exercises Provided: Patient instructed to cont prior HEP. Exercises were reviewed and Beth was able to demonstrate them prior to the end of the session.  Beth demonstrated good  understanding of the education provided. See Electronic Medical Record under Patient Instructions for exercises provided during therapy sessions    Assessment     Pt tolerated advancements in treatment well today with improvements noted in B UE AROM with less pain reported. Pt requires postural cuing with standing exercises but is able to perform these corrections with minimal tactile cuing. Pt and PT discussed visits over the next week.     Beth Is progressing well towards her goals.   Patient prognosis is Good.     Patient will continue to benefit from skilled outpatient physical therapy to address the deficits listed in the problem list box on initial evaluation, provide pt/family education and to maximize pt's level of independence in the home and community environment.     Patient's spiritual, cultural and educational needs considered and pt agreeable to plan of care and goals.     Anticipated barriers to physical therapy: -    GOALS:  SHORT TERM GOALS: 3 weeks Progress   Recent signs and systems trend is improving in order to progress towards Long term goals's. Progressing   Patient will be independent with Home Exercise Program  in order to further progress and return to maximal function. Progressing   Pain rating at Worst: 5/10 in order to progress towards increased independence with activity. Progressing   Patient will be able to correct postural deviations in sitting and standing, to decrease pain and promote postural awareness for injury prevention.  Progressing   Patient will partially meet predicted functional outcome (FOTO) score: 50% to improve towards increasing self-worth & perceived functional ability.  "Progressing      LONG TERM GOALS: 6 weeks, (8/23/24) Progress   Patient will return to normal Activities of daily living, recreational, and work related activities with less pain and limitation.  Progressing   Patient will improve Range of Motion to stated goals in order to return to maximal functional potential.  Progressing   Patient will improve Strength to stated goals of appropriate musculature in order to improve functional independence.  Progressing   Pain Rating at Best: 1/10 to improve Quality of Life.  Progressing   Patient will meet predicted functional outcome (FOTO) score: 58% to increase self-worth & perceived functional ability. Progressing   Patient will have met/partially met personal goal of: "Decrease pain with head turning with driving." Progressing       Plan     Plan of care Certification: 7/12/2024 to 8/23/24.     Outpatient Physical Therapy 2 times weekly for 6 weeks to include the following interventions: Cervical/Lumbar Traction, Electrical Stimulation , Gait Training, Manual Therapy, Moist Heat/ Ice, Neuromuscular Re-ed, Orthotic Management and Training, Patient Education, Self Care, Therapeutic Activities, Therapeutic Exercise, and FDN    Mara Castillo, PT        "

## 2024-07-29 ENCOUNTER — CLINICAL SUPPORT (OUTPATIENT)
Dept: REHABILITATION | Facility: HOSPITAL | Age: 74
End: 2024-07-29
Payer: MEDICARE

## 2024-07-29 DIAGNOSIS — R53.1 WEAKNESS: ICD-10-CM

## 2024-07-29 DIAGNOSIS — G89.29 NECK PAIN, CHRONIC: Primary | ICD-10-CM

## 2024-07-29 DIAGNOSIS — M54.2 NECK PAIN, CHRONIC: Primary | ICD-10-CM

## 2024-07-29 PROCEDURE — 97110 THERAPEUTIC EXERCISES: CPT | Mod: KX,PN

## 2024-07-29 PROCEDURE — 97112 NEUROMUSCULAR REEDUCATION: CPT | Mod: KX,PN

## 2024-08-05 ENCOUNTER — CLINICAL SUPPORT (OUTPATIENT)
Dept: REHABILITATION | Facility: HOSPITAL | Age: 74
End: 2024-08-05
Payer: MEDICARE

## 2024-08-05 DIAGNOSIS — R53.1 WEAKNESS: ICD-10-CM

## 2024-08-05 DIAGNOSIS — G89.29 NECK PAIN, CHRONIC: Primary | ICD-10-CM

## 2024-08-05 DIAGNOSIS — M54.2 NECK PAIN, CHRONIC: Primary | ICD-10-CM

## 2024-08-05 PROCEDURE — 97110 THERAPEUTIC EXERCISES: CPT | Mod: KX,PN

## 2024-08-05 PROCEDURE — 97140 MANUAL THERAPY 1/> REGIONS: CPT | Mod: KX,PN

## 2024-08-05 PROCEDURE — 97112 NEUROMUSCULAR REEDUCATION: CPT | Mod: KX,PN

## 2024-08-09 ENCOUNTER — CLINICAL SUPPORT (OUTPATIENT)
Dept: REHABILITATION | Facility: HOSPITAL | Age: 74
End: 2024-08-09
Payer: MEDICARE

## 2024-08-09 DIAGNOSIS — R53.1 WEAKNESS: ICD-10-CM

## 2024-08-09 DIAGNOSIS — M54.2 NECK PAIN, CHRONIC: Primary | ICD-10-CM

## 2024-08-09 DIAGNOSIS — G89.29 NECK PAIN, CHRONIC: Primary | ICD-10-CM

## 2024-08-09 PROCEDURE — 97112 NEUROMUSCULAR REEDUCATION: CPT | Mod: KX,PN

## 2024-08-09 PROCEDURE — 97110 THERAPEUTIC EXERCISES: CPT | Mod: KX,PN

## 2024-08-09 PROCEDURE — 97140 MANUAL THERAPY 1/> REGIONS: CPT | Mod: KX,PN

## 2024-08-09 NOTE — PROGRESS NOTES
"[facility]   Physical Therapy Treatment Note      Name: Beth Souza  Clinic Number: [id number]    Therapy Diagnosis:   Encounter Diagnoses   Name Primary?    Neck pain, chronic Yes    Weakness        Physician: Edna Oliva MD    Visit Date: 8/12/2024    Physician Orders: PT Eval and Treat  Medical Diagnosis from Referral: M47.22 (ICD-10-CM) - Osteoarthritis of spine with radiculopathy, cervical region   Evaluation Date: 7/12/2024  Authorization Period Expiration: 12/31/24  Plan of Care Expiration: 8/23/24  Progress Note Due: Every 6th visit  Visit # / Visits authorized: 4/20 + Eval  FOTO: 1/3     Precautions: Standard      Time In: 1:07 PM  Time Out: 2:17 PM  Total Appointment Time (timed & untimed codes): 70 minutes    PTA Visit #: 0/5       Subjective     Patient reports: Cont'd soreness in the shoulders/neck today. Wasn't able to do HEP since previous visit.  She was compliant with home exercise program.  Response to previous treatment: -  Functional change: -    Pain: 4/10  Location: L neck/R shoulder/arm    Objective      Objective Measures updated at progress report unless specified.     Treatment     Beth received the treatments listed below:      therapeutic exercises to develop strength, endurance, ROM, and flexibility for 30 minutes including:  UBE (3/3)  Wand flexion (15 x)  Wand press (2 x 10, 2 lbs)  UT stretch (10 x 10")  B weighted carry (2 laps, 10 lbs)  LS stretch (10 x 10")     manual therapy techniques: Joint mobilizations, Manual traction, and Soft tissue Mobilization were applied to the: cervical spine for - minutes, including:     neuromuscular re-education activities to improve: Balance, Coordination, Kinesthetic, Sense, Proprioception, and Posture for 30 minutes. The following activities were included:  Supine chin tuck (3 x 10, 10")  Supine chin tuck with head lift (2 x 10, 5")  Shrugs (3 x 10)  Shoulder Isometrics    Flexion (10 x 10")    Extension " "(10 x 10")    Abduction (10 x 10")      therapeutic activities to improve functional performance for -  minutes, including:  -    MHP to the cervical spine for 10'.     Patient Education and Home Exercises       Education provided:   - Cont'd HEP    Written Home Exercises Provided: Patient instructed to cont prior HEP. Exercises were reviewed and Beth was able to demonstrate them prior to the end of the session.  Beth demonstrated good  understanding of the education provided. See Electronic Medical Record under Patient Instructions for exercises provided during therapy sessions    Assessment     Pt tolerated treatment well today but does report increased difficulty with exercise performance today due to cervical spine pain. Pt requires cuing during supine chin tucks both with and without head lift to maintain chin tuck throughout. Pt and PT discussed importance of compliance with daily HEP.     Beth Is progressing well towards her goals.   Patient prognosis is Good.     Patient will continue to benefit from skilled outpatient physical therapy to address the deficits listed in the problem list box on initial evaluation, provide pt/family education and to maximize pt's level of independence in the home and community environment.     Patient's spiritual, cultural and educational needs considered and pt agreeable to plan of care and goals.     Anticipated barriers to physical therapy: -    GOALS:  SHORT TERM GOALS: 3 weeks Progress   Recent signs and systems trend is improving in order to progress towards Long term goals's. Progressing   Patient will be independent with Home Exercise Program  in order to further progress and return to maximal function. Progressing   Pain rating at Worst: 5/10 in order to progress towards increased independence with activity. Progressing   Patient will be able to correct postural deviations in sitting and standing, to decrease pain and promote postural awareness for injury " "prevention.  Progressing   Patient will partially meet predicted functional outcome (FOTO) score: 50% to improve towards increasing self-worth & perceived functional ability. Progressing      LONG TERM GOALS: 6 weeks, (8/23/24) Progress   Patient will return to normal Activities of daily living, recreational, and work related activities with less pain and limitation.  Progressing   Patient will improve Range of Motion to stated goals in order to return to maximal functional potential.  Progressing   Patient will improve Strength to stated goals of appropriate musculature in order to improve functional independence.  Progressing   Pain Rating at Best: 1/10 to improve Quality of Life.  Progressing   Patient will meet predicted functional outcome (FOTO) score: 58% to increase self-worth & perceived functional ability. Progressing   Patient will have met/partially met personal goal of: "Decrease pain with head turning with driving." Progressing       Plan     Plan of care Certification: 7/12/2024 to 8/23/24.     Outpatient Physical Therapy 2 times weekly for 6 weeks to include the following interventions: Cervical/Lumbar Traction, Electrical Stimulation , Gait Training, Manual Therapy, Moist Heat/ Ice, Neuromuscular Re-ed, Orthotic Management and Training, Patient Education, Self Care, Therapeutic Activities, Therapeutic Exercise, and FDN    Mara Castillo, PT          "

## 2024-08-12 ENCOUNTER — CLINICAL SUPPORT (OUTPATIENT)
Dept: REHABILITATION | Facility: HOSPITAL | Age: 74
End: 2024-08-12
Payer: MEDICARE

## 2024-08-12 DIAGNOSIS — M54.2 NECK PAIN, CHRONIC: Primary | ICD-10-CM

## 2024-08-12 DIAGNOSIS — R53.1 WEAKNESS: ICD-10-CM

## 2024-08-12 DIAGNOSIS — G89.29 NECK PAIN, CHRONIC: Primary | ICD-10-CM

## 2024-08-12 PROCEDURE — 97110 THERAPEUTIC EXERCISES: CPT | Mod: KX,PN

## 2024-08-12 PROCEDURE — 97112 NEUROMUSCULAR REEDUCATION: CPT | Mod: KX,PN

## 2024-08-13 NOTE — PROGRESS NOTES
"[facility]   Physical Therapy Treatment Note      Name: Beth Souza  Clinic Number: [id number]    Therapy Diagnosis:   Encounter Diagnoses   Name Primary?    Neck pain, chronic Yes    Weakness      Physician: Edna Oliva MD    Visit Date: 8/14/2024    Physician Orders: PT Eval and Treat  Medical Diagnosis from Referral: M47.22 (ICD-10-CM) - Osteoarthritis of spine with radiculopathy, cervical region   Evaluation Date: 7/12/2024  Authorization Period Expiration: 12/31/24  Plan of Care Expiration: 8/23/24  Progress Note Due: Every 6th visit  Visit # / Visits authorized: 6/20 + Eval  FOTO: 1/3     Precautions: Standard      Time In: 1:18 PM  Time Out: 2:18 PM  Total Appointment Time (timed & untimed codes): 60 minutes    PTA Visit #: 0/5       Subjective     Patient reports: B shoulders are pretty sore today. Hesitant but willing to participate in dry needling.   She was compliant with home exercise program.  Response to previous treatment: -  Functional change: -    Pain: 4/10  Location: L neck/R shoulder/arm    Objective      Objective Measures updated at progress report unless specified.     Palpation: Significant TTP of B UT causing significant pain.     Treatment     Beth received the treatments listed below:      therapeutic exercises to develop strength, endurance, ROM, and flexibility for 15 minutes including:  UBE (3/3)  Wand press (2 x 10)  Pulleys (2/2)     manual therapy techniques: Joint mobilizations, Manual traction, and Soft tissue Mobilization were applied to the: cervical spine for 10 minutes, including:  B STM to UT  TPDN to B UT     neuromuscular re-education activities to improve: Balance, Coordination, Kinesthetic, Sense, Proprioception, and Posture for 35 minutes. The following activities were included:  Supine chin tuck (3 x 10, 10")  Standing row (3 x 10, 20 lbs)  B ER (2 x 10, YTB)  Shrugs (3 x 10, 5 lbs)  Shoulder Isometrics    Flexion (10 x 10") " "   Extension (10 x 10")    Abduction (10 x 10")     therapeutic activities to improve functional performance for -  minutes, including:  -      Patient Education and Home Exercises       Education provided:   - Cont'd HEP    Written Home Exercises Provided: Patient instructed to cont prior HEP. Exercises were reviewed and Beth was able to demonstrate them prior to the end of the session.  Beth demonstrated good  understanding of the education provided. See Electronic Medical Record under Patient Instructions for exercises provided during therapy sessions    Assessment     Pt participated trigger point dry needling today. Pt was hesitant to participate and is only able to withstand minimal dry needling. Pt and PT discussed potential dry needling again next visit, if she is willing to tolerate. Pt tolerated light isometric exercise today but does report increased soreness throughout treatment.     Beth Is progressing well towards her goals.   Patient prognosis is Good.     Patient will continue to benefit from skilled outpatient physical therapy to address the deficits listed in the problem list box on initial evaluation, provide pt/family education and to maximize pt's level of independence in the home and community environment.     Patient's spiritual, cultural and educational needs considered and pt agreeable to plan of care and goals.     Anticipated barriers to physical therapy: -    GOALS:  SHORT TERM GOALS: 3 weeks Progress   Recent signs and systems trend is improving in order to progress towards Long term goals's. Progressing   Patient will be independent with Home Exercise Program  in order to further progress and return to maximal function. Progressing   Pain rating at Worst: 5/10 in order to progress towards increased independence with activity. Progressing   Patient will be able to correct postural deviations in sitting and standing, to decrease pain and promote postural awareness for injury " "prevention.  Progressing   Patient will partially meet predicted functional outcome (FOTO) score: 50% to improve towards increasing self-worth & perceived functional ability. Progressing      LONG TERM GOALS: 6 weeks, (8/23/24) Progress   Patient will return to normal Activities of daily living, recreational, and work related activities with less pain and limitation.  Progressing   Patient will improve Range of Motion to stated goals in order to return to maximal functional potential.  Progressing   Patient will improve Strength to stated goals of appropriate musculature in order to improve functional independence.  Progressing   Pain Rating at Best: 1/10 to improve Quality of Life.  Progressing   Patient will meet predicted functional outcome (FOTO) score: 58% to increase self-worth & perceived functional ability. Progressing   Patient will have met/partially met personal goal of: "Decrease pain with head turning with driving." Progressing       Plan     Plan of care Certification: 7/12/2024 to 8/23/24.     Outpatient Physical Therapy 2 times weekly for 6 weeks to include the following interventions: Cervical/Lumbar Traction, Electrical Stimulation , Gait Training, Manual Therapy, Moist Heat/ Ice, Neuromuscular Re-ed, Orthotic Management and Training, Patient Education, Self Care, Therapeutic Activities, Therapeutic Exercise, and FDN    Mara Castillo, PT            "

## 2024-08-14 ENCOUNTER — CLINICAL SUPPORT (OUTPATIENT)
Dept: REHABILITATION | Facility: HOSPITAL | Age: 74
End: 2024-08-14
Payer: MEDICARE

## 2024-08-14 DIAGNOSIS — M54.2 NECK PAIN, CHRONIC: Primary | ICD-10-CM

## 2024-08-14 DIAGNOSIS — R53.1 WEAKNESS: ICD-10-CM

## 2024-08-14 DIAGNOSIS — G89.29 NECK PAIN, CHRONIC: Primary | ICD-10-CM

## 2024-08-14 PROCEDURE — 97112 NEUROMUSCULAR REEDUCATION: CPT | Mod: KX,PN

## 2024-08-14 PROCEDURE — 97110 THERAPEUTIC EXERCISES: CPT | Mod: KX,PN

## 2024-08-14 PROCEDURE — 97140 MANUAL THERAPY 1/> REGIONS: CPT | Mod: KX,PN

## 2024-08-15 NOTE — PROGRESS NOTES
"[facility]   Physical Therapy Treatment Note      Name: Beth Souza  Clinic Number: [id number]    Therapy Diagnosis:   Encounter Diagnoses   Name Primary?    Neck pain, chronic Yes    Weakness        Physician: Edna Oliva MD    Visit Date: 8/19/2024    Physician Orders: PT Eval and Treat  Medical Diagnosis from Referral: M47.22 (ICD-10-CM) - Osteoarthritis of spine with radiculopathy, cervical region   Evaluation Date: 7/12/2024  Authorization Period Expiration: 12/31/24  Plan of Care Expiration: 8/23/24  Progress Note Due: Every 6th visit  Visit # / Visits authorized: 7/20 + Eval  FOTO: 1/3     Precautions: Standard      Time In: 1:05 PM  Time Out: 2:15 PM  Total Appointment Time (timed & untimed codes): 70 minutes    PTA Visit #: 0/5       Subjective     Patient reports: Shoulders and neck is feeling okay today.   She was compliant with home exercise program.  Response to previous treatment: -  Functional change: -    Pain: 4/10  Location: L neck/R shoulder/arm    Objective      Objective Measures updated at progress report unless specified.     Palpation: Significant TTP of B UT causing significant pain.     Treatment     Beth received the treatments listed below:      therapeutic exercises to develop strength, endurance, ROM, and flexibility for 18 minutes including:  UBE (3/3)  Pulleys (2/2)  Wide wand press (3 x 10)  UT stretch (10 x 10")     manual therapy techniques: Joint mobilizations, Manual traction, and Soft tissue Mobilization were applied to the: cervical spine for - minutes, including:     neuromuscular re-education activities to improve: Balance, Coordination, Kinesthetic, Sense, Proprioception, and Posture for 42 minutes. The following activities were included:  Shoulder Isometrics    Flexion (10 x 10")    Extension (10 x 10")    Abduction (10 x 10")   Standing Y - SB on wall (2 x 8)  Standing scap retract (3 x 10, GTB) - towel under elbows  Seated push " "downs (2 x 10, 10")    therapeutic activities to improve functional performance for -  minutes, including:  -    MHP to the cervical spine for 10'.     Patient Education and Home Exercises       Education provided:   - Cont'd HEP    Written Home Exercises Provided: Patient instructed to cont prior HEP. Exercises were reviewed and Beth was able to demonstrate them prior to the end of the session.  Beth demonstrated good  understanding of the education provided. See Electronic Medical Record under Patient Instructions for exercises provided during therapy sessions    Assessment     Pt requires cuing during exercise performance to improve postural positioning. Pt demonstrates improved tolerance to shoulder exercises today but continues to require cuing to improve posture with performance. Pt demonstrates improved tolerance to [place] motion as well, though she cont's to report pain and fatigue.     Beth Is progressing well towards her goals.   Patient prognosis is Good.     Patient will continue to benefit from skilled outpatient physical therapy to address the deficits listed in the problem list box on initial evaluation, provide pt/family education and to maximize pt's level of independence in the home and community environment.     Patient's spiritual, cultural and educational needs considered and pt agreeable to plan of care and goals.     Anticipated barriers to physical therapy: -    GOALS:  SHORT TERM GOALS: 3 weeks Progress   Recent signs and systems trend is improving in order to progress towards Long term goals's. Progressing   Patient will be independent with Home Exercise Program  in order to further progress and return to maximal function. Progressing   Pain rating at Worst: 5/10 in order to progress towards increased independence with activity. Progressing   Patient will be able to correct postural deviations in sitting and standing, to decrease pain and promote postural awareness for injury " "prevention.  Progressing   Patient will partially meet predicted functional outcome (FOTO) score: 50% to improve towards increasing self-worth & perceived functional ability. Progressing      LONG TERM GOALS: 6 weeks, (8/23/24) Progress   Patient will return to normal Activities of daily living, recreational, and work related activities with less pain and limitation.  Progressing   Patient will improve Range of Motion to stated goals in order to return to maximal functional potential.  Progressing   Patient will improve Strength to stated goals of appropriate musculature in order to improve functional independence.  Progressing   Pain Rating at Best: 1/10 to improve Quality of Life.  Progressing   Patient will meet predicted functional outcome (FOTO) score: 58% to increase self-worth & perceived functional ability. Progressing   Patient will have met/partially met personal goal of: "Decrease pain with head turning with driving." Progressing       Plan     Plan of care Certification: 7/12/2024 to 8/23/24.     Outpatient Physical Therapy 2 times weekly for 6 weeks to include the following interventions: Cervical/Lumbar Traction, Electrical Stimulation , Gait Training, Manual Therapy, Moist Heat/ Ice, Neuromuscular Re-ed, Orthotic Management and Training, Patient Education, Self Care, Therapeutic Activities, Therapeutic Exercise, and FDN    Mara Castillo, PT              "

## 2024-08-16 ENCOUNTER — OFFICE VISIT (OUTPATIENT)
Dept: OPHTHALMOLOGY | Facility: CLINIC | Age: 74
End: 2024-08-16
Payer: MEDICARE

## 2024-08-16 ENCOUNTER — OFFICE VISIT (OUTPATIENT)
Dept: INTERNAL MEDICINE | Facility: CLINIC | Age: 74
End: 2024-08-16
Payer: MEDICARE

## 2024-08-16 VITALS
SYSTOLIC BLOOD PRESSURE: 134 MMHG | BODY MASS INDEX: 39.1 KG/M2 | OXYGEN SATURATION: 95 % | TEMPERATURE: 97 F | DIASTOLIC BLOOD PRESSURE: 80 MMHG | WEIGHT: 220.69 LBS | HEART RATE: 53 BPM | HEIGHT: 63 IN

## 2024-08-16 DIAGNOSIS — H40.013 OPEN ANGLE WITH BORDERLINE FINDINGS OF BOTH EYES: ICD-10-CM

## 2024-08-16 DIAGNOSIS — G47.33 OSA ON CPAP: Primary | ICD-10-CM

## 2024-08-16 DIAGNOSIS — G47.00 INSOMNIA, UNSPECIFIED TYPE: ICD-10-CM

## 2024-08-16 DIAGNOSIS — D32.0 MENINGIOMA OF CEREBELLUM: Primary | ICD-10-CM

## 2024-08-16 PROCEDURE — 3288F FALL RISK ASSESSMENT DOCD: CPT | Mod: CPTII,S$GLB,, | Performed by: NURSE PRACTITIONER

## 2024-08-16 PROCEDURE — 99999 PR PBB SHADOW E&M-EST. PATIENT-LVL II: CPT | Mod: PBBFAC,,, | Performed by: OPTOMETRIST

## 2024-08-16 PROCEDURE — 1126F AMNT PAIN NOTED NONE PRSNT: CPT | Mod: CPTII,S$GLB,, | Performed by: NURSE PRACTITIONER

## 2024-08-16 PROCEDURE — 1159F MED LIST DOCD IN RCRD: CPT | Mod: CPTII,S$GLB,, | Performed by: NURSE PRACTITIONER

## 2024-08-16 PROCEDURE — 1101F PT FALLS ASSESS-DOCD LE1/YR: CPT | Mod: CPTII,S$GLB,, | Performed by: NURSE PRACTITIONER

## 2024-08-16 PROCEDURE — 3075F SYST BP GE 130 - 139MM HG: CPT | Mod: CPTII,S$GLB,, | Performed by: NURSE PRACTITIONER

## 2024-08-16 PROCEDURE — 3008F BODY MASS INDEX DOCD: CPT | Mod: CPTII,S$GLB,, | Performed by: NURSE PRACTITIONER

## 2024-08-16 PROCEDURE — 4010F ACE/ARB THERAPY RXD/TAKEN: CPT | Mod: CPTII,S$GLB,, | Performed by: NURSE PRACTITIONER

## 2024-08-16 PROCEDURE — 99214 OFFICE O/P EST MOD 30 MIN: CPT | Mod: S$GLB,,, | Performed by: NURSE PRACTITIONER

## 2024-08-16 PROCEDURE — 3079F DIAST BP 80-89 MM HG: CPT | Mod: CPTII,S$GLB,, | Performed by: NURSE PRACTITIONER

## 2024-08-16 PROCEDURE — 99999 PR PBB SHADOW E&M-EST. PATIENT-LVL III: CPT | Mod: PBBFAC,,, | Performed by: NURSE PRACTITIONER

## 2024-08-16 PROCEDURE — 3044F HG A1C LEVEL LT 7.0%: CPT | Mod: CPTII,S$GLB,, | Performed by: NURSE PRACTITIONER

## 2024-08-16 NOTE — PROGRESS NOTES
Subjective:       Patient ID: Beth Souza is a 74 y.o. female.    Chief Complaint: Fatigue (Over a month) and Insomnia    HPI    Not consistently using cpap/pcp and pul has spoken to pt ab this multiple times. She brought machine to [facility] appt recently and machine was working properly she still feels it isnt. Wakes up feeling unrested     Reports generalized body aches. Stopped cholesterol med in the past, seemed to help when she stopped it, but restarted it.    Past Medical History:   Diagnosis Date    Cataract     Hyperlipidemia     Hypertension     Lumbar spondylosis     Meningioma 2021    Obesity     Sleep apnea     Vitamin D deficiency      Past Surgical History:   Procedure Laterality Date    HYSTERECTOMY      PARTIAL HYSTERECTOMY      SELECTIVE INJECTION OF ANESTHETIC AGENT AROUND LUMBAR SPINAL NERVE ROOT BY TRANSFORAMINAL APPROACH Bilateral 3/21/2023    Procedure: Bilateral L5/S1 transforaminal epidural steroid injection;  Surgeon: Kush Jha MD;  Location: [facility];  Service: Pain Management;  Laterality: Bilateral;    SELECTIVE INJECTION OF ANESTHETIC AGENT AROUND LUMBAR SPINAL NERVE ROOT BY TRANSFORAMINAL APPROACH Bilateral 2023    Procedure: Bilateral L4/5 and L5-S1 TF LAWSON;  Surgeon: Kush Jha MD;  Location: [facility];  Service: Pain Management;  Laterality: Bilateral;     Social History     Socioeconomic History    Marital status:     Number of children: 3   Occupational History    Occupation: Cleaning houses     Employer: not employed   Tobacco Use    Smoking status: Former     Current packs/day: 0.00     Types: Cigarettes     Quit date: 2004     Years since quittin.6    Smokeless tobacco: Never   Substance and Sexual Activity    Alcohol use: Yes     Comment: occasionally    Drug use: No    Sexual activity: Not Currently     Partners: Male   Other Topics Concern    Are you pregnant or think you may be? No    Breast-feeding No   Social History  Narrative    Live w/ alone      Social Determinants of Health     Financial Resource Strain: Low Risk  (7/12/2022)    Overall Financial Resource Strain (CARDIA)     Difficulty of Paying Living Expenses: Not hard at all   Food Insecurity: No Food Insecurity (7/12/2022)    Hunger Vital Sign     Worried About Running Out of Food in the Last Year: Never true     Ran Out of Food in the Last Year: Never true   Transportation Needs: No Transportation Needs (7/12/2022)    PRAPARE - Transportation     Lack of Transportation (Medical): No     Lack of Transportation (Non-Medical): No   Physical Activity: Inactive (7/12/2022)    Exercise Vital Sign     Days of Exercise per Week: 0 days     Minutes of Exercise per Session: 0 min   Stress: No Stress Concern Present (7/12/2022)    [employer] - Occupational Stress Questionnaire     Feeling of Stress : Not at all   Housing Stability: Low Risk  (7/12/2022)    Housing Stability Vital Sign     Unable to Pay for Housing in the Last Year: No     Number of Places Lived in the Last Year: 1     Unstable Housing in the Last Year: No     Review of patient's allergies indicates:   Allergen Reactions    Aspirin Other (See Comments)     Bruising     Current Outpatient Medications   Medication Sig    acetaminophen (TYLENOL) 500 MG tablet Take 500 mg by mouth daily as needed for Pain.    ergocalciferol (ERGOCALCIFEROL) 50,000 unit Cap Take 1 capsule by mouth once a week    lisinopriL-hydrochlorothiazide (PRINZIDE,ZESTORETIC) 20-25 mg Tab Take 1 tablet by mouth once daily.     No current facility-administered medications for this visit.           Review of Systems   Constitutional:  Positive for fatigue. Negative for activity change, appetite change, chills, diaphoresis, fever and unexpected weight change.   HENT:  Negative for congestion, ear pain, postnasal drip, rhinorrhea, sinus pressure, sinus pain, sneezing, sore throat, tinnitus, trouble swallowing and voice  change.    Eyes:  Negative for photophobia, pain and visual disturbance.   Respiratory:  Negative for cough, chest tightness, shortness of breath and wheezing.    Cardiovascular:  Negative for chest pain, palpitations and leg swelling.   Gastrointestinal:  Negative for abdominal distention, abdominal pain, constipation, diarrhea, nausea and vomiting.   Genitourinary:  Negative for decreased urine volume, difficulty urinating, dysuria, flank pain, frequency, hematuria and urgency.   Musculoskeletal:  Negative for arthralgias, back pain, joint swelling, neck pain and neck stiffness.   Allergic/Immunologic: Negative for immunocompromised state.   Neurological:  Negative for dizziness, tremors, seizures, syncope, facial asymmetry, speech difficulty, weakness, light-headedness, numbness and headaches.   Hematological:  Negative for adenopathy. Does not bruise/bleed easily.   Psychiatric/Behavioral:  Positive for sleep disturbance. Negative for confusion.        Objective:      Physical Exam  Vitals reviewed.   Cardiovascular:      Rate and Rhythm: Normal rate and regular rhythm.      Heart sounds: Normal heart sounds.   Pulmonary:      Effort: Pulmonary effort is normal.      Breath sounds: Normal breath sounds.   Neurological:      Mental Status: She is alert and oriented to person, place, and time.         Assessment:     Vitals:    08/16/24 1449   BP: 134/80   Pulse: (!) 53   Temp: 96.9 °F (36.1 °C)         1. LEN on CPAP    2. Insomnia, unspecified type        Plan:   LEN on CPAP    Insomnia, unspecified type      We discussed importance of cpap use every night  Stop statin for now  Rtc 1 mo

## 2024-08-16 NOTE — PROGRESS NOTES
HPI     Follow-up            Comments: Pt reports for follow up for HVF 24-2          Comments    Vision changes since last eye exam?: Pt states VA is stable and has no   complaints of any new vision changes since her last eye exam.      Any eye pain today: Pt feels a sore and trashy feeling in eyes. Eye pain   2/10.     Other ocular symptoms: None noticed by pt.     Interested in contact lens fitting today? No.             Last edited by Carole Vega on 8/16/2024  1:43 PM.            Assessment /Plan     For exam results, see Encounter Report.    Meningioma of cerebellum    Open angle with borderline findings of both eyes  -     [place] Visual Field - OU - Extended - Both Eyes    Normal VF today OS  VF OD today shows questionable superior nasal step defect but poor reliability  Does not correspond with normal gOCT at last visit or neuro defect 2/2 to meningioma  Monitor 6 months      RTC 6 months for dilated exam and gOCT or PRN  Discussed above and all questions were answered.

## 2024-08-19 ENCOUNTER — CLINICAL SUPPORT (OUTPATIENT)
Dept: REHABILITATION | Facility: HOSPITAL | Age: 74
End: 2024-08-19
Payer: MEDICARE

## 2024-08-19 DIAGNOSIS — M54.2 NECK PAIN, CHRONIC: Primary | ICD-10-CM

## 2024-08-19 DIAGNOSIS — R53.1 WEAKNESS: ICD-10-CM

## 2024-08-19 DIAGNOSIS — G89.29 NECK PAIN, CHRONIC: Primary | ICD-10-CM

## 2024-08-19 PROCEDURE — 97112 NEUROMUSCULAR REEDUCATION: CPT | Mod: KX,PN

## 2024-08-19 PROCEDURE — 97110 THERAPEUTIC EXERCISES: CPT | Mod: KX,PN

## 2024-08-20 ENCOUNTER — TELEPHONE (OUTPATIENT)
Dept: SPORTS MEDICINE | Facility: CLINIC | Age: 74
End: 2024-08-20
Payer: MEDICARE

## 2024-08-20 NOTE — TELEPHONE ENCOUNTER
Reached out to pt regarding her message of questions, Provided pt with the provider who referred her to Physical therapy as well as the name of the medication that was provided by our staff     ----- Message from Hamzah Bowman sent at 8/20/2024 12:40 PM CDT -----  Contact: Pt [phone]  Consult    She has questions about her Rt shoulder pain    Thank you

## 2024-08-23 NOTE — PROGRESS NOTES
"[facility]   Physical Therapy Treatment Note      Name: Beth Souza  Clinic Number: [id number]    Therapy Diagnosis:   Encounter Diagnoses   Name Primary?    Neck pain, chronic Yes    Weakness          Physician: Edna Oliva MD    Visit Date: 8/26/2024    Physician Orders: PT Eval and Treat  Medical Diagnosis from Referral: M47.22 (ICD-10-CM) - Osteoarthritis of spine with radiculopathy, cervical region   Evaluation Date: 7/12/2024  Authorization Period Expiration: 12/31/24  Plan of Care Expiration: 8/23/24  Progress Note Due: Every 6th visit  Visit # / Visits authorized: 8/20 + Eval  FOTO: 1/3     Precautions: Standard      Time In: 1:06 PM  Time Out: 2:10 PM  Total Appointment Time (timed & untimed codes): 64 minutes    PTA Visit #: 0/5       Subjective     Patient reports: Shoulders are sore today.   She was compliant with home exercise program.  Response to previous treatment: -  Functional change: -    Pain: 4/10  Location: L neck/R shoulder/arm    Objective      Objective Measures updated at progress report unless specified.     Palpation: Significant TTP of B UT causing significant pain.     Treatment     Beth received the treatments listed below:      therapeutic exercises to develop strength, endurance, ROM, and flexibility for 34 minutes including:  UBE (3/3)  Pulleys (2/2)  Wide wand flexion (2 x 10)  Wide wand press (3 x 10)  UT stretch (10 x 10")     manual therapy techniques: Joint mobilizations, Manual traction, and Soft tissue Mobilization were applied to the: cervical spine for - minutes, including:     neuromuscular re-education activities to improve: Balance, Coordination, Kinesthetic, Sense, Proprioception, and Posture for 20 minutes. The following activities were included:  Supine chin tuck (3 x 10, 10")  B ER (3 x 10, YTB)  Seated LTA (3 x 5, RTB)    therapeutic activities to improve functional performance for -  minutes, including:  -    MHP to the " cervical spine for 10'.     Patient Education and Home Exercises       Education provided:   - Cont'd HEP    Written Home Exercises Provided: Patient instructed to cont prior HEP. Exercises were reviewed and Beth was able to demonstrate them prior to the end of the session.  Beth demonstrated good  understanding of the education provided. See Electronic Medical Record under Patient Instructions for exercises provided during therapy sessions    Assessment     Pt tolerated movement well today despite increased shoulder soreness reported with initial visit. Pt and PT discussed shoulder prognosis, considering known RTC tears. Pt requires tactile cuing during AAROM for better positioning of the UE.     Beth Is progressing well towards her goals.   Patient prognosis is Good.     Patient will continue to benefit from skilled outpatient physical therapy to address the deficits listed in the problem list box on initial evaluation, provide pt/family education and to maximize pt's level of independence in the home and community environment.     Patient's spiritual, cultural and educational needs considered and pt agreeable to plan of care and goals.     Anticipated barriers to physical therapy: -    GOALS:  SHORT TERM GOALS: 3 weeks Progress   Recent signs and systems trend is improving in order to progress towards Long term goals's. Progressing   Patient will be independent with Home Exercise Program  in order to further progress and return to maximal function. Progressing   Pain rating at Worst: 5/10 in order to progress towards increased independence with activity. Progressing   Patient will be able to correct postural deviations in sitting and standing, to decrease pain and promote postural awareness for injury prevention.  Progressing   Patient will partially meet predicted functional outcome (FOTO) score: 50% to improve towards increasing self-worth & perceived functional ability. Progressing      LONG TERM  "GOALS: 6 weeks, (8/23/24) Progress   Patient will return to normal Activities of daily living, recreational, and work related activities with less pain and limitation.  Progressing   Patient will improve Range of Motion to stated goals in order to return to maximal functional potential.  Progressing   Patient will improve Strength to stated goals of appropriate musculature in order to improve functional independence.  Progressing   Pain Rating at Best: 1/10 to improve Quality of Life.  Progressing   Patient will meet predicted functional outcome (FOTO) score: 58% to increase self-worth & perceived functional ability. Progressing   Patient will have met/partially met personal goal of: "Decrease pain with head turning with driving." Progressing       Plan     Plan of care Certification: 7/12/2024 to 8/23/24.     Outpatient Physical Therapy 2 times weekly for 6 weeks to include the following interventions: Cervical/Lumbar Traction, Electrical Stimulation , Gait Training, Manual Therapy, Moist Heat/ Ice, Neuromuscular Re-ed, Orthotic Management and Training, Patient Education, Self Care, Therapeutic Activities, Therapeutic Exercise, and FDN    Mara Castillo, PT                "

## 2024-08-26 ENCOUNTER — CLINICAL SUPPORT (OUTPATIENT)
Dept: REHABILITATION | Facility: HOSPITAL | Age: 74
End: 2024-08-26
Payer: MEDICARE

## 2024-08-26 DIAGNOSIS — G89.29 NECK PAIN, CHRONIC: Primary | ICD-10-CM

## 2024-08-26 DIAGNOSIS — R53.1 WEAKNESS: ICD-10-CM

## 2024-08-26 DIAGNOSIS — M54.2 NECK PAIN, CHRONIC: Primary | ICD-10-CM

## 2024-08-26 PROCEDURE — 97112 NEUROMUSCULAR REEDUCATION: CPT | Mod: KX,PN

## 2024-08-26 PROCEDURE — 97110 THERAPEUTIC EXERCISES: CPT | Mod: KX,PN

## 2024-09-04 ENCOUNTER — TELEPHONE (OUTPATIENT)
Dept: PULMONOLOGY | Facility: CLINIC | Age: 74
End: 2024-09-04
Payer: MEDICARE

## 2024-09-04 NOTE — TELEPHONE ENCOUNTER
Returned patient's call let her know [facility]  [facility].----- Message from Brittany Bhardwaj sent at 9/4/2024  3:26 PM CDT -----  Contact: pt  Looks like patient was ordered a new machine and been waiting on her to call and schedule an appointment to get it since 7/1/24. Have her call [phone] to schedule.  ----- Message -----  From: Gigi Barba MA  Sent: 9/4/2024  12:14 PM CDT  To: Brittany Bhardwaj    Can you assist this patient with her cpap?  ----- Message -----  From: Vandana Castaneda LPN  Sent: 9/4/2024  12:11 PM CDT  To: Gigi Barba MA      ----- Message -----  From: Kamila Zimmerman  Sent: 9/4/2024  11:45 AM CDT  To: [facility] Clinical Staff    Pt is calling in rgd to needing someone to call her about her c pap machine.  Pt states she is not getting much sleep and when she wakes up her mouth is dry.  Please call her back at [phone]  [email or web address]

## 2024-09-06 ENCOUNTER — PATIENT MESSAGE (OUTPATIENT)
Dept: PULMONOLOGY | Facility: CLINIC | Age: 74
End: 2024-09-06
Payer: MEDICARE

## 2024-09-10 ENCOUNTER — OFFICE VISIT (OUTPATIENT)
Dept: PULMONOLOGY | Facility: CLINIC | Age: 74
End: 2024-09-10
Payer: MEDICARE

## 2024-09-10 ENCOUNTER — TELEPHONE (OUTPATIENT)
Dept: PULMONOLOGY | Facility: CLINIC | Age: 74
End: 2024-09-10
Payer: MEDICARE

## 2024-09-10 ENCOUNTER — NURSE TRIAGE (OUTPATIENT)
Dept: ADMINISTRATIVE | Facility: CLINIC | Age: 74
End: 2024-09-10
Payer: MEDICARE

## 2024-09-10 VITALS
HEIGHT: 62 IN | SYSTOLIC BLOOD PRESSURE: 140 MMHG | BODY MASS INDEX: 40.45 KG/M2 | OXYGEN SATURATION: 98 % | DIASTOLIC BLOOD PRESSURE: 82 MMHG | HEART RATE: 55 BPM | WEIGHT: 219.81 LBS | RESPIRATION RATE: 18 BRPM

## 2024-09-10 DIAGNOSIS — G47.33 OSA ON CPAP: Primary | ICD-10-CM

## 2024-09-10 PROCEDURE — 3044F HG A1C LEVEL LT 7.0%: CPT | Mod: CPTII,S$GLB,, | Performed by: HOSPITALIST

## 2024-09-10 PROCEDURE — 3008F BODY MASS INDEX DOCD: CPT | Mod: CPTII,S$GLB,, | Performed by: HOSPITALIST

## 2024-09-10 PROCEDURE — 99999 PR PBB SHADOW E&M-EST. PATIENT-LVL III: CPT | Mod: PBBFAC,,, | Performed by: HOSPITALIST

## 2024-09-10 PROCEDURE — 1101F PT FALLS ASSESS-DOCD LE1/YR: CPT | Mod: CPTII,S$GLB,, | Performed by: HOSPITALIST

## 2024-09-10 PROCEDURE — 4010F ACE/ARB THERAPY RXD/TAKEN: CPT | Mod: CPTII,S$GLB,, | Performed by: HOSPITALIST

## 2024-09-10 PROCEDURE — 99212 OFFICE O/P EST SF 10 MIN: CPT | Mod: S$GLB,,, | Performed by: HOSPITALIST

## 2024-09-10 PROCEDURE — 1159F MED LIST DOCD IN RCRD: CPT | Mod: CPTII,S$GLB,, | Performed by: HOSPITALIST

## 2024-09-10 PROCEDURE — 1160F RVW MEDS BY RX/DR IN RCRD: CPT | Mod: CPTII,S$GLB,, | Performed by: HOSPITALIST

## 2024-09-10 PROCEDURE — 3288F FALL RISK ASSESSMENT DOCD: CPT | Mod: CPTII,S$GLB,, | Performed by: HOSPITALIST

## 2024-09-10 PROCEDURE — 3079F DIAST BP 80-89 MM HG: CPT | Mod: CPTII,S$GLB,, | Performed by: HOSPITALIST

## 2024-09-10 PROCEDURE — 3077F SYST BP >= 140 MM HG: CPT | Mod: CPTII,S$GLB,, | Performed by: HOSPITALIST

## 2024-09-10 NOTE — PROGRESS NOTES
"Subjective:      Patient ID: Beth Souza is a 74 y.o. female.    Chief Complaint: LEN    Interval Hx 9/10/24:    Mrs. Souza presents today for follow up sleep apnea. She was last seen 7/2/24- encouraged to start back using CPAP that she had, new machine was ordered.     Did try to use- experienced dry mouth and throat burning. Thinks humidity is set on 4. Her new CPAP machine was approved, but not picked up. She will be able to  today.    HPI 7/2/24:     74 year old female with history of HTN, HLD, cerebellar meningioma, hyperparathyroidism, obesity, LEN who presents to Pulmonary clinic for follow up LEN. She was last seen 2/2024 by GILLIAN Paris- pt was encouraged to resume using CPAP nightly.      Mrs. Souza reports that she has not been using machine because it doesn't work the way it should. She is not sure in what way it is not working as it has been so long since she tried. Has been tired during the day and is looking forward to getting started back on CPAP.     Pertinent Work Up:  [place] 3  Outside CTA Chest 6/2024- "CHEST: No focal consolidation. No pleural or pericardial effusion. No evidence of hilar or mediastinal adenopathy. "  3 night HST 2018- AHI 11.7    Review of Systems   Respiratory:  Positive for snoring and somnolence.      Objective:     Physical Exam   Constitutional: She is oriented to person, place, and time. She appears well-developed and well-nourished. She is obese.   Cardiovascular: Normal rate and regular rhythm.   Pulmonary/Chest: Normal expansion, effort normal and breath sounds normal.   Neurological: She is alert and oriented to person, place, and time.   Skin: Skin is warm and dry.     Personal Diagnostic Review  As Above      8/16/2024     2:49 PM 7/19/2024     3:27 PM 7/2/2024    11:50 AM 6/26/2024     2:50 PM 6/25/2024     2:37 PM 6/24/2024     1:29 PM 6/17/2024     7:53 AM   Pulmonary Function Tests   SpO2 95 %  97 % 98 %  95 % 94 %   Height 5' 3" (1.6 m) 5' 3" " "(1.6 m) 5' 3" (1.6 m) 5' 3" (1.6 m) 5' 3" (1.6 m) 5' 3" (1.6 m) 5' 3" (1.6 m)   Weight 100.1 kg (220 lb 10.9 oz) 99.2 kg (218 lb 11.1 oz) 98.7 kg (217 lb 9.5 oz) 98.4 kg (216 lb 14.9 oz) 99.2 kg (218 lb 11.1 oz) 99.2 kg (218 lb 11.1 oz) 97.8 kg (215 lb 9.8 oz)   BMI (Calculated) 39.1 38.8 38.6 38.4 38.8 38.8 38.2        Assessment:     No diagnosis found.     Outpatient Encounter Medications as of 9/10/2024   Medication Sig Dispense Refill    acetaminophen (TYLENOL) 500 MG tablet Take 500 mg by mouth daily as needed for Pain.      ergocalciferol (ERGOCALCIFEROL) 50,000 unit Cap Take 1 capsule by mouth once a week 12 capsule 0    lisinopriL-hydrochlorothiazide (PRINZIDE,ZESTORETIC) 20-25 mg Tab Take 1 tablet by mouth once daily. 5 tablet 0    [DISCONTINUED] atorvastatin (LIPITOR) 20 MG tablet Take 1 tablet (20 mg total) by mouth once daily. 90 tablet 1     No facility-administered encounter medications on file as of 9/10/2024.           Plan:     Problem List Items Addressed This Visit          Other    LEN on CPAP - Primary     - HST 2018- AHI 11.7  - received new CPAP today          Follow up in 6-8 weeks for compliance review.     "

## 2024-09-10 NOTE — TELEPHONE ENCOUNTER
Called patient back but there was no answer. Left voicemail to let patient know that she can keep her appointment today 9/10/24 at 230.----- Message from Olinda Newton sent at 9/10/2024 10:36 AM CDT -----  Patient is calling to see if she can keep her appointment for 2:30 today. Patient is at another appointment. Please callback [phone] to assist

## 2024-09-11 NOTE — TELEPHONE ENCOUNTER
Pt just received a new CPAP machine today. Unable to figure out how to turn it on. Pt has read instructions manual and called number on the box with no success.    Dispo- advice only. Advised pt to reach out to her provider office once open again on Thursday for further assistance. She VU.  Reason for Disposition   General information question, no triage required and triager able to answer question    Protocols used: Information Only Call - No Triage-A-

## 2024-09-12 ENCOUNTER — TELEPHONE (OUTPATIENT)
Dept: PULMONOLOGY | Facility: CLINIC | Age: 74
End: 2024-09-12
Payer: MEDICARE

## 2024-09-12 NOTE — PROGRESS NOTES
[facility]   Physical Therapy Discharge Summary     Name: Beth Souza  Clinic Number: [id number]    Therapy Diagnosis:   Encounter Diagnoses   Name Primary?    Neck pain, chronic Yes    Weakness            Physician: Edna Oliva MD    Visit Date: 9/13/2024    Physician Orders: PT Eval and Treat  Medical Diagnosis from Referral: M47.22 (ICD-10-CM) - Osteoarthritis of spine with radiculopathy, cervical region   Evaluation Date: 7/12/2024  Authorization Period Expiration: 12/31/24  Plan of Care Expiration: 8/23/24  Progress Note Due: Every 6th visit  Visit # / Visits authorized: 9/20 + Eval  FOTO: 1/3     Precautions: Standard      Time In: 1:01 PM  Time Out: 2:11 PM  Total Appointment Time (timed & untimed codes): 70 minutes    PTA Visit #: 0/5       Subjective     Patient reports: Having significant L shoulder pain.   She was compliant with home exercise program.  Response to previous treatment: -  Functional change: -    Pain: 4/10  Location: L neck/R shoulder/arm    Objective      Objective Measures updated at progress report unless specified.     (NT = not tested due to pain and/or inability to obtain test position)     RANGE OF MOTION:     Cervical  Range of Motion Right   (spine) Left     Function & Pain Goal   Cervical Flexion (60º) 40 degrees    45º  Functional   Nonpainful   Cervical Extension (90º) 60 degrees   Discomfort on the L side of the neck 45º  Functional   Nonpainful   Cervical Rotation (75º) WNL WNL* Mild discomfort on the L side of the neck with L rotation 60º  Functional   Nonpainful              Shoulder   Range of Motion Right  Active     Passive Left  Active     Passive Goal   Forward Flexion (180º) 135 WNL 90* 135* 170º   (*) pain and/or dysfunction     STRENGTH:     Upper Extremity  Strength RIGHT    Goal   Shoulder Flexion []1  []2  []3  [x]4  []5                []+ []- 5/5 B   Shoulder Abduction []1  []2  []3  [x]4  []5                []+ []- 5/5 B    Shoulder Internal Rotation  []1  []2  []3  [x]4  []5                []+ []- 5/5 B   Shoulder External Rotation []1  []2  []3  [x]4  []5                []+ []- 5/5 B   Elbow Flexion  []1  []2  []3  [x]4  []5                []+ []- 5/5 B   Elbow Extension []1  []2  []3  [x]4  []5                []+ []- 5/5 B      Upper Extremity  Strength LEFT    Goal   Shoulder Flexion* []1  []2  [x]3  []4  []5                []+ []-  Significant pain in test position 5/5 B   Shoulder Abduction* []1  []2  [x]3  []4  []5                []+ []-  Significant pain in test position 5/5 B   Shoulder Internal Rotation*  []1  []2  []3  [x]4  []5                []+ []- 5/5 B   Shoulder External Rotation* []1  []2  []3  [x]4  []5                []+ []- 5/5 B   Elbow Flexion  []1  []2  []3  [x]4  []5                [x]+ []- 5/5 B   Elbow Extension []1  []2  []3  [x]4  []5                [x]+ []- 5/5 B     Palpation: Increased tone and tenderness noted with palpation to: L UT and LS primarily     Posture:  Pt presents with postural abnormalities which include: forward head and rounded shoulders     FUNCTION:      Intake Outcome Measure for FOTO Neck Survey     Therapist reviewed FOTO scores for Beth Souza on 7/12/2024.   FOTO documents entered into [employer] - see Media section.     Intake Score: 44%  9/13/24: 46%          Treatment     Beth received the treatments listed below:      therapeutic exercises to develop strength, endurance, ROM, and flexibility for 40 minutes including:  UBE (3/3)  Pulleys (2/2)  Wide wand press (2 x 10)  Re-evaluation and education on assessment results     manual therapy techniques: Joint mobilizations, Manual traction, and Soft tissue Mobilization were applied to the: cervical spine for - minutes, including:     neuromuscular re-education activities to improve: Balance, Coordination, Kinesthetic, Sense, Proprioception, and Posture for 20 minutes. The following activities were included:  Shoulder  "Isometrics    Adduction (10 x 10")  Flexion (10 x 10")    Extension (10 x 10")    Abduction (10 x 10")     therapeutic activities to improve functional performance for -  minutes, including:  -    MHP to the cervical spine for 10'.     Patient Education and Home Exercises       Education provided:   - Cont'd HEP    Written Home Exercises Provided: Patient instructed to cont prior HEP. Exercises were reviewed and Beth was able to demonstrate them prior to the end of the session.  Beth demonstrated good  understanding of the education provided. See Electronic Medical Record under Patient Instructions for exercises provided during therapy sessions    Assessment     Pt demonstrates significant improvements in cervical mobility since initiating treatment. Pt also demonstrates improved R shoulder mobility and strength. Pt presents today with increase in L shoulder pain and she plans to follow up with MD next week. Pt demonstrates independence and understanding of desired HEP. Pt educated on how to get a new referral/access PT for L shoulder pain/dysfunction.      Beth Is progressing well towards her goals.   Patient prognosis is Good.     Patient will continue to benefit from skilled outpatient physical therapy to address the deficits listed in the problem list box on initial evaluation, provide pt/family education and to maximize pt's level of independence in the home and community environment.     Patient's spiritual, cultural and educational needs considered and pt agreeable to plan of care and goals.     Anticipated barriers to physical therapy: -    GOALS:  SHORT TERM GOALS: 3 weeks Progress   Recent signs and systems trend is improving in order to progress towards Long term goals's. Met   Patient will be independent with Home Exercise Program  in order to further progress and return to maximal function. Met   Pain rating at Worst: 5/10 in order to progress towards increased independence with activity. " "Met   Patient will be able to correct postural deviations in sitting and standing, to decrease pain and promote postural awareness for injury prevention.  Met   Patient will partially meet predicted functional outcome (FOTO) score: 50% to improve towards increasing self-worth & perceived functional ability. Not met      LONG TERM GOALS: 6 weeks, (8/23/24) Progress   Patient will return to normal Activities of daily living, recreational, and work related activities with less pain and limitation.  Met   Patient will improve Range of Motion to stated goals in order to return to maximal functional potential.  Partially met   Patient will improve Strength to stated goals of appropriate musculature in order to improve functional independence.  Partially met   Pain Rating at Best: 1/10 to improve Quality of Life.  Met   Patient will meet predicted functional outcome (FOTO) score: 58% to increase self-worth & perceived functional ability. Not met   Patient will have met/partially met personal goal of: "Decrease pain with head turning with driving." Met       Plan     Discharge from physical therapy.     Mara Castillo, PT                    "

## 2024-09-12 NOTE — TELEPHONE ENCOUNTER
Called and spoke to pt. She explained that figured out how to turn her CPAP on and it is working. Pt was appreciative and verbalized understanding of everything.

## 2024-09-13 ENCOUNTER — CLINICAL SUPPORT (OUTPATIENT)
Dept: REHABILITATION | Facility: HOSPITAL | Age: 74
End: 2024-09-13
Payer: MEDICARE

## 2024-09-13 DIAGNOSIS — G89.29 NECK PAIN, CHRONIC: Primary | ICD-10-CM

## 2024-09-13 DIAGNOSIS — M54.2 NECK PAIN, CHRONIC: Primary | ICD-10-CM

## 2024-09-13 DIAGNOSIS — R53.1 WEAKNESS: ICD-10-CM

## 2024-09-13 PROCEDURE — 97110 THERAPEUTIC EXERCISES: CPT | Mod: KX,PN

## 2024-09-13 PROCEDURE — 97112 NEUROMUSCULAR REEDUCATION: CPT | Mod: KX,PN

## 2024-09-13 NOTE — PLAN OF CARE
[facility]   Physical Therapy Discharge Summary     Name: Beth Souza  Clinic Number: [id number]    Therapy Diagnosis:   Encounter Diagnoses   Name Primary?    Neck pain, chronic Yes    Weakness            Physician: Edna Oliva MD    Visit Date: 9/13/2024    Physician Orders: PT Eval and Treat  Medical Diagnosis from Referral: M47.22 (ICD-10-CM) - Osteoarthritis of spine with radiculopathy, cervical region   Evaluation Date: 7/12/2024  Authorization Period Expiration: 12/31/24  Plan of Care Expiration: 8/23/24  Progress Note Due: Every 6th visit  Visit # / Visits authorized: 9/20 + Eval  FOTO: 1/3     Precautions: Standard      Time In: 1:01 PM  Time Out: 2:11 PM  Total Appointment Time (timed & untimed codes): 70 minutes    PTA Visit #: 0/5       Subjective     Patient reports: Having significant L shoulder pain.   She was compliant with home exercise program.  Response to previous treatment: -  Functional change: -    Pain: 4/10  Location: L neck/R shoulder/arm    Objective      Objective Measures updated at progress report unless specified.     (NT = not tested due to pain and/or inability to obtain test position)     RANGE OF MOTION:     Cervical  Range of Motion Right   (spine) Left     Function & Pain Goal   Cervical Flexion (60º) 40 degrees    45º  Functional   Nonpainful   Cervical Extension (90º) 60 degrees   Discomfort on the L side of the neck 45º  Functional   Nonpainful   Cervical Rotation (75º) WNL WNL* Mild discomfort on the L side of the neck with L rotation 60º  Functional   Nonpainful              Shoulder   Range of Motion Right  Active     Passive Left  Active     Passive Goal   Forward Flexion (180º) 135 WNL 90* 135* 170º   (*) pain and/or dysfunction     STRENGTH:     Upper Extremity  Strength RIGHT    Goal   Shoulder Flexion []1  []2  []3  [x]4  []5                []+ []- 5/5 B   Shoulder Abduction []1  []2  []3  [x]4  []5                []+ []- 5/5 B    Shoulder Internal Rotation  []1  []2  []3  [x]4  []5                []+ []- 5/5 B   Shoulder External Rotation []1  []2  []3  [x]4  []5                []+ []- 5/5 B   Elbow Flexion  []1  []2  []3  [x]4  []5                []+ []- 5/5 B   Elbow Extension []1  []2  []3  [x]4  []5                []+ []- 5/5 B      Upper Extremity  Strength LEFT    Goal   Shoulder Flexion* []1  []2  [x]3  []4  []5                []+ []-  Significant pain in test position 5/5 B   Shoulder Abduction* []1  []2  [x]3  []4  []5                []+ []-  Significant pain in test position 5/5 B   Shoulder Internal Rotation*  []1  []2  []3  [x]4  []5                []+ []- 5/5 B   Shoulder External Rotation* []1  []2  []3  [x]4  []5                []+ []- 5/5 B   Elbow Flexion  []1  []2  []3  [x]4  []5                [x]+ []- 5/5 B   Elbow Extension []1  []2  []3  [x]4  []5                [x]+ []- 5/5 B     Palpation: Increased tone and tenderness noted with palpation to: L UT and LS primarily     Posture:  Pt presents with postural abnormalities which include: forward head and rounded shoulders     FUNCTION:      Intake Outcome Measure for FOTO Neck Survey     Therapist reviewed FOTO scores for Beth Souza on 7/12/2024.   FOTO documents entered into [employer] - see Media section.     Intake Score: 44%  9/13/24: 46%          Treatment     Beth received the treatments listed below:      therapeutic exercises to develop strength, endurance, ROM, and flexibility for 40 minutes including:  UBE (3/3)  Pulleys (2/2)  Wide wand press (2 x 10)  Re-evaluation and education on assessment results     manual therapy techniques: Joint mobilizations, Manual traction, and Soft tissue Mobilization were applied to the: cervical spine for - minutes, including:     neuromuscular re-education activities to improve: Balance, Coordination, Kinesthetic, Sense, Proprioception, and Posture for 20 minutes. The following activities were included:  Shoulder  "Isometrics    Adduction (10 x 10")  Flexion (10 x 10")    Extension (10 x 10")    Abduction (10 x 10")     therapeutic activities to improve functional performance for -  minutes, including:  -    MHP to the cervical spine for 10'.     Patient Education and Home Exercises       Education provided:   - Cont'd HEP    Written Home Exercises Provided: Patient instructed to cont prior HEP. Exercises were reviewed and Beth was able to demonstrate them prior to the end of the session.  Beth demonstrated good  understanding of the education provided. See Electronic Medical Record under Patient Instructions for exercises provided during therapy sessions    Assessment     Pt demonstrates significant improvements in cervical mobility since initiating treatment. Pt also demonstrates improved R shoulder mobility and strength. Pt presents today with increase in L shoulder pain and she plans to follow up with MD next week. Pt demonstrates independence and understanding of desired HEP. Pt educated on how to get a new referral/access PT for L shoulder pain/dysfunction.      Beth Is progressing well towards her goals.   Patient prognosis is Good.     Patient will continue to benefit from skilled outpatient physical therapy to address the deficits listed in the problem list box on initial evaluation, provide pt/family education and to maximize pt's level of independence in the home and community environment.     Patient's spiritual, cultural and educational needs considered and pt agreeable to plan of care and goals.     Anticipated barriers to physical therapy: -    GOALS:  SHORT TERM GOALS: 3 weeks Progress   Recent signs and systems trend is improving in order to progress towards Long term goals's. Met   Patient will be independent with Home Exercise Program  in order to further progress and return to maximal function. Met   Pain rating at Worst: 5/10 in order to progress towards increased independence with activity. " "Met   Patient will be able to correct postural deviations in sitting and standing, to decrease pain and promote postural awareness for injury prevention.  Met   Patient will partially meet predicted functional outcome (FOTO) score: 50% to improve towards increasing self-worth & perceived functional ability. Not met      LONG TERM GOALS: 6 weeks, (8/23/24) Progress   Patient will return to normal Activities of daily living, recreational, and work related activities with less pain and limitation.  Met   Patient will improve Range of Motion to stated goals in order to return to maximal functional potential.  Partially met   Patient will improve Strength to stated goals of appropriate musculature in order to improve functional independence.  Partially met   Pain Rating at Best: 1/10 to improve Quality of Life.  Met   Patient will meet predicted functional outcome (FOTO) score: 58% to increase self-worth & perceived functional ability. Not met   Patient will have met/partially met personal goal of: "Decrease pain with head turning with driving." Met       Plan     Discharge from physical therapy.     Mara Castillo, PT                    "

## 2024-09-26 ENCOUNTER — OFFICE VISIT (OUTPATIENT)
Dept: PODIATRY | Facility: CLINIC | Age: 74
End: 2024-09-26
Payer: MEDICARE

## 2024-09-26 VITALS — HEIGHT: 62 IN | WEIGHT: 219.81 LBS | BODY MASS INDEX: 40.45 KG/M2

## 2024-09-26 DIAGNOSIS — M20.5X1 ACQUIRED ADDUCTOVARUS ROTATION OF TOE, RIGHT: ICD-10-CM

## 2024-09-26 DIAGNOSIS — M20.5X2 ADDUCTOVARUS ROTATION OF TOE, ACQUIRED, LEFT: Primary | ICD-10-CM

## 2024-09-26 DIAGNOSIS — L84 CORN OR CALLUS: ICD-10-CM

## 2024-09-26 PROCEDURE — 1101F PT FALLS ASSESS-DOCD LE1/YR: CPT | Mod: CPTII,S$GLB,, | Performed by: PODIATRIST

## 2024-09-26 PROCEDURE — 99999 PR PBB SHADOW E&M-EST. PATIENT-LVL II: CPT | Mod: PBBFAC,,, | Performed by: PODIATRIST

## 2024-09-26 PROCEDURE — 3288F FALL RISK ASSESSMENT DOCD: CPT | Mod: CPTII,S$GLB,, | Performed by: PODIATRIST

## 2024-09-26 PROCEDURE — 99213 OFFICE O/P EST LOW 20 MIN: CPT | Mod: S$GLB,,, | Performed by: PODIATRIST

## 2024-09-26 PROCEDURE — 3044F HG A1C LEVEL LT 7.0%: CPT | Mod: CPTII,S$GLB,, | Performed by: PODIATRIST

## 2024-09-26 PROCEDURE — 4010F ACE/ARB THERAPY RXD/TAKEN: CPT | Mod: CPTII,S$GLB,, | Performed by: PODIATRIST

## 2024-09-26 PROCEDURE — 3008F BODY MASS INDEX DOCD: CPT | Mod: CPTII,S$GLB,, | Performed by: PODIATRIST

## 2024-09-26 PROCEDURE — 1159F MED LIST DOCD IN RCRD: CPT | Mod: CPTII,S$GLB,, | Performed by: PODIATRIST

## 2024-09-26 PROCEDURE — 1125F AMNT PAIN NOTED PAIN PRSNT: CPT | Mod: CPTII,S$GLB,, | Performed by: PODIATRIST

## 2024-09-26 RX ORDER — DICLOFENAC SODIUM 10 MG/G
2 GEL TOPICAL DAILY
Qty: 50 G | Refills: 2 | Status: SHIPPED | OUTPATIENT
Start: 2024-09-26

## 2024-09-26 NOTE — PROGRESS NOTES
PODIATRIC MEDICINE AND SURGERY   9/26/2024    Reason for Visit   Chief Complaint   Patient presents with    Ingrown Toenail     C/o ingrown toenail both 4th toes, pt does discoloration in the nails, pt rates pain 10/10, pt is non-diabetic,          HPI  This is Beth Souza is a 74 y.o. female who presents today complaining of painful bilateral fifth digits. This is a chronic issue. Pain at lateral aspect of fourth digit b/l.     PMH  Patient Active Problem List    Diagnosis Date Noted    Thoracic aortic aneurysm without rupture 06/26/2024    Aneurysm of aortic arch without rupture 06/17/2024    Acute pain of right shoulder 04/08/2024    Weakness 04/08/2024    Neck pain, chronic 06/22/2023    Major neurocognitive disorder due to another medical condition 03/27/2023    Lumbar radiculopathy 03/21/2023    Prediabetes 07/12/2022    Calcification of aorta 04/11/2022    History of hyperparathyroidism 04/11/2022    Primary osteoarthritis of both hips 06/30/2021    History of COVID-19 03/02/2021    Multiple thyroid nodules 02/01/2021    Meningioma of cerebellum 07/20/2020    Mild cognitive impairment with memory loss 07/06/2020    LEN on CPAP 08/28/2018    Severe obesity with body mass index (BMI) of 35.0 to 39.9 with serious comorbidity 01/02/2018    Osteoarthritis of spine with radiculopathy, lumbar region 07/25/2016    Cataract     Essential hypertension     Vitamin D deficiency     Mixed hyperlipidemia 09/16/2012    Primary hyperparathyroidism 09/16/2012     Past Medical History:   Diagnosis Date    Cataract     Hyperlipidemia     Hypertension     Lumbar spondylosis     Meningioma 2/18/2021    Obesity     Sleep apnea     Vitamin D deficiency        MEDS  Current Outpatient Medications on File Prior to Visit   Medication Sig Dispense Refill    acetaminophen (TYLENOL) 500 MG tablet Take 500 mg by mouth daily as needed for Pain.      ergocalciferol (ERGOCALCIFEROL) 50,000 unit Cap Take 1 capsule by mouth once a  week 12 capsule 0    lisinopriL-hydrochlorothiazide (PRINZIDE,ZESTORETIC) 20-25 mg Tab Take 1 tablet by mouth once daily. 5 tablet 0     No current facility-administered medications on file prior to visit.       PSH     Past Surgical History:   Procedure Laterality Date    HYSTERECTOMY      PARTIAL HYSTERECTOMY      SELECTIVE INJECTION OF ANESTHETIC AGENT AROUND LUMBAR SPINAL NERVE ROOT BY TRANSFORAMINAL APPROACH Bilateral 3/21/2023    Procedure: Bilateral L5/S1 transforaminal epidural steroid injection;  Surgeon: Kush Jha MD;  Location: [facility];  Service: Pain Management;  Laterality: Bilateral;    SELECTIVE INJECTION OF ANESTHETIC AGENT AROUND LUMBAR SPINAL NERVE ROOT BY TRANSFORAMINAL APPROACH Bilateral 2023    Procedure: Bilateral L4/5 and L5-S1 TF LAWSON;  Surgeon: Kush Jha MD;  Location: [facility]IN [facility];  Service: Pain Management;  Laterality: Bilateral;        ALL  Review of patient's allergies indicates:   Allergen Reactions    Aspirin Other (See Comments)     Bruising       SOC     Social History     Tobacco Use    Smoking status: Former     Current packs/day: 0.00     Types: Cigarettes     Quit date: 2004     Years since quittin.7    Smokeless tobacco: Never   Substance Use Topics    Alcohol use: Yes     Comment: occasionally    Drug use: No       Family HX    Family History   Problem Relation Name Age of Onset    Hypertension Mother      Diabetes Daughter      Melanoma Neg Hx      Psoriasis Neg Hx      Lupus Neg Hx      Eczema Neg Hx      Breast cancer Neg Hx      Colon cancer Neg Hx      Ovarian cancer Neg Hx      Thrombosis Neg Hx            REVIEW OF SYSTEMS   General: This patient is well-developed, well-nourished and appears stated age, well-oriented to person, place and time, and cooperative and pleasant on today's visit  Constitutional: Negative for chills and fever.   Respiratory: Negative for shortness of breath.    Cardiovascular: Negative for chest pain,  "palpitations, orthopnea  Gastrointestinal: Negative for diarrhea, nausea and vomiting.   Musculoskeletal: Positive for above noted in HPI  Skin: Positive for nail changes   Peripheral Vascular: no claudication or cyanosis  Psychiatric/Behavioral: Negative for altered mental status       Vitals:    09/26/24 1303   Weight: 99.7 kg (219 lb 12.8 oz)   Height: 5' 2" (1.575 m)   PainSc: 10-Worst pain ever       LOWER EXTREMITY PHYSICAL EXAM      DERMATOLOGIC  Skin moist with healthy texture and turgor.  There are no open ulcerations, lacerations, or fissures to bilateral foot and ankle regions.   There are hyperkeratotic lesions noted dorsolateral fourth digit b/l    NEUROLOGIC  Epicritic sensation is intact as the patient is able to sense light touch to bilateral foot and ankle regions.   Achilles and patellar deep tendon reflexes intact  Babinski reflex absent    ORTHOPEDIC/BIOMECHANICAL  Tenderness to palpation fourth digit b/l    Muscle strength AT/EHL/EDL/PT: 5/5; Achilles/Gastroc/Soleus: 5/5; PB/PL: 5/5 Muscle tone is normal.  Ankle joint ROM INTACT DF/PF, non-crepitus    ASSESSMENT  Encounter Diagnoses   Name Primary?    Adductovarus rotation of toe, acquired, left Yes    Corn or callus     Acquired adductovarus rotation of toe, right          PLAN    Reviewed findings and explained condition to the patient with visual reference to the foot - Listers corn. I explained that the toe deformity is usually inherited or acquired over time. Repetitive pressure along the side of the toe results in a thickened keratotic build up or a corn that can become tender and sensitive. I explained that sometimes surgical intervention involving a soft tissue re-balancing/repair, osseus reconstruction, or a combination of both is the permanent solution, but conservative measures should be attempted first.    Patient was also guided on shoe gear selection of extra depth or larger toe boxed shoes.     Discussed derotational arthroplasty. " Pt would like to hold on surgery due to work responsibilities. Will continue with conservative management at this juncture.         Future Appointments   Date Time Provider Department Center   10/2/2024  2:30 PM Tanvir Yusuf MD [facility] SPOMED [facility]   10/29/2024 11:30 AM Selam Guevara PA-C [facility] PULMSVC [facility]   11/12/2024  9:30 AM Lejeune, Elizabeth B, NP [facility] SLEEP [facility]   1/9/2025  1:40 PM Eamon Reynolds MD [facility] CARDIO [facility]   2/17/2025  2:20 PM Balta Aranda OD [facility] OPHTHAL [facility]       Report Electronically Signed By:     Kareen Guevara DPM   Podiatric Medicine & Surgery  [facility]  9/26/2024

## 2024-09-27 ENCOUNTER — TELEPHONE (OUTPATIENT)
Dept: PODIATRY | Facility: CLINIC | Age: 74
End: 2024-09-27
Payer: MEDICARE

## 2024-09-27 NOTE — TELEPHONE ENCOUNTER
LVM informed pt its used for pain as well not just used for arthritis     ----- Message from Sabrina Caruso sent at 9/27/2024  1:10 PM CDT -----  Contact: self  .Type:  Needs Medical Advice    Who Called: .Beth Souza  Pharmacy name and phone #:  .  [facility] [address] [place], [place] - [address]  [address] [address]  Phone: [phone] Fax: [phone]  Would the patient rather a call back or a response via [facility]? Call back   Best Call Back Number: .[phone]   Additional Information: pt is asking for an return call in reference to medication prescribed to her diclofenac sodium (VOLTAREN ARTHRITIS PAIN) 1 % Gel states she is needing to know if medication to treat what she has because medication states its for arthritis

## 2024-10-02 ENCOUNTER — OFFICE VISIT (OUTPATIENT)
Dept: SPORTS MEDICINE | Facility: CLINIC | Age: 74
End: 2024-10-02
Payer: MEDICARE

## 2024-10-02 ENCOUNTER — HOSPITAL ENCOUNTER (OUTPATIENT)
Dept: RADIOLOGY | Facility: HOSPITAL | Age: 74
Discharge: HOME | End: 2024-10-02
Attending: STUDENT IN AN ORGANIZED HEALTH CARE EDUCATION/TRAINING PROGRAM
Payer: MEDICARE

## 2024-10-02 VITALS — HEIGHT: 62 IN | BODY MASS INDEX: 40.45 KG/M2 | WEIGHT: 219.81 LBS

## 2024-10-02 DIAGNOSIS — M54.16 LUMBAR RADICULOPATHY: Primary | ICD-10-CM

## 2024-10-02 DIAGNOSIS — M79.605 LEFT LEG PAIN: ICD-10-CM

## 2024-10-02 DIAGNOSIS — M79.605 LEFT LEG PAIN: Primary | ICD-10-CM

## 2024-10-02 PROCEDURE — 72100 X-RAY EXAM L-S SPINE 2/3 VWS: CPT | Mod: TC

## 2024-10-02 PROCEDURE — 3044F HG A1C LEVEL LT 7.0%: CPT | Mod: CPTII,S$GLB,, | Performed by: STUDENT IN AN ORGANIZED HEALTH CARE EDUCATION/TRAINING PROGRAM

## 2024-10-02 PROCEDURE — 73502 X-RAY EXAM HIP UNI 2-3 VIEWS: CPT | Mod: TC,LT

## 2024-10-02 PROCEDURE — 3008F BODY MASS INDEX DOCD: CPT | Mod: CPTII,S$GLB,, | Performed by: STUDENT IN AN ORGANIZED HEALTH CARE EDUCATION/TRAINING PROGRAM

## 2024-10-02 PROCEDURE — 3288F FALL RISK ASSESSMENT DOCD: CPT | Mod: CPTII,S$GLB,, | Performed by: STUDENT IN AN ORGANIZED HEALTH CARE EDUCATION/TRAINING PROGRAM

## 2024-10-02 PROCEDURE — 72100 X-RAY EXAM L-S SPINE 2/3 VWS: CPT | Mod: 26,,, | Performed by: RADIOLOGY

## 2024-10-02 PROCEDURE — 1160F RVW MEDS BY RX/DR IN RCRD: CPT | Mod: CPTII,S$GLB,, | Performed by: STUDENT IN AN ORGANIZED HEALTH CARE EDUCATION/TRAINING PROGRAM

## 2024-10-02 PROCEDURE — 1125F AMNT PAIN NOTED PAIN PRSNT: CPT | Mod: CPTII,S$GLB,, | Performed by: STUDENT IN AN ORGANIZED HEALTH CARE EDUCATION/TRAINING PROGRAM

## 2024-10-02 PROCEDURE — 1159F MED LIST DOCD IN RCRD: CPT | Mod: CPTII,S$GLB,, | Performed by: STUDENT IN AN ORGANIZED HEALTH CARE EDUCATION/TRAINING PROGRAM

## 2024-10-02 PROCEDURE — 99214 OFFICE O/P EST MOD 30 MIN: CPT | Mod: S$GLB,,, | Performed by: STUDENT IN AN ORGANIZED HEALTH CARE EDUCATION/TRAINING PROGRAM

## 2024-10-02 PROCEDURE — 4010F ACE/ARB THERAPY RXD/TAKEN: CPT | Mod: CPTII,S$GLB,, | Performed by: STUDENT IN AN ORGANIZED HEALTH CARE EDUCATION/TRAINING PROGRAM

## 2024-10-02 PROCEDURE — 1101F PT FALLS ASSESS-DOCD LE1/YR: CPT | Mod: CPTII,S$GLB,, | Performed by: STUDENT IN AN ORGANIZED HEALTH CARE EDUCATION/TRAINING PROGRAM

## 2024-10-02 PROCEDURE — 99999 PR PBB SHADOW E&M-EST. PATIENT-LVL III: CPT | Mod: PBBFAC,,, | Performed by: STUDENT IN AN ORGANIZED HEALTH CARE EDUCATION/TRAINING PROGRAM

## 2024-10-02 PROCEDURE — 73502 X-RAY EXAM HIP UNI 2-3 VIEWS: CPT | Mod: 26,LT,, | Performed by: RADIOLOGY

## 2024-10-02 RX ORDER — METHYLPREDNISOLONE 4 MG/1
TABLET ORAL
Qty: 21 EACH | Refills: 0 | Status: SHIPPED | OUTPATIENT
Start: 2024-10-02 | End: 2024-10-23

## 2024-10-02 NOTE — PROGRESS NOTES
Orthopaedics Sports Medicine     Leg pain Initial Visit         10/2/2024    Referring MD: Self, Aaareferral    Chief Complaint   Patient presents with    Right Leg - Pain         History of Present Illness:   Beth Souza is a 74 y.o. female who presents with leg pain and dysfunction, worse in the right leg.     Inciting event: no specific injury, gradual onset of symptoms     Current symptoms include soreness, tenderness, weakness in the legs and shooting/sharp pain in the right leg     Pain is aggravated by standing, walking, ADL's      Evaluation to date: XR     Treatment to date: rest, activity modification, tylenol, heating pad     Past Medical History:   Past Medical History:   Diagnosis Date    Cataract     Hyperlipidemia     Hypertension     Lumbar spondylosis     Meningioma 2/18/2021    Obesity     Sleep apnea     Vitamin D deficiency        Past Surgical History:   Past Surgical History:   Procedure Laterality Date    HYSTERECTOMY      PARTIAL HYSTERECTOMY      SELECTIVE INJECTION OF ANESTHETIC AGENT AROUND LUMBAR SPINAL NERVE ROOT BY TRANSFORAMINAL APPROACH Bilateral 3/21/2023    Procedure: Bilateral L5/S1 transforaminal epidural steroid injection;  Surgeon: Kush Jha MD;  Location: [facility];  Service: Pain Management;  Laterality: Bilateral;    SELECTIVE INJECTION OF ANESTHETIC AGENT AROUND LUMBAR SPINAL NERVE ROOT BY TRANSFORAMINAL APPROACH Bilateral 12/12/2023    Procedure: Bilateral L4/5 and L5-S1 TF LAWSON;  Surgeon: Kush Jha MD;  Location: [facility];  Service: Pain Management;  Laterality: Bilateral;       Medications:  Patient's Medications   New Prescriptions    No medications on file   Previous Medications    ACETAMINOPHEN (TYLENOL) 500 MG TABLET    Take 500 mg by mouth daily as needed for Pain.    DICLOFENAC SODIUM (VOLTAREN ARTHRITIS PAIN) 1 % GEL    Apply 2 g topically once daily.    ERGOCALCIFEROL (ERGOCALCIFEROL) 50,000 UNIT CAP    Take 1 capsule by  "mouth once a week    LISINOPRIL-HYDROCHLOROTHIAZIDE (PRINZIDE,ZESTORETIC) 20-25 MG TAB    Take 1 tablet by mouth once daily.   Modified Medications    No medications on file   Discontinued Medications    No medications on file       Allergies:   Review of patient's allergies indicates:   Allergen Reactions    Aspirin Other (See Comments)     Bruising       Social History:   Alcohol use: She reports current alcohol use.  Tobacco use: She reports that she quit smoking about 20 years ago. Her smoking use included cigarettes. She has never used smokeless tobacco.    Review of systems:  History of recent illness, fevers, shakes, or chills: no    Physical Examination:  Estimated body mass index is 40.2 kg/m² as calculated from the following:    Height as of this encounter: 5' 2" (1.575 m).    Weight as of this encounter: 99.7 kg (219 lb 12.8 oz).    General  Healthy appearing female in no acute distress  Alert and oriented, normal mood, appropriate affect    Back Exam     Muscle Strength   Right Quadriceps:  4/5   Left Quadriceps:  4/5     Reflexes   Patellar:  normal  Achilles:  normal          Imaging:  X-Ray Hip 2 or 3 views Left with Pelvis when performed  Narrative: EXAM: XR HIP WITH PELVIS WHEN PERFORMED 2 OR 3 VIEWS LEFT    CLINICAL HISTORY: Left flank pain    FINDINGS:  Compared to 10/31/2022.    A frontal view of the pelvis including both hips and additional frontal and frog leg views of the left hip were performed.  No fracture, dislocation or focal bone lesions are identified.  Mild degenerative changes are visible at both hips and appear unchanged since 2022.  Impression:  Mild degenerative changes at both hips, no evidence of acute injury is appreciated.    Finalized on: 10/2/2024 2:55 PM By:  Jasper Segura  [facility], [place]# [id number]      2024-10-02 [id number]    RG       Physician Read: I agree with the above impression.      Impression:  74 y.o. female with lumbar radicupathy      Plan:  Discussed diagnosis " and treatment options with the patient today. Her history and physical exam are most consistent with lower back pain and lumbar radiculopathy.  I will order PT at [facility] - 2-3x per week for 6-8 weeks  We will prescribe medrol dosepack   Discussed if continued pain will refer over to the back and spine team who she has seen before in the past for consideration of injections  Follow up with me as needed           Tanvir Yusuf MD

## 2024-10-07 ENCOUNTER — CLINICAL SUPPORT (OUTPATIENT)
Dept: REHABILITATION | Facility: HOSPITAL | Age: 74
End: 2024-10-07
Attending: STUDENT IN AN ORGANIZED HEALTH CARE EDUCATION/TRAINING PROGRAM
Payer: MEDICARE

## 2024-10-07 DIAGNOSIS — M25.612 DECREASED ROM OF LEFT SHOULDER: Primary | ICD-10-CM

## 2024-10-07 DIAGNOSIS — M54.16 LUMBAR RADICULOPATHY: ICD-10-CM

## 2024-10-07 PROCEDURE — 97161 PT EVAL LOW COMPLEX 20 MIN: CPT | Mod: PN | Performed by: PHYSICAL THERAPIST

## 2024-10-07 PROCEDURE — 97530 THERAPEUTIC ACTIVITIES: CPT | Mod: PN | Performed by: PHYSICAL THERAPIST

## 2024-10-07 NOTE — PLAN OF CARE
[facility]   Physical Therapy Initial Evaluation      Name: Beth Souza  Clinic Number: [id number]    Therapy Diagnosis:   Encounter Diagnosis   Name Primary?    Lumbar radiculopathy         Physician: Tanvir Yusuf    Physician Orders: PT Eval and Treat   Medical Diagnosis from Referral: M54.16 (ICD-10-CM) - Lumbar radiculopathy  Evaluation Date: 10/7/2024  Authorization Period Expiration: 12/31/2024  Plan of Care Expiration: 11/15/2024  Progress Note Due: 30 days  Visit # / Visits authorized: 1/ 1   FOTO: 1/ 3    Precautions: Standard     Time In: 1:10pm  Time Out: 1:50pm  Total Billable Time: 40 minutes    Subjective     Date of onset: several months    History of current condition - Beth reports: that her legs are giving her trouble.  She has been having weakness in the legs.  MD gave her some medication and her legs have been better.  She is still having pain in the neck and shoulders    Falls: 0    Imaging: Xrays:     Prior Therapy: yes for the neck  Social History:  lives alone  Occupation: sitter  Prior Level of Function: WNL  Current Level of Function: Difficulty with using her Left upper extremity and difficulty with prolonged walking and standing    Pain:  Legs 0/10, worst 10/10,   Shoulders: Right 4/10 Left 9/10  Description: Variable  Aggravating Factors: Any movement to the shoulders is painful, weakness in the legs was more of the issue  Easing Factors: pain medication    Patients goals: Pt would like to get rid of all the pain.      Medical History:   Past Medical History:   Diagnosis Date    Cataract     Hyperlipidemia     Hypertension     Lumbar spondylosis     Meningioma 2/18/2021    Obesity     Sleep apnea     Vitamin D deficiency        Surgical History:   Beth Souza  has a past surgical history that includes Partial hysterectomy; Hysterectomy; Selective injection of anesthetic agent around lumbar spinal nerve root by transforaminal  approach (Bilateral, 3/21/2023); and Selective injection of anesthetic agent around lumbar spinal nerve root by transforaminal approach (Bilateral, 12/12/2023).    Medications:   Beth has a current medication list which includes the following prescription(s): acetaminophen, diclofenac sodium, ergocalciferol, lisinopril-hydrochlorothiazide, and methylprednisolone.    Allergies:   Review of patient's allergies indicates:   Allergen Reactions    Aspirin Other (See Comments)     Bruising        Objective      Posture Observation:  forward head and shoulders    Gait Observation:  slowed antalgic gait patter    ROM:  Shoulder  AROM Right Left Pain/Dysfunction with Movement   flexion  130  90    extension  50  45    abduction  120  90    Internal rotation  60  45    ER  55  45      AROM:  Cervical Range of motion WNL in all directions    Strength:  U/E MMT Right Left Pain/Dysfunction with Movement   Cervical SB 5/5 5/5    Upper Trapezial 5/5 4+/5    Shoulder Flexion 4+/5 4/5    Shoulder Abduction 4+/5 4/5    Elbow Flexion (C5)  5/5 5/5    Elbow Extension (C7) 5/5 5/5    Shoulder ER  @ 0* Abduction 4+/5 4-/5    Shoulder IR  @ 0* Abduction 4+/5 4/5    Wrist extension (C6) 5/5 5/5     Strength  5/5 5/5    Intrinsic strength (4th/5th fingers) 5/5 5/5    Serratus Anterior 4+/5 4+/5    Supraspinatus 4+/5 4/5    Rhomboids 4+/5 4+/5    Mid Traps 4+/5 4+/5    Low Traps 4+/5 4+/5           AROM:    Thoracolumbar  (single inclinometer at L4/5) AROM  (degrees) Pain/Dysfunction with Movement   Flexion 75%    Extension 50%    Right side bending 50%    Left side bending 50%    Right rotation 75%    Left rotation 75%      Strength:     L/E MMT Right Left Pain/Dysfunction with Movement   Hip Flexion 4+/5 4+/5    Hip Extension 4+/5 4+/5    Hip Abduction 4+/5 4+/5    Hip Adduction 4+/5 4+/5    Hip IR 4+/5 4+/5    Hip ER 4+/5 4+/5    Knee Flexion 4+/5 4+/5    Knee Extension 4+/5 4+/5    Ankle DF 4+/5 4+/5    Ankle PF 4+/5 4+/5          Intake Outcome Measure for FOTO Lumbar Survey    Therapist reviewed FOTO scores for Beth Souza on 10/7/2024.   FOTO report - see Media section or FOTO account episode details.    Intake Score: 34/100         Treatment     Total Treatment time (time-based codes) separate from Evaluation: 25 minutes     Beth received the treatments listed below:      therapeutic activities to improve functional performance for 25 minutes, including:   Reviewed HEP for shoulder  Sit to stand 10x  LAQ 10x    Patient Education and Home Exercises     Education provided:   - HEP and plan of care    Written Home Exercises Provided: Yes. Exercises were reviewed and Beth was able to demonstrate them prior to the end of the session.  Beth demonstrated good  understanding of the education provided. See EMR under Patient Instructions for exercises provided during therapy sessions.    Assessment     Beth is a 74 y.o. female referred to outpatient Physical Therapy with a medical diagnosis of lumbar radiculpathy. Patient presents with weakness in BLEs, decreased Range of motion of the L shoulder and lumbar spine, difficulty with performing daily activities, and difficulty with reaching with Left upper extremity, and difficulty with prolonged walking and standing.     Patient prognosis is Good.   Patient will benefit from skilled outpatient Physical Therapy to address the deficits stated above and in the chart below, provide patient /family education, and to maximize patientt's level of independence.     Plan of care discussed with patient: Yes  Patient's spiritual, cultural and educational needs considered and patient is agreeable to the plan of care and goals as stated below:     Anticipated Barriers for therapy: none    Medical Necessity is demonstrated by the following  History  Co-morbidities and personal factors that may impact the plan of care [] LOW: no personal factors / co-morbidities  [x] MODERATE: 1-2  personal factors / co-morbidities  [] HIGH: 3+ personal factors / co-morbidities    Moderate / High Support Documentation:   Co-morbidities affecting plan of care: See PMHx    Personal Factors:   no deficits     Examination  Body Structures and Functions, activity limitations and participation restrictions that may impact the plan of care [] LOW: addressing 1-2 elements  [x] MODERATE: 3+ elements  [] HIGH: 4+ elements (please support below)    Moderate / High Support Documentation: See FOTO documentation     Clinical Presentation [x] LOW: stable  [] MODERATE: Evolving  [] HIGH: Unstable     Decision Making/ Complexity Score: low       Goals:  SHORT TERM GOALS: 2 weeks Progress   Recent signs and systems trend is improving in order to progress towards Long term goals's.     Patient will be independent with Home Exercise Program  in order to further progress and return to maximal function.     Pain rating at Worst: 5/10 in order to progress towards increased independence with activity.     Patient will be able to correct postural deviations in sitting and standing, to decrease pain and promote postural awareness for injury prevention.      Patient will partially meet predicted functional outcome (FOTO) score: 45% to improve towards increasing self-worth & perceived functional ability.        LONG TERM GOALS: 6 weeks, (11/15/24) Progress   Patient will return to normal Activities of daily living, recreational, and work related activities with less pain and limitation.      Patient will improve Range of Motion to stated goals in order to return to maximal functional potential.      Patient will improve Strength to stated goals of appropriate musculature in order to improve functional independence.      Pain Rating at Best: 1/10 to improve Quality of Life.      Patient will meet predicted functional outcome (FOTO) score: 65% to increase self-worth & perceived functional ability.           Plan     Plan of care  Certification: 10/7/2024 to 11/15/2024.    Outpatient Physical Therapy 2 times weekly for 6 weeks to include the following interventions: Cervical/Lumbar Traction, Electrical Stimulation PRN, Gait Training, Manual Therapy, Moist Heat/ Ice, Neuromuscular Re-ed, Patient Education, Self Care, Therapeutic Activities, Therapeutic Exercise, and FDN .     Marcella Ulloa PT        Physician's Signature: _________________________________________ Date: ________________

## 2024-10-11 NOTE — PROGRESS NOTES
[facility]   Physical Therapy Treatment Note      Name: Beth Souza  Clinic Number: [id number]    Therapy Diagnosis:   Encounter Diagnoses   Name Primary?    Lumbar radiculopathy Yes    Decreased ROM of left shoulder      Physician: Tanvir Yusuf    Visit Date: 10/14/2024    Physician Orders: PT Eval and Treat   Medical Diagnosis from Referral: M54.16 (ICD-10-CM) - Lumbar radiculopathy  Evaluation Date: 10/7/2024  Authorization Period Expiration: 12/31/2024  Plan of Care Expiration: 11/15/2024  Progress Note Due: 30 days  Visit # / Visits authorized: 1/10 + Eval  FOTO: 1/ 3     Precautions: Standard      Time In: 1:05 PM  Time Out: 2:05 PM  Total Billable Time: 60 minutes    PTA Visit #: 0/5       Subjective     Patient reports: Cont's to note weakness in B LE.  She was compliant with home exercise program.  Response to previous treatment: -  Functional change: -    Pain: 4/10  Location: legs and shoulders    Objective      Objective Measures updated at progress report unless specified.     Treatment     eBth received the treatments listed below:      therapeutic exercises to develop strength, endurance, ROM, and flexibility for 35 minutes including:  Nustep (5')  STS (3 x 10)  Shuttle  DL Squat (3 x 10, 6 bands)  SL Squat (3 x 10, 4 bands)  DKTC (2 x 10)  LTRs (3 x 10)    manual therapy techniques: Joint mobilizations, Manual traction, and Soft tissue Mobilization were applied to the: - for - minutes, including:  -    neuromuscular re-education activities to improve: Balance, Coordination, Kinesthetic, Sense, Proprioception, and Posture for 25 minutes. The following activities were included:  LAQ (3 x 10, 4 lbs)  SLR (2 x 10)  SL Hip Abduction (2 x 10) - L only due to pain when laying on the L hip  SL Clamshell (2 x 10)    therapeutic activities to improve functional performance for -  minutes, including:  -    Patient Education and Home Exercises       Education  provided:   - Cont'd HEP    Written Home Exercises Provided: Pt instructed to continue prior HEP. Exercises were reviewed and Beth was able to demonstrate them prior to the end of the session.  Beth demonstrated good  understanding of the education provided. See Electronic Medical Record under Patient Instructions for exercises provided during therapy sessions    Assessment     Pt demonstrates independence with HEP provided. Pt requires cuing during SLR to achieve TKE and maintain knee extension with exercise performance. Pt has difficulty laying on her L hip, requiring modifications for exercise performance.     Beth Is progressing well towards her goals.   Patient prognosis is Good.     Patient will continue to benefit from skilled outpatient physical therapy to address the deficits listed in the problem list box on initial evaluation, provide pt/family education and to maximize pt's level of independence in the home and community environment.     Patient's spiritual, cultural and educational needs considered and pt agreeable to plan of care and goals.     Anticipated barriers to physical therapy: none    Goals:  SHORT TERM GOALS: 2 weeks Progress   Recent signs and systems trend is improving in order to progress towards Long term goals's. Progressing   Patient will be independent with Home Exercise Program  in order to further progress and return to maximal function. Progressing   Pain rating at Worst: 5/10 in order to progress towards increased independence with activity. Progressing   Patient will be able to correct postural deviations in sitting and standing, to decrease pain and promote postural awareness for injury prevention.  Progressing   Patient will partially meet predicted functional outcome (FOTO) score: 45% to improve towards increasing self-worth & perceived functional ability. Progressing      LONG TERM GOALS: 6 weeks, (11/15/24) Progress   Patient will return to normal Activities of  daily living, recreational, and work related activities with less pain and limitation.  Progressing   Patient will improve Range of Motion to stated goals in order to return to maximal functional potential.  Progressing   Patient will improve Strength to stated goals of appropriate musculature in order to improve functional independence.  Progressing   Pain Rating at Best: 1/10 to improve Quality of Life.  Progressing   Patient will meet predicted functional outcome (FOTO) score: 65% to increase self-worth & perceived functional ability. Progressing         Plan     Plan of care Certification: 10/7/2024 to 11/15/2024.     Outpatient Physical Therapy 2 times weekly for 6 weeks to include the following interventions: Cervical/Lumbar Traction, Electrical Stimulation PRN, Gait Training, Manual Therapy, Moist Heat/ Ice, Neuromuscular Re-ed, Patient Education, Self Care, Therapeutic Activities, Therapeutic Exercise, and FDN .     Mara Castillo, PT

## 2024-10-14 ENCOUNTER — CLINICAL SUPPORT (OUTPATIENT)
Dept: REHABILITATION | Facility: HOSPITAL | Age: 74
End: 2024-10-14
Payer: MEDICARE

## 2024-10-14 DIAGNOSIS — M25.612 DECREASED ROM OF LEFT SHOULDER: ICD-10-CM

## 2024-10-14 DIAGNOSIS — M54.16 LUMBAR RADICULOPATHY: Primary | ICD-10-CM

## 2024-10-14 PROCEDURE — 97112 NEUROMUSCULAR REEDUCATION: CPT | Mod: KX,PN

## 2024-10-14 PROCEDURE — 97110 THERAPEUTIC EXERCISES: CPT | Mod: KX,PN

## 2024-10-15 RX ORDER — ERGOCALCIFEROL 1.25 MG/1
50000 CAPSULE ORAL
Qty: 12 CAPSULE | Refills: 0 | Status: SHIPPED | OUTPATIENT
Start: 2024-10-15

## 2024-10-15 NOTE — TELEPHONE ENCOUNTER
No care due was identified.  [facility] Embedded Care Due Messages. Reference number: [id number].   10/15/2024 2:57:43 PM CDT

## 2024-10-18 ENCOUNTER — TELEPHONE (OUTPATIENT)
Dept: PULMONOLOGY | Facility: CLINIC | Age: 74
End: 2024-10-18
Payer: MEDICARE

## 2024-10-18 NOTE — TELEPHONE ENCOUNTER
Called and spoke with pt- stopped using machine as she was having nose drying/burning. She has been putting water in the chamber. I increased her humidity from 4 to 6 remotely. Encouraged her to contact DME if problems persist.       ----- Message from Med Assistant Gigi sent at 10/18/2024 11:29 AM CDT -----  Contact: pt    ----- Message -----  From: Kamila Zimmerman  Sent: 10/18/2024  11:06 AM CDT  To: [facility] Staff    Type:  Needs Medical Advice    Who Called:  pt  Symptoms (please be specific):  her cpap machine is burning her nose   How long has patient had these symptoms:  for about 2 weeks  Pharmacy name and phone #:    Would the patient rather a call back or a response via [facility]? phone  Best Call Back Number:  [phone]  Additional Information:

## 2024-10-18 NOTE — PROGRESS NOTES
[facility]   Physical Therapy Treatment Note      Name: Beth Souza  Clinic Number: [id number]    Therapy Diagnosis:   Encounter Diagnoses   Name Primary?    Lumbar radiculopathy Yes    Decreased ROM of left shoulder        Physician: Tanvir Yusuf    Visit Date: 10/21/2024    Physician Orders: PT Eval and Treat   Medical Diagnosis from Referral: M54.16 (ICD-10-CM) - Lumbar radiculopathy  Evaluation Date: 10/7/2024  Authorization Period Expiration: 12/31/2024  Plan of Care Expiration: 11/15/2024  Progress Note Due: 30 days  Visit # / Visits authorized: 2/10 + Eval  FOTO: 1/ 3     Precautions: Standard      Time In: 1:10 PM  Time Out: 2:25 PM  Total Billable Time: 75 minutes    PTA Visit #: 0/5       Subjective     Patient reports: Cont'd shoulder pain that she wants to be treated for. Back and legs feeling okay.   She was compliant with home exercise program.  Response to previous treatment: -  Functional change: -    Pain: 4/10  Location: legs and shoulders    Objective      Objective Measures updated at progress report unless specified.     Treatment     Beth received the treatments listed below:      therapeutic exercises to develop strength, endurance, ROM, and flexibility for 40 minutes including:  Nustep (5')  STS (3 x 10, chair or 18inch box)  Shuttle  DL Squat (3 x 10, 6 bands)  SL Squat (3 x 10, 4 bands)  DKTC (2 x 10)  LTRs (3 x 10)     manual therapy techniques: Joint mobilizations, Manual traction, and Soft tissue Mobilization were applied to the: - for - minutes, including:  -     neuromuscular re-education activities to improve: Balance, Coordination, Kinesthetic, Sense, Proprioception, and Posture for 35 minutes. The following activities were included:  LAQ (3 x 10, 4 lbs)  SLR (2 x 10)  Supine hip abduction (3 x 10)  Supine Clamshell (3 x 10)   Bent knee fall out (3 x 10)     therapeutic activities to improve functional performance for - minutes,  including:  -    Patient Education and Home Exercises       Education provided:   - Cont'd HEP    Written Home Exercises Provided: Pt instructed to continue prior HEP. Exercises were reviewed and Beth was able to demonstrate them prior to the end of the session.  Beth demonstrated good  understanding of the education provided. See Electronic Medical Record under Patient Instructions for exercises provided during therapy sessions    Assessment     Pt educated on how to get referral for B shoulder pain in order to receive treatment. Pt demonstrates good tolerance to advancements in treatment today. Pt requires modification to SL activities due to reported shoulder and hip pain in sidelying.     Beth Is progressing well towards her goals.   Patient prognosis is Good.     Patient will continue to benefit from skilled outpatient physical therapy to address the deficits listed in the problem list box on initial evaluation, provide pt/family education and to maximize pt's level of independence in the home and community environment.     Patient's spiritual, cultural and educational needs considered and pt agreeable to plan of care and goals.     Anticipated barriers to physical therapy: none    Goals:  SHORT TERM GOALS: 2 weeks Progress   Recent signs and systems trend is improving in order to progress towards Long term goals's. Progressing   Patient will be independent with Home Exercise Program  in order to further progress and return to maximal function. Progressing   Pain rating at Worst: 5/10 in order to progress towards increased independence with activity. Progressing   Patient will be able to correct postural deviations in sitting and standing, to decrease pain and promote postural awareness for injury prevention.  Progressing   Patient will partially meet predicted functional outcome (FOTO) score: 45% to improve towards increasing self-worth & perceived functional ability. Progressing      LONG TERM  GOALS: 6 weeks, (11/15/24) Progress   Patient will return to normal Activities of daily living, recreational, and work related activities with less pain and limitation.  Progressing   Patient will improve Range of Motion to stated goals in order to return to maximal functional potential.  Progressing   Patient will improve Strength to stated goals of appropriate musculature in order to improve functional independence.  Progressing   Pain Rating at Best: 1/10 to improve Quality of Life.  Progressing   Patient will meet predicted functional outcome (FOTO) score: 65% to increase self-worth & perceived functional ability. Progressing         Plan     Plan of care Certification: 10/7/2024 to 11/15/2024.     Outpatient Physical Therapy 2 times weekly for 6 weeks to include the following interventions: Cervical/Lumbar Traction, Electrical Stimulation PRN, Gait Training, Manual Therapy, Moist Heat/ Ice, Neuromuscular Re-ed, Patient Education, Self Care, Therapeutic Activities, Therapeutic Exercise, and FDN .     Mara Castillo, PT

## 2024-10-21 ENCOUNTER — CLINICAL SUPPORT (OUTPATIENT)
Dept: REHABILITATION | Facility: HOSPITAL | Age: 74
End: 2024-10-21
Payer: MEDICARE

## 2024-10-21 DIAGNOSIS — M54.16 LUMBAR RADICULOPATHY: Primary | ICD-10-CM

## 2024-10-21 DIAGNOSIS — M25.612 DECREASED ROM OF LEFT SHOULDER: ICD-10-CM

## 2024-10-21 PROCEDURE — 97110 THERAPEUTIC EXERCISES: CPT | Mod: KX,PN

## 2024-10-21 PROCEDURE — 97112 NEUROMUSCULAR REEDUCATION: CPT | Mod: KX,PN

## 2024-10-22 NOTE — PROGRESS NOTES
"[facility]   Physical Therapy Treatment Note      Name: Beth Souza  Clinic Number: [id number]    Therapy Diagnosis:   Encounter Diagnoses   Name Primary?    Lumbar radiculopathy Yes    Decreased ROM of left shoulder          Physician: Tanvir Yusuf    Visit Date: 10/23/2024    Physician Orders: PT Eval and Treat   Medical Diagnosis from Referral: M54.16 (ICD-10-CM) - Lumbar radiculopathy  Evaluation Date: 10/7/2024  Authorization Period Expiration: 12/31/2024  Plan of Care Expiration: 11/15/2024  Progress Note Due: 30 days  Visit # / Visits authorized: 3/10 + Eval  FOTO: 1/ 3     Precautions: Standard      Time In: 1:10 PM  Time Out: 2:10 PM  Total Billable Time: 60 minutes    PTA Visit #: 0/5       Subjective     Patient reports: Legs are feeling pretty good. Shoulder still bothering her, but trying to get a referral for this.   She was compliant with home exercise program.  Response to previous treatment: -  Functional change: -    Pain: 4/10  Location: legs and shoulders    Objective      Objective Measures updated at progress report unless specified.     Treatment     Beth received the treatments listed below:      therapeutic exercises to develop strength, endurance, ROM, and flexibility for 42 minutes including:  Nustep (5')  STS (3 x 10, 16") - hands on thigh  Matrix Knee extension (3 x 10, 20 lbs)  Matrix Knee flexion (3 x 10, 25 lbs)  Shuttle  DL Squat (3 x 10, 7 bands)  SL Squat (3 x 10, 4 bands)     manual therapy techniques: Joint mobilizations, Manual traction, and Soft tissue Mobilization were applied to the: - for - minutes, including:  -     neuromuscular re-education activities to improve: Balance, Coordination, Kinesthetic, Sense, Proprioception, and Posture for 18 minutes. The following activities were included:  Standing hip abduction (2 x 10) - pole for assistance with balance  Standing hip extension (2 x 10)  Standing heel/toe rocking (3 x " 10)     therapeutic activities to improve functional performance for - minutes, including:  -    Patient Education and Home Exercises       Education provided:   - Cont'd HEP    Written Home Exercises Provided: Pt instructed to continue prior HEP. Exercises were reviewed and Beth was able to demonstrate them prior to the end of the session.  Beth demonstrated good  understanding of the education provided. See Electronic Medical Record under Patient Instructions for exercises provided during therapy sessions    Assessment     Pt demonstrates good tolerance to advancements in exercise performance. Pt requires cuing to improve mechanics with new exercise equipment utilized. Pt educated on knee extension and flexion machines that can be found in a typical gym/community recreation center.     Beth Is progressing well towards her goals.   Patient prognosis is Good.     Patient will continue to benefit from skilled outpatient physical therapy to address the deficits listed in the problem list box on initial evaluation, provide pt/family education and to maximize pt's level of independence in the home and community environment.     Patient's spiritual, cultural and educational needs considered and pt agreeable to plan of care and goals.     Anticipated barriers to physical therapy: none    Goals:  SHORT TERM GOALS: 2 weeks Progress   Recent signs and systems trend is improving in order to progress towards Long term goals's. Progressing   Patient will be independent with Home Exercise Program  in order to further progress and return to maximal function. Progressing   Pain rating at Worst: 5/10 in order to progress towards increased independence with activity. Progressing   Patient will be able to correct postural deviations in sitting and standing, to decrease pain and promote postural awareness for injury prevention.  Progressing   Patient will partially meet predicted functional outcome (FOTO) score: 45% to  improve towards increasing self-worth & perceived functional ability. Progressing      LONG TERM GOALS: 6 weeks, (11/15/24) Progress   Patient will return to normal Activities of daily living, recreational, and work related activities with less pain and limitation.  Progressing   Patient will improve Range of Motion to stated goals in order to return to maximal functional potential.  Progressing   Patient will improve Strength to stated goals of appropriate musculature in order to improve functional independence.  Progressing   Pain Rating at Best: 1/10 to improve Quality of Life.  Progressing   Patient will meet predicted functional outcome (FOTO) score: 65% to increase self-worth & perceived functional ability. Progressing         Plan     Plan of care Certification: 10/7/2024 to 11/15/2024.     Outpatient Physical Therapy 2 times weekly for 6 weeks to include the following interventions: Cervical/Lumbar Traction, Electrical Stimulation PRN, Gait Training, Manual Therapy, Moist Heat/ Ice, Neuromuscular Re-ed, Patient Education, Self Care, Therapeutic Activities, Therapeutic Exercise, and FDN .     Mara Castillo, PT

## 2024-10-23 ENCOUNTER — TELEPHONE (OUTPATIENT)
Dept: SLEEP MEDICINE | Facility: CLINIC | Age: 74
End: 2024-10-23
Payer: MEDICARE

## 2024-10-23 ENCOUNTER — CLINICAL SUPPORT (OUTPATIENT)
Dept: REHABILITATION | Facility: HOSPITAL | Age: 74
End: 2024-10-23
Payer: MEDICARE

## 2024-10-23 DIAGNOSIS — M25.612 DECREASED ROM OF LEFT SHOULDER: ICD-10-CM

## 2024-10-23 DIAGNOSIS — M54.16 LUMBAR RADICULOPATHY: Primary | ICD-10-CM

## 2024-10-23 PROCEDURE — 97110 THERAPEUTIC EXERCISES: CPT | Mod: KX,PN

## 2024-10-23 PROCEDURE — 97112 NEUROMUSCULAR REEDUCATION: CPT | Mod: KX,PN

## 2024-10-23 NOTE — TELEPHONE ENCOUNTER
----- Message from Ch[place]o sent at 10/23/2024  8:21 AM CDT -----  Contact: lea Campos is requesting a call back in regards to her cpap machine.   Please give her a call back at [phone]

## 2024-10-28 ENCOUNTER — CLINICAL SUPPORT (OUTPATIENT)
Dept: REHABILITATION | Facility: HOSPITAL | Age: 74
End: 2024-10-28
Payer: MEDICARE

## 2024-10-28 DIAGNOSIS — M54.16 LUMBAR RADICULOPATHY: Primary | ICD-10-CM

## 2024-10-28 DIAGNOSIS — M25.612 DECREASED ROM OF LEFT SHOULDER: ICD-10-CM

## 2024-10-28 PROCEDURE — 97110 THERAPEUTIC EXERCISES: CPT | Mod: KX,PN

## 2024-10-28 PROCEDURE — 97530 THERAPEUTIC ACTIVITIES: CPT | Mod: KX,PN

## 2024-10-29 ENCOUNTER — TELEPHONE (OUTPATIENT)
Dept: INTERNAL MEDICINE | Facility: CLINIC | Age: 74
End: 2024-10-29
Payer: MEDICARE

## 2024-10-29 ENCOUNTER — OFFICE VISIT (OUTPATIENT)
Dept: PULMONOLOGY | Facility: CLINIC | Age: 74
End: 2024-10-29
Payer: MEDICARE

## 2024-10-29 VITALS
RESPIRATION RATE: 14 BRPM | HEART RATE: 60 BPM | WEIGHT: 218.94 LBS | OXYGEN SATURATION: 97 % | DIASTOLIC BLOOD PRESSURE: 100 MMHG | SYSTOLIC BLOOD PRESSURE: 140 MMHG | BODY MASS INDEX: 40.29 KG/M2 | HEIGHT: 62 IN

## 2024-10-29 DIAGNOSIS — G89.29 CHRONIC SHOULDER PAIN, UNSPECIFIED LATERALITY: Primary | ICD-10-CM

## 2024-10-29 DIAGNOSIS — I10 ESSENTIAL HYPERTENSION: ICD-10-CM

## 2024-10-29 DIAGNOSIS — G47.33 OSA ON CPAP: Primary | ICD-10-CM

## 2024-10-29 DIAGNOSIS — M25.519 CHRONIC SHOULDER PAIN, UNSPECIFIED LATERALITY: Primary | ICD-10-CM

## 2024-10-29 PROCEDURE — 3077F SYST BP >= 140 MM HG: CPT | Mod: CPTII,S$GLB,, | Performed by: HOSPITALIST

## 2024-10-29 PROCEDURE — 1126F AMNT PAIN NOTED NONE PRSNT: CPT | Mod: CPTII,S$GLB,, | Performed by: HOSPITALIST

## 2024-10-29 PROCEDURE — 1160F RVW MEDS BY RX/DR IN RCRD: CPT | Mod: CPTII,S$GLB,, | Performed by: HOSPITALIST

## 2024-10-29 PROCEDURE — 3008F BODY MASS INDEX DOCD: CPT | Mod: CPTII,S$GLB,, | Performed by: HOSPITALIST

## 2024-10-29 PROCEDURE — 1101F PT FALLS ASSESS-DOCD LE1/YR: CPT | Mod: CPTII,S$GLB,, | Performed by: HOSPITALIST

## 2024-10-29 PROCEDURE — 3080F DIAST BP >= 90 MM HG: CPT | Mod: CPTII,S$GLB,, | Performed by: HOSPITALIST

## 2024-10-29 PROCEDURE — 99999 PR PBB SHADOW E&M-EST. PATIENT-LVL IV: CPT | Mod: PBBFAC,,, | Performed by: HOSPITALIST

## 2024-10-29 PROCEDURE — 3044F HG A1C LEVEL LT 7.0%: CPT | Mod: CPTII,S$GLB,, | Performed by: HOSPITALIST

## 2024-10-29 PROCEDURE — 99213 OFFICE O/P EST LOW 20 MIN: CPT | Mod: S$GLB,,, | Performed by: HOSPITALIST

## 2024-10-29 PROCEDURE — 3288F FALL RISK ASSESSMENT DOCD: CPT | Mod: CPTII,S$GLB,, | Performed by: HOSPITALIST

## 2024-10-29 PROCEDURE — 4010F ACE/ARB THERAPY RXD/TAKEN: CPT | Mod: CPTII,S$GLB,, | Performed by: HOSPITALIST

## 2024-10-29 PROCEDURE — 1159F MED LIST DOCD IN RCRD: CPT | Mod: CPTII,S$GLB,, | Performed by: HOSPITALIST

## 2024-10-29 RX ORDER — ATORVASTATIN CALCIUM 20 MG/1
20 TABLET, FILM COATED ORAL
COMMUNITY
Start: 2024-10-17

## 2024-11-01 NOTE — PROGRESS NOTES
[facility]   Physical Therapy Updated POC     Name: Beth Souza  Clinic Number: [id number]    Therapy Diagnosis:   Encounter Diagnoses   Name Primary?    Chronic shoulder pain, unspecified laterality     Lumbar radiculopathy Yes    Decreased ROM of left shoulder     Chronic pain of both shoulders     Weakness of both shoulders          Physician: Tanvir Yusuf    Visit Date: 11/4/2024    Physician Orders: PT Eval and Treat   Medical Diagnosis from Referral: M54.16 (ICD-10-CM) - Lumbar radiculopathy  Evaluation Date: 10/7/2024  Authorization Period Expiration: 12/31/2024  Plan of Care Expiration: 11/15/2024; 12/30/24  Progress Note Due: 30 days  Visit # / Visits authorized: 5/10 + Eval  FOTO: 1/ 3     Precautions: Standard      Time In: 1:10 PM  Time Out: 2:05 PM  Total Billable Time: 55 minutes    PTA Visit #: 0/5       Subjective     Patient reports: Pt reports B shoulder pain (L > R). Pt reports ongoing pain for about 3 months now, that is worsening in nature.   She was compliant with home exercise program.  Response to previous treatment: -  Functional change: -    Pain: 4/10  Location: legs and shoulders    Objective      (NT = not tested due to pain and/or inability to obtain test position)    RANGE OF MOTION:    Shoulder   Range of Motion Right  Active     Passive Left  Active     Passive Goal   Forward Flexion (180º) 110  90  170º   Abduction (180) 110  45  170   External Rotation at 90º (90º)  WNL  WNL* 80º   Internal Rotation at 90º (90º)  WNL  WNL* 70º   (*) pain and/or dysfunction    STRENGTH: Unable to fully assess due to difficulty achieving test position.     Upper Extremity  Strength RIGHT   Goal   Shoulder Flexion - 5/5 B   Shoulder Abduction - 5/5 B   Shoulder Internal Rotation  []1  []2  []3  [x]4  []5                []+ []- 5/5 B   Shoulder External Rotation []1  []2  []3  [x]4  []5                []+ []- 5/5 B   Elbow Flexion  []1  []2  []3  [x]4  []5   "              []+ []- 5/5 B   Elbow Extension []1  []2  []3  [x]4  []5                []+ []- 5/5 B     Upper Extremity  Strength LEFT   Goal   Shoulder Flexion - 5/5 B   Shoulder Abduction - 5/5 B   Shoulder Internal Rotation  []1  []2  []3  [x]4  []5                []+ [x]- 5/5 B   Shoulder External Rotation []1  []2  []3  [x]4  []5                []+ [x]- 5/5 B   Elbow Flexion  []1  []2  []3  [x]4  []5                []+ [x]- 5/5 B   Elbow Extension []1  []2  []3  [x]4  []5                []+ [x]- 5/5 B     JOINT MOBILITY:     Joint Motion Tested Right  (spine) Left    Goal   GHJ Hypomobile   Hypomobile  Significant muscle guarding present throughout movement Normal B     FUNCTION:     CMS Impairment/Limitation/Restriction for FOTO Shoulder Survey    Therapist reviewed FOTO scores for Beth Souza on 11/4/2024.   FOTO documents entered into [employer] - see Media section.    Limitation Score: 37%          Treatment     Beth received the treatments listed below:      therapeutic exercises to develop strength, endurance, ROM, and flexibility for 40 minutes including:  Pulleys (2/2)  Isometric Shoulder (2 x 10, 10")   Flexion   Abduction  Standing row (3 x 10, RTB)     manual therapy techniques: Joint mobilizations, Manual traction, and Soft tissue Mobilization were applied to the: B shoulders for 15 minutes, including:   PROM to B shoulders     neuromuscular re-education activities to improve: Balance, Coordination, Kinesthetic, Sense, Proprioception, and Posture for - minutes. The following activities were included:     therapeutic activities to improve functional performance for - minutes, including:     Patient Education and Home Exercises       Education provided:   - Cont'd HEP    Written Home Exercises Provided: Pt instructed to continue prior HEP. Exercises were reviewed and Beth was able to demonstrate them prior to the end of the session.  Beth demonstrated good  understanding of the education " provided. See Electronic Medical Record under Patient Instructions for exercises provided during therapy sessions    Assessment     Pt presents with B shoulder pain and weakness. Pt demonstrates diminished shoulder AROM and PROM B. Pt and PT reviewed HEP that addresses both shoulder mobility and weakness. Pt's current symptoms limit their ability to efficiently and independently complete ADLs. Pt to benefit from skilled PT to address aforementioned deficits and promote optimal function.    Beth Is progressing well towards her goals.   Patient prognosis is Good.     Patient will continue to benefit from skilled outpatient physical therapy to address the deficits listed in the problem list box on initial evaluation, provide pt/family education and to maximize pt's level of independence in the home and community environment.     Patient's spiritual, cultural and educational needs considered and pt agreeable to plan of care and goals.     Anticipated barriers to physical therapy: none    Goals:  SHORT TERM GOALS: 4 weeks Progress   Recent signs and systems trend is improving in order to progress towards Long term goals's. Progressing   Patient will be independent with Home Exercise Program  in order to further progress and return to maximal function. Progressing   Pain rating at Worst: 5/10 in order to progress towards increased independence with activity. Progressing   Patient will be able to correct postural deviations in sitting and standing, to decrease pain and promote postural awareness for injury prevention.  Progressing   Patient will partially meet predicted functional outcome (FOTO) score: 45% to improve towards increasing self-worth & perceived functional ability. Progressing      LONG TERM GOALS: 8 weeks, (12/30/24) Progress   Patient will return to normal Activities of daily living, recreational, and work related activities with less pain and limitation.  Progressing   Patient will improve Range of  Motion to stated goals in order to return to maximal functional potential.  Progressing   Patient will improve Strength to stated goals of appropriate musculature in order to improve functional independence.  Progressing   Pain Rating at Best: 1/10 to improve Quality of Life.  Progressing   Patient will meet predicted functional outcome (FOTO) score: 65% to increase self-worth & perceived functional ability. Progressing         Plan     Plan of care Certification:   Updated: 11/4/24 to 12/30/24     Outpatient Physical Therapy 2 times weekly for 6 weeks to include the following interventions: Cervical/Lumbar Traction, Electrical Stimulation PRN, Gait Training, Manual Therapy, Moist Heat/ Ice, Neuromuscular Re-ed, Patient Education, Self Care, Therapeutic Activities, Therapeutic Exercise, and FDN .     Mara Castillo, PT

## 2024-11-04 ENCOUNTER — CLINICAL SUPPORT (OUTPATIENT)
Dept: REHABILITATION | Facility: HOSPITAL | Age: 74
End: 2024-11-04
Payer: MEDICARE

## 2024-11-04 DIAGNOSIS — M54.16 LUMBAR RADICULOPATHY: Primary | ICD-10-CM

## 2024-11-04 DIAGNOSIS — M25.612 DECREASED ROM OF LEFT SHOULDER: ICD-10-CM

## 2024-11-04 DIAGNOSIS — M25.519 CHRONIC SHOULDER PAIN, UNSPECIFIED LATERALITY: ICD-10-CM

## 2024-11-04 DIAGNOSIS — G89.29 CHRONIC SHOULDER PAIN, UNSPECIFIED LATERALITY: ICD-10-CM

## 2024-11-04 DIAGNOSIS — G89.29 CHRONIC PAIN OF BOTH SHOULDERS: ICD-10-CM

## 2024-11-04 DIAGNOSIS — M25.512 CHRONIC PAIN OF BOTH SHOULDERS: ICD-10-CM

## 2024-11-04 DIAGNOSIS — R29.898 WEAKNESS OF BOTH SHOULDERS: ICD-10-CM

## 2024-11-04 DIAGNOSIS — M25.511 CHRONIC PAIN OF BOTH SHOULDERS: ICD-10-CM

## 2024-11-04 PROCEDURE — 97110 THERAPEUTIC EXERCISES: CPT | Mod: KX,PN

## 2024-11-04 PROCEDURE — 97140 MANUAL THERAPY 1/> REGIONS: CPT | Mod: KX,PN

## 2024-11-04 PROCEDURE — 97164 PT RE-EVAL EST PLAN CARE: CPT | Mod: KX,PN
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
## 2024-11-04 NOTE — PLAN OF CARE
[facility]   Physical Therapy Updated POC     Name: Beth Souza  Clinic Number: [id number]    Therapy Diagnosis:   Encounter Diagnoses   Name Primary?    Chronic shoulder pain, unspecified laterality     Lumbar radiculopathy Yes    Decreased ROM of left shoulder     Chronic pain of both shoulders     Weakness of both shoulders          Physician: Tanvir Yusuf    Visit Date: 11/4/2024    Physician Orders: PT Eval and Treat   Medical Diagnosis from Referral: M54.16 (ICD-10-CM) - Lumbar radiculopathy  Evaluation Date: 10/7/2024  Authorization Period Expiration: 12/31/2024  Plan of Care Expiration: 11/15/2024; 12/30/24  Progress Note Due: 30 days  Visit # / Visits authorized: 5/10 + Eval  FOTO: 1/ 3     Precautions: Standard      Time In: 1:10 PM  Time Out: 2:05 PM  Total Billable Time: 55 minutes    PTA Visit #: 0/5       Subjective     Patient reports: Pt reports B shoulder pain (L > R). Pt reports ongoing pain for about 3 months now, that is worsening in nature.   She was compliant with home exercise program.  Response to previous treatment: -  Functional change: -    Pain: 4/10  Location: legs and shoulders    Objective      (NT = not tested due to pain and/or inability to obtain test position)    RANGE OF MOTION:    Shoulder   Range of Motion Right  Active     Passive Left  Active     Passive Goal   Forward Flexion (180º) 110  90  170º   Abduction (180) 110  45  170   External Rotation at 90º (90º)  WNL  WNL* 80º   Internal Rotation at 90º (90º)  WNL  WNL* 70º   (*) pain and/or dysfunction    STRENGTH: Unable to fully assess due to difficulty achieving test position.     Upper Extremity  Strength RIGHT   Goal   Shoulder Flexion - 5/5 B   Shoulder Abduction - 5/5 B   Shoulder Internal Rotation  []1  []2  []3  [x]4  []5                []+ []- 5/5 B   Shoulder External Rotation []1  []2  []3  [x]4  []5                []+ []- 5/5 B   Elbow Flexion  []1  []2  []3  [x]4  []5   "              []+ []- 5/5 B   Elbow Extension []1  []2  []3  [x]4  []5                []+ []- 5/5 B     Upper Extremity  Strength LEFT   Goal   Shoulder Flexion - 5/5 B   Shoulder Abduction - 5/5 B   Shoulder Internal Rotation  []1  []2  []3  [x]4  []5                []+ [x]- 5/5 B   Shoulder External Rotation []1  []2  []3  [x]4  []5                []+ [x]- 5/5 B   Elbow Flexion  []1  []2  []3  [x]4  []5                []+ [x]- 5/5 B   Elbow Extension []1  []2  []3  [x]4  []5                []+ [x]- 5/5 B     JOINT MOBILITY:     Joint Motion Tested Right  (spine) Left    Goal   GHJ Hypomobile   Hypomobile  Significant muscle guarding present throughout movement Normal B     FUNCTION:     CMS Impairment/Limitation/Restriction for FOTO Shoulder Survey    Therapist reviewed FOTO scores for Beth Souza on 11/4/2024.   FOTO documents entered into [employer] - see Media section.    Limitation Score: 37%          Treatment     Beth received the treatments listed below:      therapeutic exercises to develop strength, endurance, ROM, and flexibility for 40 minutes including:  Pulleys (2/2)  Isometric Shoulder (2 x 10, 10")   Flexion   Abduction  Standing row (3 x 10, RTB)     manual therapy techniques: Joint mobilizations, Manual traction, and Soft tissue Mobilization were applied to the: B shoulders for 15 minutes, including:   PROM to B shoulders     neuromuscular re-education activities to improve: Balance, Coordination, Kinesthetic, Sense, Proprioception, and Posture for - minutes. The following activities were included:     therapeutic activities to improve functional performance for - minutes, including:     Patient Education and Home Exercises       Education provided:   - Cont'd HEP    Written Home Exercises Provided: Pt instructed to continue prior HEP. Exercises were reviewed and Beth was able to demonstrate them prior to the end of the session.  Beth demonstrated good  understanding of the education " provided. See Electronic Medical Record under Patient Instructions for exercises provided during therapy sessions    Assessment     Pt presents with B shoulder pain and weakness. Pt demonstrates diminished shoulder AROM and PROM B. Pt and PT reviewed HEP that addresses both shoulder mobility and weakness. Pt's current symptoms limit their ability to efficiently and independently complete ADLs. Pt to benefit from skilled PT to address aforementioned deficits and promote optimal function.    Beth Is progressing well towards her goals.   Patient prognosis is Good.     Patient will continue to benefit from skilled outpatient physical therapy to address the deficits listed in the problem list box on initial evaluation, provide pt/family education and to maximize pt's level of independence in the home and community environment.     Patient's spiritual, cultural and educational needs considered and pt agreeable to plan of care and goals.     Anticipated barriers to physical therapy: none    Goals:  SHORT TERM GOALS: 4 weeks Progress   Recent signs and systems trend is improving in order to progress towards Long term goals's. Progressing   Patient will be independent with Home Exercise Program  in order to further progress and return to maximal function. Progressing   Pain rating at Worst: 5/10 in order to progress towards increased independence with activity. Progressing   Patient will be able to correct postural deviations in sitting and standing, to decrease pain and promote postural awareness for injury prevention.  Progressing   Patient will partially meet predicted functional outcome (FOTO) score: 45% to improve towards increasing self-worth & perceived functional ability. Progressing      LONG TERM GOALS: 8 weeks, (12/30/24) Progress   Patient will return to normal Activities of daily living, recreational, and work related activities with less pain and limitation.  Progressing   Patient will improve Range of  Motion to stated goals in order to return to maximal functional potential.  Progressing   Patient will improve Strength to stated goals of appropriate musculature in order to improve functional independence.  Progressing   Pain Rating at Best: 1/10 to improve Quality of Life.  Progressing   Patient will meet predicted functional outcome (FOTO) score: 65% to increase self-worth & perceived functional ability. Progressing         Plan     Plan of care Certification:   Updated: 11/4/24 to 12/30/24     Outpatient Physical Therapy 2 times weekly for 6 weeks to include the following interventions: Cervical/Lumbar Traction, Electrical Stimulation PRN, Gait Training, Manual Therapy, Moist Heat/ Ice, Neuromuscular Re-ed, Patient Education, Self Care, Therapeutic Activities, Therapeutic Exercise, and FDN .     Mara Castillo, PT

## 2024-11-05 ENCOUNTER — OFFICE VISIT (OUTPATIENT)
Dept: PAIN MEDICINE | Facility: CLINIC | Age: 74
End: 2024-11-05
Payer: MEDICARE

## 2024-11-05 VITALS
WEIGHT: 216.63 LBS | BODY MASS INDEX: 39.86 KG/M2 | SYSTOLIC BLOOD PRESSURE: 137 MMHG | HEIGHT: 62 IN | DIASTOLIC BLOOD PRESSURE: 88 MMHG | RESPIRATION RATE: 17 BRPM | HEART RATE: 50 BPM

## 2024-11-05 DIAGNOSIS — M47.812 CERVICAL SPONDYLOSIS: ICD-10-CM

## 2024-11-05 DIAGNOSIS — M47.816 LUMBAR FACET ARTHROPATHY: ICD-10-CM

## 2024-11-05 DIAGNOSIS — M54.16 LUMBAR RADICULOPATHY: Primary | ICD-10-CM

## 2024-11-05 PROCEDURE — 3075F SYST BP GE 130 - 139MM HG: CPT | Mod: CPTII,S$GLB,, | Performed by: NURSE PRACTITIONER

## 2024-11-05 PROCEDURE — 99999 PR PBB SHADOW E&M-EST. PATIENT-LVL III: CPT | Mod: PBBFAC,,, | Performed by: NURSE PRACTITIONER

## 2024-11-05 PROCEDURE — 1125F AMNT PAIN NOTED PAIN PRSNT: CPT | Mod: CPTII,S$GLB,, | Performed by: NURSE PRACTITIONER

## 2024-11-05 PROCEDURE — 3044F HG A1C LEVEL LT 7.0%: CPT | Mod: CPTII,S$GLB,, | Performed by: NURSE PRACTITIONER

## 2024-11-05 PROCEDURE — 3288F FALL RISK ASSESSMENT DOCD: CPT | Mod: CPTII,S$GLB,, | Performed by: NURSE PRACTITIONER

## 2024-11-05 PROCEDURE — 1101F PT FALLS ASSESS-DOCD LE1/YR: CPT | Mod: CPTII,S$GLB,, | Performed by: NURSE PRACTITIONER

## 2024-11-05 PROCEDURE — 99214 OFFICE O/P EST MOD 30 MIN: CPT | Mod: S$GLB,,, | Performed by: NURSE PRACTITIONER

## 2024-11-05 PROCEDURE — 3008F BODY MASS INDEX DOCD: CPT | Mod: CPTII,S$GLB,, | Performed by: NURSE PRACTITIONER

## 2024-11-05 PROCEDURE — 4010F ACE/ARB THERAPY RXD/TAKEN: CPT | Mod: CPTII,S$GLB,, | Performed by: NURSE PRACTITIONER

## 2024-11-05 PROCEDURE — 3079F DIAST BP 80-89 MM HG: CPT | Mod: CPTII,S$GLB,, | Performed by: NURSE PRACTITIONER

## 2024-11-05 PROCEDURE — 1159F MED LIST DOCD IN RCRD: CPT | Mod: CPTII,S$GLB,, | Performed by: NURSE PRACTITIONER

## 2024-11-05 RX ORDER — GABAPENTIN 100 MG/1
CAPSULE ORAL
Qty: 60 CAPSULE | Refills: 2 | Status: SHIPPED | OUTPATIENT
Start: 2024-11-05

## 2024-11-05 NOTE — PROGRESS NOTES
"[facility]   Physical Therapy Updated POC     Name: Beth Souza  Clinic Number: [id number]    Therapy Diagnosis:   Encounter Diagnoses   Name Primary?    Lumbar radiculopathy Yes    Decreased ROM of left shoulder     Chronic pain of both shoulders     Weakness of both shoulders      Physician: Tanvir Yusuf    Visit Date: 11/6/2024    Physician Orders: PT Eval and Treat   Medical Diagnosis from Referral: M54.16 (ICD-10-CM) - Lumbar radiculopathy  Evaluation Date: 10/7/2024  Authorization Period Expiration: 12/31/2024  Plan of Care Expiration: 11/15/2024; 12/30/24  Progress Note Due: 30 days  Visit # / Visits authorized: 6/10 + Eval  FOTO: 1/ 3     Precautions: Standard      Time In: 1:00 PM  Time Out: 2:05 PM  Total Billable Time: 65 minutes    PTA Visit #: 0/5       Subjective     Patient reports: Shoulders felt a little better after last visit.   She was compliant with home exercise program.  Response to previous treatment: -  Functional change: -    Pain: 4/10  Location: legs and shoulders    Objective      (NT = not tested due to pain and/or inability to obtain test position)    Treatment     Beth received the treatments listed below:      therapeutic exercises to develop strength, endurance, ROM, and flexibility for 12 minutes including:  LLLD Inf Glide (5', 10 lbs, MH)  Pulleys (2/2)     manual therapy techniques: Joint mobilizations, Manual traction, and Soft tissue Mobilization were applied to the: B shoulders for 18 minutes, including:  PROM to B shoulders     neuromuscular re-education activities to improve: Balance, Coordination, Kinesthetic, Sense, Proprioception, and Posture for 35 minutes. The following activities were included:  IR walkouts (2 x 10, YTB) B  ER walkouts (2 x 10, YTB) B  Isometric Shoulder (2 x 10, 10") B  Flexion  Abduction     therapeutic activities to improve functional performance for - minutes, including:    Patient Education and " Home Exercises       Education provided:   - Cont'd HEP    Written Home Exercises Provided: Pt instructed to continue prior HEP. Exercises were reviewed and Beth was able to demonstrate them prior to the end of the session.  Beth demonstrated good  understanding of the education provided. See Electronic Medical Record under Patient Instructions for exercises provided during therapy sessions    Assessment     Pt demonstrates independence with HEP provided during previous visit. Pt requires cuing to improve UE placement with IR/ER walkouts. Pt tolerates PROM to the L shoulder but does report pain with end range ER and elevation.     Beth Is progressing well towards her goals.   Patient prognosis is Good.     Patient will continue to benefit from skilled outpatient physical therapy to address the deficits listed in the problem list box on initial evaluation, provide pt/family education and to maximize pt's level of independence in the home and community environment.     Patient's spiritual, cultural and educational needs considered and pt agreeable to plan of care and goals.     Anticipated barriers to physical therapy: none    Goals:  SHORT TERM GOALS: 4 weeks Progress   Recent signs and systems trend is improving in order to progress towards Long term goals's. Progressing   Patient will be independent with Home Exercise Program  in order to further progress and return to maximal function. Progressing   Pain rating at Worst: 5/10 in order to progress towards increased independence with activity. Progressing   Patient will be able to correct postural deviations in sitting and standing, to decrease pain and promote postural awareness for injury prevention.  Progressing   Patient will partially meet predicted functional outcome (FOTO) score: 45% to improve towards increasing self-worth & perceived functional ability. Progressing      LONG TERM GOALS: 8 weeks, (12/30/24) Progress   Patient will return to  normal Activities of daily living, recreational, and work related activities with less pain and limitation.  Progressing   Patient will improve Range of Motion to stated goals in order to return to maximal functional potential.  Progressing   Patient will improve Strength to stated goals of appropriate musculature in order to improve functional independence.  Progressing   Pain Rating at Best: 1/10 to improve Quality of Life.  Progressing   Patient will meet predicted functional outcome (FOTO) score: 65% to increase self-worth & perceived functional ability. Progressing         Plan     Plan of care Certification:   Updated: 11/4/24 to 12/30/24     Outpatient Physical Therapy 2 times weekly for 6 weeks to include the following interventions: Cervical/Lumbar Traction, Electrical Stimulation PRN, Gait Training, Manual Therapy, Moist Heat/ Ice, Neuromuscular Re-ed, Patient Education, Self Care, Therapeutic Activities, Therapeutic Exercise, and FDN .     Mara Castillo, PT

## 2024-11-06 ENCOUNTER — CLINICAL SUPPORT (OUTPATIENT)
Dept: REHABILITATION | Facility: HOSPITAL | Age: 74
End: 2024-11-06
Payer: MEDICARE

## 2024-11-06 DIAGNOSIS — M25.511 CHRONIC PAIN OF BOTH SHOULDERS: ICD-10-CM

## 2024-11-06 DIAGNOSIS — G89.29 CHRONIC PAIN OF BOTH SHOULDERS: ICD-10-CM

## 2024-11-06 DIAGNOSIS — M25.512 CHRONIC PAIN OF BOTH SHOULDERS: ICD-10-CM

## 2024-11-06 DIAGNOSIS — M25.612 DECREASED ROM OF LEFT SHOULDER: ICD-10-CM

## 2024-11-06 DIAGNOSIS — R29.898 WEAKNESS OF BOTH SHOULDERS: ICD-10-CM

## 2024-11-06 DIAGNOSIS — M54.16 LUMBAR RADICULOPATHY: Primary | ICD-10-CM

## 2024-11-06 PROCEDURE — 97110 THERAPEUTIC EXERCISES: CPT | Mod: KX,PN

## 2024-11-06 PROCEDURE — 97112 NEUROMUSCULAR REEDUCATION: CPT | Mod: KX,PN

## 2024-11-06 PROCEDURE — 97140 MANUAL THERAPY 1/> REGIONS: CPT | Mod: KX,PN

## 2024-11-08 NOTE — PROGRESS NOTES
[facility]   Physical Therapy Updated POC     Name: Beth Souza  Clinic Number: [id number]    Therapy Diagnosis:   Encounter Diagnoses   Name Primary?    Lumbar radiculopathy Yes    Decreased ROM of left shoulder     Chronic pain of both shoulders     Weakness of both shoulders        Physician: Tanvir Yusuf    Visit Date: 11/11/2024    Physician Orders: PT Eval and Treat   Medical Diagnosis from Referral: M54.16 (ICD-10-CM) - Lumbar radiculopathy  Evaluation Date: 10/7/2024  Authorization Period Expiration: 12/31/2024  Plan of Care Expiration: 11/15/2024; 12/30/24  Progress Note Due: 30 days  Visit # / Visits authorized: 7/10 + Eval  FOTO: 1/ 3     Precautions: Standard      Time In: 1:10 PM  Time Out: 2:15 PM  Total Billable Time: 65 minutes    PTA Visit #: 0/5       Subjective     Patient reports: Has a headache today and is not feeling well.   She was compliant with home exercise program.  Response to previous treatment: -  Functional change: -    Pain: 4/10  Location: legs and shoulders    Objective      (NT = not tested due to pain and/or inability to obtain test position)    Treatment     Beth received the treatments listed below:      therapeutic exercises to develop strength, endurance, ROM, and flexibility for 12 minutes including:  LLLD Inf Glide (5', 10 lbs, MH)  Pulleys (2/2)     manual therapy techniques: Joint mobilizations, Manual traction, and Soft tissue Mobilization were applied to the: B shoulders for 25 minutes, including:  PROM to B shoulders     neuromuscular re-education activities to improve: Balance, Coordination, Kinesthetic, Sense, Proprioception, and Posture for 18 minutes. The following activities were included:  IR walkouts (2 x 10, YTB) B  ER walkouts (2 x 10, YTB) B  Land[employer] press (2 x 10)     therapeutic activities to improve functional performance for - minutes, including:  MHP to B shoulders for 10' after clearance from  contraindications and precautions.     Patient Education and Home Exercises       Education provided:   - Cont'd HEP    Written Home Exercises Provided: Pt instructed to continue prior HEP. Exercises were reviewed and Beth was able to demonstrate them prior to the end of the session.  Beth demonstrated good  understanding of the education provided. See Electronic Medical Record under Patient Instructions for exercises provided during therapy sessions    Assessment     Pt demonstrates poor tolerance to treatment today with reports of L shoulder pain and headache. Pt also reports low back pain limiting performance of standing activities. Pt tolerates slow and progressive PROM techniques but continues to report mild catching with elevation.     Beth Is progressing well towards her goals.   Patient prognosis is Good.     Patient will continue to benefit from skilled outpatient physical therapy to address the deficits listed in the problem list box on initial evaluation, provide pt/family education and to maximize pt's level of independence in the home and community environment.     Patient's spiritual, cultural and educational needs considered and pt agreeable to plan of care and goals.     Anticipated barriers to physical therapy: none    Goals:  SHORT TERM GOALS: 4 weeks Progress   Recent signs and systems trend is improving in order to progress towards Long term goals's. Progressing   Patient will be independent with Home Exercise Program  in order to further progress and return to maximal function. Progressing   Pain rating at Worst: 5/10 in order to progress towards increased independence with activity. Progressing   Patient will be able to correct postural deviations in sitting and standing, to decrease pain and promote postural awareness for injury prevention.  Progressing   Patient will partially meet predicted functional outcome (FOTO) score: 45% to improve towards increasing self-worth & perceived  functional ability. Progressing      LONG TERM GOALS: 8 weeks, (12/30/24) Progress   Patient will return to normal Activities of daily living, recreational, and work related activities with less pain and limitation.  Progressing   Patient will improve Range of Motion to stated goals in order to return to maximal functional potential.  Progressing   Patient will improve Strength to stated goals of appropriate musculature in order to improve functional independence.  Progressing   Pain Rating at Best: 1/10 to improve Quality of Life.  Progressing   Patient will meet predicted functional outcome (FOTO) score: 65% to increase self-worth & perceived functional ability. Progressing         Plan     Plan of care Certification:   Updated: 11/4/24 to 12/30/24     Outpatient Physical Therapy 2 times weekly for 6 weeks to include the following interventions: Cervical/Lumbar Traction, Electrical Stimulation PRN, Gait Training, Manual Therapy, Moist Heat/ Ice, Neuromuscular Re-ed, Patient Education, Self Care, Therapeutic Activities, Therapeutic Exercise, and FDN .     Mara Castillo, PT                   no

## 2024-11-11 ENCOUNTER — CLINICAL SUPPORT (OUTPATIENT)
Dept: REHABILITATION | Facility: HOSPITAL | Age: 74
End: 2024-11-11
Payer: MEDICARE

## 2024-11-11 DIAGNOSIS — M54.16 LUMBAR RADICULOPATHY: Primary | ICD-10-CM

## 2024-11-11 DIAGNOSIS — R29.898 WEAKNESS OF BOTH SHOULDERS: ICD-10-CM

## 2024-11-11 DIAGNOSIS — G89.29 CHRONIC PAIN OF BOTH SHOULDERS: ICD-10-CM

## 2024-11-11 DIAGNOSIS — M25.511 CHRONIC PAIN OF BOTH SHOULDERS: ICD-10-CM

## 2024-11-11 DIAGNOSIS — M25.612 DECREASED ROM OF LEFT SHOULDER: ICD-10-CM

## 2024-11-11 DIAGNOSIS — M25.512 CHRONIC PAIN OF BOTH SHOULDERS: ICD-10-CM

## 2024-11-11 PROCEDURE — 97140 MANUAL THERAPY 1/> REGIONS: CPT | Mod: KX,PN

## 2024-11-11 PROCEDURE — 97110 THERAPEUTIC EXERCISES: CPT | Mod: KX,PN

## 2024-11-12 DIAGNOSIS — M25.512 LEFT SHOULDER PAIN, UNSPECIFIED CHRONICITY: Primary | ICD-10-CM

## 2024-11-19 ENCOUNTER — HOSPITAL ENCOUNTER (OUTPATIENT)
Dept: RADIOLOGY | Facility: HOSPITAL | Age: 74
Discharge: HOME | End: 2024-11-19
Attending: STUDENT IN AN ORGANIZED HEALTH CARE EDUCATION/TRAINING PROGRAM
Payer: MEDICARE

## 2024-11-19 ENCOUNTER — OFFICE VISIT (OUTPATIENT)
Dept: SPORTS MEDICINE | Facility: CLINIC | Age: 74
End: 2024-11-19
Payer: MEDICARE

## 2024-11-19 VITALS — BODY MASS INDEX: 39.84 KG/M2 | WEIGHT: 216.5 LBS | HEIGHT: 62 IN

## 2024-11-19 DIAGNOSIS — M75.102 TEAR OF LEFT ROTATOR CUFF, UNSPECIFIED TEAR EXTENT, UNSPECIFIED WHETHER TRAUMATIC: ICD-10-CM

## 2024-11-19 DIAGNOSIS — M25.512 CHRONIC LEFT SHOULDER PAIN: Primary | ICD-10-CM

## 2024-11-19 DIAGNOSIS — M25.512 LEFT SHOULDER PAIN, UNSPECIFIED CHRONICITY: ICD-10-CM

## 2024-11-19 DIAGNOSIS — G89.29 CHRONIC LEFT SHOULDER PAIN: Primary | ICD-10-CM

## 2024-11-19 PROCEDURE — 1159F MED LIST DOCD IN RCRD: CPT | Mod: CPTII,S$GLB,, | Performed by: STUDENT IN AN ORGANIZED HEALTH CARE EDUCATION/TRAINING PROGRAM

## 2024-11-19 PROCEDURE — 1125F AMNT PAIN NOTED PAIN PRSNT: CPT | Mod: CPTII,S$GLB,, | Performed by: STUDENT IN AN ORGANIZED HEALTH CARE EDUCATION/TRAINING PROGRAM

## 2024-11-19 PROCEDURE — 99999 PR PBB SHADOW E&M-EST. PATIENT-LVL III: CPT | Mod: PBBFAC,,, | Performed by: STUDENT IN AN ORGANIZED HEALTH CARE EDUCATION/TRAINING PROGRAM

## 2024-11-19 PROCEDURE — 3044F HG A1C LEVEL LT 7.0%: CPT | Mod: CPTII,S$GLB,, | Performed by: STUDENT IN AN ORGANIZED HEALTH CARE EDUCATION/TRAINING PROGRAM

## 2024-11-19 PROCEDURE — 4010F ACE/ARB THERAPY RXD/TAKEN: CPT | Mod: CPTII,S$GLB,, | Performed by: STUDENT IN AN ORGANIZED HEALTH CARE EDUCATION/TRAINING PROGRAM

## 2024-11-19 PROCEDURE — 73030 X-RAY EXAM OF SHOULDER: CPT | Mod: 26,LT,, | Performed by: RADIOLOGY

## 2024-11-19 PROCEDURE — 3288F FALL RISK ASSESSMENT DOCD: CPT | Mod: CPTII,S$GLB,, | Performed by: STUDENT IN AN ORGANIZED HEALTH CARE EDUCATION/TRAINING PROGRAM

## 2024-11-19 PROCEDURE — 1101F PT FALLS ASSESS-DOCD LE1/YR: CPT | Mod: CPTII,S$GLB,, | Performed by: STUDENT IN AN ORGANIZED HEALTH CARE EDUCATION/TRAINING PROGRAM

## 2024-11-19 PROCEDURE — 99214 OFFICE O/P EST MOD 30 MIN: CPT | Mod: S$GLB,,, | Performed by: STUDENT IN AN ORGANIZED HEALTH CARE EDUCATION/TRAINING PROGRAM

## 2024-11-19 PROCEDURE — 73030 X-RAY EXAM OF SHOULDER: CPT | Mod: TC,LT

## 2024-11-19 PROCEDURE — 3008F BODY MASS INDEX DOCD: CPT | Mod: CPTII,S$GLB,, | Performed by: STUDENT IN AN ORGANIZED HEALTH CARE EDUCATION/TRAINING PROGRAM

## 2024-11-19 PROCEDURE — 1160F RVW MEDS BY RX/DR IN RCRD: CPT | Mod: CPTII,S$GLB,, | Performed by: STUDENT IN AN ORGANIZED HEALTH CARE EDUCATION/TRAINING PROGRAM
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
## 2024-11-26 ENCOUNTER — HOSPITAL ENCOUNTER (OUTPATIENT)
Dept: RADIOLOGY | Facility: HOSPITAL | Age: 74
Discharge: HOME | End: 2024-11-26
Attending: STUDENT IN AN ORGANIZED HEALTH CARE EDUCATION/TRAINING PROGRAM
Payer: MEDICARE

## 2024-11-26 DIAGNOSIS — G89.29 CHRONIC LEFT SHOULDER PAIN: ICD-10-CM

## 2024-11-26 DIAGNOSIS — M75.102 TEAR OF LEFT ROTATOR CUFF, UNSPECIFIED TEAR EXTENT, UNSPECIFIED WHETHER TRAUMATIC: ICD-10-CM

## 2024-11-26 DIAGNOSIS — M25.512 CHRONIC LEFT SHOULDER PAIN: ICD-10-CM

## 2024-11-26 PROCEDURE — 73221 MRI JOINT UPR EXTREM W/O DYE: CPT | Mod: TC,LT

## 2024-11-26 PROCEDURE — 73221 MRI JOINT UPR EXTREM W/O DYE: CPT | Mod: 26,LT,, | Performed by: RADIOLOGY

## 2024-12-03 ENCOUNTER — OFFICE VISIT (OUTPATIENT)
Dept: SPORTS MEDICINE | Facility: CLINIC | Age: 74
End: 2024-12-03
Payer: MEDICARE

## 2024-12-03 VITALS — WEIGHT: 216.5 LBS | RESPIRATION RATE: 17 BRPM | BODY MASS INDEX: 39.84 KG/M2 | HEIGHT: 62 IN

## 2024-12-03 DIAGNOSIS — Z76.89 ENCOUNTER TO ESTABLISH CARE: ICD-10-CM

## 2024-12-03 DIAGNOSIS — M67.814 TENDINOSIS OF LEFT ROTATOR CUFF: Primary | ICD-10-CM

## 2024-12-03 PROCEDURE — 1159F MED LIST DOCD IN RCRD: CPT | Mod: CPTII,S$GLB,, | Performed by: STUDENT IN AN ORGANIZED HEALTH CARE EDUCATION/TRAINING PROGRAM

## 2024-12-03 PROCEDURE — 99999 PR PBB SHADOW E&M-EST. PATIENT-LVL III: CPT | Mod: PBBFAC,,, | Performed by: STUDENT IN AN ORGANIZED HEALTH CARE EDUCATION/TRAINING PROGRAM

## 2024-12-03 PROCEDURE — 3008F BODY MASS INDEX DOCD: CPT | Mod: CPTII,S$GLB,, | Performed by: STUDENT IN AN ORGANIZED HEALTH CARE EDUCATION/TRAINING PROGRAM

## 2024-12-03 PROCEDURE — 3288F FALL RISK ASSESSMENT DOCD: CPT | Mod: CPTII,S$GLB,, | Performed by: STUDENT IN AN ORGANIZED HEALTH CARE EDUCATION/TRAINING PROGRAM

## 2024-12-03 PROCEDURE — 1101F PT FALLS ASSESS-DOCD LE1/YR: CPT | Mod: CPTII,S$GLB,, | Performed by: STUDENT IN AN ORGANIZED HEALTH CARE EDUCATION/TRAINING PROGRAM

## 2024-12-03 PROCEDURE — 3044F HG A1C LEVEL LT 7.0%: CPT | Mod: CPTII,S$GLB,, | Performed by: STUDENT IN AN ORGANIZED HEALTH CARE EDUCATION/TRAINING PROGRAM

## 2024-12-03 PROCEDURE — 4010F ACE/ARB THERAPY RXD/TAKEN: CPT | Mod: CPTII,S$GLB,, | Performed by: STUDENT IN AN ORGANIZED HEALTH CARE EDUCATION/TRAINING PROGRAM

## 2024-12-03 PROCEDURE — 99214 OFFICE O/P EST MOD 30 MIN: CPT | Mod: S$GLB,,, | Performed by: STUDENT IN AN ORGANIZED HEALTH CARE EDUCATION/TRAINING PROGRAM

## 2024-12-03 PROCEDURE — 1160F RVW MEDS BY RX/DR IN RCRD: CPT | Mod: CPTII,S$GLB,, | Performed by: STUDENT IN AN ORGANIZED HEALTH CARE EDUCATION/TRAINING PROGRAM

## 2024-12-03 PROCEDURE — 1125F AMNT PAIN NOTED PAIN PRSNT: CPT | Mod: CPTII,S$GLB,, | Performed by: STUDENT IN AN ORGANIZED HEALTH CARE EDUCATION/TRAINING PROGRAM

## 2024-12-03 RX ORDER — MELOXICAM 15 MG/1
15 TABLET ORAL DAILY
Qty: 30 TABLET | Refills: 2 | Status: SHIPPED | OUTPATIENT
Start: 2024-12-03

## 2024-12-09 ENCOUNTER — OFFICE VISIT (OUTPATIENT)
Dept: PODIATRY | Facility: CLINIC | Age: 74
End: 2024-12-09
Payer: MEDICARE

## 2024-12-09 VITALS — HEIGHT: 62 IN | WEIGHT: 216.5 LBS | BODY MASS INDEX: 39.84 KG/M2

## 2024-12-09 DIAGNOSIS — M25.871 SESAMOIDITIS OF RIGHT FOOT: ICD-10-CM

## 2024-12-09 DIAGNOSIS — M20.42 HAMMER TOES OF BOTH FEET: Primary | ICD-10-CM

## 2024-12-09 DIAGNOSIS — M24.571 CONTRACTURE, RIGHT ANKLE: ICD-10-CM

## 2024-12-09 DIAGNOSIS — M24.572 CONTRACTURE, LEFT ANKLE: ICD-10-CM

## 2024-12-09 DIAGNOSIS — M21.42 BILATERAL PES PLANUS: ICD-10-CM

## 2024-12-09 DIAGNOSIS — M79.674 PAIN IN TOES OF BOTH FEET: ICD-10-CM

## 2024-12-09 DIAGNOSIS — M21.41 BILATERAL PES PLANUS: ICD-10-CM

## 2024-12-09 DIAGNOSIS — M20.41 HAMMER TOES OF BOTH FEET: Primary | ICD-10-CM

## 2024-12-09 DIAGNOSIS — M79.675 PAIN IN TOES OF BOTH FEET: ICD-10-CM

## 2024-12-09 PROCEDURE — 99214 OFFICE O/P EST MOD 30 MIN: CPT | Mod: S$GLB,,, | Performed by: PODIATRIST

## 2024-12-09 PROCEDURE — 99999 PR PBB SHADOW E&M-EST. PATIENT-LVL III: CPT | Mod: PBBFAC,,, | Performed by: PODIATRIST

## 2024-12-09 PROCEDURE — 4010F ACE/ARB THERAPY RXD/TAKEN: CPT | Mod: CPTII,S$GLB,, | Performed by: PODIATRIST

## 2024-12-09 PROCEDURE — 1125F AMNT PAIN NOTED PAIN PRSNT: CPT | Mod: CPTII,S$GLB,, | Performed by: PODIATRIST

## 2024-12-09 PROCEDURE — 1101F PT FALLS ASSESS-DOCD LE1/YR: CPT | Mod: CPTII,S$GLB,, | Performed by: PODIATRIST

## 2024-12-09 PROCEDURE — 3288F FALL RISK ASSESSMENT DOCD: CPT | Mod: CPTII,S$GLB,, | Performed by: PODIATRIST

## 2024-12-09 PROCEDURE — 3044F HG A1C LEVEL LT 7.0%: CPT | Mod: CPTII,S$GLB,, | Performed by: PODIATRIST

## 2024-12-09 PROCEDURE — 3008F BODY MASS INDEX DOCD: CPT | Mod: CPTII,S$GLB,, | Performed by: PODIATRIST

## 2024-12-09 PROCEDURE — 1159F MED LIST DOCD IN RCRD: CPT | Mod: CPTII,S$GLB,, | Performed by: PODIATRIST

## 2024-12-09 PROCEDURE — 1160F RVW MEDS BY RX/DR IN RCRD: CPT | Mod: CPTII,S$GLB,, | Performed by: PODIATRIST

## 2024-12-09 NOTE — PROGRESS NOTES
Subjective:       Patient ID: Beth Souza is a 74 y.o. female.    Chief Complaint: Callouses (Callus on toes, rate pain 10/10, nondiabetic, pt is wearing tennis shoes and socks )      HPI: Beth Souza presents to the office today, with complaints of pains to the left and right foot. The pains are stated as moderate to severe at the B/L 5th. Patient states limping with gait at times due to the pains. Pains are exacerbated by walking and standing. Sitting and limited WB does alleviate and/or decrease the pains. The patient does have hammer toe contractures. States alternation of shoe gear has not been helpful. Patient's Primary Care Provider is Edna Oliva MD.     Review of patient's allergies indicates:   Allergen Reactions    Aspirin Other (See Comments)     Bruising       Past Medical History:   Diagnosis Date    Cataract     Hyperlipidemia     Hypertension     Lumbar spondylosis     Meningioma 2021    Obesity     Sleep apnea     Vitamin D deficiency        Family History   Problem Relation Name Age of Onset    Hypertension Mother      Diabetes Daughter      Melanoma Neg Hx      Psoriasis Neg Hx      Lupus Neg Hx      Eczema Neg Hx      Breast cancer Neg Hx      Colon cancer Neg Hx      Ovarian cancer Neg Hx      Thrombosis Neg Hx         Social History     Socioeconomic History    Marital status:     Number of children: 3   Occupational History    Occupation: [employer]     Employer: not employed   Tobacco Use    Smoking status: Former     Current packs/day: 0.00     Types: Cigarettes     Quit date: 2004     Years since quittin.9    Smokeless tobacco: Never   Substance and Sexual Activity    Alcohol use: Yes     Comment: occasionally    Drug use: No    Sexual activity: Not Currently     Partners: Male   Other Topics Concern    Are you pregnant or think you may be? No    Breast-feeding No   Social History Narrative    Live w/ alone      Social Drivers of Health      Financial Resource Strain: Low Risk  (7/12/2022)    Overall Financial Resource Strain (CARDIA)     Difficulty of Paying Living Expenses: Not hard at all   Food Insecurity: No Food Insecurity (7/12/2022)    Hunger Vital Sign     Worried About Running Out of Food in the Last Year: Never true     Ran Out of Food in the Last Year: Never true   Transportation Needs: No Transportation Needs (7/12/2022)    PRAPARE - Transportation     Lack of Transportation (Medical): No     Lack of Transportation (Non-Medical): No   Physical Activity: Inactive (7/12/2022)    Exercise Vital Sign     Days of Exercise per Week: 0 days     Minutes of Exercise per Session: 0 min   Stress: No Stress Concern Present (7/12/2022)    [employer] [place] [employer] - Occupational Stress Questionnaire     Feeling of Stress : Not at all   Housing Stability: Low Risk  (7/12/2022)    Housing Stability Vital Sign     Unable to Pay for Housing in the Last Year: No     Number of Places Lived in the Last Year: 1     Unstable Housing in the Last Year: No       Past Surgical History:   Procedure Laterality Date    HYSTERECTOMY      PARTIAL HYSTERECTOMY      SELECTIVE INJECTION OF ANESTHETIC AGENT AROUND LUMBAR SPINAL NERVE ROOT BY TRANSFORAMINAL APPROACH Bilateral 3/21/2023    Procedure: Bilateral L5/S1 transforaminal epidural steroid injection;  Surgeon: Kush Jha MD;  Location: [facility];  Service: Pain Management;  Laterality: Bilateral;    SELECTIVE INJECTION OF ANESTHETIC AGENT AROUND LUMBAR SPINAL NERVE ROOT BY TRANSFORAMINAL APPROACH Bilateral 12/12/2023    Procedure: Bilateral L4/5 and L5-S1 TF LAWSON;  Surgeon: Kush Jha MD;  Location: [facility];  Service: Pain Management;  Laterality: Bilateral;       Review of Systems   Constitutional:  Negative for chills, fatigue and fever.   HENT:  Negative for hearing loss.    Eyes:  Negative for photophobia and visual disturbance.   Respiratory:  Negative for cough, chest  "tightness, shortness of breath and wheezing.    Cardiovascular:  Positive for leg swelling. Negative for chest pain and palpitations.   Gastrointestinal:  Negative for constipation, diarrhea, nausea and vomiting.   Endocrine: Negative for cold intolerance and heat intolerance.   Genitourinary:  Negative for flank pain.   Musculoskeletal:  Positive for gait problem. Negative for neck pain and neck stiffness.   Neurological:  Negative for light-headedness and headaches.   Psychiatric/Behavioral:  Negative for sleep disturbance.          Objective:   Ht 5' 2" (1.575 m)   Wt 98.2 kg (216 lb 7.9 oz)   BMI 39.60 kg/m²       Physical Exam    LOWER EXTREMITY PHYSICAL EXAMINATION    DERMATOLOGY: Skin is supple, dry and intact. Callus formation is noted, B/L 4th digit.    ORTHOPEDIC: Manual Muscle Testing is 5/5 in all planes on the left and right foot, without pains, with and without resistance. Gait pattern is antalgic. There is moderate to severe semi-rigid hammer toe contracture to the left and right foot at the 4th toe. Associated metatarsalgia is noted. 1st RLE sesamoiditis is noted. Pes planus is noted. Equinus is noted.     VASCULAR: The right dorsalis pedis pulse is 1/4 and the posterior tibial pulse is 1/4. The left dorsalis pedis pulse is 1/4 and the posterior tibial pulse is 1/4. Hair growth is noted on the dorsal foot and digits. Proximal to distal, warm to warm. Capillary refill time is WNL at less than 3s       Assessment:     1. Hammer toes of both feet    2. Pain in toes of both feet    3. Contracture, right ankle    4. Contracture, left ankle    5. Sesamoiditis of right foot    6. Bilateral pes planus          Plan:     Hammer toes of both feet    Pain in toes of both feet    Contracture, right ankle    Contracture, left ankle    Sesamoiditis of right foot    Bilateral pes planus        This patient does have hammertoe (digital) contractures. I did advise the patient to ambulate with shoe gear that is high " in the tox box to allow for extra room and depth in the sagittal plane, in order to alleviate and lessen the potential for dorsal digital break down at the IPJs. I do also recommended shoe gear that is soft and supple in the foot bed as to lessen the potential for plantar distal digital break down at the contracted digits. If the patient does not feel the aforementioned is necessary, he or she may also purchase OTC padding devices to be worn across the MTPJ, at the distal aspects of the digits, and/or at the dorsal aspects of the IPJs. The patient does acknowledge understanding and is said to be amenable to compliance.    The procedure of (B/L 4th digit hammer toe repair with possible B/L gastroc recession) was thoroughly explained to the patient. Its necessity and/or purpose and the implications therein were outlined, including any pertinent advantages and/or disadvantages, and possible complications, if any. Possible complications include recurrence of pathology and/or deformity, infection (cellulitis, drainage, purulence, malodor, etc...), pain, numbness, neuritis, edema, burning, loss of function, need for further surgery, possible need for removal of any implanted hardware, soft tissue contracture and/or scarring, etc... No guarantees were given and/or implied. Post-operative expectations and weightbearing protocol is thoroughly explained to the patient, who acknowledges understanding.            Future Appointments   Date Time Provider Department Center   1/9/2025  1:40 PM Eamon Reynolds MD [facility] CARDIO [place]   1/17/2025  8:40 AM Edna Oliva MD [facility] IM [place]   1/29/2025 10:30 AM Selam Guevara PA-C [facility] PULMSVC [place]   2/17/2025  2:20 PM Balta Aranda OD [facility] OPHTHAL [place]   5/5/2025  1:00 PM Gwen Khan NP [facility] IN [facility]